# Patient Record
Sex: MALE | Race: BLACK OR AFRICAN AMERICAN | Employment: OTHER | URBAN - METROPOLITAN AREA
[De-identification: names, ages, dates, MRNs, and addresses within clinical notes are randomized per-mention and may not be internally consistent; named-entity substitution may affect disease eponyms.]

---

## 2017-01-09 ENCOUNTER — HOME CARE VISIT (OUTPATIENT)
Dept: HOME HEALTH SERVICES | Facility: HOME HEALTH | Age: 62
End: 2017-01-09

## 2017-01-11 ENCOUNTER — HOME HEALTH ADMISSION (OUTPATIENT)
Dept: HOME HEALTH SERVICES | Facility: HOME HEALTH | Age: 62
End: 2017-01-11
Payer: COMMERCIAL

## 2017-01-17 ENCOUNTER — HOME CARE VISIT (OUTPATIENT)
Dept: HOME HEALTH SERVICES | Facility: HOME HEALTH | Age: 62
End: 2017-01-17
Payer: COMMERCIAL

## 2017-01-18 ENCOUNTER — HOME CARE VISIT (OUTPATIENT)
Dept: SCHEDULING | Facility: HOME HEALTH | Age: 62
End: 2017-01-18
Payer: COMMERCIAL

## 2017-01-18 PROCEDURE — 400013 HH SOC

## 2017-01-18 PROCEDURE — G0299 HHS/HOSPICE OF RN EA 15 MIN: HCPCS

## 2017-01-19 ENCOUNTER — HOME CARE VISIT (OUTPATIENT)
Dept: HOME HEALTH SERVICES | Facility: HOME HEALTH | Age: 62
End: 2017-01-19
Payer: COMMERCIAL

## 2017-01-21 ENCOUNTER — HOME CARE VISIT (OUTPATIENT)
Dept: SCHEDULING | Facility: HOME HEALTH | Age: 62
End: 2017-01-21
Payer: COMMERCIAL

## 2017-01-21 VITALS
OXYGEN SATURATION: 98 % | WEIGHT: 210 LBS | TEMPERATURE: 98 F | DIASTOLIC BLOOD PRESSURE: 76 MMHG | BODY MASS INDEX: 29.4 KG/M2 | SYSTOLIC BLOOD PRESSURE: 138 MMHG | RESPIRATION RATE: 20 BRPM | HEART RATE: 68 BPM | HEIGHT: 71 IN

## 2017-01-21 VITALS — SYSTOLIC BLOOD PRESSURE: 128 MMHG | TEMPERATURE: 97 F | HEART RATE: 80 BPM | DIASTOLIC BLOOD PRESSURE: 76 MMHG

## 2017-01-21 PROCEDURE — G0299 HHS/HOSPICE OF RN EA 15 MIN: HCPCS

## 2017-01-24 ENCOUNTER — HOME CARE VISIT (OUTPATIENT)
Dept: SCHEDULING | Facility: HOME HEALTH | Age: 62
End: 2017-01-24
Payer: COMMERCIAL

## 2017-01-24 VITALS
HEART RATE: 68 BPM | TEMPERATURE: 98 F | RESPIRATION RATE: 20 BRPM | OXYGEN SATURATION: 97 % | SYSTOLIC BLOOD PRESSURE: 142 MMHG | DIASTOLIC BLOOD PRESSURE: 80 MMHG

## 2017-01-24 PROCEDURE — G0299 HHS/HOSPICE OF RN EA 15 MIN: HCPCS

## 2017-01-27 ENCOUNTER — HOME CARE VISIT (OUTPATIENT)
Dept: SCHEDULING | Facility: HOME HEALTH | Age: 62
End: 2017-01-27
Payer: COMMERCIAL

## 2017-01-27 VITALS
SYSTOLIC BLOOD PRESSURE: 128 MMHG | HEART RATE: 68 BPM | RESPIRATION RATE: 20 BRPM | OXYGEN SATURATION: 97 % | TEMPERATURE: 98 F | DIASTOLIC BLOOD PRESSURE: 88 MMHG

## 2017-01-27 PROCEDURE — G0299 HHS/HOSPICE OF RN EA 15 MIN: HCPCS

## 2017-01-31 ENCOUNTER — HOME CARE VISIT (OUTPATIENT)
Dept: SCHEDULING | Facility: HOME HEALTH | Age: 62
End: 2017-01-31
Payer: COMMERCIAL

## 2017-01-31 VITALS
RESPIRATION RATE: 20 BRPM | HEART RATE: 68 BPM | OXYGEN SATURATION: 98 % | DIASTOLIC BLOOD PRESSURE: 76 MMHG | TEMPERATURE: 98 F | SYSTOLIC BLOOD PRESSURE: 136 MMHG

## 2017-01-31 PROCEDURE — G0299 HHS/HOSPICE OF RN EA 15 MIN: HCPCS

## 2017-02-03 ENCOUNTER — HOME CARE VISIT (OUTPATIENT)
Dept: SCHEDULING | Facility: HOME HEALTH | Age: 62
End: 2017-02-03
Payer: COMMERCIAL

## 2017-02-03 PROCEDURE — G0299 HHS/HOSPICE OF RN EA 15 MIN: HCPCS

## 2017-02-05 ENCOUNTER — HOME CARE VISIT (OUTPATIENT)
Dept: HOME HEALTH SERVICES | Facility: HOME HEALTH | Age: 62
End: 2017-02-05
Payer: COMMERCIAL

## 2017-02-05 VITALS
RESPIRATION RATE: 20 BRPM | DIASTOLIC BLOOD PRESSURE: 80 MMHG | OXYGEN SATURATION: 98 % | SYSTOLIC BLOOD PRESSURE: 128 MMHG | HEART RATE: 68 BPM | TEMPERATURE: 97 F

## 2017-02-06 ENCOUNTER — HOME CARE VISIT (OUTPATIENT)
Dept: SCHEDULING | Facility: HOME HEALTH | Age: 62
End: 2017-02-06
Payer: COMMERCIAL

## 2017-02-06 PROCEDURE — G0299 HHS/HOSPICE OF RN EA 15 MIN: HCPCS

## 2017-02-08 ENCOUNTER — HOME CARE VISIT (OUTPATIENT)
Dept: SCHEDULING | Facility: HOME HEALTH | Age: 62
End: 2017-02-08
Payer: COMMERCIAL

## 2017-02-08 PROCEDURE — G0151 HHCP-SERV OF PT,EA 15 MIN: HCPCS

## 2017-02-09 ENCOUNTER — HOME CARE VISIT (OUTPATIENT)
Dept: HOME HEALTH SERVICES | Facility: HOME HEALTH | Age: 62
End: 2017-02-09
Payer: COMMERCIAL

## 2017-02-09 ENCOUNTER — HOME CARE VISIT (OUTPATIENT)
Dept: SCHEDULING | Facility: HOME HEALTH | Age: 62
End: 2017-02-09
Payer: COMMERCIAL

## 2017-02-09 VITALS — SYSTOLIC BLOOD PRESSURE: 115 MMHG | OXYGEN SATURATION: 96 % | HEART RATE: 102 BPM | DIASTOLIC BLOOD PRESSURE: 60 MMHG

## 2017-02-09 VITALS
HEART RATE: 91 BPM | SYSTOLIC BLOOD PRESSURE: 150 MMHG | OXYGEN SATURATION: 98 % | DIASTOLIC BLOOD PRESSURE: 90 MMHG | TEMPERATURE: 98.8 F

## 2017-02-09 PROCEDURE — G0151 HHCP-SERV OF PT,EA 15 MIN: HCPCS

## 2017-02-09 PROCEDURE — G0299 HHS/HOSPICE OF RN EA 15 MIN: HCPCS

## 2017-02-10 ENCOUNTER — HOME CARE VISIT (OUTPATIENT)
Dept: SCHEDULING | Facility: HOME HEALTH | Age: 62
End: 2017-02-10
Payer: COMMERCIAL

## 2017-02-10 VITALS — HEART RATE: 80 BPM | OXYGEN SATURATION: 94 % | SYSTOLIC BLOOD PRESSURE: 118 MMHG | DIASTOLIC BLOOD PRESSURE: 80 MMHG

## 2017-02-10 PROCEDURE — G0157 HHC PT ASSISTANT EA 15: HCPCS

## 2017-02-12 VITALS
RESPIRATION RATE: 16 BRPM | OXYGEN SATURATION: 99 % | HEART RATE: 98 BPM | SYSTOLIC BLOOD PRESSURE: 140 MMHG | TEMPERATURE: 96.5 F | DIASTOLIC BLOOD PRESSURE: 82 MMHG

## 2017-02-13 ENCOUNTER — HOME CARE VISIT (OUTPATIENT)
Dept: SCHEDULING | Facility: HOME HEALTH | Age: 62
End: 2017-02-13
Payer: COMMERCIAL

## 2017-02-13 VITALS — OXYGEN SATURATION: 95 % | SYSTOLIC BLOOD PRESSURE: 150 MMHG | DIASTOLIC BLOOD PRESSURE: 100 MMHG | HEART RATE: 102 BPM

## 2017-02-13 PROCEDURE — G0157 HHC PT ASSISTANT EA 15: HCPCS

## 2017-02-14 VITALS
TEMPERATURE: 98.1 F | DIASTOLIC BLOOD PRESSURE: 70 MMHG | OXYGEN SATURATION: 97 % | RESPIRATION RATE: 16 BRPM | SYSTOLIC BLOOD PRESSURE: 144 MMHG | HEART RATE: 92 BPM

## 2017-02-15 ENCOUNTER — HOME CARE VISIT (OUTPATIENT)
Dept: HOME HEALTH SERVICES | Facility: HOME HEALTH | Age: 62
End: 2017-02-15
Payer: COMMERCIAL

## 2017-02-15 ENCOUNTER — HOME CARE VISIT (OUTPATIENT)
Dept: SCHEDULING | Facility: HOME HEALTH | Age: 62
End: 2017-02-15
Payer: COMMERCIAL

## 2017-02-16 ENCOUNTER — HOME CARE VISIT (OUTPATIENT)
Dept: SCHEDULING | Facility: HOME HEALTH | Age: 62
End: 2017-02-16
Payer: COMMERCIAL

## 2017-02-16 ENCOUNTER — HOME CARE VISIT (OUTPATIENT)
Dept: HOME HEALTH SERVICES | Facility: HOME HEALTH | Age: 62
End: 2017-02-16
Payer: COMMERCIAL

## 2017-02-16 VITALS
RESPIRATION RATE: 16 BRPM | OXYGEN SATURATION: 98 % | SYSTOLIC BLOOD PRESSURE: 130 MMHG | HEART RATE: 83 BPM | DIASTOLIC BLOOD PRESSURE: 78 MMHG

## 2017-02-16 VITALS
HEART RATE: 86 BPM | SYSTOLIC BLOOD PRESSURE: 130 MMHG | OXYGEN SATURATION: 95 % | DIASTOLIC BLOOD PRESSURE: 80 MMHG | TEMPERATURE: 98.4 F

## 2017-02-16 PROCEDURE — G0157 HHC PT ASSISTANT EA 15: HCPCS

## 2017-02-16 PROCEDURE — G0299 HHS/HOSPICE OF RN EA 15 MIN: HCPCS

## 2017-02-17 ENCOUNTER — HOME CARE VISIT (OUTPATIENT)
Dept: SCHEDULING | Facility: HOME HEALTH | Age: 62
End: 2017-02-17
Payer: COMMERCIAL

## 2017-02-17 VITALS — OXYGEN SATURATION: 100 % | SYSTOLIC BLOOD PRESSURE: 120 MMHG | DIASTOLIC BLOOD PRESSURE: 70 MMHG | HEART RATE: 98 BPM

## 2017-02-17 PROCEDURE — G0157 HHC PT ASSISTANT EA 15: HCPCS

## 2017-02-20 ENCOUNTER — HOME CARE VISIT (OUTPATIENT)
Dept: SCHEDULING | Facility: HOME HEALTH | Age: 62
End: 2017-02-20
Payer: COMMERCIAL

## 2017-02-20 VITALS
HEART RATE: 89 BPM | SYSTOLIC BLOOD PRESSURE: 116 MMHG | TEMPERATURE: 98.6 F | OXYGEN SATURATION: 97 % | DIASTOLIC BLOOD PRESSURE: 70 MMHG

## 2017-02-20 PROCEDURE — G0157 HHC PT ASSISTANT EA 15: HCPCS

## 2017-02-20 PROCEDURE — G0299 HHS/HOSPICE OF RN EA 15 MIN: HCPCS

## 2017-02-21 ENCOUNTER — HOME CARE VISIT (OUTPATIENT)
Dept: HOME HEALTH SERVICES | Facility: HOME HEALTH | Age: 62
End: 2017-02-21
Payer: COMMERCIAL

## 2017-02-22 ENCOUNTER — HOME CARE VISIT (OUTPATIENT)
Dept: SCHEDULING | Facility: HOME HEALTH | Age: 62
End: 2017-02-22
Payer: COMMERCIAL

## 2017-02-22 VITALS
SYSTOLIC BLOOD PRESSURE: 148 MMHG | OXYGEN SATURATION: 97 % | HEART RATE: 74 BPM | DIASTOLIC BLOOD PRESSURE: 92 MMHG | RESPIRATION RATE: 20 BRPM | TEMPERATURE: 98 F

## 2017-02-22 VITALS — SYSTOLIC BLOOD PRESSURE: 120 MMHG | OXYGEN SATURATION: 98 % | DIASTOLIC BLOOD PRESSURE: 70 MMHG | HEART RATE: 97 BPM

## 2017-02-22 PROCEDURE — G0157 HHC PT ASSISTANT EA 15: HCPCS

## 2017-02-23 ENCOUNTER — HOME CARE VISIT (OUTPATIENT)
Dept: SCHEDULING | Facility: HOME HEALTH | Age: 62
End: 2017-02-23
Payer: COMMERCIAL

## 2017-02-23 PROCEDURE — G0299 HHS/HOSPICE OF RN EA 15 MIN: HCPCS

## 2017-02-24 ENCOUNTER — HOME CARE VISIT (OUTPATIENT)
Dept: SCHEDULING | Facility: HOME HEALTH | Age: 62
End: 2017-02-24
Payer: COMMERCIAL

## 2017-02-24 VITALS — OXYGEN SATURATION: 97 % | SYSTOLIC BLOOD PRESSURE: 124 MMHG | HEART RATE: 82 BPM | DIASTOLIC BLOOD PRESSURE: 82 MMHG

## 2017-02-24 PROCEDURE — G0157 HHC PT ASSISTANT EA 15: HCPCS

## 2017-02-26 VITALS
DIASTOLIC BLOOD PRESSURE: 88 MMHG | OXYGEN SATURATION: 98 % | SYSTOLIC BLOOD PRESSURE: 140 MMHG | HEART RATE: 103 BPM | RESPIRATION RATE: 16 BRPM | TEMPERATURE: 97 F

## 2017-02-27 ENCOUNTER — HOME CARE VISIT (OUTPATIENT)
Dept: SCHEDULING | Facility: HOME HEALTH | Age: 62
End: 2017-02-27
Payer: COMMERCIAL

## 2017-02-27 VITALS
DIASTOLIC BLOOD PRESSURE: 80 MMHG | OXYGEN SATURATION: 96 % | SYSTOLIC BLOOD PRESSURE: 132 MMHG | TEMPERATURE: 97 F | HEART RATE: 74 BPM | RESPIRATION RATE: 20 BRPM

## 2017-02-27 PROCEDURE — G0299 HHS/HOSPICE OF RN EA 15 MIN: HCPCS

## 2017-02-28 ENCOUNTER — HOME CARE VISIT (OUTPATIENT)
Dept: HOME HEALTH SERVICES | Facility: HOME HEALTH | Age: 62
End: 2017-02-28
Payer: COMMERCIAL

## 2017-02-28 VITALS
HEART RATE: 75 BPM | TEMPERATURE: 97.2 F | OXYGEN SATURATION: 95 % | SYSTOLIC BLOOD PRESSURE: 138 MMHG | DIASTOLIC BLOOD PRESSURE: 90 MMHG

## 2017-02-28 PROCEDURE — G0151 HHCP-SERV OF PT,EA 15 MIN: HCPCS

## 2017-03-02 ENCOUNTER — HOME CARE VISIT (OUTPATIENT)
Dept: SCHEDULING | Facility: HOME HEALTH | Age: 62
End: 2017-03-02
Payer: COMMERCIAL

## 2017-03-02 PROCEDURE — G0299 HHS/HOSPICE OF RN EA 15 MIN: HCPCS

## 2017-03-05 VITALS
SYSTOLIC BLOOD PRESSURE: 160 MMHG | TEMPERATURE: 96.8 F | DIASTOLIC BLOOD PRESSURE: 100 MMHG | HEART RATE: 85 BPM | OXYGEN SATURATION: 98 % | RESPIRATION RATE: 16 BRPM

## 2017-03-07 ENCOUNTER — HOME CARE VISIT (OUTPATIENT)
Dept: SCHEDULING | Facility: HOME HEALTH | Age: 62
End: 2017-03-07
Payer: COMMERCIAL

## 2017-03-07 PROCEDURE — G0299 HHS/HOSPICE OF RN EA 15 MIN: HCPCS

## 2017-03-09 ENCOUNTER — HOME CARE VISIT (OUTPATIENT)
Dept: SCHEDULING | Facility: HOME HEALTH | Age: 62
End: 2017-03-09
Payer: COMMERCIAL

## 2017-03-09 VITALS
DIASTOLIC BLOOD PRESSURE: 82 MMHG | HEART RATE: 89 BPM | OXYGEN SATURATION: 97 % | RESPIRATION RATE: 16 BRPM | TEMPERATURE: 98.2 F | SYSTOLIC BLOOD PRESSURE: 130 MMHG

## 2017-03-09 VITALS
SYSTOLIC BLOOD PRESSURE: 138 MMHG | RESPIRATION RATE: 20 BRPM | TEMPERATURE: 97.6 F | HEART RATE: 76 BPM | DIASTOLIC BLOOD PRESSURE: 76 MMHG | OXYGEN SATURATION: 97 %

## 2017-03-09 PROCEDURE — G0299 HHS/HOSPICE OF RN EA 15 MIN: HCPCS

## 2017-03-14 ENCOUNTER — HOME CARE VISIT (OUTPATIENT)
Dept: SCHEDULING | Facility: HOME HEALTH | Age: 62
End: 2017-03-14
Payer: COMMERCIAL

## 2017-03-14 PROCEDURE — G0299 HHS/HOSPICE OF RN EA 15 MIN: HCPCS

## 2017-03-15 VITALS
TEMPERATURE: 97 F | RESPIRATION RATE: 18 BRPM | OXYGEN SATURATION: 98 % | DIASTOLIC BLOOD PRESSURE: 78 MMHG | SYSTOLIC BLOOD PRESSURE: 138 MMHG | HEART RATE: 78 BPM

## 2017-03-16 ENCOUNTER — HOME CARE VISIT (OUTPATIENT)
Dept: HOME HEALTH SERVICES | Facility: HOME HEALTH | Age: 62
End: 2017-03-16
Payer: COMMERCIAL

## 2017-03-17 ENCOUNTER — HOSPITAL ENCOUNTER (EMERGENCY)
Age: 62
Discharge: HOME | End: 2017-03-17
Attending: EMERGENCY MEDICINE
Payer: COMMERCIAL

## 2017-03-17 ENCOUNTER — APPOINTMENT (OUTPATIENT)
Dept: GENERAL RADIOLOGY | Age: 62
End: 2017-03-17
Attending: EMERGENCY MEDICINE
Payer: COMMERCIAL

## 2017-03-17 ENCOUNTER — HOME CARE VISIT (OUTPATIENT)
Dept: HOME HEALTH SERVICES | Facility: HOME HEALTH | Age: 62
End: 2017-03-17
Payer: COMMERCIAL

## 2017-03-17 VITALS
TEMPERATURE: 98.8 F | HEART RATE: 99 BPM | BODY MASS INDEX: 26.78 KG/M2 | DIASTOLIC BLOOD PRESSURE: 93 MMHG | RESPIRATION RATE: 13 BRPM | WEIGHT: 192 LBS | OXYGEN SATURATION: 100 % | SYSTOLIC BLOOD PRESSURE: 153 MMHG

## 2017-03-17 DIAGNOSIS — R06.6 HICCUPS: Primary | ICD-10-CM

## 2017-03-17 LAB
ALBUMIN SERPL BCP-MCNC: 3.6 G/DL (ref 3.4–5)
ALBUMIN/GLOB SERPL: 0.7 {RATIO} (ref 0.8–1.7)
ALP SERPL-CCNC: 91 U/L (ref 45–117)
ALT SERPL-CCNC: 16 U/L (ref 16–61)
AMPHET UR QL SCN: NEGATIVE
ANION GAP BLD CALC-SCNC: 4 MMOL/L (ref 3–18)
AST SERPL W P-5'-P-CCNC: 10 U/L (ref 15–37)
BARBITURATES UR QL SCN: NEGATIVE
BASOPHILS # BLD AUTO: 0 K/UL (ref 0–0.1)
BASOPHILS # BLD: 0 % (ref 0–2)
BENZODIAZ UR QL: NEGATIVE
BILIRUB SERPL-MCNC: 0.4 MG/DL (ref 0.2–1)
BUN SERPL-MCNC: 11 MG/DL (ref 7–18)
BUN/CREAT SERPL: 11 (ref 12–20)
CALCIUM SERPL-MCNC: 9 MG/DL (ref 8.5–10.1)
CANNABINOIDS UR QL SCN: NEGATIVE
CHLORIDE SERPL-SCNC: 101 MMOL/L (ref 100–108)
CO2 SERPL-SCNC: 33 MMOL/L (ref 21–32)
COCAINE UR QL SCN: NEGATIVE
CREAT SERPL-MCNC: 0.97 MG/DL (ref 0.6–1.3)
DIFFERENTIAL METHOD BLD: NORMAL
EOSINOPHIL # BLD: 0.1 K/UL (ref 0–0.4)
EOSINOPHIL NFR BLD: 2 % (ref 0–5)
ERYTHROCYTE [DISTWIDTH] IN BLOOD BY AUTOMATED COUNT: 13.8 % (ref 11.6–14.5)
ETHANOL SERPL-MCNC: <3 MG/DL (ref 0–3)
GLOBULIN SER CALC-MCNC: 5 G/DL (ref 2–4)
GLUCOSE SERPL-MCNC: 94 MG/DL (ref 74–99)
HCT VFR BLD AUTO: 38.8 % (ref 36–48)
HDSCOM,HDSCOM: NORMAL
HGB BLD-MCNC: 13 G/DL (ref 13–16)
LYMPHOCYTES # BLD AUTO: 31 % (ref 21–52)
LYMPHOCYTES # BLD: 2.1 K/UL (ref 0.9–3.6)
MCH RBC QN AUTO: 27.1 PG (ref 24–34)
MCHC RBC AUTO-ENTMCNC: 33.5 G/DL (ref 31–37)
MCV RBC AUTO: 80.8 FL (ref 74–97)
METHADONE UR QL: NEGATIVE
MONOCYTES # BLD: 0.5 K/UL (ref 0.05–1.2)
MONOCYTES NFR BLD AUTO: 7 % (ref 3–10)
NEUTS SEG # BLD: 4.2 K/UL (ref 1.8–8)
NEUTS SEG NFR BLD AUTO: 60 % (ref 40–73)
OPIATES UR QL: NEGATIVE
PCP UR QL: NEGATIVE
PLATELET # BLD AUTO: 247 K/UL (ref 135–420)
PMV BLD AUTO: 10.3 FL (ref 9.2–11.8)
POTASSIUM SERPL-SCNC: 3.9 MMOL/L (ref 3.5–5.5)
PROT SERPL-MCNC: 8.6 G/DL (ref 6.4–8.2)
RBC # BLD AUTO: 4.8 M/UL (ref 4.7–5.5)
SODIUM SERPL-SCNC: 138 MMOL/L (ref 136–145)
WBC # BLD AUTO: 6.9 K/UL (ref 4.6–13.2)

## 2017-03-17 PROCEDURE — 71010 XR CHEST PORT: CPT

## 2017-03-17 PROCEDURE — 74011250637 HC RX REV CODE- 250/637: Performed by: EMERGENCY MEDICINE

## 2017-03-17 PROCEDURE — 99285 EMERGENCY DEPT VISIT HI MDM: CPT

## 2017-03-17 PROCEDURE — 85025 COMPLETE CBC W/AUTO DIFF WBC: CPT | Performed by: EMERGENCY MEDICINE

## 2017-03-17 PROCEDURE — 80307 DRUG TEST PRSMV CHEM ANLYZR: CPT | Performed by: EMERGENCY MEDICINE

## 2017-03-17 PROCEDURE — 80053 COMPREHEN METABOLIC PANEL: CPT | Performed by: EMERGENCY MEDICINE

## 2017-03-17 RX ORDER — CHLORPROMAZINE HYDROCHLORIDE 50 MG/1
50 TABLET, FILM COATED ORAL
Qty: 9 TAB | Refills: 0 | Status: ON HOLD | OUTPATIENT
Start: 2017-03-17 | End: 2017-07-13 | Stop reason: SDUPTHER

## 2017-03-17 RX ORDER — CHLORPROMAZINE HYDROCHLORIDE 50 MG/1
50 TABLET, FILM COATED ORAL ONCE
Status: COMPLETED | OUTPATIENT
Start: 2017-03-17 | End: 2017-03-17

## 2017-03-17 RX ADMIN — CHLORPROMAZINE HYDROCHLORIDE 50 MG: 50 TABLET, SUGAR COATED ORAL at 11:01

## 2017-03-17 NOTE — DISCHARGE INSTRUCTIONS
Hiccups: Care Instructions  Your Care Instructions    Hiccups occur when a spasm contracts the diaphragm, a large sheet of muscle that separates the chest cavity from the abdominal cavity. The spasm causes an intake of breath that is suddenly stopped by the closure of the vocal cords (glottis). This closure causes the \"hiccup\" sound. A very full stomach can cause hiccups that go away on their own. A full stomach can be caused by things like eating too much food too quickly or swallowing too much air. Most hiccups go away on their own within a few minutes to a few hours and do not require any treatment. Hiccups that last longer than 48 hours are called persistent hiccups. Hiccups that last longer than a month are called intractable hiccups. Both persistent and intractable hiccups may be a sign of a more serious health problem. Follow-up care is a key part of your treatment and safety. Be sure to make and go to all appointments, and call your doctor if you are having problems. It's also a good idea to know your test results and keep a list of the medicines you take. How can you care for yourself at home? · Try these safe and easy home remedies if your hiccups are making you uncomfortable. ¨ Eat a teaspoon of sugar or honey. ¨ Hold your breath and count slowly to 10. ¨ Quickly drink a glass of cold water. · If your doctor prescribed medicine, take it as directed. Call your doctor if you think you are having a problem with your medicine. To help prevent hiccups  · Take steps to avoid swallowing air:  ¨ Eat slowly. Avoid gulping food or beverages. ¨ Chew your food thoroughly before you swallow. ¨ Avoid drinking through a straw. ¨ Avoid chewing gum or eating hard candy. ¨ Do not smoke or use other tobacco products. ¨ If you wear dentures, check with a dentist to make sure they fit properly. · Do not eat large meals. · Do not drink alcohol.   · Avoid sudden changes in stomach temperature, such as drinking a hot beverage and then a cold beverage. · Avoid emotional stress or excitement. When should you call for help? Call your doctor now or seek immediate medical care if:  · You have trouble swallowing and are unable to swallow food or fluids. · You have hiccups for more than 2 days. · You have new symptoms, such as belly pain, constipation, diarrhea, heartburn, or vomiting. Watch closely for changes in your health, and be sure to contact your doctor if:  · You have trouble swallowing but are able to swallow food and fluids. · Hiccups occur often and get in the way of your activities. · You think medicine may be causing your symptoms. · You do not get better as expected. Where can you learn more? Go to http://lainey-millie.info/. Enter D892 in the search box to learn more about \"Hiccups: Care Instructions. \"  Current as of: May 27, 2016  Content Version: 11.1  © 3312-7590 [employer]. Care instructions adapted under license by [employer] (which disclaims liability or warranty for this information). If you have questions about a medical condition or this instruction, always ask your healthcare professional. [address] any warranty or liability for your use of this information.

## 2017-03-17 NOTE — ED NOTES
PT cleaned of bowel movement and linens changed, offered PT food and beverage which he denied, offered food and beverage and PT accepted, positioned in bed to eat, safety intact, will continue to monitor

## 2017-03-17 NOTE — ED NOTES
PT was discharged per MD orders. PT had friend at bedside. PT and friend were given discharge information and both verbalized understanding. PT was escorted from ED via wheelchair.

## 2017-03-17 NOTE — ED PROVIDER NOTES
HPI Comments: 9:52 AM Jaden Jones is a 58 y.o. male with a history of HTN, chronic hiccups who presents to the ED c/o hiccups onset ~3 days ago. Pt was at the doctor yesterday, was not experiencing them, got home started back up. Pt is on medication for hiccups, did not take today. Pt states they \"just cut my air off\". Denies any shortness of breath, chest pain, fevers or any other complaints. PCP: Shanelle Phillips MD      The history is provided by the patient. Past Medical History:   Diagnosis Date    Hiccups     Hypertension     Hyponatremia     Lower extremity edema     Psychogenic polydipsia     Spinal stenosis        Past Surgical History:   Procedure Laterality Date    HX HEENT      pt reports past hx of surgery on ears unsure of what type         Family History:   Problem Relation Age of Onset    Hypertension Other        Social History     Social History    Marital status:      Spouse name: N/A    Number of children: N/A    Years of education: N/A     Occupational History    Not on file. Social History Main Topics    Smoking status: Former Smoker     Packs/day: 0.50    Smokeless tobacco: Not on file    Alcohol use No    Drug use: No    Sexual activity: Not Currently     Other Topics Concern    Not on file     Social History Narrative         ALLERGIES: Review of patient's allergies indicates no known allergies. Review of Systems   HENT: Negative for congestion. Eyes: Negative for visual disturbance. Respiratory: Negative for shortness of breath. Cardiovascular: Negative for chest pain. Gastrointestinal: Negative for abdominal pain and nausea. Genitourinary: Negative for dysuria. Musculoskeletal: Negative. Neurological: Negative for light-headedness and headaches. All other systems reviewed and are negative.       Vitals:    03/17/17 0920 03/17/17 0945   BP: (!) 137/93 (!) 162/93   Pulse: 85 84   Resp: 18 17   Temp: 98.8 °F (37.1 °C) SpO2: 97% 100%   Weight: 87.1 kg (192 lb)             Physical Exam   Constitutional: He is oriented to person, place, and time. No distress. HENT:   Head: Atraumatic. Eyes: Conjunctivae are normal.   Neck: Normal range of motion. Neck supple. Cardiovascular: Normal rate, regular rhythm and normal heart sounds. Pulmonary/Chest: Effort normal and breath sounds normal. No respiratory distress. He exhibits no tenderness. Abdominal: Soft. Bowel sounds are normal. He exhibits no distension. There is no tenderness. There is no rebound and no guarding. Musculoskeletal: Normal range of motion. He exhibits edema ( BLE). He exhibits no tenderness. Neurological: He is alert and oriented to person, place, and time. He has normal strength. No cranial nerve deficit or sensory deficit. Skin: Skin is warm and dry. Psychiatric: He has a normal mood and affect. Nursing note and vitals reviewed. MDM  Number of Diagnoses or Management Options  Elevated blood pressure:   Hiccups:   Diagnosis management comments: Stephanie Guerrero is a 58 y.o. male presenting with complaints of acute exacerbation of chronic hiccups. Does not have any currently. Is out of his thorazine. Triage note states pt endorsed sob. On my exam pt denies any sob states his hiccups \"take his breath\". Pt resting comfortably on my exam. BLE edema with bandages in place, followed as outpt. Asymptomatic hypertension. Labs unremarkable, no further work up indicated at this time. Will give prescription for thorazine and I have instructed pt to follow up with pcp as scheduled next week. Return precautions discussed. Patient stated verbal understanding and agrees with course and plan.        ED Course       Procedures      Vitals:  Patient Vitals for the past 12 hrs:   Temp Pulse Resp BP SpO2   03/17/17 0945 - 84 17 (!) 162/93 100 %   03/17/17 0920 98.8 °F (37.1 °C) 85 18 (!) 137/93 97 %         X-Ray, CT or other radiology findings or impressions:  XR CHEST PORT   Final Result            Disposition:  Diagnosis:   1. Hiccups    2. Elevated blood pressure        Disposition: Discharged home in stable condition      Scribe Attestation:     Bettie Venegas, scribing for and in the presence of Dilan Bhandari MD March 17, 2017 at 10:43 AM   Signed by: eParl Gatica March 17, 2017, 10:43 AM      Physician Attestation:   I personally performed the services described in this documentation, reviewed and edited the documentation which was dictated to the scribe in my presence, and it accurately records my words and actions.  Dilan Bhandari MD March 17, 2017

## 2017-03-17 NOTE — BSMART NOTE
Comprehensive Assessment Form Part 1      Section l - Integrated Summary    Summary:  58year old male who presented to the ER for treatment for chronic hiccups. Upon discharge from the ER made comment he was going to \" blow his brains out. \" when he goes home. Interviewed in room 17 @ the request of Dr. Carla Campo. Patient lying quietly on ER bed. Calm presentation. No hiccupping. Patient questioned on comment he made about blowing his brains out. Stated he is tired of having hiccups and wants them to stop. Admits making comment out of frustration. \" Is there an operation I can have to stop the hiccups? \" Patient is prescribed Thorazine for hiccups. Patient best friend at the bedside. Stated he is normally at home but was at work when his latest hiccup episode happened. Friend stated \" I'm not going to let anything happen to him. \" No guns in the home per friend. Patient with friends support now denied being suicidal. Contracted for safety to go home. Gave a smile and a wink at the end of our interview. Section ll - Disposition      The Medical Doctor to Psychiatrist conference was not completed. The Medical Doctor is in agreement with Psychiatrist disposition because of (reason) patient denied being suicidal and verbally contacted for safety to be discharged home. .  The plan is discussed with Dr. Carla Campo plan to discharge from the ER. Referred to outpatient follow up with PCP and ask if any other treatment for hiccups.     Cory Flores RN

## 2017-03-17 NOTE — ED NOTES
PT resting in bed, warm blanket provided, no other needs voiced, safety intact, will continue to monitor

## 2017-03-17 NOTE — ED NOTES
PROGRESS NOTE:    At the time of initial discharge patient expressed desire to die because of his frustration with his hiccups. I was called into the room and patient with poor insight into this and continues to request a surgery to cure his hiccups. I have discussed with pt that there is no surgery for this and he is on thorazine which I am refilling. Pt with no hiccups during stay in ED. Crisis consulted, appreciate their assistance. [place] has evaluated pt and spoken with him and friend at bedside. Pt able to contract for safety, denies any actual desire to die but states he was just frustrated. Pt and friend feel comfortable with outpt follow up and Return precautions discussed. Patient stated verbal understanding and agrees with course and plan.        Leisa Rothman MD

## 2017-03-17 NOTE — ED NOTES
PT verbally upset about discharge, states \"I am going to blow my brains out when I get home because Im sick of this\", informed Dr. Nishi Way of PT threat to self harm, Dr. Nishi Way at bedside, discharge held at this time, safety intact, will continue to monitor

## 2017-03-17 NOTE — ED NOTES
PT reports shortness of breath started last night, hiccups lasing \"10 years\", RR 18 even unlabored,  Ax4, bandages to bilateral lower legs with drainage noted, +1 pitting edema to top of feet, edema noted to bilateral lower legs, bilateral lower feet cold to touch +1 pulse

## 2017-03-17 NOTE — ED NOTES
Phone provided to PT to call son for ride home after discharge, safety intact, will continue to monitor

## 2017-03-17 NOTE — ED NOTES
PT's son at bedside, updated son on plan of care and PT condition, verbalized understanding and left ER

## 2017-03-18 ENCOUNTER — HOME CARE VISIT (OUTPATIENT)
Dept: SCHEDULING | Facility: HOME HEALTH | Age: 62
End: 2017-03-18
Payer: COMMERCIAL

## 2017-03-18 VITALS
TEMPERATURE: 98 F | RESPIRATION RATE: 16 BRPM | SYSTOLIC BLOOD PRESSURE: 122 MMHG | HEART RATE: 80 BPM | DIASTOLIC BLOOD PRESSURE: 78 MMHG | OXYGEN SATURATION: 98 %

## 2017-03-18 PROCEDURE — G0299 HHS/HOSPICE OF RN EA 15 MIN: HCPCS

## 2017-03-21 ENCOUNTER — HOME CARE VISIT (OUTPATIENT)
Dept: SCHEDULING | Facility: HOME HEALTH | Age: 62
End: 2017-03-21
Payer: COMMERCIAL

## 2017-03-21 PROCEDURE — 400014 HH F/U

## 2017-03-21 PROCEDURE — G0299 HHS/HOSPICE OF RN EA 15 MIN: HCPCS

## 2017-03-22 VITALS
HEART RATE: 84 BPM | SYSTOLIC BLOOD PRESSURE: 138 MMHG | TEMPERATURE: 96.7 F | OXYGEN SATURATION: 98 % | RESPIRATION RATE: 20 BRPM | DIASTOLIC BLOOD PRESSURE: 86 MMHG

## 2017-03-24 ENCOUNTER — HOME CARE VISIT (OUTPATIENT)
Dept: SCHEDULING | Facility: HOME HEALTH | Age: 62
End: 2017-03-24
Payer: COMMERCIAL

## 2017-03-24 PROCEDURE — G0299 HHS/HOSPICE OF RN EA 15 MIN: HCPCS

## 2017-03-25 VITALS
SYSTOLIC BLOOD PRESSURE: 150 MMHG | TEMPERATURE: 97.6 F | OXYGEN SATURATION: 96 % | RESPIRATION RATE: 20 BRPM | DIASTOLIC BLOOD PRESSURE: 98 MMHG | HEART RATE: 86 BPM

## 2017-03-28 ENCOUNTER — HOME CARE VISIT (OUTPATIENT)
Dept: HOME HEALTH SERVICES | Facility: HOME HEALTH | Age: 62
End: 2017-03-28
Payer: COMMERCIAL

## 2017-03-28 ENCOUNTER — HOME CARE VISIT (OUTPATIENT)
Dept: SCHEDULING | Facility: HOME HEALTH | Age: 62
End: 2017-03-28
Payer: COMMERCIAL

## 2017-03-28 PROCEDURE — G0299 HHS/HOSPICE OF RN EA 15 MIN: HCPCS

## 2017-03-31 ENCOUNTER — HOME CARE VISIT (OUTPATIENT)
Dept: SCHEDULING | Facility: HOME HEALTH | Age: 62
End: 2017-03-31
Payer: COMMERCIAL

## 2017-03-31 PROCEDURE — G0299 HHS/HOSPICE OF RN EA 15 MIN: HCPCS

## 2017-04-02 VITALS
HEART RATE: 68 BPM | DIASTOLIC BLOOD PRESSURE: 84 MMHG | DIASTOLIC BLOOD PRESSURE: 78 MMHG | TEMPERATURE: 97 F | SYSTOLIC BLOOD PRESSURE: 146 MMHG | RESPIRATION RATE: 20 BRPM | SYSTOLIC BLOOD PRESSURE: 138 MMHG | OXYGEN SATURATION: 98 % | HEART RATE: 80 BPM | OXYGEN SATURATION: 98 % | RESPIRATION RATE: 20 BRPM | TEMPERATURE: 97 F

## 2017-04-04 ENCOUNTER — HOME CARE VISIT (OUTPATIENT)
Dept: SCHEDULING | Facility: HOME HEALTH | Age: 62
End: 2017-04-04
Payer: COMMERCIAL

## 2017-04-04 PROCEDURE — G0299 HHS/HOSPICE OF RN EA 15 MIN: HCPCS

## 2017-04-05 VITALS
OXYGEN SATURATION: 98 % | RESPIRATION RATE: 20 BRPM | TEMPERATURE: 98 F | SYSTOLIC BLOOD PRESSURE: 136 MMHG | HEART RATE: 68 BPM | DIASTOLIC BLOOD PRESSURE: 86 MMHG

## 2017-04-07 ENCOUNTER — HOME CARE VISIT (OUTPATIENT)
Dept: SCHEDULING | Facility: HOME HEALTH | Age: 62
End: 2017-04-07
Payer: COMMERCIAL

## 2017-04-07 PROCEDURE — G0299 HHS/HOSPICE OF RN EA 15 MIN: HCPCS

## 2017-04-08 VITALS
SYSTOLIC BLOOD PRESSURE: 138 MMHG | RESPIRATION RATE: 20 BRPM | TEMPERATURE: 97.6 F | OXYGEN SATURATION: 98 % | HEART RATE: 68 BPM | DIASTOLIC BLOOD PRESSURE: 78 MMHG

## 2017-04-10 ENCOUNTER — HOME CARE VISIT (OUTPATIENT)
Dept: SCHEDULING | Facility: HOME HEALTH | Age: 62
End: 2017-04-10
Payer: COMMERCIAL

## 2017-04-10 PROCEDURE — G0299 HHS/HOSPICE OF RN EA 15 MIN: HCPCS

## 2017-04-11 VITALS
DIASTOLIC BLOOD PRESSURE: 76 MMHG | SYSTOLIC BLOOD PRESSURE: 136 MMHG | TEMPERATURE: 96.7 F | OXYGEN SATURATION: 96 % | HEART RATE: 86 BPM | RESPIRATION RATE: 20 BRPM

## 2017-04-13 ENCOUNTER — APPOINTMENT (OUTPATIENT)
Dept: GENERAL RADIOLOGY | Age: 62
End: 2017-04-13
Attending: EMERGENCY MEDICINE
Payer: COMMERCIAL

## 2017-04-13 ENCOUNTER — HOSPITAL ENCOUNTER (EMERGENCY)
Age: 62
Discharge: HOME | End: 2017-04-13
Attending: EMERGENCY MEDICINE
Payer: COMMERCIAL

## 2017-04-13 VITALS
HEIGHT: 71 IN | WEIGHT: 167 LBS | OXYGEN SATURATION: 100 % | DIASTOLIC BLOOD PRESSURE: 87 MMHG | RESPIRATION RATE: 23 BRPM | HEART RATE: 88 BPM | TEMPERATURE: 98.1 F | SYSTOLIC BLOOD PRESSURE: 128 MMHG | BODY MASS INDEX: 23.38 KG/M2

## 2017-04-13 DIAGNOSIS — R60.0 BILATERAL EDEMA OF LOWER EXTREMITY: ICD-10-CM

## 2017-04-13 DIAGNOSIS — R03.0 ELEVATED BLOOD PRESSURE READING: ICD-10-CM

## 2017-04-13 DIAGNOSIS — R06.6 HICCUPS: Primary | ICD-10-CM

## 2017-04-13 LAB
ANION GAP BLD CALC-SCNC: 4 MMOL/L (ref 3–18)
BASOPHILS # BLD AUTO: 0 K/UL (ref 0–0.06)
BASOPHILS # BLD: 0 % (ref 0–2)
BNP SERPL-MCNC: 35 PG/ML (ref 0–900)
BUN SERPL-MCNC: 10 MG/DL (ref 7–18)
BUN/CREAT SERPL: 11 (ref 12–20)
CALCIUM SERPL-MCNC: 9.2 MG/DL (ref 8.5–10.1)
CHLORIDE SERPL-SCNC: 108 MMOL/L (ref 100–108)
CK MB CFR SERPL CALC: NORMAL % (ref 0–4)
CK MB SERPL-MCNC: <1 NG/ML (ref 5–25)
CK SERPL-CCNC: 63 U/L (ref 39–308)
CO2 SERPL-SCNC: 31 MMOL/L (ref 21–32)
CREAT SERPL-MCNC: 0.95 MG/DL (ref 0.6–1.3)
DIFFERENTIAL METHOD BLD: ABNORMAL
EOSINOPHIL # BLD: 0.2 K/UL (ref 0–0.4)
EOSINOPHIL NFR BLD: 3 % (ref 0–5)
ERYTHROCYTE [DISTWIDTH] IN BLOOD BY AUTOMATED COUNT: 13.6 % (ref 11.6–14.5)
GLUCOSE SERPL-MCNC: 94 MG/DL (ref 74–99)
HCT VFR BLD AUTO: 38.2 % (ref 36–48)
HGB BLD-MCNC: 12.5 G/DL (ref 13–16)
LACTATE BLD-SCNC: 1 MMOL/L (ref 0.4–2)
LYMPHOCYTES # BLD AUTO: 29 % (ref 21–52)
LYMPHOCYTES # BLD: 1.7 K/UL (ref 0.9–3.6)
MAGNESIUM SERPL-MCNC: 2.4 MG/DL (ref 1.6–2.6)
MCH RBC QN AUTO: 26.8 PG (ref 24–34)
MCHC RBC AUTO-ENTMCNC: 32.7 G/DL (ref 31–37)
MCV RBC AUTO: 82 FL (ref 74–97)
MONOCYTES # BLD: 0.5 K/UL (ref 0.05–1.2)
MONOCYTES NFR BLD AUTO: 8 % (ref 3–10)
NEUTS SEG # BLD: 3.6 K/UL (ref 1.8–8)
NEUTS SEG NFR BLD AUTO: 60 % (ref 40–73)
PLATELET # BLD AUTO: 213 K/UL (ref 135–420)
PMV BLD AUTO: 10.3 FL (ref 9.2–11.8)
POTASSIUM SERPL-SCNC: 4.5 MMOL/L (ref 3.5–5.5)
RBC # BLD AUTO: 4.66 M/UL (ref 4.7–5.5)
SODIUM SERPL-SCNC: 143 MMOL/L (ref 136–145)
TROPONIN I SERPL-MCNC: <0.02 NG/ML (ref 0–0.04)
WBC # BLD AUTO: 5.9 K/UL (ref 4.6–13.2)

## 2017-04-13 PROCEDURE — 73590 X-RAY EXAM OF LOWER LEG: CPT

## 2017-04-13 PROCEDURE — 71010 XR CHEST PORT: CPT

## 2017-04-13 PROCEDURE — 83605 ASSAY OF LACTIC ACID: CPT

## 2017-04-13 PROCEDURE — 83735 ASSAY OF MAGNESIUM: CPT | Performed by: EMERGENCY MEDICINE

## 2017-04-13 PROCEDURE — 74011250637 HC RX REV CODE- 250/637: Performed by: EMERGENCY MEDICINE

## 2017-04-13 PROCEDURE — 80048 BASIC METABOLIC PNL TOTAL CA: CPT | Performed by: EMERGENCY MEDICINE

## 2017-04-13 PROCEDURE — 99285 EMERGENCY DEPT VISIT HI MDM: CPT

## 2017-04-13 PROCEDURE — 93005 ELECTROCARDIOGRAM TRACING: CPT

## 2017-04-13 PROCEDURE — 85025 COMPLETE CBC W/AUTO DIFF WBC: CPT | Performed by: EMERGENCY MEDICINE

## 2017-04-13 PROCEDURE — 82550 ASSAY OF CK (CPK): CPT | Performed by: EMERGENCY MEDICINE

## 2017-04-13 PROCEDURE — 83880 ASSAY OF NATRIURETIC PEPTIDE: CPT | Performed by: EMERGENCY MEDICINE

## 2017-04-13 RX ORDER — CHLORPROMAZINE HYDROCHLORIDE 50 MG/1
50 TABLET, FILM COATED ORAL ONCE
Status: COMPLETED | OUTPATIENT
Start: 2017-04-13 | End: 2017-04-13

## 2017-04-13 RX ADMIN — CHLORPROMAZINE HYDROCHLORIDE 50 MG: 50 TABLET, SUGAR COATED ORAL at 15:16

## 2017-04-13 NOTE — DISCHARGE INSTRUCTIONS
Hiccups: Care Instructions  Your Care Instructions    Hiccups occur when a spasm contracts the diaphragm, a large sheet of muscle that separates the chest cavity from the abdominal cavity. The spasm causes an intake of breath that is suddenly stopped by the closure of the vocal cords (glottis). This closure causes the \"hiccup\" sound. A very full stomach can cause hiccups that go away on their own. A full stomach can be caused by things like eating too much food too quickly or swallowing too much air. Most hiccups go away on their own within a few minutes to a few hours and do not require any treatment. Hiccups that last longer than 48 hours are called persistent hiccups. Hiccups that last longer than a month are called intractable hiccups. Both persistent and intractable hiccups may be a sign of a more serious health problem. Follow-up care is a key part of your treatment and safety. Be sure to make and go to all appointments, and call your doctor if you are having problems. It's also a good idea to know your test results and keep a list of the medicines you take. How can you care for yourself at home? · Try these safe and easy home remedies if your hiccups are making you uncomfortable. ¨ Eat a teaspoon of sugar or honey. ¨ Hold your breath and count slowly to 10. ¨ Quickly drink a glass of cold water. · If your doctor prescribed medicine, take it as directed. Call your doctor if you think you are having a problem with your medicine. To help prevent hiccups  · Take steps to avoid swallowing air:  ¨ Eat slowly. Avoid gulping food or beverages. ¨ Chew your food thoroughly before you swallow. ¨ Avoid drinking through a straw. ¨ Avoid chewing gum or eating hard candy. ¨ Do not smoke or use other tobacco products. ¨ If you wear dentures, check with a dentist to make sure they fit properly. · Do not eat large meals. · Do not drink alcohol.   · Avoid sudden changes in stomach temperature, such as drinking a hot beverage and then a cold beverage. · Avoid emotional stress or excitement. When should you call for help? Call your doctor now or seek immediate medical care if:  · You have trouble swallowing and are unable to swallow food or fluids. · You have hiccups for more than 2 days. · You have new symptoms, such as belly pain, constipation, diarrhea, heartburn, or vomiting. Watch closely for changes in your health, and be sure to contact your doctor if:  · You have trouble swallowing but are able to swallow food and fluids. · Hiccups occur often and get in the way of your activities. · You think medicine may be causing your symptoms. · You do not get better as expected. Where can you learn more? Go to http://lainey-millie.info/. Enter S477 in the search box to learn more about \"Hiccups: Care Instructions. \"  Current as of: May 27, 2016  Content Version: 11.2  © 3870-9088 [employer]. Care instructions adapted under license by [employer] (which disclaims liability or warranty for this information). If you have questions about a medical condition or this instruction, always ask your healthcare professional. [address] any warranty or liability for your use of this information.

## 2017-04-13 NOTE — ED TRIAGE NOTES
Pt arrived via EMS alert & oriented times 4. Pt c/o hiccups since 10 years & he c/o feeling uncomfortable.

## 2017-04-13 NOTE — ED PROVIDER NOTES
HPI Comments: 2:44 PM Gerardo Carr is a 58 y.o. male with noted PMHx who presents to the ED c/o hiccups. The patient explains he has not taken his medicine but unsure why. Pt adds the hiccups have been going on all night and this morning. Pt also c/o leg swelling, slight SOB secondary to hiccups. Pt denies fever and is not complaining of any other symptoms currently. Pt states he sleeps in a hospital bed. Endorses some drainage to his wound on his left leg. Unsure if there has been any change in the skin. States it is managed by home health. States edema does improve with elevation. Denies any orthopnea, h/o VTE, any palpitations, sob currently, chest pain or cough. Patient is a 58 y.o. male presenting with hiccups. The history is provided by the patient. Hiccups   Associated symptoms include shortness of breath. Pertinent negatives include no chest pain, no abdominal pain and no headaches. Past Medical History:   Diagnosis Date    Hiccups     Hypertension     Hyponatremia     Lower extremity edema     Psychogenic polydipsia     Spinal stenosis        Past Surgical History:   Procedure Laterality Date    HX HEENT      pt reports past hx of surgery on ears unsure of what type         Family History:   Problem Relation Age of Onset    Hypertension Other        Social History     Social History    Marital status:      Spouse name: N/A    Number of children: N/A    Years of education: N/A     Occupational History    Not on file. Social History Main Topics    Smoking status: Former Smoker     Packs/day: 0.50    Smokeless tobacco: Not on file    Alcohol use No    Drug use: No    Sexual activity: Not Currently     Other Topics Concern    Not on file     Social History Narrative         ALLERGIES: Review of patient's allergies indicates no known allergies. Review of Systems   Constitutional: Negative for fever. HENT: Negative for congestion.     Respiratory: Positive for shortness of breath. Negative for cough. Cardiovascular: Positive for leg swelling. Negative for chest pain. Gastrointestinal: Negative for abdominal pain, nausea and vomiting. Hiccups     Genitourinary: Negative for dysuria. Musculoskeletal: Negative. Skin: Positive for wound (L lower leg). Neurological: Negative for light-headedness and headaches. All other systems reviewed and are negative. Vitals:    04/13/17 1615 04/13/17 1630 04/13/17 1700 04/13/17 1758   BP: 162/86 169/87 166/76 128/87   Pulse: 89 85 74 88   Resp:  18 18 18   Temp:       SpO2: 97% 97% 100%    Weight:       Height:                Physical Exam   Constitutional: He is oriented to person, place, and time. hiccuping    HENT:   Head: Atraumatic. Eyes: Conjunctivae are normal.   Neck: Normal range of motion. Neck supple. No JVD present. Cardiovascular: Normal rate, regular rhythm and normal heart sounds. Pulmonary/Chest: Effort normal and breath sounds normal. No respiratory distress. He exhibits no tenderness. Abdominal: Soft. Bowel sounds are normal. He exhibits no distension. There is no tenderness. There is no rebound and no guarding. Musculoskeletal: He exhibits edema ( BLE). Neurological: He is alert and oriented to person, place, and time. He has normal strength. No sensory deficit. Skin: Skin is warm. 1cm ulceration to left lower leg. Scant serosangiunous drainage, no surrounding erythema or fluctuance. Nonttp. Psychiatric: He has a normal mood and affect. Nursing note and vitals reviewed. MDM  Number of Diagnoses or Management Options  Bilateral edema of lower extremity:   Elevated blood pressure reading:   Hiccups:   Diagnosis management comments: Adele Stark is a 58 y.o. male presenting with complaints of hiccups, noncompliant with his thorazine. Also has nonhealing ulceration to left lower leg.  Scant drainage but otherwise per chart review appears to be decreasing in size since previous admission. Pt given home thorazine and hiccups resolved. Labs and xray without finding c/w systemic infection or osteo. I have discussed results of work up with patient. Prior to discharge pt resting comfortably, no distress, stable for outpt follow up. Wound cx pending and I have instructed pt to continue wound care at home and follow up with pcp in the next week for recheck. Return precautions discussed. Patient stated verbal understanding and agrees with course and plan.   1 BP reading, pt asymptomatic. Instructed pt discuss this with pcp as well. ED Course       Procedures    Vitals:  Patient Vitals for the past 12 hrs:   Temp Pulse Resp BP SpO2   04/13/17 1758 - 88 18 128/87 -   04/13/17 1700 - 74 18 166/76 100 %   04/13/17 1630 - 85 18 169/87 97 %   04/13/17 1615 - 89 - 162/86 97 %   04/13/17 1515 - 77 23 168/89 97 %   04/13/17 1500 - 78 16 (!) 158/93 97 %   04/13/17 1445 - 80 14 163/88 97 %   04/13/17 1430 - 80 11 148/89 -   04/13/17 1422 98.1 °F (36.7 °C) 80 12 166/89 96 %   96% on RA, indicating adequate oxygenation. EKG interpretation by ED Physician:  9816: NSR, Rate 78, No STEMI      X-Ray, CT or other radiology findings or impressions:  XR CHEST PORT   Final Result      XR TIB/FIB LT    (Results Pending)         Disposition:  Diagnosis:   1. Hiccups    2. Bilateral edema of lower extremity        Disposition: Discharged home in stable condition      SCRIBE ATTESTATION STATEMENT  Documented by: Jung byrneing for, and in the presence of, Angelina Connell MD 2:49 PM     Signed by: Pearl Butler. 04/13/17, 2:49 PM.    PROVIDER ATTESTATION STATEMENT  I personally performed the services described in the documentation, reviewed the documentation, as recorded by the scribe in my presence, and it accurately and completely records my words and actions.   Angelina Connell MD

## 2017-04-13 NOTE — ED NOTES
I have reviewed discharge instructions with the patient. The patient verbalized understanding. Patient armband removed and given to patient to take home. Patient was informed of the privacy risks if armband lost or stolen. Pt son called per pt request for transport home.

## 2017-04-14 ENCOUNTER — HOME CARE VISIT (OUTPATIENT)
Dept: SCHEDULING | Facility: HOME HEALTH | Age: 62
End: 2017-04-14
Payer: COMMERCIAL

## 2017-04-14 LAB
ATRIAL RATE: 78 BPM
CALCULATED P AXIS, ECG09: 77 DEGREES
CALCULATED R AXIS, ECG10: 58 DEGREES
CALCULATED T AXIS, ECG11: 47 DEGREES
DIAGNOSIS, 93000: NORMAL
P-R INTERVAL, ECG05: 178 MS
Q-T INTERVAL, ECG07: 396 MS
QRS DURATION, ECG06: 118 MS
QTC CALCULATION (BEZET), ECG08: 451 MS
VENTRICULAR RATE, ECG03: 78 BPM

## 2017-04-14 PROCEDURE — G0299 HHS/HOSPICE OF RN EA 15 MIN: HCPCS

## 2017-04-15 VITALS
DIASTOLIC BLOOD PRESSURE: 88 MMHG | TEMPERATURE: 97.6 F | OXYGEN SATURATION: 97 % | SYSTOLIC BLOOD PRESSURE: 132 MMHG | RESPIRATION RATE: 20 BRPM | HEART RATE: 68 BPM

## 2017-04-17 ENCOUNTER — HOME CARE VISIT (OUTPATIENT)
Dept: SCHEDULING | Facility: HOME HEALTH | Age: 62
End: 2017-04-17
Payer: COMMERCIAL

## 2017-04-17 PROCEDURE — G0299 HHS/HOSPICE OF RN EA 15 MIN: HCPCS

## 2017-04-18 VITALS
DIASTOLIC BLOOD PRESSURE: 78 MMHG | OXYGEN SATURATION: 98 % | RESPIRATION RATE: 20 BRPM | HEART RATE: 86 BPM | SYSTOLIC BLOOD PRESSURE: 140 MMHG | TEMPERATURE: 97.8 F

## 2017-04-20 ENCOUNTER — HOME CARE VISIT (OUTPATIENT)
Dept: SCHEDULING | Facility: HOME HEALTH | Age: 62
End: 2017-04-20
Payer: COMMERCIAL

## 2017-04-20 PROCEDURE — G0299 HHS/HOSPICE OF RN EA 15 MIN: HCPCS

## 2017-04-21 VITALS
RESPIRATION RATE: 20 BRPM | DIASTOLIC BLOOD PRESSURE: 78 MMHG | OXYGEN SATURATION: 97 % | HEART RATE: 68 BPM | SYSTOLIC BLOOD PRESSURE: 128 MMHG | TEMPERATURE: 98 F

## 2017-04-24 ENCOUNTER — HOME CARE VISIT (OUTPATIENT)
Dept: SCHEDULING | Facility: HOME HEALTH | Age: 62
End: 2017-04-24
Payer: COMMERCIAL

## 2017-04-24 VITALS
SYSTOLIC BLOOD PRESSURE: 128 MMHG | TEMPERATURE: 97 F | RESPIRATION RATE: 20 BRPM | DIASTOLIC BLOOD PRESSURE: 80 MMHG | OXYGEN SATURATION: 98 % | HEART RATE: 74 BPM

## 2017-04-24 PROCEDURE — G0299 HHS/HOSPICE OF RN EA 15 MIN: HCPCS

## 2017-04-25 ENCOUNTER — HOME CARE VISIT (OUTPATIENT)
Dept: HOME HEALTH SERVICES | Facility: HOME HEALTH | Age: 62
End: 2017-04-25
Payer: COMMERCIAL

## 2017-04-27 ENCOUNTER — HOME CARE VISIT (OUTPATIENT)
Dept: SCHEDULING | Facility: HOME HEALTH | Age: 62
End: 2017-04-27
Payer: COMMERCIAL

## 2017-04-27 VITALS
HEART RATE: 84 BPM | DIASTOLIC BLOOD PRESSURE: 80 MMHG | OXYGEN SATURATION: 97 % | SYSTOLIC BLOOD PRESSURE: 120 MMHG | RESPIRATION RATE: 20 BRPM | TEMPERATURE: 98.2 F

## 2017-04-27 PROCEDURE — G0299 HHS/HOSPICE OF RN EA 15 MIN: HCPCS

## 2017-05-01 ENCOUNTER — HOME CARE VISIT (OUTPATIENT)
Dept: SCHEDULING | Facility: HOME HEALTH | Age: 62
End: 2017-05-01
Payer: COMMERCIAL

## 2017-05-01 PROCEDURE — G0299 HHS/HOSPICE OF RN EA 15 MIN: HCPCS

## 2017-05-02 VITALS
OXYGEN SATURATION: 96 % | SYSTOLIC BLOOD PRESSURE: 138 MMHG | TEMPERATURE: 97 F | DIASTOLIC BLOOD PRESSURE: 78 MMHG | RESPIRATION RATE: 20 BRPM | HEART RATE: 86 BPM

## 2017-05-04 ENCOUNTER — HOME CARE VISIT (OUTPATIENT)
Dept: SCHEDULING | Facility: HOME HEALTH | Age: 62
End: 2017-05-04
Payer: COMMERCIAL

## 2017-05-04 PROCEDURE — G0299 HHS/HOSPICE OF RN EA 15 MIN: HCPCS

## 2017-05-07 VITALS
HEART RATE: 88 BPM | RESPIRATION RATE: 16 BRPM | SYSTOLIC BLOOD PRESSURE: 130 MMHG | OXYGEN SATURATION: 98 % | DIASTOLIC BLOOD PRESSURE: 78 MMHG

## 2017-05-08 ENCOUNTER — HOME CARE VISIT (OUTPATIENT)
Dept: SCHEDULING | Facility: HOME HEALTH | Age: 62
End: 2017-05-08
Payer: COMMERCIAL

## 2017-05-08 VITALS
OXYGEN SATURATION: 96 % | DIASTOLIC BLOOD PRESSURE: 80 MMHG | SYSTOLIC BLOOD PRESSURE: 135 MMHG | HEART RATE: 89 BPM | TEMPERATURE: 98.8 F | RESPIRATION RATE: 20 BRPM

## 2017-05-08 PROCEDURE — G0299 HHS/HOSPICE OF RN EA 15 MIN: HCPCS

## 2017-05-11 ENCOUNTER — APPOINTMENT (OUTPATIENT)
Dept: GENERAL RADIOLOGY | Age: 62
End: 2017-05-11
Attending: EMERGENCY MEDICINE
Payer: COMMERCIAL

## 2017-05-11 ENCOUNTER — APPOINTMENT (OUTPATIENT)
Dept: CT IMAGING | Age: 62
End: 2017-05-11
Attending: EMERGENCY MEDICINE
Payer: COMMERCIAL

## 2017-05-11 ENCOUNTER — HOME CARE VISIT (OUTPATIENT)
Dept: HOME HEALTH SERVICES | Facility: HOME HEALTH | Age: 62
End: 2017-05-11
Payer: COMMERCIAL

## 2017-05-11 ENCOUNTER — HOSPITAL ENCOUNTER (EMERGENCY)
Age: 62
Discharge: HOME | End: 2017-05-11
Attending: EMERGENCY MEDICINE
Payer: COMMERCIAL

## 2017-05-11 ENCOUNTER — HOME CARE VISIT (OUTPATIENT)
Dept: SCHEDULING | Facility: HOME HEALTH | Age: 62
End: 2017-05-11
Payer: COMMERCIAL

## 2017-05-11 VITALS
HEART RATE: 81 BPM | TEMPERATURE: 98.2 F | DIASTOLIC BLOOD PRESSURE: 87 MMHG | SYSTOLIC BLOOD PRESSURE: 133 MMHG | OXYGEN SATURATION: 98 % | RESPIRATION RATE: 17 BRPM

## 2017-05-11 VITALS
TEMPERATURE: 97.5 F | RESPIRATION RATE: 18 BRPM | SYSTOLIC BLOOD PRESSURE: 145 MMHG | OXYGEN SATURATION: 96 % | DIASTOLIC BLOOD PRESSURE: 92 MMHG | HEART RATE: 80 BPM

## 2017-05-11 DIAGNOSIS — R53.1 LEFT-SIDED WEAKNESS: Primary | ICD-10-CM

## 2017-05-11 LAB
ALBUMIN SERPL BCP-MCNC: 2.9 G/DL (ref 3.4–5)
ALBUMIN/GLOB SERPL: 0.6 {RATIO} (ref 0.8–1.7)
ALP SERPL-CCNC: 78 U/L (ref 45–117)
ALT SERPL-CCNC: 15 U/L (ref 16–61)
AMPHET UR QL SCN: NEGATIVE
ANION GAP BLD CALC-SCNC: 4 MMOL/L (ref 3–18)
APPEARANCE UR: CLEAR
APTT PPP: 25.5 SEC (ref 23–36.4)
AST SERPL W P-5'-P-CCNC: 12 U/L (ref 15–37)
BACTERIA URNS QL MICRO: ABNORMAL /HPF
BARBITURATES UR QL SCN: NEGATIVE
BASOPHILS # BLD AUTO: 0 K/UL (ref 0–0.06)
BASOPHILS # BLD: 0 % (ref 0–2)
BENZODIAZ UR QL: NEGATIVE
BILIRUB SERPL-MCNC: 0.2 MG/DL (ref 0.2–1)
BILIRUB UR QL: NEGATIVE
BUN SERPL-MCNC: 10 MG/DL (ref 7–18)
BUN/CREAT SERPL: 12 (ref 12–20)
CALCIUM SERPL-MCNC: 8.9 MG/DL (ref 8.5–10.1)
CANNABINOIDS UR QL SCN: NEGATIVE
CHLORIDE SERPL-SCNC: 104 MMOL/L (ref 100–108)
CK MB CFR SERPL CALC: 0.8 % (ref 0–4)
CK MB SERPL-MCNC: 1 NG/ML (ref 5–25)
CK SERPL-CCNC: 127 U/L (ref 39–308)
CO2 SERPL-SCNC: 32 MMOL/L (ref 21–32)
COCAINE UR QL SCN: NEGATIVE
COLOR UR: YELLOW
CREAT SERPL-MCNC: 0.82 MG/DL (ref 0.6–1.3)
DIFFERENTIAL METHOD BLD: ABNORMAL
EOSINOPHIL # BLD: 0.2 K/UL (ref 0–0.4)
EOSINOPHIL NFR BLD: 2 % (ref 0–5)
EPITH CASTS URNS QL MICRO: ABNORMAL /LPF (ref 0–5)
ERYTHROCYTE [DISTWIDTH] IN BLOOD BY AUTOMATED COUNT: 13.1 % (ref 11.6–14.5)
GLOBULIN SER CALC-MCNC: 5 G/DL (ref 2–4)
GLUCOSE SERPL-MCNC: 85 MG/DL (ref 74–99)
GLUCOSE UR STRIP.AUTO-MCNC: NEGATIVE MG/DL
HCT VFR BLD AUTO: 38 % (ref 36–48)
HDSCOM,HDSCOM: NORMAL
HGB BLD-MCNC: 12.8 G/DL (ref 13–16)
HGB UR QL STRIP: NEGATIVE
INR PPP: 1 (ref 0.8–1.2)
KETONES UR QL STRIP.AUTO: NEGATIVE MG/DL
LEUKOCYTE ESTERASE UR QL STRIP.AUTO: ABNORMAL
LYMPHOCYTES # BLD AUTO: 27 % (ref 21–52)
LYMPHOCYTES # BLD: 2 K/UL (ref 0.9–3.6)
MAGNESIUM SERPL-MCNC: 2.1 MG/DL (ref 1.6–2.6)
MCH RBC QN AUTO: 26.9 PG (ref 24–34)
MCHC RBC AUTO-ENTMCNC: 33.7 G/DL (ref 31–37)
MCV RBC AUTO: 79.8 FL (ref 74–97)
METHADONE UR QL: NEGATIVE
MONOCYTES # BLD: 0.6 K/UL (ref 0.05–1.2)
MONOCYTES NFR BLD AUTO: 8 % (ref 3–10)
NEUTS SEG # BLD: 4.7 K/UL (ref 1.8–8)
NEUTS SEG NFR BLD AUTO: 63 % (ref 40–73)
NITRITE UR QL STRIP.AUTO: POSITIVE
OPIATES UR QL: NEGATIVE
PCP UR QL: NEGATIVE
PH UR STRIP: 8 [PH] (ref 5–8)
PLATELET # BLD AUTO: 211 K/UL (ref 135–420)
PMV BLD AUTO: 10.8 FL (ref 9.2–11.8)
POTASSIUM SERPL-SCNC: 4 MMOL/L (ref 3.5–5.5)
PROT SERPL-MCNC: 7.9 G/DL (ref 6.4–8.2)
PROT UR STRIP-MCNC: NEGATIVE MG/DL
PROTHROMBIN TIME: 13.2 SEC (ref 11.5–15.2)
RBC # BLD AUTO: 4.76 M/UL (ref 4.7–5.5)
RBC #/AREA URNS HPF: NEGATIVE /HPF (ref 0–5)
SODIUM SERPL-SCNC: 140 MMOL/L (ref 136–145)
SP GR UR REFRACTOMETRY: 1.01 (ref 1–1.03)
TROPONIN I SERPL-MCNC: <0.02 NG/ML (ref 0–0.04)
UROBILINOGEN UR QL STRIP.AUTO: 0.2 EU/DL (ref 0.2–1)
WBC # BLD AUTO: 7.5 K/UL (ref 4.6–13.2)
WBC URNS QL MICRO: ABNORMAL /HPF (ref 0–4)

## 2017-05-11 PROCEDURE — 93005 ELECTROCARDIOGRAM TRACING: CPT

## 2017-05-11 PROCEDURE — 51702 INSERT TEMP BLADDER CATH: CPT

## 2017-05-11 PROCEDURE — 87086 URINE CULTURE/COLONY COUNT: CPT | Performed by: EMERGENCY MEDICINE

## 2017-05-11 PROCEDURE — 96361 HYDRATE IV INFUSION ADD-ON: CPT

## 2017-05-11 PROCEDURE — 80053 COMPREHEN METABOLIC PANEL: CPT | Performed by: EMERGENCY MEDICINE

## 2017-05-11 PROCEDURE — 77030005514 HC CATH URETH FOL14 BARD -A

## 2017-05-11 PROCEDURE — 85610 PROTHROMBIN TIME: CPT | Performed by: EMERGENCY MEDICINE

## 2017-05-11 PROCEDURE — 85730 THROMBOPLASTIN TIME PARTIAL: CPT | Performed by: EMERGENCY MEDICINE

## 2017-05-11 PROCEDURE — 81001 URINALYSIS AUTO W/SCOPE: CPT | Performed by: EMERGENCY MEDICINE

## 2017-05-11 PROCEDURE — 74011250636 HC RX REV CODE- 250/636: Performed by: EMERGENCY MEDICINE

## 2017-05-11 PROCEDURE — 87077 CULTURE AEROBIC IDENTIFY: CPT | Performed by: EMERGENCY MEDICINE

## 2017-05-11 PROCEDURE — 96360 HYDRATION IV INFUSION INIT: CPT

## 2017-05-11 PROCEDURE — 71010 XR CHEST PORT: CPT

## 2017-05-11 PROCEDURE — 70450 CT HEAD/BRAIN W/O DYE: CPT

## 2017-05-11 PROCEDURE — 80307 DRUG TEST PRSMV CHEM ANLYZR: CPT | Performed by: EMERGENCY MEDICINE

## 2017-05-11 PROCEDURE — 83735 ASSAY OF MAGNESIUM: CPT | Performed by: EMERGENCY MEDICINE

## 2017-05-11 PROCEDURE — 85025 COMPLETE CBC W/AUTO DIFF WBC: CPT | Performed by: EMERGENCY MEDICINE

## 2017-05-11 PROCEDURE — 87186 SC STD MICRODIL/AGAR DIL: CPT | Performed by: EMERGENCY MEDICINE

## 2017-05-11 PROCEDURE — G0299 HHS/HOSPICE OF RN EA 15 MIN: HCPCS

## 2017-05-11 PROCEDURE — 82550 ASSAY OF CK (CPK): CPT | Performed by: EMERGENCY MEDICINE

## 2017-05-11 PROCEDURE — 99284 EMERGENCY DEPT VISIT MOD MDM: CPT

## 2017-05-11 RX ADMIN — SODIUM CHLORIDE 500 ML: 900 INJECTION, SOLUTION INTRAVENOUS at 10:40

## 2017-05-11 NOTE — DISCHARGE INSTRUCTIONS
Weakness: Care Instructions  Your Care Instructions  Weakness is a lack of physical or muscle strength. You may feel that you need to make extra effort to move your arms, legs, or other muscles. Generalized weakness means that you feel weak in most areas of your body. Another type of weakness may affect just one muscle or group of muscles. You may feel weak and tired after you have done too much activity, such as taking an extra-long hike. This is not a serious problem. It often goes away on its own. Feeling weak can also be caused by medical conditions like thyroid problems, depression, or a virus. Sometimes the cause can be serious. Your doctor may want to do more tests to try to find the cause of the weakness. The doctor has checked you carefully, but problems can develop later. If you notice any problems or new symptoms, get medical treatment right away. Follow-up care is a key part of your treatment and safety. Be sure to make and go to all appointments, and call your doctor if you are having problems. It's also a good idea to know your test results and keep a list of the medicines you take. How can you care for yourself at home? · Rest when you feel tired. · Be safe with medicines. If your doctor prescribed medicine, take it exactly as prescribed. Call your doctor if you think you are having a problem with your medicine. You will get more details on the specific medicines your doctor prescribes. · Do not skip meals. Eating a balanced diet may increase your energy level. · Get some physical activity every day, but do not get too tired. When should you call for help? Call your doctor now or seek immediate medical care if:  · You have new or worse weakness. · You are dizzy or lightheaded, or you feel like you may faint. Watch closely for changes in your health, and be sure to contact your doctor if:  · You do not get better as expected. Where can you learn more?   Go to http://noel.info/. Enter [id number] in the search box to learn more about \"Weakness: Care Instructions. \"  Current as of: May 27, 2016  Content Version: 11.2  © 1680-0261 [employer]. Care instructions adapted under license by [employer] (which disclaims liability or warranty for this information). If you have questions about a medical condition or this instruction, always ask your healthcare professional. [address] any warranty or liability for your use of this information. Fatigue: Care Instructions  Your Care Instructions  Fatigue is a feeling of tiredness, exhaustion, or lack of energy. You may feel fatigue because of too much or not enough activity. It can also come from stress, lack of sleep, boredom, and poor diet. Many medical problems, such as viral infections, can cause fatigue. Emotional problems, especially depression, are often the cause of fatigue. Fatigue is most often a symptom of another problem. Treatment for fatigue depends on the cause. For example, if you have fatigue because you have a certain health problem, treating this problem also treats your fatigue. If depression or anxiety is the cause, treatment may help. Follow-up care is a key part of your treatment and safety. Be sure to make and go to all appointments, and call your doctor if you are having problems. It's also a good idea to know your test results and keep a list of the medicines you take. How can you care for yourself at home? · Get regular exercise. But don't overdo it. Go back and forth between rest and exercise. · Get plenty of rest.  · Eat a healthy diet. Do not skip meals, especially breakfast.  · Reduce your use of caffeine, tobacco, and alcohol. Caffeine is most often found in coffee, tea, cola drinks, and chocolate. · Limit medicines that can cause fatigue. This includes tranquilizers and cold and allergy medicines.   When should you call for help? Watch closely for changes in your health, and be sure to contact your doctor if:  · You have new symptoms such as fever or a rash. · Your fatigue gets worse. · You have been feeling down, depressed, or hopeless. Or you may have lost interest in things that you usually enjoy. · You are not getting better as expected. Where can you learn more? Go to http://lainey-millie.info/. Enter M372 in the search box to learn more about \"Fatigue: Care Instructions. \"  Current as of: May 27, 2016  Content Version: 11.2  © 5000-2138 [employer]. Care instructions adapted under license by [employer] (which disclaims liability or warranty for this information). If you have questions about a medical condition or this instruction, always ask your healthcare professional. [address] any warranty or liability for your use of this information.

## 2017-05-11 NOTE — ED PROVIDER NOTES
HPI Comments: 10:37 AM Devin Webb is a 58 y.o. male w/ hx of HTN, and stroke who presents to the ED, via EMS, c/o L sided weakness. Pt states that the sx started last night. Pt notes that it is difficult to move his L arm and L leg. Pt states that he fell around 6 PM last night and was on the ground for 3 hours before being found by a friend. Pt lives at home with his son who takes care of him. Pt denies syncope, LOC, CP, SOB, and fever, Pt has no other sx or complaints. Pt is an extremely poor historian and changed the timeframe of onset of symptoms multiple times. Past Medical History:   Diagnosis Date    Hiccups     Hypertension     Hyponatremia     Lower extremity edema     Psychogenic polydipsia     Spinal stenosis        Past Surgical History:   Procedure Laterality Date    HX HEENT      pt reports past hx of surgery on ears unsure of what type         Family History:   Problem Relation Age of Onset    Hypertension Other        Social History     Social History    Marital status:      Spouse name: N/A    Number of children: N/A    Years of education: N/A     Occupational History    Not on file. Social History Main Topics    Smoking status: Former Smoker     Packs/day: 0.50    Smokeless tobacco: Not on file    Alcohol use No    Drug use: No    Sexual activity: Not Currently     Other Topics Concern    Not on file     Social History Narrative         ALLERGIES: Review of patient's allergies indicates no known allergies. Review of Systems   Unable to perform ROS: Other       Vitals:    05/11/17 1015 05/11/17 1335   BP: 151/75 138/86   Pulse: 91 71   Resp: 16 14   Temp: 98.2 °F (36.8 °C)    SpO2: 98% 100%            Physical Exam   Constitutional: He appears well-developed and well-nourished. No distress. Smells of urine    HENT:   Head: Normocephalic and atraumatic. Eyes: Conjunctivae and EOM are normal. Pupils are equal, round, and reactive to light.  No scleral icterus. Neck: Normal range of motion. Neck supple. No JVD present. No thyromegaly present. Cardiovascular: Normal rate, regular rhythm, S1 normal and S2 normal.  Exam reveals no gallop and no friction rub. No murmur heard. Pulmonary/Chest: Effort normal and breath sounds normal. No accessory muscle usage. No respiratory distress. Abdominal: Soft. Normal appearance. He exhibits no distension. There is no tenderness. There is no rigidity, no rebound and no guarding. Musculoskeletal: Normal range of motion. He exhibits edema (chronic; bilateral lower extremities ). He exhibits no tenderness. Neurological: He is alert. L sided facial droop  3/5 strength L upper extremity  5/5 strength 5 upper extremity  4/5 strength in bilateral lower extremities  Dysarthria    Skin: Skin is warm and intact. No rash noted. Bilateral lower leg ulcers that appear clean and w/o foul smell or necrosis    Psychiatric: He has a normal mood and affect. His speech is normal and behavior is normal.   Vitals reviewed. MDM  Number of Diagnoses or Management Options  Left-sided weakness:   Diagnosis management comments: Devin Webb is a 58 y.o. Male coming in with c/o fall and left sided weakness. Patient has hx of CVA with chronic left sided deficits. Patient's w/u has been unremarkable and patient's son has come up to the ER and states that patient is completely at his baseline mental status and that he was with him all day yesterday and he did not have any fall or worsening weakness. Patient's son states that the patient is very manipulative and that he has been doing very well recently.     ED Course       Procedures        Vitals:  Patient Vitals for the past 12 hrs:   Temp Pulse Resp BP SpO2   05/11/17 1335 - 71 14 138/86 100 %   05/11/17 1015 98.2 °F (36.8 °C) 91 16 151/75 98 %       Medications ordered:   Medications   sodium chloride 0.9 % bolus infusion 500 mL (0 mL IntraVENous IV Completed 5/11/17 1425) Lab findings:  Recent Results (from the past 12 hour(s))   EKG, 12 LEAD, INITIAL    Collection Time: 05/11/17 12:02 PM   Result Value Ref Range    Ventricular Rate 84 BPM    Atrial Rate 84 BPM    P-R Interval 202 ms    QRS Duration 86 ms    Q-T Interval 358 ms    QTC Calculation (Bezet) 423 ms    Calculated P Axis -2 degrees    Calculated R Axis 4 degrees    Calculated T Axis 18 degrees    Diagnosis       Normal sinus rhythm  Normal ECG  When compared with ECG of 13-APR-2017 15:43,  No significant change was found     CBC WITH AUTOMATED DIFF    Collection Time: 05/11/17 12:13 PM   Result Value Ref Range    WBC 7.5 4.6 - 13.2 K/uL    RBC 4.76 4.70 - 5.50 M/uL    HGB 12.8 (L) 13.0 - 16.0 g/dL    HCT 38.0 36.0 - 48.0 %    MCV 79.8 74.0 - 97.0 FL    MCH 26.9 24.0 - 34.0 PG    MCHC 33.7 31.0 - 37.0 g/dL    RDW 13.1 11.6 - 14.5 %    PLATELET 132 828 - 473 K/uL    MPV 10.8 9.2 - 11.8 FL    NEUTROPHILS 63 40 - 73 %    LYMPHOCYTES 27 21 - 52 %    MONOCYTES 8 3 - 10 %    EOSINOPHILS 2 0 - 5 %    BASOPHILS 0 0 - 2 %    ABS. NEUTROPHILS 4.7 1.8 - 8.0 K/UL    ABS. LYMPHOCYTES 2.0 0.9 - 3.6 K/UL    ABS. MONOCYTES 0.6 0.05 - 1.2 K/UL    ABS. EOSINOPHILS 0.2 0.0 - 0.4 K/UL    ABS. BASOPHILS 0.0 0.0 - 0.06 K/UL    DF AUTOMATED     METABOLIC PANEL, COMPREHENSIVE    Collection Time: 05/11/17 12:45 PM   Result Value Ref Range    Sodium 140 136 - 145 mmol/L    Potassium 4.0 3.5 - 5.5 mmol/L    Chloride 104 100 - 108 mmol/L    CO2 32 21 - 32 mmol/L    Anion gap 4 3.0 - 18 mmol/L    Glucose 85 74 - 99 mg/dL    BUN 10 7.0 - 18 MG/DL    Creatinine 0.82 0.6 - 1.3 MG/DL    BUN/Creatinine ratio 12 12 - 20      GFR est AA >60 >60 ml/min/1.73m2    GFR est non-AA >60 >60 ml/min/1.73m2    Calcium 8.9 8.5 - 10.1 MG/DL    Bilirubin, total 0.2 0.2 - 1.0 MG/DL    ALT (SGPT) 15 (L) 16 - 61 U/L    AST (SGOT) 12 (L) 15 - 37 U/L    Alk.  phosphatase 78 45 - 117 U/L    Protein, total 7.9 6.4 - 8.2 g/dL    Albumin 2.9 (L) 3.4 - 5.0 g/dL    Globulin 5.0 (H) 2.0 - 4.0 g/dL    A-G Ratio 0.6 (L) 0.8 - 1.7     MAGNESIUM    Collection Time: 05/11/17 12:45 PM   Result Value Ref Range    Magnesium 2.1 1.6 - 2.6 mg/dL   PROTHROMBIN TIME + INR    Collection Time: 05/11/17 12:45 PM   Result Value Ref Range    Prothrombin time 13.2 11.5 - 15.2 sec    INR 1.0 0.8 - 1.2     PTT    Collection Time: 05/11/17 12:45 PM   Result Value Ref Range    aPTT 25.5 23.0 - 36.4 SEC   CARDIAC PANEL,(CK, CKMB & TROPONIN)    Collection Time: 05/11/17 12:45 PM   Result Value Ref Range     39 - 308 U/L    CK - MB 1.0 <3.6 ng/ml    CK-MB Index 0.8 0.0 - 4.0 %    Troponin-I, Qt. <0.02 0.0 - 0.045 NG/ML   URINALYSIS W/ RFLX MICROSCOPIC    Collection Time: 05/11/17  1:50 PM   Result Value Ref Range    Color YELLOW      Appearance CLEAR      Specific gravity 1.010 1.005 - 1.030      pH (UA) 8.0 5.0 - 8.0      Protein NEGATIVE  NEG mg/dL    Glucose NEGATIVE  NEG mg/dL    Ketone NEGATIVE  NEG mg/dL    Bilirubin NEGATIVE  NEG      Blood NEGATIVE  NEG      Urobilinogen 0.2 0.2 - 1.0 EU/dL    Nitrites POSITIVE (A) NEG      Leukocyte Esterase MODERATE (A) NEG     DRUG SCREEN, URINE    Collection Time: 05/11/17  1:50 PM   Result Value Ref Range    BENZODIAZEPINE NEGATIVE  NEG      BARBITURATES NEGATIVE  NEG      THC (TH-CANNABINOL) NEGATIVE  NEG      OPIATES NEGATIVE  NEG      PCP(PHENCYCLIDINE) NEGATIVE  NEG      COCAINE NEGATIVE  NEG      AMPHETAMINE NEGATIVE  NEG      METHADONE NEGATIVE  NEG      HDSCOM (NOTE)        EKG interpretation by ED Physician:  Sinus rhythm at rate of 94; no acute ischemic changes     X-Ray, CT or other radiology findings or impressions:  CT HEAD WO CONT   IMPRESSION:     1. No acute intracranial pathology. 2. MRI is a more sensitive modality in the first 24-48 hours of acute infarct. 3. Extensive pre-existing lacunar infarcts, chronic small vessel ischemic  disease, and right occipital encephalomalacia without definite interval change.    As interpreted by RAD   XR CHEST PORT   IMPRESSION: No acute findings. As interpreted by RAD       Progress notes, Consult notes or additional Procedure notes:   3:01 PM spoke w/ pt's son who states that pt's L sided weakness and slurred speech is his baseline; pt is manipulative and frequently calls 911 to take him to the ED to try and punish children if they are unable to get to his house quick enough when he calls; pt did not fall yesterday and was taken to all of his doctors appointment, per pt's son; pt has remained at baseline mental status; discussed resluts w/ pt and pt's son as well with discharge home; pt and son agree with this plan. Reevaluation of patient:   3:04 PM I have reassessed the patient and discussed their results and diagnosis. Pt will be discharged in stable condition. Patient is to return to emergency department if any new or worsening condition. Patient understands and verbalizes agreement with plan. Disposition:  Diagnosis:   1. Left-sided weakness      Disposition: Discharge     Follow-up Information     Follow up With Details Comments Contact Info    Manny Santo MD Schedule an appointment as soon as possible for a visit in 3 days for re-evaluation and further treatment [address] [facility] EM[facility]ENCY DEPT  As needed, If symptoms worsen [address] [place] [id number]  [phone]           Patient's Medications   Start Taking    No medications on file   Continue Taking    AMLODIPINE (NORVASC) 10 MG TABLET    Take 1 Tab by mouth daily. ASPIRIN 81 MG CHEWABLE TABLET    Take 1 Tab by mouth daily. ATORVASTATIN (LIPITOR) 20 MG TABLET    1 Tab by Per G Tube route nightly. ATORVASTATIN (LIPITOR) 20 MG TABLET    Take 20 mg by mouth daily. BACLOFEN (LIORESAL) 10 MG TABLET    Take 1 Tab by mouth every twelve (12) hours as needed. CHLORPROMAZINE (THORAZINE) 50 MG TABLET    Take 1 Tab by mouth three (3) times daily as needed.  Indications: INTRACTABLE HICCUPS    CYANOCOBALAMIN 1,000 MCG TABLET    Take 1 Tab by mouth daily. CYANOCOBALAMIN 1,000 MCG TABLET    Take 1,000 mcg by mouth daily. FOLIC ACID (FOLVITE) 1 MG TABLET    Take 1 Tab by mouth daily. FUROSEMIDE (LASIX) 40 MG TABLET    Take 40 mg by mouth daily. LORAZEPAM (ATIVAN) 0.5 MG TABLET    Take 1-2 Tabs by mouth every eight (8) hours as needed for Anxiety (or panic attacks). Max Daily Amount: 3 mg. METOPROLOL (LOPRESSOR) 25 MG TABLET    Take 1 Tab by mouth two (2) times a day. MULTIVITAMIN,THERAPEUTIC (THERA MULTI-VITAMIN PO)    Take 500 mg by mouth daily. OXYCODONE-ACETAMINOPHEN (PERCOCET) 5-325 MG PER TABLET    Take 1 Tab by mouth every four (4) hours as needed. Max Daily Amount: 6 Tabs. These Medications have changed    No medications on file   Stop Taking    No medications on file       Scribe Attestation:   I, Joyce Flowers, am scribing for and in the presence of Brendan Harris MD on this day 05/11/17 at 10:37 AM   bertin Quiroga    Provider Attestation:  I personally performed the services described in the documentation, reviewed the documentation, as recorded by the scribe in my presence, and it accurately and completely records my words and actions.   Brendan Harris MD. 10:37 AM      Signed by: Bertin Quiroga, 10:37 AM

## 2017-05-11 NOTE — ED TRIAGE NOTES
Pt arrived via EMS alert & oriented times 4. Pt covered in urine. Pt is from home. Son takes care of pt. Pt c/o unable to lift left arm.

## 2017-05-12 LAB
ATRIAL RATE: 84 BPM
CALCULATED P AXIS, ECG09: -2 DEGREES
CALCULATED R AXIS, ECG10: 4 DEGREES
CALCULATED T AXIS, ECG11: 18 DEGREES
DIAGNOSIS, 93000: NORMAL
P-R INTERVAL, ECG05: 202 MS
Q-T INTERVAL, ECG07: 358 MS
QRS DURATION, ECG06: 86 MS
QTC CALCULATION (BEZET), ECG08: 423 MS
VENTRICULAR RATE, ECG03: 84 BPM

## 2017-05-13 LAB
BACTERIA SPEC CULT: ABNORMAL
SERVICE CMNT-IMP: ABNORMAL

## 2017-05-16 ENCOUNTER — HOME CARE VISIT (OUTPATIENT)
Dept: SCHEDULING | Facility: HOME HEALTH | Age: 62
End: 2017-05-16
Payer: COMMERCIAL

## 2017-05-16 PROCEDURE — G0299 HHS/HOSPICE OF RN EA 15 MIN: HCPCS

## 2017-05-18 NOTE — PROGRESS NOTES
CALLED PATIENT BACK AND SPOKE WITH HIM. INFORMED OF UTI AND NEED FOR TREATMENT. ADVISED HE NEEDED AN ANTIBIOTIC. STATED HE USED [facility]. GAVE ME HIS SON'S NUMBER, [phone], Jensen Mujica. CALLED BUT NO ANSWER AND MAILBOX WAS FULL AND UNABLE TO LEAVE A MESSAGE. RX FOR KEFLEX CALLED INTO [facility] ON [address].

## 2017-05-18 NOTE — CALL BACK NOTE
CALLED PATIENT BACK AND SPOKE WITH HIM. INFORMED OF UTI AND NEED FOR TREATMENT. ADVISED HE NEEDED AN ANTIBIOTIC. STATED HE USED [facility]. GAVE ME HIS SON'S NUMBER, [phone], Felisha Melissa. CALLED BUT NO ANSWER AND MAILBOX WAS FULL AND UNABLE TO LEAVE A MESSAGE. RX FOR KEFLEX CALLED INTO [facility] ON [address].

## 2017-05-21 VITALS
SYSTOLIC BLOOD PRESSURE: 112 MMHG | OXYGEN SATURATION: 97 % | RESPIRATION RATE: 18 BRPM | TEMPERATURE: 98.1 F | DIASTOLIC BLOOD PRESSURE: 78 MMHG | HEART RATE: 82 BPM

## 2017-05-26 ENCOUNTER — HOSPITAL ENCOUNTER (EMERGENCY)
Age: 62
Discharge: HOME | End: 2017-05-27
Attending: EMERGENCY MEDICINE
Payer: COMMERCIAL

## 2017-05-26 VITALS
WEIGHT: 167 LBS | TEMPERATURE: 98.3 F | RESPIRATION RATE: 25 BRPM | DIASTOLIC BLOOD PRESSURE: 88 MMHG | HEIGHT: 71 IN | HEART RATE: 96 BPM | BODY MASS INDEX: 23.38 KG/M2 | SYSTOLIC BLOOD PRESSURE: 142 MMHG | OXYGEN SATURATION: 100 %

## 2017-05-26 DIAGNOSIS — L97.921 CHRONIC ULCER OF LEG, LEFT, LIMITED TO BREAKDOWN OF SKIN (HCC): Primary | ICD-10-CM

## 2017-05-26 LAB
ALBUMIN SERPL BCP-MCNC: 3.3 G/DL (ref 3.4–5)
ALBUMIN/GLOB SERPL: 0.7 {RATIO} (ref 0.8–1.7)
ALP SERPL-CCNC: 81 U/L (ref 45–117)
ALT SERPL-CCNC: 20 U/L (ref 16–61)
ANION GAP BLD CALC-SCNC: 8 MMOL/L (ref 3–18)
AST SERPL W P-5'-P-CCNC: 18 U/L (ref 15–37)
BASOPHILS # BLD AUTO: 0 K/UL (ref 0–0.1)
BASOPHILS # BLD: 0 % (ref 0–2)
BILIRUB SERPL-MCNC: 0.4 MG/DL (ref 0.2–1)
BUN SERPL-MCNC: 13 MG/DL (ref 7–18)
BUN/CREAT SERPL: 16 (ref 12–20)
CALCIUM SERPL-MCNC: 9.4 MG/DL (ref 8.5–10.1)
CHLORIDE SERPL-SCNC: 104 MMOL/L (ref 100–108)
CO2 SERPL-SCNC: 30 MMOL/L (ref 21–32)
CREAT SERPL-MCNC: 0.83 MG/DL (ref 0.6–1.3)
DIFFERENTIAL METHOD BLD: ABNORMAL
EOSINOPHIL # BLD: 0.1 K/UL (ref 0–0.4)
EOSINOPHIL NFR BLD: 2 % (ref 0–5)
ERYTHROCYTE [DISTWIDTH] IN BLOOD BY AUTOMATED COUNT: 13.1 % (ref 11.6–14.5)
GLOBULIN SER CALC-MCNC: 4.7 G/DL (ref 2–4)
GLUCOSE SERPL-MCNC: 86 MG/DL (ref 74–99)
HCT VFR BLD AUTO: 37 % (ref 36–48)
HGB BLD-MCNC: 12.7 G/DL (ref 13–16)
LYMPHOCYTES # BLD AUTO: 24 % (ref 21–52)
LYMPHOCYTES # BLD: 1.5 K/UL (ref 0.9–3.6)
MCH RBC QN AUTO: 27.4 PG (ref 24–34)
MCHC RBC AUTO-ENTMCNC: 34.3 G/DL (ref 31–37)
MCV RBC AUTO: 79.7 FL (ref 74–97)
MONOCYTES # BLD: 0.7 K/UL (ref 0.05–1.2)
MONOCYTES NFR BLD AUTO: 11 % (ref 3–10)
NEUTS SEG # BLD: 3.9 K/UL (ref 1.8–8)
NEUTS SEG NFR BLD AUTO: 63 % (ref 40–73)
PLATELET # BLD AUTO: 253 K/UL (ref 135–420)
PMV BLD AUTO: 10.9 FL (ref 9.2–11.8)
POTASSIUM SERPL-SCNC: 4.4 MMOL/L (ref 3.5–5.5)
PROT SERPL-MCNC: 8 G/DL (ref 6.4–8.2)
RBC # BLD AUTO: 4.64 M/UL (ref 4.7–5.5)
SODIUM SERPL-SCNC: 142 MMOL/L (ref 136–145)
WBC # BLD AUTO: 6.2 K/UL (ref 4.6–13.2)

## 2017-05-26 PROCEDURE — 85025 COMPLETE CBC W/AUTO DIFF WBC: CPT | Performed by: EMERGENCY MEDICINE

## 2017-05-26 PROCEDURE — 80053 COMPREHEN METABOLIC PANEL: CPT | Performed by: EMERGENCY MEDICINE

## 2017-05-26 PROCEDURE — 99284 EMERGENCY DEPT VISIT MOD MDM: CPT

## 2017-05-26 NOTE — ED TRIAGE NOTES
BIBA from home with complaint that he is not being taken care of at home. Medics report that there was urine soaked into the carpet and food left out on plates around the house. Pt denies any specific complaints other than not receiving care at home.

## 2017-05-27 NOTE — ED NOTES
Bedside shift change report given to Adolfo Queen (oncoming nurse) by Kendrick Hickman RN (offgoing nurse). Report included the following information SBAR, ED Summary, Intake/Output, MAR and Recent Results.

## 2017-05-27 NOTE — ED NOTES
I have reviewed discharge instructions with the patient. The patient verbalized understanding.  Pt left ED in c/o [facility]

## 2017-06-22 ENCOUNTER — HOSPITAL ENCOUNTER (EMERGENCY)
Age: 62
Discharge: HOME | End: 2017-06-22
Attending: EMERGENCY MEDICINE | Admitting: EMERGENCY MEDICINE
Payer: COMMERCIAL

## 2017-06-22 VITALS
HEART RATE: 100 BPM | TEMPERATURE: 98.8 F | RESPIRATION RATE: 19 BRPM | OXYGEN SATURATION: 100 % | HEIGHT: 66 IN | SYSTOLIC BLOOD PRESSURE: 148 MMHG | WEIGHT: 182 LBS | DIASTOLIC BLOOD PRESSURE: 107 MMHG | BODY MASS INDEX: 29.25 KG/M2

## 2017-06-22 DIAGNOSIS — I83.009 CHRONIC CUTANEOUS VENOUS STASIS ULCER (HCC): Primary | ICD-10-CM

## 2017-06-22 DIAGNOSIS — R06.6 HICCUPS: ICD-10-CM

## 2017-06-22 DIAGNOSIS — L97.909 CHRONIC CUTANEOUS VENOUS STASIS ULCER (HCC): Primary | ICD-10-CM

## 2017-06-22 LAB
ALBUMIN SERPL BCP-MCNC: 3 G/DL (ref 3.4–5)
ALBUMIN/GLOB SERPL: 0.6 {RATIO} (ref 0.8–1.7)
ALP SERPL-CCNC: 73 U/L (ref 45–117)
ALT SERPL-CCNC: 14 U/L (ref 16–61)
ANION GAP BLD CALC-SCNC: 7 MMOL/L (ref 3–18)
AST SERPL W P-5'-P-CCNC: 15 U/L (ref 15–37)
BASOPHILS # BLD AUTO: 0 K/UL (ref 0–0.1)
BASOPHILS # BLD: 0 % (ref 0–2)
BILIRUB DIRECT SERPL-MCNC: <0.1 MG/DL (ref 0–0.2)
BILIRUB SERPL-MCNC: 0.2 MG/DL (ref 0.2–1)
BNP SERPL-MCNC: 167 PG/ML (ref 0–900)
BUN SERPL-MCNC: 11 MG/DL (ref 7–18)
BUN/CREAT SERPL: 14 (ref 12–20)
CALCIUM SERPL-MCNC: 9.4 MG/DL (ref 8.5–10.1)
CHLORIDE SERPL-SCNC: 104 MMOL/L (ref 100–108)
CO2 SERPL-SCNC: 28 MMOL/L (ref 21–32)
CREAT SERPL-MCNC: 0.81 MG/DL (ref 0.6–1.3)
DIFFERENTIAL METHOD BLD: ABNORMAL
EOSINOPHIL # BLD: 0.2 K/UL (ref 0–0.4)
EOSINOPHIL NFR BLD: 3 % (ref 0–5)
ERYTHROCYTE [DISTWIDTH] IN BLOOD BY AUTOMATED COUNT: 13.7 % (ref 11.6–14.5)
GLOBULIN SER CALC-MCNC: 5.3 G/DL (ref 2–4)
GLUCOSE SERPL-MCNC: 87 MG/DL (ref 74–99)
HCT VFR BLD AUTO: 37.2 % (ref 36–48)
HGB BLD-MCNC: 12.5 G/DL (ref 13–16)
LYMPHOCYTES # BLD AUTO: 31 % (ref 21–52)
LYMPHOCYTES # BLD: 1.9 K/UL (ref 0.9–3.6)
MCH RBC QN AUTO: 27.1 PG (ref 24–34)
MCHC RBC AUTO-ENTMCNC: 33.6 G/DL (ref 31–37)
MCV RBC AUTO: 80.5 FL (ref 74–97)
MONOCYTES # BLD: 0.5 K/UL (ref 0.05–1.2)
MONOCYTES NFR BLD AUTO: 9 % (ref 3–10)
NEUTS SEG # BLD: 3.5 K/UL (ref 1.8–8)
NEUTS SEG NFR BLD AUTO: 57 % (ref 40–73)
PLATELET # BLD AUTO: 247 K/UL (ref 135–420)
PMV BLD AUTO: 9.7 FL (ref 9.2–11.8)
POTASSIUM SERPL-SCNC: 4.2 MMOL/L (ref 3.5–5.5)
PROT SERPL-MCNC: 8.3 G/DL (ref 6.4–8.2)
RBC # BLD AUTO: 4.62 M/UL (ref 4.7–5.5)
SODIUM SERPL-SCNC: 139 MMOL/L (ref 136–145)
WBC # BLD AUTO: 6.2 K/UL (ref 4.6–13.2)

## 2017-06-22 PROCEDURE — 80076 HEPATIC FUNCTION PANEL: CPT | Performed by: EMERGENCY MEDICINE

## 2017-06-22 PROCEDURE — 99284 EMERGENCY DEPT VISIT MOD MDM: CPT

## 2017-06-22 PROCEDURE — 93970 EXTREMITY STUDY: CPT

## 2017-06-22 PROCEDURE — 83880 ASSAY OF NATRIURETIC PEPTIDE: CPT | Performed by: EMERGENCY MEDICINE

## 2017-06-22 PROCEDURE — 80048 BASIC METABOLIC PNL TOTAL CA: CPT | Performed by: EMERGENCY MEDICINE

## 2017-06-22 PROCEDURE — 85025 COMPLETE CBC W/AUTO DIFF WBC: CPT | Performed by: EMERGENCY MEDICINE

## 2017-06-22 NOTE — DISCHARGE INSTRUCTIONS
Venous Skin Ulcer: Care Instructions  Your Care Instructions  A venous skin ulcer is a shallow wound that develops when the leg veins do not move blood back to the heart normally. Your veins have one-way valves that keep blood flowing toward the heart. When the valves are damaged, the blood can back up and pool in the vein. The blood may leak out of the vein into tissue around the vein. The tissue can break down and form an ulcer. The first sign of a venous skin ulcer is skin that turns dark red or purple over the area where the blood is leaking out of the vein. The skin also may become thick, dry, and itchy. Without treatment, an ulcer may form. The ulcer may be painful. Your leg also may swell and ache. If the ulcer becomes infected, the infection may cause an odor, and pus may drain from the ulcer. The area around the ulcer also may be more tender and red. Follow-up care is a key part of your treatment and safety. Be sure to make and go to all appointments, and call your doctor if you are having problems. It's also a good idea to know your test results and keep a list of the medicines you take. How can you care for yourself at home? · Follow your doctor's instructions on how to clean the ulcer and change the bandage. · If your doctor prescribed antibiotics, take them as directed. Do not stop taking them just because you feel better. You need to take the full course of antibiotics. · Lift your legs above the level of your heart as often as possible. For example, lie down and then prop up your legs with pillows. · Wear compression stockings or bandages. They help the blood circulate in your legs. And they help prevent blood from pooling in your legs. But there are different types of stockings, and they need to fit right. So your doctor will recommend what you need. · After your ulcer has healed, continue to wear compression stockings. Take them off only when you bathe and sleep.  Compression helps your blood circulate and helps prevent other ulcers from forming. · Walk daily. Walking helps your blood circulation. When should you call for help? Call your doctor now or seek immediate medical care if:  · You have symptoms of infection, such as:  ¨ Increased pain, swelling, warmth, or redness. ¨ Red streaks leading from the ulcer. ¨ Pus draining from the ulcer. ¨ A fever. Watch closely for changes in your health, and be sure to contact your doctor if:  · Your ulcer is not healing. · You have new ulcers. · The ulcer starts to bleed, and blood soaks through the bandage. Oozing small amounts of a mix of blood and fluid is normal.  · You have new bleeding. · You do not get better as expected. Where can you learn more? Go to http://lainey-millie.info/. Enter P485 in the search box to learn more about \"Venous Skin Ulcer: Care Instructions. \"  Current as of: March 20, 2017  Content Version: 11.3  © 4764-5213 [employer]. Care instructions adapted under license by [employer] (which disclaims liability or warranty for this information). If you have questions about a medical condition or this instruction, always ask your healthcare professional. [place] [address] any warranty or liability for your use of this information.

## 2017-06-22 NOTE — PROGRESS NOTES
Care Management Interventions  PCP Verified by CM: Yes (Dr Lizzie Persaud)  Mode of Transport at Discharge: S  Transition of Care Consult (CM Consult): [address]: Yes  Current Support Network: Family Lives [place], Lives Alone, Relative's Home (Pt lives with his brother, however he is not a caregiver. Patient's son is involved per APS. Patient's daughter lives locally but does not assist in care.  )  Confirm Follow Up Transport: Family  Plan discussed with Pt/Family/Caregiver: Yes  Freedom of Choice Offered: Yes  Discharge Location  Discharge Placement: Home with home health    Patient is a 57 yo male who presented to the ED c/o chronic hiccups. It was found that the patient also has wounds on his legs and is unable to ambulate at this time. NATALY discussed with [address] [place] [address]. Per [address] she reached out to the patient's daughter, Mady Gottron, and was awaiting call back. NATALY spoke with Mady Gottron, patient's daughter, who stated that she has been able to check on her father recently due to personal issues. She stated that she was unaware of the patient's current condition and would try to check on him more going forward. She was in agreement with [facility] and felt he would be doing better at home with additional resources, specially a caregiver. Patient is currently under age for \"Elderly Adult\" Medicaid and, supposedly, over income for disability designation via Social Security. NATALY spoke with [facility]PS , Luisito Odom [phone] , who stated she has been trying to assist with patient getting disability but has also been told his is over income. NATALY reached out to [facility] who will review his case. Luisito Odom stated she has been trying to assist the patient's son in getting more services within the home. In the past they have provided temporary aide services, but the patient does not want to pay fr the services once APS can no longer pay.   NATALY discussed this with the patient and encouraged him to consider this going forward. Informed Ms. Tony Zepeda of plan who is in agreement. Will follow up with specifics tomorrow. NATALY obtained FOC from the patient. He selected 1) [facility], 2) [facility]. [address] [place] [address] liaison informed of referral via . NATALY will follow up in the morning.

## 2017-06-22 NOTE — ROUTINE PROCESS
IV removed, VS obtained. Patient received and reviewed discharge instructions and follow up plan. Nad at discharge by EMS stretcher with tech at side. Patient ID band not removed prior to discharge.

## 2017-06-22 NOTE — PROGRESS NOTES
This writer met with this pt who was in room ED 17 alone, pt reports that he is having a hard time walking at home. Pt reports that he does not live alone, this writer will make efforts to contact this pt's daughter and son listed on this pt's emergency chart, to evaluate their thoughts around this pt's safety when he is at home/apartment. Pt is agreeable with placement if possible and accepted, this writer reviewed this pt's insurance who may not be agreeable to providing authorization for such an admissions, pt's legs were noticeably blistery and pt present as being unkept. CM will continue to follow and assist as needed.

## 2017-06-22 NOTE — PROCEDURES
[facility]  *** FINAL REPORT ***    Name: Azeem Longo  MRN: [id number]  : 08 Mar 1955  HIS Order #: [id number]  [id number] [facility] Visit #: [id number]  Date: 2017    TYPE OF TEST: Peripheral Venous Testing    REASON FOR TEST  Pain in limb, Limb swelling    Right Leg:-  Deep venous thrombosis:           No  Superficial venous thrombosis:    No  Deep venous insufficiency:        Not examined  Superficial venous insufficiency: Not examined    Left Leg:-  Deep venous thrombosis:           No  Superficial venous thrombosis:    No  Deep venous insufficiency:        Not examined  Superficial venous insufficiency: Not examined      INTERPRETATION/FINDINGS  Duplex images were obtained using 2-D gray scale, color flow, and  spectral Doppler analysis. Right le. No evidence of deep venous thrombosis detected in the veins  visualized. 2. Deep veins visualized include the common femoral, femoral,  popliteal, posterior tibial and peroneal veins. 3. No evidence of superficial thrombosis detected. 4. Superficial veins visualized include the proximal great saphenous  vein. Left le. No evidence of deep venous thrombosis detected in the veins  visualized. 2. Deep veins visualized include the common femoral, femoral,  popliteal, posterior tibial and peroneal veins. 3. No evidence of superficial thrombosis detected. 4. Superficial veins visualized include the proximal great saphenous  vein. ADDITIONAL COMMENTS  Limitations: Edema  Unable to perform adequate compression analysis of the left peroneal  veins. Patency of these vessels is demonstrated by color flow and  Doppler signal. Cannot rule out non-occlusive thrombus in this  segments. No change when compared to previous exam on 16. I have personally reviewed the data relevant to the interpretation of  this  study. TECHNOLOGIST: Halle Tracey RVT  Signed: 2017 03:38 PM    PHYSICIAN: Isaías Paul MD  Signed: 2017 12:29 PM

## 2017-06-22 NOTE — ED TRIAGE NOTES
Patient called 911 to transport to hospital for chronic hiccups, how ever, patient has bilateral circumvential  open lesions to lower extremities at ankles.

## 2017-06-22 NOTE — ED PROVIDER NOTES
HPI Comments: Patient with history of hypertension hiccups lower extremity edema, not ambulatory for the last 3 years, normally uses a wheelchair to get around presents via EMS. He called for hiccups but EMS was concerned with his living conditions, he was found alone in his house, reports that his brother normally cares for him. He is unable to use the restroom or feed himself or care for himself on his brother is not around, he does not have any help at home. He has had persistent hiccups which are chronic. Patient is a 58 y.o. male presenting with skin infection. Skin Infection   Pertinent negatives include no chest pain, no abdominal pain and no shortness of breath. Past Medical History:   Diagnosis Date    Hiccups     Hypertension     Hyponatremia     Lower extremity edema     Psychogenic polydipsia     Spinal stenosis        Past Surgical History:   Procedure Laterality Date    HX HEENT      pt reports past hx of surgery on ears unsure of what type         Family History:   Problem Relation Age of Onset    Hypertension Other        Social History     Social History    Marital status:      Spouse name: N/A    Number of children: N/A    Years of education: N/A     Occupational History    Not on file. Social History Main Topics    Smoking status: Former Smoker     Packs/day: 0.50     Quit date: 6/22/2008    Smokeless tobacco: Not on file    Alcohol use No    Drug use: No    Sexual activity: Not Currently     Other Topics Concern    Not on file     Social History Narrative         ALLERGIES: Review of patient's allergies indicates no known allergies. Review of Systems   Constitutional: Negative for fever. HENT: Negative for nosebleeds. Eyes: Negative for visual disturbance. Respiratory: Negative for shortness of breath. Cardiovascular: Negative for chest pain. Gastrointestinal: Negative for abdominal pain. Genitourinary: Negative for dysuria. Musculoskeletal: Negative for myalgias. Skin: Positive for wound. Negative for rash. Neurological: Negative for weakness. All other systems reviewed and are negative. Vitals:    06/22/17 1336 06/22/17 1413 06/22/17 1428 06/22/17 1440   BP:       Pulse: 83 98 100    Resp: 14 19 19    Temp:       SpO2: 100% 100% 100% 100%   Weight:       Height:                Physical Exam   Constitutional:   General:  Well-developed, well-nourished, poor hygiene, smells of stale urine. Head:  Normocephalic atraumatic. Eyes:  Pupils midrange extraocular movements intact. No pallor or conjunctival injection. Nose:  No rhinorrhea, inspection grossly normal.    Ears:  Grossly normal to inspection, no discharge. Mouth:  Mucous membranes moist, no appreciable intraoral lesion. Neck/Back:  Trachea midline, no asymmetry. Chest:  Grossly normal inspection, symmetric chest rise. Pulmonary:  Clear to auscultation bilaterally no wheezes rhonchi or rales. Cardiovascular:  S1-S2 no murmurs rubs or gallops. Abdomen: Soft, nontender, nondistended no guarding rebound or peritoneal signs. Extremities:  Grossly normal to inspection, peripheral pulses intact  LEFT lower extremity: 5 cm x 4 cm area of ulceration with granulation tissue, no surrounding erythema. RIGHT foot, 3 cm by 2 cm ulceration on the dorsum overlying the 3rd and 4th MTP without erythema or discharge. RIGHT shin: Lateral aspects 4 x 2 cm vertical ulceration without purulence or discharge, no surrounding erythema. Bilateral lower extremities with 1+ pitting edema symmetric bilaterally, 2+ dorsalis pedis pulses. Neurologic:  Alert and oriented no appreciable focal neurologic deficit. Skin:  Warm and dry  Psychiatric:  Grossly normal mood and affect. Nursing note reviewed, vital signs reviewed.           MDM  Number of Diagnoses or Management Options  Chronic cutaneous venous stasis ulcer ([address]):   Hiccups:   Diagnosis management comments: ED course:  Patient presents with hiccups, chronic, has prior workups that are unremarkable. Do not feel that this requires an ED workup at this time, he has bilateral lower extremity swelling we'll evaluate for DVT, kidney disease and low albumin concern that this is chronic and largely care for he has poor hygiene, [facility] Services called by EMS. The patient is wheelchair bound with inability to care for himself at home, will consult case management for either home health or placement     Labs unremarkable      Consult:  Discussed care with Case management. Standard discussion; including history of patient's chief complaint, available diagnostic results, and treatment course. Home health has been set up for the patient, patient is stable for discharge home to follow with wound care     [facility] is aware of the plan for discharge home and will follow as an outpatient     Patient's presentation, history, physical exam and laboratory evaluations were reviewed. At this time patient was felt to be stable for outpatient management and follow with primary care/specialist.  Patient was instructed to return to the emergency department with any concerns. Disposition:     Discharged home        Portions of this chart were created with Dragon medical speech to text program.   Unrecognized errors may be present.          ED Course       Procedures

## 2017-06-23 ENCOUNTER — HOME HEALTH ADMISSION (OUTPATIENT)
Dept: HOME HEALTH SERVICES | Facility: HOME HEALTH | Age: 62
End: 2017-06-23
Payer: COMMERCIAL

## 2017-06-23 NOTE — HOME CARE
[facility] liaisons received referral this morning left on voicemail by , Dc Alarcon. Noted the patient had been discharged to home without any identifiable caregiver although the ED Physician's notes state APS is aware of the plan. This nurse first contacted the patient this morning to verify demographic and DME information. When this nurse asked patient how he was doing this morning, his response was: \" I'm not doing good. I'm sick ma'am.\" This nurse did not note any distress in patient's voice and the patient continued to answer the questions asked. Per the patient, his brother Swetha Coleman lives there with him but is not there half the time. He also told this nurse that he can't walk and uses a bucket to urinate. This nurse explained to the patient what the home care nurses, therapists, aides and social workers' roles will be once he is admitted to home care and patient states, \"I need for you to come today. .. Are you coming today? This nurse asked patient if he was able to assist the nurse with the dressing changes on his legs and he says, \"No. I can't do anything. .. I'm sick ma'am\". This nurse asked the patient about his children and if he thought they would assist - he did tell this nurse that his son helps him out. This nurse called and spoke with Rayne Llanos, patient's son. Per Dariela Ferguson, he is willing and able to assist the patient with the wound care. Evelio Barney also stated that the patient has had numerous amounts of caregivers but \"My father runs them off\". Son describes the patient as stubborn, ornery and even defiant at times with care. This nurse has notified son that [facility] is not able to see patient today for wound care due to availability and referral was received this morning. This nurse then asked the son to be present tomorrow while SN visit was made and son replied, \"Tomorrow is not a good day for me. I'm going out of town this weekend. It would have to be Monday. \" This nurse then called and left  for daughter, Cathleen Hamman to return call. Daughter did return call this afternoon and when asked, she declined to be a caregiver for the patient. This nurse then asked supervisor and clinical director if patient should have a visit tomorrow so the SN can evaluate the patient's BLE's and contact PCP with recommendations (if PCP is available) or wait until Monday when caregiver is available. Referral sent to [facility] central intake for processing and visit scheduling. Son, Sally Pena, would like to be notified if patient is seen tomorrow - information relayed on the referral as well as the follow up appointment scheduled by the son for 7/5/17 at 2:30pm - Dr. Livia Hamilton will be out of the office per the son so another practitioner will see the patient - STELLA Grimaldo LPN     Addendum: wound care orders written on paper script which was faxed to [facility] central intake and stapled to the paper referral - STELLA Perdue LPN

## 2017-06-23 NOTE — PROGRESS NOTES
PCP appointment scheduled for 6/27/2017 at 10:30am.  Mr. Angie Worrell agreeable however stated his son, Nav Merino, \"takes care of all that\". SW attempted to call the patient's son but there was no anwwer. Voicemail was full. Appt date and time communicated to [facility]. 1200: SW received a call from the patient's son who stated he cannot make this appointment. NATALY called Dr. Esmer Collado office and there were several options to try to accommodate the patient since the PCP will be going out of town. SW unable to make that decision for the family, therefore SW called the patient's son back and requested he schedule the appointment. NATALY communicated the importance of the appt and stated that Dr. Esmer Collado office will be calling this SW to notify of the scheduled date and time. He understood. [facility] updated. Will provide new date and time to [facility] once received.

## 2017-06-24 ENCOUNTER — HOME CARE VISIT (OUTPATIENT)
Dept: SCHEDULING | Facility: HOME HEALTH | Age: 62
End: 2017-06-24
Payer: COMMERCIAL

## 2017-06-24 PROCEDURE — G0299 HHS/HOSPICE OF RN EA 15 MIN: HCPCS

## 2017-06-24 PROCEDURE — 400013 HH SOC

## 2017-06-25 ENCOUNTER — HOME CARE VISIT (OUTPATIENT)
Dept: HOME HEALTH SERVICES | Facility: HOME HEALTH | Age: 62
End: 2017-06-25

## 2017-06-25 VITALS
BODY MASS INDEX: 28 KG/M2 | HEART RATE: 80 BPM | WEIGHT: 200 LBS | SYSTOLIC BLOOD PRESSURE: 122 MMHG | DIASTOLIC BLOOD PRESSURE: 76 MMHG | TEMPERATURE: 97 F | RESPIRATION RATE: 16 BRPM | OXYGEN SATURATION: 97 % | HEIGHT: 71 IN

## 2017-06-26 ENCOUNTER — HOME CARE VISIT (OUTPATIENT)
Dept: SCHEDULING | Facility: HOME HEALTH | Age: 62
End: 2017-06-26
Payer: COMMERCIAL

## 2017-06-26 PROCEDURE — G0299 HHS/HOSPICE OF RN EA 15 MIN: HCPCS

## 2017-06-26 PROCEDURE — A6446 CONFORM BAND S W>=3" <5"/YD: HCPCS

## 2017-06-26 PROCEDURE — A6402 STERILE GAUZE <= 16 SQ IN: HCPCS

## 2017-06-26 PROCEDURE — A6197 ALGINATE DRSG >16 <=48 SQ IN: HCPCS

## 2017-06-26 PROCEDURE — A4927 NON-STERILE GLOVES: HCPCS

## 2017-06-26 PROCEDURE — A6216 NON-STERILE GAUZE<=16 SQ IN: HCPCS

## 2017-06-26 PROCEDURE — A6260 WOUND CLEANSER ANY TYPE/SIZE: HCPCS

## 2017-06-26 PROCEDURE — A4450 NON-WATERPROOF TAPE: HCPCS

## 2017-06-27 ENCOUNTER — HOME CARE VISIT (OUTPATIENT)
Dept: HOME HEALTH SERVICES | Facility: HOME HEALTH | Age: 62
End: 2017-06-27
Payer: COMMERCIAL

## 2017-06-29 ENCOUNTER — HOME CARE VISIT (OUTPATIENT)
Dept: SCHEDULING | Facility: HOME HEALTH | Age: 62
End: 2017-06-29
Payer: COMMERCIAL

## 2017-06-29 PROCEDURE — G0299 HHS/HOSPICE OF RN EA 15 MIN: HCPCS

## 2017-06-30 ENCOUNTER — HOME CARE VISIT (OUTPATIENT)
Dept: SCHEDULING | Facility: HOME HEALTH | Age: 62
End: 2017-06-30
Payer: COMMERCIAL

## 2017-06-30 PROCEDURE — G0299 HHS/HOSPICE OF RN EA 15 MIN: HCPCS

## 2017-07-02 VITALS
SYSTOLIC BLOOD PRESSURE: 140 MMHG | SYSTOLIC BLOOD PRESSURE: 140 MMHG | DIASTOLIC BLOOD PRESSURE: 78 MMHG | HEART RATE: 82 BPM | OXYGEN SATURATION: 97 % | OXYGEN SATURATION: 98 % | DIASTOLIC BLOOD PRESSURE: 78 MMHG | RESPIRATION RATE: 16 BRPM | RESPIRATION RATE: 16 BRPM | HEART RATE: 88 BPM

## 2017-07-03 ENCOUNTER — HOME CARE VISIT (OUTPATIENT)
Dept: SCHEDULING | Facility: HOME HEALTH | Age: 62
End: 2017-07-03
Payer: COMMERCIAL

## 2017-07-03 ENCOUNTER — HOME CARE VISIT (OUTPATIENT)
Dept: HOME HEALTH SERVICES | Facility: HOME HEALTH | Age: 62
End: 2017-07-03
Payer: COMMERCIAL

## 2017-07-03 PROCEDURE — G0299 HHS/HOSPICE OF RN EA 15 MIN: HCPCS

## 2017-07-05 ENCOUNTER — HOME CARE VISIT (OUTPATIENT)
Dept: HOME HEALTH SERVICES | Facility: HOME HEALTH | Age: 62
End: 2017-07-05
Payer: COMMERCIAL

## 2017-07-06 ENCOUNTER — APPOINTMENT (OUTPATIENT)
Dept: GENERAL RADIOLOGY | Age: 62
DRG: 982 | End: 2017-07-06
Attending: EMERGENCY MEDICINE
Payer: COMMERCIAL

## 2017-07-06 ENCOUNTER — HOSPITAL ENCOUNTER (INPATIENT)
Age: 62
LOS: 13 days | Discharge: NURSING, REHAB OR LONG-TERM CARE | DRG: 982 | End: 2017-07-19
Attending: EMERGENCY MEDICINE | Admitting: INTERNAL MEDICINE
Payer: COMMERCIAL

## 2017-07-06 ENCOUNTER — HOME CARE VISIT (OUTPATIENT)
Dept: HOME HEALTH SERVICES | Facility: HOME HEALTH | Age: 62
End: 2017-07-06
Payer: COMMERCIAL

## 2017-07-06 ENCOUNTER — OFFICE VISIT (OUTPATIENT)
Dept: VASCULAR SURGERY | Age: 62
End: 2017-07-06

## 2017-07-06 ENCOUNTER — HOME CARE VISIT (OUTPATIENT)
Dept: SCHEDULING | Facility: HOME HEALTH | Age: 62
End: 2017-07-06
Payer: COMMERCIAL

## 2017-07-06 VITALS
RESPIRATION RATE: 22 BRPM | HEIGHT: 71 IN | WEIGHT: 200 LBS | BODY MASS INDEX: 28 KG/M2 | HEART RATE: 86 BPM | SYSTOLIC BLOOD PRESSURE: 144 MMHG | DIASTOLIC BLOOD PRESSURE: 82 MMHG

## 2017-07-06 DIAGNOSIS — L97.512 FOOT ULCER WITH FAT LAYER EXPOSED, RIGHT (HCC): ICD-10-CM

## 2017-07-06 DIAGNOSIS — M79.672 BILATERAL LEG AND FOOT PAIN: ICD-10-CM

## 2017-07-06 DIAGNOSIS — B87.9: ICD-10-CM

## 2017-07-06 DIAGNOSIS — M79.604 BILATERAL LEG AND FOOT PAIN: ICD-10-CM

## 2017-07-06 DIAGNOSIS — L97.919 VENOUS STASIS ULCERS OF BOTH LOWER EXTREMITIES (HCC): Primary | ICD-10-CM

## 2017-07-06 DIAGNOSIS — I83.019 VENOUS STASIS ULCERS OF BOTH LOWER EXTREMITIES (HCC): Primary | ICD-10-CM

## 2017-07-06 DIAGNOSIS — I83.029 VENOUS STASIS ULCERS OF BOTH LOWER EXTREMITIES (HCC): Primary | ICD-10-CM

## 2017-07-06 DIAGNOSIS — M79.671 BILATERAL LEG AND FOOT PAIN: ICD-10-CM

## 2017-07-06 DIAGNOSIS — L08.9 INFECTED ULCER OF SKIN, WITH UNSPECIFIED SEVERITY (HCC): ICD-10-CM

## 2017-07-06 DIAGNOSIS — L08.9 RIGHT FOOT INFECTION: Primary | ICD-10-CM

## 2017-07-06 DIAGNOSIS — L97.929 VENOUS STASIS ULCERS OF BOTH LOWER EXTREMITIES (HCC): Primary | ICD-10-CM

## 2017-07-06 DIAGNOSIS — M79.605 BILATERAL LEG AND FOOT PAIN: ICD-10-CM

## 2017-07-06 DIAGNOSIS — L98.499 INFECTED ULCER OF SKIN, WITH UNSPECIFIED SEVERITY (HCC): ICD-10-CM

## 2017-07-06 LAB
ALBUMIN SERPL BCP-MCNC: 3.1 G/DL (ref 3.4–5)
ALBUMIN/GLOB SERPL: 0.6 {RATIO} (ref 0.8–1.7)
ALP SERPL-CCNC: 71 U/L (ref 45–117)
ALT SERPL-CCNC: 14 U/L (ref 16–61)
ANION GAP BLD CALC-SCNC: 3 MMOL/L (ref 3–18)
AST SERPL W P-5'-P-CCNC: 7 U/L (ref 15–37)
BASOPHILS # BLD AUTO: 0 K/UL (ref 0–0.1)
BASOPHILS # BLD: 0 % (ref 0–2)
BILIRUB DIRECT SERPL-MCNC: 0.1 MG/DL (ref 0–0.2)
BILIRUB SERPL-MCNC: 0.2 MG/DL (ref 0.2–1)
BUN SERPL-MCNC: 8 MG/DL (ref 7–18)
BUN/CREAT SERPL: 10 (ref 12–20)
CALCIUM SERPL-MCNC: 9.2 MG/DL (ref 8.5–10.1)
CHLORIDE SERPL-SCNC: 106 MMOL/L (ref 100–108)
CO2 SERPL-SCNC: 31 MMOL/L (ref 21–32)
CREAT SERPL-MCNC: 0.84 MG/DL (ref 0.6–1.3)
DIFFERENTIAL METHOD BLD: ABNORMAL
EOSINOPHIL # BLD: 0.2 K/UL (ref 0–0.4)
EOSINOPHIL NFR BLD: 3 % (ref 0–5)
ERYTHROCYTE [DISTWIDTH] IN BLOOD BY AUTOMATED COUNT: 13.6 % (ref 11.6–14.5)
GLOBULIN SER CALC-MCNC: 5 G/DL (ref 2–4)
GLUCOSE SERPL-MCNC: 86 MG/DL (ref 74–99)
HCT VFR BLD AUTO: 35.9 % (ref 36–48)
HGB BLD-MCNC: 12 G/DL (ref 13–16)
LACTATE BLD-SCNC: 1.1 MMOL/L (ref 0.4–2)
LYMPHOCYTES # BLD AUTO: 25 % (ref 21–52)
LYMPHOCYTES # BLD: 1.5 K/UL (ref 0.9–3.6)
MCH RBC QN AUTO: 26.7 PG (ref 24–34)
MCHC RBC AUTO-ENTMCNC: 33.4 G/DL (ref 31–37)
MCV RBC AUTO: 79.8 FL (ref 74–97)
MONOCYTES # BLD: 0.5 K/UL (ref 0.05–1.2)
MONOCYTES NFR BLD AUTO: 8 % (ref 3–10)
NEUTS SEG # BLD: 3.8 K/UL (ref 1.8–8)
NEUTS SEG NFR BLD AUTO: 64 % (ref 40–73)
PLATELET # BLD AUTO: 296 K/UL (ref 135–420)
PMV BLD AUTO: 10.1 FL (ref 9.2–11.8)
POTASSIUM SERPL-SCNC: 3.7 MMOL/L (ref 3.5–5.5)
PROT SERPL-MCNC: 8.1 G/DL (ref 6.4–8.2)
RBC # BLD AUTO: 4.5 M/UL (ref 4.7–5.5)
SODIUM SERPL-SCNC: 140 MMOL/L (ref 136–145)
WBC # BLD AUTO: 6 K/UL (ref 4.6–13.2)

## 2017-07-06 PROCEDURE — 87184 SC STD DISK METHOD PER PLATE: CPT | Performed by: EMERGENCY MEDICINE

## 2017-07-06 PROCEDURE — 87070 CULTURE OTHR SPECIMN AEROBIC: CPT | Performed by: EMERGENCY MEDICINE

## 2017-07-06 PROCEDURE — 73620 X-RAY EXAM OF FOOT: CPT

## 2017-07-06 PROCEDURE — 83605 ASSAY OF LACTIC ACID: CPT

## 2017-07-06 PROCEDURE — 65270000029 HC RM PRIVATE

## 2017-07-06 PROCEDURE — 99284 EMERGENCY DEPT VISIT MOD MDM: CPT

## 2017-07-06 PROCEDURE — 93970 EXTREMITY STUDY: CPT

## 2017-07-06 PROCEDURE — 74011250636 HC RX REV CODE- 250/636: Performed by: EMERGENCY MEDICINE

## 2017-07-06 PROCEDURE — 80048 BASIC METABOLIC PNL TOTAL CA: CPT

## 2017-07-06 PROCEDURE — 80076 HEPATIC FUNCTION PANEL: CPT | Performed by: EMERGENCY MEDICINE

## 2017-07-06 PROCEDURE — 96366 THER/PROPH/DIAG IV INF ADDON: CPT

## 2017-07-06 PROCEDURE — 85025 COMPLETE CBC W/AUTO DIFF WBC: CPT

## 2017-07-06 PROCEDURE — 77030011256 HC DRSG MEPILEX <16IN NO BORD MOLN -A

## 2017-07-06 PROCEDURE — 87077 CULTURE AEROBIC IDENTIFY: CPT | Performed by: EMERGENCY MEDICINE

## 2017-07-06 PROCEDURE — 96365 THER/PROPH/DIAG IV INF INIT: CPT

## 2017-07-06 PROCEDURE — 87186 SC STD MICRODIL/AGAR DIL: CPT | Performed by: EMERGENCY MEDICINE

## 2017-07-06 PROCEDURE — 87040 BLOOD CULTURE FOR BACTERIA: CPT | Performed by: EMERGENCY MEDICINE

## 2017-07-06 RX ORDER — CHLORPROMAZINE HYDROCHLORIDE 25 MG/1
TABLET, FILM COATED ORAL
Status: ON HOLD | COMMUNITY
Start: 2017-07-05 | End: 2017-07-13

## 2017-07-06 RX ORDER — BACLOFEN 20 MG/1
TABLET ORAL
Status: ON HOLD | COMMUNITY
Start: 2017-07-05 | End: 2017-07-13

## 2017-07-06 RX ADMIN — SODIUM CHLORIDE 1000 MG: 900 INJECTION, SOLUTION INTRAVENOUS at 16:17

## 2017-07-06 NOTE — IP AVS SNAPSHOT
[address] Patient: Frankie Mae MRN: [id number] Current Discharge Medication List  
  
START taking these medications Dose & Instructions Dispensing Information Comments Morning Noon Evening Bedtime  
 amoxicillin-clavulanate 875-125 mg per tablet Commonly known as:  AUGMENTIN Your last dose was: Your next dose is:    
   
   
 Dose:  1 Tab Take 1 Tab by mouth two (2) times a day for 8 days. Quantity:  16 Tab Refills:  0  
     
   
   
   
  
 ascorbic acid (vitamin C) 250 mg tablet Commonly known as:  VITAMIN C Your last dose was: Your next dose is:    
   
   
 Dose:  250 mg Take 1 Tab by mouth daily. Quantity:  30 Tab Refills:  2  
     
   
   
   
  
 ciprofloxacin HCl 500 mg tablet Commonly known as:  CIPRO Your last dose was: Your next dose is:    
   
   
 Dose:  500 mg Take 1 Tab by mouth two (2) times a day for 8 days. Quantity:  16 Tab Refills:  0  
     
   
   
   
  
 ferrous sulfate 325 mg (65 mg iron) tablet Your last dose was: Your next dose is:    
   
   
 Dose:  325 mg Take 1 Tab by mouth daily (with breakfast). Quantity:  30 Tab Refills:  2  
     
   
   
   
  
 polyethylene glycol 17 gram packet Commonly known as:  Tye Seals Your last dose was: Your next dose is:    
   
   
 Dose:  17 g Take 1 Packet by mouth daily. Quantity:  30 Packet Refills:  2 Saccharomyces boulardii 250 mg capsule Commonly known as:  Blair Carter Your last dose was: Your next dose is:    
   
   
 Dose:  250 mg Take 1 Cap by mouth two (2) times a day for 14 days. Quantity:  28 Cap Refills:  0  
     
   
   
   
  
 senna-docusate 8.6-50 mg per tablet Commonly known as:  Sanjay Teague Your last dose was: Your next dose is: Dose:  1 Tab Take 1 Tab by mouth nightly. HOLD for loose stool. Quantity:  30 Tab Refills:  2 CONTINUE these medications which have CHANGED Dose & Instructions Dispensing Information Comments Morning Noon Evening Bedtime  
 baclofen 10 mg tablet Commonly known as:  LIORESAL What changed:   
- reasons to take this 
- additional instructions Your last dose was: Your next dose is:    
   
   
 Dose:  10 mg Take 1 Tab by mouth every twelve (12) hours as needed. Quantity:  15 Tab Refills:  0  
     
   
   
   
  
 chlorproMAZINE 25 mg tablet Commonly known as:  THORAZINE What changed:   
- how much to take 
- how to take this - when to take this - Another medication with the same name was removed. Continue taking this medication, and follow the directions you see here. Your last dose was: Your next dose is:    
   
   
 Dose:  25 mg Take 1 Tab by mouth four (4) times daily. Quantity:  120 Tab Refills:  3  
     
   
   
   
  
 metoprolol tartrate 25 mg tablet Commonly known as:  LOPRESSOR What changed:  additional instructions Your last dose was: Your next dose is:    
   
   
 Dose:  25 mg Take 1 Tab by mouth two (2) times a day. Quantity:  60 Tab Refills:  2 CONTINUE these medications which have NOT CHANGED Dose & Instructions Dispensing Information Comments Morning Noon Evening Bedtime  
 amLODIPine 10 mg tablet Commonly known as:  Joaquín Kamara Your last dose was: Your next dose is:    
   
   
 Dose:  10 mg Take 1 Tab by mouth daily. Quantity:  30 Tab Refills:  2  
     
   
   
   
  
 aspirin 81 mg chewable tablet Your last dose was: Your next dose is:    
   
   
 Dose:  81 mg Take 1 Tab by mouth daily. Quantity:  30 Tab Refills:  0  
     
   
   
   
  
 cyanocobalamin 1,000 mcg tablet Your last dose was: Your next dose is:    
   
   
 Dose:  1000 mcg Take 1 Tab by mouth daily. Quantity:  30 Tab Refills:  2 FLOMAX 0.4 mg capsule Generic drug:  tamsulosin Your last dose was: Your next dose is:    
   
   
 Dose:  0.4 mg Take 0.4 mg by mouth daily. 1 tab daily Refills:  0  
     
   
   
   
  
 folic acid 1 mg tablet Commonly known as:  Google Your last dose was: Your next dose is:    
   
   
 Dose:  1 mg Take 1 Tab by mouth daily. Quantity:  30 Tab Refills:  1 LASIX 40 mg tablet Generic drug:  furosemide Your last dose was: Your next dose is:    
   
   
 Dose:  40 mg Take 40 mg by mouth daily. Refills:  0  
     
   
   
   
  
 oxyCODONE-acetaminophen 5-325 mg per tablet Commonly known as:  PERCOCET Your last dose was: Your next dose is:    
   
   
 Dose:  1 Tab Take 1 Tab by mouth every four (4) hours as needed. Max Daily Amount: 6 Tabs. Quantity:  20 Tab Refills:  0  
     
   
   
   
  
 THERA MULTI-VITAMIN PO Your last dose was: Your next dose is:    
   
   
 Dose:  500 mg Take 500 mg by mouth daily. Refills:  0 STOP taking these medications   
 atorvastatin 20 mg tablet Commonly known as:  LIPITOR LORazepam 0.5 mg tablet Commonly known as:  ATIVAN Where to Get Your Medications Information on where to get these meds will be given to you by the nurse or doctor. ! Ask your nurse or doctor about these medications  
  amoxicillin-clavulanate 875-125 mg per tablet  
 ascorbic acid (vitamin C) 250 mg tablet  
 chlorproMAZINE 25 mg tablet  
 ciprofloxacin HCl 500 mg tablet  
 ferrous sulfate 325 mg (65 mg iron) tablet  
 oxyCODONE-acetaminophen 5-325 mg per tablet  
 polyethylene glycol 17 gram packet Saccharomyces boulardii 250 mg capsule senna-docusate 8.6-50 mg per tablet

## 2017-07-06 NOTE — IP AVS SNAPSHOT
303 [facility] 
 
 
 [address] Patient: Frankie Mae MRN: [id number] CVM:5/3/0808 You are allergic to the following No active allergies Recent Documentation Height Weight BMI Smoking Status 1.803 m 88.5 kg 27.2 kg/m2 Former Smoker Unresulted Labs Order Current Status HEPATITIS PANEL, ACUTE In process Emergency Contacts Name Discharge Info Relation Home Work Mobile [facility] DISCHARGE CAREGIVER [3] Son [22]   [phone] Mary Preciado  Daughter [21] [phone] Mary Preciado  Child [2] [phone] Hermelindo Goel  Child [2] [phone] About your hospitalization You were admitted on:  July 6, 2017 You last received care in the:  [facility] [address] You were discharged on:  July 19, 2017 Unit phone number:  [phone] Why you were hospitalized Your primary diagnosis was:  Not on File Your diagnoses also included:  Right Foot Infection, Bilateral Leg And Foot Pain, Infected Ulcer Of Skin (Hcc), Cellulitis, Intractable Hiccups, Foot Ulcer (Hcc) Providers Seen During Your Hospitalizations Provider Role Specialty Primary office phone Fernando Schmidt DO Attending Provider Emergency Medicine [phone] Yessenia Little MD Attending Provider Internal Medicine [phone] Your Primary Care Physician (PCP) Primary Care Physician Office Phone Office Fax Kannan Salguero [phone] Follow-up Information Follow up With Details Comments Contact Info Robert Morelos DPM On 7/31/2017 @10:00AM [address] [address] 
[phone] [facility] CARE OF Singh MARTINEZ [facility])   [address] [facility] [address] [phone] Eugenia Luong MD   [address] [facility] [id number] [phone] Sotero Crespo MD Schedule an appointment as soon as possible for a visit in 7 weeks  [address] [facility] [address] [facility] [address] 
[phone] Javan Bergeron DPM Schedule an appointment as soon as possible for a visit in 1 week  [address] [address] 
[phone] Your Appointments Thursday August 17, 2017  9:30 AM EDT Office Visit with [facility] ([address] [place] [address]) [address] Shelagh Severe 399 200 [facility]  
[phone] Current Discharge Medication List  
  
START taking these medications Dose & Instructions Dispensing Information Comments Morning Noon Evening Bedtime  
 amoxicillin-clavulanate 875-125 mg per tablet Commonly known as:  AUGMENTIN Your last dose was: Your next dose is:    
   
   
 Dose:  1 Tab Take 1 Tab by mouth two (2) times a day for 8 days. Quantity:  16 Tab Refills:  0  
     
   
   
   
  
 ascorbic acid (vitamin C) 250 mg tablet Commonly known as:  VITAMIN C Your last dose was: Your next dose is:    
   
   
 Dose:  250 mg Take 1 Tab by mouth daily. Quantity:  30 Tab Refills:  2  
     
   
   
   
  
 ciprofloxacin HCl 500 mg tablet Commonly known as:  CIPRO Your last dose was: Your next dose is:    
   
   
 Dose:  500 mg Take 1 Tab by mouth two (2) times a day for 8 days. Quantity:  16 Tab Refills:  0  
     
   
   
   
  
 ferrous sulfate 325 mg (65 mg iron) tablet Your last dose was: Your next dose is:    
   
   
 Dose:  325 mg Take 1 Tab by mouth daily (with breakfast). Quantity:  30 Tab Refills:  2  
     
   
   
   
  
 polyethylene glycol 17 gram packet Commonly known as:  Kelly Gram Your last dose was: Your next dose is:    
   
   
 Dose:  17 g Take 1 Packet by mouth daily. Quantity:  30 Packet Refills:  2 Saccharomyces boulardii 250 mg capsule Commonly known as:  Blair Carter Your last dose was: Your next dose is:    
   
   
 Dose:  250 mg Take 1 Cap by mouth two (2) times a day for 14 days. Quantity:  28 Cap Refills:  0  
     
   
   
   
  
 senna-docusate 8.6-50 mg per tablet Commonly known as:  Jose Eord [place] Your last dose was: Your next dose is:    
   
   
 Dose:  1 Tab Take 1 Tab by mouth nightly. HOLD for loose stool. Quantity:  30 Tab Refills:  2 CONTINUE these medications which have CHANGED Dose & Instructions Dispensing Information Comments Morning Noon Evening Bedtime  
 baclofen 10 mg tablet Commonly known as:  LIORESAL What changed:   
- reasons to take this 
- additional instructions Your last dose was: Your next dose is:    
   
   
 Dose:  10 mg Take 1 Tab by mouth every twelve (12) hours as needed. Quantity:  15 Tab Refills:  0  
     
   
   
   
  
 chlorproMAZINE 25 mg tablet Commonly known as:  THORAZINE What changed:   
- how much to take 
- how to take this - when to take this - Another medication with the same name was removed. Continue taking this medication, and follow the directions you see here. Your last dose was: Your next dose is:    
   
   
 Dose:  25 mg Take 1 Tab by mouth four (4) times daily. Quantity:  120 Tab Refills:  3  
     
   
   
   
  
 metoprolol tartrate 25 mg tablet Commonly known as:  LOPRESSOR What changed:  additional instructions Your last dose was: Your next dose is:    
   
   
 Dose:  25 mg Take 1 Tab by mouth two (2) times a day. Quantity:  60 Tab Refills:  2 CONTINUE these medications which have NOT CHANGED Dose & Instructions Dispensing Information Comments Morning Noon Evening Bedtime  
 amLODIPine 10 mg tablet Commonly known as:  Julia Melendez Your last dose was: Your next dose is:    
   
   
 Dose:  10 mg Take 1 Tab by mouth daily. Quantity:  30 Tab Refills:  2  
     
   
   
   
  
 aspirin 81 mg chewable tablet Your last dose was: Your next dose is:    
   
   
 Dose:  81 mg Take 1 Tab by mouth daily. Quantity:  30 Tab Refills:  0  
     
   
   
   
  
 cyanocobalamin 1,000 mcg tablet Your last dose was: Your next dose is:    
   
   
 Dose:  1000 mcg Take 1 Tab by mouth daily. Quantity:  30 Tab Refills:  2 FLOMAX 0.4 mg capsule Generic drug:  tamsulosin Your last dose was: Your next dose is:    
   
   
 Dose:  0.4 mg Take 0.4 mg by mouth daily. 1 tab daily Refills:  0  
     
   
   
   
  
 folic acid 1 mg tablet Commonly known as:  Randy Your last dose was: Your next dose is:    
   
   
 Dose:  1 mg Take 1 Tab by mouth daily. Quantity:  30 Tab Refills:  1 LASIX 40 mg tablet Generic drug:  furosemide Your last dose was: Your next dose is:    
   
   
 Dose:  40 mg Take 40 mg by mouth daily. Refills:  0  
     
   
   
   
  
 oxyCODONE-acetaminophen 5-325 mg per tablet Commonly known as:  PERCOCET Your last dose was: Your next dose is:    
   
   
 Dose:  1 Tab Take 1 Tab by mouth every four (4) hours as needed. Max Daily Amount: 6 Tabs. Quantity:  20 Tab Refills:  0  
     
   
   
   
  
 THERA MULTI-VITAMIN PO Your last dose was: Your next dose is:    
   
   
 Dose:  500 mg Take 500 mg by mouth daily. Refills:  0 STOP taking these medications   
 atorvastatin 20 mg tablet Commonly known as:  LIPITOR LORazepam 0.5 mg tablet Commonly known as:  ATIVAN Where to Get Your Medications Information on where to get these meds will be given to you by the nurse or doctor. ! Ask your nurse or doctor about these medications  
  amoxicillin-clavulanate 875-125 mg per tablet  
 ascorbic acid (vitamin C) 250 mg tablet  
 chlorproMAZINE 25 mg tablet  
 ciprofloxacin HCl 500 mg tablet  
 ferrous sulfate 325 mg (65 mg iron) tablet  
 oxyCODONE-acetaminophen 5-325 mg per tablet  
 polyethylene glycol 17 gram packet Saccharomyces boulardii 250 mg capsule  
 senna-docusate 8.6-50 mg per tablet Discharge Instructions Vacuum-Assisted Closure for Wound Healing: Care Instructions Your Care Instructions When you have a wound that is hard to close your doctor may treat it with vacuum-assisted closure (VAC). VAC uses negative pressure (suction) to help bring the edges of your wound together. It also removes fluid and dead tissue from the wound area. In VAC: 
· A special piece of foam or cotton gauze fits over your wound. This covers and protects the wound. A clear bandage (film dressing) goes several inches beyond the foam or gauze dressing to create a seal for the vacuum. · A tube connects the foam to a small machine called the therapy unit. The therapy unit creates the suction. · The [facility] system may be carried around (portable) or may stay in one place (stationary). VAC does not hurt. You may feel a mild pulling on the wound when treatment first starts. How long you need VAC depends on the size and type of wound you have and how well the [facility] works. You will be limited in what you can do while the wound heals. You will use VAC 24 hours a day. Follow-up care is a key part of your treatment and safety. Be sure to make and go to all appointments, and call your doctor if you are having problems. It's also a good idea to know your test results and keep a list of the medicines you take. How can you care for yourself at home? · A home health care worker will come to your home a few times a week to change the bandage and check the machine. You may need it changed more often if there is a lot of drainage. · Your doctor will give you information on what you can and can't do. This depends on where your wound is located. Your activities may be limited during the time you're using VAC. · You will be able to take sponge baths. Don't shower or take baths unless your doctor says it is okay. · Take pain medicines exactly as directed. ¨ If the doctor gave you a prescription medicine for pain, take it as prescribed. ¨ If you are not taking a prescription pain medicine, ask your doctor if you can take an over-the-counter medicine. · If your doctor prescribed antibiotics, take them as directed. Do not stop taking them just because you feel better. You need to take the full course of antibiotics. When should you call for help? Call 911 anytime you think you may need emergency care. For example, call if: 
· You have a lot of bleeding or see a sudden change in the color or texture of the drainage. · The wound splits open and organs under the skin can be seen (evisceration). Call your doctor now or seek immediate medical care if: · The wound starts bleeding. · The bandage comes off. Cover the area with a sterile bandage until you can see your doctor or your home health care worker comes by. · You have signs of infection, such as: 
¨ Increased pain, swelling, warmth, or redness around the wound. ¨ Red streaks leading from the wound. ¨ Pus draining from the wound. ¨ A fever. Watch closely for changes in your health, and be sure to contact your doctor if: · The noise the machine makes changes or gets very loud. This may mean the seal is broken or the machine is not producing enough suction. Where can you learn more? Go to http://lainey-millie.info/. Enter H571 in the search box to learn more about \"Vacuum-Assisted Closure for Wound Healing: Care Instructions. \" Current as of: March 20, 2017 Content Version: 11.3 © 3464-2128 Env[employer]ion. Care instructions adapted under license by [employer] (which disclaims liability or warranty for this information). If you have questions about a medical condition or this instruction, always ask your healthcare professional. [place] [address] any warranty or liability for your use of this information. Cellulitis: Care Instructions Your Care Instructions Cellulitis is a skin infection. It often occurs after a break in the skin from a scrape, cut, bite, or puncture, or after a rash. The doctor has checked you carefully, but problems can develop later. If you notice any problems or new symptoms, get medical treatment right away. Follow-up care is a key part of your treatment and safety. Be sure to make and go to all appointments, and call your doctor if you are having problems. It's also a good idea to know your test results and keep a list of the medicines you take. How can you care for yourself at home? · Take your antibiotics as directed. Do not stop taking them just because you feel better. You need to take the full course of antibiotics. · Prop up the infected area on pillows to reduce pain and swelling. Try to keep the area above the level of your heart as often as you can. · If your doctor told you how to care for your wound, follow your doctor's instructions. If you did not get instructions, follow this general advice: ¨ Wash the wound with clean water 2 times a day. Don't use hydrogen peroxide or alcohol, which can slow healing. ¨ You may cover the wound with a thin layer of petroleum jelly, such as Vaseline, and a nonstick bandage. ¨ Apply more petroleum jelly and replace the bandage as needed. · Be safe with medicines. Take pain medicines exactly as directed. ¨ If the doctor gave you a prescription medicine for pain, take it as prescribed. ¨ If you are not taking a prescription pain medicine, ask your doctor if you can take an over-the-counter medicine. To prevent cellulitis in the future · Try to prevent cuts, scrapes, or other injuries to your skin. Cellulitis most often occurs where there is a break in the skin. · If you get a scrape, cut, mild burn, or bite, wash the wound with clean water as soon as you can to help avoid infection. Don't use hydrogen peroxide or alcohol, which can slow healing. · If you have swelling in your legs (edema), support stockings and good skin care may help prevent leg sores and cellulitis. · Take care of your feet, especially if you have diabetes or other conditions that increase the risk of infection. Wear shoes and socks. Do not go barefoot. If you have athlete's foot or other skin problems on your feet, talk to your doctor about how to treat them. When should you call for help? Call your doctor now or seek immediate medical care if: 
· You have signs that your infection is getting worse, such as: 
¨ Increased pain, swelling, warmth, or redness. ¨ Red streaks leading from the area. ¨ Pus draining from the area. ¨ A fever. · You get a rash. Watch closely for changes in your health, and be sure to contact your doctor if: 
· You are not getting better after 1 day (24 hours). · You do not get better as expected. Where can you learn more? Go to http://lainey-millie.info/. Lucas Connell in the search box to learn more about \"Cellulitis: Care Instructions. \" Current as of: October 13, 2016 Content Version: 11.3 © 7274-7422 Moviles.[employer]. Care instructions adapted under license by [employer] (which disclaims liability or warranty for this information).  If you have questions about a medical condition or this instruction, always ask your healthcare professional. [employer] disclaims any warranty or liability for your use of this information. Venous Skin Ulcer: Care Instructions Your Care Instructions A venous skin ulcer is a shallow wound that develops when the leg veins do not move blood back to the heart normally. Your veins have one-way valves that keep blood flowing toward the heart. When the valves are damaged, the blood can back up and pool in the vein. The blood may leak out of the vein into tissue around the vein. The tissue can break down and form an ulcer. The first sign of a venous skin ulcer is skin that turns dark red or purple over the area where the blood is leaking out of the vein. The skin also may become thick, dry, and itchy. Without treatment, an ulcer may form. The ulcer may be painful. Your leg also may swell and ache. If the ulcer becomes infected, the infection may cause an odor, and pus may drain from the ulcer. The area around the ulcer also may be more tender and red. Follow-up care is a key part of your treatment and safety. Be sure to make and go to all appointments, and call your doctor if you are having problems. It's also a good idea to know your test results and keep a list of the medicines you take. How can you care for yourself at home? · Follow your doctor's instructions on how to clean the ulcer and change the bandage. · If your doctor prescribed antibiotics, take them as directed. Do not stop taking them just because you feel better. You need to take the full course of antibiotics. · Lift your legs above the level of your heart as often as possible. For example, lie down and then prop up your legs with pillows. · Wear compression stockings or bandages. They help the blood circulate in your legs. And they help prevent blood from pooling in your legs. But there are different types of stockings, and they need to fit right. So your doctor will recommend what you need. · After your ulcer has healed, continue to wear compression stockings. Take them off only when you bathe and sleep. Compression helps your blood circulate and helps prevent other ulcers from forming. · Walk daily. Walking helps your blood circulation. When should you call for help? Call your doctor now or seek immediate medical care if: 
· You have symptoms of infection, such as: 
¨ Increased pain, swelling, warmth, or redness. ¨ Red streaks leading from the ulcer. ¨ Pus draining from the ulcer. ¨ A fever. Watch closely for changes in your health, and be sure to contact your doctor if: 
· Your ulcer is not healing. · You have new ulcers. · The ulcer starts to bleed, and blood soaks through the bandage. Oozing small amounts of a mix of blood and fluid is normal. 
· You have new bleeding. · You do not get better as expected. Where can you learn more? Go to http://lainey-millie.info/. Enter G718 in the search box to learn more about \"Venous Skin Ulcer: Care Instructions. \" Current as of: March 20, 2017 Content Version: 11.3 © 8975-2578 Gennius. Care instructions adapted under license by [employer] (which disclaims liability or warranty for this information). If you have questions about a medical condition or this instruction, always ask your healthcare professional. [place] [address] any warranty or liability for your use of this information. Hiccups: Care Instructions Your Care Instructions Hiccups occur when a spasm contracts the diaphragm, a large sheet of muscle that separates the chest cavity from the abdominal cavity. The spasm causes an intake of breath that is suddenly stopped by the closure of the vocal cords (glottis). This closure causes the \"hiccup\" sound. A very full stomach can cause hiccups that go away on their own.  A full stomach can be caused by things like eating too much food too quickly or swallowing too much air. Most hiccups go away on their own within a few minutes to a few hours and do not require any treatment. Hiccups that last longer than 48 hours are called persistent hiccups. Hiccups that last longer than a month are called intractable hiccups. Both persistent and intractable hiccups may be a sign of a more serious health problem. Follow-up care is a key part of your treatment and safety. Be sure to make and go to all appointments, and call your doctor if you are having problems. It's also a good idea to know your test results and keep a list of the medicines you take. How can you care for yourself at home? · Try these safe and easy home remedies if your hiccups are making you uncomfortable. ¨ Eat a teaspoon of sugar or honey. ¨ Hold your breath and count slowly to 10. ¨ Quickly drink a glass of cold water. · If your doctor prescribed medicine, take it as directed. Call your doctor if you think you are having a problem with your medicine. To help prevent hiccups · Take steps to avoid swallowing air: 
¨ Eat slowly. Avoid gulping food or beverages. ¨ Chew your food thoroughly before you swallow. ¨ Avoid drinking through a straw. ¨ Avoid chewing gum or eating hard candy. ¨ Do not smoke or use other tobacco products. ¨ If you wear dentures, check with a dentist to make sure they fit properly. · Do not eat large meals. · Do not drink alcohol. · Avoid sudden changes in stomach temperature, such as drinking a hot beverage and then a cold beverage. · Avoid emotional stress or excitement. When should you call for help? Call your doctor now or seek immediate medical care if: 
· You have trouble swallowing and are unable to swallow food or fluids. · You have hiccups for more than 2 days. · You have new symptoms, such as belly pain, constipation, diarrhea, heartburn, or vomiting. Watch closely for changes in your health, and be sure to contact your doctor if: · You have trouble swallowing but are able to swallow food and fluids. · Hiccups occur often and get in the way of your activities. · You think medicine may be causing your symptoms. · You do not get better as expected. Where can you learn more? Go to http://lainey-millie.info/. Enter K273 in the search box to learn more about \"Hiccups: Care Instructions. \" Current as of: 2017 Content Version: 11.3 © 7804-4387 deviantART. Care instructions adapted under license by [employer] (which disclaims liability or warranty for this information). If you have questions about a medical condition or this instruction, always ask your healthcare professional. [place] [address] any warranty or liability for your use of this information. Patient armband removed and shredded DISCHARGE SUMMARY from Nurse The following personal items are in your possession at time of discharge: 
 
Dental Appliances: None Visual Aid: None Home Medications: None Jewelry: None Clothing: None MyChart Activation Thank you for requesting access to [employer]. Please follow the instructions below to securely access and download your online medical record. [employer] allows you to send messages to your doctor, view your test results, renew your prescriptions, schedule appointments, and more. How Do I Sign Up? 1. In your internet browser, go to [email or web address] 
2. Click on the First Time User? Click Here link in the Sign In box. You will be redirect to the New Member Sign Up page. 3. Enter your [employer] Access Code exactly as it appears below. You will not need to use this code after youve completed the sign-up process. If you do not sign up before the expiration date, you must request a new code. [employer] Access Code: [email or web address] Expires: 2017  3:45 PM (This is the date your [employer] access code will ) 4. Enter the last four digits of your Social Security Number (xxxx) and Date of Birth (mm/dd/yyyy) as indicated and click Submit. You will be taken to the next sign-up page. 5. Create a [facility] ID. This will be your [facility] login ID and cannot be changed, so think of one that is secure and easy to remember. 6. Create a [facility] password. You can change your password at any time. 7. Enter your Password Reset Question and Answer. This can be used at a later time if you forget your password. 8. Enter your e-mail address. You will receive e-mail notification when new information is available in [address] 9. Click Sign Up. You can now view and download portions of your medical record. 10. Click the Download Summary menu link to download a portable copy of your medical information. Additional Information If you have questions, please visit the Frequently Asked Questions section of the [employer] website at [email or web address] [email or web address] Remember, [facility] is NOT to be used for urgent needs. For medical emergencies, dial 911. PATIENT INSTRUCTIONS: 
 
 
F-face looks uneven A-arms unable to move or move unevenly S-speech slurred or non-existent T-time-call 911 as soon as signs and symptoms begin-DO NOT go Back to bed or wait to see if you get better-TIME IS BRAIN. Warning Signs of HEART ATTACK Call 911 if you have these symptoms: 
? Chest discomfort. Most heart attacks involve discomfort in the center of the chest that lasts more than a few minutes, or that goes away and comes back. It can feel like uncomfortable pressure, squeezing, fullness, or pain. ? Discomfort in other areas of the upper body.  Symptoms can include pain or discomfort in one or both arms, the back, neck, jaw, or stomach. ? Shortness of breath with or without chest discomfort. ? Other signs may include breaking out in a cold sweat, nausea, or lightheadedness. Don't wait more than five minutes to call [address]! Fast action can save your life. Calling 911 is almost always the fastest way to get lifesaving treatment. Emergency Medical Services staff can begin treatment when they arrive  up to an hour sooner than if someone gets to the hospital by car. The discharge information has been reviewed with the caregiver. The caregiver verbalized understanding. Discharge medications reviewed with the caregiver and appropriate educational materials and side effects teaching were provided. Discharge Instructions Attachments/References WOUND: VAC (VACUUM-ASSISTED CLOSURE) (ENGLISH) ULTRASOUND: ABDOMEN (ENGLISH) Discharge Orders None [employer] Announcement We are excited to announce that we are making your provider's discharge notes available to you in [employer]. You will see these notes when they are completed and signed by the physician that discharged you from your recent hospital stay. If you have any questions or concerns about any information you see in [employer], please call the Health Information Department where you were seen or reach out to your Primary Care Provider for more information about your plan of care. Introducing [facility]! Ivan Samuels introduces [employer] patient portal. Now you can access parts of your medical record, email your doctor's office, and request medication refills online. 1. In your internet browser, go to [email or web address] 2. Click on the First Time User? Click Here link in the Sign In box. You will see the New Member Sign Up page. 3. Enter your [employer] Access Code exactly as it appears below.  You will not need to use this code after youve completed the sign-up process. If you do not sign up before the expiration date, you must request a new code. · [employer] Access Code: [email or web address] Expires: 9/16/2017  3:45 PM 
 
4. Enter the last four digits of your Social Security Number (xxxx) and Date of Birth (mm/dd/yyyy) as indicated and click Submit. You will be taken to the next sign-up page. 5. Create a IKOTECH ID. This will be your [employer] login ID and cannot be changed, so think of one that is secure and easy to remember. 6. Create a [employer]CH password. You can change your password at any time. 7. Enter your Password Reset Question and Answer. This can be used at a later time if you forget your password. 8. Enter your e-mail address. You will receive e-mail notification when new information is available in [address] 9. Click Sign Up. You can now view and download portions of your medical record. 10. Click the Download Summary menu link to download a portable copy of your medical information. If you have questions, please visit the Frequently Asked Questions section of the [employer] website. Remember, [employer] is NOT to be used for urgent needs. For medical emergencies, dial 911. Now available from your iPhone and Android! General Information Please provide this summary of care documentation to your next provider. Patient Signature:  ____________________________________________________________ Date:  ____________________________________________________________  
  
Eulalia Morelos Provider Signature:  ____________________________________________________________ Date:  ____________________________________________________________ More Information Vacuum-Assisted Closure for Wound Healing: Care Instructions Your Care Instructions When you have a wound that is hard to close your doctor may treat it with vacuum-assisted closure (VAC). VAC uses negative pressure (suction) to help bring the edges of your wound together. It also removes fluid and dead tissue from the wound area. In VAC: 
· A special piece of foam or cotton gauze fits over your wound. This covers and protects the wound. A clear bandage (film dressing) goes several inches beyond the foam or gauze dressing to create a seal for the vacuum. · A tube connects the foam to a small machine called the therapy unit. The therapy unit creates the suction. · The [facility] system may be carried around (portable) or may stay in one place (stationary). VAC does not hurt. You may feel a mild pulling on the wound when treatment first starts. How long you need VAC depends on the size and type of wound you have and how well the [facility] works. You will be limited in what you can do while the wound heals. You will use VAC 24 hours a day. Follow-up care is a key part of your treatment and safety. Be sure to make and go to all appointments, and call your doctor if you are having problems. It's also a good idea to know your test results and keep a list of the medicines you take. How can you care for yourself at home? · A home health care worker will come to your home a few times a week to change the bandage and check the machine. You may need it changed more often if there is a lot of drainage. · Your doctor will give you information on what you can and can't do. This depends on where your wound is located. Your activities may be limited during the time you're using VAC. · You will be able to take sponge baths. Don't shower or take baths unless your doctor says it is okay. · Take pain medicines exactly as directed. ¨ If the doctor gave you a prescription medicine for pain, take it as prescribed. ¨ If you are not taking a prescription pain medicine, ask your doctor if you can take an over-the-counter medicine. · If your doctor prescribed antibiotics, take them as directed.  Do not stop taking them just because you feel better. You need to take the full course of antibiotics. When should you call for help? Call 911 anytime you think you may need emergency care. For example, call if: 
· You have a lot of bleeding or see a sudden change in the color or texture of the drainage. · The wound splits open and organs under the skin can be seen (evisceration). Call your doctor now or seek immediate medical care if: · The wound starts bleeding. · The bandage comes off. Cover the area with a sterile bandage until you can see your doctor or your home health care worker comes by. · You have signs of infection, such as: 
¨ Increased pain, swelling, warmth, or redness around the wound. ¨ Red streaks leading from the wound. ¨ Pus draining from the wound. ¨ A fever. Watch closely for changes in your health, and be sure to contact your doctor if: · The noise the machine makes changes or gets very loud. This may mean the seal is broken or the machine is not producing enough suction. Where can you learn more? Go to http://lainey-millie.info/. Enter T874 in the search box to learn more about \"Vacuum-Assisted Closure for Wound Healing: Care Instructions. \" Current as of: March 20, 2017 Content Version: 11.3 © 8155-2145 [employer]. Care instructions adapted under license by [employer] (which disclaims liability or warranty for this information). If you have questions about a medical condition or this instruction, always ask your healthcare professional. [place] [address] any warranty or liability for your use of this information. Ultrasound of the Abdomen: About This Test 
What is it? An abdominal ultrasound uses reflected sound waves to produce a picture of the organs and blood vessels in the upper belly.  These include your liver, gallbladder, spleen, pancreas, kidneys, and major blood vessels like the aorta. The sound waves create a picture on a video monitor. Why is this test done? An ultrasound can be done to: · Find out what's causing belly paina gallstone, for example. · Look at a lump or mass that was found in a prior exam and, in some cases, tell what it is. · Check for problems in the liver and kidneys. · Look for changes in an aortic aneurysm, a bulging section in the wall of the large artery that carries blood out of the heart. · Find fluid in the belly. · Guide needles or other medical instruments in treatment. How can you prepare for the test? 
· Depending on the part of your belly your doctor will be looking at, you may need to eat a fat-free meal the night before your test. Or you may need to avoid eating for 4 to 12 hours before the test. Your doctor will tell you what to do. What happens before the test? 
· You will need to take off any jewelry that might interfere with the ultrasound test. 
· You will need to take off your clothes around the area to be scanned. You will get a cloth or paper covering to use during the test. 
What happens during the test? 
· You lie down on your back or your side on an exam table. · The doctor or technologist spreads some warm gel on your belly to improve the transmission of the sound waves. A small handheld unit called a transducer is pressed against your belly and is moved back and forth. A picture of the organs and blood vessels can be seen on a video monitor. · You need to lie still. You may be asked to take a breath and hold it for several seconds during the scanning. What else should you know about the test? 
· The ultrasound is a very safe procedure. · There is no discomfort from the test itself. If your belly hurts already, you will feel some pain from the probe being pressed down on it. · You will not hear or feel the sound waves. How long does the test take? · The test will take about 30 minutes. · You may be asked to wait until the radiologist has reviewed the scan. He or she may want to do more ultrasound views of some areas of your belly. What happens after the test? 
· You will probably be able to go home right away. · You can go back to your usual activities right away. Follow-up care is a key part of your treatment and safety. Be sure to make and go to all appointments, and call your doctor if you are having problems. It's also a good idea to keep a list of the medicines you take. Ask your doctor when you can expect to have your test results. Where can you learn more? Go to http://lainey-millie.info/. Enter T376 in the search box to learn more about \"Ultrasound of the Abdomen: About This Test.\" Current as of: October 14, 2016 Content Version: 11.3 © 7499-9223 [employer]. Care instructions adapted under license by [employer] (which disclaims liability or warranty for this information). If you have questions about a medical condition or this instruction, always ask your healthcare professional. [address] any warranty or liability for your use of this information.

## 2017-07-06 NOTE — ED PROVIDER NOTES
HPI Comments: 2:31 PM Emerald Young is a 58 y.o. Male with a hx of HTN,  who presents to the ED for evaluation of an ulcer to the right dorsal foot that was noted by his son several days ago. His son states that he noted maggots in the ulcer initially, so he brought the pt to Dr. Austin Srinivasan office. Feliciano Cloud referred them to the [address] [place] [address] and Vascular. They were seen at [facility] where they cleaned and dressed the wound of the right foot as well as wounds to the bilateral legs, and referred the patient to Dr. Gore Red podiatry office. They called the podiatry office and they were instructed to come to the ED for treatment of a possible foot infection before he could be seen by them for wound care. The patient denies foot pain, fever, cough, SOB, and any further complaints. The history is provided by the patient and a relative (Son). Past Medical History:   Diagnosis Date    Hiccups     Hypertension     Hyponatremia     Lower extremity edema     Psychogenic polydipsia     Spinal stenosis        Past Surgical History:   Procedure Laterality Date    HX HEENT      pt reports past hx of surgery on ears unsure of what type         Family History:   Problem Relation Age of Onset    Hypertension Other        Social History     Social History    Marital status:      Spouse name: N/A    Number of children: N/A    Years of education: N/A     Occupational History    Not on file. Social History Main Topics    Smoking status: Former Smoker     Packs/day: 0.50     Quit date: 6/22/2008    Smokeless tobacco: Never Used    Alcohol use No    Drug use: No    Sexual activity: Not Currently     Other Topics Concern    Not on file     Social History Narrative         ALLERGIES: Review of patient's allergies indicates no known allergies. Review of Systems   Constitutional: Negative for fever. HENT: Negative for sore throat. Eyes: Negative for redness and visual disturbance. Respiratory: Negative for cough, shortness of breath and wheezing. Cardiovascular: Negative for chest pain. Gastrointestinal: Negative for abdominal pain and nausea. Endocrine: Negative for polyuria. Genitourinary: Negative for dysuria. Musculoskeletal: Negative for arthralgias and neck stiffness. Skin: Positive for wound. Negative for rash. Neurological: Negative for headaches. All other systems reviewed and are negative. Vitals:    07/06/17 1630 07/06/17 1700 07/06/17 1930 07/06/17 1945   BP: (!) 163/92 163/89 (!) 160/91 (!) 162/95   Pulse: 77 77 75 79   Resp: 19 15 15 13   Temp:       SpO2: 100% 100% 100% 100%   Weight:       Height:                Physical Exam   Constitutional: He is oriented to person, place, and time. He appears well-developed and well-nourished. No distress. HENT:   Head: Normocephalic and atraumatic. Mouth/Throat: Oropharynx is clear and moist.   Eyes: Conjunctivae are normal. Pupils are equal, round, and reactive to light. No scleral icterus. Neck: Normal range of motion. Neck supple. Cardiovascular: Normal rate and intact distal pulses. Capillary refill < 3 seconds   Pulmonary/Chest: Effort normal and breath sounds normal. No respiratory distress. He has no wheezes. Abdominal: Soft. Bowel sounds are normal. He exhibits no distension. There is no tenderness. Musculoskeletal: Normal range of motion. He exhibits edema (pitting edema of the bilateral lower extremities.) and tenderness (bilateral calf tenderness, right greater than left). Lymphadenopathy:     He has no cervical adenopathy. Neurological: He is alert and oriented to person, place, and time. No cranial nerve deficit. Skin: Skin is warm and dry. He is not diaphoretic. Purulent drainage from the dorsal ulcer of the left foot, foul smelling     Nursing note and vitals reviewed.        MDM  Number of Diagnoses or Management Options  Bilateral leg and foot pain:   Infected ulcer of skin, with unspecified severity [facility]):   Right foot infection:   Diagnosis management comments: ddx foot and leg ulcer infections, osteomyelitis, DVT, metabolic    Relative states recently they found magets in the foot wound.  Pt was being treated for his wound but worsened and was sent to ED    Foul smelling drainage of foot ulcer    Labs, cultures, Vancomycin    Xray foot    Admitted    I discussed results and plan, Pt and family agree with plan       Amount and/or Complexity of Data Reviewed  Clinical lab tests: ordered and reviewed  Tests in the radiology section of CPT®: ordered and reviewed  Tests in the medicine section of CPT®: ordered and reviewed  Discussion of test results with the performing providers: yes  Obtain history from someone other than the patient: (family)  Review and summarize past medical records: yes  Discuss the patient with other providers: yes  Independent visualization of images, tracings, or specimens: yes    Risk of Complications, Morbidity, and/or Mortality  Presenting problems: high  Diagnostic procedures: high  Management options: high    Patient Progress  Patient progress: stable    ED Course       Procedures    Vitals:  Patient Vitals for the past 12 hrs:   Temp Pulse Resp BP SpO2   07/06/17 1945 - 79 13 (!) 162/95 100 %   07/06/17 1930 - 75 15 (!) 160/91 100 %   07/06/17 1700 - 77 15 163/89 100 %   07/06/17 1630 - 77 19 (!) 163/92 100 %   07/06/17 1600 - - - (!) 149/107 100 %   07/06/17 1530 - 93 - (!) 173/146 100 %   07/06/17 1500 - 78 14 152/85 100 %   07/06/17 1430 - 81 16 137/77 99 %   07/06/17 1354 98.3 °F (36.8 °C) 88 19 134/83 99 %     Pulse ox reviewed and WNL    Medications ordered:   Medications   vancomycin (VANCOCIN) 1,000 mg in 0.9% sodium chloride (MBP/ADV) 250 mL adv (0 mg IntraVENous IV Completed 7/6/17 1817)         Lab findings:  Recent Results (from the past 12 hour(s))   CBC WITH AUTOMATED DIFF    Collection Time: 07/06/17  2:45 PM   Result Value Ref Range    WBC 6.0 4.6 - 13.2 K/uL    RBC 4.50 (L) 4.70 - 5.50 M/uL    HGB 12.0 (L) 13.0 - 16.0 g/dL    HCT 35.9 (L) 36.0 - 48.0 %    MCV 79.8 74.0 - 97.0 FL    MCH 26.7 24.0 - 34.0 PG    MCHC 33.4 31.0 - 37.0 g/dL    RDW 13.6 11.6 - 14.5 %    PLATELET 901 251 - 338 K/uL    MPV 10.1 9.2 - 11.8 FL    NEUTROPHILS 64 40 - 73 %    LYMPHOCYTES 25 21 - 52 %    MONOCYTES 8 3 - 10 %    EOSINOPHILS 3 0 - 5 %    BASOPHILS 0 0 - 2 %    ABS. NEUTROPHILS 3.8 1.8 - 8.0 K/UL    ABS. LYMPHOCYTES 1.5 0.9 - 3.6 K/UL    ABS. MONOCYTES 0.5 0.05 - 1.2 K/UL    ABS. EOSINOPHILS 0.2 0.0 - 0.4 K/UL    ABS. BASOPHILS 0.0 0.0 - 0.1 K/UL    DF AUTOMATED     METABOLIC PANEL, BASIC    Collection Time: 07/06/17  2:45 PM   Result Value Ref Range    Sodium 140 136 - 145 mmol/L    Potassium 3.7 3.5 - 5.5 mmol/L    Chloride 106 100 - 108 mmol/L    CO2 31 21 - 32 mmol/L    Anion gap 3 3.0 - 18 mmol/L    Glucose 86 74 - 99 mg/dL    BUN 8 7.0 - 18 MG/DL    Creatinine 0.84 0.6 - 1.3 MG/DL    BUN/Creatinine ratio 10 (L) 12 - 20      GFR est AA >60 >60 ml/min/1.73m2    GFR est non-AA >60 >60 ml/min/1.73m2    Calcium 9.2 8.5 - 10.1 MG/DL   HEPATIC FUNCTION PANEL    Collection Time: 07/06/17  2:45 PM   Result Value Ref Range    Protein, total 8.1 6.4 - 8.2 g/dL    Albumin 3.1 (L) 3.4 - 5.0 g/dL    Globulin 5.0 (H) 2.0 - 4.0 g/dL    A-G Ratio 0.6 (L) 0.8 - 1.7      Bilirubin, total 0.2 0.2 - 1.0 MG/DL    Bilirubin, direct 0.1 0.0 - 0.2 MG/DL    Alk. phosphatase 71 45 - 117 U/L    AST (SGOT) 7 (L) 15 - 37 U/L    ALT (SGPT) 14 (L) 16 - 61 U/L   POC LACTIC ACID    Collection Time: 07/06/17  3:12 PM   Result Value Ref Range    Lactic Acid (POC) 1.1 0.4 - 2.0 mmol/L       X-Ray, CT or other radiology findings or impressions:  DUPLEX LOWER EXT VENOUS BILAT      INTERPRETATION/FINDINGS  Duplex images were obtained using 2-D gray scale, color flow, and  spectral Doppler analysis. Right leg :  1.  Deep veins visualized include the common femoral, femoral,  popliteal, posterior tibial and peroneal veins. Visualization of the  peroneal veins in the calf is limited due to body habitus. 2. No evidence of deep venous thrombosis detected in the veins  visualized. Patency of the peroneal veins is demonstrated by color  flow and spectral Doppler. An isolated acute, non-occlusive thrombus  cannot be totally ruled out in this area. 3. Superficial veins visualized include the great saphenous vein. 4. No evidence of superficial thrombosis detected. 5. Normal multiphasic flow seen in the posterior tibial artery. Left leg :  1. Deep veins visualized include the common femoral, femoral,  popliteal, posterior tibial and peroneal veins. Visualization of the  peroneal veins in the calf is limited due to body habitus. 2. No evidence of deep venous thrombosis detected in the veins  visualized. Patency of the peroneal veins is demonstrated by color  flow and spectral Doppler. An isolated acute, non-occlusive thrombus  cannot be totally ruled out in this area. 3. Superficial veins visualized include the great saphenous vein. 4. No evidence of superficial thrombosis detected. 5. Normal multiphasic flow seen in the posterior tibial artery. XR FOOT RT AP/LAT   Final Result   IMPRESSION:     No acute bony injuries. Per Dr. Sonali Woodard, Radiology       Progress notes, Consult notes or additional Procedure notes:   Consult:  Discussed care with Dr. Reena Husain, Specialty: Hospitalist  Standard discussion; including history of patients chief complaint, available diagnostic results, and treatment course. She agrees on admission. 07/06/17         Disposition:  Diagnosis:   1. Right foot infection    2. Infected ulcer of skin, with unspecified severity ([address]    3.  Bilateral leg and foot pain        Disposition: Admit        SCRIBE ATTESTATION STATEMENT  Documented by: Diandra byrneing for, and in the presence of, Jaki Motley DO 3:48 PM    Signed by: Pearl Dao, 07/06/17 3:48 PM    PROVIDER ATTESTATION STATEMENT  I personally performed the services described in the documentation, reviewed the documentation, as recorded by the scribe in my presence, and it accurately and completely records my words and actions.   Germaine Elaine, DO

## 2017-07-06 NOTE — PROGRESS NOTES
Dr. Leana Singer office called our office back after patient had left and stated that they are unable and not equipped to care for patient in their office and so were cancelling the appointment for this afternoon. Contacted patient's son and explained this and advised patient's son to take patient to the ED because the foot needed to be addressed. Son stated understood.

## 2017-07-06 NOTE — PROGRESS NOTES
Marguerite     Chief Complaint   Patient presents with   Carolyn Wilson New Patient    Swelling    Wound Check       History and Physical    Mr Vanessa Boykin is referred by his PCP for leg wounds  He is nonambulatory for the past 3 years and spends many days with his legs dependent sitting in his wheelchair  He was acutally in the ED a couple of weeks ago and is now set back up with home health  It sounds like these wounds on the legs have been present about one year. He was hospitalized in December and it appears plastic surgery was consulted and did some debridement of wounds. He was in a SNF for a short period as well and had some continued wound care. There is a bit of a gap in time of what has been continued since he has been home. Apparently the recent ED visit was prompted by EMS having to come out and sent him in due to concern of poor living arrangement and even APS was involved. He is here with his son today who has taken on the role of helping out, but he also works full time. There is apparently a brother who lives with the patient. But he now also has a relatively new wound of the right foot. He saw podiatry about 2 months ago but it was not present at that time  This wound was in fact observed to have maggots in it.  He is also experiencing pain in this wound/foot     Past Medical History:   Diagnosis Date    Hiccups     Hypertension     Hyponatremia     Lower extremity edema     Psychogenic polydipsia     Spinal stenosis      Patient Active Problem List   Diagnosis Code    Intractable hiccups R06.6    Essential hypertension, benign I10    First degree AV block I44.0    Former heavy tobacco smoker Z87.891    Psychogenic polydipsia R63.1, F54    Hyponatremia E87.1    Cellulitis L03.90     Past Surgical History:   Procedure Laterality Date    HX HEENT      pt reports past hx of surgery on ears unsure of what type     Current Outpatient Prescriptions   Medication Sig Dispense Refill    baclofen (LIORESAL) 20 mg tablet       chlorproMAZINE (THORAZINE) 25 mg tablet       tamsulosin (FLOMAX) 0.4 mg capsule Take 0.4 mg by mouth daily. 1 tab daily      furosemide (LASIX) 40 mg tablet Take 40 mg by mouth daily.  atorvastatin (LIPITOR) 20 mg tablet Take 20 mg by mouth daily.  chlorproMAZINE (THORAZINE) 50 mg tablet Take 1 Tab by mouth three (3) times daily as needed. Indications: INTRACTABLE HICCUPS 9 Tab 0    MULTIVITAMIN,THERAPEUTIC (THERA MULTI-VITAMIN PO) Take 500 mg by mouth daily.  LORazepam (ATIVAN) 0.5 mg tablet Take 1-2 Tabs by mouth every eight (8) hours as needed for Anxiety (or panic attacks). Max Daily Amount: 3 mg. 40 Tab 0    oxyCODONE-acetaminophen (PERCOCET) 5-325 mg per tablet Take 1 Tab by mouth every four (4) hours as needed. Max Daily Amount: 6 Tabs. 40 Tab 0    baclofen (LIORESAL) 10 mg tablet Take 1 Tab by mouth every twelve (12) hours as needed. (Patient taking differently: Take 1 Tab by mouth every twelve (12) hours as needed (for back pain, muscle ralaxant). as needed for pain,) 15 Tab 0    amLODIPine (NORVASC) 10 mg tablet Take 1 Tab by mouth daily. 30 Tab 2    metoprolol (LOPRESSOR) 25 mg tablet Take 1 Tab by mouth two (2) times a day. (Patient taking differently: Take 1 Tab by mouth two (2) times a day. 1/2 tablet bid) 60 Tab 2    cyanocobalamin 1,000 mcg tablet Take 1 Tab by mouth daily. 30 Tab 2    folic acid (FOLVITE) 1 mg tablet Take 1 Tab by mouth daily. 30 Tab 1    atorvastatin (LIPITOR) 20 mg tablet 1 Tab by Per G Tube route nightly. (Patient taking differently: Take 1 Tab by mouth nightly.) 30 Tab 0    aspirin 81 mg chewable tablet Take 1 Tab by mouth daily. 30 Tab 0     No Known Allergies  Social History     Social History    Marital status:      Spouse name: N/A    Number of children: N/A    Years of education: N/A     Occupational History    Not on file.      Social History Main Topics    Smoking status: Former Smoker     Packs/day: 0.50     Quit date: 2008    Smokeless tobacco: Not on file    Alcohol use No    Drug use: No    Sexual activity: Not Currently     Other Topics Concern    Not on file     Social History Narrative      Family History   Problem Relation Age of Onset    Hypertension Other        Review of Systems    Patient is a poor historian, unable to obtain full ROS. Pertinent factors in HPI as provided by chart review and discussion with son        Physical Exam:    Visit Vitals    /82 (BP 1 Location: Left arm, BP Patient Position: Sitting)    Pulse 86    Resp 22    Ht 5' 11\" (1.803 m)    Wt 200 lb (90.7 kg)    BMI 27.89 kg/m2      General:  Alert, cooperative, no distress. Wheelchair bound   Head:  Normocephalic, without obvious abnormality, atraumatic. Eyes:    Conjunctivae/corneas clear. Pupils equal, round, reactive to light. Extraocular movements intact. Extremities: About 2+ edema bilaterally   Pulses: Triphasic signals distally with handheld doppler, which is consistent with flow studies done 2016   Skin: Bilateral anterolateral calf wounds which are superficial and viable. Can tell by periwounds that these were once larger, as can see scarring/evidence of wound contraction. Has new right foot ulcer which is infested with maggots. dakins applied to attempt removal of maggots. There appears to be some lateral tunneling of wound which maggots are moving towards and difficult to completely remove. Soaked dakins gauze applied until able to be seen by podiatry. Other wounds dressed and unna boot applied on left     Vascular studies:  17 PVL  Right le. No evidence of deep venous thrombosis detected in the veins  visualized. 2. Deep veins visualized include the common femoral, femoral,  popliteal, posterior tibial and peroneal veins. 3. No evidence of superficial thrombosis detected. 4. Superficial veins visualized include the proximal great saphenous  vein. Left le.  No evidence of deep venous thrombosis detected in the veins  visualized. 2. Deep veins visualized include the common femoral, femoral,  popliteal, posterior tibial and peroneal veins. 3. No evidence of superficial thrombosis detected. 4. Superficial veins visualized include the proximal great saphenous  vein. Arterial duplex 12/2016  Right leg :  1. Mild <50% stenosis of the common femoral, profunda femoris,  superficial femoral, popliteal, anterior tibial and posterior tibial  arteries. 2. Unable to visualize the peroneal artery due to body habitus. 3. Triphasic doppler signals noted throughout the lower extremity  arteries assessed. Left leg :  1. Mild <50% stenosis of the common femoral, profunda femoris,  superficial femoral, popliteal, anterior tibial and posterior tibial  arteries. 2. Unable to visualize the peroneal artery due to body habitus. 3. Triphasic doppler signals noted throughout the lower extremity  arteries assessed. Impression and Plan:  1. Venous stasis ulcers of both lower extremities ([address]    2. Foot ulcer with fat layer exposed, right    3. Infestation, maggots      Orders Placed This Encounter    baclofen (LIORESAL) 20 mg tablet    chlorproMAZINE (THORAZINE) 25 mg tablet     Leg wounds appear viable. So simply needs to continue with wound care efforts and would incorporate compression with that. I think unna boots would be idea, but local wound care and light compression with tubi  sleeves applied today until right foot can be evaluated by podiatry as likely needs debridement. He is established with podiatry so we are trying to facilitate urgent visit. Will coordinate continued efforts with home health wound care team. Defer that foot wound to podiatry and we can evaluate leg wounds in about 6 weeks    Follow-up Disposition:  Return in about 6 weeks (around 8/17/2017). ARSEN Norwood    Portions of this note have been created using voice recognition software.

## 2017-07-06 NOTE — PROCEDURES
[facility]  *** [facility] ***    Name: Hayden Conley  MRN: [id number]  : 08 Mar 1955  HIS Order #: [id number]  [address] [facility] Visit #: [id number]  Date: 2017    TYPE OF TEST: Peripheral Venous Testing    REASON FOR TEST  Limb swelling, Ulcer of skin of lower ext    Right Leg:-  Deep venous thrombosis:           No  Superficial venous thrombosis:    No  Deep venous insufficiency:        Not examined  Superficial venous insufficiency: Not examined    Left Leg:-  Deep venous thrombosis:           No  Superficial venous thrombosis:    No  Deep venous insufficiency:        Not examined  Superficial venous insufficiency: Not examined      INTERPRETATION/FINDINGS  Duplex images were obtained using 2-D gray scale, color flow, and  spectral Doppler analysis. Right leg :  1. Deep veins visualized include the common femoral, femoral,  popliteal, posterior tibial and peroneal veins. Visualization of the  peroneal veins in the calf is limited due to body habitus. 2. No evidence of deep venous thrombosis detected in the veins  visualized. Patency of the peroneal veins is demonstrated by color  flow and spectral Doppler. An isolated acute, non-occlusive thrombus  cannot be totally ruled out in this area. 3. Superficial veins visualized include the great saphenous vein. 4. No evidence of superficial thrombosis detected. 5. Normal multiphasic flow seen in the posterior tibial artery. Left leg :  1. Deep veins visualized include the common femoral, femoral,  popliteal, posterior tibial and peroneal veins. Visualization of the  peroneal veins in the calf is limited due to body habitus. 2. No evidence of deep venous thrombosis detected in the veins  visualized. Patency of the peroneal veins is demonstrated by color  flow and spectral Doppler. An isolated acute, non-occlusive thrombus  cannot be totally ruled out in this area. 3. Superficial veins visualized include the great saphenous vein.   4. No evidence of superficial thrombosis detected. 5. Normal multiphasic flow seen in the posterior tibial artery. ADDITIONAL COMMENTS    I have personally reviewed the data relevant to the interpretation of  this  study. TECHNOLOGIST: IDALMIS Delcid, RVS  Signed: 07/06/2017 06:47 PM    PHYSICIAN: Michael Caldera D.O.   Signed: 07/07/2017 09:14 AM

## 2017-07-06 NOTE — MR AVS SNAPSHOT
Visit Information Date & Time Provider Department Dept. Phone Encounter #  
 7/6/2017  9:15 AM Meredith Jefferson, [facility] [id number] Follow-up Instructions Return in about 6 weeks (around 8/17/2017). Your Appointments 8/17/2017  9:30 AM  
Office Visit with ARSEN Benz [facility] ([facility]) Appt Note: 6 WEEK FOLLOW UP PER NATALI TO CHECK WOUND  
 [address], [place] [address] [address] [facility]  
[phone] [address] [facility] [address] [facility] Upcoming Health Maintenance Date Due Hepatitis C Screening 1955 DTaP/Tdap/Td series (1 - Tdap) 3/8/1976 FOBT Q 1 YEAR AGE 50-75 3/8/2005 ZOSTER VACCINE AGE 60> 3/8/2015 INFLUENZA AGE 9 TO ADULT 8/1/2017 Allergies as of 7/6/2017  Review Complete On: 7/6/2017 By: ARSEN Benz No Known Allergies Current Immunizations  Reviewed on 8/16/2014 Name Date Pneumococcal Polysaccharide (PPSV-23) 5/23/2014 12:05 PM  
  
 Not reviewed this visit You Were Diagnosed With   
  
 Codes Comments Venous stasis ulcers of both lower extremities (HCC)    -  Primary ICD-10-CM: I83.019, I83.029 
ICD-9-CM: 459.81, 707.10 Foot ulcer with fat layer exposed, right     ICD-10-CM: W14.681 ICD-9-CM: 707.15 Infestation, maggots     ICD-10-CM: B87.9 ICD-9-CM: 134.0 Vitals BP Pulse Resp Height(growth percentile) Weight(growth percentile) BMI  
 144/82 (BP 1 Location: Left arm, BP Patient Position: Sitting) 86 22 5' 11\" (1.803 m) 200 lb (90.7 kg) 27.89 kg/m2 Smoking Status Former Smoker Vitals History BMI and BSA Data Body Mass Index Body Surface Area  
 27.89 kg/m 2 2.13 m 2 Preferred Pharmacy Pharmacy Name Phone [facility] [address] [place] [address] [phone] Your Updated Medication List  
  
   
This list is accurate as of: 7/6/17  9:57 AM.  Always use your most recent med list. amLODIPine 10 mg tablet Commonly known as:  Joaquín Asad Take 1 Tab by mouth daily. aspirin 81 mg chewable tablet Take 1 Tab by mouth daily. * atorvastatin 20 mg tablet Commonly known as:  LIPITOR Take 20 mg by mouth daily. * atorvastatin 20 mg tablet Commonly known as:  LIPITOR  
1 Tab by Per G Tube route nightly. * baclofen 10 mg tablet Commonly known as:  LIORESAL Take 1 Tab by mouth every twelve (12) hours as needed. * baclofen 20 mg tablet Commonly known as:  LIORESAL * chlorproMAZINE 50 mg tablet Commonly known as:  THORAZINE Take 1 Tab by mouth three (3) times daily as needed. Indications: INTRACTABLE HICCUPS * chlorproMAZINE 25 mg tablet Commonly known as:  THORAZINE  
  
 cyanocobalamin 1,000 mcg tablet Take 1 Tab by mouth daily. FLOMAX 0.4 mg capsule Generic drug:  tamsulosin Take 0.4 mg by mouth daily. 1 tab daily  
  
 folic acid 1 mg tablet Commonly known as:  [employer] Take 1 Tab by mouth daily. LASIX 40 mg tablet Generic drug:  furosemide Take 40 mg by mouth daily. LORazepam 0.5 mg tablet Commonly known as:  ATIVAN Take 1-2 Tabs by mouth every eight (8) hours as needed for Anxiety (or panic attacks). Max Daily Amount: 3 mg.  
  
 metoprolol tartrate 25 mg tablet Commonly known as:  LOPRESSOR Take 1 Tab by mouth two (2) times a day. oxyCODONE-acetaminophen 5-325 mg per tablet Commonly known as:  PERCOCET Take 1 Tab by mouth every four (4) hours as needed. Max Daily Amount: 6 Tabs. THERA MULTI-VITAMIN PO Take 500 mg by mouth daily. * Notice: This list has 6 medication(s) that are the same as other medications prescribed for you. Read the directions carefully, and ask your doctor or other care provider to review them with you. We Performed the Following APPLY OF PASTE BOOT [id number] CPT(R)] Follow-up Instructions Return in about 6 weeks (around 8/17/2017). To-Do List   
 07/08/2017 To Be Determined Appointment with Rui Lopez RN at [address] Introducing [address]! [employer] introduces "[employer]" patient portal. Now you can access parts of your medical record, email your doctor's office, and request medication refills online. 1. In your internet browser, go to [email or web address] 2. Click on the First Time User? Click Here link in the Sign In box. You will see the New Member Sign Up page. 3. Enter your "[employer]" Access Code exactly as it appears below. You will not need to use this code after youve completed the sign-up process. If you do not sign up before the expiration date, you must request a new code. · "[employer]" Access Code: [email or web address] Expires: 9/16/2017  3:45 PM 
 
4. Enter the last four digits of your Social Security Number (xxxx) and Date of Birth (mm/dd/yyyy) as indicated and click Submit. You will be taken to the next sign-up page. 5. Create a "[employer]" ID. This will be your "[employer]" login ID and cannot be changed, so think of one that is secure and easy to remember. 6. Create a "[employer]" password. You can change your password at any time. 7. Enter your Password Reset Question and Answer. This can be used at a later time if you forget your password. 8. Enter your e-mail address. You will receive e-mail notification when new information is available in [address] 9. Click Sign Up. You can now view and download portions of your medical record. 10. Click the Download Summary menu link to download a portable copy of your medical information. If you have questions, please visit the Frequently Asked Questions section of the "[employer]" website.  Remember, "[employer]" is NOT to be used for urgent needs. For medical emergencies, dial 911. Now available from your iPhone and Android! Please provide this summary of care documentation to your next provider. Your primary care clinician is listed as Kasey Bruno. If you have any questions after today's visit, please call [phone].

## 2017-07-06 NOTE — ED NOTES
Received report from Satnley Bledsoe RN pt alert and oriented at this time, pt has wounds on both calves as well as right foot, bilateral pitting edema with blisters and weeping

## 2017-07-07 ENCOUNTER — APPOINTMENT (OUTPATIENT)
Dept: MRI IMAGING | Age: 62
DRG: 982 | End: 2017-07-07
Attending: PODIATRIST
Payer: COMMERCIAL

## 2017-07-07 PROBLEM — L97.509 FOOT ULCER (HCC): Status: ACTIVE | Noted: 2017-07-07

## 2017-07-07 LAB — LACTATE SERPL-SCNC: 0.7 MMOL/L (ref 0.4–2)

## 2017-07-07 PROCEDURE — 74011250636 HC RX REV CODE- 250/636: Performed by: INTERNAL MEDICINE

## 2017-07-07 PROCEDURE — 77030011256 HC DRSG MEPILEX <16IN NO BORD MOLN -A

## 2017-07-07 PROCEDURE — 77030011255 HC DSG AQUACEL AG BMS -A

## 2017-07-07 PROCEDURE — 83605 ASSAY OF LACTIC ACID: CPT | Performed by: INTERNAL MEDICINE

## 2017-07-07 PROCEDURE — 65270000029 HC RM PRIVATE

## 2017-07-07 PROCEDURE — 77030029211 HC GEL MEDIH TU INLC -B

## 2017-07-07 PROCEDURE — 87040 BLOOD CULTURE FOR BACTERIA: CPT | Performed by: INTERNAL MEDICINE

## 2017-07-07 PROCEDURE — 74011000258 HC RX REV CODE- 258: Performed by: INTERNAL MEDICINE

## 2017-07-07 PROCEDURE — 74011250637 HC RX REV CODE- 250/637: Performed by: HOSPITALIST

## 2017-07-07 PROCEDURE — 74011250637 HC RX REV CODE- 250/637: Performed by: INTERNAL MEDICINE

## 2017-07-07 PROCEDURE — 36415 COLL VENOUS BLD VENIPUNCTURE: CPT | Performed by: INTERNAL MEDICINE

## 2017-07-07 RX ORDER — THERA TABS 400 MCG
1 TAB ORAL DAILY
Status: DISCONTINUED | OUTPATIENT
Start: 2017-07-07 | End: 2017-07-19 | Stop reason: HOSPADM

## 2017-07-07 RX ORDER — ATORVASTATIN CALCIUM 20 MG/1
20 TABLET, FILM COATED ORAL
Status: DISCONTINUED | OUTPATIENT
Start: 2017-07-07 | End: 2017-07-18

## 2017-07-07 RX ORDER — ATORVASTATIN CALCIUM 20 MG/1
20 TABLET, FILM COATED ORAL DAILY
Status: DISCONTINUED | OUTPATIENT
Start: 2017-07-07 | End: 2017-07-07

## 2017-07-07 RX ORDER — OXYCODONE AND ACETAMINOPHEN 5; 325 MG/1; MG/1
1 TABLET ORAL
Status: DISCONTINUED | OUTPATIENT
Start: 2017-07-07 | End: 2017-07-07

## 2017-07-07 RX ORDER — ACETAMINOPHEN 500 MG
500 TABLET ORAL
Status: DISCONTINUED | OUTPATIENT
Start: 2017-07-07 | End: 2017-07-18

## 2017-07-07 RX ORDER — LORAZEPAM 0.5 MG/1
.5-1 TABLET ORAL
Status: DISCONTINUED | OUTPATIENT
Start: 2017-07-07 | End: 2017-07-19 | Stop reason: HOSPADM

## 2017-07-07 RX ORDER — OXYCODONE AND ACETAMINOPHEN 5; 325 MG/1; MG/1
1 TABLET ORAL
Status: DISCONTINUED | OUTPATIENT
Start: 2017-07-07 | End: 2017-07-19 | Stop reason: HOSPADM

## 2017-07-07 RX ORDER — CHLORPROMAZINE HYDROCHLORIDE 25 MG/1
25 TABLET, FILM COATED ORAL 4 TIMES DAILY
Status: DISCONTINUED | OUTPATIENT
Start: 2017-07-07 | End: 2017-07-19 | Stop reason: HOSPADM

## 2017-07-07 RX ORDER — ACETAMINOPHEN 325 MG/1
650 TABLET ORAL
Status: DISCONTINUED | OUTPATIENT
Start: 2017-07-07 | End: 2017-07-07

## 2017-07-07 RX ORDER — TAMSULOSIN HYDROCHLORIDE 0.4 MG/1
0.4 CAPSULE ORAL DAILY
Status: DISCONTINUED | OUTPATIENT
Start: 2017-07-07 | End: 2017-07-19 | Stop reason: HOSPADM

## 2017-07-07 RX ORDER — FUROSEMIDE 40 MG/1
40 TABLET ORAL DAILY
Status: DISCONTINUED | OUTPATIENT
Start: 2017-07-07 | End: 2017-07-19 | Stop reason: HOSPADM

## 2017-07-07 RX ORDER — HEPARIN SODIUM 5000 [USP'U]/ML
5000 INJECTION, SOLUTION INTRAVENOUS; SUBCUTANEOUS EVERY 8 HOURS
Status: DISCONTINUED | OUTPATIENT
Start: 2017-07-07 | End: 2017-07-19 | Stop reason: HOSPADM

## 2017-07-07 RX ORDER — AMLODIPINE BESYLATE 10 MG/1
10 TABLET ORAL DAILY
Status: DISCONTINUED | OUTPATIENT
Start: 2017-07-07 | End: 2017-07-19 | Stop reason: HOSPADM

## 2017-07-07 RX ORDER — ONDANSETRON 2 MG/ML
4 INJECTION INTRAMUSCULAR; INTRAVENOUS
Status: DISCONTINUED | OUTPATIENT
Start: 2017-07-07 | End: 2017-07-19 | Stop reason: HOSPADM

## 2017-07-07 RX ORDER — GUAIFENESIN 100 MG/5ML
81 LIQUID (ML) ORAL DAILY
Status: DISCONTINUED | OUTPATIENT
Start: 2017-07-07 | End: 2017-07-19 | Stop reason: HOSPADM

## 2017-07-07 RX ORDER — LANOLIN ALCOHOL/MO/W.PET/CERES
1000 CREAM (GRAM) TOPICAL DAILY
Status: DISCONTINUED | OUTPATIENT
Start: 2017-07-07 | End: 2017-07-19 | Stop reason: HOSPADM

## 2017-07-07 RX ADMIN — CHLORPROMAZINE HYDROCHLORIDE 25 MG: 25 TABLET, SUGAR COATED ORAL at 23:11

## 2017-07-07 RX ADMIN — AMLODIPINE BESYLATE 10 MG: 10 TABLET ORAL at 12:01

## 2017-07-07 RX ADMIN — THERA TABS 1 TABLET: TAB at 12:28

## 2017-07-07 RX ADMIN — PIPERACILLIN SODIUM,TAZOBACTAM SODIUM 3.38 G: 3; .375 INJECTION, POWDER, FOR SOLUTION INTRAVENOUS at 02:49

## 2017-07-07 RX ADMIN — TAMSULOSIN HYDROCHLORIDE 0.4 MG: 0.4 CAPSULE ORAL at 12:01

## 2017-07-07 RX ADMIN — ATORVASTATIN CALCIUM 20 MG: 20 TABLET, FILM COATED ORAL at 23:11

## 2017-07-07 RX ADMIN — PIPERACILLIN SODIUM,TAZOBACTAM SODIUM 3.38 G: 3; .375 INJECTION, POWDER, FOR SOLUTION INTRAVENOUS at 20:17

## 2017-07-07 RX ADMIN — CHLORPROMAZINE HYDROCHLORIDE 25 MG: 25 TABLET, SUGAR COATED ORAL at 12:04

## 2017-07-07 RX ADMIN — FUROSEMIDE 40 MG: 40 TABLET ORAL at 12:01

## 2017-07-07 RX ADMIN — PIPERACILLIN SODIUM,TAZOBACTAM SODIUM 3.38 G: 3; .375 INJECTION, POWDER, FOR SOLUTION INTRAVENOUS at 15:10

## 2017-07-07 RX ADMIN — HEPARIN SODIUM 5000 UNITS: 5000 INJECTION, SOLUTION INTRAVENOUS; SUBCUTANEOUS at 17:21

## 2017-07-07 RX ADMIN — PIPERACILLIN SODIUM,TAZOBACTAM SODIUM 3.38 G: 3; .375 INJECTION, POWDER, FOR SOLUTION INTRAVENOUS at 08:59

## 2017-07-07 RX ADMIN — OXYCODONE AND ACETAMINOPHEN 1 TABLET: 5; 325 TABLET ORAL at 20:18

## 2017-07-07 RX ADMIN — CYANOCOBALAMIN TAB 1000 MCG 1000 MCG: 1000 TAB at 12:01

## 2017-07-07 RX ADMIN — HEPARIN SODIUM 5000 UNITS: 5000 INJECTION, SOLUTION INTRAVENOUS; SUBCUTANEOUS at 02:49

## 2017-07-07 RX ADMIN — HEPARIN SODIUM 5000 UNITS: 5000 INJECTION, SOLUTION INTRAVENOUS; SUBCUTANEOUS at 09:02

## 2017-07-07 RX ADMIN — ASPIRIN 81 MG 81 MG: 81 TABLET ORAL at 12:01

## 2017-07-07 RX ADMIN — CHLORPROMAZINE HYDROCHLORIDE 25 MG: 25 TABLET, SUGAR COATED ORAL at 17:21

## 2017-07-07 NOTE — ROUTINE PROCESS
Bedside and Verbal shift change report given to Lidia Arteaga (oncoming nurse) by Levy Keenan (offgoing nurse). Report included the following information SBAR, Kardex, MAR and Recent Results.     SITUATION:    Code Status: DNR   Reason for Admission: Right foot infection   Infected ulcer of skin ([facility]   Bilateral leg and foot pain   Foot ulcer ([facility]   Cellulitis   Intractable hiccups    [facility] day: 1   Problem List:       Hospital Problems  Date Reviewed: 7/6/2017          Codes Class Noted POA    Foot ulcer ([facility] ICD-10-CM: L97.509  ICD-9-CM: 707.15  7/7/2017 Unknown        Right foot infection ICD-10-CM: L08.9  ICD-9-CM: 686.9  7/6/2017 Unknown        Bilateral leg and foot pain ICD-10-CM: M79.604, M79.605, M79.671, M79.672  ICD-9-CM: 729.5  7/6/2017 Unknown        Infected ulcer of skin ([facility] ICD-10-CM: L98.499, L08.9  ICD-9-CM: 707.9, 686.9  7/6/2017 Unknown        Cellulitis ICD-10-CM: L03.90  ICD-9-CM: 682.9  12/20/2016 Unknown        Intractable hiccups (Chronic) ICD-10-CM: R06.6  ICD-9-CM: 786.8  9/2/2012 Unknown              BACKGROUND:    Past Medical History:   Past Medical History:   Diagnosis Date    Hiccups     Hypertension     Hyponatremia     Lacunar infarction ([facility] 2010    Lower extremity edema     Psychogenic polydipsia     Spinal stenosis          Patient taking anticoagulants yes     ASSESSMENT:    Changes in Assessment Throughout Shift: no     Patient has Central Line: no Reasons if yes:      Patient has Reid Cath: no Reasons if yes:       Last Vitals:     Vitals:    07/06/17 2140 07/07/17 0051 07/07/17 0500 07/07/17 0857   BP: (!) 165/99 (!) 167/94 (!) 161/96 (!) 165/91   Pulse: 77 85 81 77   Resp: 14 15 16 19   Temp: 98.1 °F (36.7 °C) 99 °F (37.2 °C) 98.4 °F (36.9 °C) 97.4 °F (36.3 °C)   SpO2: 100% 100% 99% 100%   Weight:       Height:            IV and DRAINS (will only show if present)   Peripheral IV 07/06/17 Right Wrist-Site Assessment: Clean, dry, & intact     WOUND (if present)   Wound Type: infected foot ulcer   Dressing present     Wound Concerns/Notes:  Yes, sepsis     PAIN    Pain Assessment    Pain Intensity 1: 0 (07/07/17 0853)              Patient Stated Pain Goal: 0  o Interventions for Pain:  none  o Intervention effective: no  o Time of last intervention:    o Reassessment Completed: yes      Last 3 Weights:  Last 3 Recorded Weights in this Encounter    07/06/17 1354   Weight: 72.6 kg (160 lb)     Weight change:      INTAKE/OUPUT    Current Shift:      Last three shifts: 07/05 1901 - 07/07 0700  In: -   Out: 600 [Urine:600]     LAB RESULTS     Recent Labs      07/06/17   1445   WBC  6.0   HGB  12.0*   HCT  35.9*   PLT  296        Recent Labs      07/06/17   1445   NA  140   K  3.7   GLU  86   BUN  8   CREA  0.84   CA  9.2       RECOMMENDATIONS AND DISCHARGE PLANNING     1. Pending tests/procedures/ Plan of Care or Other Needs: Wound consult     2. Discharge plan for patient and Needs/Barriers: TBD    3. Estimated Discharge Date: 8/7 Posted on Whiteboard in Patients Room: no      4. The patient's care plan was reviewed with the oncoming nurse. \"HEALS\" SAFETY CHECK      Fall Risk    Total Score: 4    Safety Measures: Safety Measures: Bed/Chair alarm on, Bed/Chair-Wheels locked, Bed in low position, Call light within reach, Fall prevention (comment)    A safety check occurred in the patient's room between off going nurse and oncoming nurse listed above.     The safety check included the below items  Area Items   H  High Alert Medications - Verify all high alert medication drips (heparin, PCA, etc.)   E  Equipment - Suction is set up for ALL patients (with yanker)  - Red plugs utilized for all equipment (IV pumps, etc.)  - WOWs wiped down at end of shift.  - Room stocked with oxygen, suction, and other unit-specific supplies   A  Alarms - Bed alarm is set for fall risk patients  - Ensure chair alarm is in place and activated if patient is up in a chair   L  Lines - Check IV for any infiltration  - Reid bag is empty if patient has a Reid   - Tubing and IV bags are labeled   S  Safety   - Room is clean, patient is clean, and equipment is clean. - Hallways are clear from equipment besides carts. - Fall bracelet on for fall risk patients  - Ensure room is clear and free of clutter  - Suction is set up for ALL patients (with yanker)  - Hallways are clear from equipment besides carts.    - Isolation precautions followed, supplies available outside room, sign posted     Elham Cunningham

## 2017-07-07 NOTE — H&P
History & Physical    Patient: Uday Arce MRN: [id number]  CSN: [id number]    YOB: 1955  Age: 58 y.o. Sex: male      DOA: 7/6/2017    Chief Complaint:   Chief Complaint   Patient presents with    Foot Pain          HPI:     Uday Arce is a 58 y.o.  male who went to wound clinic for worsening foot ulcer sent to [facility] for further eval and abx treatment   Ulcer chronic but non healing worse on right leg / associated with pain   No f/c/n/s   Known CVA with left sided weaness  Son is JESSICA Peguero    Past Medical History:   Diagnosis Date    Hiccups     Hypertension     Hyponatremia     Lacunar infarction ([address] 2010    Lower extremity edema     Psychogenic polydipsia     Spinal stenosis        Past Surgical History:   Procedure Laterality Date    HX HEENT      pt reports past hx of surgery on ears unsure of what type       Family History   Problem Relation Age of Onset    Hypertension Other        Social History     Social History    Marital status:      Spouse name: N/A    Number of children: N/A    Years of education: N/A     Social History Main Topics    Smoking status: Former Smoker     Packs/day: 0.50     Quit date: 6/22/2008    Smokeless tobacco: Never Used    Alcohol use No    Drug use: No    Sexual activity: Not Currently     Other Topics Concern    None     Social History Narrative       Prior to Admission medications    Medication Sig Start Date End Date Taking? Authorizing Provider   baclofen (LIORESAL) 20 mg tablet  7/5/17   Historical Provider   chlorproMAZINE (THORAZINE) 25 mg tablet  7/5/17   Historical Provider   tamsulosin (FLOMAX) 0.4 mg capsule Take 0.4 mg by mouth daily. 1 tab daily    Historical Provider   furosemide (LASIX) 40 mg tablet Take 40 mg by mouth daily. Lisa Dubois MD   atorvastatin (LIPITOR) 20 mg tablet Take 20 mg by mouth daily.     Historical Provider   chlorproMAZINE (THORAZINE) 50 mg tablet Take 1 Tab by mouth three (3) times daily as needed. Indications: INTRACTABLE HICCUPS 3/17/17   Tarik French MD   MULTIVITAMIN,THERAPEUTIC (THERA MULTI-VITAMIN PO) Take 500 mg by mouth daily. Historical Provider   LORazepam (ATIVAN) 0.5 mg tablet Take 1-2 Tabs by mouth every eight (8) hours as needed for Anxiety (or panic attacks). Max Daily Amount: 3 mg. 12/30/16   Tai Cash MD   oxyCODONE-acetaminophen (PERCOCET) 5-325 mg per tablet Take 1 Tab by mouth every four (4) hours as needed. Max Daily Amount: 6 Tabs. 12/30/16   Tai Cash MD   baclofen (LIORESAL) 10 mg tablet Take 1 Tab by mouth every twelve (12) hours as needed. Patient taking differently: Take 1 Tab by mouth every twelve (12) hours as needed (for back pain, muscle ralaxant). as needed for pain, 1/25/16   ARSEN Medina   amLODIPine (NORVASC) 10 mg tablet Take 1 Tab by mouth daily. 8/18/14   Gutierrez Chauhan MD   metoprolol (LOPRESSOR) 25 mg tablet Take 1 Tab by mouth two (2) times a day. Patient taking differently: Take 1 Tab by mouth two (2) times a day. 1/2 tablet bid 8/18/14   Gutierrez Chauhan MD   cyanocobalamin 1,000 mcg tablet Take 1 Tab by mouth daily. 8/18/14   Gutierrez Chauhan MD   folic acid (FOLVITE) 1 mg tablet Take 1 Tab by mouth daily. 8/18/14   Gutierrez Chauhan MD   atorvastatin (LIPITOR) 20 mg tablet 1 Tab by Per G Tube route nightly. Patient taking differently: Take 1 Tab by mouth nightly. 9/17/13   Vick Romero MD   aspirin 81 mg chewable tablet Take 1 Tab by mouth daily. 9/18/12   Gutierrez Chauhan MD       No Known Allergies      Review of Systems  GENERAL: Patient alert, awake and oriented times 3, able to communicate full sentences and not in distress. HEENT: No change in vision, no earache, tinnitus, sore throat or sinus congestion. Dysarthria noted hiccups with speeh   NECK: No pain or stiffness. PULMONARY: No shortness of breath, cough or wheeze.    Cardiovascular: no pnd or orthopnea, no CP  GASTROINTESTINAL: No abdominal pain, nausea, vomiting or diarrhea, melena or bright red blood per rectum. GENITOURINARY: No urinary frequency, urgency, hesitancy or dysuria. MUSCULOSKELETAL: foot pain present bilateral  joint or muscle pain, no back pain, no recent trauma. DERMATOLOGIC: No rash, no itching, no lesions. ENDOCRINE: No polyuria, polydipsia, no heat or cold intolerance. No recent change in weight. HEMATOLOGICAL: No anemia or easy bruising or bleeding. NEUROLOGIC: No headache, seizures, numbness, tingling chronic left sidedweaknes non ambulatory  weakness. Physical Exam:     Physical Exam:  Visit Vitals    BP (!) 167/94 (BP 1 Location: Right arm, BP Patient Position: At rest)    Pulse 85    Temp 99 °F (37.2 °C)    Resp 15    Ht 5' 11\" (1.803 m)    Wt 72.6 kg (160 lb)    SpO2 100%    BMI 22.32 kg/m2      O2 Device: Room air    Temp (24hrs), Av.5 °F (36.9 °C), Min:98.1 °F (36.7 °C), Max:99 °F (37.2 °C)             General:  Alert, cooperative, no distress, appears stated age. Head: Normocephalic, without obvious abnormality, atraumatic. Eyes:  Conjunctivae/corneas clear. PERRL, EOMs intact. Nose: Nares normal. No drainage or sinus tenderness. Neck: Supple, symmetrical, trachea midline, no adenopathy, thyroid: no enlargement, no carotid bruit and no JVD. Lungs:   Clear to auscultation bilaterally. Heart:  Regular rate and rhythm, S1, S2 normal.     Abdomen: Soft, non-tender. Bowel sounds normal.    Extremities: Extremities normal, atraumatic, no cyanosis or edema. Pulses: 1+ pulseand symmetric all extremities. Skin:  right leg ulcer superficial 3by 2 cm   Left foot ulcer deep with serous anguinas drainge foul spelling    Neurologic: AAOx3, No focal motor or sensory deficit. Labs Reviewed:    Lab results reviewed. For significant abnormal values and values requiring intervention, see assessment and plan.   Labs reviewed    Procedures/imaging: see electronic medical records for all procedures/Xrays and details which were not copied into this note but were reviewed prior to creation of Plan      Assessment/Plan     Active Problems:    Intractable hiccups (9/2/2012)  Increase thorazine     Cellulitis (12/20/2016)  Start zosyn     Right foot infection (7/6/2017)  Wound care therapy       Bilateral leg and foot pain (7/6/2017)      Infected ulcer of skin ([address] (7/6/2017)      Foot ulcer ([address] (7/7/2017)         DVT/GI Prophylaxis: Hep SQ    Discussed with patient and  at bedside about hospital admission and my plan care, both understood and agree with my plan care.     Oren Jacobson MD  7/7/2017 12:59 AM

## 2017-07-07 NOTE — PROGRESS NOTES
NUTRITION    Nursing Referral: Pressure Ulcer      RECOMMENDATIONS / PLAN:     - Add supplement: Ensure Enlive once daily; Simon BID  - Continue RD inpatient monitoring and evaluation. NUTRITION INTERVENTIONS & DIAGNOSIS:     [x] Meals/Snacks: regular diet  [x] Medical food supplementation: add    Nutrition Diagnosis:  Unintentional weight loss related to inadequate energy intake as evidenced by weight loss of 15 lb PTA  Increased protein needs related to promotion of wound healing as evidenced by pressure ulcer wounds    ASSESSMENT:     Pt reported good appetite and meal intake PTA and since admission. Reported consuming 100% of meals. Pt reported experiencing unplanned weight loss PTA. Noted skin breakdown. Discussed adding nutrition supplement; pt agreed with plan    Average po intake adequate to meet patients estimated nutritional needs:   [x] Yes     [] No   [] Unable to determine at this time    Diet: DIET REGULAR      Food Allergies:  None known   Current Appetite:   [x] Good     [] Fair     [] Poor     [] Other:  Appetite/meal intake prior to admission:   [x] Good     [] Fair     [] Poor     [] Other:  Feeding Limitations:  [] Swallowing difficulty    [] Chewing difficulty    [] Other:  Current Meal Intake: No data found.       BM:  7/5 - pt denied feeling constipated  Skin Integrity:  Vascular left leg wound; vascular right foot wound; vascular right leg wound; stage II pressure ulcer on buttocks; pressure ulcer on right foot  Edema:  +3 LEs  Pertinent Medications: Reviewed    Recent Labs      07/06/17   1445   NA  140   K  3.7   CL  106   CO2  31   GLU  86   BUN  8   CREA  0.84   CA  9.2   ALB  3.1*   SGOT  7*   ALT  14*       Intake/Output Summary (Last 24 hours) at 07/07/17 1737  Last data filed at 07/07/17 0249   Gross per 24 hour   Intake                0 ml   Output              600 ml   Net             -600 ml       Anthropometrics:  Ht Readings from Last 1 Encounters:   07/06/17 5' 11\" (1.803 m) Last 3 Recorded Weights in this Encounter    07/06/17 1354   Weight: 72.6 kg (160 lb)     Body mass index is 22.32 kg/(m^2). Weight History:  Pt reported experiencing unplanned weight loss of 15 lb (8.6%) PTA; pt unsure of time frame of weight trends    Weight Metrics 7/6/2017 7/6/2017 6/24/2017 6/22/2017 5/26/2017 4/13/2017 3/17/2017   Weight 160 lb 200 lb 200 lb 182 lb 167 lb 167 lb 192 lb   BMI 22.32 kg/m2 27.89 kg/m2 27.89 kg/m2 29.38 kg/m2 23.29 kg/m2 23.29 kg/m2 26.78 kg/m2        Admitting Diagnosis: Right foot infection  Infected ulcer of skin (HCC)  Bilateral leg and foot pain  Foot ulcer (HCC)  Cellulitis  Intractable hiccups  Pertinent PMHx:  hypertension    Education Needs:        [x] None identified  [] Identified - Not appropriate at this time  []  Identified and addressed - refer to education log  Learning Limitations:   [x] None identified  [] Identified    Cultural, Restorationism & ethnic food preferences:  [x] None identified    [] Identified and addressed     ESTIMATED NUTRITION NEEDS:     Calories: 0593-4826 kcal (30-33 kcal/kg) based on  [x] Actual BW: 73 kg      [] IBW   Protein:  gm (1.2-1.4 gm/kg) based on  [x] Actual BW      [] IBW   Fluid: 1 mL/kcal     MONITORING & EVALUATION:     Nutrition Goal(s):   1. Po intake of meals will meet >75% of patient estimated nutritional needs within the next 7 days.   Outcome:  [] Met/Ongoing    []  Not Met    [x] New/Initial Goal     Monitoring:   [x] Diet tolerance   [x] Meal intake   [x] Supplement intake   [] GI symptoms/ability to tolerate po diet   [] Respiratory status   [] Plan of care      Previous Recommendations (for follow-up assessments only):     []   Implemented       []   Not Implemented (RD to address)     [] No Recommendation Made     Discharge Planning:   Regular diet  [x] Participated in care planning, discharge planning, & interdisciplinary rounds as appropriate      Philippe Joyner, [address] [place] [address]   Pager: [phone]
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
## 2017-07-07 NOTE — ED NOTES
TRANSFER - OUT REPORT:    Verbal report given to JOSEFA Lizarraga(name) on Jose David Azar  being transferred to N(unit) for routine progression of care       Report consisted of patients Situation, Background, Assessment and   Recommendations(SBAR). Information from the following report(s) SBAR and ED Summary was reviewed with the receiving nurse. Lines:   Peripheral IV 07/06/17 Right Wrist (Active)   Site Assessment Clean, dry, & intact 7/6/2017  2:45 PM   Phlebitis Assessment 0 7/6/2017  2:45 PM   Infiltration Assessment 0 7/6/2017  2:45 PM   Dressing Status Clean, dry, & intact 7/6/2017  2:45 PM   Dressing Type Tape;Transparent 7/6/2017  2:45 PM   Hub Color/Line Status Pink;Flushed;Patent 7/6/2017  2:45 PM        Opportunity for questions and clarification was provided.       Patient transported with:   [place]

## 2017-07-07 NOTE — WOUND CARE
Physical Exam   Wound Leg Left; Anterior (Active)   DRESSING STATUS Removed 7/7/2017  1:14 PM   DRESSING TYPE Silicone 4/3/4954  0:34 PM   Non-Pressure Injury Partial thickness (epider/derm) 7/7/2017  1:14 PM   Wound Length (cm) 1.7 cm 7/7/2017  1:14 PM   Wound Width (cm) 3 cm 7/7/2017  1:14 PM   Wound Depth (cm) 0.3 7/7/2017  1:14 PM   Wound Surface area (cm^3) 1.53 cm^2 7/7/2017  1:14 PM   Condition of Base Other (comment);Granulation 7/7/2017  1:14 PM   Condition of Edges Closed 7/7/2017  1:14 PM   Tissue Type Percent Red 75 7/7/2017  1:14 PM   Tissue Type Percent Yellow 25 7/7/2017  1:14 PM   Drainage Amount  Scant 7/7/2017  1:14 PM   Drainage Color Serous 7/7/2017  1:14 PM   Wound Odor None 7/7/2017  1:14 PM   Periwound Skin Condition Other (comment) 7/7/2017  1:14 PM   Cleansing and Cleansing Agents  Normal saline 7/7/2017  1:14 PM   Dressing Type Applied Honey;Silver products;Silicone 5/0/3204  2:21 PM   Procedure Tolerated Well 7/7/2017  1:14 PM   Number of days:1       Wound Foot Dorsal;Right (Active)   DRESSING STATUS Removed 7/7/2017  1:14 PM   DRESSING TYPE Silicone 6/9/2528  0:99 PM   Non-Pressure Injury Full thickness (subcut/muscle) 7/7/2017  1:14 PM   Wound Length (cm) 5.5 cm 7/7/2017  1:14 PM   Wound Width (cm) 4 cm 7/7/2017  1:14 PM   Wound Depth (cm) 0.3 7/7/2017  1:14 PM   Wound Surface area (cm^3) 6.6 cm^2 7/7/2017  1:14 PM   Condition of Base Slough 7/7/2017  1:14 PM   Condition of Edges Closed 7/7/2017  1:14 PM   Tissue Type Yellow 7/7/2017  1:14 PM   Tissue Type Percent Yellow 100 7/7/2017  1:14 PM   Drainage Amount  Scant 7/7/2017  1:14 PM   Drainage Color Serous 7/7/2017  1:14 PM   Wound Odor None 7/7/2017  1:14 PM   Periwound Skin Condition Intact 7/7/2017  1:14 PM   Cleansing and Cleansing Agents  Normal saline 7/7/2017  1:14 PM   Dressing Type Applied Honey;Silver products;Silicone 9/4/6313  7:25 PM   Procedure Tolerated Well 7/7/2017  1:14 PM   Number of days:1       Wound Leg Right;Lateral (Active)   DRESSING STATUS Removed 7/7/2017  1:14 PM   DRESSING TYPE Silicone 8/6/9811  5:79 PM   Non-Pressure Injury Partial thickness (epider/derm) 7/7/2017  1:14 PM   Wound Length (cm) 5 cm 7/7/2017  1:14 PM   Wound Width (cm) 2 cm 7/7/2017  1:14 PM   Wound Depth (cm) 0.1 7/7/2017  1:14 PM   Wound Surface area (cm^3) 1 cm^2 7/7/2017  1:14 PM   Condition of Base [place] 7/7/2017  1:14 PM   Condition of Edges Calloused;Closed 7/7/2017  1:14 PM   Tissue Type Pink 7/7/2017  1:14 PM   Tissue Type Percent Pink 100 7/7/2017  1:14 PM   Drainage Amount  Scant 7/7/2017  1:14 PM   Drainage Color Serous 7/7/2017  1:14 PM   Wound Odor None 7/7/2017  1:14 PM   Periwound Skin Condition Calloused 7/7/2017  1:14 PM   Cleansing and Cleansing Agents  Normal saline 7/7/2017  1:14 PM   Dressing Type Applied Honey;Silver products;Silicone 8/5/1147  2:84 PM   Procedure Tolerated Well 7/7/2017  1:14 PM   Number of days:1       Wound Buttocks Right (Active), no open wounds healed pink skin areas noted   DRESSING TYPE Open to air 7/7/2017  1:14 PM   Number of days:1          453: Seen by wound care [facility] dressing applications and wounds assessment performed at this time. Healed skin to buttocks  noted during skin assessment. Treatment plan explained  and Pt agreeable to continue current treatment. Wound care education provided to pt at this time. Plan of care reviewed with nursing. Will turn over care to nursing staff at this time  Jocelynn Lake, Wound Care Department

## 2017-07-07 NOTE — PROGRESS NOTES
conducted an initial consultation and Spiritual Assessment for Brandon Tapia, who is a 58 y. o.,male. Patients Primary Language is: Trenda Faroese. According to the patients EMR [facility] Affiliation is: [facility].     The reason the Patient came to the hospital is:   Patient Active Problem List    Diagnosis Date Noted    Foot ulcer ([facility] 07/07/2017    Right foot infection 07/06/2017    Bilateral leg and foot pain 07/06/2017    Infected ulcer of skin ([facility] 07/06/2017    Cellulitis 12/20/2016    Hyponatremia 01/01/2015    Psychogenic polydipsia 05/22/2014    First degree AV block 09/01/2013    Former heavy tobacco smoker 09/01/2013    Intractable hiccups 09/02/2012    Essential hypertension, benign 09/02/2012        The  provided the following Interventions:  Initiated a relationship of care and support with patient. Explored issues of kitty, belief, spirituality and Taoist/ritual needs while hospitalized. Listened empathically to patient. Provided chaplaincy education. Provided information about Spiritual Care Services. Offered prayer and assurance of continued prayers on patient's behalf. Chart reviewed. The following outcomes where achieved:  Patient shared limited information about both their medical narrative and spiritual journey/beliefs. Patient processed feeling about current hospitalization. Patient expressed gratitude for 's visit. Assessment:  Patient does not have any Taoist/cultural needs that will affect patients preferences in health care. There are no spiritual or Taoist issues which require intervention at this time. Plan:  Chaplains will continue to follow and will provide pastoral care on an as needed/requested basis.  recommends bedside caregivers page  on duty if patient shows signs of acute spiritual or emotional distress.     [address] [place] [address]   [phone]

## 2017-07-07 NOTE — PROGRESS NOTES
Received in bed, eyes closed appears to be sleeping, easily aroused. A/OX2 name, place. Disoriented to time, situation. Speech unclear. Left sided weakness. Fall precautions. Side rails X3. Call bell phone within reach. Appears to be in no resp. distress. Mepilex dressing to right foot and left lower leg intact,.

## 2017-07-07 NOTE — CONSULTS
Consult    Patient: Eric Perez MRN: [id number]  SSN: [id number]    YOB: 1955  Age: 58 y.o. Sex: male      Subjective: Eric Perez is a 58 y.o. male who is being seen for asked to evaluate and treat infection right foot. .    Past Medical History:   Diagnosis Date    Hiccups     Hypertension     Hyponatremia     Lacunar infarction ([address] 2010    Lower extremity edema     Psychogenic polydipsia     Spinal stenosis      Past Surgical History:   Procedure Laterality Date    HX HEENT      pt reports past hx of surgery on ears unsure of what type      Family History   Problem Relation Age of Onset    Hypertension Other      Social History   Substance Use Topics    Smoking status: Former Smoker     Packs/day: 0.50     Quit date: 6/22/2008    Smokeless tobacco: Never Used    Alcohol use No      Current Facility-Administered Medications   Medication Dose Route Frequency Provider Last Rate Last Dose    ondansetron (ZOFRAN) injection 4 mg  4 mg IntraVENous Q4H PRN Margarita Ortiz MD        heparin (porcine) injection 5,000 Units  5,000 Units SubCUTAneous Q8H Margarita Ortiz MD   5,000 Units at 07/07/17 0902    amLODIPine (NORVASC) tablet 10 mg  10 mg Oral DAILY Margarita Ortiz MD        aspirin chewable tablet 81 mg  81 mg Oral DAILY Margarita Ortiz MD        atorvastatin (LIPITOR) tablet 20 mg  20 mg Oral QHS Margarita Ortiz MD        chlorproMAZINE (THORAZINE) tablet 25 mg  25 mg Oral QID Margarita Ortiz MD        cyanocobalamin tablet 1,000 mcg  1,000 mcg Oral DAILY Margarita Ortiz MD        furosemide (LASIX) tablet 40 mg  40 mg Oral DAILY Margarita Ortiz MD        LORazepam (ATIVAN) tablet 0.5-1 mg  0.5-1 mg Oral Q8H PRN Margarita Ortiz MD        therapeutic multivitamin [facility]) tablet 1 Tab  1 Tab Oral DAILY Margarita Ortiz MD        tamsulosin (FLOMAX) capsule 0.4 mg  0.4 mg Oral DAILY Norma Hinds MD [place]        piperacillin-tazobactam (ZOSYN) 3.375 g in 0.9% sodium chloride (MBP/ADV) 100 mL MBP  3.375 g IntraVENous Q6H Romell Heimlich,  mL/hr at 07/07/17 0859 3.375 g at 07/07/17 0859    oxyCODONE-acetaminophen (PERCOCET) 5-325 mg per tablet 1 Tab  1 Tab Oral Q6H PRN Roz Garcia MD        acetaminophen (TYLENOL) tablet 500 mg  500 mg Oral Q6H PRN Roz Garcia MD            No Known Allergies    Review of Systems:  A comprehensive review of systems was negative except for that written in the History of Present Illness. Objective:     Vitals:    07/06/17 2140 07/07/17 0051 07/07/17 0500 07/07/17 0857   BP: (!) 165/99 (!) 167/94 (!) 161/96 (!) 165/91   Pulse: 77 85 81 77   Resp: 14 15 16 19   Temp: 98.1 °F (36.7 °C) 99 °F (37.2 °C) 98.4 °F (36.9 °C) 97.4 °F (36.3 °C)   SpO2: 100% 100% 99% 100%   Weight:       Height:            Physical Exam:  Seen at bedside awake and alert. Dose not ambulate, lower leg weakness? Etiology? Feet warm, palpable dorsalis pedis pulses. Abrasions on lower legs. No cellulitis. Deep ulcer dorsal right forefoot, pus in base. Painful. Assessment:     Hospital Problems  Date Reviewed: 7/7/2017          Codes Class Noted POA    Foot ulcer ([facility] ICD-10-CM: L97.509  ICD-9-CM: 707.15  7/7/2017 Unknown        Right foot infection ICD-10-CM: L08.9  ICD-9-CM: 686.9  7/6/2017 Unknown        Bilateral leg and foot pain ICD-10-CM: M79.604, M79.605, M79.671, M79.672  ICD-9-CM: 729.5  7/6/2017 Unknown        Infected ulcer of skin ([facility] ICD-10-CM: L98.499, L08.9  ICD-9-CM: 707.9, 686.9  7/6/2017 Unknown        Cellulitis ICD-10-CM: L03.90  ICD-9-CM: 682.9  12/20/2016 Unknown        Intractable hiccups (Chronic) ICD-10-CM: R06.6  ICD-9-CM: 786.8  9/2/2012 Unknown              Plan:     Deep infected ulcer, start wound care, MRI to evaluate for bone involvement.     Signed By: Anuel Gonzales DPM     July 7, 2017

## 2017-07-08 ENCOUNTER — APPOINTMENT (OUTPATIENT)
Dept: GENERAL RADIOLOGY | Age: 62
DRG: 982 | End: 2017-07-08
Attending: HOSPITALIST
Payer: COMMERCIAL

## 2017-07-08 ENCOUNTER — APPOINTMENT (OUTPATIENT)
Dept: MRI IMAGING | Age: 62
DRG: 982 | End: 2017-07-08
Attending: PODIATRIST
Payer: COMMERCIAL

## 2017-07-08 ENCOUNTER — HOME CARE VISIT (OUTPATIENT)
Dept: HOME HEALTH SERVICES | Facility: HOME HEALTH | Age: 62
End: 2017-07-08
Payer: COMMERCIAL

## 2017-07-08 PROCEDURE — 74011000258 HC RX REV CODE- 258: Performed by: INTERNAL MEDICINE

## 2017-07-08 PROCEDURE — 74011250637 HC RX REV CODE- 250/637: Performed by: INTERNAL MEDICINE

## 2017-07-08 PROCEDURE — 71020 XR CHEST PA LAT: CPT

## 2017-07-08 PROCEDURE — 74000 XR ABD (KUB): CPT

## 2017-07-08 PROCEDURE — 74011250636 HC RX REV CODE- 250/636: Performed by: INTERNAL MEDICINE

## 2017-07-08 PROCEDURE — 70030 X-RAY EYE FOR FOREIGN BODY: CPT

## 2017-07-08 PROCEDURE — 65270000029 HC RM PRIVATE

## 2017-07-08 PROCEDURE — 70250 X-RAY EXAM OF SKULL: CPT

## 2017-07-08 RX ADMIN — CHLORPROMAZINE HYDROCHLORIDE 25 MG: 25 TABLET, SUGAR COATED ORAL at 18:21

## 2017-07-08 RX ADMIN — CHLORPROMAZINE HYDROCHLORIDE 25 MG: 25 TABLET, SUGAR COATED ORAL at 09:43

## 2017-07-08 RX ADMIN — TAMSULOSIN HYDROCHLORIDE 0.4 MG: 0.4 CAPSULE ORAL at 09:43

## 2017-07-08 RX ADMIN — HEPARIN SODIUM 5000 UNITS: 5000 INJECTION, SOLUTION INTRAVENOUS; SUBCUTANEOUS at 18:20

## 2017-07-08 RX ADMIN — AMLODIPINE BESYLATE 10 MG: 10 TABLET ORAL at 09:40

## 2017-07-08 RX ADMIN — CHLORPROMAZINE HYDROCHLORIDE 25 MG: 25 TABLET, SUGAR COATED ORAL at 21:20

## 2017-07-08 RX ADMIN — HEPARIN SODIUM 5000 UNITS: 5000 INJECTION, SOLUTION INTRAVENOUS; SUBCUTANEOUS at 09:44

## 2017-07-08 RX ADMIN — PIPERACILLIN SODIUM,TAZOBACTAM SODIUM 3.38 G: 3; .375 INJECTION, POWDER, FOR SOLUTION INTRAVENOUS at 01:45

## 2017-07-08 RX ADMIN — THERA TABS 1 TABLET: TAB at 09:42

## 2017-07-08 RX ADMIN — PIPERACILLIN SODIUM,TAZOBACTAM SODIUM 3.38 G: 3; .375 INJECTION, POWDER, FOR SOLUTION INTRAVENOUS at 20:53

## 2017-07-08 RX ADMIN — CYANOCOBALAMIN TAB 1000 MCG 1000 MCG: 1000 TAB at 09:41

## 2017-07-08 RX ADMIN — PIPERACILLIN SODIUM,TAZOBACTAM SODIUM 3.38 G: 3; .375 INJECTION, POWDER, FOR SOLUTION INTRAVENOUS at 13:32

## 2017-07-08 RX ADMIN — ATORVASTATIN CALCIUM 20 MG: 20 TABLET, FILM COATED ORAL at 21:20

## 2017-07-08 RX ADMIN — PIPERACILLIN SODIUM,TAZOBACTAM SODIUM 3.38 G: 3; .375 INJECTION, POWDER, FOR SOLUTION INTRAVENOUS at 08:59

## 2017-07-08 RX ADMIN — ASPIRIN 81 MG 81 MG: 81 TABLET ORAL at 09:41

## 2017-07-08 RX ADMIN — HEPARIN SODIUM 5000 UNITS: 5000 INJECTION, SOLUTION INTRAVENOUS; SUBCUTANEOUS at 01:52

## 2017-07-08 RX ADMIN — FUROSEMIDE 40 MG: 40 TABLET ORAL at 09:42

## 2017-07-08 RX ADMIN — CHLORPROMAZINE HYDROCHLORIDE 25 MG: 25 TABLET, SUGAR COATED ORAL at 12:44

## 2017-07-08 RX ADMIN — LORAZEPAM 1 MG: 0.5 TABLET ORAL at 16:22

## 2017-07-08 NOTE — PROGRESS NOTES
7/8/17 17:56 Patient is a poor historian and will need clearing x rays prior to his MRI test. I notified his nurse that we will need orbits, skull, chest and KUB x rays in order to clear him. Patient is currently vomitting and will be sent back to his room.      Thank you  Mame MARINELLI RT (R) MR

## 2017-07-09 ENCOUNTER — APPOINTMENT (OUTPATIENT)
Dept: MRI IMAGING | Age: 62
DRG: 982 | End: 2017-07-09
Attending: PODIATRIST
Payer: COMMERCIAL

## 2017-07-09 LAB
ANION GAP BLD CALC-SCNC: 7 MMOL/L (ref 3–18)
BASOPHILS # BLD AUTO: 0 K/UL (ref 0–0.1)
BASOPHILS # BLD: 0 % (ref 0–2)
BUN SERPL-MCNC: 10 MG/DL (ref 7–18)
BUN/CREAT SERPL: 10 (ref 12–20)
CALCIUM SERPL-MCNC: 8.7 MG/DL (ref 8.5–10.1)
CHLORIDE SERPL-SCNC: 108 MMOL/L (ref 100–108)
CO2 SERPL-SCNC: 26 MMOL/L (ref 21–32)
CREAT SERPL-MCNC: 0.99 MG/DL (ref 0.6–1.3)
DIFFERENTIAL METHOD BLD: ABNORMAL
EOSINOPHIL # BLD: 0.3 K/UL (ref 0–0.4)
EOSINOPHIL NFR BLD: 6 % (ref 0–5)
ERYTHROCYTE [DISTWIDTH] IN BLOOD BY AUTOMATED COUNT: 14 % (ref 11.6–14.5)
FERRITIN SERPL-MCNC: 39 NG/ML (ref 8–388)
FOLATE SERPL-MCNC: >20 NG/ML (ref 3.1–17.5)
GLUCOSE SERPL-MCNC: 84 MG/DL (ref 74–99)
HCT VFR BLD AUTO: 34.4 % (ref 36–48)
HGB BLD-MCNC: 11.5 G/DL (ref 13–16)
IRON SATN MFR SERPL: 19 %
IRON SERPL-MCNC: 45 UG/DL (ref 50–175)
LYMPHOCYTES # BLD AUTO: 20 % (ref 21–52)
LYMPHOCYTES # BLD: 1 K/UL (ref 0.9–3.6)
MAGNESIUM SERPL-MCNC: 2.2 MG/DL (ref 1.6–2.6)
MCH RBC QN AUTO: 26.9 PG (ref 24–34)
MCHC RBC AUTO-ENTMCNC: 33.4 G/DL (ref 31–37)
MCV RBC AUTO: 80.4 FL (ref 74–97)
MONOCYTES # BLD: 0.5 K/UL (ref 0.05–1.2)
MONOCYTES NFR BLD AUTO: 10 % (ref 3–10)
NEUTS SEG # BLD: 3.3 K/UL (ref 1.8–8)
NEUTS SEG NFR BLD AUTO: 64 % (ref 40–73)
PLATELET # BLD AUTO: 240 K/UL (ref 135–420)
PMV BLD AUTO: 10.1 FL (ref 9.2–11.8)
POTASSIUM SERPL-SCNC: 4 MMOL/L (ref 3.5–5.5)
RBC # BLD AUTO: 4.28 M/UL (ref 4.7–5.5)
SODIUM SERPL-SCNC: 141 MMOL/L (ref 136–145)
TIBC SERPL-MCNC: 233 UG/DL (ref 250–450)
VIT B12 SERPL-MCNC: 541 PG/ML (ref 211–911)
WBC # BLD AUTO: 5.1 K/UL (ref 4.6–13.2)

## 2017-07-09 PROCEDURE — 36415 COLL VENOUS BLD VENIPUNCTURE: CPT | Performed by: HOSPITALIST

## 2017-07-09 PROCEDURE — 74011250636 HC RX REV CODE- 250/636: Performed by: INTERNAL MEDICINE

## 2017-07-09 PROCEDURE — 74011250637 HC RX REV CODE- 250/637: Performed by: INTERNAL MEDICINE

## 2017-07-09 PROCEDURE — 85025 COMPLETE CBC W/AUTO DIFF WBC: CPT | Performed by: HOSPITALIST

## 2017-07-09 PROCEDURE — 74011000258 HC RX REV CODE- 258: Performed by: INTERNAL MEDICINE

## 2017-07-09 PROCEDURE — 80048 BASIC METABOLIC PNL TOTAL CA: CPT | Performed by: HOSPITALIST

## 2017-07-09 PROCEDURE — 83540 ASSAY OF IRON: CPT | Performed by: HOSPITALIST

## 2017-07-09 PROCEDURE — 82728 ASSAY OF FERRITIN: CPT | Performed by: HOSPITALIST

## 2017-07-09 PROCEDURE — 83735 ASSAY OF MAGNESIUM: CPT | Performed by: HOSPITALIST

## 2017-07-09 PROCEDURE — 74011250636 HC RX REV CODE- 250/636: Performed by: HOSPITALIST

## 2017-07-09 PROCEDURE — 73718 MRI LOWER EXTREMITY W/O DYE: CPT

## 2017-07-09 PROCEDURE — 65270000029 HC RM PRIVATE

## 2017-07-09 PROCEDURE — 82607 VITAMIN B-12: CPT | Performed by: HOSPITALIST

## 2017-07-09 RX ORDER — HYDROMORPHONE HYDROCHLORIDE 1 MG/ML
1 INJECTION, SOLUTION INTRAMUSCULAR; INTRAVENOUS; SUBCUTANEOUS
Status: COMPLETED | OUTPATIENT
Start: 2017-07-09 | End: 2017-07-09

## 2017-07-09 RX ORDER — HYDROMORPHONE HYDROCHLORIDE 1 MG/ML
2 INJECTION, SOLUTION INTRAMUSCULAR; INTRAVENOUS; SUBCUTANEOUS
Status: ACTIVE | OUTPATIENT
Start: 2017-07-09 | End: 2017-07-10

## 2017-07-09 RX ADMIN — CYANOCOBALAMIN TAB 1000 MCG 1000 MCG: 1000 TAB at 08:22

## 2017-07-09 RX ADMIN — HEPARIN SODIUM 5000 UNITS: 5000 INJECTION, SOLUTION INTRAVENOUS; SUBCUTANEOUS at 02:06

## 2017-07-09 RX ADMIN — ONDANSETRON 4 MG: 2 INJECTION INTRAMUSCULAR; INTRAVENOUS at 18:08

## 2017-07-09 RX ADMIN — CHLORPROMAZINE HYDROCHLORIDE 25 MG: 25 TABLET, SUGAR COATED ORAL at 13:00

## 2017-07-09 RX ADMIN — CHLORPROMAZINE HYDROCHLORIDE 25 MG: 25 TABLET, SUGAR COATED ORAL at 18:03

## 2017-07-09 RX ADMIN — ATORVASTATIN CALCIUM 20 MG: 20 TABLET, FILM COATED ORAL at 21:58

## 2017-07-09 RX ADMIN — THERA TABS 1 TABLET: TAB at 08:22

## 2017-07-09 RX ADMIN — TAMSULOSIN HYDROCHLORIDE 0.4 MG: 0.4 CAPSULE ORAL at 08:22

## 2017-07-09 RX ADMIN — AMLODIPINE BESYLATE 10 MG: 10 TABLET ORAL at 08:23

## 2017-07-09 RX ADMIN — PIPERACILLIN SODIUM,TAZOBACTAM SODIUM 3.38 G: 3; .375 INJECTION, POWDER, FOR SOLUTION INTRAVENOUS at 21:04

## 2017-07-09 RX ADMIN — PIPERACILLIN SODIUM,TAZOBACTAM SODIUM 3.38 G: 3; .375 INJECTION, POWDER, FOR SOLUTION INTRAVENOUS at 02:05

## 2017-07-09 RX ADMIN — PIPERACILLIN SODIUM,TAZOBACTAM SODIUM 3.38 G: 3; .375 INJECTION, POWDER, FOR SOLUTION INTRAVENOUS at 08:00

## 2017-07-09 RX ADMIN — FUROSEMIDE 40 MG: 40 TABLET ORAL at 08:22

## 2017-07-09 RX ADMIN — CHLORPROMAZINE HYDROCHLORIDE 25 MG: 25 TABLET, SUGAR COATED ORAL at 21:58

## 2017-07-09 RX ADMIN — HYDROMORPHONE HYDROCHLORIDE 1 MG: 1 INJECTION, SOLUTION INTRAMUSCULAR; INTRAVENOUS; SUBCUTANEOUS at 15:40

## 2017-07-09 RX ADMIN — HEPARIN SODIUM 5000 UNITS: 5000 INJECTION, SOLUTION INTRAVENOUS; SUBCUTANEOUS at 18:03

## 2017-07-09 RX ADMIN — ASPIRIN 81 MG 81 MG: 81 TABLET ORAL at 08:22

## 2017-07-09 RX ADMIN — PIPERACILLIN SODIUM,TAZOBACTAM SODIUM 3.38 G: 3; .375 INJECTION, POWDER, FOR SOLUTION INTRAVENOUS at 14:00

## 2017-07-09 RX ADMIN — CHLORPROMAZINE HYDROCHLORIDE 25 MG: 25 TABLET, SUGAR COATED ORAL at 08:22

## 2017-07-09 RX ADMIN — HEPARIN SODIUM 5000 UNITS: 5000 INJECTION, SOLUTION INTRAVENOUS; SUBCUTANEOUS at 12:52

## 2017-07-09 NOTE — PROGRESS NOTES
Hospitalist Progress Note    Patient: Lindsey Flowers MRN: [id number]  CSN: [id number]    YOB: 1955  Age: 58 y.o. Sex: male    DOA: 7/6/2017 LOS:  LOS: 3 days          vomited when MRI attempted yesterday so not done. He agrees to try again with dilaudid. Assessment/Plan     1. Infected wound of the right foot which is new, as well as wounds to the bilateral legs, venous stasis ulcers. Podiatry following, MRI recommended. 2. Iron deficiency anemia  3. nonambulatory for the past 3 years and spends many days with his legs dependent sitting in his wheelchair  4. concern of poor living arrangement and APS has been involved. Per chart and patient, he lives with his brother. His son works, but stop in to help. 5. Arterial duplex 12/2016  Right leg : Mild <50% stenosis of the common femoral, profunda femoris, superficial femoral, popliteal, anterior tibial and posterior tibial arteries. Mild <50% stenosis of the common femoral, profunda femoris, superficial femoral, popliteal, anterior tibial and posterior tibial  Arteries. 6. Infestation, maggots at presentation. 7. Hypertension - home meds resumed  8. Wheelchair bound at baseline. 9. DNR. No durable DNR on file. brother who lives with the patient. Son also assists w/ care but works full time. Additional Notes:      Case discussed with:  [x]Patient  []Family  []Nursing  []Case Management  DVT Prophylaxis:  []Lovenox  [x]Hep SQ  []SCDs  []Coumadin   []On Heparin gtt    Vital signs/Intake and Output:  Visit Vitals    /89 (BP 1 Location: Right arm, BP Patient Position: At rest)    Pulse (!) 102    Temp 97 °F (36.1 °C)    Resp 18    Ht 5' 11\" (1.803 m)    Wt 72.6 kg (160 lb)    SpO2 96%    BMI 22.32 kg/m2     Current Shift:  07/09 0701 - 07/09 1900  In: 240 [P.O.:240]  Out: -   Last three shifts:  07/07 1901 - 07/09 0700  In: -   Out: 150 [Urine:150]    Drowsy, arousable, oriented x2  Ncat.  Poor dentition  RRR  cta b.l  Soft nt nd nabs  Trace edema b.l. Deep ulcer dorsal right forefoot, pus in base. Medications Reviewed      Labs: Results:       Chemistry Recent Labs      07/09/17 0224 07/06/17   1445   GLU  84  86   NA  141  140   K  4.0  3.7   CL  108  106   CO2  26  31   BUN  10  8   CREA  0.99  0.84   CA  8.7  9.2   AGAP  7  3   BUCR  10*  10*   AP   --   71   TP   --   8.1   ALB   --   3.1*   GLOB   --   5.0*   AGRAT   --   0.6*      CBC w/Diff Recent Labs      07/09/17 0224 07/06/17   1445   WBC  5.1  6.0   RBC  4.28*  4.50*   HGB  11.5*  12.0*   HCT  34.4*  35.9*   PLT  240  296   GRANS  64  64   LYMPH  20*  25   EOS  6*  3      Cardiac Enzymes No results for input(s): CPK, CKND1, BARRINGTON in the last 72 hours. No lab exists for component: CKRMB, TROIP   Coagulation No results for input(s): PTP, INR, APTT in the last 72 hours. No lab exists for component: INREXT, INREXT    Lipid Panel Lab Results   Component Value Date/Time    Cholesterol, total 107 12/20/2011 04:00 AM    HDL Cholesterol 39 12/20/2011 04:00 AM    LDL, calculated 54.4 12/20/2011 04:00 AM    VLDL, calculated 13.6 12/20/2011 04:00 AM    Triglyceride 68 12/20/2011 04:00 AM    CHOL/HDL Ratio 2.7 12/20/2011 04:00 AM      BNP No results for input(s): BNPP in the last 72 hours.    Liver Enzymes Recent Labs      07/06/17   1445   TP  8.1   ALB  3.1*   AP  71   SGOT  7*      Thyroid Studies Lab Results   Component Value Date/Time    TSH 0.80 07/02/2016 10:56 AM        Procedures/imaging: see electronic medical records for all procedures/Xrays and details which were not copied into this note but were reviewed prior to creation of Plan

## 2017-07-09 NOTE — PROGRESS NOTES
Hospitalist Progress Note    Patient: Eric Perez MRN: [id number]  CSN: [id number]    YOB: 1955  Age: 58 y.o. Sex: male    DOA: 7/6/2017 LOS:  LOS: 2 days          MRI not done as patient declined to lie flat. At my interview, he agrees to MRI if given ativan. Assessment/Plan     1. Infected wound of the right foot which is new, as well as wounds to the bilateral legs, venous stasis ulcers. Podiatry following, MRI recommended. 2. Anemia  hc - check studies. 3. nonambulatory for the past 3 years and spends many days with his legs dependent sitting in his wheelchair  4. concern of poor living arrangement and APS has been involved. Per chart and patient, he lives with his brother. His son works, but stop in to help. 5. Arterial duplex 12/2016  Right leg : Mild <50% stenosis of the common femoral, profunda femoris, superficial femoral, popliteal, anterior tibial and posterior tibial arteries. Mild <50% stenosis of the common femoral, profunda femoris, superficial femoral, popliteal, anterior tibial and posterior tibial  Arteries. 6. Infestation, maggots at presentation. 7. Hypertension - home meds resumed  8. Wheelchair bound at baseline. 9. DNR. No durable DNR on file. I discussed the case with Dr. Augie Scheuermann. brother who lives with the patient. Son also assists w/ care but works full time. Additional Notes:      Case discussed with:  [x]Patient  []Family  []Nursing  []Case Management  DVT Prophylaxis:  []Lovenox  [x]Hep SQ  []SCDs  []Coumadin   []On Heparin gtt    Vital signs/Intake and Output:  Visit Vitals    /83 (BP 1 Location: Left arm, BP Patient Position: At rest)    Pulse 89    Temp 97.3 °F (36.3 °C)    Resp 18    Ht 5' 11\" (1.803 m)    Wt 72.6 kg (160 lb)    SpO2 96%    BMI 22.32 kg/m2     Current Shift:     Last three shifts:       Drowsy, arousable, oriented x2  Ncat. Poor dentition  RRR  cta b.l  Soft nt nd nabs  Trace edema b.l.  Deep ulcer dorsal right forefoot, pus in base. Medications Reviewed      Labs: Results:       Chemistry Recent Labs      07/06/17   1445   GLU  86   NA  140   K  3.7   CL  106   CO2  31   BUN  8   CREA  0.84   CA  9.2   AGAP  3   BUCR  10*   AP  71   TP  8.1   ALB  3.1*   GLOB  5.0*   AGRAT  0.6*      CBC w/Diff Recent Labs      07/06/17   1445   WBC  6.0   RBC  4.50*   HGB  12.0*   HCT  35.9*   PLT  296   GRANS  64   LYMPH  25   EOS  3      Cardiac Enzymes No results for input(s): CPK, CKND1, BARRINGTON in the last 72 hours. No lab exists for component: CKRMB, TROIP   Coagulation No results for input(s): PTP, INR, APTT in the last 72 hours. No lab exists for component: INREXT, INREXT    Lipid Panel Lab Results   Component Value Date/Time    Cholesterol, total 107 12/20/2011 04:00 AM    HDL Cholesterol 39 12/20/2011 04:00 AM    LDL, calculated 54.4 12/20/2011 04:00 AM    VLDL, calculated 13.6 12/20/2011 04:00 AM    Triglyceride 68 12/20/2011 04:00 AM    CHOL/HDL Ratio 2.7 12/20/2011 04:00 AM      BNP No results for input(s): BNPP in the last 72 hours.    Liver Enzymes Recent Labs      07/06/17   1445   TP  8.1   ALB  3.1*   AP  71   SGOT  7*      Thyroid Studies Lab Results   Component Value Date/Time    TSH 0.80 07/02/2016 10:56 AM        Procedures/imaging: see electronic medical records for all procedures/Xrays and details which were not copied into this note but were reviewed prior to creation of Plan

## 2017-07-09 NOTE — ROUTINE PROCESS
Bedside and Verbal shift change report given to Chris Contreras RN (oncoming nurse) by Raj Galarza RN (offgoing nurse). Report included the following information SBAR, Kardex, MAR and Recent Results.     SITUATION:    Code Status: DNR  Reason for Admission: Right foot infection  Infected ulcer of skin ([facility]  Bilateral leg and foot pain  Foot ulcer ([facility]  Cellulitis   Intractable hiccups    [facility] day: 3   Problem List:       Hospital Problems  Date Reviewed: 7/7/2017          Codes Class Noted POA    Foot ulcer ([facility] ICD-10-CM: L97.509  ICD-9-CM: 707.15  7/7/2017 Unknown        Right foot infection ICD-10-CM: L08.9  ICD-9-CM: 686.9  7/6/2017 Unknown        Bilateral leg and foot pain ICD-10-CM: M79.604, M79.605, M79.671, M79.672  ICD-9-CM: 729.5  7/6/2017 Unknown        Infected ulcer of skin ([facility] ICD-10-CM: L98.499, L08.9  ICD-9-CM: 707.9, 686.9  7/6/2017 Unknown        Cellulitis ICD-10-CM: L03.90  ICD-9-CM: 682.9  12/20/2016 Unknown        Intractable hiccups (Chronic) ICD-10-CM: R06.6  ICD-9-CM: 786.8  9/2/2012 Unknown              BACKGROUND:    Past Medical History:   Past Medical History:   Diagnosis Date    Hiccups     Hypertension     Hyponatremia     Lacunar infarction ([facility] 2010    Lower extremity edema     Psychogenic polydipsia     Spinal stenosis          Patient taking anticoagulants yes     ASSESSMENT:    Changes in Assessment Throughout Shift: no     Patient has Central Line: no Reasons if yes:      Patient has Reid Cath: no Reasons if yes:       Last Vitals:     Vitals:    07/08/17 1147 07/08/17 1554 07/08/17 2042 07/08/17 2346   BP: 158/84 126/86 133/83 147/90   Pulse: 78 94 89 89   Resp: 16 16 18 19   Temp: 97.5 °F (36.4 °C) 96.8 °F (36 °C) 97.3 °F (36.3 °C) 97 °F (36.1 °C)   TempSrc:       SpO2: 96% 98% 96% 100%   Weight:       Height:            IV and DRAINS (will only show if present)   Peripheral IV 07/06/17 Right Wrist-Site Assessment: Clean, dry, & intact     WOUND (if present)   Wound Type: infected foot ulcer   Dressing present Dressing Present : Yes   Wound Concerns/Notes:  Yes, sepsis     PAIN    Pain Assessment    Pain Intensity 1: 0 (07/09/17 0000)    Pain Location 1: Leg    Pain Intervention(s) 1: Medication (see MAR)    Patient Stated Pain Goal: 0  o Interventions for Pain:  None. No C/O pain  o Intervention effective: no  o Time of last intervention:  N/A  o Reassessment Completed: yes      Last 3 Weights:  Last 3 Recorded Weights in this Encounter    07/06/17 1354   Weight: 72.6 kg (160 lb)     Weight change:      INTAKE/OUPUT    Current Shift:      Last three shifts:       LAB RESULTS     Recent Labs      07/06/17   1445   WBC  6.0   HGB  12.0*   HCT  35.9*   PLT  296        Recent Labs      07/06/17   1445   NA  140   K  3.7   GLU  86   BUN  8   CREA  0.84   CA  9.2       RECOMMENDATIONS AND DISCHARGE PLANNING     1. Pending tests/procedures/ Plan of Care or Other Needs: Wound consult     2. Discharge plan for patient and Needs/Barriers: TBD    3. Estimated Discharge Date: 8/7 Posted on Whiteboard in Patients Room: YES      4. The patient's care plan was reviewed with the oncoming nurse. \"HEALS\" SAFETY CHECK      Fall Risk    Total Score: 4    Safety Measures: Safety Measures: Bed/Chair-Wheels locked, Bed in low position, Call light within reach, Fall prevention (comment)    A safety check occurred in the patient's room between off going nurse and oncoming nurse listed above.     The safety check included the below items  Area Items   H  High Alert Medications - Verify all high alert medication drips (heparin, PCA, etc.)   E  Equipment - Suction is set up for ALL patients (with yanker)  - Red plugs utilized for all equipment (IV pumps, etc.)  - WOWs wiped down at end of shift.  - Room stocked with oxygen, suction, and other unit-specific supplies   A  Alarms - Bed alarm is set for fall risk patients  - Ensure chair alarm is in place and activated if patient is up in a chair   L  Lines - Check IV for any infiltration  - Reid bag is empty if patient has a Reid   - Tubing and IV bags are labeled   S  Safety   - Room is clean, patient is clean, and equipment is clean. - Hallways are clear from equipment besides carts. - Fall bracelet on for fall risk patients  - Ensure room is clear and free of clutter  - Suction is set up for ALL patients (with yanker)  - Hallways are clear from equipment besides carts.    - Isolation precautions followed, supplies available outside room, sign posted     Terrence Woods RN

## 2017-07-10 ENCOUNTER — HOME CARE VISIT (OUTPATIENT)
Dept: HOME HEALTH SERVICES | Facility: HOME HEALTH | Age: 62
End: 2017-07-10
Payer: COMMERCIAL

## 2017-07-10 ENCOUNTER — ANESTHESIA EVENT (OUTPATIENT)
Dept: SURGERY | Age: 62
DRG: 982 | End: 2017-07-10
Payer: COMMERCIAL

## 2017-07-10 LAB
ANION GAP BLD CALC-SCNC: 6 MMOL/L (ref 3–18)
BACTERIA SPEC CULT: ABNORMAL
BASOPHILS # BLD AUTO: 0 K/UL (ref 0–0.1)
BASOPHILS # BLD: 0 % (ref 0–2)
BUN SERPL-MCNC: 10 MG/DL (ref 7–18)
BUN/CREAT SERPL: 11 (ref 12–20)
CALCIUM SERPL-MCNC: 8.8 MG/DL (ref 8.5–10.1)
CHLORIDE SERPL-SCNC: 107 MMOL/L (ref 100–108)
CO2 SERPL-SCNC: 26 MMOL/L (ref 21–32)
CREAT SERPL-MCNC: 0.91 MG/DL (ref 0.6–1.3)
DIFFERENTIAL METHOD BLD: ABNORMAL
EOSINOPHIL # BLD: 0.3 K/UL (ref 0–0.4)
EOSINOPHIL NFR BLD: 4 % (ref 0–5)
ERYTHROCYTE [DISTWIDTH] IN BLOOD BY AUTOMATED COUNT: 14.1 % (ref 11.6–14.5)
GLUCOSE SERPL-MCNC: 86 MG/DL (ref 74–99)
GRAM STN SPEC: ABNORMAL
HCT VFR BLD AUTO: 32.7 % (ref 36–48)
HGB BLD-MCNC: 10.8 G/DL (ref 13–16)
INR PPP: 1.2 (ref 0.8–1.2)
LYMPHOCYTES # BLD AUTO: 16 % (ref 21–52)
LYMPHOCYTES # BLD: 1.2 K/UL (ref 0.9–3.6)
MCH RBC QN AUTO: 26.5 PG (ref 24–34)
MCHC RBC AUTO-ENTMCNC: 33 G/DL (ref 31–37)
MCV RBC AUTO: 80.1 FL (ref 74–97)
MONOCYTES # BLD: 0.5 K/UL (ref 0.05–1.2)
MONOCYTES NFR BLD AUTO: 7 % (ref 3–10)
NEUTS SEG # BLD: 5.5 K/UL (ref 1.8–8)
NEUTS SEG NFR BLD AUTO: 73 % (ref 40–73)
PLATELET # BLD AUTO: 253 K/UL (ref 135–420)
PMV BLD AUTO: 10.7 FL (ref 9.2–11.8)
POTASSIUM SERPL-SCNC: 4 MMOL/L (ref 3.5–5.5)
PROTHROMBIN TIME: 14.3 SEC (ref 11.5–15.2)
RBC # BLD AUTO: 4.08 M/UL (ref 4.7–5.5)
SERVICE CMNT-IMP: ABNORMAL
SODIUM SERPL-SCNC: 139 MMOL/L (ref 136–145)
WBC # BLD AUTO: 7.4 K/UL (ref 4.6–13.2)

## 2017-07-10 PROCEDURE — 74011250637 HC RX REV CODE- 250/637: Performed by: INTERNAL MEDICINE

## 2017-07-10 PROCEDURE — 85025 COMPLETE CBC W/AUTO DIFF WBC: CPT | Performed by: HOSPITALIST

## 2017-07-10 PROCEDURE — 74011000258 HC RX REV CODE- 258: Performed by: INTERNAL MEDICINE

## 2017-07-10 PROCEDURE — 36415 COLL VENOUS BLD VENIPUNCTURE: CPT | Performed by: HOSPITALIST

## 2017-07-10 PROCEDURE — 85610 PROTHROMBIN TIME: CPT | Performed by: HOSPITALIST

## 2017-07-10 PROCEDURE — 74011250636 HC RX REV CODE- 250/636: Performed by: INTERNAL MEDICINE

## 2017-07-10 PROCEDURE — 80048 BASIC METABOLIC PNL TOTAL CA: CPT | Performed by: HOSPITALIST

## 2017-07-10 PROCEDURE — 65270000029 HC RM PRIVATE

## 2017-07-10 RX ORDER — LANOLIN ALCOHOL/MO/W.PET/CERES
1 CREAM (GRAM) TOPICAL
Status: DISCONTINUED | OUTPATIENT
Start: 2017-07-11 | End: 2017-07-19 | Stop reason: HOSPADM

## 2017-07-10 RX ORDER — ASCORBIC ACID 250 MG
250 TABLET ORAL DAILY
Status: DISCONTINUED | OUTPATIENT
Start: 2017-07-11 | End: 2017-07-19 | Stop reason: HOSPADM

## 2017-07-10 RX ADMIN — AMLODIPINE BESYLATE 10 MG: 10 TABLET ORAL at 08:49

## 2017-07-10 RX ADMIN — CHLORPROMAZINE HYDROCHLORIDE 25 MG: 25 TABLET, SUGAR COATED ORAL at 22:51

## 2017-07-10 RX ADMIN — PIPERACILLIN SODIUM,TAZOBACTAM SODIUM 3.38 G: 3; .375 INJECTION, POWDER, FOR SOLUTION INTRAVENOUS at 01:29

## 2017-07-10 RX ADMIN — TAMSULOSIN HYDROCHLORIDE 0.4 MG: 0.4 CAPSULE ORAL at 08:48

## 2017-07-10 RX ADMIN — CHLORPROMAZINE HYDROCHLORIDE 25 MG: 25 TABLET, SUGAR COATED ORAL at 08:48

## 2017-07-10 RX ADMIN — PIPERACILLIN SODIUM,TAZOBACTAM SODIUM 3.38 G: 3; .375 INJECTION, POWDER, FOR SOLUTION INTRAVENOUS at 14:43

## 2017-07-10 RX ADMIN — PIPERACILLIN SODIUM,TAZOBACTAM SODIUM 3.38 G: 3; .375 INJECTION, POWDER, FOR SOLUTION INTRAVENOUS at 08:53

## 2017-07-10 RX ADMIN — ASPIRIN 81 MG 81 MG: 81 TABLET ORAL at 08:49

## 2017-07-10 RX ADMIN — PIPERACILLIN SODIUM,TAZOBACTAM SODIUM 3.38 G: 3; .375 INJECTION, POWDER, FOR SOLUTION INTRAVENOUS at 22:51

## 2017-07-10 RX ADMIN — HEPARIN SODIUM 5000 UNITS: 5000 INJECTION, SOLUTION INTRAVENOUS; SUBCUTANEOUS at 10:44

## 2017-07-10 RX ADMIN — CHLORPROMAZINE HYDROCHLORIDE 25 MG: 25 TABLET, SUGAR COATED ORAL at 17:45

## 2017-07-10 RX ADMIN — THERA TABS 1 TABLET: TAB at 08:49

## 2017-07-10 RX ADMIN — HEPARIN SODIUM 5000 UNITS: 5000 INJECTION, SOLUTION INTRAVENOUS; SUBCUTANEOUS at 01:33

## 2017-07-10 RX ADMIN — CYANOCOBALAMIN TAB 1000 MCG 1000 MCG: 1000 TAB at 08:49

## 2017-07-10 RX ADMIN — ATORVASTATIN CALCIUM 20 MG: 20 TABLET, FILM COATED ORAL at 22:51

## 2017-07-10 RX ADMIN — FUROSEMIDE 40 MG: 40 TABLET ORAL at 08:48

## 2017-07-10 RX ADMIN — CHLORPROMAZINE HYDROCHLORIDE 25 MG: 25 TABLET, SUGAR COATED ORAL at 12:05

## 2017-07-10 RX ADMIN — HEPARIN SODIUM 5000 UNITS: 5000 INJECTION, SOLUTION INTRAVENOUS; SUBCUTANEOUS at 17:45

## 2017-07-10 NOTE — ROUTINE PROCESS
Bedside and Verbal shift change report given to Alma Oliver (oncoming nurse) by Cee aLy RN (offgoing nurse). Report included the following information SBAR, Kardex, MAR and Recent Results.     SITUATION:    Code Status: DNR  Reason for Admission: Right foot infection  Infected ulcer of skin ([facility]  Bilateral leg and foot pain  Foot ulcer ([facility] [address]  Cellulitis  Intractable hiccups   DX    [facility] day: 4   Problem List:       Hospital Problems  Date Reviewed: 7/7/2017          Codes Class Noted POA    Foot ulcer ([facility] [facility] ICD-10-CM: L97.509  ICD-9-CM: 707.15  7/7/2017 Unknown        Right foot infection ICD-10-CM: L08.9  ICD-9-CM: 686.9  7/6/2017 Unknown        Bilateral leg and foot pain ICD-10-CM: M79.604, M79.605, M79.671, M79.672  ICD-9-CM: 729.5  7/6/2017 Unknown        Infected ulcer of skin ([facility] ICD-10-CM: L98.499, L08.9  ICD-9-CM: 707.9, 686.9  7/6/2017 Unknown        Cellulitis ICD-10-CM: L03.90  ICD-9-CM: 682.9  12/20/2016 Unknown        Intractable hiccups (Chronic) ICD-10-CM: R06.6  ICD-9-CM: 786.8  9/2/2012 Unknown              BACKGROUND:    Past Medical History:   Past Medical History:   Diagnosis Date    Hiccups     Hypertension     Hyponatremia     Lacunar infarction ([facility] [address] [facility] 2010    Lower extremity edema     Psychogenic polydipsia     Spinal stenosis          Patient taking anticoagulants yes     ASSESSMENT:    Changes in Assessment Throughout Shift: no     Patient has Central Line: no Reasons if yes:    Patient has Reid Cath: no Reasons if yes:       Last Vitals:     Vitals:    07/10/17 0436 07/10/17 0848 07/10/17 1126 07/10/17 1709   BP: 128/80 126/81 108/66 111/71   Pulse: 76 88 92 80   Resp: 18 18 18 20   Temp: 96.6 °F (35.9 °C) 97.3 °F (36.3 °C) 98 °F (36.7 °C) 100 °F (37.8 °C)   TempSrc:       SpO2: 99% 98% 98% 98%   Weight:       Height:            IV and DRAINS (will only show if present)   Peripheral IV 07/06/17 Right Wrist-Site Assessment: Clean, dry, & intact     WOUND (if present)   Wound Type:  peripheral   Dressing present Dressing Present : Yes   Wound Concerns/Notes:    PAIN    Pain Assessment    Pain Intensity 1: 0 (07/10/17 1816)    Pain Location 1: Abdomen (nausea)    Pain Intervention(s) 1: Medication (see MAR)    Patient Stated Pain Goal: 0  o Interventions for Pain:  NO  o Intervention effective: pt. c/o no pain  o Time of last intervention:  n/a  o Reassessment Completed: yes      Last 3 Weights:  Last 3 Recorded Weights in this Encounter    07/06/17 1354   Weight: 72.6 kg (160 lb)     Weight change:      INTAKE/OUPUT    Current Shift: 07/10 0701 - 07/10 1900  In: 1180 [P.O.:1080; I.V.:100]  Out: 700 [Urine:700]    Last three shifts: 07/08 1901 - 07/10 0700  In: 240 [P.O.:240]  Out: 600 [Urine:600]     LAB RESULTS     Recent Labs      07/10/17   0248  07/09/17   0224   WBC  7.4  5.1   HGB  10.8*  11.5*   HCT  32.7*  34.4*   PLT  253  240        Recent Labs      07/10/17   0248  07/09/17   0224   NA  139  141   K  4.0  4.0   GLU  86  84   BUN  10  10   CREA  0.91  0.99   CA  8.8  8.7   MG   --   2.2   INR  1.2   --        RECOMMENDATIONS AND DISCHARGE PLANNING     1. Pending tests/procedures/ Plan of Care or Other Needs: mri results     2. Discharge plan for patient and Needs/Barriers: pending mri results    3. Estimated Discharge Date: 7/12/17 Posted on Whiteboard in [facility]: yes      4. The patient's care plan was reviewed with the oncoming nurse. \"HEALS\" SAFETY CHECK      Fall Risk    Total Score: 4    Safety Measures: Safety Measures: Bed/Chair-Wheels locked, Bed in low position, Call light within reach    A safety check occurred in the patient's room between off going nurse and oncoming nurse listed above.     The safety check included the below items  Area Items   H  High Alert Medications - Verify all high alert medication drips (heparin, PCA, etc.)   E  Equipment - Suction is set up for ALL patients (with erika)  - Red plugs utilized for all equipment (IV pumps, etc.)  - WOWs wiped down at end of shift.  - Room stocked with oxygen, suction, and other unit-specific supplies   A  Alarms - Bed alarm is set for fall risk patients  - Ensure chair alarm is in place and activated if patient is up in a chair   L  Lines - Check IV for any infiltration  - Reid bag is empty if patient has a Reid   - Tubing and IV bags are labeled   S  Safety   - Room is clean, patient is clean, and equipment is clean. - Hallways are clear from equipment besides carts. - Fall bracelet on for fall risk patients  - Ensure room is clear and free of clutter  - Suction is set up for ALL patients (with yanker)  - Hallways are clear from equipment besides carts.    - Isolation precautions followed, supplies available outside room, sign posted     Monalisa Dakin, RN

## 2017-07-10 NOTE — PROGRESS NOTES
Hospitalist Progress Note    Patient: Eric Perez MRN: [id number]  CSN: [id number]    YOB: 1955  Age: 58 y.o. Sex: male    DOA: 7/6/2017 LOS:  LOS: 4 days          MRI done, results pending. He ate 100% of breakfast tray. Hiccoughs are overall better controlled than in the past.     Assessment/Plan     1. Infected wound of the right foot which is new, as well as wounds to the bilateral legs, venous stasis ulcers. Follow MRI results and Podiatry recs. 2. Iron deficiency anemia - supplement. 3. nonambulatory for the past 3 years and spends many days with his legs dependent sitting in his wheelchair  4. concern of poor living arrangement and APS has been involved. Per chart and patient, he lives with his brother. His son works, but stops in to help. 5. Arterial duplex 12/2016  Right leg : Mild <50% stenosis of the common femoral, profunda femoris, superficial femoral, popliteal, anterior tibial and posterior tibial arteries. Mild <50% stenosis of the common femoral, profunda femoris, superficial femoral, popliteal, anterior tibial and posterior tibial  Arteries. 6. Infestation, maggots at presentation. 7. Hypertension controlled on home meds   8. Wheelchair bound at baseline. 9. DNR. No durable DNR on file. brother who lives with the patient. Son also assists w/ care but works full time. Additional Notes:      Case discussed with:  [x]Patient  []Family  [x]Nursing  [x]Case Management  DVT Prophylaxis:  []Lovenox  [x]Hep SQ  []SCDs  []Coumadin   []On Heparin gtt    Vital signs/Intake and Output:  Visit Vitals    /81    Pulse 88    Temp 97.3 °F (36.3 °C)    Resp 18    Ht 5' 11\" (1.803 m)    Wt 72.6 kg (160 lb)    SpO2 98%    BMI 22.32 kg/m2     Current Shift:     Last three shifts:  07/08 1901 - 07/10 0700  In: 240 [P.O.:240]  Out: 600 [Urine:600]    Drowsy, arousable, oriented x2. Empty breakfast tray at bedside. Ncat.  Poor dentition  RRR  cta b.l  Soft nt nd nabs  Trace edema b.l. Deep ulcer dorsal right forefoot, pus in base. Medications Reviewed      Labs: Results:       Chemistry Recent Labs      07/10/17   0248  07/09/17   0224   GLU  86  84   NA  139  141   K  4.0  4.0   CL  107  108   CO2  26  26   BUN  10  10   CREA  0.91  0.99   CA  8.8  8.7   AGAP  6  7   BUCR  11*  10*      CBC w/Diff Recent Labs      07/10/17   0248  07/09/17   0224   WBC  7.4  5.1   RBC  4.08*  4.28*   HGB  10.8*  11.5*   HCT  32.7*  34.4*   PLT  253  240   GRANS  73  64   LYMPH  16*  20*   EOS  4  6*      Cardiac Enzymes No results for input(s): CPK, CKND1, BARRINGTON in the last 72 hours. No lab exists for component: CKRMB, TROIP   Coagulation Recent Labs      07/10/17   0248   PTP  14.3   INR  1.2       Lipid Panel Lab Results   Component Value Date/Time    Cholesterol, total 107 12/20/2011 04:00 AM    HDL Cholesterol 39 12/20/2011 04:00 AM    LDL, calculated 54.4 12/20/2011 04:00 AM    VLDL, calculated 13.6 12/20/2011 04:00 AM    Triglyceride 68 12/20/2011 04:00 AM    CHOL/HDL Ratio 2.7 12/20/2011 04:00 AM      BNP No results for input(s): BNPP in the last 72 hours. Liver Enzymes No results for input(s): TP, ALB, TBIL, AP, SGOT, GPT in the last 72 hours.     No lab exists for component: DBIL   Thyroid Studies Lab Results   Component Value Date/Time    TSH 0.80 07/02/2016 10:56 AM        Procedures/imaging: see electronic medical records for all procedures/Xrays and details which were not copied into this note but were reviewed prior to creation of Plan

## 2017-07-10 NOTE — ROUTINE PROCESS
Bedside and Verbal shift change report given to amalia rn (oncoming nurse) by Reanna Price RN (offgoing nurse). Report included the following information SBAR, Kardex, MAR and Recent Results.     SITUATION:    Code Status: DNR   Reason for Admission: Right foot infection   Infected ulcer of skin ([address]   Bilateral leg and foot pain   Foot ulcer ([address]   Cellulitis   Intractable hiccups    [facility] day: 3   Problem List:       Hospital Problems  Date Reviewed: 7/7/2017          Codes Class Noted POA    Foot ulcer ([facility] [address] ICD-10-CM: L97.509  ICD-9-CM: 707.15  7/7/2017 Unknown        Right foot infection ICD-10-CM: L08.9  ICD-9-CM: 686.9  7/6/2017 Unknown        Bilateral leg and foot pain ICD-10-CM: M79.604, M79.605, M79.671, M79.672  ICD-9-CM: 729.5  7/6/2017 Unknown        Infected ulcer of skin ([facility] [address] ICD-10-CM: L98.499, L08.9  ICD-9-CM: 707.9, 686.9  7/6/2017 Unknown        Cellulitis ICD-10-CM: L03.90  ICD-9-CM: 682.9  12/20/2016 Unknown        Intractable hiccups (Chronic) ICD-10-CM: R06.6  ICD-9-CM: 786.8  9/2/2012 Unknown              BACKGROUND:    Past Medical History:   Past Medical History:   Diagnosis Date    Hiccups     Hypertension     Hyponatremia     Lacunar infarction ([facility] [address] 2010    Lower extremity edema     Psychogenic polydipsia     Spinal stenosis          Patient taking anticoagulants yes     ASSESSMENT:    Changes in Assessment Throughout Shift: no     Patient has Central Line: no Reasons if yes:    Patient has Reid Cath: no Reasons if yes:       Last Vitals:     Vitals:    07/09/17 0500 07/09/17 0821 07/09/17 1123 07/09/17 1533   BP: 122/77 (!) 151/93 158/89 147/85   Pulse: 80 83 (!) 102 97   Resp: 18 18 18 18   Temp: 97.6 °F (36.4 °C) 96.9 °F (36.1 °C) 97 °F (36.1 °C) 97 °F (36.1 °C)   TempSrc:       SpO2: 99% 98% 96% 93%   Weight:       Height:            IV and DRAINS (will only show if present)   Peripheral IV 07/06/17 Right Wrist-Site Assessment: Clean, dry, & intact     WOUND (if present)   Wound Type:  peripheral   Dressing present Dressing Present : Yes   Wound Concerns/Notes: pending mri   PAIN    Pain Assessment    Pain Intensity 1: 8 (07/09/17 1811)    Pain Location 1: Abdomen (nausea)    Pain Intervention(s) 1: Medication (see MAR)    Patient Stated Pain Goal: 0  o Interventions for Pain:  yes  o Intervention effective: yes  o Time of last intervention:    o Reassessment Completed: yes      Last 3 Weights:  Last 3 Recorded Weights in this Encounter    07/06/17 1354   Weight: 72.6 kg (160 lb)     Weight change:      INTAKE/OUPUT    Current Shift:      Last three shifts: 07/08 0701 - 07/09 1900  In: 240 [P.O.:240]  Out: 150 [Urine:150]     LAB RESULTS     Recent Labs      07/09/17 0224   WBC  5.1   HGB  11.5*   HCT  34.4*   PLT  240        Recent Labs      07/09/17 0224   NA  141   K  4.0   GLU  84   BUN  10   CREA  0.99   CA  8.7   MG  2.2       RECOMMENDATIONS AND DISCHARGE PLANNING     1. Pending tests/procedures/ Plan of Care or Other Needs: mri results     2. Discharge plan for patient and Needs/Barriers: pending mri results    3. Estimated Discharge Date: 7/12/17 Posted on Whiteboard in \[facility]\"": yes      4. The patient's care plan was reviewed with the oncoming nurse. \"HEALS\" SAFETY CHECK      Fall Risk    Total Score: 4    Safety Measures: Safety Measures: Bed/Chair-Wheels locked, Bed in low position, Call light within reach, Fall prevention (comment)    A safety check occurred in the patient's room between off going nurse and oncoming nurse listed above.     The safety check included the below items  Area Items   H  High Alert Medications - Verify all high alert medication drips (heparin, PCA, etc.)   E  Equipment - Suction is set up for ALL patients (with yanker)  - Red plugs utilized for all equipment (IV pumps, etc.)  - WOWs wiped down at end of shift.  - Room stocked with oxygen, suction, and other unit-specific supplies   A  Alarms - Bed alarm is set for fall risk patients  - Ensure chair alarm is in place and activated if patient is up in a chair   L  Lines - Check IV for any infiltration  - Reid bag is empty if patient has a Reid   - Tubing and IV bags are labeled   S  Safety   - Room is clean, patient is clean, and equipment is clean. - Hallways are clear from equipment besides carts. - Fall bracelet on for fall risk patients  - Ensure room is clear and free of clutter  - Suction is set up for ALL patients (with yanker)  - Hallways are clear from equipment besides carts.    - Isolation precautions followed, supplies available outside room, sign posted     Daryn Lawson RN

## 2017-07-10 NOTE — PROGRESS NOTES
Care Management Interventions  PCP Verified by CM: Yes (DR. Ismael Amaro)  Palliative Care Consult (Criteria: CHF and RRAT>21): No  Reason for No Palliative Care Consult: Other (see comment) (NO ORDER)  Mode of Transport at Discharge:  Other (see comment) (FAMILY WILL TRANSPORT)  Transition of Care Consult (CM Consult): Discharge Planning  Current Support Network: Own Home, Lives Alone, Family Lives Nearby  Confirm Follow Up Transport: Self  Plan discussed with Pt/Family/Caregiver: Yes (PT LIVES AT HOME ALONE, [facility])  Discharge Location  Discharge Placement: Home with home health

## 2017-07-10 NOTE — ROUTINE PROCESS
Bedside and Verbal shift change report given to JOSEFA Hughes (oncoming nurse) by Nazia Wilde RN (offgoing nurse). Report included the following information SBAR, Kardex, MAR and Recent Results.     SITUATION:    Code Status: DNR  Reason for Admission: Right foot infection  Infected ulcer of skin ([facility] [address]  Bilateral leg and foot pain  Foot ulcer ([facility] [address]  Cellulitis   Intractable hiccups    [facility] day: 4   Problem List:       Hospital Problems  Date Reviewed: 7/7/2017          Codes Class Noted POA    Foot ulcer ([facility] [address] ICD-10-CM: L97.509  ICD-9-CM: 707.15  7/7/2017 Unknown        Right foot infection ICD-10-CM: L08.9  ICD-9-CM: 686.9  7/6/2017 Unknown        Bilateral leg and foot pain ICD-10-CM: M79.604, M79.605, M79.671, M79.672  ICD-9-CM: 729.5  7/6/2017 Unknown        Infected ulcer of skin ([facility] [address] ICD-10-CM: L98.499, L08.9  ICD-9-CM: 707.9, 686.9  7/6/2017 Unknown        Cellulitis ICD-10-CM: L03.90  ICD-9-CM: 682.9  12/20/2016 Unknown        Intractable hiccups (Chronic) ICD-10-CM: R06.6  ICD-9-CM: 786.8  9/2/2012 Unknown              BACKGROUND:    Past Medical History:   Past Medical History:   Diagnosis Date    Hiccups     Hypertension     Hyponatremia     Lacunar infarction ([facility] [address] 2010    Lower extremity edema     Psychogenic polydipsia     Spinal stenosis          Patient taking anticoagulants yes     ASSESSMENT:    Changes in Assessment Throughout Shift: no     Patient has Central Line: no Reasons if yes:    Patient has Reid Cath: no Reasons if yes:       Last Vitals:     Vitals:    07/09/17 1533 07/09/17 2049 07/10/17 0020 07/10/17 0436   BP: 147/85 112/65 115/62 128/80   Pulse: 97 98 90 76   Resp: 18 18 18 18   Temp: 97 °F (36.1 °C) 98.7 °F (37.1 °C) 98 °F (36.7 °C) 96.6 °F (35.9 °C)   TempSrc:       SpO2: 93% 98% 98% 99%   Weight:       Height:            IV and DRAINS (will only show if present)   Peripheral IV 07/06/17 Right Wrist-Site Assessment: Clean, dry, & intact     WOUND (if present)   Wound Type:  peripheral   Dressing present Dressing Present : Yes   Wound Concerns/Notes:    PAIN    Pain Assessment    Pain Intensity 1: 0 (07/10/17 0400)    Pain Location 1: Abdomen (nausea)    Pain Intervention(s) 1: Medication (see MAR)    Patient Stated Pain Goal: 0  o Interventions for Pain:  NO  o Intervention effective: pt. c/o no pain  o Time of last intervention:  n/a  o Reassessment Completed: yes      Last 3 Weights:  Last 3 Recorded Weights in this Encounter    07/06/17 1354   Weight: 72.6 kg (160 lb)     Weight change:      INTAKE/OUPUT    Current Shift: 07/09 1901 - 07/10 0700  In: -   Out: 450 [Urine:450]    Last three shifts: 07/08 0701 - 07/09 1900  In: 240 [P.O.:240]  Out: 150 [Urine:150]     LAB RESULTS     Recent Labs      07/10/17   0248  07/09/17   0224   WBC  7.4  5.1   HGB  10.8*  11.5*   HCT  32.7*  34.4*   PLT  253  240        Recent Labs      07/10/17   0248  07/09/17   0224   NA  139  141   K  4.0  4.0   GLU  86  84   BUN  10  10   CREA  0.91  0.99   CA  8.8  8.7   MG   --   2.2   INR  1.2   --        RECOMMENDATIONS AND DISCHARGE PLANNING     1. Pending tests/procedures/ Plan of Care or Other Needs: mri results     2. Discharge plan for patient and Needs/Barriers: pending mri results    3. Estimated Discharge Date: 7/12/17 Posted on Whiteboard in [facility]: yes      4. The patient's care plan was reviewed with the oncoming nurse. \"HEALS\" SAFETY CHECK      Fall Risk    Total Score: 4    Safety Measures: Safety Measures: Bed/Chair-Wheels locked, Bed in low position, Call light within reach, Fall prevention (comment)    A safety check occurred in the patient's room between off going nurse and oncoming nurse listed above.     The safety check included the below items  Area Items   H  High Alert Medications - Verify all high alert medication drips (heparin, PCA, etc.)   E  Equipment - Suction is set up for ALL patients (with yanker)  - Red plugs utilized for all equipment (IV pumps, etc.)  - WOWs wiped down at end of shift.  - Room stocked with oxygen, suction, and other unit-specific supplies   A  Alarms - Bed alarm is set for fall risk patients  - Ensure chair alarm is in place and activated if patient is up in a chair   L  Lines - Check IV for any infiltration  - Reid bag is empty if patient has a Reid   - Tubing and IV bags are labeled   S  Safety   - Room is clean, patient is clean, and equipment is clean. - Hallways are clear from equipment besides carts. - Fall bracelet on for fall risk patients  - Ensure room is clear and free of clutter  - Suction is set up for ALL patients (with yanker)  - Hallways are clear from equipment besides carts.    - Isolation precautions followed, supplies available outside room, sign posted     Andrew Mendoza RN

## 2017-07-10 NOTE — ROUTINE PROCESS
Received patient lying in bed awake and orient x 3. Patient denies pain at this time. Respiration even and unlabored. mepilex to right leg and left foot intact with small amount of drainage. No odor. Informed patient nothing to eat or drink after midnight fr surgery tomorrow. Voice understanding. No apparent distress noted.

## 2017-07-11 ENCOUNTER — ANESTHESIA (OUTPATIENT)
Dept: SURGERY | Age: 62
DRG: 982 | End: 2017-07-11
Payer: COMMERCIAL

## 2017-07-11 VITALS
HEART RATE: 89 BPM | SYSTOLIC BLOOD PRESSURE: 130 MMHG | OXYGEN SATURATION: 98 % | RESPIRATION RATE: 16 BRPM | DIASTOLIC BLOOD PRESSURE: 88 MMHG

## 2017-07-11 LAB
ANION GAP BLD CALC-SCNC: 6 MMOL/L (ref 3–18)
BASOPHILS # BLD AUTO: 0 K/UL (ref 0–0.1)
BASOPHILS # BLD: 0 % (ref 0–2)
BUN SERPL-MCNC: 20 MG/DL (ref 7–18)
BUN/CREAT SERPL: 19 (ref 12–20)
CALCIUM SERPL-MCNC: 8.9 MG/DL (ref 8.5–10.1)
CHLORIDE SERPL-SCNC: 105 MMOL/L (ref 100–108)
CO2 SERPL-SCNC: 27 MMOL/L (ref 21–32)
CREAT SERPL-MCNC: 1.06 MG/DL (ref 0.6–1.3)
DIFFERENTIAL METHOD BLD: ABNORMAL
EOSINOPHIL # BLD: 0.4 K/UL (ref 0–0.4)
EOSINOPHIL NFR BLD: 8 % (ref 0–5)
ERYTHROCYTE [DISTWIDTH] IN BLOOD BY AUTOMATED COUNT: 14.3 % (ref 11.6–14.5)
GLUCOSE SERPL-MCNC: 109 MG/DL (ref 74–99)
HCT VFR BLD AUTO: 32.5 % (ref 36–48)
HGB BLD-MCNC: 10.8 G/DL (ref 13–16)
LYMPHOCYTES # BLD AUTO: 14 % (ref 21–52)
LYMPHOCYTES # BLD: 0.7 K/UL (ref 0.9–3.6)
MCH RBC QN AUTO: 26.7 PG (ref 24–34)
MCHC RBC AUTO-ENTMCNC: 33.2 G/DL (ref 31–37)
MCV RBC AUTO: 80.2 FL (ref 74–97)
MONOCYTES # BLD: 0.6 K/UL (ref 0.05–1.2)
MONOCYTES NFR BLD AUTO: 12 % (ref 3–10)
NEUTS SEG # BLD: 3.2 K/UL (ref 1.8–8)
NEUTS SEG NFR BLD AUTO: 66 % (ref 40–73)
PLATELET # BLD AUTO: 255 K/UL (ref 135–420)
PMV BLD AUTO: 10.8 FL (ref 9.2–11.8)
POTASSIUM SERPL-SCNC: 4.5 MMOL/L (ref 3.5–5.5)
RBC # BLD AUTO: 4.05 M/UL (ref 4.7–5.5)
SODIUM SERPL-SCNC: 138 MMOL/L (ref 136–145)
WBC # BLD AUTO: 4.9 K/UL (ref 4.6–13.2)

## 2017-07-11 PROCEDURE — 74011250637 HC RX REV CODE- 250/637: Performed by: INTERNAL MEDICINE

## 2017-07-11 PROCEDURE — 74011250636 HC RX REV CODE- 250/636: Performed by: NURSE ANESTHETIST, CERTIFIED REGISTERED

## 2017-07-11 PROCEDURE — 77030032490 HC SLV COMPR SCD KNE COVD -B: Performed by: PODIATRIST

## 2017-07-11 PROCEDURE — 77030011640 HC PAD GRND REM COVD -A: Performed by: PODIATRIST

## 2017-07-11 PROCEDURE — 74011250637 HC RX REV CODE- 250/637: Performed by: NURSE ANESTHETIST, CERTIFIED REGISTERED

## 2017-07-11 PROCEDURE — 76060000031 HC ANESTHESIA FIRST 0.5 HR: Performed by: PODIATRIST

## 2017-07-11 PROCEDURE — 36415 COLL VENOUS BLD VENIPUNCTURE: CPT | Performed by: HOSPITALIST

## 2017-07-11 PROCEDURE — 74011250636 HC RX REV CODE- 250/636

## 2017-07-11 PROCEDURE — 87075 CULTR BACTERIA EXCEPT BLOOD: CPT | Performed by: PODIATRIST

## 2017-07-11 PROCEDURE — 74011250636 HC RX REV CODE- 250/636: Performed by: INTERNAL MEDICINE

## 2017-07-11 PROCEDURE — 74011000250 HC RX REV CODE- 250

## 2017-07-11 PROCEDURE — 76010000154 HC OR TIME FIRST 0.5 HR: Performed by: PODIATRIST

## 2017-07-11 PROCEDURE — 74011000258 HC RX REV CODE- 258: Performed by: INTERNAL MEDICINE

## 2017-07-11 PROCEDURE — 76210000006 HC OR PH I REC 0.5 TO 1 HR: Performed by: PODIATRIST

## 2017-07-11 PROCEDURE — 77030018836 HC SOL IRR NACL ICUM -A: Performed by: PODIATRIST

## 2017-07-11 PROCEDURE — 77030020782 HC GWN BAIR PAWS FLX 3M -B: Performed by: PODIATRIST

## 2017-07-11 PROCEDURE — 74011250637 HC RX REV CODE- 250/637: Performed by: HOSPITALIST

## 2017-07-11 PROCEDURE — 85025 COMPLETE CBC W/AUTO DIFF WBC: CPT | Performed by: HOSPITALIST

## 2017-07-11 PROCEDURE — 0LBV0ZZ EXCISION OF RIGHT FOOT TENDON, OPEN APPROACH: ICD-10-PCS | Performed by: PODIATRIST

## 2017-07-11 PROCEDURE — 65270000029 HC RM PRIVATE

## 2017-07-11 PROCEDURE — 74011000250 HC RX REV CODE- 250: Performed by: PODIATRIST

## 2017-07-11 PROCEDURE — 87077 CULTURE AEROBIC IDENTIFY: CPT | Performed by: PODIATRIST

## 2017-07-11 PROCEDURE — 87186 SC STD MICRODIL/AGAR DIL: CPT | Performed by: PODIATRIST

## 2017-07-11 PROCEDURE — 80048 BASIC METABOLIC PNL TOTAL CA: CPT | Performed by: HOSPITALIST

## 2017-07-11 PROCEDURE — 87070 CULTURE OTHR SPECIMN AEROBIC: CPT | Performed by: PODIATRIST

## 2017-07-11 RX ORDER — AMOXICILLIN 250 MG
2 CAPSULE ORAL
Status: DISCONTINUED | OUTPATIENT
Start: 2017-07-11 | End: 2017-07-19 | Stop reason: HOSPADM

## 2017-07-11 RX ORDER — PROPOFOL 10 MG/ML
INJECTION, EMULSION INTRAVENOUS AS NEEDED
Status: DISCONTINUED | OUTPATIENT
Start: 2017-07-11 | End: 2017-07-11 | Stop reason: HOSPADM

## 2017-07-11 RX ORDER — SODIUM CHLORIDE, SODIUM LACTATE, POTASSIUM CHLORIDE, CALCIUM CHLORIDE 600; 310; 30; 20 MG/100ML; MG/100ML; MG/100ML; MG/100ML
75 INJECTION, SOLUTION INTRAVENOUS CONTINUOUS
Status: DISCONTINUED | OUTPATIENT
Start: 2017-07-11 | End: 2017-07-11 | Stop reason: HOSPADM

## 2017-07-11 RX ORDER — FENTANYL CITRATE 50 UG/ML
50 INJECTION, SOLUTION INTRAMUSCULAR; INTRAVENOUS AS NEEDED
Status: DISCONTINUED | OUTPATIENT
Start: 2017-07-11 | End: 2017-07-11 | Stop reason: HOSPADM

## 2017-07-11 RX ORDER — MIDAZOLAM HYDROCHLORIDE 1 MG/ML
INJECTION, SOLUTION INTRAMUSCULAR; INTRAVENOUS AS NEEDED
Status: DISCONTINUED | OUTPATIENT
Start: 2017-07-11 | End: 2017-07-11 | Stop reason: HOSPADM

## 2017-07-11 RX ORDER — SODIUM CHLORIDE 0.9 % (FLUSH) 0.9 %
5-10 SYRINGE (ML) INJECTION AS NEEDED
Status: DISCONTINUED | OUTPATIENT
Start: 2017-07-11 | End: 2017-07-11 | Stop reason: HOSPADM

## 2017-07-11 RX ORDER — POLYETHYLENE GLYCOL 3350 17 G/17G
17 POWDER, FOR SOLUTION ORAL DAILY
Status: DISCONTINUED | OUTPATIENT
Start: 2017-07-12 | End: 2017-07-19 | Stop reason: HOSPADM

## 2017-07-11 RX ORDER — HYDROCODONE BITARTRATE AND ACETAMINOPHEN 5; 325 MG/1; MG/1
1 TABLET ORAL ONCE
Status: DISCONTINUED | OUTPATIENT
Start: 2017-07-11 | End: 2017-07-11 | Stop reason: HOSPADM

## 2017-07-11 RX ORDER — DIPHENHYDRAMINE HYDROCHLORIDE 50 MG/ML
25 INJECTION, SOLUTION INTRAMUSCULAR; INTRAVENOUS
Status: DISCONTINUED | OUTPATIENT
Start: 2017-07-11 | End: 2017-07-11 | Stop reason: HOSPADM

## 2017-07-11 RX ORDER — FAMOTIDINE 20 MG/1
20 TABLET, FILM COATED ORAL ONCE
Status: COMPLETED | OUTPATIENT
Start: 2017-07-11 | End: 2017-07-11

## 2017-07-11 RX ORDER — SODIUM CHLORIDE 0.9 % (FLUSH) 0.9 %
5-10 SYRINGE (ML) INJECTION EVERY 8 HOURS
Status: DISCONTINUED | OUTPATIENT
Start: 2017-07-11 | End: 2017-07-11 | Stop reason: HOSPADM

## 2017-07-11 RX ORDER — LIDOCAINE HYDROCHLORIDE 20 MG/ML
INJECTION, SOLUTION EPIDURAL; INFILTRATION; INTRACAUDAL; PERINEURAL AS NEEDED
Status: DISCONTINUED | OUTPATIENT
Start: 2017-07-11 | End: 2017-07-11 | Stop reason: HOSPADM

## 2017-07-11 RX ADMIN — CHLORPROMAZINE HYDROCHLORIDE 25 MG: 25 TABLET, SUGAR COATED ORAL at 09:25

## 2017-07-11 RX ADMIN — PROPOFOL 30 MG: 10 INJECTION, EMULSION INTRAVENOUS at 17:30

## 2017-07-11 RX ADMIN — PIPERACILLIN SODIUM,TAZOBACTAM SODIUM 3.38 G: 3; .375 INJECTION, POWDER, FOR SOLUTION INTRAVENOUS at 02:38

## 2017-07-11 RX ADMIN — PIPERACILLIN SODIUM,TAZOBACTAM SODIUM 3.38 G: 3; .375 INJECTION, POWDER, FOR SOLUTION INTRAVENOUS at 09:25

## 2017-07-11 RX ADMIN — FENTANYL CITRATE 50 MCG: 50 INJECTION INTRAMUSCULAR; INTRAVENOUS at 18:08

## 2017-07-11 RX ADMIN — CYANOCOBALAMIN TAB 1000 MCG 1000 MCG: 1000 TAB at 09:24

## 2017-07-11 RX ADMIN — OXYCODONE AND ACETAMINOPHEN 1 TABLET: 5; 325 TABLET ORAL at 02:13

## 2017-07-11 RX ADMIN — CHLORPROMAZINE HYDROCHLORIDE 25 MG: 25 TABLET, SUGAR COATED ORAL at 21:55

## 2017-07-11 RX ADMIN — HEPARIN SODIUM 5000 UNITS: 5000 INJECTION, SOLUTION INTRAVENOUS; SUBCUTANEOUS at 02:38

## 2017-07-11 RX ADMIN — FAMOTIDINE 20 MG: 20 TABLET ORAL at 12:34

## 2017-07-11 RX ADMIN — TAMSULOSIN HYDROCHLORIDE 0.4 MG: 0.4 CAPSULE ORAL at 09:24

## 2017-07-11 RX ADMIN — Medication 325 MG: at 09:24

## 2017-07-11 RX ADMIN — CHLORPROMAZINE HYDROCHLORIDE 25 MG: 25 TABLET, SUGAR COATED ORAL at 12:34

## 2017-07-11 RX ADMIN — LIDOCAINE HYDROCHLORIDE 40 MG: 20 INJECTION, SOLUTION EPIDURAL; INFILTRATION; INTRACAUDAL; PERINEURAL at 17:30

## 2017-07-11 RX ADMIN — Medication 250 MG: at 09:24

## 2017-07-11 RX ADMIN — AMLODIPINE BESYLATE 10 MG: 10 TABLET ORAL at 09:24

## 2017-07-11 RX ADMIN — ONDANSETRON 4 MG: 2 INJECTION INTRAMUSCULAR; INTRAVENOUS at 02:13

## 2017-07-11 RX ADMIN — PIPERACILLIN SODIUM,TAZOBACTAM SODIUM 3.38 G: 3; .375 INJECTION, POWDER, FOR SOLUTION INTRAVENOUS at 20:08

## 2017-07-11 RX ADMIN — SODIUM CHLORIDE, SODIUM LACTATE, POTASSIUM CHLORIDE, AND CALCIUM CHLORIDE 75 ML/HR: 600; 310; 30; 20 INJECTION, SOLUTION INTRAVENOUS at 12:41

## 2017-07-11 RX ADMIN — ONDANSETRON 4 MG: 2 INJECTION INTRAMUSCULAR; INTRAVENOUS at 09:32

## 2017-07-11 RX ADMIN — ATORVASTATIN CALCIUM 20 MG: 20 TABLET, FILM COATED ORAL at 21:50

## 2017-07-11 RX ADMIN — OXYCODONE AND ACETAMINOPHEN 1 TABLET: 5; 325 TABLET ORAL at 21:49

## 2017-07-11 RX ADMIN — Medication 2 TABLET: at 21:49

## 2017-07-11 RX ADMIN — OXYCODONE AND ACETAMINOPHEN 1 TABLET: 5; 325 TABLET ORAL at 09:24

## 2017-07-11 RX ADMIN — Medication 10 ML: at 14:36

## 2017-07-11 RX ADMIN — THERA TABS 1 TABLET: TAB at 09:25

## 2017-07-11 RX ADMIN — PIPERACILLIN SODIUM,TAZOBACTAM SODIUM 3.38 G: 3; .375 INJECTION, POWDER, FOR SOLUTION INTRAVENOUS at 14:41

## 2017-07-11 RX ADMIN — FENTANYL CITRATE 50 MCG: 50 INJECTION INTRAMUSCULAR; INTRAVENOUS at 18:17

## 2017-07-11 RX ADMIN — MIDAZOLAM HYDROCHLORIDE 2 MG: 1 INJECTION, SOLUTION INTRAMUSCULAR; INTRAVENOUS at 17:28

## 2017-07-11 RX ADMIN — FUROSEMIDE 40 MG: 40 TABLET ORAL at 09:25

## 2017-07-11 NOTE — PERIOP NOTES
TRANSFER - OUT REPORT:    Verbal report given to Mert on Mahi Ramos  being transferred to  for routine post - op       Report consisted of patients Situation, Background, Assessment and   Recommendations(SBAR). Information from the following report(s) SBAR and MAR was reviewed with the receiving nurse. Lines:   Peripheral IV 07/06/17 Right Wrist (Active)   Site Assessment Clean, dry, & intact 7/11/2017  5:50 PM   Phlebitis Assessment 0 7/11/2017  5:50 PM   Infiltration Assessment 0 7/11/2017  5:50 PM   Dressing Status Clean, dry, & intact 7/11/2017  5:50 PM   Dressing Type Tape;Transparent 7/11/2017  5:50 PM   Hub Color/Line Status Pink; Infusing 7/11/2017  5:50 PM   Alcohol Cap Used No 7/9/2017  6:11 PM        Opportunity for questions and clarification was provided.       Patient transported with:   Registered Nurse

## 2017-07-11 NOTE — ROUTINE PROCESS
Bedside and Verbal shift change report given to 132Larisa Betty Tierney (oncoming nurse) by Madelyn Batista RN (offgoing nurse). Report included the following information SBAR, Kardex, MAR and Recent Results.     SITUATION:    Code Status: DNR   Reason for Admission: Right foot infection   Infected [facility] of [facility] ([facility]   Bilateral leg and foot pain   Foot ulcer ([facility] [facility]   Cellulitis   Intractable hiccups   DX    [facility] day: 5   Problem List:       Hospital Problems  Date Reviewed: 7/11/2017          Codes Class Noted POA    Foot ulcer ([facility] [address] ICD-10-CM: L97.509  ICD-9-CM: 707.15  7/7/2017 Unknown        Right foot infection ICD-10-CM: L08.9  ICD-9-CM: 686.9  7/6/2017 Unknown        Bilateral leg and foot pain ICD-10-CM: M79.604, M79.605, M79.671, M79.672  ICD-9-CM: 729.5  7/6/2017 Unknown        Infected ulcer of skin ([facility] [address] [facility] ICD-10-CM: L98.499, L08.9  ICD-9-CM: 707.9, 686.9  7/6/2017 Unknown        Cellulitis ICD-10-CM: L03.90  ICD-9-CM: 682.9  12/20/2016 Unknown        Intractable hiccups (Chronic) ICD-10-CM: R06.6  ICD-9-CM: 786.8  9/2/2012 Unknown              BACKGROUND:    Past Medical History:   Past Medical History:   Diagnosis Date    Hiccups     Hypertension     Hyponatremia     Lacunar infarction ([facility] [address] [facility] 2010    Lower extremity edema     Psychogenic polydipsia     Spinal stenosis          Patient taking anticoagulants yes     ASSESSMENT:    Changes in Assessment Throughout Shift: S/P DEBRIDMENT     Patient has Central Line: no Reasons if yes:    Patient has Reid Cath: no Reasons if yes:       Last Vitals:     Vitals:    07/11/17 1810 07/11/17 1817 07/11/17 1820 07/11/17 1823   BP: 127/70  119/66    Pulse:  94 93 89   Resp:  18 14 12   Temp:       TempSrc:       SpO2: 99% 99% 97% 97%   Weight:       Height:            IV and DRAINS (will only show if present)   Peripheral IV 07/06/17 Right Wrist-Site Assessment: Clean, dry, & intact     WOUND (if present)   Wound Type:  YES   Dressing present Dressing Present : Yes   Wound Concerns/Notes:       PAIN    Pain Assessment    Pain Intensity 1: 0 (07/11/17 1934)    Pain Location 1: Foot    Pain Intervention(s) 1: Medication (see MAR)    Patient Stated Pain Goal: 0  o Interventions for Pain:  YES  o Intervention effective: yes  o Time of last intervention:    o Reassessment Completed: yes      Last 3 Weights:  Last 3 Recorded Weights in this Encounter    07/06/17 1354   Weight: 72.6 kg (160 lb)     Weight change:      INTAKE/OUPUT    Current Shift:      Last three shifts: 07/10 0701 - 07/11 1900  In: 1580 [P.O.:1080; I.V.:500]  Out: 1850 [Urine:1850]     LAB RESULTS     Recent Labs      07/11/17   0326  07/10/17   0248  07/09/17   0224   WBC  4.9  7.4  5.1   HGB  10.8*  10.8*  11.5*   HCT  32.5*  32.7*  34.4*   PLT  255  253  240        Recent Labs      07/11/17   0326  07/10/17   0248  07/09/17   0224   NA  138  139  141   K  4.5  4.0  4.0   GLU  109*  86  84   BUN  20*  10  10   CREA  1.06  0.91  0.99   CA  8.9  8.8  8.7   MG   --    --   2.2   INR   --   1.2   --        RECOMMENDATIONS AND DISCHARGE PLANNING     1. Pending tests/procedures/ Plan of Care or Other Needs: WOUND CARE     2. Discharge plan for patient and Needs/Barriers: [address] [place] [address]    3. Estimated Discharge Date: 7/14/17 Posted on Whiteboard in \[facility]\"": yes      4. The patient's care plan was reviewed with the oncoming nurse. \"HEALS\" SAFETY CHECK      Fall Risk    Total Score: 3    Safety Measures: Safety Measures: Bed/Chair-Wheels locked    A safety check occurred in the patient's room between off going nurse and oncoming nurse listed above.     The safety check included the below items  Area Items   H  High Alert Medications - Verify all high alert medication drips (heparin, PCA, etc.)   E  Equipment - Suction is set up for ALL patients (with yanker)  - Red plugs utilized for all equipment (IV pumps, etc.)  - WOWs wiped down at end of shift.  - Room stocked with oxygen, suction, and other unit-specific supplies   A  Alarms - Bed alarm is set for fall risk patients  - Ensure chair alarm is in place and activated if patient is up in a chair   L  Lines - Check IV for any infiltration  - Reid bag is empty if patient has a Reid   - Tubing and IV bags are labeled   S  Safety   - Room is clean, patient is clean, and equipment is clean. - Hallways are clear from equipment besides carts. - Fall bracelet on for fall risk patients  - Ensure room is clear and free of clutter  - Suction is set up for ALL patients (with ellaker)  - Hallways are clear from equipment besides carts.    - Isolation precautions followed, supplies available outside room, sign posted     Caleb Alamo RN

## 2017-07-11 NOTE — ROUTINE PROCESS
Received  Patient from pacu sleeping awakens to voice, alert oriented x3, no c/o pain bulkey dressing intact to right foot intact no bleeding.

## 2017-07-11 NOTE — CONSULTS
Infectious Disease Consultation Note    Requested by: dr. Jhon quiroz    Reason: infected right foot ulcer    Current abx Prior abx   Pip/tazo since 7/6 Vancomycin x1 on 7/6     Lines:       Assessment :    58 y.o. Male with a hx of HTN,  who presented to the ED on 7/6/17 for evaluation of an ulcer to the right dorsal foot that was noted by his son several days ago. Clinical picture consistent with right foot cellulitis, infected right foot ulcer secondary to Proteus, citrobacter, e.coli, mssa, e.fecalis, e.avium, strep viridans. No evidence of osteomyelitis clinically or per MRI. Plans for I&D noted    Recommendations:    1. Continue pip/tazo  2. I&D of the ulcer per podiatry  3. F/u intra op cultures, clinically      Thank you for consultation request. Above plan was discussed in details with patient, RN. Please call me if any further questions or concerns. Will continue to participate in the care of this patient. HPI:    58 y.o. Male with a hx of HTN,  who presented to the ED on 7/6/17 for evaluation of an ulcer to the right dorsal foot that was noted by his son several days ago. Patient states that he has had an ulcer on his right foot since about 2 years. Recently his son states that he noted maggots in the ulcer initially, so he brought the pt to Dr. Lyubov Akbar office. Jessica Cloud referred them to the [address] [place] [address] and Vascular. [facility] were seen [facility] where they cleaned and dressed the wound of the right foot as well as wounds to the bilateral legs, and referred the patient to Dr. Roel Giron podiatry office. They called the podiatry office and they were instructed to come to the ED for treatment of a possible foot infection before he could be seen by them for wound care. X ray in ed didn't reveal evidence of osteomyelitis. Dr. Renea Chapman was consulted. Mri right foot was ordered.  Mri 7/9/17 reported as Notable ulceration in the dorsum of the forefoot with adjacent collection of  fluid, possibly abscess. No compelling evidence for osteomyelitis. Wound cultures reveal mssa, citrobacter, proteus. Patient is planned to have I&d of the ulcer today. I have been consulted for further recommendations. Patient continues to have pain in his right foot. He denies fever, headaches, visual disturbances, sore throat, runny nose, earaches, cp, sob, chills, cough, abdominal pain, diarrhea, burning micturition, or weakness in extremities. He denies back pain/flank pain. He denies recent sick contacts. No h/o recent travel. No known h/o MRSA colonization or infection in the past.    He had arterial doppler bilateral LE in 12/2016 which didn't reveal significant peripheral arterial insufficiency. Past Medical History:   Diagnosis Date    Hiccups     Hypertension     Hyponatremia     Lacunar infarction ([facility] [address] 2010    Lower extremity edema     Psychogenic polydipsia     Spinal stenosis        Past Surgical History:   Procedure Laterality Date    HX HEENT      pt reports past hx of surgery on ears unsure of what type       Home Medication List    Details   !! baclofen (LIORESAL) 20 mg tablet       !! chlorproMAZINE (THORAZINE) 25 mg tablet       tamsulosin (FLOMAX) 0.4 mg capsule Take 0.4 mg by mouth daily. 1 tab daily      furosemide (LASIX) 40 mg tablet Take 40 mg by mouth daily. !! atorvastatin (LIPITOR) 20 mg tablet Take 20 mg by mouth daily. !! chlorproMAZINE (THORAZINE) 50 mg tablet Take 1 Tab by mouth three (3) times daily as needed. Indications: INTRACTABLE HICCUPS  Qty: 9 Tab, Refills: 0      MULTIVITAMIN,THERAPEUTIC (THERA MULTI-VITAMIN PO) Take 500 mg by mouth daily. LORazepam (ATIVAN) 0.5 mg tablet Take 1-2 Tabs by mouth every eight (8) hours as needed for Anxiety (or panic attacks). Max Daily Amount: 3 mg. Qty: 40 Tab, Refills: 0      oxyCODONE-acetaminophen (PERCOCET) 5-325 mg per tablet Take 1 Tab by mouth every four (4) hours as needed.  Max Daily Amount: 6 Tabs.  Qty: 40 Tab, Refills: 0      !! baclofen (LIORESAL) 10 mg tablet Take 1 Tab by mouth every twelve (12) hours as needed. Qty: 15 Tab, Refills: 0      amLODIPine (NORVASC) 10 mg tablet Take 1 Tab by mouth daily. Qty: 30 Tab, Refills: 2      metoprolol (LOPRESSOR) 25 mg tablet Take 1 Tab by mouth two (2) times a day. Qty: 60 Tab, Refills: 2      cyanocobalamin 1,000 mcg tablet Take 1 Tab by mouth daily. Qty: 30 Tab, Refills: 2      folic acid (FOLVITE) 1 mg tablet Take 1 Tab by mouth daily. Qty: 30 Tab, Refills: 1      !! atorvastatin (LIPITOR) 20 mg tablet 1 Tab by Per G Tube route nightly. Qty: 30 Tab, Refills: 0      aspirin 81 mg chewable tablet Take 1 Tab by mouth daily. Qty: 30 Tab, Refills: 0       !! - Potential duplicate medications found. Please discuss with provider.           Current Facility-Administered Medications   Medication Dose Route Frequency    ferrous sulfate tablet 325 mg  1 Tab Oral DAILY WITH BREAKFAST    ascorbic acid (vitamin C) (VITAMIN C) tablet 250 mg  250 mg Oral DAILY    ondansetron (ZOFRAN) injection 4 mg  4 mg IntraVENous Q4H PRN    heparin (porcine) injection 5,000 Units  5,000 Units SubCUTAneous Q8H    amLODIPine (NORVASC) tablet 10 mg  10 mg Oral DAILY    aspirin chewable tablet 81 mg  81 mg Oral DAILY    atorvastatin (LIPITOR) tablet 20 mg  20 mg Oral QHS    chlorproMAZINE (THORAZINE) tablet 25 mg  25 mg Oral QID    cyanocobalamin tablet 1,000 mcg  1,000 mcg Oral DAILY    furosemide (LASIX) tablet 40 mg  40 mg Oral DAILY    LORazepam (ATIVAN) tablet 0.5-1 mg  0.5-1 mg Oral Q8H PRN    therapeutic multivitamin (THERAGRAN) tablet 1 Tab  1 Tab Oral DAILY    tamsulosin (FLOMAX) capsule 0.4 mg  0.4 mg Oral DAILY    piperacillin-tazobactam (ZOSYN) 3.375 g in 0.9% sodium chloride (MBP/ADV) 100 mL MBP  3.375 g IntraVENous Q6H    oxyCODONE-acetaminophen (PERCOCET) 5-325 mg per tablet 1 Tab  1 Tab Oral Q6H PRN    acetaminophen (TYLENOL) tablet 500 mg  500 mg Oral Q6H PRN       Allergies: Review of patient's allergies indicates no known allergies. Family History   Problem Relation Age of Onset    Hypertension Other      Social History     Social History    Marital status:      Spouse name: N/A    Number of children: N/A    Years of education: N/A     Occupational History    Not on file. Social History Main Topics    Smoking status: Former Smoker     Packs/day: 0.50     Quit date: 2008    Smokeless tobacco: Never Used    Alcohol use No    Drug use: No    Sexual activity: Not Currently     Other Topics Concern    Not on file     Social History Narrative     History   Smoking Status    Former Smoker    Packs/day: 0.50    Quit date: 2008   Smokeless Tobacco    Never Used        Temp (24hrs), Av.4 °F (36.9 °C), Min:97.3 °F (36.3 °C), Max:100 °F (37.8 °C)    Visit Vitals    /82 (BP 1 Location: Left arm, BP Patient Position: At rest)    Pulse 97    Temp 98.9 °F (37.2 °C)    Resp 18    Ht 5' 11\" (1.803 m)    Wt 72.6 kg (160 lb)    SpO2 98%    BMI 22.32 kg/m2       ROS: 12 point ROS obtained in details. Pertinent positives as mentioned in HPI,   otherwise negative    Physical Exam:    Constitutional: He is oriented to person, place, and time. He appears well-developed and well-nourished. No distress. HENT:   Head: Normocephalic and atraumatic. Mouth/Throat: Oropharynx is clear and moist.   Eyes: Conjunctivae are normal. Pupils are equal, round, and reactive to light. No scleral icterus. Neck: Normal range of motion. Neck supple. Cardiovascular: Normal rate and intact distal pulses. Pulmonary/Chest: Effort normal and breath sounds normal. No respiratory distress. He has no wheezes. Abdominal: Soft. Bowel sounds are normal. He exhibits no distension. There is no tenderness. Musculoskeletal: Normal range of motion.  He exhibits edema (pitting edema of the bilateral lower extremities.) and tenderness (bilateral calf tenderness, right greater than left). Lymphadenopathy:     He has no cervical adenopathy. Neurological: He is alert and oriented to person, place, and time. No cranial nerve deficit. Skin: Skin is warm and dry. He is not diaphoretic. Purulent drainage from the dorsal ulcer of the left foot, foul smelling    Labs: Results:   Chemistry Recent Labs      07/11/17   0326  07/10/17   0248  07/09/17   0224   GLU  109*  86  84   NA  138  139  141   K  4.5  4.0  4.0   CL  105  107  108   CO2  27  26  26   BUN  20*  10  10   CREA  1.06  0.91  0.99   CA  8.9  8.8  8.7   AGAP  6  6  7   BUCR  19  11*  10*      CBC w/Diff Recent Labs      07/11/17   0326  07/10/17   0248  07/09/17   0224   WBC  4.9  7.4  5.1   RBC  4.05*  4.08*  4.28*   HGB  10.8*  10.8*  11.5*   HCT  32.5*  32.7*  34.4*   PLT  255  253  240   GRANS  66  73  64   LYMPH  14*  16*  20*   EOS  8*  4  6*      Microbiology No results for input(s): CULT in the last 72 hours.        RADIOLOGY:    All available imaging studies/reports in [facility] for this admission were reviewed    Dr. Annalisa Eduardo, Infectious Disease Specialist  [phone]  July 11, 2017  9:13 AM

## 2017-07-11 NOTE — BRIEF OP NOTE
BRIEF OPERATIVE NOTE    Date of Procedure: 7/11/2017   Preoperative Diagnosis: DX  Postoperative Diagnosis: * No post-op diagnosis entered *    Procedure(s):  DEBRIDEMENT AND DRAINAGE RIGHT FOOT  Surgeon(s) and Role:     * Mihai Portillo DPM - Primary         Assistant Staff:       Surgical Staff:  Circ-1: Sonya Lopez  Scrub Tech-1: Iraida Nguyen  Surg Asst-1: Brandt Sandoval  Event Time In   Incision Start 1740   Incision Close      Anesthesia: MAC   Estimated Blood Loss: 0  Specimens: * No specimens in log *   Findings: abscess, necrosis right foot.     Complications: none   Implants: * No implants in log *

## 2017-07-11 NOTE — H&P
H&P Update:  Dennis Rice was seen and examined. History and physical has been reviewed. The patient has been examined.  There have been no significant clinical changes since the completion of the originally dated History and Physical.    Signed By: Campbell Phillip DPM     July 11, 2017 4:11 PM

## 2017-07-11 NOTE — ANESTHESIA PREPROCEDURE EVALUATION
Anesthetic History   No history of anesthetic complications            Review of Systems / Medical History  Patient summary reviewed and pertinent labs reviewed    Pulmonary  Within defined limits                 Neuro/Psych              Cardiovascular    Hypertension        Dysrhythmias            GI/Hepatic/Renal         Renal disease       Endo/Other             Other Findings   Comments:   Risk Factors for Postoperative nausea/vomiting:       History of postoperative nausea/vomiting? NO       Female? NO       Motion sickness? NO       Intended opioid administration for postoperative analgesia? YES      Smoking Abstinence  Current Smoker? NO  Elective Surgery? YES  Seen preoperatively by anesthesiologist or proxy prior to day of surgery? YES  Pt abstained from smoking 24 hours prior to anesthesia?  N/A           Physical Exam    Airway  Mallampati: III  TM Distance: 4 - 6 cm  Neck ROM: decreased range of motion   Mouth opening: Diminished (comment)     Cardiovascular    Rhythm: irregular  Rate: normal         Dental    Dentition: Poor dentition  Comments: Multiple missing teeth   Pulmonary      Decreased breath sounds: bilateral           Abdominal  GI exam deferred       Other Findings            Anesthetic Plan    ASA: 3  Anesthesia type: MAC            Anesthetic plan and risks discussed with: Son / Daughter

## 2017-07-11 NOTE — ROUTINE PROCESS
Patients son called at phone number provided, left phone number for son to call, phone full left sms notification sent.

## 2017-07-12 LAB
ANION GAP BLD CALC-SCNC: 7 MMOL/L (ref 3–18)
BACTERIA SPEC CULT: NORMAL
BACTERIA SPEC CULT: NORMAL
BASOPHILS # BLD AUTO: 0 K/UL (ref 0–0.1)
BASOPHILS # BLD: 1 % (ref 0–2)
BUN SERPL-MCNC: 11 MG/DL (ref 7–18)
BUN/CREAT SERPL: 12 (ref 12–20)
CALCIUM SERPL-MCNC: 8.6 MG/DL (ref 8.5–10.1)
CHLORIDE SERPL-SCNC: 104 MMOL/L (ref 100–108)
CO2 SERPL-SCNC: 28 MMOL/L (ref 21–32)
CREAT SERPL-MCNC: 0.9 MG/DL (ref 0.6–1.3)
DIFFERENTIAL METHOD BLD: ABNORMAL
EOSINOPHIL # BLD: 0.4 K/UL (ref 0–0.4)
EOSINOPHIL NFR BLD: 9 % (ref 0–5)
ERYTHROCYTE [DISTWIDTH] IN BLOOD BY AUTOMATED COUNT: 14.3 % (ref 11.6–14.5)
GLUCOSE SERPL-MCNC: 83 MG/DL (ref 74–99)
HCT VFR BLD AUTO: 33.2 % (ref 36–48)
HGB BLD-MCNC: 10.8 G/DL (ref 13–16)
LYMPHOCYTES # BLD AUTO: 22 % (ref 21–52)
LYMPHOCYTES # BLD: 1 K/UL (ref 0.9–3.6)
MCH RBC QN AUTO: 26.4 PG (ref 24–34)
MCHC RBC AUTO-ENTMCNC: 32.5 G/DL (ref 31–37)
MCV RBC AUTO: 81.2 FL (ref 74–97)
MONOCYTES # BLD: 0.3 K/UL (ref 0.05–1.2)
MONOCYTES NFR BLD AUTO: 7 % (ref 3–10)
NEUTS SEG # BLD: 2.9 K/UL (ref 1.8–8)
NEUTS SEG NFR BLD AUTO: 61 % (ref 40–73)
PLATELET # BLD AUTO: 199 K/UL (ref 135–420)
PMV BLD AUTO: 10 FL (ref 9.2–11.8)
POTASSIUM SERPL-SCNC: 3.7 MMOL/L (ref 3.5–5.5)
RBC # BLD AUTO: 4.09 M/UL (ref 4.7–5.5)
SERVICE CMNT-IMP: NORMAL
SERVICE CMNT-IMP: NORMAL
SODIUM SERPL-SCNC: 139 MMOL/L (ref 136–145)
WBC # BLD AUTO: 4.7 K/UL (ref 4.6–13.2)

## 2017-07-12 PROCEDURE — 36415 COLL VENOUS BLD VENIPUNCTURE: CPT | Performed by: HOSPITALIST

## 2017-07-12 PROCEDURE — 80048 BASIC METABOLIC PNL TOTAL CA: CPT | Performed by: HOSPITALIST

## 2017-07-12 PROCEDURE — 85025 COMPLETE CBC W/AUTO DIFF WBC: CPT | Performed by: HOSPITALIST

## 2017-07-12 PROCEDURE — 74011250637 HC RX REV CODE- 250/637: Performed by: HOSPITALIST

## 2017-07-12 PROCEDURE — 74011250636 HC RX REV CODE- 250/636: Performed by: INTERNAL MEDICINE

## 2017-07-12 PROCEDURE — 65270000029 HC RM PRIVATE

## 2017-07-12 PROCEDURE — 74011000258 HC RX REV CODE- 258: Performed by: INTERNAL MEDICINE

## 2017-07-12 PROCEDURE — 74011250637 HC RX REV CODE- 250/637: Performed by: INTERNAL MEDICINE

## 2017-07-12 RX ADMIN — PIPERACILLIN SODIUM,TAZOBACTAM SODIUM 3.38 G: 3; .375 INJECTION, POWDER, FOR SOLUTION INTRAVENOUS at 20:54

## 2017-07-12 RX ADMIN — AMLODIPINE BESYLATE 10 MG: 10 TABLET ORAL at 10:20

## 2017-07-12 RX ADMIN — CHLORPROMAZINE HYDROCHLORIDE 25 MG: 25 TABLET, SUGAR COATED ORAL at 12:28

## 2017-07-12 RX ADMIN — CYANOCOBALAMIN TAB 1000 MCG 1000 MCG: 1000 TAB at 10:20

## 2017-07-12 RX ADMIN — TAMSULOSIN HYDROCHLORIDE 0.4 MG: 0.4 CAPSULE ORAL at 11:50

## 2017-07-12 RX ADMIN — HEPARIN SODIUM 5000 UNITS: 5000 INJECTION, SOLUTION INTRAVENOUS; SUBCUTANEOUS at 01:07

## 2017-07-12 RX ADMIN — Medication 250 MG: at 10:20

## 2017-07-12 RX ADMIN — POLYETHYLENE GLYCOL 3350 17 G: 17 POWDER, FOR SOLUTION ORAL at 10:20

## 2017-07-12 RX ADMIN — THERA TABS 1 TABLET: TAB at 10:20

## 2017-07-12 RX ADMIN — PIPERACILLIN SODIUM,TAZOBACTAM SODIUM 3.38 G: 3; .375 INJECTION, POWDER, FOR SOLUTION INTRAVENOUS at 10:19

## 2017-07-12 RX ADMIN — ONDANSETRON 4 MG: 2 INJECTION INTRAMUSCULAR; INTRAVENOUS at 14:37

## 2017-07-12 RX ADMIN — FUROSEMIDE 40 MG: 40 TABLET ORAL at 10:20

## 2017-07-12 RX ADMIN — CHLORPROMAZINE HYDROCHLORIDE 25 MG: 25 TABLET, SUGAR COATED ORAL at 10:20

## 2017-07-12 RX ADMIN — ASPIRIN 81 MG 81 MG: 81 TABLET ORAL at 10:20

## 2017-07-12 RX ADMIN — OXYCODONE AND ACETAMINOPHEN 1 TABLET: 5; 325 TABLET ORAL at 16:34

## 2017-07-12 RX ADMIN — ATORVASTATIN CALCIUM 20 MG: 20 TABLET, FILM COATED ORAL at 21:00

## 2017-07-12 RX ADMIN — Medication 325 MG: at 10:20

## 2017-07-12 RX ADMIN — HEPARIN SODIUM 5000 UNITS: 5000 INJECTION, SOLUTION INTRAVENOUS; SUBCUTANEOUS at 10:20

## 2017-07-12 RX ADMIN — PIPERACILLIN SODIUM,TAZOBACTAM SODIUM 3.38 G: 3; .375 INJECTION, POWDER, FOR SOLUTION INTRAVENOUS at 13:19

## 2017-07-12 RX ADMIN — HEPARIN SODIUM 5000 UNITS: 5000 INJECTION, SOLUTION INTRAVENOUS; SUBCUTANEOUS at 17:29

## 2017-07-12 RX ADMIN — CHLORPROMAZINE HYDROCHLORIDE 25 MG: 25 TABLET, SUGAR COATED ORAL at 17:28

## 2017-07-12 RX ADMIN — PIPERACILLIN SODIUM,TAZOBACTAM SODIUM 3.38 G: 3; .375 INJECTION, POWDER, FOR SOLUTION INTRAVENOUS at 01:05

## 2017-07-12 RX ADMIN — Medication 2 TABLET: at 21:00

## 2017-07-12 RX ADMIN — CHLORPROMAZINE HYDROCHLORIDE 25 MG: 25 TABLET, SUGAR COATED ORAL at 21:00
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
## 2017-07-12 NOTE — OP NOTES
[address]    Name:  Arlen Ramachandran  MR#:  [id number]  :  1955  Account #:  [de-identified]  Date of Adm:  2017  Date of Surgery:  2017      PREOPERATIVE DIAGNOSIS:  Necrotic wound with abscess, right  dorsal forefoot. POSTOPERATIVE DIAGNOSIS:  Necrotic wound with abscess, right  dorsal forefoot. PROCEDURES PERFORMED:  Excisional drainage of infection  involving skin, subcutaneous tissue, muscle and tendon, drainage of  infection with wound packing, right foot. SURGEON: Tylor Holm DPM    ESTIMATED BLOOD LOSS: -    SPECIMENS REMOVED: -    ANESTHESIA:  MAC. PROCEDURE: On 2017, the patient was placed on the  operating table in the supine position. After adequate induction of MAC  anesthesia, right lower extremity was prepped and draped in the usual  sterile fashion. Attention was directed to the dorsum of the right foot. There was a necrotic wound approximately 2 x 2 cm with deep  drainage, about 2 cm penetration to the deep fascia over the bone. I  took deep cultures. I then debrided the wound edges sharply back to  healthy tissue and excised necrotic tissue from the base of the wound,  expressed localized pus. Post-debridement was 3 cm x 3 cm and 3 cm  depth. Thoroughly irrigated with antibiotic solution. Small bleeders were  bovied as necessary. There was adequate perfusion. Packed with  gauze packing and a soft dressing. Patient tolerated the procedure and  anesthesia well. Vital signs stable throughout. Patient was transported  to the recovery room in stable condition.         MICHELLE Pan / [place]  D:  2017   17:52  T:  2017   09:24  Job #:  [id number]

## 2017-07-12 NOTE — PROGRESS NOTES
Infectious Disease progress Note    Requested by: dr. Sherry quiroz    Reason: infected right foot ulcer    Current abx Prior abx   Pip/tazo since 7/6 Vancomycin x1 on 7/6     Lines:       Assessment :    58 y.o. Male with a hx of HTN,  who presented to the ED on 7/6/17 for evaluation of an ulcer to the right dorsal foot that was noted by his son several days ago. Clinical picture consistent with right foot cellulitis, infected right foot ulcer secondary to Proteus, citrobacter, e.coli, mssa, e.fecalis, e.avium, strep viridans. No evidence of osteomyelitis clinically or per MRI. S/p I&D of right foot ulcer on 7/11- intra op findings noted - no evidence of bone infection. Recommendations:    1. Continue pip/tazo  2. F/u intra op cultures  3.will switch to po antibiotics when discharge planned       Above plan was discussed in details with patient, RN. Please call me if any further questions or concerns. Will continue to participate in the care of this patient. subjective:    Patient denies headaches, visual disturbances, sore throat, runny nose, earaches, cp, sob, chills, cough, abdominal pain, diarrhea, burning micturition,  or weakness in extremities. He denies back pain/flank pain. Denies increased right foot pain. Home Medication List    Details   !! baclofen (LIORESAL) 20 mg tablet       !! chlorproMAZINE (THORAZINE) 25 mg tablet       tamsulosin (FLOMAX) 0.4 mg capsule Take 0.4 mg by mouth daily. 1 tab daily      furosemide (LASIX) 40 mg tablet Take 40 mg by mouth daily. !! atorvastatin (LIPITOR) 20 mg tablet Take 20 mg by mouth daily. !! chlorproMAZINE (THORAZINE) 50 mg tablet Take 1 Tab by mouth three (3) times daily as needed. Indications: INTRACTABLE HICCUPS  Qty: 9 Tab, Refills: 0      MULTIVITAMIN,THERAPEUTIC (THERA MULTI-VITAMIN PO) Take 500 mg by mouth daily.       LORazepam (ATIVAN) 0.5 mg tablet Take 1-2 Tabs by mouth every eight (8) hours as needed for Anxiety (or panic attacks). Max Daily Amount: 3 mg. Qty: 40 Tab, Refills: 0      oxyCODONE-acetaminophen (PERCOCET) 5-325 mg per tablet Take 1 Tab by mouth every four (4) hours as needed. Max Daily Amount: 6 Tabs. Qty: 40 Tab, Refills: 0      !! baclofen (LIORESAL) 10 mg tablet Take 1 Tab by mouth every twelve (12) hours as needed. Qty: 15 Tab, Refills: 0      amLODIPine (NORVASC) 10 mg tablet Take 1 Tab by mouth daily. Qty: 30 Tab, Refills: 2      metoprolol (LOPRESSOR) 25 mg tablet Take 1 Tab by mouth two (2) times a day. Qty: 60 Tab, Refills: 2      cyanocobalamin 1,000 mcg tablet Take 1 Tab by mouth daily. Qty: 30 Tab, Refills: 2      folic acid (FOLVITE) 1 mg tablet Take 1 Tab by mouth daily. Qty: 30 Tab, Refills: 1      !! atorvastatin (LIPITOR) 20 mg tablet 1 Tab by Per G Tube route nightly. Qty: 30 Tab, Refills: 0      aspirin 81 mg chewable tablet Take 1 Tab by mouth daily. Qty: 30 Tab, Refills: 0       !! - Potential duplicate medications found. Please discuss with provider.           Current Facility-Administered Medications   Medication Dose Route Frequency    senna-docusate (PERICOLACE) 8.6-50 mg per tablet 2 Tab  2 Tab Oral QHS    polyethylene glycol (MIRALAX) packet 17 g  17 g Oral DAILY    ferrous sulfate tablet 325 mg  1 Tab Oral DAILY WITH BREAKFAST    ascorbic acid (vitamin C) (VITAMIN C) tablet 250 mg  250 mg Oral DAILY    ondansetron (ZOFRAN) injection 4 mg  4 mg IntraVENous Q4H PRN    heparin (porcine) injection 5,000 Units  5,000 Units SubCUTAneous Q8H    amLODIPine (NORVASC) tablet 10 mg  10 mg Oral DAILY    aspirin chewable tablet 81 mg  81 mg Oral DAILY    atorvastatin (LIPITOR) tablet 20 mg  20 mg Oral QHS    chlorproMAZINE (THORAZINE) tablet 25 mg  25 mg Oral QID    cyanocobalamin tablet 1,000 mcg  1,000 mcg Oral DAILY    furosemide (LASIX) tablet 40 mg  40 mg Oral DAILY    LORazepam (ATIVAN) tablet 0.5-1 mg  0.5-1 mg Oral Q8H PRN    therapeutic multivitamin [facility]) tablet 1 Tab  1 Tab Oral DAILY    tamsulosin (FLOMAX) capsule 0.4 mg  0.4 mg Oral DAILY    piperacillin-tazobactam (ZOSYN) 3.375 g in 0.9% sodium chloride (MBP/ADV) 100 mL MBP  3.375 g IntraVENous Q6H    oxyCODONE-acetaminophen (PERCOCET) 5-325 mg per tablet 1 Tab  1 Tab Oral Q6H PRN    acetaminophen (TYLENOL) tablet 500 mg  500 mg Oral Q6H PRN       Allergies: Review of patient's allergies indicates no known allergies. Temp (24hrs), Av.1 °F (36.7 °C), Min:97.4 °F (36.3 °C), Max:98.8 °F (37.1 °C)    Visit Vitals    /72 (BP 1 Location: Right arm, BP Patient Position: At rest)    Pulse 79    Temp 97.6 °F (36.4 °C)    Resp 20    Ht 5' 11\" (1.803 m)    Wt 72.6 kg (160 lb)    SpO2 97%    BMI 22.32 kg/m2       ROS: 12 point ROS obtained in details. Pertinent positives as mentioned in HPI,   otherwise negative    Physical Exam:    Constitutional: He is oriented to person, place, and time. He appears well-developed and well-nourished. No distress. HENT:   Head: Normocephalic and atraumatic. Mouth/Throat: Oropharynx is clear and moist.   Eyes: Conjunctivae are normal. Pupils are equal, round, and reactive to light. No scleral icterus. Neck: Normal range of motion. Neck supple. Cardiovascular: Normal rate and intact distal pulses. Pulmonary/Chest: Effort normal and breath sounds normal. No respiratory distress. He has no wheezes. Abdominal: Soft. Bowel sounds are normal. He exhibits no distension. There is no tenderness. Musculoskeletal: Normal range of motion. He exhibits edema (pitting edema of the bilateral lower extremities.) and tenderness (bilateral calf tenderness, right greater than left). Lymphadenopathy:     He has no cervical adenopathy. Neurological: He is alert and oriented to person, place, and time. No cranial nerve deficit. Skin: Skin is warm and dry. He is not diaphoretic.    Purulent drainage from the dorsal ulcer of the left foot, foul smelling    Labs: Results:   Chemistry Recent Labs      07/12/17   0236  07/11/17   0326  07/10/17   0248   GLU  83  109*  86   NA  139  138  139   K  3.7  4.5  4.0   CL  104  105  107   CO2  28  27  26   BUN  11  20*  10   CREA  0.90  1.06  0.91   CA  8.6  8.9  8.8   AGAP  7  6  6   BUCR  12  19  11*      CBC w/Diff Recent Labs      07/12/17 0236  07/11/17   0326  07/10/17   0248   WBC  4.7  4.9  7.4   RBC  4.09*  4.05*  4.08*   HGB  10.8*  10.8*  10.8*   HCT  33.2*  32.5*  32.7*   PLT  199  255  253   GRANS  61  66  73   LYMPH  22  14*  16*   EOS  9*  8*  4      Microbiology Recent Labs      07/11/17   1755   CULT  PENDING          RADIOLOGY:    All available imaging studies/reports in [facility] for this admission were reviewed    Dr. Sabrina Oliva, Infectious Disease Specialist  [phone]  July 12, 2017  9:13 AM

## 2017-07-12 NOTE — ANESTHESIA POSTPROCEDURE EVALUATION
Post-Anesthesia Evaluation and Assessment    Patient: Nafisa Arteaga MRN: [id number]  SSN: [id number]    YOB: 1955  Age: 58 y.o. Sex: male     VS from flow sheet    Cardiovascular Function/Vital Signs  Visit Vitals    /76 (BP 1 Location: Left arm, BP Patient Position: At rest)    Pulse 86    Temp 36.3 °C (97.4 °F)    Resp 20    Ht 5' 11\" (1.803 m)    Wt 72.6 kg (160 lb)    SpO2 96%    BMI 22.32 kg/m2       Patient is status post MAC anesthesia for Procedure(s):  DEBRIDEMENT AND DRAINAGE RIGHT FOOT. Nausea/Vomiting: None    Postoperative hydration reviewed and adequate. Pain:  Pain Scale 1: Numeric (0 - 10) (07/11/17 1934)  Pain Intensity 1: 0 (07/11/17 1934)   Managed    Neurological Status:   Neuro (WDL): Within Defined Limits (07/11/17 1750)  Neuro  Neurologic State: Appropriate for age (07/11/17 1429)  Orientation Level: Appropriate for age (07/11/17 1429)  Cognition: Follows commands (07/11/17 1115)  Speech: Clear (07/11/17 1115)  LUE Motor Response: Weak (07/11/17 1115)  LLE Motor Response: Weak (07/11/17 1115)  RUE Motor Response: Weak (07/11/17 1115)  RLE Motor Response: Weak (07/11/17 1115)   At baseline    Mental Status and Level of Consciousness: Arousable    Pulmonary Status:   O2 Device: Room air (07/11/17 1753)   Adequate oxygenation and airway patent    Complications related to anesthesia: None    Post-anesthesia assessment completed.  No concerns    Signed By: Hermilo Biswas MD     July 11, 2017

## 2017-07-12 NOTE — ROUTINE PROCESS
Bedside and Verbal shift change report given to Job Hussein (oncoming nurse) by Kishore Carmona (offgoing nurse). Report included the following information SBAR, Kardex, MAR and Recent Results.     SITUATION:    Code Status: DNR  Reason for Admission: Right foot infection  Infected ulcer of skin ([facility]  Bilateral leg and foot pain  Foot ulcer ([facility] [facility]  Cellulitis  Intractable hiccups   DX    [facility] day: 6   Problem List:       Hospital Problems  Date Reviewed: 7/11/2017          Codes Class Noted POA    Foot ulcer ([facility] ICD-10-CM: L97.509  ICD-9-CM: 707.15  7/7/2017 Unknown        Right foot infection ICD-10-CM: L08.9  ICD-9-CM: 686.9  7/6/2017 Unknown        Bilateral leg and foot pain ICD-10-CM: M79.604, M79.605, M79.671, M79.672  ICD-9-CM: 729.5  7/6/2017 Unknown        Infected ulcer of skin ([facility] ICD-10-CM: L98.499, L08.9  ICD-9-CM: 707.9, 686.9  7/6/2017 Unknown        Cellulitis ICD-10-CM: L03.90  ICD-9-CM: 682.9  12/20/2016 Unknown        Intractable hiccups (Chronic) ICD-10-CM: R06.6  ICD-9-CM: 786.8  9/2/2012 Unknown              BACKGROUND:    Past Medical History:   Past Medical History:   Diagnosis Date    Hiccups     Hypertension     Hyponatremia     Lacunar infarction ([facility] [facility] 2010    Lower extremity edema     Psychogenic polydipsia     Spinal stenosis          Patient taking anticoagulants yes     ASSESSMENT:    Changes in Assessment Throughout Shift: S/P DEBRIDMENT     Patient has Central Line: no Reasons if yes:    Patient has Reid Cath: no Reasons if yes:       Last Vitals:     Vitals:    07/11/17 1820 07/11/17 1823 07/11/17 2102 07/12/17 0408   BP: 119/66  119/76 117/77   Pulse: 93 89 86 80   Resp: 14 12 20 20   Temp:   97.4 °F (36.3 °C) 98.2 °F (36.8 °C)   TempSrc:       SpO2: 97% 97% 96% 100%   Weight:       Height:            IV and DRAINS (will only show if present)   Peripheral IV 07/06/17 Right Wrist-Site Assessment: Clean, dry, & intact     WOUND (if present)   Wound Type: YES   Dressing present Dressing Present : Yes   Wound Concerns/Notes:       PAIN    Pain Assessment    Pain Intensity 1: 0 (07/12/17 0408)    Pain Location 1: Foot    Pain Intervention(s) 1: Medication (see MAR)    Patient Stated Pain Goal: 0  o Interventions for Pain:  YES  o Intervention effective: yes  o Time of last intervention:    o Reassessment Completed: yes      Last 3 Weights:  Last 3 Recorded Weights in this Encounter    07/06/17 1354   Weight: 72.6 kg (160 lb)     Weight change:      INTAKE/OUPUT    Current Shift:      Last three shifts: 07/10 0701 - 07/11 1900  In: 1580 [P.O.:1080; I.V.:500]  Out: 1850 [Urine:1850]     LAB RESULTS     Recent Labs      07/12/17   0236  07/11/17   0326  07/10/17   0248   WBC  4.7  4.9  7.4   HGB  10.8*  10.8*  10.8*   HCT  33.2*  32.5*  32.7*   PLT  199  255  253        Recent Labs      07/12/17   0236  07/11/17   0326  07/10/17   0248   NA  139  138  139   K  3.7  4.5  4.0   GLU  83  109*  86   BUN  11  20*  10   CREA  0.90  1.06  0.91   CA  8.6  8.9  8.8   INR   --    --   1.2       RECOMMENDATIONS AND DISCHARGE PLANNING     1. Pending tests/procedures/ Plan of Care or Other Needs: WOUND CARE     2. Discharge plan for patient and Needs/Barriers: [address] [place] [address]    3. Estimated Discharge Date: 7/14/17 Posted on Whiteboard in [facility]: yes      4. The patient's care plan was reviewed with the oncoming nurse. \"HEALS\" SAFETY CHECK      Fall Risk    Total Score: 3    Safety Measures: Safety Measures: Bed/Chair alarm on, Bed/Chair-Wheels locked, Bed in low position, Call light within reach, Fall prevention (comment)    A safety check occurred in the patient's room between off going nurse and oncoming nurse listed above.     The safety check included the below items  Area Items   H  High Alert Medications - Verify all high alert medication drips (heparin, PCA, etc.)   E  Equipment - Suction is set up for ALL patients (with ellaker)  - Red plugs utilized for all equipment (IV pumps, etc.)  - WOWs wiped down at end of shift.  - Room stocked with oxygen, suction, and other unit-specific supplies   A  Alarms - Bed alarm is set for fall risk patients  - Ensure chair alarm is in place and activated if patient is up in a chair   L  Lines - Check IV for any infiltration  - Reid bag is empty if patient has a Reid   - Tubing and IV bags are labeled   S  Safety   - Room is clean, patient is clean, and equipment is clean. - Hallways are clear from equipment besides carts. - Fall bracelet on for fall risk patients  - Ensure room is clear and free of clutter  - Suction is set up for ALL patients (with yanker)  - Hallways are clear from equipment besides carts.    - Isolation precautions followed, supplies available outside room, sign posted     Elham Michelle

## 2017-07-13 VITALS
HEART RATE: 82 BPM | TEMPERATURE: 98 F | SYSTOLIC BLOOD PRESSURE: 130 MMHG | RESPIRATION RATE: 16 BRPM | DIASTOLIC BLOOD PRESSURE: 80 MMHG | OXYGEN SATURATION: 98 %

## 2017-07-13 PROBLEM — L08.9 INFECTED ULCER OF SKIN (HCC): Status: RESOLVED | Noted: 2017-07-06 | Resolved: 2017-07-13

## 2017-07-13 PROBLEM — L97.509 FOOT ULCER (HCC): Status: RESOLVED | Noted: 2017-07-07 | Resolved: 2017-07-13

## 2017-07-13 PROBLEM — L98.499 INFECTED ULCER OF SKIN (HCC): Status: RESOLVED | Noted: 2017-07-06 | Resolved: 2017-07-13

## 2017-07-13 LAB
BACTERIA SPEC CULT: NORMAL
BACTERIA SPEC CULT: NORMAL
GLUCOSE BLD STRIP.AUTO-MCNC: 84 MG/DL (ref 70–110)
SERVICE CMNT-IMP: NORMAL
SERVICE CMNT-IMP: NORMAL

## 2017-07-13 PROCEDURE — 82962 GLUCOSE BLOOD TEST: CPT

## 2017-07-13 PROCEDURE — 97535 SELF CARE MNGMENT TRAINING: CPT

## 2017-07-13 PROCEDURE — 97161 PT EVAL LOW COMPLEX 20 MIN: CPT

## 2017-07-13 PROCEDURE — 77030019934 HC DRSG VAC ASST KCON -B

## 2017-07-13 PROCEDURE — 77030011251 HC DRSG HYDRCOIL S&N -A

## 2017-07-13 PROCEDURE — 74011250637 HC RX REV CODE- 250/637: Performed by: INTERNAL MEDICINE

## 2017-07-13 PROCEDURE — 97165 OT EVAL LOW COMPLEX 30 MIN: CPT

## 2017-07-13 PROCEDURE — 77030011255 HC DSG AQUACEL AG BMS -A

## 2017-07-13 PROCEDURE — 65270000029 HC RM PRIVATE

## 2017-07-13 PROCEDURE — 74011250637 HC RX REV CODE- 250/637: Performed by: HOSPITALIST

## 2017-07-13 PROCEDURE — 77030019952 HC CANSTR VAC ASST KCON -B

## 2017-07-13 PROCEDURE — 74011250636 HC RX REV CODE- 250/636: Performed by: INTERNAL MEDICINE

## 2017-07-13 PROCEDURE — 77030011256 HC DRSG MEPILEX <16IN NO BORD MOLN -A

## 2017-07-13 PROCEDURE — 74011000258 HC RX REV CODE- 258: Performed by: INTERNAL MEDICINE

## 2017-07-13 RX ORDER — CIPROFLOXACIN 500 MG/1
500 TABLET ORAL 2 TIMES DAILY
Qty: 20 TAB | Refills: 0 | Status: SHIPPED | OUTPATIENT
Start: 2017-07-13 | End: 2017-07-19

## 2017-07-13 RX ORDER — POLYETHYLENE GLYCOL 3350 17 G/17G
17 POWDER, FOR SOLUTION ORAL ONCE
Status: COMPLETED | OUTPATIENT
Start: 2017-07-13 | End: 2017-07-13

## 2017-07-13 RX ORDER — POLYETHYLENE GLYCOL 3350 17 G/17G
17 POWDER, FOR SOLUTION ORAL DAILY
Qty: 30 PACKET | Refills: 2 | Status: SHIPPED | OUTPATIENT
Start: 2017-07-13

## 2017-07-13 RX ORDER — ASCORBIC ACID 250 MG
250 TABLET ORAL DAILY
Qty: 30 TAB | Refills: 2 | Status: SHIPPED | OUTPATIENT
Start: 2017-07-13

## 2017-07-13 RX ORDER — LANOLIN ALCOHOL/MO/W.PET/CERES
325 CREAM (GRAM) TOPICAL
Qty: 30 TAB | Refills: 2 | Status: SHIPPED | OUTPATIENT
Start: 2017-07-13

## 2017-07-13 RX ORDER — AMOXICILLIN 250 MG
1 CAPSULE ORAL
Qty: 30 TAB | Refills: 2 | Status: SHIPPED | OUTPATIENT
Start: 2017-07-13 | End: 2022-10-22

## 2017-07-13 RX ORDER — CHLORPROMAZINE HYDROCHLORIDE 25 MG/1
25 TABLET, FILM COATED ORAL 4 TIMES DAILY
Qty: 120 TAB | Refills: 3 | Status: SHIPPED | OUTPATIENT
Start: 2017-07-13 | End: 2017-08-22

## 2017-07-13 RX ORDER — SAME BUTANEDISULFONATE/BETAINE 400-600 MG
250 POWDER IN PACKET (EA) ORAL 2 TIMES DAILY
Qty: 28 CAP | Refills: 0 | Status: SHIPPED | OUTPATIENT
Start: 2017-07-13 | End: 2017-07-27

## 2017-07-13 RX ORDER — AMOXICILLIN AND CLAVULANATE POTASSIUM 500; 125 MG/1; MG/1
1 TABLET, FILM COATED ORAL EVERY 12 HOURS
Qty: 20 TAB | Refills: 0 | Status: SHIPPED | OUTPATIENT
Start: 2017-07-13 | End: 2017-07-14

## 2017-07-13 RX ADMIN — HEPARIN SODIUM 5000 UNITS: 5000 INJECTION, SOLUTION INTRAVENOUS; SUBCUTANEOUS at 09:01

## 2017-07-13 RX ADMIN — ONDANSETRON 4 MG: 2 INJECTION INTRAMUSCULAR; INTRAVENOUS at 06:57

## 2017-07-13 RX ADMIN — FUROSEMIDE 40 MG: 40 TABLET ORAL at 08:58

## 2017-07-13 RX ADMIN — CYANOCOBALAMIN TAB 1000 MCG 1000 MCG: 1000 TAB at 08:58

## 2017-07-13 RX ADMIN — CHLORPROMAZINE HYDROCHLORIDE 25 MG: 25 TABLET, SUGAR COATED ORAL at 18:19

## 2017-07-13 RX ADMIN — PIPERACILLIN SODIUM,TAZOBACTAM SODIUM 3.38 G: 3; .375 INJECTION, POWDER, FOR SOLUTION INTRAVENOUS at 09:02

## 2017-07-13 RX ADMIN — POLYETHYLENE GLYCOL 3350 17 G: 17 POWDER, FOR SOLUTION ORAL at 01:32

## 2017-07-13 RX ADMIN — AMLODIPINE BESYLATE 10 MG: 10 TABLET ORAL at 08:58

## 2017-07-13 RX ADMIN — HEPARIN SODIUM 5000 UNITS: 5000 INJECTION, SOLUTION INTRAVENOUS; SUBCUTANEOUS at 18:19

## 2017-07-13 RX ADMIN — PIPERACILLIN SODIUM,TAZOBACTAM SODIUM 3.38 G: 3; .375 INJECTION, POWDER, FOR SOLUTION INTRAVENOUS at 01:32

## 2017-07-13 RX ADMIN — ATORVASTATIN CALCIUM 20 MG: 20 TABLET, FILM COATED ORAL at 21:04

## 2017-07-13 RX ADMIN — CHLORPROMAZINE HYDROCHLORIDE 25 MG: 25 TABLET, SUGAR COATED ORAL at 08:58

## 2017-07-13 RX ADMIN — OXYCODONE AND ACETAMINOPHEN 1 TABLET: 5; 325 TABLET ORAL at 21:04

## 2017-07-13 RX ADMIN — THERA TABS 1 TABLET: TAB at 08:58

## 2017-07-13 RX ADMIN — OXYCODONE AND ACETAMINOPHEN 1 TABLET: 5; 325 TABLET ORAL at 08:56

## 2017-07-13 RX ADMIN — ASPIRIN 81 MG 81 MG: 81 TABLET ORAL at 08:58

## 2017-07-13 RX ADMIN — PIPERACILLIN SODIUM,TAZOBACTAM SODIUM 3.38 G: 3; .375 INJECTION, POWDER, FOR SOLUTION INTRAVENOUS at 21:03

## 2017-07-13 RX ADMIN — POLYETHYLENE GLYCOL 3350 17 G: 17 POWDER, FOR SOLUTION ORAL at 08:56

## 2017-07-13 RX ADMIN — Medication 325 MG: at 08:58

## 2017-07-13 RX ADMIN — CHLORPROMAZINE HYDROCHLORIDE 25 MG: 25 TABLET, SUGAR COATED ORAL at 12:59

## 2017-07-13 RX ADMIN — Medication 2 TABLET: at 21:04

## 2017-07-13 RX ADMIN — CHLORPROMAZINE HYDROCHLORIDE 25 MG: 25 TABLET, SUGAR COATED ORAL at 21:04

## 2017-07-13 RX ADMIN — HEPARIN SODIUM 5000 UNITS: 5000 INJECTION, SOLUTION INTRAVENOUS; SUBCUTANEOUS at 01:32

## 2017-07-13 RX ADMIN — PIPERACILLIN SODIUM,TAZOBACTAM SODIUM 3.38 G: 3; .375 INJECTION, POWDER, FOR SOLUTION INTRAVENOUS at 16:18

## 2017-07-13 RX ADMIN — TAMSULOSIN HYDROCHLORIDE 0.4 MG: 0.4 CAPSULE ORAL at 08:58

## 2017-07-13 RX ADMIN — Medication 250 MG: at 08:58

## 2017-07-13 NOTE — PROGRESS NOTES
Problem: Self Care Deficits Care Plan (Adult)  Goal: *Acute Goals and Plan of Care (Insert Text)  Occupational Therapy Goals  Initiated 7/13/2017 within 7 day(s). 1. Patient will perform self-feeding with supervision/set-up   2. Patient will perform grooming with supervision/set-up sitting on EOB. 3. Patient will perform upper body dressing with minimal assistance/contact guard assist.  4. Patient will participate in BUE exercises to increase strength in preparation for ADLs      **Ambulatory goals will be addressed as patient progresses/clarification on WB status/reliable PLOF  Outcome: Progressing Towards Goal  OCCUPATIONAL THERAPY EVALUATION     Patient: Pauline Laws (41 y.o. male)  Date: 7/13/2017  Primary Diagnosis: Right foot infection  Infected ulcer of skin (HCC)  Bilateral leg and foot pain  Foot ulcer (HCC)  Cellulitis  Intractable hiccups  DX  Procedure(s) (LRB):  DEBRIDEMENT AND DRAINAGE RIGHT FOOT (Right) 2 Days Post-Op   Precautions:  Skin, Fall, (PWB/WBAT per MD order, will need clarification on WB status of RLE)      ASSESSMENT :  Based on the objective data described below, the patient was supine in bed upon arrival. Patient was A&O x 3. Patient sat EOB with Mod A for bed mobility. Patient's LUE AROM and strength was significantly decreased, with functional RUE AROM and strength. Patient simulated LE dressing with Max A sitting on EOB(unable to assess standing at this time, secondary to need for clarification on WB status of RLE). Patient returned to supine with Mod A. Patient rolled with supervision during tiffanie care. Patient performed UE dressing with Max A. Decreases in BUE AROM and strength currently limit participation in self care activities. Would benefit from strength training and training in adaptive strategies for ADLs. Patient was left comfortable in bed. Patient will benefit from skilled intervention to address the above impairments.   Patients rehabilitation potential is considered to be Good  Factors which may influence rehabilitation potential include:   [ ]             None noted  [ ]             Mental ability/status  [X]             Medical condition  [ ]             Home/family situation and support systems  [ ]             Safety awareness  [ ]             Pain tolerance/management  [ ]             Other:        PLAN :  Recommendations and Planned Interventions:  [X]               Self Care Training                  [X]        Therapeutic Activities  [X]               Functional Mobility Training    [X]        Cognitive Retraining  [ ]               Therapeutic Exercises           [X]        Endurance Activities  [ ]               Balance Training                   [ ]        Neuromuscular Re-Education  [ ]               Visual/Perceptual Training     [X]   Home Safety Training  [X]               Patient Education                 [ ]        Family Training/Education  [ ]               Other (comment):     Frequency/Duration: Patient will be followed by occupational therapy 1-2 times per day/4-7 days per week to address goals. Discharge Recommendations: K[facility] Sheppard   Further Equipment Recommendations for Discharge: N/A       PATIENT COMPLEXITY      Eval Complexity: History: LOW Complexity : Brief history review ; Examination: LOW Complexity : 1-3 performance deficits relating to physical, cognitive , or psychosocial skils that result in activity limitations and / or participation restrictions ; Decision Making:LOW Complexity : No comorbidities that affect functional and no verbal or physical assistance needed to complete eval tasks  Assessment: Low Complexity       G-CODES:      Self Care  Current  CL= 60-79%   Goal  CJ= 20-39%. The severity rating is based on the Level of Assistance required for Functional Mobility and ADLs. SUBJECTIVE:   Patient stated My brother helps me with anything I want help with.       OBJECTIVE DATA SUMMARY:       Past Medical History:   Diagnosis Date    Hiccups      Hypertension      Hyponatremia      Lacunar infarction ([facility] 2010    Lower extremity edema      Psychogenic polydipsia      Spinal stenosis       Past Surgical History:   Procedure Laterality Date    HX HEENT         pt reports past hx of surgery on ears unsure of what type     Prior Level of Function/Home Situation: Patient is a poor historian. Patient reported that he needed help for all ADLs and IADLs PTA. Pt. Reported that he used a w/c and walker PTA and was able to perform transfers with mod I. Home Situation  Home Environment: Private residence  # Steps to Enter: 3  One/Two Story Residence: One story  Living Alone: No  Support Systems: Family member(s)  Patient Expects to be Discharged to[de-identified] Apartment  Current DME Used/Available at Home: Valeria Vaughn, Wheelchair  [X]  Right hand dominant          [ ]  Left hand dominant  Cognitive/Behavioral Status:  Neurologic State: Alert  Orientation Level: Disoriented to time;Oriented to person;Oriented to place;Oriented to situation  Cognition: Follows commands     Skin: No skin changes noted     Edema: No edema noted     Vision/Perceptual:    Acuity: Within Defined Limits (not formally assessed)       Coordination:  Coordination: Grossly decreased, non-functional (LUE), WFL (RUE)     Balance:  Sitting: Impaired  Sitting - Static: Good (unsupported)  Sitting - Dynamic: Fair (occasional)     Strength:  Strength: Grossly decreased L  and L shoulder              LUE:  strength 2/5              RUE:  strength 5/5     Tone & Sensation:  Tone: Normal (BUEs)  Sensation: Intact (BUEs)     Range of Motion:  AROM: Grossly decreased, functional (BUEs R>L, except non-functional limited AROM L shoulder)  PROM: Generally decreased, functional  (LUE)              LUE: Shoulder flexion 0-90 degrees.  Elbow flexion WFL     Functional Mobility and Transfers for ADLs:  Bed Mobility:  Rolling: Supervision;Stand-by asssistance  Supine to Sit: Moderate assistance  Sit to Supine: Moderate assistance  Scooting: Maximum assistance;Assist x2  Transfers:  Did not asses at this time      ADL Assessment:  Feeding: Minimum assistance     Oral Facial Hygiene/Grooming: Moderate assistance     Bathing: Maximum assistance     Upper Body Dressing: Maximum assistance     Lower Body Dressing: Maximum assistance     Toileting: Maximum assistance     Pain:  Pt reports 0/10 pain or discomfort prior to treatment. Pt reports 0/10 pain or discomfort post treatment. Activity Tolerance:   Good     Please refer to the flowsheet for vital signs taken during this treatment. After treatment:   [ ] Patient left in no apparent distress sitting up in chair  [X] Patient left in no apparent distress in bed  [ ] Call bell left within reach  [X] Nursing notified  [ ] Caregiver present  [ ] Bed alarm activated      COMMUNICATION/EDUCATION:   [ ] Home safety education was provided and the patient/caregiver indicated understanding. [ ] Patient/family have participated as able in goal setting and plan of care. [X] Patient/family agree to work toward stated goals and plan of care. [ ] Patient understands intent and goals of therapy, but is neutral about his/her participation. [ ] Patient is unable to participate in goal setting and plan of care.      Thank you for this referral.  Nancylee Hodgkin, OTS  Time Calculation: 27 mins

## 2017-07-13 NOTE — ROUTINE PROCESS
Bedside and Verbal shift change report given to Ben Polo (oncoming nurse) by Lisa Shelby (offgoing nurse). Report included the following information SBAR, Kardex, MAR and Recent Results.     SITUATION:    Code Status: DNR  Reason for Admission: Right foot infection  Infected ulcer of skin ([facility]  Bilateral leg and foot pain  Foot ulcer ([facility] [facility]  Cellulitis  Intractable hiccups   DX    [facility] day: 7   Problem List:       Hospital Problems  Date Reviewed: 7/11/2017          Codes Class Noted POA    Foot ulcer ([facility] ICD-10-CM: L97.509  ICD-9-CM: 707.15  7/7/2017 Unknown        Right foot infection ICD-10-CM: L08.9  ICD-9-CM: 686.9  7/6/2017 Unknown        Bilateral leg and foot pain ICD-10-CM: M79.604, M79.605, M79.671, M79.672  ICD-9-CM: 729.5  7/6/2017 Unknown        Infected ulcer of skin ([facility] ICD-10-CM: L98.499, L08.9  ICD-9-CM: 707.9, 686.9  7/6/2017 Unknown        Cellulitis ICD-10-CM: L03.90  ICD-9-CM: 682.9  12/20/2016 Unknown        Intractable hiccups (Chronic) ICD-10-CM: R06.6  ICD-9-CM: 786.8  9/2/2012 Unknown              BACKGROUND:    Past Medical History:   Past Medical History:   Diagnosis Date    Hiccups     Hypertension     Hyponatremia     Lacunar infarction ([facility] [address] 2010    Lower extremity edema     Psychogenic polydipsia     Spinal stenosis          Patient taking anticoagulants yes     ASSESSMENT:    Changes in Assessment Throughout Shift: S/P DEBRIDMENT     Patient has Central Line: no Reasons if yes:    Patient has Reid Cath: no Reasons if yes:       Last Vitals:     Vitals:    07/12/17 1954 07/12/17 2052 07/12/17 2340 07/13/17 0511   BP: 128/78  138/80 (!) 171/95   Pulse: 98  100 (!) 107   Resp: 20  19 20   Temp: 98.3 °F (36.8 °C)  98.8 °F (37.1 °C) 98.8 °F (37.1 °C)   TempSrc:       SpO2: 95%  100% 97%   Weight:  89.7 kg (197 lb 11.2 oz)     Height:            IV and DRAINS (will only show if present)   Peripheral IV 07/06/17 Right Wrist-Site Assessment: Clean, dry, & intact     WOUND (if present)   Wound Type:  YES   Dressing present Dressing Present : Yes   Wound Concerns/Notes:       PAIN    Pain Assessment    Pain Intensity 1: 0 (07/13/17 0418)    Pain Location 1: Abdomen    Pain Intervention(s) 1: Medication (see MAR)    Patient Stated Pain Goal: 0  o Interventions for Pain:  YES  o Intervention effective: yes  o Time of last intervention:    o Reassessment Completed: yes      Last 3 Weights:  Last 3 Recorded Weights in this Encounter    07/06/17 1354 07/12/17 2052   Weight: 72.6 kg (160 lb) 89.7 kg (197 lb 11.2 oz)     Weight change:      INTAKE/OUPUT    Current Shift:      Last three shifts: 07/11 1901 - 07/13 0700  In: -   Out: 1200 [Urine:1200]     LAB RESULTS     Recent Labs      07/12/17   0236  07/11/17   0326   WBC  4.7  4.9   HGB  10.8*  10.8*   HCT  33.2*  32.5*   PLT  199  255        Recent Labs      07/12/17   0236  07/11/17   0326   NA  139  138   K  3.7  4.5   GLU  83  109*   BUN  11  20*   CREA  0.90  1.06   CA  8.6  8.9       RECOMMENDATIONS AND DISCHARGE PLANNING     1. Pending tests/procedures/ Plan of Care or Other Needs: WOUND CARE     2. Discharge plan for patient and Needs/Barriers: [address] [place] [address]    3. Estimated Discharge Date: 7/14/17 Posted on Whiteboard in \[facility]\"": yes      4. The patient's care plan was reviewed with the oncoming nurse. \"HEALS\" SAFETY CHECK      Fall Risk    Total Score: 2    Safety Measures: Safety Measures: Bed/Chair alarm on, Bed/Chair-Wheels locked, Bed in low position    A safety check occurred in the patient's room between off going nurse and oncoming nurse listed above.     The safety check included the below items  Area Items   H  High Alert Medications - Verify all high alert medication drips (heparin, PCA, etc.)   E  Equipment - Suction is set up for ALL patients (with yanker)  - Red plugs utilized for all equipment (IV pumps, etc.)  - WOWs wiped down at end of shift.  - Room stocked with oxygen, suction, and other unit-specific supplies   A  Alarms - Bed alarm is set for fall risk patients  - Ensure chair alarm is in place and activated if patient is up in a chair   L  Lines - Check IV for any infiltration  - Reid bag is empty if patient has a Reid   - Tubing and IV bags are labeled   S  Safety   - Room is clean, patient is clean, and equipment is clean. - Hallways are clear from equipment besides carts. - Fall bracelet on for fall risk patients  - Ensure room is clear and free of clutter  - Suction is set up for ALL patients (with erika)  - Hallways are clear from equipment besides carts.    - Isolation precautions followed, supplies available outside room, sign posted     Elham Terrazas

## 2017-07-13 NOTE — WOUND CARE
Physical Exam   Musculoskeletal:        Feet:      Seen by wound care appllied negative pressure vac therapy performed at this time. Beefy red skin  noted during skin assessment. Treatment plan explained  and Pt agreeable to continue current treatment. Wound care education provided to pt at this time. Plan of care reviewed with nursing. Will turn over care to nursing staff at this time  Aneesh Clark, Wound Care Department

## 2017-07-13 NOTE — PROGRESS NOTES
Problem: Mobility Impaired (Adult and Pediatric)  Goal: *Acute Goals and Plan of Care (Insert Text)  STG for 7.13.17 to be completed by 7.20.17 (7 days). 1. Pt will complete supine to/from sit (I) in order to complete EOB activity. 2. Pt will complete functional transfer to/from w/c vs chair with mod A in prep for OOB activity. 3. Pt will perform HEP for improved BLE strength (I). Outcome: Progressing Towards Goal  PHYSICAL THERAPY EVALUATION     Patient: Nafisa Arteaga (58 y.o. male)  Date: 7/13/2017  Primary Diagnosis: Right foot infection  Infected ulcer of skin (HCC)  Bilateral leg and foot pain  Foot ulcer (HCC)  Cellulitis  Intractable hiccups  DX  Procedure(s) (LRB):  DEBRIDEMENT AND DRAINAGE RIGHT FOOT (Right) 2 Days Post-Op   Precautions: PWB (BLE)      ASSESSMENT : 57 yo male presents with impaired functional mobility, decreased activity tolerance, and generalized weakness s/p debridement and drainage of (R) foot. Pt educated on role of therapy during acute stay, verbalized understanding. Pt reports he is limited to wheelchair mobility only, usually requires 1-2 people to transfer him to chair. Pt is PWB BLE, please clarify what percentage of WB so that we can address transfers appropriately via stand-pivot vs sliding board. Pt completed bed mobility with min/mod A, required mod VCs to sit up. Pt sat EOB x 7 minutes prior to returning supine with HOB elevated to eat breakfast. Pt reporting he had to urinate but catheter was not in, notified Thad Schulz and CNA. Gave pt a urinal and educated on use. Recommend continued therapy during acute stay.      Eval Complexity: History: MEDIUM  Complexity : 1-2 comorbidities / personal factors will impact the outcome/ POC Exam:LOW Complexity : 1-2 Standardized tests and measures addressing body structure, function, activity limitation and / or participation in recreation  Presentation: LOW Complexity : Stable, uncomplicated  Clinical Decision Making:Low Complexity wheelchair bound, PWB BLE, minimal support at home Overall Complexity:LOW      Patient will benefit from skilled intervention to address the above impairments. Patients rehabilitation potential is considered to be Guarded  Factors which may influence rehabilitation potential include:   [ ]         None noted  [ ]         Mental ability/status  [X]         Medical condition  [X]         Home/family situation and support systems  [ ]         Safety awareness  [X]         Pain tolerance/management  [ ]         Other:        PLAN :Recommend continued therapy during acute stay. Recommendations and Planned Interventions:  [X]           Bed Mobility Training             [X]    Neuromuscular Re-Education  [X]           Transfer Training                   [ ]    Orthotic/Prosthetic Training  [X]           Gait Training                          [ ]    Modalities  [X]           Therapeutic Exercises          [ ]    Edema Management/Control  [X]           Therapeutic Activities            [X]    Patient and Family Training/Education  [ ]           Other (comment):     Frequency/Duration: Patient will be followed by physical therapy 3-5 times a week to address goals. Discharge Recommendations: Kj Sheppard  Further Equipment Recommendations for Discharge: bedside commode, to be determined at next level of care prior to home. SUBJECTIVE:   Patient stated It depends who is at home.       OBJECTIVE DATA SUMMARY:       Past Medical History:   Diagnosis Date    Hiccups      Hypertension      Hyponatremia      Lacunar infarction ([facility] 2010    Lower extremity edema      Psychogenic polydipsia      Spinal stenosis       Past Surgical History:   Procedure Laterality Date    HX HEENT         pt reports past hx of surgery on ears unsure of what type     Barriers to Learning/Limitations: None  Compensate with: visual, verbal, tactile, kinesthetic cues/model  GCODES(GP):Mobility  Current  CJ= 20-39%   Goal  CI= 1-19%. The severity rating is based on the [facility] Sitting Balance Scale  0: Pt performs 25% or less of sitting activity (Max assist) CN, 100% impaired. 1: Pt supports self with upper extremities but requires therapist assistance. Pt performs 25-50% of effort (Mod assist) CM, 80% to <100% impaired. 1+: Pt supports self with upper extremities but requires therapist assistance. Pt performs >50% effort. (Min assist). CL, 60% to <80% impaired. 2: Pt supports self independently with both upper extremities. CL, 60% to <80% impaired. 2+: Pt support self independently with 1 upper extremity. CK, 40% to <60% impaired. 3: Pt sits without upper extremity support for up to 30 seconds. CK, 40% to <60% impaired. 3+: Pt sits without upper extremity support for 30 seconds or greater. CJ, 20% to <40% impaired. 4: Pt moves and returns trunkal midpoint 1-2 inches in one plane. CJ, 20% to <40% impaired. 4+: Pt moves and returns trunkal midpoint 1-2 inches in multiple planes. CI, 1% to <20% impaired. 5: Pt moves and returns trunkal midpoint in all planes greater than 2 inches. CH, 0% impaired. Prior Level of Function/Home Situation: Pt reports he uses power w/c all of the time. Reports he requires assist from 1-2 people to get into chair. Home Situation  Home Environment: Private residence  # Steps to Enter: 1  One/Two Story Residence: One story  Living Alone: No  Support Systems: Family member(s), Friends \ neighbors  Patient Expects to be Discharged to[de-identified] Apartment  Current DME Used/Available at Home: Wheelchair, power  Critical Behavior:  Neurologic State: Alert  Orientation Level: Appropriate for age  Cognition: Follows commands  Psychosocial  Patient Behaviors: Calm; Cooperative  Purposeful Interaction: Yes  Strength:    Strength: Generally decreased, functional  Range Of Motion:  AROM: Generally decreased, functional  PROM: Generally decreased, functional  Functional Mobility:  Bed Mobility:  Supine to Sit: Minimum assistance; Moderate assistance;Assist x1  Sit to Supine: Minimum assistance; Moderate assistance;Assist x1;Additional time  Transfers:  Sit to Stand:  (did not assess)  Balance:   Sitting: Impaired  Sitting - Static: Good (unsupported)  Sitting - Dynamic: Good (unsupported)  Standing:  (did not assess)  Ambulation/Gait Training:  Gait Description (WDL):  (did not assess)  Pain:  Pre-treatment level: 10  Location: BLE  Post-treatment level: 10  Activity Tolerance:   Pt reported no symptomatic complaints throughout session. Please refer to the flowsheet for vital signs taken during this treatment. After treatment:   [ ]         Patient left in no apparent distress sitting up in chair  [X]         Patient left in no apparent distress in bed  [X]         Call bell left within reach  [X]         Nursing notified  [ ]         Caregiver present  [ ]         Bed alarm activated      COMMUNICATION/EDUCATION:   [X]         Fall prevention education was provided and the patient/caregiver indicated understanding. [X]         Patient/family have participated as able in goal setting and plan of care. [X]         Patient/family agree to work toward stated goals and plan of care. [ ]         Patient understands intent and goals of therapy, but is neutral about his/her participation. [ ]         Patient is unable to participate in goal setting and plan of care.      Thank you for this referral.  Naty Cifuentes PT, DPT   Time Calculation: 12 mins
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
## 2017-07-13 NOTE — ROUTINE PROCESS
Bedside and Verbal shift change report given to Serina Galeas (oncoming nurse) by Steven Roberts (offgoing nurse). Report included the following information SBAR, Kardex, MAR and Recent Results.     SITUATION:    Code Status: DNR  Reason for Admission: Right foot infection  Infected ulcer of skin ([address]  Bilateral leg and foot pain  Foot ulcer ([address]  Cellulitis  Intractable hiccups   DX    [facility] day: 7   Problem List:       Hospital Problems  Date Reviewed: 7/11/2017          Codes Class Noted POA    Foot ulcer ([facility] ICD-10-CM: L97.509  ICD-9-CM: 707.15  7/7/2017 Unknown        Right foot infection ICD-10-CM: L08.9  ICD-9-CM: 686.9  7/6/2017 Unknown        Bilateral leg and foot pain ICD-10-CM: M79.604, M79.605, M79.671, M79.672  ICD-9-CM: 729.5  7/6/2017 Unknown        Infected ulcer of skin ([facility] [address] ICD-10-CM: L98.499, L08.9  ICD-9-CM: 707.9, 686.9  7/6/2017 Unknown        Cellulitis ICD-10-CM: L03.90  ICD-9-CM: 682.9  12/20/2016 Unknown        Intractable hiccups (Chronic) ICD-10-CM: R06.6  ICD-9-CM: 786.8  9/2/2012 Unknown              BACKGROUND:    Past Medical History:   Past Medical History:   Diagnosis Date    Hiccups     Hypertension     Hyponatremia     Lacunar infarction ([facility] 2010    Lower extremity edema     Psychogenic polydipsia     Spinal stenosis          Patient taking anticoagulants yes     ASSESSMENT:    Changes in Assessment Throughout Shift: S/P DEBRIDMENT     Patient has Central Line: no Reasons if yes:    Patient has Reid Cath: no Reasons if yes:       Last Vitals:     Vitals:    07/12/17 1954 07/12/17 2052 07/12/17 2340 07/13/17 0511   BP: 128/78  138/80 (!) 171/95   Pulse: 98  100 (!) 107   Resp: 20  19 20   Temp: 98.3 °F (36.8 °C)  98.8 °F (37.1 °C) 98.8 °F (37.1 °C)   TempSrc:       SpO2: 95%  100% 97%   Weight:  89.7 kg (197 lb 11.2 oz)     Height:            IV and DRAINS (will only show if present)   Peripheral IV 07/06/17 Right Wrist-Site Assessment: Clean, dry, & intact     WOUND (if present)   Wound Type:  YES   Dressing present Dressing Present : Yes   Wound Concerns/Notes:       PAIN    Pain Assessment    Pain Intensity 1: 0 (07/13/17 0418)    Pain Location 1: Abdomen    Pain Intervention(s) 1: Medication (see MAR)    Patient Stated Pain Goal: 0  o Interventions for Pain:  YES  o Intervention effective: yes  o Time of last intervention:    o Reassessment Completed: yes      Last 3 Weights:  Last 3 Recorded Weights in this Encounter    07/06/17 1354 07/12/17 2052   Weight: 72.6 kg (160 lb) 89.7 kg (197 lb 11.2 oz)     Weight change:      INTAKE/OUPUT    Current Shift:      Last three shifts: 07/11 1901 - 07/13 0700  In: -   Out: 1200 [Urine:1200]     LAB RESULTS     Recent Labs      07/12/17   0236  07/11/17   0326   WBC  4.7  4.9   HGB  10.8*  10.8*   HCT  33.2*  32.5*   PLT  199  255        Recent Labs      07/12/17   0236  07/11/17   0326   NA  139  138   K  3.7  4.5   GLU  83  109*   BUN  11  20*   CREA  0.90  1.06   CA  8.6  8.9       RECOMMENDATIONS AND DISCHARGE PLANNING     1. Pending tests/procedures/ Plan of Care or Other Needs: WOUND CARE     2. Discharge plan for patient and Needs/Barriers: [address] [place] [address]    3. Estimated Discharge Date: 7/14/17 Posted on Whiteboard in \[facility] [place] [facility]\"": yes      4. The patient's care plan was reviewed with the oncoming nurse. \"HEALS\" SAFETY CHECK      Fall Risk    Total Score: 2    Safety Measures: Safety Measures: Bed/Chair alarm on, Bed/Chair-Wheels locked, Bed in low position    A safety check occurred in the patient's room between off going nurse and oncoming nurse listed above.     The safety check included the below items  Area Items   H  High Alert Medications - Verify all high alert medication drips (heparin, PCA, etc.)   E  Equipment - Suction is set up for ALL patients (with yanker)  - Red plugs utilized for all equipment (IV pumps, etc.)  - WOWs wiped down at end of shift.  - Room stocked with oxygen, suction, and other unit-specific supplies   A  Alarms - Bed alarm is set for fall risk patients  - Ensure chair alarm is in place and activated if patient is up in a chair   L  Lines - Check IV for any infiltration  - Reid bag is empty if patient has a Reid   - Tubing and IV bags are labeled   S  Safety   - Room is clean, patient is clean, and equipment is clean. - Hallways are clear from equipment besides carts. - Fall bracelet on for fall risk patients  - Ensure room is clear and free of clutter  - Suction is set up for ALL patients (with ellaker)  - Hallways are clear from equipment besides carts.    - Isolation precautions followed, supplies available outside room, sign posted     Elham Hodges

## 2017-07-13 NOTE — PROGRESS NOTES
Infectious Disease progress Note    Requested by: dr. Wes quiroz    Reason: infected right foot ulcer    Current abx Prior abx   Pip/tazo since 7/6 Vancomycin x1 on 7/6     Lines:       Assessment :    58 y.o. Male with a hx of HTN,  who presented to the ED on 7/6/17 for evaluation of an ulcer to the right dorsal foot that was noted by his son several days ago. Clinical picture consistent with right foot cellulitis, infected right foot ulcer secondary to Proteus, citrobacter, e.coli, mssa, e.fecalis, e.avium, strep viridans. Surgical wound cultures 7/11 : proteus, second gram negative    No evidence of osteomyelitis clinically or per MRI. S/p I&D of right foot ulcer on 7/11- intra op findings noted - no evidence of bone infection. Recommendations:    1. discontinue pip/tazo. Start po ciprofloxacin, amoxicillin/clavulanate for 10 days  2. Wound care per dr. Nathaly Wayne to d/c from id standpoint     Above plan was discussed in details with patient, RN. Please call me if any further questions or concerns. Will continue to participate in the care of this patient. subjective:    Patient denies headaches, visual disturbances, sore throat, runny nose, earaches, cp, sob, chills, cough, abdominal pain, diarrhea, burning micturition,  or weakness in extremities. He denies back pain/flank pain. Denies increased right foot pain. Home Medication List    Details   !! baclofen (LIORESAL) 20 mg tablet       !! chlorproMAZINE (THORAZINE) 25 mg tablet       tamsulosin (FLOMAX) 0.4 mg capsule Take 0.4 mg by mouth daily. 1 tab daily      furosemide (LASIX) 40 mg tablet Take 40 mg by mouth daily. !! atorvastatin (LIPITOR) 20 mg tablet Take 20 mg by mouth daily. !! chlorproMAZINE (THORAZINE) 50 mg tablet Take 1 Tab by mouth three (3) times daily as needed.  Indications: INTRACTABLE HICCUPS  Qty: 9 Tab, Refills: 0      MULTIVITAMIN,THERAPEUTIC (THERA MULTI-VITAMIN PO) Take 500 mg by mouth daily.      LORazepam (ATIVAN) 0.5 mg tablet Take 1-2 Tabs by mouth every eight (8) hours as needed for Anxiety (or panic attacks). Max Daily Amount: 3 mg. Qty: 40 Tab, Refills: 0      oxyCODONE-acetaminophen (PERCOCET) 5-325 mg per tablet Take 1 Tab by mouth every four (4) hours as needed. Max Daily Amount: 6 Tabs. Qty: 40 Tab, Refills: 0      !! baclofen (LIORESAL) 10 mg tablet Take 1 Tab by mouth every twelve (12) hours as needed. Qty: 15 Tab, Refills: 0      amLODIPine (NORVASC) 10 mg tablet Take 1 Tab by mouth daily. Qty: 30 Tab, Refills: 2      metoprolol (LOPRESSOR) 25 mg tablet Take 1 Tab by mouth two (2) times a day. Qty: 60 Tab, Refills: 2      cyanocobalamin 1,000 mcg tablet Take 1 Tab by mouth daily. Qty: 30 Tab, Refills: 2      folic acid (FOLVITE) 1 mg tablet Take 1 Tab by mouth daily. Qty: 30 Tab, Refills: 1      !! atorvastatin (LIPITOR) 20 mg tablet 1 Tab by Per G Tube route nightly. Qty: 30 Tab, Refills: 0      aspirin 81 mg chewable tablet Take 1 Tab by mouth daily. Qty: 30 Tab, Refills: 0       !! - Potential duplicate medications found. Please discuss with provider.           Current Facility-Administered Medications   Medication Dose Route Frequency    senna-docusate (PERICOLACE) 8.6-50 mg per tablet 2 Tab  2 Tab Oral QHS    polyethylene glycol (MIRALAX) packet 17 g  17 g Oral DAILY    ferrous sulfate tablet 325 mg  1 Tab Oral DAILY WITH BREAKFAST    ascorbic acid (vitamin C) (VITAMIN C) tablet 250 mg  250 mg Oral DAILY    ondansetron (ZOFRAN) injection 4 mg  4 mg IntraVENous Q4H PRN    heparin (porcine) injection 5,000 Units  5,000 Units SubCUTAneous Q8H    amLODIPine (NORVASC) tablet 10 mg  10 mg Oral DAILY    aspirin chewable tablet 81 mg  81 mg Oral DAILY    atorvastatin (LIPITOR) tablet 20 mg  20 mg Oral QHS    chlorproMAZINE (THORAZINE) tablet 25 mg  25 mg Oral QID    cyanocobalamin tablet 1,000 mcg  1,000 mcg Oral DAILY    furosemide (LASIX) tablet 40 mg  40 mg Oral DAILY    LORazepam (ATIVAN) tablet 0.5-1 mg  0.5-1 mg Oral Q8H PRN    therapeutic multivitamin (THERAGRAN) tablet 1 Tab  1 Tab Oral DAILY    tamsulosin (FLOMAX) capsule 0.4 mg  0.4 mg Oral DAILY    piperacillin-tazobactam (ZOSYN) 3.375 g in 0.9% sodium chloride (MBP/ADV) 100 mL MBP  3.375 g IntraVENous Q6H    oxyCODONE-acetaminophen (PERCOCET) 5-325 mg per tablet 1 Tab  1 Tab Oral Q6H PRN    acetaminophen (TYLENOL) tablet 500 mg  500 mg Oral Q6H PRN       Allergies: Review of patient's allergies indicates no known allergies. Temp (24hrs), Av.3 °F (36.8 °C), Min:97.5 °F (36.4 °C), Max:98.8 °F (37.1 °C)    Visit Vitals    /88 (BP 1 Location: Right arm, BP Patient Position: At rest)    Pulse 90    Temp 98.3 °F (36.8 °C)    Resp 20    Ht 5' 11\" (1.803 m)    Wt 89.7 kg (197 lb 11.2 oz)    SpO2 99%    BMI 27.57 kg/m2       ROS: 12 point ROS obtained in details. Pertinent positives as mentioned in HPI,   otherwise negative    Physical Exam:    Constitutional: He is oriented to person, place, and time. He appears well-developed and well-nourished. No distress. HENT:   Head: Normocephalic and atraumatic. Mouth/Throat: Oropharynx is clear and moist.   Eyes: Conjunctivae are normal. Pupils are equal, round, and reactive to light. No scleral icterus. Neck: Normal range of motion. Neck supple. Cardiovascular: Normal rate and intact distal pulses. Pulmonary/Chest: Effort normal and breath sounds normal. No respiratory distress. He has no wheezes. Abdominal: Soft. Bowel sounds are normal. He exhibits no distension. There is no tenderness. Musculoskeletal: Normal range of motion. Tenderness left foot. Decreased bilateral LE swelling. Wound vac recent surgical site left foot  Lymphadenopathy:     He has no cervical adenopathy. Neurological: He is alert and oriented to person, place, and time. No cranial nerve deficit. Skin: Skin is warm and dry. He is not diaphoretic.        Labs: Results:   Chemistry Recent Labs      07/12/17   0236  07/11/17   0326   GLU  83  109*   NA  139  138   K  3.7  4.5   CL  104  105   CO2  28  27   BUN  11  20*   CREA  0.90  1.06   CA  8.6  8.9   AGAP  7  6   BUCR  12  19      CBC w/Diff Recent Labs      07/12/17   0236  07/11/17   0326   WBC  4.7  4.9   RBC  4.09*  4.05*   HGB  10.8*  10.8*   HCT  33.2*  32.5*   PLT  199  255   GRANS  61  66   LYMPH  22  14*   EOS  9*  8*      Microbiology Recent Labs      07/11/17   1755   CULT  NO ANAEROBES ISOLATED 2 DAYS  FEW PROTEUS MIRABILIS*          RADIOLOGY:    All available imaging studies/reports in [facility] for this admission were reviewed    Dr. Clara Portillo, Infectious Disease Specialist  [phone]  July 13, 2017  9:13 AM

## 2017-07-14 LAB
BACTERIA SPEC CULT: ABNORMAL
BACTERIA SPEC CULT: ABNORMAL
GRAM STN SPEC: ABNORMAL
GRAM STN SPEC: ABNORMAL
SERVICE CMNT-IMP: ABNORMAL

## 2017-07-14 PROCEDURE — 74011000258 HC RX REV CODE- 258: Performed by: INTERNAL MEDICINE

## 2017-07-14 PROCEDURE — 97110 THERAPEUTIC EXERCISES: CPT

## 2017-07-14 PROCEDURE — 74011250637 HC RX REV CODE- 250/637: Performed by: HOSPITALIST

## 2017-07-14 PROCEDURE — 65270000029 HC RM PRIVATE

## 2017-07-14 PROCEDURE — 74011250636 HC RX REV CODE- 250/636: Performed by: INTERNAL MEDICINE

## 2017-07-14 PROCEDURE — 74011250637 HC RX REV CODE- 250/637: Performed by: INTERNAL MEDICINE

## 2017-07-14 RX ORDER — AMOXICILLIN AND CLAVULANATE POTASSIUM 875; 125 MG/1; MG/1
1 TABLET, FILM COATED ORAL 2 TIMES DAILY
Qty: 20 TAB | Refills: 0 | Status: SHIPPED
Start: 2017-07-14 | End: 2017-07-19

## 2017-07-14 RX ORDER — OXYCODONE AND ACETAMINOPHEN 5; 325 MG/1; MG/1
1 TABLET ORAL
Qty: 20 TAB | Refills: 0 | Status: ON HOLD | OUTPATIENT
Start: 2017-07-14 | End: 2017-08-30

## 2017-07-14 RX ORDER — OXYCODONE AND ACETAMINOPHEN 5; 325 MG/1; MG/1
1 TABLET ORAL
Qty: 20 TAB | Refills: 0 | Status: SHIPPED | OUTPATIENT
Start: 2017-07-14 | End: 2017-07-14

## 2017-07-14 RX ADMIN — CHLORPROMAZINE HYDROCHLORIDE 25 MG: 25 TABLET, SUGAR COATED ORAL at 22:00

## 2017-07-14 RX ADMIN — PIPERACILLIN SODIUM,TAZOBACTAM SODIUM 3.38 G: 3; .375 INJECTION, POWDER, FOR SOLUTION INTRAVENOUS at 21:01

## 2017-07-14 RX ADMIN — AMLODIPINE BESYLATE 10 MG: 10 TABLET ORAL at 08:42

## 2017-07-14 RX ADMIN — FUROSEMIDE 40 MG: 40 TABLET ORAL at 08:42

## 2017-07-14 RX ADMIN — ASPIRIN 81 MG 81 MG: 81 TABLET ORAL at 08:42

## 2017-07-14 RX ADMIN — CHLORPROMAZINE HYDROCHLORIDE 25 MG: 25 TABLET, SUGAR COATED ORAL at 15:16

## 2017-07-14 RX ADMIN — Medication 325 MG: at 08:42

## 2017-07-14 RX ADMIN — PIPERACILLIN SODIUM,TAZOBACTAM SODIUM 3.38 G: 3; .375 INJECTION, POWDER, FOR SOLUTION INTRAVENOUS at 08:42

## 2017-07-14 RX ADMIN — Medication 2 TABLET: at 22:00

## 2017-07-14 RX ADMIN — CYANOCOBALAMIN TAB 1000 MCG 1000 MCG: 1000 TAB at 08:42

## 2017-07-14 RX ADMIN — CHLORPROMAZINE HYDROCHLORIDE 25 MG: 25 TABLET, SUGAR COATED ORAL at 18:47

## 2017-07-14 RX ADMIN — HEPARIN SODIUM 5000 UNITS: 5000 INJECTION, SOLUTION INTRAVENOUS; SUBCUTANEOUS at 02:10

## 2017-07-14 RX ADMIN — HEPARIN SODIUM 5000 UNITS: 5000 INJECTION, SOLUTION INTRAVENOUS; SUBCUTANEOUS at 18:47

## 2017-07-14 RX ADMIN — THERA TABS 1 TABLET: TAB at 08:42

## 2017-07-14 RX ADMIN — POLYETHYLENE GLYCOL 3350 17 G: 17 POWDER, FOR SOLUTION ORAL at 08:45

## 2017-07-14 RX ADMIN — OXYCODONE AND ACETAMINOPHEN 1 TABLET: 5; 325 TABLET ORAL at 18:46

## 2017-07-14 RX ADMIN — HEPARIN SODIUM 5000 UNITS: 5000 INJECTION, SOLUTION INTRAVENOUS; SUBCUTANEOUS at 11:10

## 2017-07-14 RX ADMIN — TAMSULOSIN HYDROCHLORIDE 0.4 MG: 0.4 CAPSULE ORAL at 08:42

## 2017-07-14 RX ADMIN — PIPERACILLIN SODIUM,TAZOBACTAM SODIUM 3.38 G: 3; .375 INJECTION, POWDER, FOR SOLUTION INTRAVENOUS at 02:11

## 2017-07-14 RX ADMIN — ATORVASTATIN CALCIUM 20 MG: 20 TABLET, FILM COATED ORAL at 22:00

## 2017-07-14 RX ADMIN — OXYCODONE AND ACETAMINOPHEN 1 TABLET: 5; 325 TABLET ORAL at 08:42

## 2017-07-14 RX ADMIN — PIPERACILLIN SODIUM,TAZOBACTAM SODIUM 3.38 G: 3; .375 INJECTION, POWDER, FOR SOLUTION INTRAVENOUS at 15:16

## 2017-07-14 RX ADMIN — Medication 250 MG: at 08:42

## 2017-07-14 RX ADMIN — CHLORPROMAZINE HYDROCHLORIDE 25 MG: 25 TABLET, SUGAR COATED ORAL at 08:42

## 2017-07-14 NOTE — DISCHARGE SUMMARY
[id number]  TRANSFER SUMMARY    Name:  Malcom Jacobs  MR#:  [id number]  :  1955  Account #:  [de-identified]  Date of Adm:  2017  Date of Transfer:      DIAGNOSES:  1. Infected wound on right foot status post debridement by Podiatry  and Wound VAC. 2.  Iron deficiency anemia. 3.  Debilitated and bed and wheelchair bound for the last 3 years. 4.  Infestation of wound and maggots on initial presentation. 5.  Hypertension. 6.  Right foot cellulitis. DISCHARGE MEDICATIONS:  1. Augmentin 875/125 mg one tablet p.o. twice daily for 10 more days. 2.  Vitamin C 250 mg p.o. daily. 3.  Cipro 500 mg p.o. twice daily for 10 days. 4.  Ferrous sulfate 325 mg p.o. daily. 5.  MiraLax 17 g p.o. daily. 6.  Florastor 250 mg p.o. twice daily for 14 days. 7.  PeriColace 1 tablet p.o. daily at night, hold for loose stools. 8.  Lipitor 20 mg p.o. daily. 9.  Baclofen 10 mg p.o. every 12 hours. 10.  Thorazine 25 mg p.o. 4 times daily. 11.  Lopressor 25 mg p.o. 2 times daily. 12.  Norvasc 10 mg p.o. daily. 13.  Aspirin 81 mg p.o. daily. 14.  Vitamin B12 1000 mcg p.o. daily. 15.  Flomax 0.4 mg p.o. daily. 16.  Folic acid 1 mg p.o. daily. 17.  Lasix 40 mg p.o. daily. 18.  Percocet 5/325 mg 1 tablet p.o. every 4 hours p.r.n. pain. 19.  Multivitamin 1 tablet p.o. daily. INSTRUCTIONS:  1. Diet:  Cardiac regular. Ensure Enlive once daily, Simon drink mix  twice daily. 2.  Activity as tolerated. 3.  All precautions, aspirations precautions, decubitus precautions. 4.  PT/OT evaluate and treat. 5.  Wound care and Wound VAC as directed by Podiatry. 6.  Check a CBC, CMP, magnesium in 3 days. Results to supervising  physician at the facility immediately. 7.  Follow up with PCP in 1 week. 8.  Follow up with Podiatry in 1 week. 9.  Incentive spirometry use as directed. 10.  Graded compression stockings on the left leg. Use as directed.     HOSPITAL COURSE:  This is a 59-year-old male who presented to the  ER with pain in his right foot. The patient was noted to have an  infected right foot ulcer. The patient was admitted and was started on  antibiotics. As per the chart, the patient had known history of CVA with  some residual weakness on the left side. Antibiotics were continued,  cultures were followed. Infectious Disease was consulted, Podiatry was  consulted. The patient subsequently underwent a debridement of the  wound. Wound care was done, wound VAC was placed. Infectious  Disease made recommendations for discharge antibiotics. PT/OT  were consulted and they have recommended skilled nursing facility. I  have discussed with the care manager and she advises me that the  patient will be transferred once a bed is available. I have discussed  discharge plans with the patient and he verbalized understanding and  agreed. Total time of the discharge more than 35 minutes.         Marianela French MD    VT / PAG  D:  07/14/2017   13:28  T:  07/14/2017   14:21  Job #:  [id number]

## 2017-07-14 NOTE — DISCHARGE SUMMARY
[id number]. D/w Patient regarding DNR and DDNR. Patient wishes to be full code and rescinds DNR. Patient is awake and alert.

## 2017-07-14 NOTE — PROGRESS NOTES
Bedside and Verbal shift change report given to Dionisio Hunt (oncoming nurse) by Marianela Carrillo LPN (offgoing nurse). Report included the following information SBAR, Kardex, MAR and Recent Results. SITUATION:    Code Status: Full Code   Reason for Admission: Right foot infection   In[facility]   Bilateral leg and foot pain   Foot ulcer ([facility]   Cellulitis   Intractable hiccups   DX    [facility] day: 8   Problem List:       Hospital Problems  Date Reviewed: 7/11/2017          Codes Class Noted POA    Right foot infection ICD-10-CM: L08.9  ICD-9-CM: 686.9  7/6/2017 Unknown        Bilateral leg and foot pain ICD-10-CM: M79.604, M79.605, M79.671, M79.672  ICD-9-CM: 729.5  7/6/2017 Unknown        Cellulitis ICD-10-CM: L03.90  ICD-9-CM: 682.9  12/20/2016 Unknown        Intractable hiccups (Chronic) ICD-10-CM: R06.6  ICD-9-CM: 786.8  9/2/2012 Unknown              BACKGROUND:    Past Medical History:   Past Medical History:   Diagnosis Date    Hiccups     Hypertension     Hyponatremia     Lacunar infarction ([facility] 2010    Lower extremity edema     Psychogenic polydipsia     Spinal stenosis          Patient taking anticoagulants yes Heparin    ASSESSMENT:    Changes in Assessment Throughout Shift: No     Patient has Central Line:No    Patient has Reid Cath: No     Last Vitals:     Vitals:    07/14/17 0016 07/14/17 0501 07/14/17 0735 07/14/17 1620   BP: 118/73 128/77 159/81 153/76   Pulse: (!) 56 83 83 81   Resp: 19 20 19 20   Temp: 97.1 °F (36.2 °C) 97 °F (36.1 °C) 98.6 °F (37 °C) 97.5 °F (36.4 °C)   TempSrc:       SpO2: 99% 99% 97% 98%   Weight:       Height:            IV and DRAINS (will only show if present)   Peripheral IV 07/14/17 Right Other(comment)-Site Assessment: Clean, dry, & intact  [REMOVED] Peripheral IV 07/06/17 Right Wrist-Site Assessment: Clean, dry, & intact     WOUND (if present)   Wound Type:  Foot/lower extrem.    Dressing present yes wound vac Wound Concerns/Notes: none     PAIN    Pain Assessment    Pain Intensity 1: 4 (07/14/17 1846)    Pain Location 1: Abdomen    Pain Intervention(s) 1: Medication (see MAR)    Patient Stated Pain Goal: 0  o Interventions for Pain:  Percocet   o Intervention effective: yes  o Time of last intervention: 1846  o    o Reassessment Completed: yes      Last 3 Weights:  Last 3 Recorded Weights in this Encounter    07/06/17 1354 07/12/17 2052 07/13/17 2103   Weight: 72.6 kg (160 lb) 89.7 kg (197 lb 11.2 oz) 90.8 kg (200 lb 1.6 oz)     Weight change: 1.089 kg (2 lb 6.4 oz)     INTAKE/OUPUT    Current Shift:      Last three shifts: 07/13 0701 - 07/14 1900  In: 565 [P.O.:565]  Out: -      LAB RESULTS     Recent Labs      07/12/17   0236   WBC  4.7   HGB  10.8*   HCT  33.2*   PLT  199        Recent Labs      07/12/17   0236   NA  139   K  3.7   GLU  83   BUN  11   CREA  0.90   CA  8.6       RECOMMENDATIONS AND DISCHARGE PLANNING     1. Pending tests/procedures/ Plan of Care or Other NeedsPT/OT    2. Discharge plan for patient and Needs/Barriers: SNF    3. Estimated Discharge Date: 7/17/2017 Posted on Whiteboard in [facility]: yes      4. The patient's care plan was reviewed with the oncoming nurse. \"HEALS\" SAFETY CHECK      Fall Risk    Total Score: 2    Safety Measures: Safety Measures: Bed/Chair-Wheels locked, Bed in low position, Call light within reach, Fall prevention (comment), Side rails X 3    A safety check occurred in the patient's room between off going nurse and oncoming nurse listed above.     The safety check included the below items  Area Items   H  High Alert Medications - Verify all high alert medication drips (heparin, PCA, etc.)   E  Equipment - Suction is set up for ALL patients (with yanker)  - Red plugs utilized for all equipment (IV pumps, etc.)  - WOWs wiped down at end of shift.  - Room stocked with oxygen, suction, and other unit-specific supplies   A  Alarms - Bed alarm is set for fall risk patients  - Ensure chair alarm is in place and activated if patient is up in a chair   L  Lines - Check IV for any infiltration  - Reid bag is empty if patient has a Reid   - Tubing and IV bags are labeled   S  Safety   - Room is clean, patient is clean, and equipment is clean. - Hallways are clear from equipment besides carts. - Fall bracelet on for fall risk patients  - Ensure room is clear and free of clutter  - Suction is set up for ALL patients (with yanker)  - Hallways are clear from equipment besides carts.    - Isolation precautions followed, supplies available outside room, sign posted     Ac Olea LPN

## 2017-07-14 NOTE — PROGRESS NOTES
conducted a Follow up consultation and Spiritual Assessment for Mahi Ramos, who is a 58 y. o.,male. The  provided the following Interventions:  Continued the relationship of care and support. Listened empathically. Offered prayer and assurance of continued prayer on patients behalf. Chart reviewed. The following outcomes were achieved:  Patient expressed gratitude for 's visit. Assessment:  There are no further spiritual or Congregation issues which require Spiritual Care Services interventions at this time. Plan:  Chaplains will continue to follow and will provide pastoral care on an as needed/requested basis.  recommends bedside caregivers page  on duty if patient shows signs of acute spiritual or emotional distress.        [address] [place] [address]   [phone]

## 2017-07-14 NOTE — PROGRESS NOTES
Problem: Mobility Impaired (Adult and Pediatric)  Goal: *Acute Goals and Plan of Care (Insert Text)  STG for 7.13.17 to be completed by 7.20.17 (7 days). 1. Pt will complete supine to/from sit (I) in order to complete EOB activity. 2. Pt will complete functional transfer to/from w/c vs chair with mod A in prep for OOB activity. 3. Pt will perform HEP for improved BLE strength (I). PHYSICAL THERAPY TREATMENT     Patient: Paul Ivory (51 y.o. male)  Date: 7/14/2017  Diagnosis: Right foot infection  Infected ulcer of skin (HCC)  Bilateral leg and foot pain  Foot ulcer ([address]  Cellulitis  Intractable hiccups  DX <principal problem not specified>  Procedure(s) (LRB):  DEBRIDEMENT AND DRAINAGE RIGHT FOOT (Right) 3 Days Post-Op  Precautions: Skin, Fall, PWB, WBAT  Chart, physical therapy assessment, plan of care and goals were reviewed. ASSESSMENT:  Pt seen today for follow-up physical therapy treatment. Still awaiting weight bearing clarification. Pt agreed to bed level therex. Wound vac in place dorsal aspect of R foot. Pt presents with significant LE weakness and ROM deficits today, requiring AAROM for Hip ABD, Knee Ext, Ankle pumps therex. Bilateral hamstrings hypertonic, hip tightness present restricting hip abduction, and little ankle DF/PF PROM or AROM. These impairments indicate pt likely moves very little. Pt would benefit from SNF for rehab to improve strength and ROM to increase independence with functional mobility and transfers. Progression toward goals:  [ ]      Improving appropriately and progressing toward goals  [X]      Improving slowly and progressing toward goals  [ ]      Not making progress toward goals and plan of care will be adjusted       PLAN:  Patient continues to benefit from skilled intervention to address the above impairments. Continue treatment per established plan of care.   Discharge Recommendations:  Kj Sheppard  Further Equipment Recommendations for Discharge:  TBD by next level of care          SUBJECTIVE:   Patient stated I'm leaving today.       OBJECTIVE DATA SUMMARY:   Critical Behavior:  Neurologic State: Alert, Eyes open spontaneously  Orientation Level: Oriented to person, Oriented to place, Oriented to time  Cognition: Follows commands     Therapeutic Exercises:   AAROM: Hip Abd 10x ea, Knee Ext 10x ea  PROM: Ankle PF/DF  AROM: Ankle Pumps 30x, Knee Flex 10x ea  Pain:  Pain prior to treatment: 0/10  Pain after treatment: 0/10     Activity Tolerance:   Good  Please refer to the flowsheet for vital signs taken during this treatment.   After treatment:   [ ] Patient left in no apparent distress sitting up in chair  [X] Patient left in no apparent distress in bed  [X] Call bell left within reach  [ ] Nursing notified  [ ] Caregiver present  [ ] Bed alarm activated      Bolivar Morrow PT, DPT   Time Calculation: 12 mins

## 2017-07-14 NOTE — PROGRESS NOTES
NUTRITION    Nursing Referral: Pressure Ulcer      RECOMMENDATIONS / PLAN:     - Continue with current nutrition interventions  - Continue RD inpatient monitoring and evaluation. NUTRITION INTERVENTIONS & DIAGNOSIS:     [x] Meals/Snacks: modified diet  [x] Medical food supplementation: Ensure Enlive once daily; Simon BID    Nutrition Diagnosis:  Unintentional weight loss related to inadequate energy intake as evidenced by weight loss of 15 lb PTA  Increased protein needs related to promotion of wound healing as evidenced by pressure ulcer wounds    ASSESSMENT:     7/14: Pt reported good appetite. Meal intake is variable sometimes, but usually good, >75%, per pt. Consuming supplements. Pt denied having any concerns at time of visit    7/7: Pt reported good appetite and meal intake PTA and since admission. Reported consuming 100% of meals. Pt reported experiencing unplanned weight loss PTA. Noted skin breakdown.  Discussed adding nutrition supplement; pt agreed with plan    Average po intake adequate to meet patients estimated nutritional needs:   [x] Yes     [] No   [] Unable to determine at this time    Diet: DIET NUTRITIONAL SUPPLEMENTS Breakfast; ENSURE ENLIVE  DIET NUTRITIONAL SUPPLEMENTS Lunch, Dinner; [address] MIX  DIET CARDIAC Regular      Food Allergies:  None known   Current Appetite:   [x] Good     [] Fair     [] Poor     [] Other:  Appetite/meal intake prior to admission:   [x] Good     [] Fair     [] Poor     [] Other:  Feeding Limitations:  [] Swallowing difficulty    [] Chewing difficulty    [] Other:  Current Meal Intake:   Patient Vitals for the past 100 hrs:   % Diet Eaten   07/13/17 0905 30 %       BM:  7/14  Skin Integrity:  Vascular left leg wound; vascular right foot wound; vascular right leg wound; stage II pressure ulcer on buttocks; pressure ulcer on right foot  Edema:  +1 LEs  Pertinent Medications: Reviewed    Recent Labs      07/12/17   0236   NA  139   K  3.7   CL  104   CO2  28 GLU  83   BUN  11   CREA  0.90   CA  8.6     No intake or output data in the 24 hours ending 07/14/17 1515    Anthropometrics:  Ht Readings from Last 1 Encounters:   07/06/17 5' 11\" (1.803 m)     Last 3 Recorded Weights in this Encounter    07/06/17 1354 07/12/17 2052 07/13/17 2103   Weight: 72.6 kg (160 lb) 89.7 kg (197 lb 11.2 oz) 90.8 kg (200 lb 1.6 oz)     Body mass index is 27.91 kg/(m^2). Weight History:  Pt reported experiencing unplanned weight loss of 15 lb (8.6%) PTA; pt unsure of time frame of weight trends    Weight Metrics 7/13/2017 7/6/2017 7/6/2017 7/6/2017 6/24/2017 6/22/2017 5/26/2017   Weight 200 lb 1.6 oz - 200 lb - 200 lb 182 lb 167 lb   BMI - 27.91 kg/m2 - 27.89 kg/m2 27.89 kg/m2 29.38 kg/m2 23.29 kg/m2        Admitting Diagnosis: Right foot infection  Infected ulcer of skin (HCC)  Bilateral leg and foot pain  Foot ulcer (HCC)  Cellulitis  Intractable hiccups  DX  Pertinent PMHx:  hypertension    Education Needs:        [x] None identified  [] Identified - Not appropriate at this time  []  Identified and addressed - refer to education log  Learning Limitations:   [x] None identified  [] Identified    Cultural, Sikhism & ethnic food preferences:  [x] None identified    [] Identified and addressed     ESTIMATED NUTRITION NEEDS:     Calories: 3985-7455 kcal (30-33 kcal/kg) based on  [x] Actual BW: 73 kg      [] IBW   Protein:  gm (1.2-1.4 gm/kg) based on  [x] Actual BW      [] IBW   Fluid: 1 mL/kcal     MONITORING & EVALUATION:     Nutrition Goal(s):   1. Po intake of meals will meet >75% of patient estimated nutritional needs within the next 7 days.   Outcome:  [x] Met/Ongoing    []  Not Met    [] New/Initial Goal     Monitoring:   [x] Diet tolerance   [x] Meal intake   [x] Supplement intake   [] GI symptoms/ability to tolerate po diet   [] Respiratory status   [] Plan of care      Previous Recommendations (for follow-up assessments only):     [x]   Implemented       []   Not Implemented (RD to address)     [] No Recommendation Made     Discharge Planning:   Regular diet  [x] Participated in care planning, discharge planning, & interdisciplinary rounds as appropriate      Albertina Buitrago, [address] [place] [address]   Pager: [phone]

## 2017-07-14 NOTE — PROGRESS NOTES
6732 Received in bed A/OX3. Aware of name, place situation. Side rails X3. Call bell phone within reach. Appears to be in no resp. distress. C/o pain to abd. area, aching 8/10. Medicated with Percocet 1 po.

## 2017-07-14 NOTE — PROGRESS NOTES
Meeting with this pt and his son and reported that PT and OT are recommending SNF placement prior to this pt returning home. Pt and son are in favor of this plan for rehab prior to returning home. Spoke with CM from [facility] who is requesting to speak with pt's son, Daryl Mcleod [phone] ext. [phone].

## 2017-07-14 NOTE — ROUTINE PROCESS
Bedside and Verbal shift change report given to Lidia Fallon (oncoming nurse) by Kishore Carmona (offgoing nurse). Report included the following information SBAR, Kardex, MAR and Recent Results.     SITUATION:    Code Status: DNR  Reason for Admission: Right foot infection  Infected ulcer of skin ([address]  Bilateral leg and foot pain  Foot ulcer ([address]  Cellulitis  Intractable hiccups   DX    [facility] day: 8   Problem List:       Hospital Problems  Date Reviewed: 7/11/2017          Codes Class Noted POA    Right foot infection ICD-10-CM: L08.9  ICD-9-CM: 686.9  7/6/2017 Unknown        Bilateral leg and foot pain ICD-10-CM: M79.604, M79.605, M79.671, M79.672  ICD-9-CM: 729.5  7/6/2017 Unknown        Cellulitis ICD-10-CM: L03.90  ICD-9-CM: 682.9  12/20/2016 Unknown        Intractable hiccups (Chronic) ICD-10-CM: R06.6  ICD-9-CM: 786.8  9/2/2012 Unknown              BACKGROUND:    Past Medical History:   Past Medical History:   Diagnosis Date    Hiccups     Hypertension     Hyponatremia     [facility]ar infarction [facility] [address] 2010    Lower extremity edema     Psychogenic polydipsia     Spinal stenosis          Patient taking anticoagulants yes     ASSESSMENT:    Changes in Assessment Throughout Shift: S/P DEBRIDMENT     Patient has Central Line: no Reasons if yes:    Patient has Reid Cath: no Reasons if yes:       Last Vitals:     Vitals:    07/13/17 2103 07/14/17 0016 07/14/17 0501 07/14/17 0735   BP:  118/73 128/77 159/81   Pulse:  (!) 56 83 83   Resp:  19 20 19   Temp:  97.1 °F (36.2 °C) 97 °F (36.1 °C) 98.6 °F (37 °C)   TempSrc:       SpO2:  99% 99% 97%   Weight: 90.8 kg (200 lb 1.6 oz)      Height:            IV and DRAINS (will only show if present)   Peripheral IV 07/14/17 Right Other(comment)-Site Assessment: Clean, dry, & intact  [REMOVED] Peripheral IV 07/06/17 Right Wrist-Site Assessment: Clean, dry, & intact     WOUND (if present)   Wound Type:  YES   Dressing present Dressing Present : Yes   Wound Concerns/Notes:       PAIN    Pain Assessment    Pain Intensity 1: 10 (07/13/17 2109)    Pain Location 1: Foot    Pain Intervention(s) 1: Medication (see MAR)    Patient Stated Pain Goal: 0  o Interventions for Pain:  YES  o Intervention effective: yes  o Time of last intervention:    o Reassessment Completed: yes      Last 3 Weights:  Last 3 Recorded Weights in this Encounter    07/06/17 1354 07/12/17 2052 07/13/17 2103   Weight: 72.6 kg (160 lb) 89.7 kg (197 lb 11.2 oz) 90.8 kg (200 lb 1.6 oz)     Weight change: 1.089 kg (2 lb 6.4 oz)     INTAKE/OUPUT    Current Shift:      Last three shifts: 07/12 1901 - 07/14 0700  In: 345 [P.O.:345]  Out: 600 [Urine:600]     LAB RESULTS     Recent Labs      07/12/17   0236   WBC  4.7   HGB  10.8*   HCT  33.2*   PLT  199        Recent Labs      07/12/17   0236   NA  139   K  3.7   GLU  83   BUN  11   CREA  0.90   CA  8.6       RECOMMENDATIONS AND DISCHARGE PLANNING     1. Pending tests/procedures/ Plan of Care or Other Needs: WOUND CARE     2. Discharge plan for patient and Needs/Barriers: [facility]    3. Estimated Discharge Date: 7/14/17 Posted on Whiteboard in [facility]: yes      4. The patient's care plan was reviewed with the oncoming nurse. \"HEALS\" SAFETY CHECK      Fall Risk    Total Score: 2    Safety Measures: Safety Measures: Bed/Chair alarm on, Bed/Chair-Wheels locked, Bed in low position, Call light within reach, Fall prevention (comment), Side rails X2    A safety check occurred in the patient's room between off going nurse and oncoming nurse listed above.     The safety check included the below items  Area Items   H  High Alert Medications - Verify all high alert medication drips (heparin, PCA, etc.)   E  Equipment - Suction is set up for ALL patients (with yanker)  - Red plugs utilized for all equipment (IV pumps, etc.)  - WOWs wiped down at end of shift.  - Room stocked with oxygen, suction, and other unit-specific supplies   A  Alarms - Bed alarm is set for fall risk patients  - Ensure chair alarm is in place and activated if patient is up in a chair   L  Lines - Check IV for any infiltration  - Reid bag is empty if patient has a Reid   - Tubing and IV bags are labeled   S  Safety   - Room is clean, patient is clean, and equipment is clean. - Hallways are clear from equipment besides carts. - Fall bracelet on for fall risk patients  - Ensure room is clear and free of clutter  - Suction is set up for ALL patients (with yanker)  - Hallways are clear from equipment besides carts.    - Isolation precautions followed, supplies available outside room, sign posted     Elham Evans

## 2017-07-15 PROCEDURE — 74011250636 HC RX REV CODE- 250/636: Performed by: INTERNAL MEDICINE

## 2017-07-15 PROCEDURE — 74011000258 HC RX REV CODE- 258: Performed by: INTERNAL MEDICINE

## 2017-07-15 PROCEDURE — 74011250637 HC RX REV CODE- 250/637: Performed by: HOSPITALIST

## 2017-07-15 PROCEDURE — 97110 THERAPEUTIC EXERCISES: CPT

## 2017-07-15 PROCEDURE — 97530 THERAPEUTIC ACTIVITIES: CPT

## 2017-07-15 PROCEDURE — 74011250637 HC RX REV CODE- 250/637: Performed by: INTERNAL MEDICINE

## 2017-07-15 PROCEDURE — 65270000029 HC RM PRIVATE

## 2017-07-15 RX ADMIN — TAMSULOSIN HYDROCHLORIDE 0.4 MG: 0.4 CAPSULE ORAL at 08:25

## 2017-07-15 RX ADMIN — FUROSEMIDE 40 MG: 40 TABLET ORAL at 08:25

## 2017-07-15 RX ADMIN — POLYETHYLENE GLYCOL 3350 17 G: 17 POWDER, FOR SOLUTION ORAL at 08:25

## 2017-07-15 RX ADMIN — OXYCODONE AND ACETAMINOPHEN 1 TABLET: 5; 325 TABLET ORAL at 00:18

## 2017-07-15 RX ADMIN — LORAZEPAM 0.5 MG: 0.5 TABLET ORAL at 00:21

## 2017-07-15 RX ADMIN — CHLORPROMAZINE HYDROCHLORIDE 25 MG: 25 TABLET, SUGAR COATED ORAL at 17:05

## 2017-07-15 RX ADMIN — HEPARIN SODIUM 5000 UNITS: 5000 INJECTION, SOLUTION INTRAVENOUS; SUBCUTANEOUS at 09:33

## 2017-07-15 RX ADMIN — PIPERACILLIN SODIUM,TAZOBACTAM SODIUM 3.38 G: 3; .375 INJECTION, POWDER, FOR SOLUTION INTRAVENOUS at 01:55

## 2017-07-15 RX ADMIN — OXYCODONE AND ACETAMINOPHEN 1 TABLET: 5; 325 TABLET ORAL at 22:36

## 2017-07-15 RX ADMIN — CHLORPROMAZINE HYDROCHLORIDE 25 MG: 25 TABLET, SUGAR COATED ORAL at 12:12

## 2017-07-15 RX ADMIN — CHLORPROMAZINE HYDROCHLORIDE 25 MG: 25 TABLET, SUGAR COATED ORAL at 22:35

## 2017-07-15 RX ADMIN — ASPIRIN 81 MG 81 MG: 81 TABLET ORAL at 08:25

## 2017-07-15 RX ADMIN — Medication 325 MG: at 08:24

## 2017-07-15 RX ADMIN — PIPERACILLIN SODIUM,TAZOBACTAM SODIUM 3.38 G: 3; .375 INJECTION, POWDER, FOR SOLUTION INTRAVENOUS at 20:23

## 2017-07-15 RX ADMIN — AMLODIPINE BESYLATE 10 MG: 10 TABLET ORAL at 08:25

## 2017-07-15 RX ADMIN — THERA TABS 1 TABLET: TAB at 08:24

## 2017-07-15 RX ADMIN — Medication 250 MG: at 08:25

## 2017-07-15 RX ADMIN — CHLORPROMAZINE HYDROCHLORIDE 25 MG: 25 TABLET, SUGAR COATED ORAL at 08:24

## 2017-07-15 RX ADMIN — PIPERACILLIN SODIUM,TAZOBACTAM SODIUM 3.38 G: 3; .375 INJECTION, POWDER, FOR SOLUTION INTRAVENOUS at 08:25

## 2017-07-15 RX ADMIN — HEPARIN SODIUM 5000 UNITS: 5000 INJECTION, SOLUTION INTRAVENOUS; SUBCUTANEOUS at 17:05

## 2017-07-15 RX ADMIN — HEPARIN SODIUM 5000 UNITS: 5000 INJECTION, SOLUTION INTRAVENOUS; SUBCUTANEOUS at 01:56

## 2017-07-15 RX ADMIN — ATORVASTATIN CALCIUM 20 MG: 20 TABLET, FILM COATED ORAL at 22:35

## 2017-07-15 RX ADMIN — PIPERACILLIN SODIUM,TAZOBACTAM SODIUM 3.38 G: 3; .375 INJECTION, POWDER, FOR SOLUTION INTRAVENOUS at 13:43

## 2017-07-15 RX ADMIN — Medication 2 TABLET: at 22:35

## 2017-07-15 RX ADMIN — CYANOCOBALAMIN TAB 1000 MCG 1000 MCG: 1000 TAB at 08:24

## 2017-07-15 NOTE — PROGRESS NOTES
Bedside and Verbal shift change report given to Mary RIVERO (oncoming nurse) by Tonia Chiang (offgoing nurse). Report included the following information SBAR, Kardex, MAR and Recent Results.     SITUATION:  Code Status: Full Code  Reason for Admission: Right foot infection  Infected ulcer of skin ([facility] [address]  Bilateral leg and foot pain  Foot ulcer ([facility] [address]  Cellulitis  Intractable hiccups  DX  Hospital day: 9  Problem List:       Hospital Problems  Date Reviewed: 7/11/2017          Codes Class Noted POA    Right foot infection ICD-10-CM: L08.9  ICD-9-CM: 686.9  7/6/2017 Unknown        Bilateral leg and foot pain ICD-10-CM: M79.604, M79.605, M79.671, M79.672  ICD-9-CM: 729.5  7/6/2017 Unknown        Cellulitis ICD-10-CM: L03.90  ICD-9-CM: 682.9  12/20/2016 Unknown        Intractable hiccups (Chronic) ICD-10-CM: R06.6  ICD-9-CM: 786.8  9/2/2012 Unknown              BACKGROUND:   Past Medical History:   Past Medical History:   Diagnosis Date    Hiccups     Hypertension     Hyponatremia     Lacunar infarction ([facility] [address] 2010    Lower extremity edema     Psychogenic polydipsia     Spinal stenosis       Patient taking anticoagulants yes    Patient has a defibrillator: no    History of shots NO    Isolation History NO    ASSESSMENT:  Changes in Assessment Throughout Shift: NA  Significant Changes in 24 hours (for example, RR/code, fall)  Patient has Central Line: no   Patient has Reid Cath: no - incontinent   Mobility Issues  PT  IV Patency  OR Checklist  Pending Tests    Last Vitals:  Vitals w/ MEWS Score (last day)     Date/Time MEWS Score Pulse Resp Temp BP Level of Consciousness SpO2    07/15/17 1616 2 (!)  104 18 98.3 °F (36.8 °C) 110/71 Alert 98 %    07/15/17 1205 1 91 18 98.6 °F (37 °C) 125/77 Alert 97 %    07/15/17 0855 1 69 18 98 °F (36.7 °C) 137/81 Alert 94 %    07/15/17 0410 1 82 18 98.7 °F (37.1 °C) 136/72 Alert 98 %    07/15/17 0125 -- 81 18 97.8 °F (36.6 °C) 139/83 -- 98 %    07/14/17 2010 -- 89 20 97.3 °F (36.3 °C) 123/75 -- 96 %    07/14/17 1620 1 81 20 97.5 °F (36.4 °C) 153/76 Alert 98 %    07/14/17 0842 -- -- -- -- -- Alert --    07/14/17 0735 1 83 19 98.6 °F (37 °C) 159/81 Alert 97 %    07/14/17 0501 1 83 20 97 °F (36.1 °C) 128/77 Alert 99 %    07/14/17 0016 1 (!)  56 19 97.1 °F (36.2 °C) 118/73 Alert 99 %            PAIN    Pain Assessment    Pain Intensity 1: 0 (07/15/17 0125)    Pain Location 1: Abdomen    Pain Intervention(s) 1: Medication (see MAR)    Patient Stated Pain Goal: 0  Intervention effective: yes - denies pain       Last 3 Weights:  Last 3 Recorded Weights in this Encounter    07/12/17 2052 07/13/17 2103 07/15/17 0017   Weight: 89.7 kg (197 lb 11.2 oz) 90.8 kg (200 lb 1.6 oz) 90.7 kg (200 lb)   Weight change: -0.045 kg (-1.6 oz)    INTAKE/OUPUT    Current Shift: 07/15 0701 - 07/15 1900  In: 733 [P.O.:870; I.V.:100]  Out: -     Last three shifts: 07/13 1901 - 07/15 0700  In: 220 [P.O.:220]  Out: -     RECOMMENDATIONS AND DISCHARGE PLANNING  Patient needs and requests: NA    Pending tests/procedures: NA     Discharge plan for patient: to be determined     Discharge planning Needs or Barriers: NA    Estimated Discharge Date: to be determined Posted on Whiteboard in Patients Room: no       \"HEALS\" SAFETY CHECK  A safety check occurred in the patient's room between off going nurse and oncoming nurse listed above. The safety check included the below items:    H  High Alert Medications Verify all high alert medication drips (heparin, PCA, etc.)  E  Equipment Suction is set up for ALL patients (with yanker)  Red plugs utilized for all equipment (IV pumps, etc.)  WOWs wiped down at end of shift.   Room stocked with oxygen, suction, and other unit-specific supplies  A  Alarms Bed alarm is set for fall risk patients  Ensure chair alarm is in place and activated if patient is up in a chair  L  Lines Check IV for any infiltration  Reid bag is empty if patient has a Reid   Tubing and IV bags are labeled  S  Safety  Room is clean, patient is clean, and equipment is clean. Hallways are clear from equipment besides carts. Fall bracelet on for fall risk patients  Ensure room is clear and free of clutter  Suction is set up for ALL patients (with erika)  Hallways are clear from equipment besides carts.    Isolation precautions followed, supplies available outside room, sign posted    Elsie Adams

## 2017-07-15 NOTE — PROGRESS NOTES
Hospitalist Progress Note    Patient: Kain Sanders MRN: [id number]  CSN: [id number]    YOB: 1955  Age: 58 y.o. Sex: male    DOA: 7/6/2017 LOS:  LOS: 9 days          Patient sitting in bed in NAD    Assessment/Plan     1. Infected wound of the right foot which is new, as well as wounds to the bilateral legs, venous stasis ulcers. No OM on MRI. S/p procecure per podiatry 7/11/17. abx per ID. Wound vac per podiatry. 2. Iron deficiency anemia - supplement. 3. nonambulatory for the past 3 years and spends many days with his legs dependent sitting in his wheelchair  4. Counseled compliance  6. Infestation, maggots at presentation. 7. Hypertension controlled- monitor  8. Wheelchair bound at baseline. 9. Patient rescinded DNR status,   Dispo- await SNF, Discharge summary dictated on 7/14/17    Additional Notes:      Case discussed with:  [x]Patient  []Family  [x]Nursing  [x]Case Management  DVT Prophylaxis:  []Lovenox  [x]Hep SQ  []SCDs  []Coumadin   []On Heparin gtt    Vital signs/Intake and Output:  Visit Vitals    /71 (BP 1 Location: Left arm, BP Patient Position: At rest)    Pulse (!) 104    Temp 98.3 °F (36.8 °C)    Resp 18    Ht 5' 11\" (1.803 m)    Wt 90.7 kg (200 lb)    SpO2 98%    BMI 27.89 kg/m2     Current Shift:  07/15 0701 - 07/15 1900  In: 932 [P.O.:870; I.V.:100]  Out: -   Last three shifts:  07/13 1901 - 07/15 0700  In: 220 [P.O.:220]  Out: -     General:  Awake, alert  Cardiovascular:  S1S2+, RRR  Pulmonary:  CTA b/l  GI:  Soft, BS+, NT, ND  Extremities:  trace edema, right foot with dressing      Medications Reviewed      Labs: Results:       Chemistry No results for input(s): GLU, NA, K, CL, CO2, BUN, CREA, CA, AGAP, BUCR, TBIL, GPT, AP, TP, ALB, GLOB, AGRAT in the last 72 hours. CBC w/Diff No results for input(s): WBC, RBC, HGB, HCT, PLT, GRANS, LYMPH, EOS, HGBEXT, HCTEXT, PLTEXT, HGBEXT, HCTEXT, PLTEXT in the last 72 hours.    Cardiac Enzymes No results for input(s): CPK, CKND1, B[place] in the last 72 hours. No lab exists for component: CKRMB, TROIP   Coagulation No results for input(s): PTP, INR, APTT in the last 72 hours. No lab exists for component: INREXT, INREXT    Lipid Panel Lab Results   Component Value Date/Time    Cholesterol, total 107 12/20/2011 04:00 AM    HDL Cholesterol 39 12/20/2011 04:00 AM    LDL, calculated 54.4 12/20/2011 04:00 AM    VLDL, calculated 13.6 12/20/2011 04:00 AM    Triglyceride 68 12/20/2011 04:00 AM    CHOL/HDL Ratio 2.7 12/20/2011 04:00 AM      BNP No results for input(s): BNPP in the last 72 hours. Liver Enzymes No results for input(s): TP, ALB, TBIL, AP, SGOT, GPT in the last 72 hours.     No lab exists for component: DBIL   Thyroid Studies Lab Results   Component Value Date/Time    TSH 0.80 07/02/2016 10:56 AM        Procedures/imaging: see electronic medical records for all procedures/Xrays and details which were not copied into this note but were reviewed prior to creation of Eli Yepez MD

## 2017-07-15 NOTE — PROGRESS NOTES
Problem: Mobility Impaired (Adult and Pediatric)  Goal: *Acute Goals and Plan of Care (Insert Text)  STG for 7.13.17 to be completed by 7.20.17 (7 days). 1. Pt will complete supine to/from sit (I) in order to complete EOB activity. 2. Pt will complete functional transfer to/from w/c vs chair with mod A in prep for OOB activity. 3. Pt will perform HEP for improved BLE strength (I). PHYSICAL THERAPY TREATMENT     Patient: Kirti Reed (77 y.o. male)  Date: 7/15/2017  Diagnosis: Right foot infection  Infected ulcer of skin (HCC)  Bilateral leg and foot pain  Foot ulcer ([address]  Cellulitis  Intractable hiccups  DX <principal problem not specified>  Procedure(s) (LRB):  DEBRIDEMENT AND DRAINAGE RIGHT FOOT (Right) 4 Days Post-Op  Precautions: Skin, Fall, PWB, WBAT  Chart, physical therapy assessment, plan of care and goals were reviewed. ASSESSMENT:  Pt  Performed supine  And  Seated  There ex  Fairly  Well. Required  Min (A) (B) LE  With  Supine  To sit. Pt  Performed  Sit  To stand  2x   Min/mod  (A). Attempt  Ambulation but  Pt  Was  unable  Due  To pain. Progression toward goals:  [X]      Improving appropriately and progressing toward goals  [ ]      Improving slowly and progressing toward goals  [ ]      Not making progress toward goals and plan of care will be adjusted       PLAN:  Patient continues to benefit from skilled intervention to address the above impairments. Continue treatment per established plan of care. Discharge Recommendations:  Rehab and Home Health  Further Equipment Recommendations for Discharge:  bedside commode, rolling walker and N/A       G-CODES:             SUBJECTIVE:   Patient stated Im  ok.       OBJECTIVE DATA SUMMARY:   Critical Behavior:  Neurologic State: Alert, Eyes open spontaneously  Orientation Level: Oriented X4  Cognition: Appropriate decision making, Appropriate for age attention/concentration, Appropriate safety awareness, Follows commands     Functional Mobility Training:  Bed Mobility:  Rolling: Minimum assistance  Supine to Sit: Minimum assistance ((B)  LE)  Sit to Supine: Minimum assistance ((B) LE)                       Transfers:  Sit to Stand: Minimum assistance; Moderate assistance  Stand to Sit: Minimum assistance            Balance:  Sitting: Intact; With support  Sitting - Static: Poor (constant support)  Sitting - Dynamic: Poor (constant support)  Standing: Impaired;Pull to stand  Standing - Static: Constant support  Standing - Dynamic : Poor  Ambulation/Gait Training:  Attempt  But  Pt  Experienced  Pain. Therapeutic Exercises:   Supine:  SLR/AP  Quad  Sets  X  10  Each  Seated :  Marches/LAQ  X  10 each  Pain:  Pain Scale 1: Visual  Pain Intensity 1: 0  Pain Location 1: Abdomen  Pain Orientation 1: Anterior  Pain Description 1: Aching  Pain Intervention(s) 1: Medication (see MAR)  Activity Tolerance:   fair  Please refer to the flowsheet for vital signs taken during this treatment.   After treatment:   [ ] Patient left in no apparent distress sitting up in chair  [X] Patient left in no apparent distress in bed  [X] Call bell left within reach  [ ] Nursing notified  [ ] Caregiver present  [X] Bed alarm activated      Tomeka Lamar PTA   Time Calculation: 25 mins

## 2017-07-16 LAB
BACTERIA SPEC CULT: NORMAL
ERYTHROCYTE [DISTWIDTH] IN BLOOD BY AUTOMATED COUNT: 14.2 % (ref 11.6–14.5)
HCT VFR BLD AUTO: 33.8 % (ref 36–48)
HGB BLD-MCNC: 11 G/DL (ref 13–16)
MCH RBC QN AUTO: 26.1 PG (ref 24–34)
MCHC RBC AUTO-ENTMCNC: 32.5 G/DL (ref 31–37)
MCV RBC AUTO: 80.3 FL (ref 74–97)
PLATELET # BLD AUTO: 236 K/UL (ref 135–420)
PMV BLD AUTO: 10.7 FL (ref 9.2–11.8)
RBC # BLD AUTO: 4.21 M/UL (ref 4.7–5.5)
SERVICE CMNT-IMP: NORMAL
WBC # BLD AUTO: 5.7 K/UL (ref 4.6–13.2)

## 2017-07-16 PROCEDURE — 77030010545

## 2017-07-16 PROCEDURE — 85027 COMPLETE CBC AUTOMATED: CPT | Performed by: INTERNAL MEDICINE

## 2017-07-16 PROCEDURE — 36415 COLL VENOUS BLD VENIPUNCTURE: CPT | Performed by: INTERNAL MEDICINE

## 2017-07-16 PROCEDURE — 97530 THERAPEUTIC ACTIVITIES: CPT

## 2017-07-16 PROCEDURE — 65270000029 HC RM PRIVATE

## 2017-07-16 PROCEDURE — 74011250637 HC RX REV CODE- 250/637: Performed by: HOSPITALIST

## 2017-07-16 PROCEDURE — 74011000258 HC RX REV CODE- 258: Performed by: INTERNAL MEDICINE

## 2017-07-16 PROCEDURE — 74011250637 HC RX REV CODE- 250/637: Performed by: INTERNAL MEDICINE

## 2017-07-16 PROCEDURE — 74011250636 HC RX REV CODE- 250/636: Performed by: INTERNAL MEDICINE

## 2017-07-16 RX ADMIN — THERA TABS 1 TABLET: TAB at 08:30

## 2017-07-16 RX ADMIN — CYANOCOBALAMIN TAB 1000 MCG 1000 MCG: 1000 TAB at 08:30

## 2017-07-16 RX ADMIN — Medication 250 MG: at 08:30

## 2017-07-16 RX ADMIN — CHLORPROMAZINE HYDROCHLORIDE 25 MG: 25 TABLET, SUGAR COATED ORAL at 17:46

## 2017-07-16 RX ADMIN — PIPERACILLIN SODIUM,TAZOBACTAM SODIUM 3.38 G: 3; .375 INJECTION, POWDER, FOR SOLUTION INTRAVENOUS at 01:44

## 2017-07-16 RX ADMIN — ATORVASTATIN CALCIUM 20 MG: 20 TABLET, FILM COATED ORAL at 22:04

## 2017-07-16 RX ADMIN — POLYETHYLENE GLYCOL 3350 17 G: 17 POWDER, FOR SOLUTION ORAL at 08:30

## 2017-07-16 RX ADMIN — PIPERACILLIN SODIUM,TAZOBACTAM SODIUM 3.38 G: 3; .375 INJECTION, POWDER, FOR SOLUTION INTRAVENOUS at 08:28

## 2017-07-16 RX ADMIN — Medication 325 MG: at 08:30

## 2017-07-16 RX ADMIN — CHLORPROMAZINE HYDROCHLORIDE 25 MG: 25 TABLET, SUGAR COATED ORAL at 08:30

## 2017-07-16 RX ADMIN — HEPARIN SODIUM 5000 UNITS: 5000 INJECTION, SOLUTION INTRAVENOUS; SUBCUTANEOUS at 17:46

## 2017-07-16 RX ADMIN — Medication 2 TABLET: at 22:05

## 2017-07-16 RX ADMIN — PIPERACILLIN SODIUM,TAZOBACTAM SODIUM 3.38 G: 3; .375 INJECTION, POWDER, FOR SOLUTION INTRAVENOUS at 20:00

## 2017-07-16 RX ADMIN — AMLODIPINE BESYLATE 10 MG: 10 TABLET ORAL at 08:30

## 2017-07-16 RX ADMIN — HEPARIN SODIUM 5000 UNITS: 5000 INJECTION, SOLUTION INTRAVENOUS; SUBCUTANEOUS at 09:33

## 2017-07-16 RX ADMIN — CHLORPROMAZINE HYDROCHLORIDE 25 MG: 25 TABLET, SUGAR COATED ORAL at 22:04

## 2017-07-16 RX ADMIN — ASPIRIN 81 MG 81 MG: 81 TABLET ORAL at 08:30

## 2017-07-16 RX ADMIN — FUROSEMIDE 40 MG: 40 TABLET ORAL at 08:30

## 2017-07-16 RX ADMIN — LORAZEPAM 1 MG: 0.5 TABLET ORAL at 22:15

## 2017-07-16 RX ADMIN — PIPERACILLIN SODIUM,TAZOBACTAM SODIUM 3.38 G: 3; .375 INJECTION, POWDER, FOR SOLUTION INTRAVENOUS at 14:26

## 2017-07-16 RX ADMIN — CHLORPROMAZINE HYDROCHLORIDE 25 MG: 25 TABLET, SUGAR COATED ORAL at 14:26

## 2017-07-16 RX ADMIN — TAMSULOSIN HYDROCHLORIDE 0.4 MG: 0.4 CAPSULE ORAL at 08:30

## 2017-07-16 RX ADMIN — HEPARIN SODIUM 5000 UNITS: 5000 INJECTION, SOLUTION INTRAVENOUS; SUBCUTANEOUS at 01:44

## 2017-07-16 RX ADMIN — ONDANSETRON 4 MG: 2 INJECTION INTRAMUSCULAR; INTRAVENOUS at 22:15

## 2017-07-16 NOTE — PROGRESS NOTES
Bedside and Verbal shift change report given to Mojgan Belcher RN (oncoming nurse) by Leanne Sanford RN (offgoing nurse). Report included the following information SBAR, Kardex, MAR and Recent Results.     SITUATION:  Code Status: Full Code  Reason for Admission: Right foot infection  Infected ulcer of skin ([facility] [address]  Bilateral leg and foot pain  Foot ulcer ([facility] [address]  Cellulitis  Intractable hiccups  DX  Hospital day: 10  Problem List:       Hospital Problems  Date Reviewed: 7/11/2017          Codes Class Noted POA    Right foot infection ICD-10-CM: L08.9  ICD-9-CM: 686.9  7/6/2017 Unknown        Bilateral leg and foot pain ICD-10-CM: M79.604, M79.605, M79.671, M79.672  ICD-9-CM: 729.5  7/6/2017 Unknown        Cellulitis ICD-10-CM: L03.90  ICD-9-CM: 682.9  12/20/2016 Unknown        Intractable hiccups (Chronic) ICD-10-CM: R06.6  ICD-9-CM: 786.8  9/2/2012 Unknown              BACKGROUND:   Past Medical History:   Past Medical History:   Diagnosis Date    Hiccups     Hypertension     Hyponatremia     Lacunar infarction ([facility] [address] 2010    Lower extremity edema     Psychogenic polydipsia     Spinal stenosis       Patient taking anticoagulants yes    Patient has a defibrillator: no    History of shots NO    Isolation History NO    ASSESSMENT:  Changes in Assessment Throughout Shift: NA  Significant Changes in 24 hours (for example, RR/code, fall)  Patient has Central Line: no   Patient has Reid Cath: no - incontinent   Mobility Issues  PT  IV Patency  OR Checklist  Pending Tests    Last Vitals:  Vitals w/ MEWS Score (last day)     Date/Time MEWS Score Pulse Resp Temp BP Level of Consciousness SpO2    07/16/17 0553 1 74 19 97.3 °F (36.3 °C) 148/82 Alert 99 %    07/16/17 0013 1 87 20 97.6 °F (36.4 °C) 132/88 Alert 94 %    07/15/17 2236 -- -- -- -- -- Alert --    07/15/17 2126 1 87 19 97.8 °F (36.6 °C) 137/83 Alert 97 %    07/15/17 1616 2 (!)  104 18 98.3 °F (36.8 °C) 110/71 Alert 98 %    07/15/17 1205 1 91 18 98.6 °F (37 °C) 125/77 Alert 97 %    07/15/17 0855 1 69 18 98 °F (36.7 °C) 137/81 Alert 94 %    07/15/17 0410 1 82 18 98.7 °F (37.1 °C) 136/72 Alert 98 %    07/15/17 0125 -- 81 18 97.8 °F (36.6 °C) 139/83 -- 98 %            PAIN    Pain Assessment    Pain Intensity 1: 0 (07/16/17 0400)    Pain Location 1: Leg    Pain Intervention(s) 1: Medication (see MAR)    Patient Stated Pain Goal: 0  Intervention effective: yes       Last 3 Weights:  Last 3 Recorded Weights in this Encounter    07/13/17 2103 07/15/17 0017 07/16/17 0143   Weight: 90.8 kg (200 lb 1.6 oz) 90.7 kg (200 lb) 92.9 kg (204 lb 12.8 oz)   Weight change: 2.177 kg (4 lb 12.8 oz)    INTAKE/OUPUT    Current Shift:      Last three shifts: 07/14 1901 - 07/16 0700  In: 970 [P.O.:870; I.V.:100]  Out: -     RECOMMENDATIONS AND DISCHARGE PLANNING  Patient needs and requests: NA    Pending tests/procedures: NA     Discharge plan for patient: to be determined     Discharge planning Needs or Barriers: NA    Estimated Discharge Date: to be determined Posted on Whiteboard in Patients Room: no       \"HEALS\" SAFETY CHECK  A safety check occurred in the patient's room between off going nurse and oncoming nurse listed above. The safety check included the below items:    H  High Alert Medications Verify all high alert medication drips (heparin, PCA, etc.)  E  Equipment Suction is set up for ALL patients (with yanker)  Red plugs utilized for all equipment (IV pumps, etc.)  WOWs wiped down at end of shift. Room stocked with oxygen, suction, and other unit-specific supplies  A  Alarms Bed alarm is set for fall risk patients  Ensure chair alarm is in place and activated if patient is up in a chair  L  Lines Check IV for any infiltration  Reid bag is empty if patient has a Reid   Tubing and IV bags are labeled  S  Safety  Room is clean, patient is clean, and equipment is clean. Hallways are clear from equipment besides carts.    Fall bracelet on for fall risk patients  Ensure room is clear and free of clutter  Suction is set up for ALL patients (with erika)  Hallways are clear from equipment besides carts.    Isolation precautions followed, supplies available outside room, sign posted    Beryle Ishikawa, RN

## 2017-07-16 NOTE — PROGRESS NOTES
Medical Progress Note      NAME: Marguerite Booker   :  1955  MRM:  [id number]    Date/Time: 2017  12:52 PM         Subjective: This 62M is s/p debridement of his dorsal RT foot with VAC placement by Pod/Segundo on 17. Kenrick historian. RN in room. Awaiting placement. Also has other foot/ankle/leg wounds. I am covering for Jason/Segundo. Past Medical History reviewed and unchanged from Admission History and Physical    Review of Systems   Denies FCNVD/cp/sob. Objective:     Vitals:      Last 24hrs VS reviewed since prior progress note. Most recent are:    Visit Vitals    /74 (BP 1 Location: Left arm, BP Patient Position: At rest)    Pulse 86    Temp 97.9 °F (36.6 °C)    Resp 19    Ht 5' 11\" (1.803 m)    Wt 92.9 kg (204 lb 12.8 oz)    SpO2 99%    BMI 28.56 kg/m2     SpO2 Readings from Last 6 Encounters:   17 99%   17 98%   17 98%   17 97%   17 98%   17 97%          Intake/Output Summary (Last 24 hours) at 17 1252  Last data filed at 17 1126   Gross per 24 hour   Intake              590 ml   Output              450 ml   Net              140 ml          Exam:      Physical Exam     CFT immediate RL. Skin is warm RL. VAC intact and functioning RT foot.      Lab Data Reviewed: (see below)    Medications:  Current Facility-Administered Medications   Medication Dose Route Frequency    senna-docusate (PERICOLACE) 8.6-50 mg per tablet 2 Tab  2 Tab Oral QHS    polyethylene glycol (MIRALAX) packet 17 g  17 g Oral DAILY    ferrous sulfate tablet 325 mg  1 Tab Oral DAILY WITH BREAKFAST    ascorbic acid (vitamin C) (VITAMIN C) tablet 250 mg  250 mg Oral DAILY    ondansetron (ZOFRAN) injection 4 mg  4 mg IntraVENous Q4H PRN    heparin (porcine) injection 5,000 Units  5,000 Units SubCUTAneous Q8H    amLODIPine (NORVASC) tablet 10 mg  10 mg Oral DAILY    aspirin chewable tablet 81 mg  81 mg Oral DAILY    atorvastatin (LIPITOR) tablet 20 mg  20 mg Oral QHS    chlorproMAZINE (THORAZINE) tablet 25 mg  25 mg Oral QID    cyanocobalamin tablet 1,000 mcg  1,000 mcg Oral DAILY    furosemide (LASIX) tablet 40 mg  40 mg Oral DAILY    LORazepam (ATIVAN) tablet 0.5-1 mg  0.5-1 mg Oral Q8H PRN    therapeutic multivitamin (THERAGRAN) tablet 1 Tab  1 Tab Oral DAILY    tamsulosin (FLOMAX) capsule 0.4 mg  0.4 mg Oral DAILY    piperacillin-tazobactam (ZOSYN) 3.375 g in 0.9% sodium chloride (MBP/ADV) 100 mL MBP  3.375 g IntraVENous Q6H    oxyCODONE-acetaminophen (PERCOCET) 5-325 mg per tablet 1 Tab  1 Tab Oral Q6H PRN    acetaminophen (TYLENOL) tablet 500 mg  500 mg Oral Q6H PRN       ______________________________________________________________________      Lab Review:     Recent Labs      07/16/17   0244   WBC  5.7   HGB  11.0*   HCT  33.8*   PLT  236     Reviewed relevant EMR/notes/labs/studies/reports. Assessment:     Cellulitis of RT foot  Ulcers of foot/ankle RL       Plan:     1. Conintue VAC as outpatient. 2. Abx as per cxs.   3. Follow-up with Pod/Segundo one week after d/c.    ___________________________________________________    Attending Physician: Roly Toussaint DPM

## 2017-07-16 NOTE — PROGRESS NOTES
Attempted to see pt. Pt reporting he is \"sick\" when nursing arrived. Deferred to nursing, told them we would try again at a later time today as schedule allows.

## 2017-07-16 NOTE — PROGRESS NOTES
Hospitalist Progress Note    Patient: Verona Akins MRN: [id number]  CSN: [id number]    YOB: 1955  Age: 58 y.o. Sex: male    DOA: 7/6/2017 LOS:  LOS: 10 days          Patient in NAD, no new complaints    Assessment/Plan     1. Infected wound of the right foot which is new, as well as wounds to the bilateral legs, venous stasis ulcers. No OM on MRI. S/p procecure per podiatry 7/11/17. , Abx, wound care and vac  2. Iron deficiency anemia - supplement. 3. nonambulatory for the past 3 years and spends many days with his legs dependent sitting in his wheelchair  4. Counseled compliance  6. Infestation, maggots at presentation. 7. Hypertension controlled- monitor  8. Wheelchair bound at baseline. 9. Patient rescinded DNR status, Full code  Dispo- Await SNF, Discharge summary dictated on 7/14/17    Additional Notes:      Case discussed with:  [x]Patient  []Family  [x]Nursing  [x]Case Management  DVT Prophylaxis:  []Lovenox  [x]Hep SQ  []SCDs  []Coumadin   []On Heparin gtt    Vital signs/Intake and Output:  Visit Vitals    /79 (BP 1 Location: Left arm, BP Patient Position: At rest)    Pulse (!) 102    Temp 98.9 °F (37.2 °C)    Resp 19    Ht 5' 11\" (1.803 m)    Wt 92.9 kg (204 lb 12.8 oz)    SpO2 97%    BMI 28.56 kg/m2     Current Shift:  07/16 0701 - 07/16 1900  In: 200 [P.O.:100; I.V.:100]  Out: 450 [Urine:450]  Last three shifts:  07/14 1901 - 07/16 0700  In: 1 [P.O.:870; I.V.:100]  Out: -     General:  Awake, alert  Cardiovascular:  S1S2+, RRR  Pulmonary:  CTA b/l  GI:  Soft, BS+, NT, ND  Extremities:  trace edema, right foot with dressing, wound vac      Medications Reviewed      Labs: Results:       Chemistry No results for input(s): GLU, NA, K, CL, CO2, BUN, CREA, CA, AGAP, BUCR, TBIL, GPT, AP, TP, ALB, GLOB, AGRAT in the last 72 hours.    CBC w/Diff Recent Labs      07/16/17   0244   WBC  5.7   RBC  4.21*   HGB  11.0*   HCT  33.8*   PLT  236      Cardiac Enzymes No results for input(s): CPK, CKND1, BARRINGTON in the last 72 hours. No lab exists for component: CKRMB, TROIP   Coagulation No results for input(s): PTP, INR, APTT in the last 72 hours. No lab exists for component: INREXT, INREXT    Lipid Panel Lab Results   Component Value Date/Time    Cholesterol, total 107 12/20/2011 04:00 AM    HDL Cholesterol 39 12/20/2011 04:00 AM    LDL, calculated 54.4 12/20/2011 04:00 AM    VLDL, calculated 13.6 12/20/2011 04:00 AM    Triglyceride 68 12/20/2011 04:00 AM    CHOL/HDL Ratio 2.7 12/20/2011 04:00 AM      BNP No results for input(s): BNPP in the last 72 hours. Liver Enzymes No results for input(s): TP, ALB, TBIL, AP, SGOT, GPT in the last 72 hours.     No lab exists for component: DBIL   Thyroid Studies Lab Results   Component Value Date/Time    TSH 0.80 07/02/2016 10:56 AM        Procedures/imaging: see electronic medical records for all procedures/Xrays and details which were not copied into this note but were reviewed prior to creation of Gala Chow MD

## 2017-07-16 NOTE — PROGRESS NOTES
Bedside and Verbal shift change report given to Mary RIVERO (oncoming nurse) by Odilon Barfield (offgoing nurse). Report included the following information SBAR, Kardex, MAR and Recent Results.     SITUATION:  Code Status: Full Code  Reason for Admission: Right foot infection  Infected ulcer of skin ([facility] [address]  Bilateral leg and foot pain  Foot ulcer ([facility] [address]  Cellulitis  Intractable hiccups  DX  Hospital day: 9  Problem List:   [facility] Problems  Date Reviewed: 7/11/2017             Codes Class Noted POA     Right foot infection ICD-10-CM: L08.9  ICD-9-CM: 356. 9   7/6/2017 Unknown           Bilateral leg and foot pain ICD-10-CM: M79.604, M79.605, M79.671, M79.672  ICD-9-CM: 100. 5   7/6/2017 Unknown           Cellulitis ICD-10-CM: L03.90  ICD-9-CM: 322. 9   12/20/2016 Unknown           Intractable hiccups (Chronic) ICD-10-CM: R06.6  ICD-9-CM: 786.8   9/2/2012 Unknown                  BACKGROUND:                        Past Medical History:        Past Medical History:   Diagnosis Date    Hiccups      Hypertension      Hyponatremia      Lacunar infarction ([facility] [address] 2010    Lower extremity edema      Psychogenic polydipsia      Spinal stenosis                              Patient taking anticoagulants yes                         Patient has a defibrillator: no                         History of shots NO                         Isolation History NO     ASSESSMENT:  Changes in Assessment Throughout Shift: More nauseated today.   Significant Changes in 24 hours (for example, RR/code, fall)  Patient has Central Line: no   Patient has Reid Cath: no - incontinent   Mobility Issues  PT  IV Patency  OR Checklist  Pending Tests     Last Vitals:  Vitals w/ MEWS Score (last day)     Date/Time MEWS Score Pulse Resp Temp BP Level of Consciousness SpO2     07/15/17 1616 2 (!)  104 18 98.3 °F (36.8 °C) 110/71 Alert 98 %     07/15/17 1205 1 91 18 98.6 °F (37 °C) 125/77 Alert 97 %     07/15/17 0855 1 69 18 98 °F (36.7 °C) 137/81 Alert 94 %     07/15/17 0410 1 82 18 98.7 °F (37.1 °C) 136/72 Alert 98 %     07/15/17 0125 -- 81 18 97.8 °F (36.6 °C) 139/83 -- 98 %     07/14/17 2010 -- 89 20 97.3 °F (36.3 °C) 123/75 -- 96 %     07/14/17 1620 1 81 20 97.5 °F (36.4 °C) 153/76 Alert 98 %     07/14/17 0842 -- -- -- -- -- Alert --     07/14/17 0735 1 83 19 98.6 °F (37 °C) 159/81 Alert 97 %     07/14/17 0501 1 83 20 97 °F (36.1 °C) 128/77 Alert 99 %     07/14/17 0016 1 (!)  56 19 97.1 °F (36.2 °C) 118/73 Alert 99 %               PAIN                                              Pain Assessment                                              Pain Intensity 1: 0 (07/15/17 0125)                                              Pain Location 1: Abdomen                                              Pain Intervention(s) 1: Medication (see MAR)                                              Patient Stated Pain Goal: 0  Intervention effective: yes - denies pain         Last 3 Weights:        Last 3 Recorded Weights in this Encounter     07/12/17 2052 07/13/17 2103 07/15/17 0017   Weight: 89.7 kg (197 lb 11.2 oz) 90.8 kg (200 lb 1.6 oz) 90.7 kg (200 lb)   Weight change: -0.045 kg (-1.6 oz)     INTAKE/OUPUT                                              Current Shift: 07/15 0701 - 07/15 1900  In: 1 [P.O.:870;  I.V.:100]  Out: -                                               Last three shifts: 07/13 1901 - 07/15 0700  In: 26 [P.O.:220]  Out: -      RECOMMENDATIONS AND DISCHARGE PLANNING  Patient needs and requests: NA     Pending tests/procedures: NA      Discharge plan for patient: to be determined      Discharge planning Needs or Barriers: NA     Estimated Discharge Date: to be determined Posted on Whiteboard in Patients Room: no        \"HEALS\" SAFETY CHECK  A safety check occurred in the patient's room between off going nurse and oncoming nurse listed above.     The safety check included the below items:     H  High Alert Medications                      Verify all high alert medication drips (heparin, PCA, etc.)  E  Equipment                 Suction is set up for ALL patients (with erika)  Red plugs utilized for all equipment (IV pumps, etc.)  WOWs wiped down at end of shift. Room stocked with oxygen, suction, and other unit-specific supplies  A  Alarms           Bed alarm is set for fall risk patients  Ensure chair alarm is in place and activated if patient is up in a chair  L  Lines              Check IV for any infiltration  Reid bag is empty if patient has a Reid   Tubing and IV bags are labeled  S  Safety  Room is clean, patient is clean, and equipment is clean. Hallways are clear from equipment besides carts. Fall bracelet on for fall risk patients  Ensure room is clear and free of clutter  Suction is set up for ALL patients (with erika)  Hallways are clear from equipment besides carts.    Isolation precautions followed, supplies available outside room, sign posted     Metta Bamberger

## 2017-07-16 NOTE — PROGRESS NOTES
Problem: Mobility Impaired (Adult and Pediatric)  Goal: *Acute Goals and Plan of Care (Insert Text)  STG for 7.13.17 to be completed by 7.20.17 (7 days). 1. Pt will complete supine to/from sit (I) in order to complete EOB activity. 2. Pt will complete functional transfer to/from w/c vs chair with mod A in prep for OOB activity. 3. Pt will perform HEP for improved BLE strength (I). PHYSICAL THERAPY TREATMENT     Patient: Kain Sanders (74 y.o. male)  Date: 7/16/2017  Diagnosis: Right foot infection  Infected ulcer of skin (HCC)  Bilateral leg and foot pain  Foot ulcer ([address]  Cellulitis  Intractable hiccups  DX <principal problem not specified>  Procedure(s) (LRB):  DEBRIDEMENT AND DRAINAGE RIGHT FOOT (Right) 5 Days Post-Op  Precautions: Skin, Fall, PWB, WBAT  Chart, physical therapy assessment, plan of care and goals were reviewed. ASSESSMENT:  Pt reporting still feeling sick, however is eager to participate in PT. Pt is slightly lethargic, difficulty with speech/slurring words. Encouraged to sit EOB, min(A) required for LE mobility during bed mobility. Applied gait belt. Min/Mod(A) for sit<>stand transfer - maintained standing position ~2' x4.  Pt WBAT on (R) LE, appears to tolerate ~50% Weight on (R) w/o reports of increases in pain. Good improvement in weight bearing tolerance, however unable to ambulate at this time. Pt returned to bed w/ mod/max(A) to position @ HOB. Pt left w/ HOB elevated, food tray, call bell within reach and nursing notified. Progression toward goals:  [X]      Improving appropriately and progressing toward goals  [ ]      Improving slowly and progressing toward goals  [ ]      Not making progress toward goals and plan of care will be adjusted       PLAN:  Patient continues to benefit from skilled intervention to address the above impairments. Continue treatment per established plan of care.   Discharge Recommendations:  [address] Recommendations for Discharge:  rolling walker and N/A       SUBJECTIVE:   Patient stated You help me and I'll help you.   Mobility [id number] Current  CJ= 20-39%   Goal  CI= 1-19%. The severity rating is based on the Other Level of assistance required for functional mobility and ADLs. OBJECTIVE DATA SUMMARY:   Critical Behavior:  Neurologic State: Drowsy  Orientation Level: Oriented X4  Cognition: Follows commands     Functional Mobility Training:  Bed Mobility:  Rolling: Minimum assistance; Moderate assistance  Supine to Sit: Minimum assistance  Sit to Supine: Minimum assistance; Moderate assistance  Scooting: Maximum assistance                    Transfers:  Sit to Stand: Minimum assistance; Moderate assistance  Stand to Sit: Minimum assistance        Balance:  Sitting: Intact; With support  Sitting - Static: Fair (occasional)  Sitting - Dynamic: Fair (occasional)  Standing: Impaired; With support  Standing - Static: Poor  Standing - Dynamic : Poor     Pain:  Pt pain was reported as  1-2 pre-treatment. Pt pain was reported as 1-2 post-treatment. Activity Tolerance:   Good  Please refer to the flowsheet for vital signs taken during this treatment.   After treatment:   [ ] Patient left in no apparent distress sitting up in chair  [X] Patient left in no apparent distress in bed  [X] Call bell left within reach  [X] Nursing notified  [ ] Caregiver present  [ ] Bed alarm activated      Juan Oneal PTA   Time Calculation: 34 mins

## 2017-07-17 ENCOUNTER — APPOINTMENT (OUTPATIENT)
Dept: CT IMAGING | Age: 62
DRG: 982 | End: 2017-07-17
Attending: EMERGENCY MEDICINE
Payer: COMMERCIAL

## 2017-07-17 LAB
ALBUMIN SERPL BCP-MCNC: 2.9 G/DL (ref 3.4–5)
ALBUMIN/GLOB SERPL: 0.6 {RATIO} (ref 0.8–1.7)
ALP SERPL-CCNC: 88 U/L (ref 45–117)
ALT SERPL-CCNC: 67 U/L (ref 16–61)
ANION GAP BLD CALC-SCNC: 7 MMOL/L (ref 3–18)
AST SERPL W P-5'-P-CCNC: 108 U/L (ref 15–37)
BILIRUB SERPL-MCNC: 0.4 MG/DL (ref 0.2–1)
BUN SERPL-MCNC: 15 MG/DL (ref 7–18)
BUN/CREAT SERPL: 15 (ref 12–20)
CALCIUM SERPL-MCNC: 9.2 MG/DL (ref 8.5–10.1)
CHLORIDE SERPL-SCNC: 105 MMOL/L (ref 100–108)
CO2 SERPL-SCNC: 29 MMOL/L (ref 21–32)
CREAT SERPL-MCNC: 1.03 MG/DL (ref 0.6–1.3)
GLOBULIN SER CALC-MCNC: 5.2 G/DL (ref 2–4)
GLUCOSE SERPL-MCNC: 112 MG/DL (ref 74–99)
LACTATE SERPL-SCNC: 1.5 MMOL/L (ref 0.4–2)
LIPASE SERPL-CCNC: 172 U/L (ref 73–393)
POTASSIUM SERPL-SCNC: 3.8 MMOL/L (ref 3.5–5.5)
PROT SERPL-MCNC: 8.1 G/DL (ref 6.4–8.2)
SODIUM SERPL-SCNC: 141 MMOL/L (ref 136–145)

## 2017-07-17 PROCEDURE — 74176 CT ABD & PELVIS W/O CONTRAST: CPT

## 2017-07-17 PROCEDURE — 77030012935 HC DRSG AQUACEL BMS -B

## 2017-07-17 PROCEDURE — 83690 ASSAY OF LIPASE: CPT | Performed by: EMERGENCY MEDICINE

## 2017-07-17 PROCEDURE — 74011250636 HC RX REV CODE- 250/636: Performed by: INTERNAL MEDICINE

## 2017-07-17 PROCEDURE — 83605 ASSAY OF LACTIC ACID: CPT | Performed by: EMERGENCY MEDICINE

## 2017-07-17 PROCEDURE — 77030011256 HC DRSG MEPILEX <16IN NO BORD MOLN -A

## 2017-07-17 PROCEDURE — 77030019934 HC DRSG VAC ASST KCON -B

## 2017-07-17 PROCEDURE — 74011000258 HC RX REV CODE- 258: Performed by: INTERNAL MEDICINE

## 2017-07-17 PROCEDURE — 74011250637 HC RX REV CODE- 250/637: Performed by: INTERNAL MEDICINE

## 2017-07-17 PROCEDURE — 74011250637 HC RX REV CODE- 250/637: Performed by: HOSPITALIST

## 2017-07-17 PROCEDURE — 74011250636 HC RX REV CODE- 250/636: Performed by: EMERGENCY MEDICINE

## 2017-07-17 PROCEDURE — 65270000029 HC RM PRIVATE

## 2017-07-17 PROCEDURE — 36415 COLL VENOUS BLD VENIPUNCTURE: CPT | Performed by: EMERGENCY MEDICINE

## 2017-07-17 PROCEDURE — 80053 COMPREHEN METABOLIC PANEL: CPT | Performed by: EMERGENCY MEDICINE

## 2017-07-17 RX ORDER — SODIUM CHLORIDE 9 MG/ML
75 INJECTION, SOLUTION INTRAVENOUS CONTINUOUS
Status: DISCONTINUED | OUTPATIENT
Start: 2017-07-17 | End: 2017-07-19

## 2017-07-17 RX ORDER — HYDRALAZINE HYDROCHLORIDE 20 MG/ML
10 INJECTION INTRAMUSCULAR; INTRAVENOUS
Status: DISCONTINUED | OUTPATIENT
Start: 2017-07-17 | End: 2017-07-19 | Stop reason: HOSPADM

## 2017-07-17 RX ORDER — FACIAL-BODY WIPES
10 EACH TOPICAL DAILY PRN
Status: DISCONTINUED | OUTPATIENT
Start: 2017-07-17 | End: 2017-07-19 | Stop reason: HOSPADM

## 2017-07-17 RX ADMIN — PIPERACILLIN SODIUM,TAZOBACTAM SODIUM 3.38 G: 3; .375 INJECTION, POWDER, FOR SOLUTION INTRAVENOUS at 08:36

## 2017-07-17 RX ADMIN — FUROSEMIDE 40 MG: 40 TABLET ORAL at 08:41

## 2017-07-17 RX ADMIN — Medication 250 MG: at 08:35

## 2017-07-17 RX ADMIN — ATORVASTATIN CALCIUM 20 MG: 20 TABLET, FILM COATED ORAL at 22:48

## 2017-07-17 RX ADMIN — CYANOCOBALAMIN TAB 1000 MCG 1000 MCG: 1000 TAB at 08:35

## 2017-07-17 RX ADMIN — POLYETHYLENE GLYCOL 3350 17 G: 17 POWDER, FOR SOLUTION ORAL at 08:40

## 2017-07-17 RX ADMIN — THERA TABS 1 TABLET: TAB at 08:35

## 2017-07-17 RX ADMIN — ONDANSETRON 4 MG: 2 INJECTION INTRAMUSCULAR; INTRAVENOUS at 12:16

## 2017-07-17 RX ADMIN — OXYCODONE AND ACETAMINOPHEN 1 TABLET: 5; 325 TABLET ORAL at 05:42

## 2017-07-17 RX ADMIN — HEPARIN SODIUM 5000 UNITS: 5000 INJECTION, SOLUTION INTRAVENOUS; SUBCUTANEOUS at 01:54

## 2017-07-17 RX ADMIN — CHLORPROMAZINE HYDROCHLORIDE 25 MG: 25 TABLET, SUGAR COATED ORAL at 22:48

## 2017-07-17 RX ADMIN — ACETAMINOPHEN 500 MG: 500 TABLET ORAL at 08:39

## 2017-07-17 RX ADMIN — PIPERACILLIN SODIUM,TAZOBACTAM SODIUM 3.38 G: 3; .375 INJECTION, POWDER, FOR SOLUTION INTRAVENOUS at 21:00

## 2017-07-17 RX ADMIN — SODIUM CHLORIDE 75 ML/HR: 900 INJECTION, SOLUTION INTRAVENOUS at 17:05

## 2017-07-17 RX ADMIN — TAMSULOSIN HYDROCHLORIDE 0.4 MG: 0.4 CAPSULE ORAL at 08:35

## 2017-07-17 RX ADMIN — Medication 325 MG: at 08:39

## 2017-07-17 RX ADMIN — PIPERACILLIN SODIUM,TAZOBACTAM SODIUM 3.38 G: 3; .375 INJECTION, POWDER, FOR SOLUTION INTRAVENOUS at 16:09

## 2017-07-17 RX ADMIN — PIPERACILLIN SODIUM,TAZOBACTAM SODIUM 3.38 G: 3; .375 INJECTION, POWDER, FOR SOLUTION INTRAVENOUS at 01:51

## 2017-07-17 RX ADMIN — CHLORPROMAZINE HYDROCHLORIDE 25 MG: 25 TABLET, SUGAR COATED ORAL at 18:00

## 2017-07-17 RX ADMIN — OXYCODONE AND ACETAMINOPHEN 1 TABLET: 5; 325 TABLET ORAL at 18:57

## 2017-07-17 RX ADMIN — HEPARIN SODIUM 5000 UNITS: 5000 INJECTION, SOLUTION INTRAVENOUS; SUBCUTANEOUS at 18:57

## 2017-07-17 RX ADMIN — AMLODIPINE BESYLATE 10 MG: 10 TABLET ORAL at 08:41

## 2017-07-17 RX ADMIN — CHLORPROMAZINE HYDROCHLORIDE 25 MG: 25 TABLET, SUGAR COATED ORAL at 12:11

## 2017-07-17 RX ADMIN — ASPIRIN 81 MG 81 MG: 81 TABLET ORAL at 08:35

## 2017-07-17 RX ADMIN — Medication 2 TABLET: at 22:49

## 2017-07-17 RX ADMIN — HEPARIN SODIUM 5000 UNITS: 5000 INJECTION, SOLUTION INTRAVENOUS; SUBCUTANEOUS at 10:36

## 2017-07-17 RX ADMIN — CHLORPROMAZINE HYDROCHLORIDE 25 MG: 25 TABLET, SUGAR COATED ORAL at 08:35

## 2017-07-17 NOTE — PROGRESS NOTES
Bedside and Verbal shift change report given to Sabrina Chase LPN (oncoming nurse) by Sean Dunbar RN (offgoing nurse). Report included the following information SBAR, Kardex, MAR and Recent Results.     SITUATION:  Code Status: Full Code  Reason for Admission: Right foot infection  Infected ulcer of skin ([facility] [address]  Bilateral leg and foot pain  Foot ulcer ([facility] [address]  Cellulitis  Intractable hiccups  DX  Hospital day: 11  Problem List:       Hospital Problems  Date Reviewed: 7/11/2017          Codes Class Noted POA    Right foot infection ICD-10-CM: L08.9  ICD-9-CM: 686.9  7/6/2017 Unknown        Bilateral leg and foot pain ICD-10-CM: M79.604, M79.605, M79.671, M79.672  ICD-9-CM: 729.5  7/6/2017 Unknown        Cellulitis ICD-10-CM: L03.90  ICD-9-CM: 682.9  12/20/2016 Unknown        Intractable hiccups (Chronic) ICD-10-CM: R06.6  ICD-9-CM: 786.8  9/2/2012 Unknown              BACKGROUND:   Past Medical History:   Past Medical History:   Diagnosis Date    Hiccups     Hypertension     Hyponatremia     Lacunar infarction ([facility] [address] 2010    Lower extremity edema     Psychogenic polydipsia     Spinal stenosis       Patient taking anticoagulants yes    Patient has a defibrillator: no    History of shots NO    Isolation History NO    ASSESSMENT:  Changes in Assessment Throughout Shift: NA  Significant Changes in 24 hours (for example, RR/code, fall)  Patient has Central Line: no   Patient has Reid Cath: no - incontinent   Mobility Issues  PT  IV Patency  OR Checklist  Pending Tests    Last Vitals:  Vitals w/ MEWS Score (last day)     Date/Time MEWS Score Pulse Resp Temp BP Level of Consciousness SpO2    07/17/17 0542 -- -- -- -- -- Alert --    07/17/17 0415 1 84 18 97.8 °F (36.6 °C) 135/87 Alert 97 %    07/17/17 0000 1 96 18 98.2 °F (36.8 °C) (!)  161/97 Alert 97 %    07/16/17 2000 1 95 18 98.5 °F (36.9 °C) 146/90 Alert 97 %    07/16/17 1602 1 95 20 98.6 °F (37 °C) 123/75 Alert 96 %    07/16/17 1327 2 (!)  102 19 98.9 °F (37.2 °C) 119/79 Alert 97 %    07/16/17 0820 1 86 19 97.9 °F (36.6 °C) 112/74 Alert 99 %    07/16/17 0553 1 74 19 97.3 °F (36.3 °C) 148/82 Alert 99 %    07/16/17 0013 1 87 20 97.6 °F (36.4 °C) 132/88 Alert 94 %            PAIN    Pain Assessment    Pain Intensity 1: 5 (07/17/17 0618)    Pain Location 1: Leg    Pain Intervention(s) 1: Medication (see MAR)    Patient Stated Pain Goal: 0  Intervention effective: yes       Last 3 Weights:  Last 3 Recorded Weights in this Encounter    07/15/17 0017 07/16/17 0143 07/17/17 0212   Weight: 90.7 kg (200 lb) 92.9 kg (204 lb 12.8 oz) 89.9 kg (198 lb 1.6 oz)   Weight change: -3.039 kg (-6 lb 11.2 oz)    INTAKE/OUPUT    Current Shift:      Last three shifts: 07/15 1901 - 07/17 0700  In: 300 [P.O.:100; I.V.:200]  Out: 450 [Urine:450]    RECOMMENDATIONS AND DISCHARGE PLANNING  Patient needs and requests: NA    Pending tests/procedures: NA     Discharge plan for patient: to be determined     Discharge planning Needs or Barriers: NA    Estimated Discharge Date: to be determined Posted on Whiteboard in Patients Room: no       \"HEALS\" SAFETY CHECK  A safety check occurred in the patient's room between off going nurse and oncoming nurse listed above. The safety check included the below items:    H  High Alert Medications Verify all high alert medication drips (heparin, PCA, etc.)  E  Equipment Suction is set up for ALL patients (with yanker)  Red plugs utilized for all equipment (IV pumps, etc.)  WOWs wiped down at end of shift. Room stocked with oxygen, suction, and other unit-specific supplies  A  Alarms Bed alarm is set for fall risk patients  Ensure chair alarm is in place and activated if patient is up in a chair  L  Lines Check IV for any infiltration  Reid bag is empty if patient has a Reid   Tubing and IV bags are labeled  S  Safety  Room is clean, patient is clean, and equipment is clean. Hallways are clear from equipment besides carts.    Fall bracelet on for fall risk patients  Ensure room is clear and free of clutter  Suction is set up for ALL patients (with erika)  Hallways are clear from equipment besides carts.    Isolation precautions followed, supplies available outside room, sign posted    Luz Marina Ortiz RN

## 2017-07-17 NOTE — PROGRESS NOTES
Infectious Disease progress Note    Requested by: dr. Larissa Linton. gabriella    Reason: infected right foot ulcer    Current abx Prior abx   Pip/tazo since 7/6 Vancomycin x1 on 7/6     Lines:       Assessment :    58 y.o. Male with a hx of HTN,  who presented to the ED on 7/6/17 for evaluation of an ulcer to the right dorsal foot that was noted by his son several days ago. Clinical picture consistent with right foot cellulitis, infected right foot ulcer secondary to Proteus, citrobacter, e.coli, mssa, e.fecalis, e.avium, strep viridans. Surgical wound cultures 7/11 : proteus, second gram negative    No evidence of osteomyelitis clinically or per MRI. S/p I&D of right foot ulcer on 7/11- intra op findings noted - no evidence of bone infection. Recommendations:    1. discontinue pip/tazo. Start po ciprofloxacin, amoxicillin/clavulanate for 8  days  2. Wound care per dr. Edmundo Resendiz to d/c from id standpoint     Above plan was discussed in details with patient, RN. Please call me if any further questions or concerns. Will continue to participate in the care of this patient. subjective:    Patient denies headaches, visual disturbances, sore throat, runny nose, earaches, cp, sob, chills, cough, abdominal pain, diarrhea, burning micturition,  or weakness in extremities. He denies back pain/flank pain. Denies increased right foot pain. Home Medication List    Details   !! baclofen (LIORESAL) 20 mg tablet       !! chlorproMAZINE (THORAZINE) 25 mg tablet       tamsulosin (FLOMAX) 0.4 mg capsule Take 0.4 mg by mouth daily. 1 tab daily      furosemide (LASIX) 40 mg tablet Take 40 mg by mouth daily. !! atorvastatin (LIPITOR) 20 mg tablet Take 20 mg by mouth daily. !! chlorproMAZINE (THORAZINE) 50 mg tablet Take 1 Tab by mouth three (3) times daily as needed.  Indications: INTRACTABLE HICCUPS  Qty: 9 Tab, Refills: 0      MULTIVITAMIN,THERAPEUTIC (THERA MULTI-VITAMIN PO) Take 500 mg by mouth daily.      LORazepam (ATIVAN) 0.5 mg tablet Take 1-2 Tabs by mouth every eight (8) hours as needed for Anxiety (or panic attacks). Max Daily Amount: 3 mg. Qty: 40 Tab, Refills: 0      oxyCODONE-acetaminophen (PERCOCET) 5-325 mg per tablet Take 1 Tab by mouth every four (4) hours as needed. Max Daily Amount: 6 Tabs. Qty: 40 Tab, Refills: 0      !! baclofen (LIORESAL) 10 mg tablet Take 1 Tab by mouth every twelve (12) hours as needed. Qty: 15 Tab, Refills: 0      amLODIPine (NORVASC) 10 mg tablet Take 1 Tab by mouth daily. Qty: 30 Tab, Refills: 2      metoprolol (LOPRESSOR) 25 mg tablet Take 1 Tab by mouth two (2) times a day. Qty: 60 Tab, Refills: 2      cyanocobalamin 1,000 mcg tablet Take 1 Tab by mouth daily. Qty: 30 Tab, Refills: 2      folic acid (FOLVITE) 1 mg tablet Take 1 Tab by mouth daily. Qty: 30 Tab, Refills: 1      !! atorvastatin (LIPITOR) 20 mg tablet 1 Tab by Per G Tube route nightly. Qty: 30 Tab, Refills: 0      aspirin 81 mg chewable tablet Take 1 Tab by mouth daily. Qty: 30 Tab, Refills: 0       !! - Potential duplicate medications found. Please discuss with provider.           Current Facility-Administered Medications   Medication Dose Route Frequency    senna-docusate (PERICOLACE) 8.6-50 mg per tablet 2 Tab  2 Tab Oral QHS    polyethylene glycol (MIRALAX) packet 17 g  17 g Oral DAILY    ferrous sulfate tablet 325 mg  1 Tab Oral DAILY WITH BREAKFAST    ascorbic acid (vitamin C) (VITAMIN C) tablet 250 mg  250 mg Oral DAILY    ondansetron (ZOFRAN) injection 4 mg  4 mg IntraVENous Q4H PRN    heparin (porcine) injection 5,000 Units  5,000 Units SubCUTAneous Q8H    amLODIPine (NORVASC) tablet 10 mg  10 mg Oral DAILY    aspirin chewable tablet 81 mg  81 mg Oral DAILY    atorvastatin (LIPITOR) tablet 20 mg  20 mg Oral QHS    chlorproMAZINE (THORAZINE) tablet 25 mg  25 mg Oral QID    cyanocobalamin tablet 1,000 mcg  1,000 mcg Oral DAILY    furosemide (LASIX) tablet 40 mg  40 mg Oral DAILY    LORazepam (ATIVAN) tablet 0.5-1 mg  0.5-1 mg Oral Q8H PRN    therapeutic multivitamin (THERAGRAN) tablet 1 Tab  1 Tab Oral DAILY    tamsulosin (FLOMAX) capsule 0.4 mg  0.4 mg Oral DAILY    piperacillin-tazobactam (ZOSYN) 3.375 g in 0.9% sodium chloride (MBP/ADV) 100 mL MBP  3.375 g IntraVENous Q6H    oxyCODONE-acetaminophen (PERCOCET) 5-325 mg per tablet 1 Tab  1 Tab Oral Q6H PRN    acetaminophen (TYLENOL) tablet 500 mg  500 mg Oral Q6H PRN       Allergies: Review of patient's allergies indicates no known allergies. Temp (24hrs), Av.3 °F (36.8 °C), Min:97.5 °F (36.4 °C), Max:98.9 °F (37.2 °C)    Visit Vitals    /84 (BP 1 Location: Left arm, BP Patient Position: At rest)    Pulse 88    Temp 97.5 °F (36.4 °C)    Resp 18    Ht 5' 11\" (1.803 m)    Wt 89.9 kg (198 lb 1.6 oz)    SpO2 97%    BMI 27.63 kg/m2       ROS: 12 point ROS obtained in details. Pertinent positives as mentioned in HPI,   otherwise negative    Physical Exam:    Constitutional: He is oriented to person, place, and time. He appears well-developed and well-nourished. No distress. HENT:   Head: Normocephalic and atraumatic. Mouth/Throat: Oropharynx is clear and moist.   Eyes: Conjunctivae are normal. Pupils are equal, round, and reactive to light. No scleral icterus. Neck: Normal range of motion. Neck supple. Cardiovascular: Normal rate and intact distal pulses. Pulmonary/Chest: Effort normal and breath sounds normal. No respiratory distress. He has no wheezes. Abdominal: Soft. Bowel sounds are normal. He exhibits no distension. There is no tenderness. Musculoskeletal: Normal range of motion. Tenderness left foot. Decreased bilateral LE swelling. Wound vac recent surgical site left foot  Lymphadenopathy:     He has no cervical adenopathy. Neurological: He is alert and oriented to person, place, and time. No cranial nerve deficit. Skin: Skin is warm and dry. He is not diaphoretic.        Labs: Results: Chemistry No results for input(s): GLU, NA, K, CL, CO2, BUN, CREA, CA, AGAP, BUCR, TBIL, GPT, AP, TP, ALB, GLOB, AGRAT in the last 72 hours. CBC w/Diff Recent Labs      07/16/17   0244   WBC  5.7   RBC  4.21*   HGB  11.0*   HCT  33.8*   PLT  236      Microbiology No results for input(s): CULT in the last 72 hours.        RADIOLOGY:    All available imaging studies/reports in [facility] for this admission were reviewed    Dr. Teresa Parry, Infectious Disease Specialist  [phone]  July 17, 2017  9:13 AM

## 2017-07-17 NOTE — PROGRESS NOTES
Hospitalist Progress Note    Patient: Verona Akins MRN: [id number]  CSN: [id number]    YOB: 1955  Age: 58 y.o. Sex: male    DOA: 7/6/2017 LOS:  LOS: 11 days          Patient in NAD, awake, follows commands, c/o some Left sided abdo pain and had 1 episode of vomiting. RN in room    Assessment/Plan     1. Infected wound of the right foot which is new, as well as wounds to the bilateral legs, venous stasis ulcers. No OM on MRI. S/p procecure per podiatry 7/11/17. ,wound v\care and Vac, on abx  2. Iron deficiency anemia - supplement. 3. nonambulatory for the past 3 years and spends many days with his legs dependent sitting in his wheelchair  4. Counseled compliance  6. Infestation, maggots at presentation. 7. Hypertension controlled- monitor  8. Wheelchair bound at baseline. 9. Patient rescinded DNR status, Full code  10 CT abdo done- discussed CT findings with GI Dr Mihai Rodas and he recommends Bowel purging. Given that there is no recurrent vomiting he does not recommend NG tube. Normal lactate is reassuring  Dispo- Await SNF, Discharge summary dictated on 7/14/17  D/w patient, d/w RN, d/w Care manager dante    Additional Notes:      Case discussed with:  [x]Patient  []Family  [x]Nursing  [x]Case Management  DVT Prophylaxis:  []Lovenox  [x]Hep SQ  []SCDs  []Coumadin   []On Heparin gtt    Vital signs/Intake and Output:  Visit Vitals    BP (!) 148/91 (BP 1 Location: Right arm, BP Patient Position: At rest)    Pulse 96    Temp 97.3 °F (36.3 °C)    Resp 18    Ht 5' 11\" (1.803 m)    Wt 89.9 kg (198 lb 1.6 oz)    SpO2 98%    BMI 27.63 kg/m2     Current Shift:  07/17 0701 - 07/17 1900  In: 425 [P.O.:425]  Out: 1300 [Urine:1300]  Last three shifts:  07/15 1901 - 07/17 0700  In: 300 [P.O.:100;  I.V.:200]  Out: 450 [Urine:450]    General:  Awake, alert  Cardiovascular:  S1S2+, RRR  Pulmonary:  CTA b/l  GI:  Soft, BS+, mild distension, mild tenderness to palpation in Left mid abdomen  Extremities:  trace edema, right foot with dressing, wound vac      Medications Reviewed      Labs: Results:       Chemistry No results for input(s): GLU, NA, K, CL, CO2, BUN, CREA, CA, AGAP, BUCR, TBIL, GPT, AP, TP, ALB, GLOB, AGRAT in the last 72 hours. CBC w/Diff Recent Labs      07/16/17   0244   WBC  5.7   RBC  4.21*   HGB  11.0*   HCT  33.8*   PLT  236      Cardiac Enzymes No results for input(s): CPK, CKND1, BARRINGTON in the last 72 hours. No lab exists for component: CKRMB, TROIP   Coagulation No results for input(s): PTP, INR, APTT in the last 72 hours. No lab exists for component: INREXT, INREXT    Lipid Panel Lab Results   Component Value Date/Time    Cholesterol, total 107 12/20/2011 04:00 AM    HDL Cholesterol 39 12/20/2011 04:00 AM    LDL, calculated 54.4 12/20/2011 04:00 AM    VLDL, calculated 13.6 12/20/2011 04:00 AM    Triglyceride 68 12/20/2011 04:00 AM    CHOL/HDL Ratio 2.7 12/20/2011 04:00 AM      BNP No results for input(s): BNPP in the last 72 hours. Liver Enzymes No results for input(s): TP, ALB, TBIL, AP, SGOT, GPT in the last 72 hours.     No lab exists for component: DBIL   Thyroid Studies Lab Results   Component Value Date/Time    TSH 0.80 07/02/2016 10:56 AM        Procedures/imaging: see electronic medical records for all procedures/Xrays and details which were not copied into this note but were reviewed prior to creation of Kiera Lopez MD

## 2017-07-17 NOTE — PROGRESS NOTES
Bedside and Verbal shift change report given to Elham RIVERO (oncoming nurse) by Kirsten Duenas LPN (offgoing nurse). Report included the following information SBAR, Kardex, MAR and Recent Results.     SITUATION:    Code Status: Full Code   Reason for Admission: Right foot infection   [facility]fected ulcer of [facility] [address]   Bilateral leg and foot pain   Foot ulcer ([facility] [address]   Cellulitis   Intractable hiccups   DX    [facility] day: 11   Problem List:       Hospital Problems  Date Reviewed: 7/11/2017          Codes Class Noted POA    Right foot infection ICD-10-CM: L08.9  ICD-9-CM: 686.9  7/6/2017 Unknown        Bilateral leg and foot pain ICD-10-CM: M79.604, M79.605, M79.671, M79.672  ICD-9-CM: 729.5  7/6/2017 Unknown        Cellulitis ICD-10-CM: L03.90  ICD-9-CM: 682.9  12/20/2016 Unknown        Intractable hiccups (Chronic) ICD-10-CM: R06.6  ICD-9-CM: 786.8  9/2/2012 Unknown              BACKGROUND:    Past Medical History:   Past Medical History:   Diagnosis Date    Hiccups     Hypertension     Hyponatremia     Lacunar infarction ([facility] [address] 2010    Lower extremity edema     Psychogenic polydipsia     Spinal stenosis          Patient taking anticoagulants yes     ASSESSMENT:    Changes in Assessment Throughout Shift: No     Patient has Central Line: yes    Patient has Reid Cath: no      Last Vitals:     Vitals:    07/17/17 0415 07/17/17 0842 07/17/17 1201 07/17/17 1556   BP: 135/87 126/84 (!) 148/91 (!) 165/92   Pulse: 84 88 96 93   Resp: 18 18 18 18   Temp: 97.8 °F (36.6 °C) 97.5 °F (36.4 °C) 97.3 °F (36.3 °C) 98.4 °F (36.9 °C)   TempSrc:       SpO2: 97% 97% 98% 99%   Weight:       Height:            IV and DRAINS (will only show if present)   Peripheral IV 07/14/17 Right Other(comment)-Site Assessment: Clean, dry, & intact  [REMOVED] Peripheral IV 07/06/17 Right Wrist-Site Assessment: Clean, dry, & intact     WOUND (if present)   Wound Type:  none   Dressing present Dressing Present : Yes   Wound Concerns/Notes:  none     PAIN    Pain Assessment    Pain Intensity 1: 7 (07/17/17 1857)    Pain Location 1: Abdomen    Pain Intervention(s) 1: Medication (see MAR)    Patient Stated Pain Goal: 0  o Interventions for Pain:  Percocet   o Intervention effective: yes  o Time of last intervention: 1857   o Reassessment Completed: yes      Last 3 Weights:  Last 3 Recorded Weights in this Encounter    07/15/17 0017 07/16/17 0143 07/17/17 0212   Weight: 90.7 kg (200 lb) 92.9 kg (204 lb 12.8 oz) 89.9 kg (198 lb 1.6 oz)     Weight change: -3.039 kg (-6 lb 11.2 oz)     INTAKE/OUPUT    Current Shift:      Last three shifts: 07/16 0701 - 07/17 1900  In: 725 [P.O.:525; I.V.:200]  Out: 1750 [Urine:1750]     LAB RESULTS     Recent Labs      07/16/17   0244   WBC  5.7   HGB  11.0*   HCT  33.8*   PLT  236        Recent Labs      07/17/17   1403   NA  141   K  3.8   GLU  112*   BUN  15   CREA  1.03   CA  9.2       RECOMMENDATIONS AND DISCHARGE PLANNING     1. Pending tests/procedures/ Plan of Care or Other Needs: Suppository     2. Discharge plan for patient and Needs/Barriers:SNF    3. Estimated Discharge Date: 7/17/2017 Posted on Whiteboard in [facility]: yes      4. The patient's care plan was reviewed with the oncoming nurse. \"HEALS\" SAFETY CHECK      Fall Risk    Total Score: 3    Safety Measures: Safety Measures: Bed/Chair-Wheels locked, Bed in low position, Call light within reach, Fall prevention (comment), Side rails X 3    A safety check occurred in the patient's room between off going nurse and oncoming nurse listed above.     The safety check included the below items  Area Items   H  High Alert Medications - Verify all high alert medication drips (heparin, PCA, etc.)   E  Equipment - Suction is set up for ALL patients (with yanker)  - Red plugs utilized for all equipment (IV pumps, etc.)  - WOWs wiped down at end of shift.  - Room stocked with oxygen, suction, and other unit-specific supplies   A  Alarms - Bed alarm is set for fall risk patients  - Ensure chair alarm is in place and activated if patient is up in a chair   L  Lines - Check IV for any infiltration  - Reid bag is empty if patient has a Reid   - Tubing and IV bags are labeled   S  Safety   - Room is clean, patient is clean, and equipment is clean. - Hallways are clear from equipment besides carts. - Fall bracelet on for fall risk patients  - Ensure room is clear and free of clutter  - Suction is set up for ALL patients (with yanker)  - Hallways are clear from equipment besides carts.    - Isolation precautions followed, supplies available outside room, sign posted     Ashley Pearson LPN

## 2017-07-18 ENCOUNTER — APPOINTMENT (OUTPATIENT)
Dept: ULTRASOUND IMAGING | Age: 62
DRG: 982 | End: 2017-07-18
Attending: HOSPITALIST
Payer: COMMERCIAL

## 2017-07-18 LAB
ALBUMIN SERPL BCP-MCNC: 2.6 G/DL (ref 3.4–5)
ALBUMIN/GLOB SERPL: 0.5 {RATIO} (ref 0.8–1.7)
ALP SERPL-CCNC: 125 U/L (ref 45–117)
ALT SERPL-CCNC: 268 U/L (ref 16–61)
ANION GAP BLD CALC-SCNC: 6 MMOL/L (ref 3–18)
AST SERPL W P-5'-P-CCNC: 289 U/L (ref 15–37)
BASOPHILS # BLD AUTO: 0 K/UL (ref 0–0.1)
BASOPHILS # BLD: 0 % (ref 0–2)
BILIRUB SERPL-MCNC: 1.3 MG/DL (ref 0.2–1)
BUN SERPL-MCNC: 12 MG/DL (ref 7–18)
BUN/CREAT SERPL: 12 (ref 12–20)
CALCIUM SERPL-MCNC: 8.8 MG/DL (ref 8.5–10.1)
CHLORIDE SERPL-SCNC: 109 MMOL/L (ref 100–108)
CO2 SERPL-SCNC: 27 MMOL/L (ref 21–32)
CREAT SERPL-MCNC: 1 MG/DL (ref 0.6–1.3)
DIFFERENTIAL METHOD BLD: ABNORMAL
EOSINOPHIL # BLD: 0.2 K/UL (ref 0–0.4)
EOSINOPHIL NFR BLD: 4 % (ref 0–5)
ERYTHROCYTE [DISTWIDTH] IN BLOOD BY AUTOMATED COUNT: 14.3 % (ref 11.6–14.5)
GLOBULIN SER CALC-MCNC: 4.8 G/DL (ref 2–4)
GLUCOSE SERPL-MCNC: 89 MG/DL (ref 74–99)
HCT VFR BLD AUTO: 33.8 % (ref 36–48)
HGB BLD-MCNC: 11.1 G/DL (ref 13–16)
INR PPP: 1.2 (ref 0.8–1.2)
LYMPHOCYTES # BLD AUTO: 16 % (ref 21–52)
LYMPHOCYTES # BLD: 0.9 K/UL (ref 0.9–3.6)
MAGNESIUM SERPL-MCNC: 2.3 MG/DL (ref 1.6–2.6)
MCH RBC QN AUTO: 26.4 PG (ref 24–34)
MCHC RBC AUTO-ENTMCNC: 32.8 G/DL (ref 31–37)
MCV RBC AUTO: 80.3 FL (ref 74–97)
MONOCYTES # BLD: 0.6 K/UL (ref 0.05–1.2)
MONOCYTES NFR BLD AUTO: 12 % (ref 3–10)
NEUTS SEG # BLD: 3.5 K/UL (ref 1.8–8)
NEUTS SEG NFR BLD AUTO: 68 % (ref 40–73)
PLATELET # BLD AUTO: 206 K/UL (ref 135–420)
PMV BLD AUTO: 9.9 FL (ref 9.2–11.8)
POTASSIUM SERPL-SCNC: 4 MMOL/L (ref 3.5–5.5)
PROT SERPL-MCNC: 7.4 G/DL (ref 6.4–8.2)
PROTHROMBIN TIME: 14.8 SEC (ref 11.5–15.2)
RBC # BLD AUTO: 4.21 M/UL (ref 4.7–5.5)
SODIUM SERPL-SCNC: 142 MMOL/L (ref 136–145)
WBC # BLD AUTO: 5.2 K/UL (ref 4.6–13.2)

## 2017-07-18 PROCEDURE — 83735 ASSAY OF MAGNESIUM: CPT | Performed by: EMERGENCY MEDICINE

## 2017-07-18 PROCEDURE — 85610 PROTHROMBIN TIME: CPT | Performed by: EMERGENCY MEDICINE

## 2017-07-18 PROCEDURE — 80053 COMPREHEN METABOLIC PANEL: CPT | Performed by: EMERGENCY MEDICINE

## 2017-07-18 PROCEDURE — 74011000258 HC RX REV CODE- 258: Performed by: INTERNAL MEDICINE

## 2017-07-18 PROCEDURE — 74011250637 HC RX REV CODE- 250/637: Performed by: INTERNAL MEDICINE

## 2017-07-18 PROCEDURE — 85025 COMPLETE CBC W/AUTO DIFF WBC: CPT | Performed by: EMERGENCY MEDICINE

## 2017-07-18 PROCEDURE — 97530 THERAPEUTIC ACTIVITIES: CPT

## 2017-07-18 PROCEDURE — 36415 COLL VENOUS BLD VENIPUNCTURE: CPT | Performed by: EMERGENCY MEDICINE

## 2017-07-18 PROCEDURE — 74011250637 HC RX REV CODE- 250/637: Performed by: HOSPITALIST

## 2017-07-18 PROCEDURE — 65270000029 HC RM PRIVATE

## 2017-07-18 PROCEDURE — 74011250636 HC RX REV CODE- 250/636: Performed by: INTERNAL MEDICINE

## 2017-07-18 PROCEDURE — 97535 SELF CARE MNGMENT TRAINING: CPT

## 2017-07-18 PROCEDURE — 74011250636 HC RX REV CODE- 250/636: Performed by: EMERGENCY MEDICINE

## 2017-07-18 PROCEDURE — 76705 ECHO EXAM OF ABDOMEN: CPT

## 2017-07-18 RX ORDER — CIPROFLOXACIN 500 MG/1
500 TABLET ORAL EVERY 12 HOURS
Status: DISCONTINUED | OUTPATIENT
Start: 2017-07-18 | End: 2017-07-19 | Stop reason: HOSPADM

## 2017-07-18 RX ORDER — AMOXICILLIN AND CLAVULANATE POTASSIUM 875; 125 MG/1; MG/1
1 TABLET, FILM COATED ORAL EVERY 12 HOURS
Status: DISCONTINUED | OUTPATIENT
Start: 2017-07-18 | End: 2017-07-19 | Stop reason: HOSPADM

## 2017-07-18 RX ADMIN — Medication 325 MG: at 09:15

## 2017-07-18 RX ADMIN — CHLORPROMAZINE HYDROCHLORIDE 25 MG: 25 TABLET, SUGAR COATED ORAL at 21:01

## 2017-07-18 RX ADMIN — SODIUM CHLORIDE 75 ML/HR: 900 INJECTION, SOLUTION INTRAVENOUS at 07:26

## 2017-07-18 RX ADMIN — LORAZEPAM 1 MG: 0.5 TABLET ORAL at 03:06

## 2017-07-18 RX ADMIN — FUROSEMIDE 40 MG: 40 TABLET ORAL at 09:16

## 2017-07-18 RX ADMIN — Medication 2 TABLET: at 21:01

## 2017-07-18 RX ADMIN — PIPERACILLIN SODIUM,TAZOBACTAM SODIUM 3.38 G: 3; .375 INJECTION, POWDER, FOR SOLUTION INTRAVENOUS at 09:15

## 2017-07-18 RX ADMIN — HEPARIN SODIUM 5000 UNITS: 5000 INJECTION, SOLUTION INTRAVENOUS; SUBCUTANEOUS at 02:23

## 2017-07-18 RX ADMIN — ASPIRIN 81 MG 81 MG: 81 TABLET ORAL at 09:15

## 2017-07-18 RX ADMIN — ONDANSETRON 4 MG: 2 INJECTION INTRAMUSCULAR; INTRAVENOUS at 14:17

## 2017-07-18 RX ADMIN — CHLORPROMAZINE HYDROCHLORIDE 25 MG: 25 TABLET, SUGAR COATED ORAL at 17:46

## 2017-07-18 RX ADMIN — AMLODIPINE BESYLATE 10 MG: 10 TABLET ORAL at 09:17

## 2017-07-18 RX ADMIN — CHLORPROMAZINE HYDROCHLORIDE 25 MG: 25 TABLET, SUGAR COATED ORAL at 09:16

## 2017-07-18 RX ADMIN — CYANOCOBALAMIN TAB 1000 MCG 1000 MCG: 1000 TAB at 09:16

## 2017-07-18 RX ADMIN — POLYETHYLENE GLYCOL 3350 17 G: 17 POWDER, FOR SOLUTION ORAL at 09:16

## 2017-07-18 RX ADMIN — HEPARIN SODIUM 5000 UNITS: 5000 INJECTION, SOLUTION INTRAVENOUS; SUBCUTANEOUS at 09:16

## 2017-07-18 RX ADMIN — TAMSULOSIN HYDROCHLORIDE 0.4 MG: 0.4 CAPSULE ORAL at 09:15

## 2017-07-18 RX ADMIN — CIPROFLOXACIN HYDROCHLORIDE 500 MG: 500 TABLET, FILM COATED ORAL at 20:54

## 2017-07-18 RX ADMIN — Medication 250 MG: at 09:17

## 2017-07-18 RX ADMIN — HEPARIN SODIUM 5000 UNITS: 5000 INJECTION, SOLUTION INTRAVENOUS; SUBCUTANEOUS at 17:46

## 2017-07-18 RX ADMIN — THERA TABS 1 TABLET: TAB at 09:15

## 2017-07-18 RX ADMIN — AMOXICILLIN AND CLAVULANATE POTASSIUM 1 TABLET: 875; 125 TABLET, FILM COATED ORAL at 20:53

## 2017-07-18 RX ADMIN — PIPERACILLIN SODIUM,TAZOBACTAM SODIUM 3.38 G: 3; .375 INJECTION, POWDER, FOR SOLUTION INTRAVENOUS at 02:23

## 2017-07-18 NOTE — DISCHARGE INSTRUCTIONS
Vacuum-Assisted Closure for Wound Healing: Care Instructions  Your Care Instructions  When you have a wound that is hard to close your doctor may treat it with vacuum-assisted closure (VAC). VAC uses negative pressure (suction) to help bring the edges of your wound together. It also removes fluid and dead tissue from the wound area. In VAC:  · A special piece of foam or cotton gauze fits over your wound. This covers and protects the wound. A clear bandage (film dressing) goes several inches beyond the foam or gauze dressing to create a seal for the vacuum. · A tube connects the foam to a small machine called the therapy unit. The therapy unit creates the suction. · The [facility] system may be carried around (portable) or may stay in one place (stationary). VAC does not hurt. You may feel a mild pulling on the wound when treatment first starts. How long you need VAC depends on the size and type of wound you have and how well the [facility] works. You will be limited in what you can do while the wound heals. You will use VAC 24 hours a day. Follow-up care is a key part of your treatment and safety. Be sure to make and go to all appointments, and call your doctor if you are having problems. It's also a good idea to know your test results and keep a list of the medicines you take. How can you care for yourself at home? · A home health care worker will come to your home a few times a week to change the bandage and check the machine. You may need it changed more often if there is a lot of drainage. · Your doctor will give you information on what you can and can't do. This depends on where your wound is located. Your activities may be limited during the time you're using VAC. · You will be able to take sponge baths. Don't shower or take baths unless your doctor says it is okay. · Take pain medicines exactly as directed. ¨ If the doctor gave you a prescription medicine for pain, take it as prescribed.   ¨ If you are not taking a prescription pain medicine, ask your doctor if you can take an over-the-counter medicine. · If your doctor prescribed antibiotics, take them as directed. Do not stop taking them just because you feel better. You need to take the full course of antibiotics. When should you call for help? Call 911 anytime you think you may need emergency care. For example, call if:  · You have a lot of bleeding or see a sudden change in the color or texture of the drainage. · The wound splits open and organs under the skin can be seen (evisceration). Call your doctor now or seek immediate medical care if:  · The wound starts bleeding. · The bandage comes off. Cover the area with a sterile bandage until you can see your doctor or your home health care worker comes by. · You have signs of infection, such as:  ¨ Increased pain, swelling, warmth, or redness around the wound. ¨ Red streaks leading from the wound. ¨ Pus draining from the wound. ¨ A fever. Watch closely for changes in your health, and be sure to contact your doctor if:  · The noise the machine makes changes or gets very loud. This may mean the seal is broken or the machine is not producing enough suction. Where can you learn more? Go to http://lainey-millie.info/. Enter I816 in the search box to learn more about \"Vacuum-Assisted Closure for Wound Healing: Care Instructions. \"  Current as of: March 20, 2017  Content Version: 11.3  © 1926-8484 [employer]. Care instructions adapted under license by [employer] (which disclaims liability or warranty for this information). If you have questions about a medical condition or this instruction, always ask your healthcare professional. [place] [address] any warranty or liability for your use of this information. Cellulitis: Care Instructions  Your Care Instructions    Cellulitis is a skin infection.  It often occurs after a break in the skin from a scrape, cut, bite, or puncture, or after a rash. The doctor has checked you carefully, but problems can develop later. If you notice any problems or new symptoms, get medical treatment right away. Follow-up care is a key part of your treatment and safety. Be sure to make and go to all appointments, and call your doctor if you are having problems. It's also a good idea to know your test results and keep a list of the medicines you take. How can you care for yourself at home? · Take your antibiotics as directed. Do not stop taking them just because you feel better. You need to take the full course of antibiotics. · Prop up the infected area on pillows to reduce pain and swelling. Try to keep the area above the level of your heart as often as you can. · If your doctor told you how to care for your wound, follow your doctor's instructions. If you did not get instructions, follow this general advice:  ¨ Wash the wound with clean water 2 times a day. Don't use hydrogen peroxide or alcohol, which can slow healing. ¨ You may cover the wound with a thin layer of petroleum jelly, such as Vaseline, and a nonstick bandage. ¨ Apply more petroleum jelly and replace the bandage as needed. · Be safe with medicines. Take pain medicines exactly as directed. ¨ If the doctor gave you a prescription medicine for pain, take it as prescribed. ¨ If you are not taking a prescription pain medicine, ask your doctor if you can take an over-the-counter medicine. To prevent cellulitis in the future  · Try to prevent cuts, scrapes, or other injuries to your skin. Cellulitis most often occurs where there is a break in the skin. · If you get a scrape, cut, mild burn, or bite, wash the wound with clean water as soon as you can to help avoid infection. Don't use hydrogen peroxide or alcohol, which can slow healing.   · If you have swelling in your legs (edema), support stockings and good skin care may help prevent leg sores and cellulitis. · Take care of your feet, especially if you have diabetes or other conditions that increase the risk of infection. Wear shoes and socks. Do not go barefoot. If you have athlete's foot or other skin problems on your feet, talk to your doctor about how to treat them. When should you call for help? Call your doctor now or seek immediate medical care if:  · You have signs that your infection is getting worse, such as:  ¨ Increased pain, swelling, warmth, or redness. ¨ Red streaks leading from the area. ¨ Pus draining from the area. ¨ A fever. · You get a rash. Watch closely for changes in your health, and be sure to contact your doctor if:  · You are not getting better after 1 day (24 hours). · You do not get better as expected. Where can you learn more? Go to http://lainey-millie.info/. Galdino Skelton in the search box to learn more about \"Cellulitis: Care Instructions. \"  Current as of: October 13, 2016  Content Version: 11.3  © 9452-2808 [employer]. Care instructions adapted under license by [employer] (which disclaims liability or warranty for this information). If you have questions about a medical condition or this instruction, always ask your healthcare professional. [address] any warranty or liability for your use of this information. Venous Skin Ulcer: Care Instructions  Your Care Instructions  A venous skin ulcer is a shallow wound that develops when the leg veins do not move blood back to the heart normally. Your veins have one-way valves that keep blood flowing toward the heart. When the valves are damaged, the blood can back up and pool in the vein. The blood may leak out of the vein into tissue around the vein. The tissue can break down and form an ulcer. The first sign of a venous skin ulcer is skin that turns dark red or purple over the area where the blood is leaking out of the vein.  The skin also may become thick, dry, and itchy. Without treatment, an ulcer may form. The ulcer may be painful. Your leg also may swell and ache. If the ulcer becomes infected, the infection may cause an odor, and pus may drain from the ulcer. The area around the ulcer also may be more tender and red. Follow-up care is a key part of your treatment and safety. Be sure to make and go to all appointments, and call your doctor if you are having problems. It's also a good idea to know your test results and keep a list of the medicines you take. How can you care for yourself at home? · Follow your doctor's instructions on how to clean the ulcer and change the bandage. · If your doctor prescribed antibiotics, take them as directed. Do not stop taking them just because you feel better. You need to take the full course of antibiotics. · Lift your legs above the level of your heart as often as possible. For example, lie down and then prop up your legs with pillows. · Wear compression stockings or bandages. They help the blood circulate in your legs. And they help prevent blood from pooling in your legs. But there are different types of stockings, and they need to fit right. So your doctor will recommend what you need. · After your ulcer has healed, continue to wear compression stockings. Take them off only when you bathe and sleep. Compression helps your blood circulate and helps prevent other ulcers from forming. · Walk daily. Walking helps your blood circulation. When should you call for help? Call your doctor now or seek immediate medical care if:  · You have symptoms of infection, such as:  ¨ Increased pain, swelling, warmth, or redness. ¨ Red streaks leading from the ulcer. ¨ Pus draining from the ulcer. ¨ A fever. Watch closely for changes in your health, and be sure to contact your doctor if:  · Your ulcer is not healing. · You have new ulcers. · The ulcer starts to bleed, and blood soaks through the bandage.  Oozing small amounts of a mix of blood and fluid is normal.  · You have new bleeding. · You do not get better as expected. Where can you learn more? Go to http://lainey-millie.info/. Enter W010 in the search box to learn more about \"Venous Skin Ulcer: Care Instructions. \"  Current as of: March 20, 2017  Content Version: 11.3  © 3166-8468 [employer]. Care instructions adapted under license by [employer] (which disclaims liability or warranty for this information). If you have questions about a medical condition or this instruction, always ask your healthcare professional. [place] [address] any warranty or liability for your use of this information. Hiccups: Care Instructions  Your Care Instructions    Hiccups occur when a spasm contracts the diaphragm, a large sheet of muscle that separates the chest cavity from the abdominal cavity. The spasm causes an intake of breath that is suddenly stopped by the closure of the vocal cords (glottis). This closure causes the \"hiccup\" sound. A very full stomach can cause hiccups that go away on their own. A full stomach can be caused by things like eating too much food too quickly or swallowing too much air. Most hiccups go away on their own within a few minutes to a few hours and do not require any treatment. Hiccups that last longer than 48 hours are called persistent hiccups. Hiccups that last longer than a month are called intractable hiccups. Both persistent and intractable hiccups may be a sign of a more serious health problem. Follow-up care is a key part of your treatment and safety. Be sure to make and go to all appointments, and call your doctor if you are having problems. It's also a good idea to know your test results and keep a list of the medicines you take. How can you care for yourself at home? · Try these safe and easy home remedies if your hiccups are making you uncomfortable.   ¨ Eat a teaspoon of sugar or honey. ¨ Hold your breath and count slowly to 10. ¨ Quickly drink a glass of cold water. · If your doctor prescribed medicine, take it as directed. Call your doctor if you think you are having a problem with your medicine. To help prevent hiccups  · Take steps to avoid swallowing air:  ¨ Eat slowly. Avoid gulping food or beverages. ¨ Chew your food thoroughly before you swallow. ¨ Avoid drinking through a straw. ¨ Avoid chewing gum or eating hard candy. ¨ Do not smoke or use other tobacco products. ¨ If you wear dentures, check with a dentist to make sure they fit properly. · Do not eat large meals. · Do not drink alcohol. · Avoid sudden changes in stomach temperature, such as drinking a hot beverage and then a cold beverage. · Avoid emotional stress or excitement. When should you call for help? Call your doctor now or seek immediate medical care if:  · You have trouble swallowing and are unable to swallow food or fluids. · You have hiccups for more than 2 days. · You have new symptoms, such as belly pain, constipation, diarrhea, heartburn, or vomiting. Watch closely for changes in your health, and be sure to contact your doctor if:  · You have trouble swallowing but are able to swallow food and fluids. · Hiccups occur often and get in the way of your activities. · You think medicine may be causing your symptoms. · You do not get better as expected. Where can you learn more? Go to http://lainey-millie.info/. Enter D698 in the search box to learn more about \"Hiccups: Care Instructions. \"  Current as of: March 20, 2017  Content Version: 11.3  © 3693-3945 [employer]. Care instructions adapted under license by [employer] (which disclaims liability or warranty for this information).  If you have questions about a medical condition or this instruction, always ask your healthcare professional. Mari Mckinley disclaims any warranty or liability for your use of this information. Patient armband removed and shredded      DISCHARGE SUMMARY from Nurse    The following personal items are in your possession at time of discharge:    Dental Appliances: None  Visual Aid: None     Home Medications: None  Jewelry: None  Clothing: None   MyChart Activation    Thank you for requesting access to [email or web address]. Please follow the instructions below to securely access and download your online medical record. [email or web address] allows you to send messages to your doctor, view your test results, renew your prescriptions, schedule appointments, and more. How Do I Sign Up? 1. In your internet browser, go to [email or web address]  2. Click on the First Time User? Click Here link in the Sign In box. You will be redirect to the New Member Sign Up page. 3. Enter your [email or web address] Access Code exactly as it appears below. You will not need to use this code after youve completed the sign-up process. If you do not sign up before the expiration date, you must request a new code. [email or web address] Access Code: [email or web address]  Expires: 2017  3:45 PM (This is the date your [email or web address] access code will )    4. Enter the last four digits of your Social Security Number (xxxx) and Date of Birth (mm/dd/yyyy) as indicated and click Submit. You will be taken to the next sign-up page. 5. Create a [email or web address] ID. This will be your [email or web address] login ID and cannot be changed, so think of one that is secure and easy to remember. 6. Create a [email or web address] password. You can change your password at any time. 7. Enter your Password Reset Question and Answer. This can be used at a later time if you forget your password. 8. Enter your e-mail address. You will receive e-mail notification when new information is available in [address] 9. Click Sign Up. You can now view and download portions of your medical record.   10. Click the Download Summary menu link to download a portable copy of your medical information. Additional Information    If you have questions, please visit the Frequently Asked Questions section of the [employer] website at [email or web address] Remember, [employer] is NOT to be used for urgent needs. For medical emergencies, dial 911. PATIENT INSTRUCTIONS:    After general anesthesia or intravenous sedation, for 24 hours or while taking prescription Narcotics:  · Limit your activities  · Do not drive and operate hazardous machinery  · Do not make important personal or business decisions  · Do  not drink alcoholic beverages  · If you have not urinated within 8 hours after discharge, please contact your surgeon on call. Report the following to your surgeon:  · Excessive pain, swelling, redness or odor of or around the surgical area  · Temperature over 100.5  · Nausea and vomiting lasting longer than 4 hours or if unable to take medications  · Any signs of decreased circulation or nerve impairment to extremity: change in color, persistent  numbness, tingling, coldness or increase pain  · Any questions        What to do at Home:  Recommended activity: Bedrest, Wound Vac to RT Dorsal Foot. If you experience any of the following symptoms Pain, Fever not controlled, please follow up with doctor at facility. *  Please give a list of your current medications to your Primary Care Provider. *  Please update this list whenever your medications are discontinued, doses are      changed, or new medications (including over-the-counter products) are added. *  Please carry medication information at all times in case of emergency situations. These are general instructions for a healthy lifestyle:    No smoking/ No tobacco products/ Avoid exposure to second hand smoke    Surgeon General's Warning:  Quitting smoking now greatly reduces serious risk to your health.     Obesity, smoking, and sedentary lifestyle greatly increases your risk for illness    A healthy diet, regular physical exercise & weight monitoring are important for maintaining a healthy lifestyle    You may be retaining fluid if you have a history of heart failure or if you experience any of the following symptoms:  Weight gain of 3 pounds or more overnight or 5 pounds in a week, increased swelling in our hands or feet or shortness of breath while lying flat in bed. Please call your doctor as soon as you notice any of these symptoms; do not wait until your next office visit. Recognize signs and symptoms of STROKE:    F-face looks uneven    A-arms unable to move or move unevenly    S-speech slurred or non-existent    T-time-call 911 as soon as signs and symptoms begin-DO NOT go       Back to bed or wait to see if you get better-TIME IS BRAIN. Warning Signs of HEART ATTACK     Call 911 if you have these symptoms:   Chest discomfort. Most heart attacks involve discomfort in the center of the chest that lasts more than a few minutes, or that goes away and comes back. It can feel like uncomfortable pressure, squeezing, fullness, or pain.  Discomfort in other areas of the upper body. Symptoms can include pain or discomfort in one or both arms, the back, neck, jaw, or stomach.  Shortness of breath with or without chest discomfort.  Other signs may include breaking out in a cold sweat, nausea, or lightheadedness. Don't wait more than five minutes to call 911 - MINUTES MATTER! Fast action can save your life. Calling 911 is almost always the fastest way to get lifesaving treatment. Emergency Medical Services staff can begin treatment when they arrive -- up to an hour sooner than if someone gets to the hospital by car. The discharge information has been reviewed with the caregiver. The caregiver verbalized understanding. Discharge medications reviewed with the caregiver and appropriate educational materials and side effects teaching were provided.

## 2017-07-18 NOTE — PROGRESS NOTES
[facility] notified of this acceptance. Would like to be called regarding a time.

## 2017-07-18 NOTE — PROGRESS NOTES
Problem: Self Care Deficits Care Plan (Adult)  Goal: *Acute Goals and Plan of Care (Insert Text)  Occupational Therapy Goals  Initiated 7/13/2017 within 7 day(s). 1. Patient will perform self-feeding with supervision/set-up   2. Patient will perform grooming with supervision/set-up. 3. Patient will perform upper body dressing with minimal assistance/contact guard assist.  4. Patient will participate in UE exercises to increase strength to 3+/5 for ADLs    **Ambulatory goals will be addressed as patient progresses   Outcome: Progressing Towards Goal  OCCUPATIONAL THERAPY TREATMENT     Patient: Barry Sarmiento (68 y.o. male)  Date: 7/18/2017  Diagnosis: Right foot infection  Infected ulcer of skin (HCC)  Bilateral leg and foot pain  Foot ulcer ([address]  Cellulitis  Intractable hiccups  DX <principal problem not specified>  Procedure(s) (LRB):  DEBRIDEMENT AND DRAINAGE RIGHT FOOT (Right) 7 Days Post-Op  Precautions: Skin, Fall, PWB, WBAT  Chart, occupational therapy assessment, plan of care, and goals were reviewed. ASSESSMENT:  Pt is asleep upon entry. Appears drowsy, opens eyes to voice, c/o feeling nauseous. Pt is requesting to perform grooming task. Pt required Supervised A to perform simple grooming task at bed level, c/o feeling tired upon completion. Pt performed simulated self-feeding task (pt is NPO) w/min VCs for sequencing. Pt was educate don BUE TherEx to perform to improve UB strength and endurance in preparation for ADLs. Pt verbalized understanding, however reports he is too tired and doesn't feel good enough to perform. Pt's nurse notified of pt's nausea and addressing.    Progression toward goals:  [ ]          Improving appropriately and progressing toward goals  [X]          Improving slowly and progressing toward goals  [ ]          Not making progress toward goals and plan of care will be adjusted       PLAN:  Patient continues to benefit from skilled intervention to address the above impairments. Continue treatment per established plan of care. Discharge Recommendations:  jK Sheppard  Further Equipment Recommendations for Discharge:  N/A      G-CODES:      Self Care  Current  CM= 80-99%. The severity rating is based on the Level of Assistance required for Functional Mobility and ADLs. SUBJECTIVE:   Patient stated I just don't feel good today.       OBJECTIVE DATA SUMMARY:   Cognitive/Behavioral Status:  Neurologic State: Alert  Orientation Level: Oriented X4  Cognition: Follows commands     ADL Intervention:   Feeding  Drink to Mouth: Supervision/set-up (simulated task, pt is NPO)     Grooming  Grooming Assistance: Supervision/set up (at bed level)  Washing Face: Supervision/set-up  Washing Hands: Supervision/set-up  Therapeutic Exercises:   Pt is educated on BUE TherEx to perform to improve UE strength and endurance for ADLs carryover. Pain:  Pt doesn't rate pain or discomfort prior to treatment. Pt doesn't rate pain or discomfort post treatment. Activity Tolerance:    Fair-     Please refer to the flowsheet for vital signs taken during this treatment.   After treatment:   [ ]  Patient left in no apparent distress sitting up in chair  [X]  Patient left in no apparent distress in bed  [X]  Call bell left within reach  [X]  Nursing notified  [ ]  Caregiver present  [X]  Bed alarm activated  JILL Saravia  Time Calculation: 9 mins

## 2017-07-18 NOTE — ROUTINE PROCESS
Bedside and Verbal shift change report given to JOSEFA Lizarraga (oncoming nurse) by Silver Bae RN (offgoing nurse). Report included the following information SBAR, Kardex, MAR and Recent Results.     SITUATION:    Code Status: Full Code   Reason for Admission: Right foot infection   Infected [address] of [address] ([facility]   Bilateral leg and foot pain   Foot ulcer ([facility] [address]   Cellulitis   Intractable hiccups   DX    [facility] day: 12   Problem List:       Hospital Problems  Date Reviewed: 7/11/2017          Codes Class Noted POA    Right foot infection ICD-10-CM: L08.9  ICD-9-CM: 686.9  7/6/2017 Unknown        Bilateral leg and foot pain ICD-10-CM: M79.604, M79.605, M79.671, M79.672  ICD-9-CM: 729.5  7/6/2017 Unknown        Cellulitis ICD-10-CM: L03.90  ICD-9-CM: 682.9  12/20/2016 Unknown        Intractable hiccups (Chronic) ICD-10-CM: R06.6  ICD-9-CM: 786.8  9/2/2012 Unknown              BACKGROUND:    Past Medical History:   Past Medical History:   Diagnosis Date    Hiccups     Hypertension     Hyponatremia     Lacunar infarction ([facility] [address] 2010    Lower extremity edema     Psychogenic polydipsia     Spinal stenosis          Patient taking anticoagulants yes     ASSESSMENT:    Changes in Assessment Throughout Shift: no     Patient has Central Line: no Reasons if yes: n/a   Patient has Reid Cath: no Reasons if yes: n/a      Last Vitals:     Vitals:    07/18/17 0400 07/18/17 0911 07/18/17 1153 07/18/17 1717   BP: 126/75 135/88 133/82 (!) 141/92   Pulse: 98 85 81 90   Resp: 18 20 18 18   Temp: 99 °F (37.2 °C) 98.7 °F (37.1 °C) 98.1 °F (36.7 °C) 97.4 °F (36.3 °C)   TempSrc:       SpO2: 98% 98%  96%   Weight:   86.1 kg (189 lb 12.8 oz)    Height:            IV and DRAINS (will only show if present)   Peripheral IV 07/14/17 Right Wrist-Site Assessment: Clean, dry, & intact  [REMOVED] Peripheral IV 07/06/17 Right Wrist-Site Assessment: Clean, dry, & intact     WOUND (if present)   Wound Type:  none   Dressing present Dressing Present : Yes   Wound Concerns/Notes:  none     PAIN    Pain Assessment    Pain Intensity 1: 0 (07/18/17 1746)    Pain Location 1: Foot    Pain Intervention(s) 1: Medication (see MAR)    Patient Stated Pain Goal: 0  o Interventions for Pain:  none  o Intervention effective: n/a  o Time of last intervention: none  o Reassessment Completed: n/a     Last 3 Weights:  Last 3 Recorded Weights in this Encounter    07/17/17 0212 07/18/17 0222 07/18/17 1153   Weight: 89.9 kg (198 lb 1.6 oz) 89.9 kg (198 lb 1.6 oz) 86.1 kg (189 lb 12.8 oz)     Weight change: 0 kg (0 lb)     INTAKE/OUPUT    Current Shift:      Last three shifts: 07/17 0701 - 07/18 1900  In: 1025 [P.O.:1025]  Out: 1300 [Urine:1300]     LAB RESULTS     Recent Labs      07/18/17   0257  07/16/17   0244   WBC  5.2  5.7   HGB  11.1*  11.0*   HCT  33.8*  33.8*   PLT  206  236        Recent Labs      07/18/17   0257  07/17/17   1403   NA  142  141   K  4.0  3.8   GLU  89  112*   BUN  12  15   CREA  1.00  1.03   CA  8.8  9.2   MG  2.3   --    INR  1.2   --        RECOMMENDATIONS AND DISCHARGE PLANNING     1. Pending tests/procedures/ Plan of Care or Other Needs: GI Consult     2. Discharge plan for patient and Needs/Barriers: SNF    3. Estimated Discharge Date: TBD Posted on Whiteboard in Patients Room:       4. The patient's care plan was reviewed with the oncoming nurse. \"HEALS\" SAFETY CHECK      Fall Risk    Total Score: 3    Safety Measures: Safety Measures: Bed/Chair-Wheels locked, Bed in low position, Call light within reach    A safety check occurred in the patient's room between off going nurse and oncoming nurse listed above.     The safety check included the below items  Area Items   H  High Alert Medications - Verify all high alert medication drips (heparin, PCA, etc.)   E  Equipment - Suction is set up for ALL patients (with yanker)  - Red plugs utilized for all equipment (IV pumps, etc.)  - WOWs wiped down at end of shift.  - Room stocked with oxygen, suction, and other unit-specific supplies   A  Alarms - Bed alarm is set for fall risk patients  - Ensure chair alarm is in place and activated if patient is up in a chair   L  Lines - Check IV for any infiltration  - Reid bag is empty if patient has a Reid   - Tubing and IV bags are labeled   S  Safety   - Room is clean, patient is clean, and equipment is clean. - Hallways are clear from equipment besides carts. - Fall bracelet on for fall risk patients  - Ensure room is clear and free of clutter  - Suction is set up for ALL patients (with yanker)  - Hallways are clear from equipment besides carts.    - Isolation precautions followed, supplies available outside room, sign posted     Anna James RN

## 2017-07-18 NOTE — ROUTINE PROCESS
Bedside and Verbal shift change report given to Sara Negron RN (oncoming nurse) by Felecia Chen (offgoing nurse). Report included the following information SBAR, Kardex, MAR and Recent Results.     SITUATION:    Code Status: Full Code  Reason for Admission: Right foot infection  Infected ulcer of skin ([facility] [address]  Bilateral leg and foot pain  Foot ulcer ([facility] [address]  Cellulitis  Intractable hiccups   DX    [facility] day: 12   Problem List:       Hospital Problems  Date Reviewed: 7/11/2017          Codes Class Noted POA    Right foot infection ICD-10-CM: L08.9  ICD-9-CM: 686.9  7/6/2017 Unknown        Bilateral leg and foot pain ICD-10-CM: M79.604, M79.605, M79.671, M79.672  ICD-9-CM: 729.5  7/6/2017 Unknown        Cellulitis ICD-10-CM: L03.90  ICD-9-CM: 682.9  12/20/2016 Unknown        Intractable hiccups (Chronic) ICD-10-CM: R06.6  ICD-9-CM: 786.8  9/2/2012 Unknown              BACKGROUND:    Past Medical History:   Past Medical History:   Diagnosis Date    Hiccups     Hypertension     Hyponatremia     Lacunar infarction ([facility] [address] 2010    Lower extremity edema     Psychogenic polydipsia     Spinal stenosis          Patient taking anticoagulants yes     ASSESSMENT:    Changes in Assessment Throughout Shift: S/P DEBRIDMENT     Patient has Central Line: no Reasons if yes:    Patient has Reid Cath: no Reasons if yes:       Last Vitals:     Vitals:    07/17/17 1937 07/18/17 0000 07/18/17 0222 07/18/17 0400   BP: 126/74 128/79  126/75   Pulse: 99 97  98   Resp: 18 18 18   Temp: 99.7 °F (37.6 °C) 99.2 °F (37.3 °C)  99 °F (37.2 °C)   TempSrc:       SpO2: 96% 98%  98%   Weight:   89.9 kg (198 lb 1.6 oz)    Height:            IV and DRAINS (will only show if present)   Peripheral IV 07/14/17 Right Other(comment)-Site Assessment: Clean, dry, & intact  [REMOVED] Peripheral IV 07/06/17 Right Wrist-Site Assessment: Clean, dry, & intact     WOUND (if present)   Wound Type:  YES   Dressing present Dressing Present : Yes   Wound Concerns/Notes:       PAIN    Pain Assessment    Pain Intensity 1: 0 (07/18/17 0400)    Pain Location 1: Abdomen    Pain Intervention(s) 1: Medication (see MAR)    Patient Stated Pain Goal: 0  o Interventions for Pain:  YES  o Intervention effective: yes  o Time of last intervention:    o Reassessment Completed: yes      Last 3 Weights:  Last 3 Recorded Weights in this Encounter    07/16/17 0143 07/17/17 0212 07/18/17 0222   Weight: 92.9 kg (204 lb 12.8 oz) 89.9 kg (198 lb 1.6 oz) 89.9 kg (198 lb 1.6 oz)     Weight change: 0 kg (0 lb)     INTAKE/OUPUT    Current Shift:      Last three shifts: 07/16 1901 - 07/18 0700  In: 425 [P.O.:425]  Out: 1300 [Urine:1300]     LAB RESULTS     Recent Labs      07/18/17   0257  07/16/17   0244   WBC  5.2  5.7   HGB  11.1*  11.0*   HCT  33.8*  33.8*   PLT  206  236        Recent Labs      07/18/17   0257  07/17/17   1403   NA  142  141   K  4.0  3.8   GLU  89  112*   BUN  12  15   CREA  1.00  1.03   CA  8.8  9.2   MG  2.3   --    INR  1.2   --        RECOMMENDATIONS AND DISCHARGE PLANNING     1. Pending tests/procedures/ Plan of Care or Other Needs: WOUND CARE     2. Discharge plan for patient and Needs/Barriers: [facility]  3. Estimated Discharge Date: 7/14/17 Posted on Whiteboard in [facility]: yes      4. The patient's care plan was reviewed with the oncoming nurse. \"HEALS\" SAFETY CHECK      Fall Risk    Total Score: 3    Safety Measures: Safety Measures: Bed/Chair alarm on, Bed/Chair-Wheels locked, Bed in low position, Call light within reach, Fall prevention (comment)    A safety check occurred in the patient's room between off going nurse and oncoming nurse listed above.     The safety check included the below items  Area Items   H  High Alert Medications - Verify all high alert medication drips (heparin, PCA, etc.)   E  Equipment - Suction is set up for ALL patients (with yanker)  - Red plugs utilized for all equipment (IV pumps, etc.)  - WOWs wiped down at end of shift.  - Room stocked with oxygen, suction, and other unit-specific supplies   A  Alarms - Bed alarm is set for fall risk patients  - Ensure chair alarm is in place and activated if patient is up in a chair   L  Lines - Check IV for any infiltration  - Reid bag is empty if patient has a Reid   - Tubing and IV bags are labeled   S  Safety   - Room is clean, patient is clean, and equipment is clean. - Hallways are clear from equipment besides carts. - Fall bracelet on for fall risk patients  - Ensure room is clear and free of clutter  - Suction is set up for ALL patients (with yanker)  - Hallways are clear from equipment besides carts.    - Isolation precautions followed, supplies available outside room, sign posted     Elham Munoz      \[id number]\

## 2017-07-18 NOTE — PROGRESS NOTES
Infectious Disease progress Note    Requested by: dr. Joellen Munson. gabriella    Reason: infected right foot ulcer    Current abx Prior abx   Pip/tazo since 7/6 Vancomycin x1 on 7/6     Lines:       Assessment :    58 y.o. Male with a hx of HTN,  who presented to the ED on 7/6/17 for evaluation of an ulcer to the right dorsal foot that was noted by his son several days ago. Clinical picture consistent with right foot cellulitis, infected right foot ulcer secondary to Proteus, citrobacter, e.coli, mssa, e.fecalis, e.avium, strep viridans. Surgical wound cultures 7/11 : proteus, second gram negative    No evidence of osteomyelitis clinically or per MRI. S/p I&D of right foot ulcer on 7/11- intra op findings noted - no evidence of bone infection. Recommendations:    1. discontinue pip/tazo. Start po ciprofloxacin, amoxicillin/clavulanate till 7/25/17  2. Wound care per dr. Lissa Ayon to d/c from id standpoint     Above plan was discussed in details with patient, RN. Please call me if any further questions or concerns. Will continue to participate in the care of this patient. subjective:    Patient denies headaches, visual disturbances, sore throat, runny nose, earaches, cp, sob, chills, cough, abdominal pain, diarrhea, burning micturition,  or weakness in extremities. He denies back pain/flank pain. Denies increased right foot pain. Home Medication List    Details   !! baclofen (LIORESAL) 20 mg tablet       !! chlorproMAZINE (THORAZINE) 25 mg tablet       tamsulosin (FLOMAX) 0.4 mg capsule Take 0.4 mg by mouth daily. 1 tab daily      furosemide (LASIX) 40 mg tablet Take 40 mg by mouth daily. !! atorvastatin (LIPITOR) 20 mg tablet Take 20 mg by mouth daily. !! chlorproMAZINE (THORAZINE) 50 mg tablet Take 1 Tab by mouth three (3) times daily as needed.  Indications: INTRACTABLE HICCUPS  Qty: 9 Tab, Refills: 0      MULTIVITAMIN,THERAPEUTIC (THERA MULTI-VITAMIN PO) Take 500 mg by mouth daily.      LORazepam (ATIVAN) 0.5 mg tablet Take 1-2 Tabs by mouth every eight (8) hours as needed for Anxiety (or panic attacks). Max Daily Amount: 3 mg. Qty: 40 Tab, Refills: 0      oxyCODONE-acetaminophen (PERCOCET) 5-325 mg per tablet Take 1 Tab by mouth every four (4) hours as needed. Max Daily Amount: 6 Tabs. Qty: 40 Tab, Refills: 0      !! baclofen (LIORESAL) 10 mg tablet Take 1 Tab by mouth every twelve (12) hours as needed. Qty: 15 Tab, Refills: 0      amLODIPine (NORVASC) 10 mg tablet Take 1 Tab by mouth daily. Qty: 30 Tab, Refills: 2      metoprolol (LOPRESSOR) 25 mg tablet Take 1 Tab by mouth two (2) times a day. Qty: 60 Tab, Refills: 2      cyanocobalamin 1,000 mcg tablet Take 1 Tab by mouth daily. Qty: 30 Tab, Refills: 2      folic acid (FOLVITE) 1 mg tablet Take 1 Tab by mouth daily. Qty: 30 Tab, Refills: 1      !! atorvastatin (LIPITOR) 20 mg tablet 1 Tab by Per G Tube route nightly. Qty: 30 Tab, Refills: 0      aspirin 81 mg chewable tablet Take 1 Tab by mouth daily. Qty: 30 Tab, Refills: 0       !! - Potential duplicate medications found. Please discuss with provider.           Current Facility-Administered Medications   Medication Dose Route Frequency    bisacodyl (DULCOLAX) suppository 10 mg  10 mg Rectal DAILY PRN    0.9% sodium chloride infusion  75 mL/hr IntraVENous CONTINUOUS    hydrALAZINE (APRESOLINE) 20 mg/mL injection 10 mg  10 mg IntraVENous Q6H PRN    senna-docusate (PERICOLACE) 8.6-50 mg per tablet 2 Tab  2 Tab Oral QHS    polyethylene glycol (MIRALAX) packet 17 g  17 g Oral DAILY    ferrous sulfate tablet 325 mg  1 Tab Oral DAILY WITH BREAKFAST    ascorbic acid (vitamin C) (VITAMIN C) tablet 250 mg  250 mg Oral DAILY    ondansetron (ZOFRAN) injection 4 mg  4 mg IntraVENous Q4H PRN    heparin (porcine) injection 5,000 Units  5,000 Units SubCUTAneous Q8H    amLODIPine (NORVASC) tablet 10 mg  10 mg Oral DAILY    aspirin chewable tablet 81 mg  81 mg Oral DAILY    chlorproMAZINE (THORAZINE) tablet 25 mg  25 mg Oral QID    cyanocobalamin tablet 1,000 mcg  1,000 mcg Oral DAILY    furosemide (LASIX) tablet 40 mg  40 mg Oral DAILY    LORazepam (ATIVAN) tablet 0.5-1 mg  0.5-1 mg Oral Q8H PRN    therapeutic multivitamin (THERAGRAN) tablet 1 Tab  1 Tab Oral DAILY    tamsulosin (FLOMAX) capsule 0.4 mg  0.4 mg Oral DAILY    piperacillin-tazobactam (ZOSYN) 3.375 g in 0.9% sodium chloride (MBP/ADV) 100 mL MBP  3.375 g IntraVENous Q6H    oxyCODONE-acetaminophen (PERCOCET) 5-325 mg per tablet 1 Tab  1 Tab Oral Q6H PRN       Allergies: Review of patient's allergies indicates no known allergies. Temp (24hrs), Av.7 °F (37.1 °C), Min:97.3 °F (36.3 °C), Max:99.7 °F (37.6 °C)    Visit Vitals    /88 (BP 1 Location: Left arm, BP Patient Position: At rest)    Pulse 85    Temp 98.7 °F (37.1 °C)    Resp 20    Ht 5' 11\" (1.803 m)    Wt 89.9 kg (198 lb 1.6 oz)    SpO2 98%    BMI 27.63 kg/m2       ROS: 12 point ROS obtained in details. Pertinent positives as mentioned in HPI,   otherwise negative    Physical Exam:    Constitutional: He is oriented to person, place, and time. He appears well-developed and well-nourished. No distress. HENT:   Head: Normocephalic and atraumatic. Mouth/Throat: Oropharynx is clear and moist.   Eyes: Conjunctivae are normal. Pupils are equal, round, and reactive to light. No scleral icterus. Neck: Normal range of motion. Neck supple. Cardiovascular: Normal rate and intact distal pulses. Pulmonary/Chest: Effort normal and breath sounds normal. No respiratory distress. He has no wheezes. Abdominal: Soft. Bowel sounds are normal. He exhibits no distension. There is no tenderness. Musculoskeletal: Normal range of motion. Tenderness left foot. Decreased bilateral LE swelling. Wound vac recent surgical site left foot  Lymphadenopathy:     He has no cervical adenopathy. Neurological: He is alert and oriented to person, place, and time. No cranial nerve deficit. Skin: Skin is warm and dry. He is not diaphoretic. Labs: Results:   Chemistry Recent Labs      07/18/17   0257  07/17/17   1403   GLU  89  112*   NA  142  141   K  4.0  3.8   CL  109*  105   CO2  27  29   BUN  12  15   CREA  1.00  1.03   CA  8.8  9.2   AGAP  6  7   BUCR  12  15   AP  125*  88   TP  7.4  8.1   ALB  2.6*  2.9*   GLOB  4.8*  5.2*   AGRAT  0.5*  0.6*      CBC w/Diff Recent Labs      07/18/17   0257  07/16/17   0244   WBC  5.2  5.7   RBC  4.21*  4.21*   HGB  11.1*  11.0*   HCT  33.8*  33.8*   PLT  206  236   GRANS  68   --    LYMPH  16*   --    EOS  4   --       Microbiology No results for input(s): CULT in the last 72 hours.        RADIOLOGY:    All available imaging studies/reports in [facility] for this admission were reviewed    Dr. Rosanne Aguilar, Infectious Disease Specialist  [phone]  July 18, 2017  9:13 AM

## 2017-07-18 NOTE — PROGRESS NOTES
Problem: Mobility Impaired (Adult and Pediatric)  Goal: *Acute Goals and Plan of Care (Insert Text)  STG for 7.13.17 to be completed by 7.20.17 (7 days). 1. Pt will complete supine to/from sit (I) in order to complete EOB activity. 2. Pt will complete functional transfer to/from w/c vs chair with mod A in prep for OOB activity. 3. Pt will perform HEP for improved BLE strength (I). Outcome: Progressing Towards Goal  PHYSICAL THERAPY TREATMENT     Patient: Michelle Corral (1 y.o. male)  Date: 7/18/2017  Diagnosis: Right foot infection  Infected ulcer of skin (HCC)  Bilateral leg and foot pain  Foot ulcer ([address]  Cellulitis  Intractable hiccups  DX <principal problem not specified>  Procedure(s) (LRB):  DEBRIDEMENT AND DRAINAGE RIGHT FOOT (Right) 7 Days Post-Op  Precautions: Skin, Fall, PWB, WBAT   Chart, physical therapy assessment, plan of care and goals were reviewed. ASSESSMENT:  Patient continues to progress with functional mobility. Wound vac in place to R foot. WB status on R LE continues to require further clarification. Patient requiring less assistance with bed mobility, transitioned into standing initially min A with RW, however with 2 more completed trials with standing, assistance level decreased to CGA with RW, with patient maintaining PWB on R LE. Patient required increased assist with transitioning back to supine from sitting position, mod A for management of B LE onto bed and tubing from the wound vac. Will continue to progress patient as tolerated. Progression toward goals:  [ ]      Improving appropriately and progressing toward goals  [X]      Improving slowly and progressing toward goals  [ ]      Not making progress toward goals and plan of care will be adjusted       PLAN:  Patient continues to benefit from skilled intervention to address the above impairments. Continue treatment per established plan of care.   Discharge Recommendations:  [facility] Equipment Recommendations for Discharge: To be determined       SUBJECTIVE:   Patient stated I think I am going to a nursing home to go to rehab so I can get stronger.       OBJECTIVE DATA SUMMARY:   Critical Behavior:  Neurologic State: Alert  Orientation Level: Oriented to person, Oriented to place, Oriented to situation  Cognition: Follows commands, Appropriate decision making, Appropriate for age attention/concentration, Appropriate safety awareness  Safety/Judgement: Fall prevention, Awareness of environment  Functional Mobility Training:  Bed Mobility:  Rolling: Minimum assistance; Additional time  Supine to Sit: Minimum assistance; Additional time  Sit to Supine: Moderate assistance; Additional time (assist for management of B LE onto bed)  Scooting: Minimum assistance;Contact guard assistance; Additional time              Interventions: Verbal cues  Transfers:  Sit to Stand: Minimum assistance;Contact guard assistance; Additional time (with RW)  Stand to Sit: Contact guard assistance; Additional time (with RW, vc's for hand placement)  Balance:  Sitting: Impaired; With support  Sitting - Static: Fair (occasional)  Sitting - Dynamic: Fair (occasional)  Standing: Impaired;Pull to stand; With support (with RW)  Standing - Static: Fair;Constant support (with RW)  Standing - Dynamic :  (N/A)  Pain:  Pain Scale 1: Numeric (0 - 10)  Pain Intensity 1: 0  Pain Location 1: Foot  Pain Orientation 1: Right  Activity Tolerance:   Good  Please refer to the flowsheet for vital signs taken during this treatment.   After treatment:   [ ] Patient left in no apparent distress sitting up in chair  [X] Patient left in no apparent distress in bed  [X] Call bell left within reach  [X] Nursing notified  [ ] Caregiver present  [ ] Bed alarm activated      Sade Saleh, PT   Time Calculation: 34 mins

## 2017-07-18 NOTE — PROGRESS NOTES
SW received a telephone call from Ana Maria Richmond with [facility]. Ms. Preet Ross is seeking an update on the patient's disability status as it is necessary for patient's Medicaid approval for LTC. VML with [employer].

## 2017-07-18 NOTE — PROGRESS NOTES
Hospitalist Progress Note    Patient: Aquilino Hale MRN: [id number]  CSN: [id number]    YOB: 1955  Age: 58 y.o. Sex: male    DOA: 7/6/2017 LOS:  LOS: 12 days          Patient feels better, min nausea but no vomiting. Had BM. Assessment/Plan     1. Infected wound of the right foot which is new, as well as wounds to the bilateral legs, venous stasis ulcers. No OM on MRI. S/p procecure per podiatry 7/11/17. Cont wound care and Vac, on abx  2. Constipation: s/p laxative     3. Abnormal LFT's with chololithiasis: will d/c statin and monitor LFT's. get RUQ sono and hep panel. GI mago, d/w Dr. Sofy Truong   4. Infestation, maggots at presentation. 5. Hypertension controlled- monitor  7. Wheelchair bound at baseline. 8. Iron deficiency anemia - supplement. Dispo- Await SNF, Discharge summary dictated on 7/14/17  D/w patient, d/w RN   Full code    Case discussed with:  [x]Patient  []Family  [x]Nursing  [x]Case Management  DVT Prophylaxis:  []Lovenox  [x]Hep SQ  []SCDs  []Coumadin   []On Heparin gtt    Vital signs/Intake and Output:  Visit Vitals    /82 (BP 1 Location: Right arm, BP Patient Position: At rest)    Pulse 81    Temp 98.1 °F (36.7 °C)    Resp 18    Ht 5' 11\" (1.803 m)    Wt 86.1 kg (189 lb 12.8 oz)    SpO2 98%    BMI 26.47 kg/m2     Current Shift:     Last three shifts:  07/16 1901 - 07/18 0700  In: 425 [P.O.:425]  Out: 1300 [Urine:1300]    General:  Awake, alert  Cardiovascular:  S1S2+, RRR  Pulmonary:  CTA b/l  GI:  Soft, BS +, non distension, no tenderness to palpation.   Extremities:  no edema, right foot with dressing, wound vac       Medications Reviewed      Labs: Results:       Chemistry Recent Labs      07/18/17   0257  07/17/17   1403   GLU  89  112*   NA  142  141   K  4.0  3.8   CL  109*  105   CO2  27  29   BUN  12  15   CREA  1.00  1.03   CA  8.8  9.2   AGAP  6  7   BUCR  12  15   AP  125*  88   TP  7.4  8.1   ALB  2.6*  2.9*   GLOB  4.8*  5.2*   AGRAT  0.5* 0.6*      CBC w/Diff Recent Labs      07/18/17 0257 07/16/17   0244   WBC  5.2  5.7   RBC  4.21*  4.21*   HGB  11.1*  11.0*   HCT  33.8*  33.8*   PLT  206  236   GRANS  68   --    LYMPH  16*   --    EOS  4   --       Cardiac Enzymes No results for input(s): CPK, CKND1, BARRINGTON in the last 72 hours. No lab exists for component: CKRMB, TROIP   Coagulation Recent Labs      07/18/17 0257   PTP  14.8   INR  1.2       Lipid Panel Lab Results   Component Value Date/Time    Cholesterol, total 107 12/20/2011 04:00 AM    HDL Cholesterol 39 12/20/2011 04:00 AM    LDL, calculated 54.4 12/20/2011 04:00 AM    VLDL, calculated 13.6 12/20/2011 04:00 AM    Triglyceride 68 12/20/2011 04:00 AM    CHOL/HDL Ratio 2.7 12/20/2011 04:00 AM      BNP No results for input(s): BNPP in the last 72 hours.    Liver Enzymes Recent Labs      07/18/17 0257   TP  7.4   ALB  2.6*   AP  125*   SGOT  289*      Thyroid Studies Lab Results   Component Value Date/Time    TSH 0.80 07/02/2016 10:56 AM        Procedures/imaging: see electronic medical records for all procedures/Xrays and details which were not copied into this note but were reviewed prior to creation of Maximilian Min MD

## 2017-07-19 VITALS
TEMPERATURE: 96.6 F | SYSTOLIC BLOOD PRESSURE: 117 MMHG | WEIGHT: 195 LBS | RESPIRATION RATE: 18 BRPM | OXYGEN SATURATION: 100 % | HEIGHT: 71 IN | BODY MASS INDEX: 27.3 KG/M2 | DIASTOLIC BLOOD PRESSURE: 77 MMHG | HEART RATE: 84 BPM

## 2017-07-19 LAB
ALBUMIN SERPL BCP-MCNC: 2.6 G/DL (ref 3.4–5)
ALBUMIN/GLOB SERPL: 0.5 {RATIO} (ref 0.8–1.7)
ALP SERPL-CCNC: 103 U/L (ref 45–117)
ALT SERPL-CCNC: 169 U/L (ref 16–61)
ANION GAP BLD CALC-SCNC: 6 MMOL/L (ref 3–18)
AST SERPL W P-5'-P-CCNC: 77 U/L (ref 15–37)
BASOPHILS # BLD AUTO: 0 K/UL (ref 0–0.1)
BASOPHILS # BLD: 0 % (ref 0–2)
BILIRUB SERPL-MCNC: 0.4 MG/DL (ref 0.2–1)
BUN SERPL-MCNC: 9 MG/DL (ref 7–18)
BUN/CREAT SERPL: 13 (ref 12–20)
CALCIUM SERPL-MCNC: 8.9 MG/DL (ref 8.5–10.1)
CHLORIDE SERPL-SCNC: 109 MMOL/L (ref 100–108)
CO2 SERPL-SCNC: 25 MMOL/L (ref 21–32)
CREAT SERPL-MCNC: 0.67 MG/DL (ref 0.6–1.3)
DIFFERENTIAL METHOD BLD: ABNORMAL
EOSINOPHIL # BLD: 0.4 K/UL (ref 0–0.4)
EOSINOPHIL NFR BLD: 8 % (ref 0–5)
ERYTHROCYTE [DISTWIDTH] IN BLOOD BY AUTOMATED COUNT: 14.4 % (ref 11.6–14.5)
GLOBULIN SER CALC-MCNC: 4.9 G/DL (ref 2–4)
GLUCOSE SERPL-MCNC: 74 MG/DL (ref 74–99)
HAV IGM SERPL QL IA: NEGATIVE
HBV CORE IGM SER QL: NEGATIVE
HBV SURFACE AG SER QL: <0.1 INDEX
HBV SURFACE AG SER QL: NEGATIVE
HCT VFR BLD AUTO: 32.8 % (ref 36–48)
HCV AB SER IA-ACNC: 0.11 INDEX
HCV AB SERPL QL IA: NEGATIVE
HCV COMMENT,HCGAC: NORMAL
HGB BLD-MCNC: 10.8 G/DL (ref 13–16)
LYMPHOCYTES # BLD AUTO: 24 % (ref 21–52)
LYMPHOCYTES # BLD: 1.2 K/UL (ref 0.9–3.6)
MCH RBC QN AUTO: 26.6 PG (ref 24–34)
MCHC RBC AUTO-ENTMCNC: 32.9 G/DL (ref 31–37)
MCV RBC AUTO: 80.8 FL (ref 74–97)
MONOCYTES # BLD: 0.5 K/UL (ref 0.05–1.2)
MONOCYTES NFR BLD AUTO: 9 % (ref 3–10)
NEUTS SEG # BLD: 3.1 K/UL (ref 1.8–8)
NEUTS SEG NFR BLD AUTO: 59 % (ref 40–73)
PLATELET # BLD AUTO: 194 K/UL (ref 135–420)
PMV BLD AUTO: 10.3 FL (ref 9.2–11.8)
POTASSIUM SERPL-SCNC: 3.8 MMOL/L (ref 3.5–5.5)
PROT SERPL-MCNC: 7.5 G/DL (ref 6.4–8.2)
RBC # BLD AUTO: 4.06 M/UL (ref 4.7–5.5)
SODIUM SERPL-SCNC: 140 MMOL/L (ref 136–145)
SP1: NORMAL
SP2: NORMAL
SP3: NORMAL
WBC # BLD AUTO: 5.2 K/UL (ref 4.6–13.2)

## 2017-07-19 PROCEDURE — 74011250636 HC RX REV CODE- 250/636: Performed by: INTERNAL MEDICINE

## 2017-07-19 PROCEDURE — 36415 COLL VENOUS BLD VENIPUNCTURE: CPT | Performed by: HOSPITALIST

## 2017-07-19 PROCEDURE — 77030018836 HC SOL IRR NACL ICUM -A

## 2017-07-19 PROCEDURE — 74011250637 HC RX REV CODE- 250/637: Performed by: INTERNAL MEDICINE

## 2017-07-19 PROCEDURE — 80074 ACUTE HEPATITIS PANEL: CPT | Performed by: HOSPITALIST

## 2017-07-19 PROCEDURE — 74011250637 HC RX REV CODE- 250/637: Performed by: HOSPITALIST

## 2017-07-19 PROCEDURE — 80053 COMPREHEN METABOLIC PANEL: CPT | Performed by: HOSPITALIST

## 2017-07-19 PROCEDURE — 77030011256 HC DRSG MEPILEX <16IN NO BORD MOLN -A

## 2017-07-19 PROCEDURE — 74011250636 HC RX REV CODE- 250/636: Performed by: EMERGENCY MEDICINE

## 2017-07-19 PROCEDURE — 85025 COMPLETE CBC W/AUTO DIFF WBC: CPT | Performed by: HOSPITALIST

## 2017-07-19 PROCEDURE — 77030033263 HC DRSG MEPILEX 16-48IN BORD MOLN -B

## 2017-07-19 RX ORDER — AMOXICILLIN AND CLAVULANATE POTASSIUM 875; 125 MG/1; MG/1
1 TABLET, FILM COATED ORAL 2 TIMES DAILY
Qty: 16 TAB | Refills: 0 | Status: SHIPPED
Start: 2017-07-19 | End: 2017-07-27

## 2017-07-19 RX ORDER — CIPROFLOXACIN 500 MG/1
500 TABLET ORAL 2 TIMES DAILY
Qty: 16 TAB | Refills: 0 | Status: SHIPPED
Start: 2017-07-19 | End: 2017-07-27

## 2017-07-19 RX ADMIN — POLYETHYLENE GLYCOL 3350 17 G: 17 POWDER, FOR SOLUTION ORAL at 09:52

## 2017-07-19 RX ADMIN — CHLORPROMAZINE HYDROCHLORIDE 25 MG: 25 TABLET, SUGAR COATED ORAL at 13:42

## 2017-07-19 RX ADMIN — TAMSULOSIN HYDROCHLORIDE 0.4 MG: 0.4 CAPSULE ORAL at 09:53

## 2017-07-19 RX ADMIN — HEPARIN SODIUM 5000 UNITS: 5000 INJECTION, SOLUTION INTRAVENOUS; SUBCUTANEOUS at 09:53

## 2017-07-19 RX ADMIN — CHLORPROMAZINE HYDROCHLORIDE 25 MG: 25 TABLET, SUGAR COATED ORAL at 09:53

## 2017-07-19 RX ADMIN — HEPARIN SODIUM 5000 UNITS: 5000 INJECTION, SOLUTION INTRAVENOUS; SUBCUTANEOUS at 02:00

## 2017-07-19 RX ADMIN — Medication 250 MG: at 09:52

## 2017-07-19 RX ADMIN — CYANOCOBALAMIN TAB 1000 MCG 1000 MCG: 1000 TAB at 09:53

## 2017-07-19 RX ADMIN — AMOXICILLIN AND CLAVULANATE POTASSIUM 1 TABLET: 875; 125 TABLET, FILM COATED ORAL at 09:53

## 2017-07-19 RX ADMIN — OXYCODONE AND ACETAMINOPHEN 1 TABLET: 5; 325 TABLET ORAL at 14:45

## 2017-07-19 RX ADMIN — AMLODIPINE BESYLATE 10 MG: 10 TABLET ORAL at 09:53

## 2017-07-19 RX ADMIN — ASPIRIN 81 MG 81 MG: 81 TABLET ORAL at 09:53

## 2017-07-19 RX ADMIN — THERA TABS 1 TABLET: TAB at 09:52

## 2017-07-19 RX ADMIN — Medication 325 MG: at 09:53

## 2017-07-19 RX ADMIN — FUROSEMIDE 40 MG: 40 TABLET ORAL at 11:26

## 2017-07-19 RX ADMIN — CIPROFLOXACIN HYDROCHLORIDE 500 MG: 500 TABLET, FILM COATED ORAL at 09:53

## 2017-07-19 RX ADMIN — SODIUM CHLORIDE 75 ML/HR: 900 INJECTION, SOLUTION INTRAVENOUS at 05:27

## 2017-07-19 NOTE — DISCHARGE SUMMARY
Transfer Summary    Patient: Emerald Young MRN: [id number]  CSN: [id number]    YOB: 1955  Age: 58 y.o. Sex: male    DOA: 7/6/2017 LOS:  LOS: 13 days   Discharge Date:      Admission Diagnoses: Right foot infection  Infected ulcer of skin ([address]  Bilateral leg and foot pain  Foot ulcer ([address]  Cellulitis  Intractable hiccups  DX    Discharge Diagnoses:  Please see Dictation. Discharge Condition: Stable    PHYSICAL EXAM  Visit Vitals    /75 (BP 1 Location: Left arm, BP Patient Position: Head of bed elevated (Comment degrees))    Pulse 96    Temp 97.5 °F (36.4 °C)    Resp 19    Ht 5' 11\" (1.803 m)    Wt 86.1 kg (189 lb 12.8 oz)    SpO2 99%    BMI 26.47 kg/m2       General:  Awake, alert  Cardiovascular:  S1S2+, RRR  Pulmonary:  CTA b/l  GI:  Soft, BS +, non distension, no tenderness to palpation. Extremities:  no edema, right foot with dressing, wound vac +  Neuro: AAOx3                                Hospital Course: Please see dictation by Dr. Bharati Stanton.   Pt was planned transfer to SNF but it was cancelled after pt developed abd pain. CT abd done showed constipation but no obstruction. Pt had BM after laxatives and enema. It was noted that his LFT went up, so RUQ sono was done and hep panel was sent. RUQ shows fatty liver and GI eval recommended no intervention but hold statin. GI cleared for transfer and follow up out pt. Hep panel is pending. Pt feels better, tolerating PO well. Will transfer to SNF today. D/w son Agus Brown, he agree with transfer      Discharge Medications:     Current Discharge Medication List      START taking these medications    Details   amoxicillin-clavulanate (AUGMENTIN) 875-125 mg per tablet Take 1 Tab by mouth two (2) times a day for 8 days. Qty: 16 Tab, Refills: 0      ciprofloxacin HCl (CIPRO) 500 mg tablet Take 1 Tab by mouth two (2) times a day for 8 days.   Qty: 16 Tab, Refills: 0      ascorbic acid, vitamin C, (VITAMIN C) 250 mg tablet Take 1 Tab by mouth daily. Qty: 30 Tab, Refills: 2      ferrous sulfate 325 mg (65 mg iron) tablet Take 1 Tab by mouth daily (with breakfast). Qty: 30 Tab, Refills: 2      polyethylene glycol (MIRALAX) 17 gram packet Take 1 Packet by mouth daily. Qty: 30 Packet, Refills: 2      senna-docusate (PERICOLACE) 8.6-50 mg per tablet Take 1 Tab by mouth nightly. HOLD for loose stool. Qty: 30 Tab, Refills: 2      Saccharomyces boulardii (FLORASTOR) 250 mg capsule Take 1 Cap by mouth two (2) times a day for 14 days. Qty: 28 Cap, Refills: 0         CONTINUE these medications which have CHANGED    Details   oxyCODONE-acetaminophen (PERCOCET) 5-325 mg per tablet Take 1 Tab by mouth every four (4) hours as needed. Max Daily Amount: 6 Tabs. Qty: 20 Tab, Refills: 0      chlorproMAZINE (THORAZINE) 25 mg tablet Take 1 Tab by mouth four (4) times daily. Qty: 120 Tab, Refills: 3         CONTINUE these medications which have NOT CHANGED    Details   tamsulosin (FLOMAX) 0.4 mg capsule Take 0.4 mg by mouth daily. 1 tab daily      furosemide (LASIX) 40 mg tablet Take 40 mg by mouth daily. MULTIVITAMIN,THERAPEUTIC (THERA MULTI-VITAMIN PO) Take 500 mg by mouth daily. baclofen (LIORESAL) 10 mg tablet Take 1 Tab by mouth every twelve (12) hours as needed. Qty: 15 Tab, Refills: 0      amLODIPine (NORVASC) 10 mg tablet Take 1 Tab by mouth daily. Qty: 30 Tab, Refills: 2      metoprolol (LOPRESSOR) 25 mg tablet Take 1 Tab by mouth two (2) times a day. Qty: 60 Tab, Refills: 2      cyanocobalamin 1,000 mcg tablet Take 1 Tab by mouth daily. Qty: 30 Tab, Refills: 2      folic acid (FOLVITE) 1 mg tablet Take 1 Tab by mouth daily. Qty: 30 Tab, Refills: 1      aspirin 81 mg chewable tablet Take 1 Tab by mouth daily.   Qty: 30 Tab, Refills: 0         STOP taking these medications       atorvastatin (LIPITOR) 20 mg tablet Comments:   Reason for Stopping:         LORazepam (ATIVAN) 0.5 mg tablet Comments:   Reason for Stopping: atorvastatin (LIPITOR) 20 mg tablet Comments:   Reason for Stopping:             DIET:  Cardiac Diet    ACTIVITY: Activity as tolerated  Patient needs to be on Fall, aspiration, decubitus precaution. ·    PT/OT consult  ·             Speech consult  ·  Wound care and vac care  ·             DVT prophylaxis     ADDITIONAL INFORMATION: If you experience any of the following symptoms but not limited to Fever, chills, nausea, vomiting, diarrhea, change in mentation, falling, bleeding, shortness of breath, chest pain, please call your primary care physician or return to the emergency room if you cannot get hold of your doctor:     FOLLOW UP CARE:  Follow-up with 1. Physician at SNF in 1-2 days with Cbc with diff, bmp. LFT in 2 weeks                             2. Dr. Whyte Record in 6 week                           [address] in 1 week.                                 Pt's PCP: Hans Yuen MD.    Minutes spent on discharge: >40 minutes spent coordinating this discharge (review instructions/follow-up, prescriptions, preparing report for sign off)    Gunjan Castillo MD  7/19/2017 1:41 PM

## 2017-07-19 NOTE — PROGRESS NOTES
Infectious Disease progress Note    Requested by: dr. Nelai quiroz    Reason: infected right foot ulcer    Current abx Prior abx   Ciprofloxacin, augmentin since 7/18 Vancomycin x1 on 7/6  Pip/tazo  7/6-7/18     Lines:       Assessment :    58 y.o. Male with a hx of HTN,  who presented to the ED on 7/6/17 for evaluation of an ulcer to the right dorsal foot that was noted by his son several days ago. Clinical picture consistent with right foot cellulitis, infected right foot ulcer secondary to Proteus, citrobacter, e.coli, mssa, e.fecalis, e.avium, strep viridans. Surgical wound cultures 7/11 : proteus, second gram negative    No evidence of osteomyelitis clinically or per MRI. S/p I&D of right foot ulcer on 7/11- intra op findings noted - no evidence of bone infection. Recommendations:    1. cont po ciprofloxacin, amoxicillin/clavulanate tentatively till 7/25/17  2. Wound care per dr. Gus Amaya was discussed in details with patient, RN. Please call me if any further questions or concerns. Will continue to participate in the care of this patient. subjective:    Patient denies headaches, visual disturbances, sore throat, runny nose, earaches, cp, sob, chills, cough, abdominal pain, diarrhea, burning micturition,  or weakness in extremities. He denies back pain/flank pain. Denies increased right foot pain. Home Medication List    Details   !! baclofen (LIORESAL) 20 mg tablet       !! chlorproMAZINE (THORAZINE) 25 mg tablet       tamsulosin (FLOMAX) 0.4 mg capsule Take 0.4 mg by mouth daily. 1 tab daily      furosemide (LASIX) 40 mg tablet Take 40 mg by mouth daily. !! atorvastatin (LIPITOR) 20 mg tablet Take 20 mg by mouth daily. !! chlorproMAZINE (THORAZINE) 50 mg tablet Take 1 Tab by mouth three (3) times daily as needed. Indications: INTRACTABLE HICCUPS  Qty: 9 Tab, Refills: 0      MULTIVITAMIN,THERAPEUTIC (THERA MULTI-VITAMIN PO) Take 500 mg by mouth daily. LORazepam (ATIVAN) 0.5 mg tablet Take 1-2 Tabs by mouth every eight (8) hours as needed for Anxiety (or panic attacks). Max Daily Amount: 3 mg. Qty: 40 Tab, Refills: 0      oxyCODONE-acetaminophen (PERCOCET) 5-325 mg per tablet Take 1 Tab by mouth every four (4) hours as needed. Max Daily Amount: 6 Tabs. Qty: 40 Tab, Refills: 0      !! baclofen (LIORESAL) 10 mg tablet Take 1 Tab by mouth every twelve (12) hours as needed. Qty: 15 Tab, Refills: 0      amLODIPine (NORVASC) 10 mg tablet Take 1 Tab by mouth daily. Qty: 30 Tab, Refills: 2      metoprolol (LOPRESSOR) 25 mg tablet Take 1 Tab by mouth two (2) times a day. Qty: 60 Tab, Refills: 2      cyanocobalamin 1,000 mcg tablet Take 1 Tab by mouth daily. Qty: 30 Tab, Refills: 2      folic acid (FOLVITE) 1 mg tablet Take 1 Tab by mouth daily. Qty: 30 Tab, Refills: 1      !! atorvastatin (LIPITOR) 20 mg tablet 1 Tab by Per G Tube route nightly. Qty: 30 Tab, Refills: 0      aspirin 81 mg chewable tablet Take 1 Tab by mouth daily. Qty: 30 Tab, Refills: 0       !! - Potential duplicate medications found. Please discuss with provider.           Current Facility-Administered Medications   Medication Dose Route Frequency    ciprofloxacin HCl (CIPRO) tablet 500 mg  500 mg Oral Q12H    amoxicillin-clavulanate (AUGMENTIN) 875-125 mg per tablet 1 Tab  1 Tab Oral Q12H    bisacodyl (DULCOLAX) suppository 10 mg  10 mg Rectal DAILY PRN    0.9% sodium chloride infusion  75 mL/hr IntraVENous CONTINUOUS    hydrALAZINE (APRESOLINE) 20 mg/mL injection 10 mg  10 mg IntraVENous Q6H PRN    senna-docusate (PERICOLACE) 8.6-50 mg per tablet 2 Tab  2 Tab Oral QHS    polyethylene glycol (MIRALAX) packet 17 g  17 g Oral DAILY    ferrous sulfate tablet 325 mg  1 Tab Oral DAILY WITH BREAKFAST    ascorbic acid (vitamin C) (VITAMIN C) tablet 250 mg  250 mg Oral DAILY    ondansetron (ZOFRAN) injection 4 mg  4 mg IntraVENous Q4H PRN    heparin (porcine) injection 5,000 Units  5,000 Units SubCUTAneous Q8H    amLODIPine (NORVASC) tablet 10 mg  10 mg Oral DAILY    aspirin chewable tablet 81 mg  81 mg Oral DAILY    chlorproMAZINE (THORAZINE) tablet 25 mg  25 mg Oral QID    cyanocobalamin tablet 1,000 mcg  1,000 mcg Oral DAILY    furosemide (LASIX) tablet 40 mg  40 mg Oral DAILY    LORazepam (ATIVAN) tablet 0.5-1 mg  0.5-1 mg Oral Q8H PRN    therapeutic multivitamin (THERAGRAN) tablet 1 Tab  1 Tab Oral DAILY    tamsulosin (FLOMAX) capsule 0.4 mg  0.4 mg Oral DAILY    oxyCODONE-acetaminophen (PERCOCET) 5-325 mg per tablet 1 Tab  1 Tab Oral Q6H PRN       Allergies: Review of patient's allergies indicates no known allergies. Temp (24hrs), Av.8 °F (36.6 °C), Min:97 °F (36.1 °C), Max:98.4 °F (36.9 °C)    Visit Vitals    BP (!) 138/91 (BP 1 Location: Right arm, BP Patient Position: Head of bed elevated (Comment degrees))    Pulse 76    Temp 97 °F (36.1 °C)    Resp 18    Ht 5' 11\" (1.803 m)    Wt 86.1 kg (189 lb 12.8 oz)    SpO2 96%    BMI 26.47 kg/m2       ROS: 12 point ROS obtained in details. Pertinent positives as mentioned in HPI,   otherwise negative    Physical Exam:    Constitutional: He is oriented to person, place, and time. He appears well-developed and well-nourished. No distress. HENT:   Head: Normocephalic and atraumatic. Mouth/Throat: Oropharynx is clear and moist.   Eyes: Conjunctivae are normal. Pupils are equal, round, and reactive to light. No scleral icterus. Neck: Normal range of motion. Neck supple. Cardiovascular: Normal rate and intact distal pulses. Pulmonary/Chest: Effort normal and breath sounds normal. No respiratory distress. He has no wheezes. Abdominal: Soft. Bowel sounds are normal. He exhibits no distension. There is no tenderness. Musculoskeletal: Normal range of motion. Tenderness left foot. Decreased bilateral LE swelling. Wound vac recent surgical site left foot  Lymphadenopathy:     He has no cervical adenopathy.    Neurological: He is alert and oriented to person, place, and time. No cranial nerve deficit. Skin: Skin is warm and dry. He is not diaphoretic. Labs: Results:   Chemistry Recent Labs      07/19/17   0309 07/18/17   0257  07/17/17   1403   GLU  74  89  112*   NA  140  142  141   K  3.8  4.0  3.8   CL  109*  109*  105   CO2  25  27  29   BUN  9  12  15   CREA  0.67  1.00  1.03   CA  8.9  8.8  9.2   AGAP  6  6  7   BUCR  13  12  15   AP  103  125*  88   TP  7.5  7.4  8.1   ALB  2.6*  2.6*  2.9*   GLOB  4.9*  4.8*  5.2*   AGRAT  0.5*  0.5*  0.6*      CBC w/Diff Recent Labs      07/19/17 0309 07/18/17 0257   WBC  5.2  5.2   RBC  4.06*  4.21*   HGB  10.8*  11.1*   HCT  32.8*  33.8*   PLT  194  206   GRANS  59  68   LYMPH  24  16*   EOS  8*  4      Microbiology No results for input(s): CULT in the last 72 hours.        RADIOLOGY:    All available imaging studies/reports in [facility] for this admission were reviewed    Dr. Rachelle Romero, Infectious Disease Specialist  [phone]  July 19, 2017  9:13 AM

## 2017-07-19 NOTE — PROGRESS NOTES
Medical Progress Note      NAME: Duarte Salinas   :  1955  MRM:  [id number]    Date/Time: 2017  12:52 PM         Subjective: This 62M is s/p debridement of his dorsal RT foot with VAC placement by Jason/Segundo on 17. Poor historian. Should be placed today. Denies new issues. Past Medical History reviewed and unchanged from Admission History and Physical    Review of Systems   Denies FCNVD/cp/sob. Objective:     Vitals:      Last 24hrs VS reviewed since prior progress note. Most recent are:    Visit Vitals    /90 (BP 1 Location: Right arm, BP Patient Position: At rest)    Pulse 81    Temp 98.4 °F (36.9 °C)    Resp 18    Ht 5' 11\" (1.803 m)    Wt 86.1 kg (189 lb 12.8 oz)    SpO2 99%    BMI 26.47 kg/m2     SpO2 Readings from Last 6 Encounters:   17 99%   17 98%   17 98%   17 97%   17 98%   17 97%            Intake/Output Summary (Last 24 hours) at 17 0757  Last data filed at 17 1859   Gross per 24 hour   Intake           1372.5 ml   Output                0 ml   Net           1372.5 ml          Exam:      Physical Exam     CFT immediate RL. Skin is warm RL. VAC intact and functioning RT foot. Other dressings CDI.     Lab Data Reviewed: (see below)    Medications:  Current Facility-Administered Medications   Medication Dose Route Frequency    ciprofloxacin HCl (CIPRO) tablet 500 mg  500 mg Oral Q12H    amoxicillin-clavulanate (AUGMENTIN) 875-125 mg per tablet 1 Tab  1 Tab Oral Q12H    bisacodyl (DULCOLAX) suppository 10 mg  10 mg Rectal DAILY PRN    0.9% sodium chloride infusion  75 mL/hr IntraVENous CONTINUOUS    hydrALAZINE (APRESOLINE) 20 mg/mL injection 10 mg  10 mg IntraVENous Q6H PRN    senna-docusate (PERICOLACE) 8.6-50 mg per tablet 2 Tab  2 Tab Oral QHS    polyethylene glycol (MIRALAX) packet 17 g  17 g Oral DAILY    ferrous sulfate tablet 325 mg  1 Tab Oral DAILY WITH BREAKFAST    ascorbic acid (vitamin C) (VITAMIN C) tablet 250 mg  250 mg Oral DAILY    ondansetron (ZOFRAN) injection 4 mg  4 mg IntraVENous Q4H PRN    heparin (porcine) injection 5,000 Units  5,000 Units SubCUTAneous Q8H    amLODIPine (NORVASC) tablet 10 mg  10 mg Oral DAILY    aspirin chewable tablet 81 mg  81 mg Oral DAILY    chlorproMAZINE (THORAZINE) tablet 25 mg  25 mg Oral QID    cyanocobalamin tablet 1,000 mcg  1,000 mcg Oral DAILY    furosemide (LASIX) tablet 40 mg  40 mg Oral DAILY    LORazepam (ATIVAN) tablet 0.5-1 mg  0.5-1 mg Oral Q8H PRN    therapeutic multivitamin (THERAGRAN) tablet 1 Tab  1 Tab Oral DAILY    tamsulosin (FLOMAX) capsule 0.4 mg  0.4 mg Oral DAILY    oxyCODONE-acetaminophen (PERCOCET) 5-325 mg per tablet 1 Tab  1 Tab Oral Q6H PRN       ______________________________________________________________________      Lab Review:     Recent Labs      07/19/17   0309  07/18/17   0257   WBC  5.2  5.2   HGB  10.8*  11.1*   HCT  32.8*  33.8*   PLT  194  206     Reviewed relevant EMR/notes/labs/studies/reports. Assessment:     Cellulitis of RT foot  Ulcers of foot/ankle RL       Plan:     1. Conintue VAC as outpatient. 2. Abx as per cxs.   3. Follow-up with Pod/Segundo one week after d/c.    ___________________________________________________    Attending Physician: Cyndi Díaz DPM

## 2017-07-19 NOTE — ROUTINE PROCESS
Bedside and Verbal shift change report given to Singh Corral RN (oncoming nurse) by Cathy Arroyo (offgoing nurse). Report included the following information SBAR, Kardex, MAR and Recent Results.     SITUATION:    Code Status: Full Code  Reason for Admission: Right foot infection  Infected ulcer of skin ([facility]  Bilateral leg and foot pain  Foot ulcer ([facility]  Cellulitis  Intractable hiccups   DX    [facility] day: 13   Problem List:       Hospital Problems  Date Reviewed: 7/11/2017          Codes Class Noted POA    Right foot infection ICD-10-CM: L08.9  ICD-9-CM: 686.9  7/6/2017 Unknown        Bilateral leg and foot pain ICD-10-CM: M79.604, M79.605, M79.671, M79.672  ICD-9-CM: 729.5  7/6/2017 Unknown        Cellulitis ICD-10-CM: L03.90  ICD-9-CM: 682.9  12/20/2016 Unknown        Intractable hiccups (Chronic) ICD-10-CM: R06.6  ICD-9-CM: 786.8  9/2/2012 Unknown              BACKGROUND:    Past Medical History:   Past Medical History:   Diagnosis Date    Hiccups     Hypertension     Hyponatremia     Lacunar infarction ([facility] 2010    Lower extremity edema     Psychogenic polydipsia     Spinal stenosis          Patient taking anticoagulants yes     ASSESSMENT:    Changes in Assessment Throughout Shift: no     Patient has Central Line: no Reasons if yes: n/a   Patient has Reid Cath: no Reasons if yes: n/a      Last Vitals:     Vitals:    07/18/17 1153 07/18/17 1717 07/18/17 1930 07/19/17 0000   BP: 133/82 (!) 141/92 (!) 156/107 147/90   Pulse: 81 90 84 81   Resp: 18 18 18 18   Temp: 98.1 °F (36.7 °C) 97.4 °F (36.3 °C) 98.2 °F (36.8 °C) 98.4 °F (36.9 °C)   TempSrc:       SpO2:  96% 99% 99%   Weight: 86.1 kg (189 lb 12.8 oz)      Height:            IV and DRAINS (will only show if present)   Peripheral IV 07/14/17 Right Wrist-Site Assessment: Clean, dry, & intact  [REMOVED] Peripheral IV 07/06/17 Right Wrist-Site Assessment: Clean, dry, & intact     WOUND (if present)   Wound Type:  none   Dressing present Dressing Present : Yes   Wound Concerns/Notes:  none     PAIN    Pain Assessment    Pain Intensity 1: 0 (07/19/17 0727)    Pain Location 1: Foot    Pain Intervention(s) 1: Medication (see MAR)    Patient Stated Pain Goal: 0  o Interventions for Pain:  none  o Intervention effective: n/a  o Time of last intervention: none  o Reassessment Completed: n/a     Last 3 Weights:  Last 3 Recorded Weights in this Encounter    07/17/17 0212 07/18/17 0222 07/18/17 1153   Weight: 89.9 kg (198 lb 1.6 oz) 89.9 kg (198 lb 1.6 oz) 86.1 kg (189 lb 12.8 oz)     Weight change: -3.765 kg (-8 lb 4.8 oz)     INTAKE/OUPUT    Current Shift:      Last three shifts: 07/17 1901 - 07/19 0700  In: 1372.5 [P.O.:600; I.V.:772.5]  Out: -      LAB RESULTS     Recent Labs      07/19/17   0309 07/18/17 0257   WBC  5.2  5.2   HGB  10.8*  11.1*   HCT  32.8*  33.8*   PLT  194  206        Recent Labs      07/19/17   0309 07/18/17   0257  07/17/17   1403   NA  140  142  141   K  3.8  4.0  3.8   GLU  74  89  112*   BUN  9  12  15   CREA  0.67  1.00  1.03   CA  8.9  8.8  9.2   MG   --   2.3   --    INR   --   1.2   --        RECOMMENDATIONS AND DISCHARGE PLANNING     1. Pending tests/procedures/ Plan of Care or Other Needs: none    2. Discharge plan for patient and Needs/Barriers: SNF    3. Estimated Discharge Date: 7/19 Posted on Whiteboard in [address] [place] [address] Room:       4. The patient's care plan was reviewed with the oncoming nurse. \"HEALS\" SAFETY CHECK      Fall Risk    Total Score: 2    Safety Measures: Safety Measures: Bed/Chair alarm on, Bed/Chair-Wheels locked, Call light within reach, Fall prevention (comment)    A safety check occurred in the patient's room between off going nurse and oncoming nurse listed above.     The safety check included the below items  Area Items   H  High Alert Medications - Verify all high alert medication drips (heparin, PCA, etc.)   E  Equipment - Suction is set up for ALL patients (with erika)  - Red plugs utilized for all equipment (IV pumps, etc.)  - WOWs wiped down at end of shift.  - Room stocked with oxygen, suction, and other unit-specific supplies   A  Alarms - Bed alarm is set for fall risk patients  - Ensure chair alarm is in place and activated if patient is up in a chair   L  Lines - Check IV for any infiltration  - Reid bag is empty if patient has a Reid   - Tubing and IV bags are labeled   S  Safety   - Room is clean, patient is clean, and equipment is clean. - Hallways are clear from equipment besides carts. - Fall bracelet on for fall risk patients  - Ensure room is clear and free of clutter  - Suction is set up for ALL patients (with yanker)  - Hallways are clear from equipment besides carts.    - Isolation precautions followed, supplies available outside room, sign posted     Elham Woo

## 2017-07-19 NOTE — PROGRESS NOTES
Pt discharge to  [address] [place] [address]. Medical  Transport pick-up. Report called to  Colleen Thompson RN.

## 2017-07-19 NOTE — PROGRESS NOTES
PT attempted treatment this afternoon, pt refused stating that he was leaving today. Pt is awaiting discharge. Thank you.   Camilla Montgomery, PT, DPT

## 2017-07-19 NOTE — CONSULTS
[facility] 1947, [address] 32   [email or web address]      Impression:   1. Abn LFTs on statin w current infectious process R foot. Plan:     1. Numbers improving. OK to DC w OP f/u. Suspect due to fatty liver, statin, and current foot infection. Will see in f/u if LFTs don't return to baseline. F/U LFTs in 4-6 weeks w PCP. Will sign off, avail if needed. Chief Complaint:       HPI:  Uday Arce is a 58 y.o. male who is being seen on consult for abn LFTs. Has a current infected R foot which is likely contributing. On statin which has been held. Sono neg. Hep panel pending. PMH:   Past Medical History:   Diagnosis Date    Hiccups     Hypertension     Hyponatremia     Lacunar infarction ([facility] [address] 2010    Lower extremity edema     Psychogenic polydipsia     Spinal stenosis        PSH:   Past Surgical History:   Procedure Laterality Date    HX HEENT      pt reports past hx of surgery on ears unsure of what type       Social HX:   Social History     Social History    Marital status:      Spouse name: N/A    Number of children: N/A    Years of education: N/A     Occupational History    Not on file. Social History Main Topics    Smoking status: Former Smoker     Packs/day: 0.50     Quit date: 6/22/2008    Smokeless tobacco: Never Used    Alcohol use No    Drug use: No    Sexual activity: Not Currently     Other Topics Concern    Not on file     Social History Narrative       FHX:   Family History   Problem Relation Age of Onset    Hypertension Other        Allergy:   No Known Allergies    Home Medications:     Prescriptions Prior to Admission   Medication Sig    tamsulosin (FLOMAX) 0.4 mg capsule Take 0.4 mg by mouth daily. 1 tab daily    furosemide (LASIX) 40 mg tablet Take 40 mg by mouth daily.  MULTIVITAMIN,THERAPEUTIC (THERA MULTI-VITAMIN PO) Take 500 mg by mouth daily.     LORazepam (ATIVAN) 0.5 mg tablet Take 1-2 Tabs by mouth every eight (8) hours as needed for Anxiety (or panic attacks). Max Daily Amount: 3 mg.  baclofen (LIORESAL) 10 mg tablet Take 1 Tab by mouth every twelve (12) hours as needed. (Patient taking differently: Take 1 Tab by mouth every twelve (12) hours as needed (for back pain, muscle ralaxant). as needed for pain,)    amLODIPine (NORVASC) 10 mg tablet Take 1 Tab by mouth daily.  metoprolol (LOPRESSOR) 25 mg tablet Take 1 Tab by mouth two (2) times a day. (Patient taking differently: Take 1 Tab by mouth two (2) times a day. 1/2 tablet bid)    cyanocobalamin 1,000 mcg tablet Take 1 Tab by mouth daily.  folic acid (FOLVITE) 1 mg tablet Take 1 Tab by mouth daily.  atorvastatin (LIPITOR) 20 mg tablet 1 Tab by Per G Tube route nightly. (Patient taking differently: Take 1 Tab by mouth nightly.)    aspirin 81 mg chewable tablet Take 1 Tab by mouth daily. Review of Systems:     Constitutional: + fevers, + chills, weight loss, fatigue. Skin: No rashes, pruritis, jaundice, ulcerations, erythema. HENT: No headaches, nosebleeds, sinus pressure, rhinorrhea, sore throat. Eyes: No visual changes, blurred vision, eye pain, photophobia, jaundice. Cardiovascular: No chest pain, heart palpitations. Respiratory: No cough, SOB, wheezing, chest discomfort, orthopnea. Gastrointestinal: neg   Genitourinary: No dysuria, bleeding, discharge, pyuria. Musculoskeletal: + weakness, arthralgias, wasting. Endo: No sweats. Heme: No bruising, easy bleeding. Allergies: As noted. Neurological: Cranial nerves intact. Alert and oriented. Gait not assessed. Psychiatric:  No anxiety, depression, hallucinations.                  Visit Vitals    /75 (BP 1 Location: Left arm, BP Patient Position: Head of bed elevated (Comment degrees))    Pulse 96    Temp 97.5 °F (36.4 °C)    Resp 19    Ht 5' 11\" (1.803 m)    Wt 86.1 kg (189 lb 12.8 oz)    SpO2 99%    BMI 26.47 kg/m2       Physical Assessment:     constitutional: appearance: well developed, normal habitus, no deformities, in no acute distress. skin: inspection: no rashes, ulcers, icterus or other lesions; no clubbing or telangiectasias. palpation: no induration or subcutaneos nodules. eyes: inspection: normal conjunctivae and lids; no jaundice pupils: symmetrical, normoreactive to light, normal accommodation and size. ENMT: mouth: normal oral mucosa,lips and gums; good dentition. respiratory: effort: normal chest excursion; no intercostal retraction or accessory muscle use. cardiovascular: abdominal aorta: normal size and position; no bruits. palpation: PMI of normal size and position; normal rhythm; no thrill or murmurs. abdominal: abdomen: normal consistency; no tenderness or masses. hernias: no hernias appreciated. liver: normal size and consistency. spleen: not palpable. rectal: hemoccult/guaiac: not performed. musculoskeletal: digits and nails: not assessed. neurologic: cranial nerves: II-XII normal.   psychiatric: judgement/insight: within normal limits. memory: within normal limits for recent and remote events. mood and affect: no evidence of depression, anxiety or agitation. orientation: oriented to time, space and person. Basic Metabolic Profile   Recent Labs      07/19/17   0309  07/18/17   0257   NA  140  142   K  3.8  4.0   CL  109*  109*   CO2  25  27   BUN  9  12   GLU  74  89   CA  8.9  8.8   MG   --   2.3         CBC w/Diff    Recent Labs      07/19/17   0309   WBC  5.2   RBC  4.06*   HGB  10.8*   HCT  32.8*   MCV  80.8   MCH  26.6   MCHC  32.9   RDW  14.4   PLT  194    Recent Labs      07/19/17   0309   GRANS  59   LYMPH  24   EOS  8*        Hepatic Function   Recent Labs      07/19/17   0309   07/17/17   1403   ALB  2.6*   < >  2.9*   TP  7.5   < >  8.1   TBILI  0.4   < >  0.4   SGOT  77*   < >  108*   AP  103   < >  88   LPSE   --    --   172    < > = values in this interval not displayed.           Reuben Oviedo MD, M.D. [facility] [place] 1947, [address] [facility]  [email or web address]

## 2017-07-20 NOTE — PROGRESS NOTES
NATALY received a voicemail from [facility] regarding a call placed while the patient was admitted inquiring about disability status. NATALY spoke with Hector Harley regarding this patient today. NATALY explained situation that the patient is over income for disability but under age for LTC Medicaid. She stated she will have her supervisor review the patient to see if anything can be done.

## 2017-08-18 ENCOUNTER — OFFICE VISIT (OUTPATIENT)
Dept: VASCULAR SURGERY | Age: 62
End: 2017-08-18

## 2017-08-18 VITALS
RESPIRATION RATE: 20 BRPM | HEART RATE: 66 BPM | DIASTOLIC BLOOD PRESSURE: 72 MMHG | HEIGHT: 71 IN | BODY MASS INDEX: 27.3 KG/M2 | SYSTOLIC BLOOD PRESSURE: 140 MMHG | WEIGHT: 195 LBS

## 2017-08-18 DIAGNOSIS — L08.9 RIGHT FOOT INFECTION: Primary | ICD-10-CM

## 2017-08-18 DIAGNOSIS — R60.0 BILATERAL LEG EDEMA: ICD-10-CM

## 2017-08-18 NOTE — PROGRESS NOTES
Mr. martines is here today for 6 week follow-up wound check. When we first saw him, we actually had concerns about maggots in the right foot wound. We are trying to send him urgently to podiatry but they deferred him to the hospital.  So he was admitted and had debridement with podiatry. He then initially had a wound VAC skilled nursing facility for continued care. He comes from there today. The wound VAC has since been discontinued. That right foot wound is very viable and healthy filling in nicely, has good granulation tissue. There is also a healing left shin wound, which is likely stasis ulcer. He had vascular studies which have been negative for any DVT and he has good arterial flow. So there is not any necessary follow-up that will be needed from a vascular standpoint. Reinforcement with compression will continue to aid wound healing by keeping the swelling down. That can be achieved with tubi  which we applied today, or alternatively standard compression stockings or compression or Ace wrap.   If any issues or concerns we will certainly be happy to see him back

## 2017-08-18 NOTE — PROGRESS NOTES
Cleansed wound to top of right foot and left lower leg with wound cleanser and gauze, wounds are granulating with small amount ss drainage, surrounding tissue WNL. Right foot wound measures: 0.5x2.0x0.1 cm and left lower leg wound measures: 3.0x1.0x0.1cm. Applied silvasorb, mepilex and tubi  size E, patient tolerated well.

## 2017-08-22 ENCOUNTER — APPOINTMENT (OUTPATIENT)
Dept: CT IMAGING | Age: 62
DRG: 194 | End: 2017-08-22
Attending: PHYSICIAN ASSISTANT
Payer: COMMERCIAL

## 2017-08-22 ENCOUNTER — HOSPITAL ENCOUNTER (INPATIENT)
Age: 62
LOS: 10 days | Discharge: NURSING, REHAB OR LONG-TERM CARE | DRG: 194 | End: 2017-09-01
Attending: EMERGENCY MEDICINE | Admitting: INTERNAL MEDICINE
Payer: COMMERCIAL

## 2017-08-22 ENCOUNTER — APPOINTMENT (OUTPATIENT)
Dept: GENERAL RADIOLOGY | Age: 62
DRG: 194 | End: 2017-08-22
Attending: PHYSICIAN ASSISTANT
Payer: COMMERCIAL

## 2017-08-22 DIAGNOSIS — J18.9 PNEUMONIA OF RIGHT LOWER LOBE DUE TO INFECTIOUS ORGANISM: Primary | ICD-10-CM

## 2017-08-22 DIAGNOSIS — I10 ESSENTIAL HYPERTENSION: ICD-10-CM

## 2017-08-22 DIAGNOSIS — J90 PLEURAL EFFUSION, RIGHT: ICD-10-CM

## 2017-08-22 LAB
ALBUMIN SERPL-MCNC: 2.6 G/DL (ref 3.4–5)
ALBUMIN/GLOB SERPL: 0.4 {RATIO} (ref 0.8–1.7)
ALP SERPL-CCNC: 99 U/L (ref 45–117)
ALT SERPL-CCNC: 12 U/L (ref 16–61)
AMYLASE SERPL-CCNC: 52 U/L (ref 25–115)
ANION GAP SERPL CALC-SCNC: 8 MMOL/L (ref 3–18)
AST SERPL-CCNC: 11 U/L (ref 15–37)
ATRIAL RATE: 96 BPM
BASOPHILS # BLD: 0 K/UL (ref 0–0.1)
BASOPHILS NFR BLD: 0 % (ref 0–2)
BILIRUB SERPL-MCNC: 0.8 MG/DL (ref 0.2–1)
BUN SERPL-MCNC: 13 MG/DL (ref 7–18)
BUN/CREAT SERPL: 17 (ref 12–20)
CALCIUM SERPL-MCNC: 9 MG/DL (ref 8.5–10.1)
CALCULATED P AXIS, ECG09: 50 DEGREES
CALCULATED R AXIS, ECG10: 60 DEGREES
CALCULATED T AXIS, ECG11: -6 DEGREES
CHLORIDE SERPL-SCNC: 100 MMOL/L (ref 100–108)
CK MB CFR SERPL CALC: ABNORMAL % (ref 0–4)
CK MB SERPL-MCNC: <1 NG/ML (ref 5–25)
CK SERPL-CCNC: 27 U/L (ref 39–308)
CO2 SERPL-SCNC: 27 MMOL/L (ref 21–32)
CREAT SERPL-MCNC: 0.78 MG/DL (ref 0.6–1.3)
DIAGNOSIS, 93000: NORMAL
DIFFERENTIAL METHOD BLD: ABNORMAL
EOSINOPHIL # BLD: 0.1 K/UL (ref 0–0.4)
EOSINOPHIL NFR BLD: 1 % (ref 0–5)
ERYTHROCYTE [DISTWIDTH] IN BLOOD BY AUTOMATED COUNT: 14 % (ref 11.6–14.5)
GLOBULIN SER CALC-MCNC: 6.2 G/DL (ref 2–4)
GLUCOSE SERPL-MCNC: 101 MG/DL (ref 74–99)
HCT VFR BLD AUTO: 31.9 % (ref 36–48)
HGB BLD-MCNC: 11.1 G/DL (ref 13–16)
LACTATE BLD-SCNC: 1.4 MMOL/L (ref 0.4–2)
LIPASE SERPL-CCNC: 68 U/L (ref 73–393)
LYMPHOCYTES # BLD: 1.3 K/UL (ref 0.9–3.6)
LYMPHOCYTES NFR BLD: 12 % (ref 21–52)
MCH RBC QN AUTO: 27 PG (ref 24–34)
MCHC RBC AUTO-ENTMCNC: 34.8 G/DL (ref 31–37)
MCV RBC AUTO: 77.6 FL (ref 74–97)
MONOCYTES # BLD: 1.1 K/UL (ref 0.05–1.2)
MONOCYTES NFR BLD: 10 % (ref 3–10)
NEUTS SEG # BLD: 8.5 K/UL (ref 1.8–8)
NEUTS SEG NFR BLD: 77 % (ref 40–73)
P-R INTERVAL, ECG05: 180 MS
PLATELET # BLD AUTO: 263 K/UL (ref 135–420)
PMV BLD AUTO: 9.7 FL (ref 9.2–11.8)
POTASSIUM SERPL-SCNC: 3.8 MMOL/L (ref 3.5–5.5)
PROT SERPL-MCNC: 8.8 G/DL (ref 6.4–8.2)
Q-T INTERVAL, ECG07: 332 MS
QRS DURATION, ECG06: 92 MS
QTC CALCULATION (BEZET), ECG08: 419 MS
RBC # BLD AUTO: 4.11 M/UL (ref 4.7–5.5)
SODIUM SERPL-SCNC: 135 MMOL/L (ref 136–145)
TROPONIN I SERPL-MCNC: <0.02 NG/ML (ref 0–0.04)
VENTRICULAR RATE, ECG03: 96 BPM
WBC # BLD AUTO: 11 K/UL (ref 4.6–13.2)

## 2017-08-22 PROCEDURE — 96361 HYDRATE IV INFUSION ADD-ON: CPT

## 2017-08-22 PROCEDURE — 99285 EMERGENCY DEPT VISIT HI MDM: CPT

## 2017-08-22 PROCEDURE — 85025 COMPLETE CBC W/AUTO DIFF WBC: CPT | Performed by: EMERGENCY MEDICINE

## 2017-08-22 PROCEDURE — 80053 COMPREHEN METABOLIC PANEL: CPT | Performed by: EMERGENCY MEDICINE

## 2017-08-22 PROCEDURE — 74176 CT ABD & PELVIS W/O CONTRAST: CPT

## 2017-08-22 PROCEDURE — 74011250636 HC RX REV CODE- 250/636: Performed by: PHYSICIAN ASSISTANT

## 2017-08-22 PROCEDURE — 96374 THER/PROPH/DIAG INJ IV PUSH: CPT

## 2017-08-22 PROCEDURE — 74011000258 HC RX REV CODE- 258: Performed by: PHYSICIAN ASSISTANT

## 2017-08-22 PROCEDURE — 82550 ASSAY OF CK (CPK): CPT | Performed by: PHYSICIAN ASSISTANT

## 2017-08-22 PROCEDURE — 71010 XR CHEST PORT: CPT

## 2017-08-22 PROCEDURE — 83605 ASSAY OF LACTIC ACID: CPT

## 2017-08-22 PROCEDURE — 74011000258 HC RX REV CODE- 258: Performed by: FAMILY MEDICINE

## 2017-08-22 PROCEDURE — 74011250636 HC RX REV CODE- 250/636: Performed by: FAMILY MEDICINE

## 2017-08-22 PROCEDURE — 83690 ASSAY OF LIPASE: CPT | Performed by: EMERGENCY MEDICINE

## 2017-08-22 PROCEDURE — 65270000029 HC RM PRIVATE

## 2017-08-22 PROCEDURE — 93005 ELECTROCARDIOGRAM TRACING: CPT

## 2017-08-22 PROCEDURE — 87040 BLOOD CULTURE FOR BACTERIA: CPT

## 2017-08-22 PROCEDURE — 82150 ASSAY OF AMYLASE: CPT | Performed by: EMERGENCY MEDICINE

## 2017-08-22 PROCEDURE — 74011250637 HC RX REV CODE- 250/637: Performed by: FAMILY MEDICINE

## 2017-08-22 RX ORDER — FOLIC ACID 1 MG/1
1 TABLET ORAL DAILY
Status: DISCONTINUED | OUTPATIENT
Start: 2017-08-23 | End: 2017-09-01 | Stop reason: HOSPADM

## 2017-08-22 RX ORDER — ENOXAPARIN SODIUM 100 MG/ML
40 INJECTION SUBCUTANEOUS EVERY 24 HOURS
Status: DISCONTINUED | OUTPATIENT
Start: 2017-08-22 | End: 2017-09-01 | Stop reason: HOSPADM

## 2017-08-22 RX ORDER — ONDANSETRON 2 MG/ML
4 INJECTION INTRAMUSCULAR; INTRAVENOUS
Status: DISCONTINUED | OUTPATIENT
Start: 2017-08-22 | End: 2017-09-01 | Stop reason: HOSPADM

## 2017-08-22 RX ORDER — GUAIFENESIN 100 MG/5ML
81 LIQUID (ML) ORAL DAILY
Status: DISCONTINUED | OUTPATIENT
Start: 2017-08-23 | End: 2017-09-01 | Stop reason: HOSPADM

## 2017-08-22 RX ORDER — LANOLIN ALCOHOL/MO/W.PET/CERES
325 CREAM (GRAM) TOPICAL
Status: DISCONTINUED | OUTPATIENT
Start: 2017-08-23 | End: 2017-09-01 | Stop reason: HOSPADM

## 2017-08-22 RX ORDER — THERA TABS 400 MCG
1 TAB ORAL DAILY
Status: DISCONTINUED | OUTPATIENT
Start: 2017-08-22 | End: 2017-09-01 | Stop reason: HOSPADM

## 2017-08-22 RX ORDER — OXYCODONE AND ACETAMINOPHEN 5; 325 MG/1; MG/1
1 TABLET ORAL
Status: DISCONTINUED | OUTPATIENT
Start: 2017-08-22 | End: 2017-09-01 | Stop reason: HOSPADM

## 2017-08-22 RX ORDER — ASCORBIC ACID 250 MG
250 TABLET ORAL DAILY
Status: DISCONTINUED | OUTPATIENT
Start: 2017-08-23 | End: 2017-09-01 | Stop reason: HOSPADM

## 2017-08-22 RX ORDER — AMLODIPINE BESYLATE 10 MG/1
10 TABLET ORAL DAILY
Status: DISCONTINUED | OUTPATIENT
Start: 2017-08-23 | End: 2017-09-01 | Stop reason: HOSPADM

## 2017-08-22 RX ORDER — DIPHENHYDRAMINE HCL 25 MG
25 CAPSULE ORAL
Status: DISCONTINUED | OUTPATIENT
Start: 2017-08-22 | End: 2017-09-01 | Stop reason: HOSPADM

## 2017-08-22 RX ORDER — AMOXICILLIN 250 MG
1 CAPSULE ORAL
Status: DISCONTINUED | OUTPATIENT
Start: 2017-08-22 | End: 2017-09-01 | Stop reason: HOSPADM

## 2017-08-22 RX ORDER — METOCLOPRAMIDE HYDROCHLORIDE 5 MG/ML
5 INJECTION INTRAMUSCULAR; INTRAVENOUS
Status: COMPLETED | OUTPATIENT
Start: 2017-08-22 | End: 2017-08-22

## 2017-08-22 RX ORDER — FUROSEMIDE 40 MG/1
40 TABLET ORAL DAILY
Status: DISCONTINUED | OUTPATIENT
Start: 2017-08-23 | End: 2017-09-01 | Stop reason: HOSPADM

## 2017-08-22 RX ORDER — TAMSULOSIN HYDROCHLORIDE 0.4 MG/1
0.4 CAPSULE ORAL DAILY
Status: DISCONTINUED | OUTPATIENT
Start: 2017-08-23 | End: 2017-09-01 | Stop reason: HOSPADM

## 2017-08-22 RX ORDER — POLYETHYLENE GLYCOL 3350 17 G/17G
17 POWDER, FOR SOLUTION ORAL DAILY
Status: DISCONTINUED | OUTPATIENT
Start: 2017-08-23 | End: 2017-09-01 | Stop reason: HOSPADM

## 2017-08-22 RX ORDER — LANOLIN ALCOHOL/MO/W.PET/CERES
1000 CREAM (GRAM) TOPICAL DAILY
Status: DISCONTINUED | OUTPATIENT
Start: 2017-08-23 | End: 2017-09-01 | Stop reason: HOSPADM

## 2017-08-22 RX ORDER — BACLOFEN 10 MG/1
10 TABLET ORAL
Status: DISCONTINUED | OUTPATIENT
Start: 2017-08-22 | End: 2017-09-01 | Stop reason: HOSPADM

## 2017-08-22 RX ADMIN — THERA TABS 1 TABLET: TAB at 13:04

## 2017-08-22 RX ADMIN — CEFTRIAXONE 1 G: 1 INJECTION, POWDER, FOR SOLUTION INTRAMUSCULAR; INTRAVENOUS at 07:11

## 2017-08-22 RX ADMIN — STANDARDIZED SENNA CONCENTRATE AND DOCUSATE SODIUM 1 TABLET: 8.6; 5 TABLET, FILM COATED ORAL at 23:04

## 2017-08-22 RX ADMIN — METOCLOPRAMIDE 5 MG: 5 INJECTION, SOLUTION INTRAMUSCULAR; INTRAVENOUS at 04:26

## 2017-08-22 RX ADMIN — SODIUM CHLORIDE 500 ML: 900 INJECTION, SOLUTION INTRAVENOUS at 04:24

## 2017-08-22 RX ADMIN — SODIUM CHLORIDE 500 MG: 900 INJECTION, SOLUTION INTRAVENOUS at 10:35

## 2017-08-22 RX ADMIN — ENOXAPARIN SODIUM 40 MG: 40 INJECTION SUBCUTANEOUS at 13:04

## 2017-08-22 RX ADMIN — VANCOMYCIN HYDROCHLORIDE 1500 MG: 10 INJECTION, POWDER, LYOPHILIZED, FOR SOLUTION INTRAVENOUS at 23:14

## 2017-08-22 RX ADMIN — PIPERACILLIN SODIUM AND TAZOBACTAM SODIUM 4.5 G: 4; .5 INJECTION, POWDER, LYOPHILIZED, FOR SOLUTION INTRAVENOUS at 20:42

## 2017-08-22 NOTE — ED NOTES
Assumed care of pt from jess ackerman pt stable no s/s of cardiac or respiratory distress, pt resting easily arousable connected to cardiac monitor

## 2017-08-22 NOTE — IP AVS SNAPSHOT
[address] [address] [facility] Patient: Dionne Fonseca MRN: [id number] XTM:[birth year] You are allergic to the following No active allergies Recent Documentation Height Weight BMI Smoking Status 1.651 m 85.3 kg 31.28 kg/m2 Former Smoker Unresulted Labs Order Current Status AFB CULTURE + SMEAR W/RFLX PCR AND ID Preliminary result CULTURE, FUNGUS Preliminary result Emergency Contacts Name Discharge Info Relation Home Work Mobile [facility] DISCHARGE CAREGIVER [3] Son [22]   [phone] Mary Preciado  Daughter [21] [phone] Mary Preciado  Child [2] [phone] Hermelindo Goel  Child [2] [phone] About your hospitalization You were admitted on:  August 22, 2017 You last received care in the:  [facility] [address] You were discharged on:  September 1, 2017 Unit phone number:  [phone] Why you were hospitalized Your primary diagnosis was:  Not on File Your diagnoses also included:  Pneumonia Providers Seen During Your Hospitalizations Provider Role Specialty Primary office phone Johny Astudillo MD Attending Provider Emergency Medicine [phone] Heriberto Machuca MD Attending Provider Internal Medicine [phone] Roger Zarate MD Attending Provider [facility] [place] [phone] Your Primary Care Physician (PCP) Primary Care Physician Office Phone Office Fax Ivana Cea [phone] Follow-up Information Follow up With Details Comments Contact Info Cyrus Mast MD   [address] [facility] [address] [phone] Patient will likely be discharging to a [address] [facility]/ Noel John. Current Discharge Medication List  
  
START taking these medications Dose & Instructions Dispensing Information Comments Morning Noon Evening Bedtime  
 levoFLOXacin 750 mg tablet Commonly known as:  Lisa Sainz Your last dose was: Your next dose is:    
   
   
 Dose:  750 mg Take 1 Tab by mouth Daily (before breakfast). Quantity:  1 Tab Refills:  0 CONTINUE these medications which have CHANGED Dose & Instructions Dispensing Information Comments Morning Noon Evening Bedtime  
 baclofen 10 mg tablet Commonly known as:  LIORESAL What changed:   
- reasons to take this 
- additional instructions Your last dose was: Your next dose is:    
   
   
 Dose:  10 mg Take 1 Tab by mouth every twelve (12) hours as needed. Quantity:  15 Tab Refills:  0  
     
   
   
   
  
 metoprolol tartrate 25 mg tablet Commonly known as:  LOPRESSOR What changed:  additional instructions Your last dose was: Your next dose is:    
   
   
 Dose:  25 mg Take 1 Tab by mouth two (2) times a day. Quantity:  60 Tab Refills:  2 CONTINUE these medications which have NOT CHANGED Dose & Instructions Dispensing Information Comments Morning Noon Evening Bedtime  
 amLODIPine 10 mg tablet Commonly known as:  Kasey Jada Your last dose was: Your next dose is:    
   
   
 Dose:  10 mg Take 1 Tab by mouth daily. Quantity:  30 Tab Refills:  2  
     
   
   
   
  
 ascorbic acid (vitamin C) 250 mg tablet Commonly known as:  VITAMIN C Your last dose was: Your next dose is:    
   
   
 Dose:  250 mg Take 1 Tab by mouth daily. Quantity:  30 Tab Refills:  2  
     
   
   
   
  
 aspirin 81 mg chewable tablet Your last dose was: Your next dose is:    
   
   
 Dose:  81 mg Take 1 Tab by mouth daily. Quantity:  30 Tab Refills:  0  
     
   
   
   
  
 cyanocobalamin 1,000 mcg tablet Your last dose was: Your next dose is:    
   
   
 Dose:  1000 mcg Take 1 Tab by mouth daily. Quantity:  30 Tab Refills:  2  
     
   
   
   
  
 ferrous sulfate 325 mg (65 mg iron) tablet Your last dose was: Your next dose is:    
   
   
 Dose:  325 mg Take 1 Tab by mouth daily (with breakfast). Quantity:  30 Tab Refills:  2 FLOMAX 0.4 mg capsule Generic drug:  tamsulosin Your last dose was: Your next dose is:    
   
   
 Dose:  0.4 mg Take 0.4 mg by mouth daily. 1 tab daily Refills:  0  
     
   
   
   
  
 folic acid 1 mg tablet Commonly known as:  Google Your last dose was: Your next dose is:    
   
   
 Dose:  1 mg Take 1 Tab by mouth daily. Quantity:  30 Tab Refills:  1 LASIX 40 mg tablet Generic drug:  furosemide Your last dose was: Your next dose is:    
   
   
 Dose:  40 mg Take 40 mg by mouth daily. Refills:  0  
     
   
   
   
  
 oxyCODONE-acetaminophen 5-325 mg per tablet Commonly known as:  PERCOCET Your last dose was: Your next dose is:    
   
   
 Dose:  1 Tab Take 1 Tab by mouth every four (4) hours as needed. Max Daily Amount: 6 Tabs. Quantity:  20 Tab Refills:  0  
     
   
   
   
  
 polyethylene glycol 17 gram packet Commonly known as:  Levon Santillan Your last dose was: Your next dose is:    
   
   
 Dose:  17 g Take 1 Packet by mouth daily. Quantity:  30 Packet Refills:  2  
     
   
   
   
  
 senna-docusate 8.6-50 mg per tablet Commonly known as:  Fatoumata Madden Your last dose was: Your next dose is:    
   
   
 Dose:  1 Tab Take 1 Tab by mouth nightly. HOLD for loose stool. Quantity:  30 Tab Refills:  2 THERA MULTI-VITAMIN PO Your last dose was: Your next dose is:    
   
   
 Dose:  500 mg Take 500 mg by mouth daily. Refills:  0 STOP taking these medications   
 chlorproMAZINE 25 mg tablet Commonly known as:  THORAZINE Where to Get Your Medications Information on where to get these meds will be given to you by the nurse or doctor. ! Ask your nurse or doctor about these medications  
  levoFLOXacin 750 mg tablet  
 oxyCODONE-acetaminophen 5-325 mg per tablet Discharge Instructions Pneumonia: Care Instructions Your Care Instructions Pneumonia is an infection of the lungs. Most cases are caused by infections from bacteria or viruses. Pneumonia may be mild or very severe. If it is caused by bacteria, you will be treated with antibiotics. It may take a few weeks to a few months to recover fully from pneumonia, depending on how sick you were and whether your overall health is good. Follow-up care is a key part of your treatment and safety. Be sure to make and go to all appointments, and call your doctor if you are having problems. Its also a good idea to know your test results and keep a list of the medicines you take. How can you care for yourself at home? · Take your antibiotics exactly as directed. Do not stop taking the medicine just because you are feeling better. You need to take the full course of antibiotics. · Take your medicines exactly as prescribed. Call your doctor if you think you are having a problem with your medicine. · Get plenty of rest and sleep. You may feel weak and tired for a while, but your energy level will improve with time. · To prevent dehydration, drink plenty of fluids, enough so that your urine is light yellow or clear like water. Choose water and other caffeine-free clear liquids until you feel better. If you have kidney, heart, or liver disease and have to limit fluids, talk with your doctor before you increase the amount of fluids you drink.  
· Take care of your cough so you can rest. A cough that brings up mucus from your lungs is common with pneumonia. It is one way your body gets rid of the infection. But if coughing keeps you from resting or causes severe fatigue and chest-wall pain, talk to your doctor. He or she may suggest that you take a medicine to reduce the cough. · Use a vaporizer or humidifier to add moisture to your bedroom. Follow the directions for cleaning the machine. · Do not smoke or allow others to smoke around you. Smoke will make your cough last longer. If you need help quitting, talk to your doctor about stop-smoking programs and medicines. These can increase your chances of quitting for good. · Take an over-the-counter pain medicine, such as acetaminophen (Tylenol), ibuprofen (Advil, Motrin), or naproxen (Aleve). Read and follow all instructions on the label. · Do not take two or more pain medicines at the same time unless the doctor told you to. Many pain medicines have acetaminophen, which is Tylenol. Too much acetaminophen (Tylenol) can be harmful. · If you were given a spirometer to measure how well your lungs are working, use it as instructed. This can help your doctor tell how your recovery is going. · To prevent pneumonia in the future, talk to your doctor about getting a flu vaccine (once a year) and a pneumococcal vaccine (one time only for most people). When should you call for help? Call 911 anytime you think you may need emergency care. For example, call if: 
· You have severe trouble breathing. Call your doctor now or seek immediate medical care if: 
· You cough up dark brown or bloody mucus (sputum). · You have new or worse trouble breathing. · You are dizzy or lightheaded, or you feel like you may faint. Watch closely for changes in your health, and be sure to contact your doctor if: 
· You have a new or higher fever. · You are coughing more deeply or more often. · You are not getting better after 2 days (48 hours). · You do not get better as expected. Where can you learn more? Go to http://lainey-millie.info/. Enter [id number] in the search box to learn more about \"Pneumonia: Care Instructions. \" Current as of: 2017 Content Version: 11.3 © 0669-8464 [employer]. Care instructions adapted under license by [employer] (which disclaims liability or warranty for this information). If you have questions about a medical condition or this instruction, always ask your healthcare professional. [address] any warranty or liability for your use of this information. Patient armband removed and given to patient to take home. Patient was informed of the privacy risks if armband lost or stolen [email or web address] Activation Thank you for requesting access to Seculert. Please follow the instructions below to securely access and download your online medical record. Seculert allows you to send messages to your doctor, view your test results, renew your prescriptions, schedule appointments, and more. How Do I Sign Up? 1. In your internet browser, go to www.[employer] party supplies [employer] rentals 
2. Click on the First Time User? Click Here link in the Sign In box. You will be redirect to the New Member Sign Up page. 3. Enter your Seculert Access Code exactly as it appears below. You will not need to use this code after youve completed the sign-up process. If you do not sign up before the expiration date, you must request a new code. Seculert Access Code: [email or web address] Expires: 2017  3:45 PM (This is the date your Seculert access code will ) 4. Enter the last four digits of your Social Security Number (xxxx) and Date of Birth (mm/dd/yyyy) as indicated and click Submit. You will be taken to the next sign-up page. 5. Create a Seculert ID. This will be your Seculert login ID and cannot be changed, so think of one that is secure and easy to remember. 6. Create a WebPay password. You can change your password at any time. 7. Enter your Password Reset Question and Answer. This can be used at a later time if you forget your password. 8. Enter your e-mail address. You will receive e-mail notification when new information is available in [address] 9. Click Sign Up. You can now view and download portions of your medical record. 10. Click the Download Summary menu link to download a portable copy of your medical information. Additional Information If you have questions, please visit the Frequently Asked Questions section of the WebPay website at [email or web address] Remember, WebPay is NOT to be used for urgent needs. For medical emergencies, dial 911. DISCHARGE SUMMARY from Nurse The following personal items are in your possession at time of discharge: 
 
Dental Appliances: None Visual Aid: None Home Medications: None Jewelry: None Clothing: None Other Valuables: None PATIENT INSTRUCTIONS: 
 
 
F-face looks uneven A-arms unable to move or move unevenly S-speech slurred or non-existent T-time-call 911 as soon as signs and symptoms begin-DO NOT go Back to bed or wait to see if you get better-TIME IS BRAIN. Warning Signs of HEART ATTACK Call 911 if you have these symptoms: 
? Chest discomfort. Most heart attacks involve discomfort in the center of the chest that lasts more than a few minutes, or that goes away and comes back. It can feel like uncomfortable pressure, squeezing, fullness, or pain. ? Discomfort in other areas of the upper body. Symptoms can include pain or discomfort in one or both arms, the back, neck, jaw, or stomach. ? Shortness of breath with or without chest discomfort. ? Other signs may include breaking out in a cold sweat, nausea, or lightheadedness. Don't wait more than five minutes to call [address]! Fast action can save your life. Calling 911 is almost always the fastest way to get lifesaving treatment. Emergency Medical Services staff can begin treatment when they arrive  up to an hour sooner than if someone gets to the hospital by car. The discharge information has been reviewed with the patient and caregiver. The patient and caregiver verbalized understanding. Discharge medications reviewed with the patient and caregiver and appropriate educational materials and side effects teaching were provided. Discharge Orders None ClickFox Announcement We are excited to announce that we are making your provider's discharge notes available to you in ClickFox. You will see these notes when they are completed and signed by the physician that discharged you from your recent hospital stay. If you have any questions or concerns about any information you see in ClickFox, please call the Health Information Department where you were seen or reach out to your Primary Care Provider for more information about your plan of care. Introducing [facility] SERVICES! Kalpana Cancino introduces ClickFox patient portal. Now you can access parts of your medical record, email your doctor's office, and request medication refills online. 1. In your internet browser, go to [email or web address] 2. Click on the First Time User? Click Here link in the Sign In box. You will see the New Member Sign Up page. 3. Enter your ClickFox Access Code exactly as it appears below. You will not need to use this code after youve completed the sign-up process.  If you do not sign up before the expiration date, you must request a new code. · LogicLoop Access Code: [email or web address] Expires: 9/16/2017  3:45 PM 
 
4. Enter the last four digits of your Social Security Number (xxxx) and Date of Birth (mm/dd/yyyy) as indicated and click Submit. You will be taken to the next sign-up page. 5. Create a LogicLoop ID. This will be your LogicLoop login ID and cannot be changed, so think of one that is secure and easy to remember. 6. Create a LogicLoop password. You can change your password at any time. 7. Enter your Password Reset Question and Answer. This can be used at a later time if you forget your password. 8. Enter your e-mail address. You will receive e-mail notification when new information is available in [address] 9. Click Sign Up. You can now view and download portions of your medical record. 10. Click the Download Summary menu link to download a portable copy of your medical information. If you have questions, please visit the Frequently Asked Questions section of the LogicLoop website. Remember, LogicLoop is NOT to be used for urgent needs. For medical emergencies, dial 911. Now available from your iPhone and Android! General Information Please provide this summary of care documentation to your next provider. Patient Signature:  ____________________________________________________________ Date:  ____________________________________________________________  
  
Claudene Born Provider Signature:  ____________________________________________________________ Date:  ____________________________________________________________

## 2017-08-22 NOTE — IP AVS SNAPSHOT
[address] Patient: Ruben Shaw MRN: [id number] CVS:[id number] Current Discharge Medication List  
  
START taking these medications Dose & Instructions Dispensing Information Comments Morning Noon Evening Bedtime  
 levoFLOXacin 750 mg tablet Commonly known as:  Josseline Ground Your last dose was: Your next dose is:    
   
   
 Dose:  750 mg Take 1 Tab by mouth Daily (before breakfast). Quantity:  1 Tab Refills:  0 CONTINUE these medications which have CHANGED Dose & Instructions Dispensing Information Comments Morning Noon Evening Bedtime  
 baclofen 10 mg tablet Commonly known as:  LIORESAL What changed:   
- reasons to take this 
- additional instructions Your last dose was: Your next dose is:    
   
   
 Dose:  10 mg Take 1 Tab by mouth every twelve (12) hours as needed. Quantity:  15 Tab Refills:  0  
     
   
   
   
  
 metoprolol tartrate 25 mg tablet Commonly known as:  LOPRESSOR What changed:  additional instructions Your last dose was: Your next dose is:    
   
   
 Dose:  25 mg Take 1 Tab by mouth two (2) times a day. Quantity:  60 Tab Refills:  2 CONTINUE these medications which have NOT CHANGED Dose & Instructions Dispensing Information Comments Morning Noon Evening Bedtime  
 amLODIPine 10 mg tablet Commonly known as:  Alma Rosa Chimes Your last dose was: Your next dose is:    
   
   
 Dose:  10 mg Take 1 Tab by mouth daily. Quantity:  30 Tab Refills:  2  
     
   
   
   
  
 ascorbic acid (vitamin C) 250 mg tablet Commonly known as:  VITAMIN C Your last dose was: Your next dose is:    
   
   
 Dose:  250 mg Take 1 Tab by mouth daily. Quantity:  30 Tab Refills:  2 aspirin 81 mg chewable tablet Your last dose was: Your next dose is:    
   
   
 Dose:  81 mg Take 1 Tab by mouth daily. Quantity:  30 Tab Refills:  0  
     
   
   
   
  
 cyanocobalamin 1,000 mcg tablet Your last dose was: Your next dose is:    
   
   
 Dose:  1000 mcg Take 1 Tab by mouth daily. Quantity:  30 Tab Refills:  2  
     
   
   
   
  
 ferrous sulfate 325 mg (65 mg iron) tablet Your last dose was: Your next dose is:    
   
   
 Dose:  325 mg Take 1 Tab by mouth daily (with breakfast). Quantity:  30 Tab Refills:  2 FLOMAX 0.4 mg capsule Generic drug:  tamsulosin Your last dose was: Your next dose is:    
   
   
 Dose:  0.4 mg Take 0.4 mg by mouth daily. 1 tab daily Refills:  0  
     
   
   
   
  
 folic acid 1 mg tablet Commonly known as:  Google Your last dose was: Your next dose is:    
   
   
 Dose:  1 mg Take 1 Tab by mouth daily. Quantity:  30 Tab Refills:  1 LASIX 40 mg tablet Generic drug:  furosemide Your last dose was: Your next dose is:    
   
   
 Dose:  40 mg Take 40 mg by mouth daily. Refills:  0  
     
   
   
   
  
 oxyCODONE-acetaminophen 5-325 mg per tablet Commonly known as:  PERCOCET Your last dose was: Your next dose is:    
   
   
 Dose:  1 Tab Take 1 Tab by mouth every four (4) hours as needed. Max Daily Amount: 6 Tabs. Quantity:  20 Tab Refills:  0  
     
   
   
   
  
 polyethylene glycol 17 gram packet Commonly known as:  Byron Francis Your last dose was: Your next dose is:    
   
   
 Dose:  17 g Take 1 Packet by mouth daily. Quantity:  30 Packet Refills:  2  
     
   
   
   
  
 senna-docusate 8.6-50 mg per tablet Commonly known as:  Maurilio Khan Your last dose was: Your next dose is:    
   
   
 Dose:  1 Tab Take 1 Tab by mouth nightly. HOLD for loose stool. Quantity:  30 Tab Refills:  2 THERA MULTI-VITAMIN PO Your last dose was: Your next dose is:    
   
   
 Dose:  500 mg Take 500 mg by mouth daily. Refills:  0 STOP taking these medications   
 chlorproMAZINE 25 mg tablet Commonly known as:  THORAZINE Where to Get Your Medications Information on where to get these meds will be given to you by the nurse or doctor. ! Ask your nurse or doctor about these medications  
  levoFLOXacin 750 mg tablet  
 oxyCODONE-acetaminophen 5-325 mg per tablet

## 2017-08-22 NOTE — ED PROVIDER NOTES
HPI Comments: 65yo M with h/o HTN, chronic hiccups, CVA presents due to right sided chest and abdominal pain. Started this morning. Constant all day. Pain is in RUQ of abdomen into the right lower chest wall. He admits to nausea and vomiting today. He denies diarrhea and believes his last bowel movement was today. He denies dysuria and hematuria. His hiccups have been present for years. He denies cough and shortness of breath. Patient is a 58 y.o. male presenting with abdominal pain and chest pain. Abdominal Pain    Associated symptoms include nausea, vomiting and chest pain. Pertinent negatives include no fever, no diarrhea, no constipation and no dysuria. Chest Pain (Angina)    Associated symptoms include abdominal pain, nausea and vomiting. Pertinent negatives include no dizziness, no fever and no shortness of breath. Past Medical History:   Diagnosis Date    Hiccups     Hypertension     Hyponatremia     Lacunar infarction ([facility] [address] 2010    Lower extremity edema     Psychogenic polydipsia     Spinal stenosis        Past Surgical History:   Procedure Laterality Date    HX HEENT      pt reports past hx of surgery on ears unsure of what type         Family History:   Problem Relation Age of Onset    Hypertension Other        Social History     Social History    Marital status:      Spouse name: N/A    Number of children: N/A    Years of education: N/A     Occupational History    Not on file. Social History Main Topics    Smoking status: Former Smoker     Packs/day: 0.50     Quit date: 6/22/2008    Smokeless tobacco: Never Used    Alcohol use No    Drug use: No    Sexual activity: Not Currently     Other Topics Concern    Not on file     Social History Narrative         ALLERGIES: Review of patient's allergies indicates no known allergies. Review of Systems   Constitutional: Negative for fever. HENT: Negative for facial swelling.     Eyes: Negative for visual disturbance. Respiratory: Negative for shortness of breath. Cardiovascular: Positive for chest pain. Gastrointestinal: Positive for abdominal pain, nausea and vomiting. Negative for constipation and diarrhea. Genitourinary: Negative for dysuria. Musculoskeletal: Negative for neck pain. Skin: Negative for rash. Neurological: Negative for dizziness. Psychiatric/Behavioral: Negative for confusion. All other systems reviewed and are negative. Vitals:    08/22/17 0308   BP: 139/80   Pulse: 98   Resp: 25   Temp: 98.9 °F (37.2 °C)   SpO2: 96%   Weight: 99.3 kg (219 lb)            Physical Exam   Constitutional: He appears well-developed and well-nourished. No distress. HENT:   Head: Normocephalic and atraumatic. Eyes: Conjunctivae are normal.   Neck: Normal range of motion. Neck supple. Cardiovascular: Normal rate. Pulmonary/Chest: Effort normal and breath sounds normal. He exhibits no tenderness. Abdominal: Soft. There is tenderness in the right upper quadrant. Musculoskeletal: Normal range of motion. Neurological: He is alert. Skin: Skin is warm and dry. He is not diaphoretic. Psychiatric: He has a normal mood and affect. Nursing note and vitals reviewed. MDM  Number of Diagnoses or Management Options  Essential hypertension:   Pleural effusion, right:   Pneumonia of right lower lobe due to infectious organism [facility]):   Diagnosis management comments: RUQ pain , nausea and vomiting x 1 day. The patient presents with abdominal pain with a differential diagnosis of AAA, appendicitis, biliary colic, cholecystitis, diverticulitis, gastroenteritis, obstruction, pancreatitis, PUD and renal Colic    No significant abnormality on labs.      0600am: I received care from my colleague who was ending her shift. We discussed the HPI, exam, workup, and pending CT results of his abdomen.   I will follow up, reassess pt and dispo him.    0645am: rechecked patient, we discussed the CT with him and I discussed with my attending who saw him, he feels so sick, appears ill, and has pneumonia with pleural effusion, and comorbid conditions, admit,   0654am, consult with Dr. Rosaline Pollard, hospitalist physician, we discussed the case, general discussion, she accepts him for admission, I will order lactic acid and blood cultures and put him on zithromax and rocephin, stop his thorazine for now. His oxygen was around 95-97, oxygen for him was ordered to help him feel and breath a little bit better. He likes the oxygen. Amount and/or Complexity of Data Reviewed  Clinical lab tests: ordered and reviewed  Tests in the radiology section of CPT®: ordered and reviewed    Risk of Complications, Morbidity, and/or Mortality  Presenting problems: moderate  Diagnostic procedures: moderate  Management options: moderate    Patient Progress  Patient progress: stable    ED Course       Procedures           Labs Reviewed   CBC WITH AUTOMATED DIFF - Abnormal; Notable for the following:        Result Value    RBC 4.11 (*)     HGB 11.1 (*)     HCT 31.9 (*)     NEUTROPHILS 77 (*)     LYMPHOCYTES 12 (*)     ABS.  NEUTROPHILS 8.5 (*)     All other components within normal limits   METABOLIC PANEL, COMPREHENSIVE - Abnormal; Notable for the following:     Sodium 135 (*)     Glucose 101 (*)     ALT (SGPT) 12 (*)     AST (SGOT) 11 (*)     Protein, total 8.8 (*)     Albumin 2.6 (*)     Globulin 6.2 (*)     A-G Ratio 0.4 (*)     All other components within normal limits   LIPASE - Abnormal; Notable for the following:     Lipase 68 (*)     All other components within normal limits   CARDIAC PANEL,(CK, CKMB & TROPONIN) - Abnormal; Notable for the following:     CK 27 (*)     All other components within normal limits   CULTURE, BLOOD   CULTURE, BLOOD   AMYLASE   HEPATIC FUNCTION PANEL     Final result (Exam End: 8/22/2017  4:48 AM) Open        Study Result      CT of abdomen and pelvis with contrast      INDICATION: Right-sided chest and abdominal pain, feel nauseated     COMPARISON: None.     TECHNIQUE: 5 mm helical scan to the abdomen and pelvis is obtained  from the  diaphragm to the symphysis pubis after uneventful nonionic IV contrast  administration.     All CT scans at this facility performed using dose optimization techniques as  appreciated to a performed exam, to include automated exposure control,  adjustment of the mA and or KU according to patient size (including appropriate  matching for site specific examination), or use of iterative reconstruction  technique.     CONTRAST: 100 cc Isovue 370.     FINDINGS:      CT OF ABDOMEN:     Lung Bases: Patchy airspace opacities identified in right lower lobe with  associated mild and not loculated right pleural effusion.     Liver: Normal.     Gallbladder: Moderate amount layering hyperdensity identified in the gallbladder  lumen, likely numerous tiny calculi and hyperdense debris.      Biliary System: No ductal dilatation.     Spleen: Normal.     Pancreas: Normal.     Kidneys: Unremarkable.     Adrenal Glands: Normal.     Bowel: The stomach is poorly distended. Moderate hiatal hernia present. The  small and large bowel are nondilated.       Peritoneum/Retroperitoneum: No adenopathy. No free air or fluid.     Vasculature: Unremarkable for age.     CT PELVIS:      Bowel: The small and large bowel are nondilated. Extensive diverticula  throughout the colon     Bladder: Well distended with subtle asymmetric bladder wall thickening at  posterior inferior aspect. No discrete mass seen. .     Peritoneum/Retroperitoneum: No adenopathy.     Other: The prostate is not enlarged. No free fluid.     CT OSSEOUS STRUCTURES:        Unremarkable for age.     IMPRESSION  IMPRESSION:      1. Patchy airspace opacities in right lower lobe identified and suggesting  infectious airspace disease. Small loculated right pleural effusion.     2.  Cholelithiasis with numerous tiny calculi along with debris layering in the  dependent gallbladder lumen.     3. Large hiatal hernia.     4. Extensive colonic diverticulosis without diverticulitis. Normal appendix.     5. Mild and asymmetric bladder wall thickening at inferior and posterior aspect  as nonspecific finding. Recommend clinical correlation and bladder ultrasound if  indicated.     Thank you for this referral.              ICD-10-CM ICD-9-CM   1. Pneumonia of right lower lobe due to infectious organism ([facility] [address] J18.1 486   2. Pleural effusion, right J90 511.9   3. Essential hypertension I10 401.9   I personally saw and examined the patient. I have reviewed and agree with the MLP's findings, including all diagnostic interpretations, and plans as written. I was present during the key portions of separately billed procedures.     Gege Curry MD        Plan: Admit to the hospital.

## 2017-08-22 NOTE — ED TRIAGE NOTES
Pt arrived by EMS from [facility]  for c/o right sided chest/abdominal pain. Pt states that he has been feeling nauseated. Pt is AxOx3, NAD.

## 2017-08-22 NOTE — PROGRESS NOTES
Seen, chart reviewed. Patient referred for admission for treatment of suspected pneumonia. This patient has been hospitalized frequently here at [facility], most recently last month. Has received azithromycin ED, but needs coverage for healthcare-acquired organisms - will initiate. Full note to follow.

## 2017-08-22 NOTE — H&P
History & Physical    Patient: Barry Sarmiento MRN: [id number]  CSN: [id number]    YOB: 1955  Age: 58 y.o. Sex: male      DOA: 8/22/2017    Chief Complaint:   Chief Complaint   Patient presents with    Abdominal Pain     fever    Chest Pain          HPI:     Barry Sarmiento is a 58 y.o.  male with history of intractable hiccups, HTN and stroke who is well known to service. He presented to the ED this AM for evaluation of fever and chest/abdominal pain. Symptoms began late last night/early this morning and have been constant. Patient reports associated N/V as well. On arrival to the ED, CXR was concerning for pneumonia. He is being referred for hospital admission for further evaluation/treatment. Past Medical History:   Diagnosis Date    Hiccups     Hypertension     Hyponatremia     Lacunar infarction ([facility] [address] 2010    Lower extremity edema     Psychogenic polydipsia     Spinal stenosis        Past Surgical History:   Procedure Laterality Date    HX HEENT      pt reports past hx of surgery on ears unsure of what type       Family History   Problem Relation Age of Onset    Hypertension Other        Social History     Social History    Marital status:      Spouse name: N/A    Number of children: N/A    Years of education: N/A     Social History Main Topics    Smoking status: Former Smoker     Packs/day: 0.50     Quit date: 6/22/2008    Smokeless tobacco: Never Used    Alcohol use No    Drug use: No    Sexual activity: Not Currently     Other Topics Concern    None     Social History Narrative       Prior to Admission medications    Medication Sig Start Date End Date Taking? Authorizing Provider   oxyCODONE-acetaminophen (PERCOCET) 5-325 mg per tablet Take 1 Tab by mouth every four (4) hours as needed. Max Daily Amount: 6 Tabs. 7/14/17   Zuly Lester MD   ascorbic acid, vitamin C, (VITAMIN C) 250 mg tablet Take 1 Tab by mouth daily.  7/13/17   Sherwood Closs Milka Scherer MD   ferrous sulfate 325 mg (65 mg iron) tablet Take 1 Tab by mouth daily (with breakfast). 7/13/17   Dat Willett MD   polyethylene glycol (MIRALAX) 17 gram packet Take 1 Packet by mouth daily. 7/13/17   Dat Willett MD   senna-docusate (PERICOLACE) 8.6-50 mg per tablet Take 1 Tab by mouth nightly. HOLD for loose stool. 7/13/17   Dat Willett MD   tamsulosin (FLOMAX) 0.4 mg capsule Take 0.4 mg by mouth daily. 1 tab daily    Historical Provider   furosemide (LASIX) 40 mg tablet Take 40 mg by mouth daily. Jeffery Bolden MD   MULTIVITAMIN,THERAPEUTIC [facility] MULTI-VITAMIN PO) Take 500 mg by mouth daily. Historical Provider   baclofen (LIORESAL) 10 mg tablet Take 1 Tab by mouth every twelve (12) hours as needed. Patient taking differently: Take 1 Tab by mouth every twelve (12) hours as needed (for back pain, muscle ralaxant). as needed for pain, 1/25/16   ARSEN Medina   amLODIPine (NORVASC) 10 mg tablet Take 1 Tab by mouth daily. 8/18/14   Dat Willett MD   metoprolol (LOPRESSOR) 25 mg tablet Take 1 Tab by mouth two (2) times a day. Patient taking differently: Take 1 Tab by mouth two (2) times a day. 1/2 tablet bid 8/18/14   Dat Willett MD   cyanocobalamin 1,000 mcg tablet Take 1 Tab by mouth daily. 8/18/14   Dat Willett MD   folic acid (FOLVITE) 1 mg tablet Take 1 Tab by mouth daily. 8/18/14   Dat Willett MD   aspirin 81 mg chewable tablet Take 1 Tab by mouth daily. 9/18/12   Dat Willett MD       No Known Allergies      Review of Systems  GENERAL: Ill-appearing, but nontoxic. HEENT: No change in vision, no earache, tinnitus, sore throat or sinus congestion. NECK: No pain or stiffness. PULMONARY: No shortness of breath, cough or wheeze. Cardiovascular: + chest pain  GASTROINTESTINAL: + abdominal pain and nausea, vomiting. No diarrhea, melena or bright red blood per rectum. GENITOURINARY: No urinary frequency, urgency, hesitancy or dysuria. MUSCULOSKELETAL: No joint or muscle pain, no back pain, no recent trauma. DERMATOLOGIC: No rash, no itching, no lesions. ENDOCRINE: No polyuria, polydipsia, no heat or cold intolerance. No recent change in weight. HEMATOLOGICAL: No anemia or easy bruising or bleeding. NEUROLOGIC: No headache, seizures, numbness, tingling or new weakness. Physical Exam:     Physical Exam:  Visit Vitals    /71    Pulse 89    Temp 98.9 °F (37.2 °C)    Resp 18    Ht 5' 5\" (1.651 m)    Wt 65.8 kg (145 lb)    SpO2 100%    BMI 24.13 kg/m2      O2 Device: Room air    Temp (24hrs), Av.9 °F (37.2 °C), Min:98.9 °F (37.2 °C), Max:98.9 °F (37.2 °C)             General:  In NAD, nontoxic-appearing. Appears older than stated age. Head: NCAT. Eyes:  Conjunctivae/corneas clear, sclerae anicteric. Nose: Nares normal, no drainage. Throat: Lips and mucosa normal, OP clear. Neck: Supple, symmetrical, trachea midline. Back:   No bony/paraspinous TTp. Lungs: No wheezes, effort nonlabored. Chest wall:  No tenderness or deformity. Heart:  RRR. Abdomen: Soft, non-tender. Bowel sounds normal. No masses,  No organomegaly. Extremities: No edema. Pulses: Extremities warm, no ischemia. Skin:  No rashes or lesions   Neurologic: Awake and alert.         Labs Reviewed:  BMP:   Lab Results   Component Value Date/Time     (L) 2017 03:25 AM    K 3.8 2017 03:25 AM     2017 03:25 AM    CO2 27 2017 03:25 AM    AGAP 8 2017 03:25 AM     (H) 2017 03:25 AM    BUN 13 2017 03:25 AM    CREA 0.78 2017 03:25 AM    GFRAA >60 2017 03:25 AM    GFRNA >60 2017 03:25 AM          CBC WITH AUTOMATED DIFF    Collection Time: 17  3:25 AM   Result Value Ref Range    WBC 11.0 4.6 - 13.2 K/uL    RBC 4.11 (L) 4.70 - 5.50 M/uL    HGB 11.1 (L) 13.0 - 16.0 g/dL    HCT 31.9 (L) 36.0 - 48.0 %    MCV 77.6 74.0 - 97.0 FL    MCH 27.0 24.0 - 34.0 PG    MCHC 34.8 31.0 - 37.0 g/dL    RDW 14.0 11.6 - 14.5 %    PLATELET 315 622 - 598 K/uL    MPV 9.7 9.2 - 11.8 FL    NEUTROPHILS 77 (H) 40 - 73 %    LYMPHOCYTES 12 (L) 21 - 52 %    MONOCYTES 10 3 - 10 %    EOSINOPHILS 1 0 - 5 %    BASOPHILS 0 0 - 2 %    ABS. NEUTROPHILS 8.5 (H) 1.8 - 8.0 K/UL    ABS. LYMPHOCYTES 1.3 0.9 - 3.6 K/UL    ABS. MONOCYTES 1.1 0.05 - 1.2 K/UL    ABS. EOSINOPHILS 0.1 0.0 - 0.4 K/UL    ABS. BASOPHILS 0.0 0.0 - 0.1 K/UL    DF AUTOMATED           Assessment:   HCAP  HTN  Iron deficiency anemia  Recent R foot wound    Plan:  Admit to medical bed. Given azithromycin in ED. Needs to be treated for HCAP given multiple, recent hospitalizations. Have ordered appropriate antibiotic therapy - de-escalate as soon as able. Wound care eval.  Resume previous home meds as prescribed.   DVT/GI Prophylaxis: Stacey Simmons MD  8/22/2017 12:22 PM

## 2017-08-23 LAB
ALBUMIN SERPL-MCNC: 2.2 G/DL (ref 3.4–5)
ALBUMIN/GLOB SERPL: 0.4 {RATIO} (ref 0.8–1.7)
ALP SERPL-CCNC: 97 U/L (ref 45–117)
ALT SERPL-CCNC: 17 U/L (ref 16–61)
ANION GAP SERPL CALC-SCNC: 8 MMOL/L (ref 3–18)
AST SERPL-CCNC: 20 U/L (ref 15–37)
BACTERIA SPEC CULT: ABNORMAL
BACTERIA SPEC CULT: ABNORMAL
BASOPHILS # BLD: 0 K/UL (ref 0–0.06)
BASOPHILS NFR BLD: 0 % (ref 0–2)
BILIRUB SERPL-MCNC: 0.8 MG/DL (ref 0.2–1)
BUN SERPL-MCNC: 12 MG/DL (ref 7–18)
BUN/CREAT SERPL: 16 (ref 12–20)
CALCIUM SERPL-MCNC: 8.4 MG/DL (ref 8.5–10.1)
CHLORIDE SERPL-SCNC: 104 MMOL/L (ref 100–108)
CO2 SERPL-SCNC: 25 MMOL/L (ref 21–32)
CREAT SERPL-MCNC: 0.73 MG/DL (ref 0.6–1.3)
DIFFERENTIAL METHOD BLD: ABNORMAL
EOSINOPHIL # BLD: 0.4 K/UL (ref 0–0.4)
EOSINOPHIL NFR BLD: 4 % (ref 0–5)
ERYTHROCYTE [DISTWIDTH] IN BLOOD BY AUTOMATED COUNT: 14.2 % (ref 11.6–14.5)
GLOBULIN SER CALC-MCNC: 5.4 G/DL (ref 2–4)
GLUCOSE SERPL-MCNC: 114 MG/DL (ref 74–99)
HCT VFR BLD AUTO: 29.6 % (ref 36–48)
HGB BLD-MCNC: 9.8 G/DL (ref 13–16)
LYMPHOCYTES # BLD: 1 K/UL (ref 0.9–3.6)
LYMPHOCYTES NFR BLD: 12 % (ref 21–52)
MCH RBC QN AUTO: 26.3 PG (ref 24–34)
MCHC RBC AUTO-ENTMCNC: 33.1 G/DL (ref 31–37)
MCV RBC AUTO: 79.6 FL (ref 74–97)
MONOCYTES # BLD: 0.7 K/UL (ref 0.05–1.2)
MONOCYTES NFR BLD: 9 % (ref 3–10)
NEUTS SEG # BLD: 6.1 K/UL (ref 1.8–8)
NEUTS SEG NFR BLD: 75 % (ref 40–73)
PLATELET # BLD AUTO: 260 K/UL (ref 135–420)
PMV BLD AUTO: 10 FL (ref 9.2–11.8)
POTASSIUM SERPL-SCNC: 3.6 MMOL/L (ref 3.5–5.5)
PROT SERPL-MCNC: 7.6 G/DL (ref 6.4–8.2)
RBC # BLD AUTO: 3.72 M/UL (ref 4.7–5.5)
SERVICE CMNT-IMP: ABNORMAL
SODIUM SERPL-SCNC: 137 MMOL/L (ref 136–145)
WBC # BLD AUTO: 8.1 K/UL (ref 4.6–13.2)

## 2017-08-23 PROCEDURE — 74011250637 HC RX REV CODE- 250/637: Performed by: FAMILY MEDICINE

## 2017-08-23 PROCEDURE — 74011000258 HC RX REV CODE- 258: Performed by: FAMILY MEDICINE

## 2017-08-23 PROCEDURE — 80053 COMPREHEN METABOLIC PANEL: CPT | Performed by: FAMILY MEDICINE

## 2017-08-23 PROCEDURE — 77030011256 HC DRSG MEPILEX <16IN NO BORD MOLN -A

## 2017-08-23 PROCEDURE — 85025 COMPLETE CBC W/AUTO DIFF WBC: CPT | Performed by: FAMILY MEDICINE

## 2017-08-23 PROCEDURE — 74011250637 HC RX REV CODE- 250/637: Performed by: INTERNAL MEDICINE

## 2017-08-23 PROCEDURE — 77010033678 HC OXYGEN DAILY

## 2017-08-23 PROCEDURE — 74011250636 HC RX REV CODE- 250/636: Performed by: FAMILY MEDICINE

## 2017-08-23 PROCEDURE — 65270000029 HC RM PRIVATE

## 2017-08-23 PROCEDURE — 77030010545

## 2017-08-23 PROCEDURE — 36415 COLL VENOUS BLD VENIPUNCTURE: CPT | Performed by: FAMILY MEDICINE

## 2017-08-23 PROCEDURE — 87641 MR-STAPH DNA AMP PROBE: CPT | Performed by: FAMILY MEDICINE

## 2017-08-23 RX ORDER — ZOLPIDEM TARTRATE 5 MG/1
5 TABLET ORAL
Status: DISCONTINUED | OUTPATIENT
Start: 2017-08-23 | End: 2017-09-01 | Stop reason: HOSPADM

## 2017-08-23 RX ADMIN — ONDANSETRON 4 MG: 2 INJECTION INTRAMUSCULAR; INTRAVENOUS at 05:26

## 2017-08-23 RX ADMIN — ENOXAPARIN SODIUM 40 MG: 40 INJECTION SUBCUTANEOUS at 12:30

## 2017-08-23 RX ADMIN — VANCOMYCIN HYDROCHLORIDE 1500 MG: 10 INJECTION, POWDER, LYOPHILIZED, FOR SOLUTION INTRAVENOUS at 12:30

## 2017-08-23 RX ADMIN — FUROSEMIDE 40 MG: 40 TABLET ORAL at 09:43

## 2017-08-23 RX ADMIN — FOLIC ACID 1 MG: 1 TABLET ORAL at 09:43

## 2017-08-23 RX ADMIN — PIPERACILLIN SODIUM AND TAZOBACTAM SODIUM 4.5 G: 4; .5 INJECTION, POWDER, LYOPHILIZED, FOR SOLUTION INTRAVENOUS at 05:28

## 2017-08-23 RX ADMIN — POLYETHYLENE GLYCOL 3350 17 G: 17 POWDER, FOR SOLUTION ORAL at 09:42

## 2017-08-23 RX ADMIN — CYANOCOBALAMIN TAB 1000 MCG 1000 MCG: 1000 TAB at 09:42

## 2017-08-23 RX ADMIN — ASPIRIN 81 MG 81 MG: 81 TABLET ORAL at 09:42

## 2017-08-23 RX ADMIN — ONDANSETRON 4 MG: 2 INJECTION INTRAMUSCULAR; INTRAVENOUS at 20:10

## 2017-08-23 RX ADMIN — OXYCODONE HYDROCHLORIDE AND ACETAMINOPHEN 1 TABLET: 5; 325 TABLET ORAL at 05:28

## 2017-08-23 RX ADMIN — TAMSULOSIN HYDROCHLORIDE 0.4 MG: 0.4 CAPSULE ORAL at 09:42

## 2017-08-23 RX ADMIN — PIPERACILLIN SODIUM AND TAZOBACTAM SODIUM 4.5 G: 4; .5 INJECTION, POWDER, LYOPHILIZED, FOR SOLUTION INTRAVENOUS at 00:47

## 2017-08-23 RX ADMIN — STANDARDIZED SENNA CONCENTRATE AND DOCUSATE SODIUM 1 TABLET: 8.6; 5 TABLET, FILM COATED ORAL at 23:03

## 2017-08-23 RX ADMIN — PIPERACILLIN SODIUM AND TAZOBACTAM SODIUM 4.5 G: 4; .5 INJECTION, POWDER, LYOPHILIZED, FOR SOLUTION INTRAVENOUS at 17:38

## 2017-08-23 RX ADMIN — OXYCODONE HYDROCHLORIDE AND ACETAMINOPHEN 1 TABLET: 5; 325 TABLET ORAL at 20:14

## 2017-08-23 RX ADMIN — FERROUS SULFATE TAB 325 MG (65 MG ELEMENTAL FE) 325 MG: 325 (65 FE) TAB at 09:44

## 2017-08-23 RX ADMIN — SODIUM CHLORIDE 500 MG: 900 INJECTION, SOLUTION INTRAVENOUS at 11:00

## 2017-08-23 RX ADMIN — PIPERACILLIN SODIUM AND TAZOBACTAM SODIUM 4.5 G: 4; .5 INJECTION, POWDER, LYOPHILIZED, FOR SOLUTION INTRAVENOUS at 13:47

## 2017-08-23 RX ADMIN — Medication 250 MG: at 09:43

## 2017-08-23 RX ADMIN — ZOLPIDEM TARTRATE 5 MG: 5 TABLET ORAL at 23:03

## 2017-08-23 RX ADMIN — THERA TABS 1 TABLET: TAB at 09:45

## 2017-08-23 RX ADMIN — AMLODIPINE BESYLATE 10 MG: 10 TABLET ORAL at 09:42

## 2017-08-23 NOTE — PROGRESS NOTES
met with patient, completed the initial Spiritual Assessment of the patient, and offered Pastoral Care, see flow sheets for interventions. Pastoral support provided. Patient does not have any Mosque/cultural needs that will affect patients preferences in health care. Chart reviewed. Chaplains will continue to follow and will provide pastoral care on an as needed/as requested basis. Ray Rose MDiv.   Board Certified [employer] [phone]

## 2017-08-23 NOTE — PROGRESS NOTES
NUTRITION    Nursing Referral: [facility]  Nutrition Consult: General Nutrition Management & Supplements     RECOMMENDATIONS / PLAN:     - Start supplements: Ensure daily, Simon BID.   - Continue vitamin/mineral supplementation as medically appropriate  - Continue RD inpatient monitoring and evaluation. NUTRITION INTERVENTIONS & DIAGNOSIS:     [x] Meals/Snacks: modified diet  [x] Medical food supplementation: Ensure Daily, Simon BID. [x] Vitamin/mineral supplementation: MVI, folvite, Vit C, ferrous sulfate-continue as medically appropriate        Nutrition Diagnosis: Inadequate oral intake related to increased needs of wound healing as evidenced by PO intake not meeting estimated needs. ASSESSMENT:     Variations in height and weight noted. Pt denies recent weight loss and reports fair appetite.  Skin breakdown noted    Average po intake adequate to meet patients estimated nutritional needs:   [] Yes     [x] No   [] Unable to determine at this time    Diet: DIET CARDIAC Mechanical Soft; Consistent Carb 1800kcal      Food Allergies: none  Current Appetite:   [] Good     [x] Fair     [] Poor     [] Other:  Appetite/meal intake prior to admission:   [x] Good     [] Fair     [] Poor     [] Other:  Feeding Limitations: pt denies issues chewing or swallowing  [] Swallowing difficulty    [] Chewing difficulty    [] Other:  Current Meal Intake: Patient Vitals for the past 100 hrs:   % Diet Eaten   08/22/17 1828 80 %   08/22/17 1304 100 %       BM:  8/22  Skin Integrity: R foot wound, L pretibial wound  Edema: none noted  Pertinent Medications: Reviewed, Vitamin C, ferrous sulfate, folvite, MVI    Recent Labs      08/23/17   0104  08/22/17   0325   NA  137  135*   K  3.6  3.8   CL  104  100   CO2  25  27   GLU  114*  101*   BUN  12  13   CREA  0.73  0.78   CA  8.4*  9.0   ALB  2.2*  2.6*   SGOT  20  11*   ALT  17  12*       Intake/Output Summary (Last 24 hours) at 08/23/17 1450  Last data filed at 08/22/17 1828   Gross per 24 hour   Intake              800 ml   Output                0 ml   Net              800 ml       Anthropometrics: Per chart review pt was 5'11\". Pt appears taller than 5'5\". Ht Readings from Last 1 Encounters:   08/22/17 5' 5\" (1.651 m)     Last 3 Recorded Weights in this Encounter    08/22/17 0308 08/22/17 1153   Weight: 99.3 kg (219 lb) 65.8 kg (145 lb)     Body mass index is 24.13 kg/(m^2). BMI: 27.3 based on Height 71\", 195 lbs. Weight History: Pt reports  lbs. Change in weight documented noted-RD took bed weight of 195 lbs. Pt denies any recent weight loss. Weight Metrics 8/22/2017 8/18/2017 7/19/2017 7/6/2017 7/6/2017 7/6/2017 6/24/2017   Weight 145 lb 195 lb 195 lb - 200 lb - 200 lb   BMI 24.13 kg/m2 27.2 kg/m2 - 27.2 kg/m2 - 27.89 kg/m2 27.89 kg/m2        Admitting Diagnosis: Pneumonia  Pertinent PMHx: HTN, recent admission for foot ulcer and foot infection 7/7/17    Education Needs:        [x] None identified  [] Identified - Not appropriate at this time  []  Identified and addressed - refer to education log  Learning Limitations:   [x] None identified  [] Identified    Cultural, Bahai & ethnic food preferences:  [x] None identified    [] Identified and addressed     ESTIMATED NUTRITION NEEDS:     Calories: 1685-4333 kcal (MSJx1.2-1.3) based on  [x] Actual BW 88.6 kg     [] IBW   Protein:  gm (0.8-1.2 gm/kg) based on  [x] Actual BW      [] IBW   Fluid: 1 mL/kcal     MONITORING & EVALUATION:     Nutrition Goal(s):   1. Po intake of meals will meet >75% of patient estimated nutritional needs within the next 7 days.   Outcome:  [] Met/Ongoing    []  Not Met    [x] New/Initial Goal     Monitoring:   [x] Diet tolerance   [x] Meal intake   [x] Supplement intake   [] GI symptoms/ability to tolerate po diet   [] Respiratory status   [] Plan of care      Previous Recommendations (for follow-up assessments only):     []   Implemented       []   Not Implemented (RD to address)     [] No Recommendation Made     Discharge Planning:  Cardiac diet  [x] Participated in care planning, discharge planning, & interdisciplinary rounds as appropriate      Aleksandra Callahan, [address] [place] [address]   Pager: [phone]

## 2017-08-23 NOTE — INTERDISCIPLINARY ROUNDS
IDR/SLIDR Summary          Patient: Jose David Azar MRN: [id number]    Age: 58 y.o. YOB: 1955 Room/Bed: [facility]   Admit Diagnosis: Pneumonia  Principal Diagnosis: <principal problem not specified>   Goals: resolve   Readmission: NO  Quality Measure: PNA  VTE Prophylaxis: Chemical  Influenza Vaccine screening completed? YES  Pneumococcal Vaccine screening completed? YES  Mobility needs: Yes   Nutrition plan:Yes  Consults: P. T and O.T. Financial concerns:no  Escalated to CM? NO  RRAT Score: 7   Interventions:Testing due for pt today? NO  LOS: 1 days Expected length of stay ?  days  Discharge plan: home  PCP: Ernestine Louis MD  Transportation needs: No    Days before discharge:two or more days before discharge   Discharge disposition: SNF    Signed:     Dayo Giraldo RN  8/23/2017  4:06 AM

## 2017-08-23 NOTE — CDMP QUERY
Please clarify if this patient is being treated/managed for:    =>Wound Foot Right;Dorsal and Pretibial Left;Lateral  vascular wound POA with PVD (or without PVD)  =>Other Explanation of clinical findings  =>Unable to Determine (no explanation of clinical findings)    The medical record reflects the following:    Risk:  59 yo with hx CVA, HTN    Clinical Indicators:  --H&P:  \"Recent R foot wound\"  --8/23 wound RN \"Wound Foot Right;Dorsal and Pretibial Left;Lateral  vascular wound POA\"    Treatment: Wound RN consult, Silicone;Elastic bandage, Xeroform;Silicone;Elastic bandage    Please clarify and document your clinical opinion in the progress notes and discharge summary including the definitive and/or presumptive diagnosis, (suspected or probable), related to the above clinical findings. Please include clinical findings supporting your diagnosis. If you DECLINE this query or would like to communicate with [facility], please utilize the \"[employer] message box\" at the TOP of the Progress Note on the right.       Thank you,  Fritz Ordonez RN BSN CCDS [phone]

## 2017-08-23 NOTE — ROUTINE PROCESS
Bedside and Verbal shift change report given to Natasha Quispe RN (oncoming nurse) by Chantelle Alexander RN (offgoing nurse). Report included the following information SBAR, Kardex, MAR and Recent Results.     SITUATION:    Code Status: Full Code   Reason for Admission: Pneumonia    [facility] day: 1   Problem List:       Hospital Problems  Date Reviewed: 7/11/2017          Codes Class Noted POA    Pneumonia ICD-10-CM: J18.9  ICD-9-CM: 486  8/22/2017 Unknown              BACKGROUND:    Past Medical History:   Past Medical History:   Diagnosis Date    Hiccups     Hypertension     Hyponatremia     Lacunar infarction ([facility] [place] [address] 2010    Lower extremity edema     Psychogenic polydipsia     Spinal stenosis          Patient taking anticoagulants yes     ASSESSMENT:    Changes in Assessment Throughout Shift: none     Patient has Central Line: no Reasons if yes: n/a   Patient has Reid Cath: no Reasons if yes: n/a      Last Vitals:     Vitals:    08/22/17 1519 08/22/17 2031 08/23/17 0030 08/23/17 0443   BP: (!) 144/94 129/74  137/80   Pulse: 94 87 82 80   Resp: 20 18 18 18   Temp: 99.7 °F (37.6 °C) 99.3 °F (37.4 °C) 99.3 °F (37.4 °C) 98.4 °F (36.9 °C)   SpO2: 96% 100% 100% 100%   Weight:       Height:            IV and DRAINS (will only show if present)   Peripheral IV 08/22/17 Right Hand-Site Assessment: Clean, dry, & intact  Peripheral IV 08/22/17 Left Hand-Site Assessment: Clean, dry, & intact     WOUND (if present)   Wound Type:  Vascular wound   Dressing present Dressing Present : No   Wound Concerns/Notes:  Yes,  Wound  consult in place     PAIN    Pain Assessment    Pain Intensity 1: 6 (08/23/17 0528)    Pain Location 1: Abdomen    Pain Intervention(s) 1: Medication (see MAR)    Patient Stated Pain Goal: 0  o Interventions for Pain:  percocet  o Intervention effective: yes  o Time of last intervention: 0528   o Reassessment Completed: yes      Last 3 Weights:  Last 3 Recorded Weights in this Encounter 08/22/17 0308 08/22/17 1153   Weight: 99.3 kg (219 lb) 65.8 kg (145 lb)     Weight change: -33.566 kg (-74 lb)     INTAKE/OUPUT    Current Shift:      Last three shifts: 08/21 1901 - 08/23 0700  In: 6483 [P.O.:1040]  Out: -      LAB RESULTS     Recent Labs      08/23/17   0104  08/22/17   0325   WBC  8.1  11.0   HGB  9.8*  11.1*   HCT  29.6*  31.9*   PLT  260  263        Recent Labs      08/23/17   0104  08/22/17   0325   NA  137  135*   K  3.6  3.8   GLU  114*  101*   BUN  12  13   CREA  0.73  0.78   CA  8.4*  9.0       RECOMMENDATIONS AND DISCHARGE PLANNING     1. Pending tests/procedures/ Plan of Care or Other Needs: antibiotics    2. Discharge plan for patient and Needs/Barriers: home    3. Estimated Discharge Date: tbd Posted on Whiteboard in [facility]: yes      4. The patient's care plan was reviewed with the oncoming nurse. \"HEALS\" SAFETY CHECK      Fall Risk    Total Score: 4    Safety Measures: Safety Measures: Bed/Chair-Wheels locked, Bed in low position, Call light within reach    A safety check occurred in the patient's room between off going nurse and oncoming nurse listed above. The safety check included the below items  Area Items   H  High Alert Medications - Verify all high alert medication drips (heparin, PCA, etc.)   E  Equipment - Suction is set up for ALL patients (with yanker)  - Red plugs utilized for all equipment (IV pumps, etc.)  - WOWs wiped down at end of shift.  - Room stocked with oxygen, suction, and other unit-specific supplies   A  Alarms - Bed alarm is set for fall risk patients  - Ensure chair alarm is in place and activated if patient is up in a chair   L  Lines - Check IV for any infiltration  - Reid bag is empty if patient has a Reid   - Tubing and IV bags are labeled   S  Safety   - Room is clean, patient is clean, and equipment is clean. - Hallways are clear from equipment besides carts.    - Fall bracelet on for fall risk patients  - Ensure room is clear and free of clutter  - Suction is set up for ALL patients (with erika)  - Hallways are clear from equipment besides carts.    - Isolation precautions followed, supplies available outside room, sign posted     Ita Reyes RN

## 2017-08-23 NOTE — WOUND CARE
Physical Exam   Room 452: wound assessment  Wound Foot Right;Dorsal (Active) vascular wound POA   DRESSING STATUS Intact 8/23/2017  9:21 AM   DRESSING TYPE Silicone;Elastic bandage 8/23/2017  9:21 AM   Non-Pressure Injury Partial thickness (epider/derm) 8/23/2017  9:21 AM   Wound Length (cm) 1 cm 8/23/2017  9:21 AM   Wound Width (cm) 1.8 cm 8/23/2017  9:21 AM   Wound Depth (cm) 0.1 8/23/2017  9:21 AM   Wound Surface area (cm^3) 0.18 cm^2 8/23/2017  9:21 AM   Condition of Base Granulation;Epithelializing 8/23/2017  9:21 AM   Condition of Edges Closed 8/23/2017  9:21 AM   Epithelialization (%) 50 8/23/2017  9:21 AM   Tissue Type Red 8/23/2017  9:21 AM   Tissue Type Percent Red 100 8/23/2017  9:21 AM   Drainage Amount  Small  8/23/2017  9:21 AM   Drainage Color Serosanguinous 8/23/2017  9:21 AM   Wound Odor None 8/23/2017  9:21 AM   Periwound Skin Condition Intact;Edematous 8/23/2017  9:21 AM   Cleansing and Cleansing Agents  Normal saline 8/23/2017  9:21 AM   Dressing Type Applied Silicone;Elastic bandage 8/23/2017  9:21 AM   Procedure Tolerated Well 8/23/2017  9:21 AM   Number of days:1   Wound Pretibial Left;Lateral (Active) vascular wound POA   DRESSING STATUS Intact; Removed 8/23/2017  9:21 AM   DRESSING TYPE Xeroform;Silicone;Elastic bandage 8/23/2017  9:21 AM   Non-Pressure Injury Partial thickness (epider/derm) 8/23/2017  9:21 AM   Wound Length (cm) 2.5 cm 8/23/2017  9:21 AM   Wound Width (cm) 2 cm 8/23/2017  9:21 AM   Wound Depth (cm) 0.1 8/23/2017  9:21 AM   Wound Surface area (cm^3) 0.5 cm^2 8/23/2017  9:21 AM   Condition of Base Granulation;Epithelializing 8/23/2017  9:21 AM   Condition of Edges Closed 8/23/2017  9:21 AM   Epithelialization (%) 75 8/23/2017  9:21 AM   Tissue Type Red 8/23/2017  9:21 AM   Tissue Type Percent Red 100 8/23/2017  9:21 AM   Drainage Amount  Small  8/23/2017  9:21 AM   Drainage Color Serosanguinous 8/23/2017  9:21 AM   Wound Odor None 8/23/2017  9:21 AM   Periwound Skin Condition Intact;Edematous 8/23/2017  9:21 AM   Cleansing and Cleansing Agents  Normal saline 8/23/2017  9:21 AM   Dressing Type Applied Silicone;Elastic bandage 8/23/2017  9:21 AM   Procedure Tolerated Well 8/23/2017  9:21 AM   Number of days:1   Pt agreeable to continue current treatment. Wound care education provided to pt at this time. Will turn over care to nursing staff at this time.   Frank Allison BSN, RN, [facility], [address]

## 2017-08-23 NOTE — PROGRESS NOTES
[facility] Hospitalist Group  Progress Note    Patient: Brandon Tapia Age: 58 y.o. : 1955 MR#: [id number] SSN: [id number]  Date/Time: 2017 12:08 PM    Subjective/24-hour events:     Complains of stomach discomfort, but denies N/V. Able to tolerate some breakfast this AM without difficulty. Afebrile overnight. Assessment:   HCAP  HTN  Iron deficiency anemia  Nasal MRSA colonization  Recent R foot wound    Plan:  Antibiotic therapy as ordered for now, continue to follow cultures. De-escalate therapy as soon as able. Wound care per wound/ostomy team recs. PT/OT. Contact isolation initiated. Disposition TBD. Case discussed with:  [x]Patient  []Family  []Nursing  []Case Management  DVT Prophylaxis:  [x]Lovenox  []Hep SQ  []SCDs  []Coumadin   []On Heparin gtt    Objective:   VS:   Visit Vitals    /80    Pulse 80    Temp 98.4 °F (36.9 °C)    Resp 18    Ht 5' 5\" (1.651 m)    Wt 65.8 kg (145 lb)    SpO2 100%    BMI 24.13 kg/m2      Tmax/24hrs: Temp (24hrs), Av.3 °F (37.4 °C), Min:98.4 °F (36.9 °C), Max:99.7 °F (37.6 °C)    Intake/Output Summary (Last 24 hours) at 17 1208  Last data filed at 17 1828   Gross per 24 hour   Intake             1040 ml   Output                0 ml   Net             1040 ml       General:  In NAD. Nontoxic-appearing. Cardiovascular:  RRR. Pulmonary:  No wheezes, effort nonlabored. GI:  Abdomen soft, NTTP. Extremities:  No ischemia. Additional:  Awake and alert.     Labs:    Recent Results (from the past 24 hour(s))   METABOLIC PANEL, COMPREHENSIVE    Collection Time: 17  1:04 AM   Result Value Ref Range    Sodium 137 136 - 145 mmol/L    Potassium 3.6 3.5 - 5.5 mmol/L    Chloride 104 100 - 108 mmol/L    CO2 25 21 - 32 mmol/L    Anion gap 8 3.0 - 18 mmol/L    Glucose 114 (H) 74 - 99 mg/dL    BUN 12 7.0 - 18 MG/DL    Creatinine 0.73 0.6 - 1.3 MG/DL    BUN/Creatinine ratio 16 12 - 20      GFR est AA >60 >60 ml/min/1.73m2    GFR est non-AA >60 >60 ml/min/1.73m2    Calcium 8.4 (L) 8.5 - 10.1 MG/DL    Bilirubin, total 0.8 0.2 - 1.0 MG/DL    ALT (SGPT) 17 16 - 61 U/L    AST (SGOT) 20 15 - 37 U/L    Alk. phosphatase 97 45 - 117 U/L    Protein, total 7.6 6.4 - 8.2 g/dL    Albumin 2.2 (L) 3.4 - 5.0 g/dL    Globulin 5.4 (H) 2.0 - 4.0 g/dL    A-G Ratio 0.4 (L) 0.8 - 1.7     CBC WITH AUTOMATED DIFF    Collection Time: 08/23/17  1:04 AM   Result Value Ref Range    WBC 8.1 4.6 - 13.2 K/uL    RBC 3.72 (L) 4.70 - 5.50 M/uL    HGB 9.8 (L) 13.0 - 16.0 g/dL    HCT 29.6 (L) 36.0 - 48.0 %    MCV 79.6 74.0 - 97.0 FL    MCH 26.3 24.0 - 34.0 PG    MCHC 33.1 31.0 - 37.0 g/dL    RDW 14.2 11.6 - 14.5 %    PLATELET 314 599 - 485 K/uL    MPV 10.0 9.2 - 11.8 FL    NEUTROPHILS 75 (H) 40 - 73 %    LYMPHOCYTES 12 (L) 21 - 52 %    MONOCYTES 9 3 - 10 %    EOSINOPHILS 4 0 - 5 %    BASOPHILS 0 0 - 2 %    ABS. NEUTROPHILS 6.1 1.8 - 8.0 K/UL    ABS. LYMPHOCYTES 1.0 0.9 - 3.6 K/UL    ABS. MONOCYTES 0.7 0.05 - 1.2 K/UL    ABS. EOSINOPHILS 0.4 0.0 - 0.4 K/UL    ABS. BASOPHILS 0.0 0.0 - 0.06 K/UL    DF AUTOMATED     MRSA SCREEN - PCR (NASAL)    Collection Time: 08/23/17  5:00 AM   Result Value Ref Range    Special Requests: NO SPECIAL REQUESTS      Culture result: (A)       MRSA target DNA is detected (presumptive positive for MRSA colonization).     Culture result:        CALLED TO AND CORRECTLY REPEATED BY:  MUKESH RIVERO 4N 8/23/17 1026 TO LAE         Signed By: Moises Flood MD     August 23, 2017 12:08 PM

## 2017-08-24 LAB
ANION GAP SERPL CALC-SCNC: 5 MMOL/L (ref 3–18)
BUN SERPL-MCNC: 9 MG/DL (ref 7–18)
BUN/CREAT SERPL: 13 (ref 12–20)
CALCIUM SERPL-MCNC: 8.4 MG/DL (ref 8.5–10.1)
CHLORIDE SERPL-SCNC: 104 MMOL/L (ref 100–108)
CO2 SERPL-SCNC: 27 MMOL/L (ref 21–32)
CREAT SERPL-MCNC: 0.69 MG/DL (ref 0.6–1.3)
DATE LAST DOSE: NORMAL
ERYTHROCYTE [DISTWIDTH] IN BLOOD BY AUTOMATED COUNT: 14.3 % (ref 11.6–14.5)
GLUCOSE SERPL-MCNC: 114 MG/DL (ref 74–99)
HCT VFR BLD AUTO: 26.4 % (ref 36–48)
HGB BLD-MCNC: 8.8 G/DL (ref 13–16)
MCH RBC QN AUTO: 26.3 PG (ref 24–34)
MCHC RBC AUTO-ENTMCNC: 33.3 G/DL (ref 31–37)
MCV RBC AUTO: 79 FL (ref 74–97)
PLATELET # BLD AUTO: 296 K/UL (ref 135–420)
PMV BLD AUTO: 10 FL (ref 9.2–11.8)
POTASSIUM SERPL-SCNC: 3.7 MMOL/L (ref 3.5–5.5)
RBC # BLD AUTO: 3.34 M/UL (ref 4.7–5.5)
REPORTED DOSE,DOSE: NORMAL UNITS
REPORTED DOSE/TIME,TMG: 1200
SODIUM SERPL-SCNC: 136 MMOL/L (ref 136–145)
VANCOMYCIN TROUGH SERPL-MCNC: 18 UG/ML (ref 10–20)
WBC # BLD AUTO: 6.6 K/UL (ref 4.6–13.2)

## 2017-08-24 PROCEDURE — 94760 N-INVAS EAR/PLS OXIMETRY 1: CPT

## 2017-08-24 PROCEDURE — 74011250636 HC RX REV CODE- 250/636: Performed by: FAMILY MEDICINE

## 2017-08-24 PROCEDURE — 36415 COLL VENOUS BLD VENIPUNCTURE: CPT | Performed by: FAMILY MEDICINE

## 2017-08-24 PROCEDURE — 51798 US URINE CAPACITY MEASURE: CPT

## 2017-08-24 PROCEDURE — 65270000029 HC RM PRIVATE

## 2017-08-24 PROCEDURE — 74011250637 HC RX REV CODE- 250/637: Performed by: FAMILY MEDICINE

## 2017-08-24 PROCEDURE — 80202 ASSAY OF VANCOMYCIN: CPT | Performed by: FAMILY MEDICINE

## 2017-08-24 PROCEDURE — 74011000258 HC RX REV CODE- 258: Performed by: FAMILY MEDICINE

## 2017-08-24 PROCEDURE — 80048 BASIC METABOLIC PNL TOTAL CA: CPT | Performed by: FAMILY MEDICINE

## 2017-08-24 PROCEDURE — 85027 COMPLETE CBC AUTOMATED: CPT | Performed by: FAMILY MEDICINE

## 2017-08-24 PROCEDURE — 74011250637 HC RX REV CODE- 250/637: Performed by: INTERNAL MEDICINE

## 2017-08-24 RX ORDER — ACETAMINOPHEN 325 MG/1
650 TABLET ORAL
Status: DISCONTINUED | OUTPATIENT
Start: 2017-08-24 | End: 2017-09-01 | Stop reason: HOSPADM

## 2017-08-24 RX ADMIN — CYANOCOBALAMIN TAB 1000 MCG 1000 MCG: 1000 TAB at 08:29

## 2017-08-24 RX ADMIN — ENOXAPARIN SODIUM 40 MG: 40 INJECTION SUBCUTANEOUS at 12:27

## 2017-08-24 RX ADMIN — ACETAMINOPHEN 650 MG: 325 TABLET ORAL at 01:21

## 2017-08-24 RX ADMIN — OXYCODONE HYDROCHLORIDE AND ACETAMINOPHEN 1 TABLET: 5; 325 TABLET ORAL at 12:28

## 2017-08-24 RX ADMIN — PIPERACILLIN SODIUM AND TAZOBACTAM SODIUM 4.5 G: 4; .5 INJECTION, POWDER, LYOPHILIZED, FOR SOLUTION INTRAVENOUS at 06:34

## 2017-08-24 RX ADMIN — FUROSEMIDE 40 MG: 40 TABLET ORAL at 08:29

## 2017-08-24 RX ADMIN — PIPERACILLIN SODIUM AND TAZOBACTAM SODIUM 4.5 G: 4; .5 INJECTION, POWDER, LYOPHILIZED, FOR SOLUTION INTRAVENOUS at 12:27

## 2017-08-24 RX ADMIN — AMLODIPINE BESYLATE 10 MG: 10 TABLET ORAL at 08:29

## 2017-08-24 RX ADMIN — FERROUS SULFATE TAB 325 MG (65 MG ELEMENTAL FE) 325 MG: 325 (65 FE) TAB at 08:29

## 2017-08-24 RX ADMIN — PIPERACILLIN SODIUM AND TAZOBACTAM SODIUM 4.5 G: 4; .5 INJECTION, POWDER, LYOPHILIZED, FOR SOLUTION INTRAVENOUS at 23:47

## 2017-08-24 RX ADMIN — VANCOMYCIN HYDROCHLORIDE 1500 MG: 10 INJECTION, POWDER, LYOPHILIZED, FOR SOLUTION INTRAVENOUS at 13:25

## 2017-08-24 RX ADMIN — VANCOMYCIN HYDROCHLORIDE 1500 MG: 10 INJECTION, POWDER, LYOPHILIZED, FOR SOLUTION INTRAVENOUS at 01:22

## 2017-08-24 RX ADMIN — THERA TABS 1 TABLET: TAB at 08:29

## 2017-08-24 RX ADMIN — PIPERACILLIN SODIUM AND TAZOBACTAM SODIUM 4.5 G: 4; .5 INJECTION, POWDER, LYOPHILIZED, FOR SOLUTION INTRAVENOUS at 00:26

## 2017-08-24 RX ADMIN — TAMSULOSIN HYDROCHLORIDE 0.4 MG: 0.4 CAPSULE ORAL at 08:29

## 2017-08-24 RX ADMIN — PIPERACILLIN SODIUM AND TAZOBACTAM SODIUM 4.5 G: 4; .5 INJECTION, POWDER, LYOPHILIZED, FOR SOLUTION INTRAVENOUS at 17:15

## 2017-08-24 RX ADMIN — FOLIC ACID 1 MG: 1 TABLET ORAL at 08:29

## 2017-08-24 RX ADMIN — Medication 250 MG: at 08:29

## 2017-08-24 RX ADMIN — STANDARDIZED SENNA CONCENTRATE AND DOCUSATE SODIUM 1 TABLET: 8.6; 5 TABLET, FILM COATED ORAL at 21:56

## 2017-08-24 RX ADMIN — ASPIRIN 81 MG 81 MG: 81 TABLET ORAL at 08:29

## 2017-08-24 NOTE — PROGRESS NOTES
[facility]    Patient: Allison Leiva Age: 58 y.o. : 1955 MR#: [id number] SSN: [id number]  Date/Time: 2017 10:18 AM    Subjective/24-hour events:     No acute respiratory issues overnight. Remains afebrile. Assessment:   HCAP, RLL  HTN  Iron deficiency anemia  Nasal MRSA colonization  Recent R foot wound    Plan:  Vancomycin and Zosyn to continue. Consider de-escalating therapy in next day or so if clinical status remains stable. Monitor H/H - mild decline noted, but no evdience for acute blood loss. F/U CXR in AM.  Wound care as ordered. PT/OT evals. Disposition TBD. Case discussed with:  [x]Patient  []Family  []Nursing  []Case Management  DVT Prophylaxis:  [x]Lovenox  []Hep SQ  []SCDs  []Coumadin   []On Heparin gtt    Objective:   VS:   Visit Vitals    /78 (BP 1 Location: Right arm, BP Patient Position: At rest)    Pulse 80    Temp 98.6 °F (37 °C)    Resp 18    Ht 5' 5\" (1.651 m)    Wt 65.8 kg (145 lb)    SpO2 96%    BMI 24.13 kg/m2      Tmax/24hrs: Temp (24hrs), Av.7 °F (37.6 °C), Min:97.4 °F (36.3 °C), Max:101.1 °F (38.4 °C)      Intake/Output Summary (Last 24 hours) at 17 1017  Last data filed at 17 0908   Gross per 24 hour   Intake              440 ml   Output              200 ml   Net              240 ml       General:  In NAD. Nontoxic-appearing. Cardiovascular:  RRR. Pulmonary:  No wheezes, effort nonlabored. GI:  Abdomen soft, NTTP. Extremities:  No ischemia. Additional:  Awake and alert.     Labs:    Recent Results (from the past 24 hour(s))   CBC W/O DIFF    Collection Time: 17  3:14 AM   Result Value Ref Range    WBC 6.6 4.6 - 13.2 K/uL    RBC 3.34 (L) 4.70 - 5.50 M/uL    HGB 8.8 (L) 13.0 - 16.0 g/dL    HCT 26.4 (L) 36.0 - 48.0 %    MCV 79.0 74.0 - 97.0 FL    MCH 26.3 24.0 - 34.0 PG    MCHC 33.3 31.0 - 37.0 g/dL    RDW 14.3 11.6 - 14.5 %    PLATELET 112 410 - 248 K/uL    MPV 10.0 9.2 - 57.7 FL   METABOLIC PANEL, BASIC    Collection Time: 08/24/17  3:14 AM   Result Value Ref Range    Sodium 136 136 - 145 mmol/L    Potassium 3.7 3.5 - 5.5 mmol/L    Chloride 104 100 - 108 mmol/L    CO2 27 21 - 32 mmol/L    Anion gap 5 3.0 - 18 mmol/L    Glucose 114 (H) 74 - 99 mg/dL    BUN 9 7.0 - 18 MG/DL    Creatinine 0.69 0.6 - 1.3 MG/DL    BUN/Creatinine ratio 13 12 - 20      GFR est AA >60 >60 ml/min/1.73m2    GFR est non-AA >60 >60 ml/min/1.73m2    Calcium 8.4 (L) 8.5 - 10.1 MG/DL       Signed By: Humberto Rahman MD     August 24, 2017 10:17 AM

## 2017-08-24 NOTE — PROGRESS NOTES
Problem: Falls - Risk of  Goal: *Absence of Falls  Document Abena Fall Risk and appropriate interventions in the flowsheet.    Outcome: Progressing Towards Goal  Fall Risk Interventions:  Mobility Interventions: Bed/chair exit alarm, Assess mobility with egress test     Mentation Interventions: Bed/chair exit alarm, Evaluate medications/consider consulting pharmacy     Medication Interventions: Bed/chair exit alarm, Evaluate medications/consider consulting pharmacy     Elimination Interventions: Bed/chair exit alarm     History of Falls Interventions: Consult care management for discharge planning, Bed/chair exit alarm, Evaluate medications/consider consulting pharmacy

## 2017-08-24 NOTE — ROUTINE PROCESS
Bedside and Verbal shift change report given to [address] [place] [address] E (oncoming nurse) by Sigifredo Dominguez RN (offgoing nurse). Report included the following information SBAR, Kardex, MAR and Recent Results.     SITUATION:    Code Status: Full Code   Reason for Admission: Pneumonia    [facility] day: 2   Problem List:       Hospital Problems  Date Reviewed: 7/11/2017          Codes Class Noted POA    Pneumonia ICD-10-CM: J18.9  ICD-9-CM: 569  8/22/2017 Unknown              BACKGROUND:    Past Medical History:   Past Medical History:   Diagnosis Date    Hiccups     Hypertension     Hyponatremia     Lacunar infarction ([facility] [address] 2010    Lower extremity edema     Psychogenic polydipsia     Spinal stenosis          Patient taking anticoagulants yes     ASSESSMENT:    Changes in Assessment Throughout Shift: none     Patient has Central Line: no Reasons if yes: n/a   Patient has Reid Cath: no Reasons if yes: n/a      Last Vitals:     Vitals:    08/24/17 0503 08/24/17 0744 08/24/17 1119 08/24/17 1521   BP: 109/68 126/78 124/73 136/81   Pulse: 75 80 80 73   Resp: 18 18 20 20   Temp: 97.4 °F (36.3 °C) 98.6 °F (37 °C) 98.9 °F (37.2 °C) 97.8 °F (36.6 °C)   SpO2: 100% 96% 95% 97%   Weight:       Height:            IV and DRAINS (will only show if present)   Peripheral IV 08/22/17 Right Hand-Site Assessment: Clean, dry, & intact  Peripheral IV 08/22/17 Left Hand-Site Assessment: Clean, dry, & intact     WOUND (if present)   Wound Type:  Vascular wound   Dressing present Dressing Present : No   Wound Concerns/Notes:  Yes,  Wound  consult in place     PAIN    Pain Assessment    Pain Intensity 1: 0 (08/24/17 0500)    Pain Location 1: Abdomen    Pain Intervention(s) 1: Medication (see MAR)    Patient Stated Pain Goal: 0  o Interventions for Pain:  percocet  o Intervention effective: yes  o Time of last intervention: 2014   o Reassessment Completed: yes      Last 3 Weights:  Last 3 Recorded Weights in this Encounter    08/22/17 7029 08/22/17 1153   Weight: 99.3 kg (219 lb) 65.8 kg (145 lb)     Weight change:      INTAKE/OUPUT    Current Shift: 08/24 1901 - 08/25 0700  In: -   Out: 200 [Urine:200]    Last three shifts: 08/23 0701 - 08/24 1900  In: 9669 [P.O.:1640; I.V.:700]  Out: 1600 [Urine:1600]     LAB RESULTS     Recent Labs      08/24/17   0314  08/23/17   0104  08/22/17   0325   WBC  6.6  8.1  11.0   HGB  8.8*  9.8*  11.1*   HCT  26.4*  29.6*  31.9*   PLT  296  260  263        Recent Labs      08/24/17   0314  08/23/17   0104  08/22/17   0325   NA  136  137  135*   K  3.7  3.6  3.8   GLU  114*  114*  101*   BUN  9  12  13   CREA  0.69  0.73  0.78   CA  8.4*  8.4*  9.0       RECOMMENDATIONS AND DISCHARGE PLANNING     1. Pending tests/procedures/ Plan of Care or Other Needs: antibiotics    2. Discharge plan for patient and Needs/Barriers: home    3. Estimated Discharge Date: tbd Posted on Whiteboard in [facility]: yes      4. The patient's care plan was reviewed with the oncoming nurse. \"HEALS\" SAFETY CHECK      Fall Risk    Total Score: 5    Safety Measures: Safety Measures: Bed/Chair-Wheels locked, Bed in low position, Call light within reach    A safety check occurred in the patient's room between off going nurse and oncoming nurse listed above.     The safety check included the below items  Area Items   H  High Alert Medications - Verify all high alert medication drips (heparin, PCA, etc.)   E  Equipment - Suction is set up for ALL patients (with yanker)  - Red plugs utilized for all equipment (IV pumps, etc.)  - WOWs wiped down at end of shift.  - Room stocked with oxygen, suction, and other unit-specific supplies   A  Alarms - Bed alarm is set for fall risk patients  - Ensure chair alarm is in place and activated if patient is up in a chair   L  Lines - Check IV for any infiltration  - Reid bag is empty if patient has a Reid   - Tubing and IV bags are labeled   S  Safety   - Room is clean, patient is clean, and equipment is clean. - Hallways are clear from equipment besides carts. - Fall bracelet on for fall risk patients  - Ensure room is clear and free of clutter  - Suction is set up for ALL patients (with erika)  - Hallways are clear from equipment besides carts.    - Isolation precautions followed, supplies available outside room, sign posted     Monalisa Dakin, RN

## 2017-08-24 NOTE — PROGRESS NOTES
Problem: Falls - Risk of  Goal: *Absence of Falls  Document Abena Fall Risk and appropriate interventions in the flowsheet.    Outcome: Progressing Towards Goal  Fall Risk Interventions:        Mentation Interventions: Door open when patient unattended     Medication Interventions: Utilize gait belt for transfers/ambulation     Elimination Interventions: Call light in reach     History of Falls Interventions: Room close to nurse's station

## 2017-08-24 NOTE — ROUTINE PROCESS
Bedside and Verbal shift change report given to Helen Bell RN (oncoming nurse) by Hector Plunkett RN (offgoing nurse). Report included the following information SBAR, Kardex, MAR and Recent Results.     SITUATION:    Code Status: Full Code   Reason for Admission: Pneumonia    [facility] day: 2   Problem List:       Hospital Problems  Date Reviewed: 7/11/2017          Codes Class Noted POA    Pneumonia ICD-10-CM: J18.9  ICD-9-CM: 871  8/22/2017 Unknown              BACKGROUND:    Past Medical History:   Past Medical History:   Diagnosis Date    Hiccups     Hypertension     Hyponatremia     Lacunar infarction ([facility] [address] 2010    Lower extremity edema     Psychogenic polydipsia     Spinal stenosis          Patient taking anticoagulants yes     ASSESSMENT:    Changes in Assessment Throughout Shift: none     Patient has Central Line: no Reasons if yes: n/a   Patient has Reid Cath: no Reasons if yes: n/a      Last Vitals:     Vitals:    08/23/17 1959 08/23/17 2332 08/24/17 0503 08/24/17 0744   BP: 149/82 138/79 109/68 126/78   Pulse: 86 90 75 80   Resp: 19 18 18 18   Temp: 100.4 °F (38 °C) (!) 101.1 °F (38.4 °C) 97.4 °F (36.3 °C) 98.6 °F (37 °C)   SpO2: 95% 95% 100% 96%   Weight:       Height:            IV and DRAINS (will only show if present)   Peripheral IV 08/22/17 Right Hand-Site Assessment: Clean, dry, & intact  Peripheral IV 08/22/17 Left Hand-Site Assessment: Clean, dry, & intact     WOUND (if present)   Wound Type:  Vascular wound   Dressing present Dressing Present : No   Wound Concerns/Notes:  Yes,  Wound  consult in place     PAIN    Pain Assessment    Pain Intensity 1: 0 (08/24/17 0500)    Pain Location 1: Abdomen    Pain Intervention(s) 1: Medication (see MAR)    Patient Stated Pain Goal: 0  o Interventions for Pain:  percocet  o Intervention effective: yes  o Time of last intervention: 2014   o Reassessment Completed: yes      Last 3 Weights:  Last 3 Recorded Weights in this Encounter 08/22/17 0308 08/22/17 1153   Weight: 99.3 kg (219 lb) 65.8 kg (145 lb)     Weight change:      INTAKE/OUPUT    Current Shift: 08/24 0701 - 08/24 1900  In: 120 [P.O.:120]  Out: 200 [Urine:200]    Last three shifts:       LAB RESULTS     Recent Labs      08/24/17   0314  08/23/17   0104  08/22/17   0325   WBC  6.6  8.1  11.0   HGB  8.8*  9.8*  11.1*   HCT  26.4*  29.6*  31.9*   PLT  296  260  263        Recent Labs      08/24/17   0314  08/23/17   0104  08/22/17   0325   NA  136  137  135*   K  3.7  3.6  3.8   GLU  114*  114*  101*   BUN  9  12  13   CREA  0.69  0.73  0.78   CA  8.4*  8.4*  9.0       RECOMMENDATIONS AND DISCHARGE PLANNING     1. Pending tests/procedures/ Plan of Care or Other Needs: antibiotics    2. Discharge plan for patient and Needs/Barriers: home    3. Estimated Discharge Date: tbd Posted on Whiteboard in [facility]: yes      4. The patient's care plan was reviewed with the oncoming nurse. \"HEALS\" SAFETY CHECK      Fall Risk    Total Score: 5    Safety Measures: Safety Measures: Bed/Chair-Wheels locked, Bed in low position, Call light within reach    A safety check occurred in the patient's room between off going nurse and oncoming nurse listed above. The safety check included the below items  Area Items   H  High Alert Medications - Verify all high alert medication drips (heparin, PCA, etc.)   E  Equipment - Suction is set up for ALL patients (with yanker)  - Red plugs utilized for all equipment (IV pumps, etc.)  - WOWs wiped down at end of shift.  - Room stocked with oxygen, suction, and other unit-specific supplies   A  Alarms - Bed alarm is set for fall risk patients  - Ensure chair alarm is in place and activated if patient is up in a chair   L  Lines - Check IV for any infiltration  - Reid bag is empty if patient has a Reid   - Tubing and IV bags are labeled   S  Safety   - Room is clean, patient is clean, and equipment is clean.   - Hallways are clear from equipment besides carts. - Fall bracelet on for fall risk patients  - Ensure room is clear and free of clutter  - Suction is set up for ALL patients (with erika)  - Hallways are clear from equipment besides carts.    - Isolation precautions followed, supplies available outside room, sign posted     Higinio Weiss RN

## 2017-08-25 ENCOUNTER — APPOINTMENT (OUTPATIENT)
Dept: GENERAL RADIOLOGY | Age: 62
DRG: 194 | End: 2017-08-25
Attending: FAMILY MEDICINE
Payer: COMMERCIAL

## 2017-08-25 LAB
ANION GAP SERPL CALC-SCNC: 5 MMOL/L (ref 3–18)
BUN SERPL-MCNC: 10 MG/DL (ref 7–18)
BUN/CREAT SERPL: 14 (ref 12–20)
CALCIUM SERPL-MCNC: 8.5 MG/DL (ref 8.5–10.1)
CHLORIDE SERPL-SCNC: 104 MMOL/L (ref 100–108)
CO2 SERPL-SCNC: 30 MMOL/L (ref 21–32)
CREAT SERPL-MCNC: 0.71 MG/DL (ref 0.6–1.3)
GLUCOSE SERPL-MCNC: 128 MG/DL (ref 74–99)
HCT VFR BLD AUTO: 29.5 % (ref 36–48)
HGB BLD-MCNC: 9.8 G/DL (ref 13–16)
POTASSIUM SERPL-SCNC: 3.7 MMOL/L (ref 3.5–5.5)
SODIUM SERPL-SCNC: 139 MMOL/L (ref 136–145)

## 2017-08-25 PROCEDURE — 97161 PT EVAL LOW COMPLEX 20 MIN: CPT

## 2017-08-25 PROCEDURE — 65270000029 HC RM PRIVATE

## 2017-08-25 PROCEDURE — 71020 XR CHEST PA LAT: CPT

## 2017-08-25 PROCEDURE — 85018 HEMOGLOBIN: CPT | Performed by: FAMILY MEDICINE

## 2017-08-25 PROCEDURE — 80048 BASIC METABOLIC PNL TOTAL CA: CPT | Performed by: FAMILY MEDICINE

## 2017-08-25 PROCEDURE — 74011250636 HC RX REV CODE- 250/636: Performed by: FAMILY MEDICINE

## 2017-08-25 PROCEDURE — 74011000258 HC RX REV CODE- 258: Performed by: FAMILY MEDICINE

## 2017-08-25 PROCEDURE — 74011250637 HC RX REV CODE- 250/637: Performed by: FAMILY MEDICINE

## 2017-08-25 PROCEDURE — 77010033678 HC OXYGEN DAILY

## 2017-08-25 PROCEDURE — 36415 COLL VENOUS BLD VENIPUNCTURE: CPT | Performed by: FAMILY MEDICINE

## 2017-08-25 RX ADMIN — ASPIRIN 81 MG 81 MG: 81 TABLET ORAL at 09:22

## 2017-08-25 RX ADMIN — FOLIC ACID 1 MG: 1 TABLET ORAL at 09:22

## 2017-08-25 RX ADMIN — PIPERACILLIN SODIUM AND TAZOBACTAM SODIUM 4.5 G: 4; .5 INJECTION, POWDER, LYOPHILIZED, FOR SOLUTION INTRAVENOUS at 13:24

## 2017-08-25 RX ADMIN — CYANOCOBALAMIN TAB 1000 MCG 1000 MCG: 1000 TAB at 09:22

## 2017-08-25 RX ADMIN — OXYCODONE HYDROCHLORIDE AND ACETAMINOPHEN 1 TABLET: 5; 325 TABLET ORAL at 09:22

## 2017-08-25 RX ADMIN — VANCOMYCIN HYDROCHLORIDE 1500 MG: 10 INJECTION, POWDER, LYOPHILIZED, FOR SOLUTION INTRAVENOUS at 03:24

## 2017-08-25 RX ADMIN — PIPERACILLIN SODIUM AND TAZOBACTAM SODIUM 4.5 G: 4; .5 INJECTION, POWDER, LYOPHILIZED, FOR SOLUTION INTRAVENOUS at 18:00

## 2017-08-25 RX ADMIN — Medication 250 MG: at 09:22

## 2017-08-25 RX ADMIN — PIPERACILLIN SODIUM AND TAZOBACTAM SODIUM 4.5 G: 4; .5 INJECTION, POWDER, LYOPHILIZED, FOR SOLUTION INTRAVENOUS at 05:45

## 2017-08-25 RX ADMIN — ENOXAPARIN SODIUM 40 MG: 40 INJECTION SUBCUTANEOUS at 12:55

## 2017-08-25 RX ADMIN — TAMSULOSIN HYDROCHLORIDE 0.4 MG: 0.4 CAPSULE ORAL at 09:22

## 2017-08-25 RX ADMIN — THERA TABS 1 TABLET: TAB at 09:22

## 2017-08-25 RX ADMIN — FUROSEMIDE 40 MG: 40 TABLET ORAL at 09:22

## 2017-08-25 RX ADMIN — STANDARDIZED SENNA CONCENTRATE AND DOCUSATE SODIUM 1 TABLET: 8.6; 5 TABLET, FILM COATED ORAL at 21:28

## 2017-08-25 RX ADMIN — FERROUS SULFATE TAB 325 MG (65 MG ELEMENTAL FE) 325 MG: 325 (65 FE) TAB at 09:16

## 2017-08-25 RX ADMIN — POLYETHYLENE GLYCOL 3350 17 G: 17 POWDER, FOR SOLUTION ORAL at 09:22

## 2017-08-25 RX ADMIN — VANCOMYCIN HYDROCHLORIDE 1500 MG: 10 INJECTION, POWDER, LYOPHILIZED, FOR SOLUTION INTRAVENOUS at 15:00

## 2017-08-25 RX ADMIN — AMLODIPINE BESYLATE 10 MG: 10 TABLET ORAL at 09:22

## 2017-08-25 NOTE — PROGRESS NOTES
Karishma Cloud Hospitalist paged on call. Discussed bed rest orders vs, PT evaluation orders. MD states he will d/c orders.   Will complete evaluation once orders have been discharged for bedrest.

## 2017-08-25 NOTE — CDMP QUERY
Please clarify if this patient is being treated/managed for:    =>Mild Malnutrition  =>Other Explanation of clinical findings  =>Unable to Determine (no explanation of clinical findings)    The medical record reflects the following:    Risk:  57 yo with pneumonia and vascular wounds    Clinical Indicators:  --RD \"Pt denies any recent weight loss. Current Appetite:   Fair       Nutrition Diagnosis: Inadequate oral intake related to increased needs of wound healing as evidenced by PO intake not meeting estimated needs. \"  RD Plan:  Start supplements: Ensure daily, Simon BID. Continue vitamin/mineral supplementation as medically appropriate  - Continue RD inpatient monitoring and evaluation. \"    Please clarify and document your clinical opinion in the progress notes and discharge summary including the definitive and/or presumptive diagnosis, (suspected or probable), related to the above clinical findings. Please include clinical findings supporting your diagnosis. If you DECLINE this query or would like to communicate with [facility], please utilize the \"[facility] message box\" at the TOP of the Progress Note on the right.       Thank you,  Juve Nunes RN BSN CCDS [phone]

## 2017-08-25 NOTE — PROGRESS NOTES
Problem: Mobility Impaired (Adult and Pediatric)  Goal: *Acute Goals and Plan of Care (Insert Text)  Physical Therapy Goals  Initiated 8/25/2017 and to be accomplished within 5 day(s)  1. Patient will move from supine to sit and sit to supine , scoot up and down and roll side to side in bed with moderate assistance . 2. Patient will transfer from bed to chair and chair to bed with max A of 2 using the least restrictive device. 3. Patient will perform sit to stand with maximal assistance of 2.  4. Patient will improved sitting static and dynamic balance to fair+good. PHYSICAL THERAPY EVALUATION     Patient: Mahi Ramos (89 y.o. male)  Date: 8/25/2017  Primary Diagnosis: Pneumonia        Precautions:   Aspiration, Contact, Fall      ASSESSMENT :  Based on the objective data described below, the patient presents with generalized weakness and overall extensive assistance is required for bed mobility. Pt. Reports that he was in a SNF for rehab prior to this due to a stroke and was working on standing and exercises. Pt. Required extensive assistance to move to EOB due to his weakness and limited ROM LUE he is unable to assist.  He has limited hip and knee flexion as well as LE weakness which further complicate his condition. Pt is willing to work with PT and has goals of walking. He reports that his PLOF was indep and that he lived at home prior to the NH. Patient will benefit from skilled intervention to address the above impairments.   Patients rehabilitation potential is considered to be Good  Factors which may influence rehabilitation potential include:   [ ]         None noted  [ ]         Mental ability/status  [X]         Medical condition  [ ]         Home/family situation and support systems  [ ]         Safety awareness  [ ]         Pain tolerance/management  [ ]         Other:        PLAN :Recommendations and Planned Interventions:  [X]           Bed Mobility Training             [ ] Neuromuscular Re-Education  [X]           Transfer Training                   [ ]    Orthotic/Prosthetic Training  [X]           Gait Training                          [ ]    Modalities  [X]           Therapeutic Exercises          [ ]    Edema Management/Control  [X]           Therapeutic Activities            [X]    Patient and Family Training/Education  [ ]           Other (comment):     Frequency/Duration: Patient will be followed by physical therapy 1-2 times per day/4-7 days per week to address goals. Discharge Recommendations: Skilled Nursing Facility  Further Equipment Recommendations for Discharge: TBD at d/c facility/snf       SUBJECTIVE:   Patient stated Vanessa Rodriguezs would like to work with PT.      OBJECTIVE DATA SUMMARY:       Past Medical History:   Diagnosis Date    Hiccups      Hypertension      Hyponatremia      Lacunar infarction ([facility] [address] 2010    Lower extremity edema      Psychogenic polydipsia      Spinal stenosis       Past Surgical History:   Procedure Laterality Date    HX HEENT         pt reports past hx of surgery on ears unsure of what type     Barriers to Learning/Limitations: None  Compensate with: visual, verbal, tactile, kinesthetic cues/model     Eval Complexity: History: MEDIUM  Complexity : 1-2 comorbidities / personal factors will impact the outcome/ POC Exam:LOW Complexity : 1-2 Standardized tests and measures addressing body structure, function, activity limitation and / or participation in recreation  Presentation: LOW Complexity : Stable, uncomplicated  Clinical Decision Making:Low Complexity based on functional testing and ADL's Overall Complexity:LOW      GCODES(GP):  Mobility  Current  CM= 80-99%   Goal  CL= 60-79%.   The severity rating is based on the Other severity rated on functional mobility and ADL score     Prior Level of Function/Home Situation:   Home Situation  Home Environment: [address] [place] [address] Name: per pt.[facility]  # Steps to Enter: 0  One/Two Story Residence: One story  Living Alone: No  Support Systems: /, Child(flako)  Patient Expects to be Discharged to[de-identified] Skilled nursing facility  Current DME Used/Available at Home: None  Critical Behavior:  Neurologic State: Alert  Orientation Level: Oriented X4  Cognition: Follows commands     Strength:    Strength: Generally decreased, functional (LUE weakness)   Tone & Sensation:   Tone: Abnormal LUE     Range Of Motion:  AROM: Generally decreased, functional (L hip and knees limited ROM; LUE limited, )   Functional Mobility:  Bed Mobility:  Rolling: Maximum assistance; Additional time;Assist x1  Supine to Sit: Maximum assistance; Additional time;Assist x1  Sit to Supine: Maximum assistance; Additional time;Assist x1  Scooting: Maximum assistance; Additional time;Assist x1  Transfers:  Sit to Stand:  (nt)     Balance:   Sitting: Impaired;High guard; With support  Sitting - Static: Prop sitting;Supported sitting  Sitting - Dynamic: Poor (constant support)     Therapeutic Exercises:   AAROM muna LE's supine and seated AA LAQ muna x 10 ea. Pt.with an onset hiccups  Pain:  Pain Scale 1: Visual  Pain Intensity 1: 0  Activity Tolerance:   fair  Please refer to the flowsheet for vital signs taken during this treatment. After treatment:   [ ]         Patient left in no apparent distress sitting up in chair  [X]         Patient left in no apparent distress in bed  [X]         Call bell left within reach  [X]         Nursing notified  [ ]         Caregiver present  [ ]         Bed alarm activated      COMMUNICATION/EDUCATION:   [X]         Fall prevention education was provided and the patient/caregiver indicated understanding. [ ]         Patient/family have participated as able in goal setting and plan of care. [X]         Patient/family agree to work toward stated goals and plan of care.   [ ]         Patient understands intent and goals of therapy, but is neutral about his/her participation. [ ]         Patient is unable to participate in goal setting and plan of care.      Thank you for this referral.  Burt Miller, PT   Time Calculation: 19 mins

## 2017-08-25 NOTE — PROGRESS NOTES
PT evaluation orders received. Pt. Has bed rest orders. Will page MD to see if bedrest orders can be dc's prior to proceeding.

## 2017-08-25 NOTE — PROGRESS NOTES
[facility]  Progress Note    Patient: Brandon Tapia Age: 58 y.o. : 1955 MR#: [id number] SSN: [id number]  Date/Time: 2017 10:23 AM    Subjective/24-hour events:     Continues to improve. No new complaints, no new issues overnight. Assessment:   HCAP, RLL  Large R effusion, likely parapneumonic  HTN  Iron deficiency anemia  Nasal MRSA colonization  Recent R foot wound    Plan:  CXR from this AM shows worsening effusion - will order diagnostic tap. Continue antibiotic therapy as ordered. Follow H/H. Wound care as ordered. PT/OT - mobilize as tolerated. Case discussed with:  [x]Patient  []Family  [x]Nursing  []Case Management  DVT Prophylaxis:  [x]Lovenox  []Hep SQ  []SCDs  []Coumadin   []On Heparin gtt    Objective:   VS:   Visit Vitals    BP (!) 147/95 (BP 1 Location: Right arm)    Pulse 84    Temp 98.6 °F (37 °C)    Resp 18    Ht 5' 5\" (1.651 m)    Wt 65.8 kg (145 lb)    SpO2 100%    BMI 24.13 kg/m2      Tmax/24hrs: Temp (24hrs), Av.7 °F (37.1 °C), Min:97.8 °F (36.6 °C), Max:99.1 °F (37.3 °C)      Intake/Output Summary (Last 24 hours) at 17 1023  Last data filed at 17 0854   Gross per 24 hour   Intake             2980 ml   Output             2100 ml   Net              880 ml       General:  In NAD. Nontoxic-appearing. Cardiovascular:  RRR. Pulmonary:  No wheezes, effort nonlabored. GI:  Abdomen soft, NTTP. Extremities:  No ischemia. Additional:  Awake and alert.     Labs:    Recent Results (from the past 24 hour(s))   VANCOMYCIN, TROUGH    Collection Time: 17 10:50 AM   Result Value Ref Range    Vancomycin,trough 18.0 10.0 - 20.0 ug/mL    Reported dose date: 2017      Reported dose time: 1200      Reported dose: 1500 MG UNITS   HGB & HCT    Collection Time: 17  3:20 AM   Result Value Ref Range    HGB 9.8 (L) 13.0 - 16.0 g/dL    HCT 29.5 (L) 36.0 - 48.0 %       Signed By: Zaheer Finch MD     , 2017 10:23 AM

## 2017-08-26 LAB
DATE LAST DOSE: NORMAL
REPORTED DOSE,DOSE: NORMAL UNITS
REPORTED DOSE/TIME,TMG: 0
VANCOMYCIN TROUGH SERPL-MCNC: 17.7 UG/ML (ref 10–20)

## 2017-08-26 PROCEDURE — 65270000029 HC RM PRIVATE

## 2017-08-26 PROCEDURE — 74011250636 HC RX REV CODE- 250/636: Performed by: FAMILY MEDICINE

## 2017-08-26 PROCEDURE — 77010033678 HC OXYGEN DAILY

## 2017-08-26 PROCEDURE — 80202 ASSAY OF VANCOMYCIN: CPT | Performed by: FAMILY MEDICINE

## 2017-08-26 PROCEDURE — 74011250637 HC RX REV CODE- 250/637: Performed by: FAMILY MEDICINE

## 2017-08-26 PROCEDURE — 74011000258 HC RX REV CODE- 258: Performed by: FAMILY MEDICINE

## 2017-08-26 PROCEDURE — 36415 COLL VENOUS BLD VENIPUNCTURE: CPT | Performed by: FAMILY MEDICINE

## 2017-08-26 RX ADMIN — PIPERACILLIN SODIUM AND TAZOBACTAM SODIUM 4.5 G: 4; .5 INJECTION, POWDER, LYOPHILIZED, FOR SOLUTION INTRAVENOUS at 00:30

## 2017-08-26 RX ADMIN — PIPERACILLIN SODIUM AND TAZOBACTAM SODIUM 4.5 G: 4; .5 INJECTION, POWDER, LYOPHILIZED, FOR SOLUTION INTRAVENOUS at 06:07

## 2017-08-26 RX ADMIN — AMLODIPINE BESYLATE 10 MG: 10 TABLET ORAL at 08:36

## 2017-08-26 RX ADMIN — VANCOMYCIN HYDROCHLORIDE 1500 MG: 10 INJECTION, POWDER, LYOPHILIZED, FOR SOLUTION INTRAVENOUS at 13:25

## 2017-08-26 RX ADMIN — VANCOMYCIN HYDROCHLORIDE 1500 MG: 10 INJECTION, POWDER, LYOPHILIZED, FOR SOLUTION INTRAVENOUS at 00:35

## 2017-08-26 RX ADMIN — TAMSULOSIN HYDROCHLORIDE 0.4 MG: 0.4 CAPSULE ORAL at 08:36

## 2017-08-26 RX ADMIN — POLYETHYLENE GLYCOL 3350 17 G: 17 POWDER, FOR SOLUTION ORAL at 08:36

## 2017-08-26 RX ADMIN — THERA TABS 1 TABLET: TAB at 08:36

## 2017-08-26 RX ADMIN — FUROSEMIDE 40 MG: 40 TABLET ORAL at 08:36

## 2017-08-26 RX ADMIN — FOLIC ACID 1 MG: 1 TABLET ORAL at 08:36

## 2017-08-26 RX ADMIN — STANDARDIZED SENNA CONCENTRATE AND DOCUSATE SODIUM 1 TABLET: 8.6; 5 TABLET, FILM COATED ORAL at 21:28

## 2017-08-26 RX ADMIN — CYANOCOBALAMIN TAB 1000 MCG 1000 MCG: 1000 TAB at 08:36

## 2017-08-26 RX ADMIN — PIPERACILLIN SODIUM AND TAZOBACTAM SODIUM 4.5 G: 4; .5 INJECTION, POWDER, LYOPHILIZED, FOR SOLUTION INTRAVENOUS at 18:19

## 2017-08-26 RX ADMIN — ENOXAPARIN SODIUM 40 MG: 40 INJECTION SUBCUTANEOUS at 12:44

## 2017-08-26 RX ADMIN — PIPERACILLIN SODIUM AND TAZOBACTAM SODIUM 4.5 G: 4; .5 INJECTION, POWDER, LYOPHILIZED, FOR SOLUTION INTRAVENOUS at 12:50

## 2017-08-26 RX ADMIN — FERROUS SULFATE TAB 325 MG (65 MG ELEMENTAL FE) 325 MG: 325 (65 FE) TAB at 08:36

## 2017-08-26 RX ADMIN — Medication 250 MG: at 08:36

## 2017-08-26 RX ADMIN — ASPIRIN 81 MG 81 MG: 81 TABLET ORAL at 08:36

## 2017-08-26 NOTE — PROGRESS NOTES
[facility]  Progress Note    Patient: Dunia Koch Age: 58 y.o. : 1955 MR#: [id number] SSN: [id number]  Date/Time: 2017 12:11 PM    Subjective/24-hour events:     Clinically stable. Remains afebrile, no worsening SOB. Assessment:   HCAP, RLL  Large R effusion, likely parapneumonic  HTN  Iron deficiency anemia  Nasal MRSA colonization  Recent R foot wound    Plan:  Diagnostic thoracentesis ordered yesterday - pending. Antibiotic therapy to continue in interim. Wound care as ordered. PT/OT - mobilize as tolerated. Case discussed with:  [x]Patient  []Family  [x]Nursing  []Case Management  DVT Prophylaxis:  [x]Lovenox  []Hep SQ  []SCDs  []Coumadin   []On Heparin gtt    Objective:   VS:   Visit Vitals    /86 (BP 1 Location: Left arm, BP Patient Position: At rest)    Pulse 85    Temp 98.5 °F (36.9 °C)    Resp 18    Ht 5' 5\" (1.651 m)    Wt 65.3 kg (144 lb)    SpO2 95%    BMI 23.96 kg/m2      Tmax/24hrs: Temp (24hrs), Av °F (36.7 °C), Min:97.5 °F (36.4 °C), Max:98.7 °F (37.1 °C)      Intake/Output Summary (Last 24 hours) at 17 1212  Last data filed at 17 0607   Gross per 24 hour   Intake             1840 ml   Output             1600 ml   Net              240 ml       General:  In NAD. Nontoxic-appearing. Cardiovascular:  RRR. Pulmonary:  No wheezes, effort nonlabored. GI:  Abdomen soft, NTTP. Extremities:  No ischemia. Additional:  Awake and alert. Labs:    No results found for this or any previous visit (from the past 24 hour(s)).     Signed By: Justine Simmons MD     2017 12:11 PM

## 2017-08-26 NOTE — ROUTINE PROCESS
Received patient sitting up in bed awake alert and orient x 4. Patient denies pain and shortness of breath. Instructed to call for assistance. Call light in reach.

## 2017-08-27 ENCOUNTER — APPOINTMENT (OUTPATIENT)
Dept: GENERAL RADIOLOGY | Age: 62
DRG: 194 | End: 2017-08-27
Attending: RADIOLOGY
Payer: COMMERCIAL

## 2017-08-27 ENCOUNTER — APPOINTMENT (OUTPATIENT)
Dept: GENERAL RADIOLOGY | Age: 62
DRG: 194 | End: 2017-08-27
Attending: FAMILY MEDICINE
Payer: COMMERCIAL

## 2017-08-27 ENCOUNTER — APPOINTMENT (OUTPATIENT)
Dept: ULTRASOUND IMAGING | Age: 62
DRG: 194 | End: 2017-08-27
Attending: FAMILY MEDICINE
Payer: COMMERCIAL

## 2017-08-27 LAB
APPEARANCE FLD: ABNORMAL
BODY FLD TYPE: NORMAL
COLOR FLD: ABNORMAL
EOSINOPHIL NFR FLD MANUAL: 11 %
GLUCOSE FLD-MCNC: 88 MG/DL
LDH FLD L TO P-CCNC: 344 U/L
LYMPHOCYTES NFR FLD: 7 %
MACROPHAGES NFR FLD: 7 %
MONOCYTES NFR FLD: 0 %
NEUTROPHILS NFR FLD: 75 %
NEUTS BAND # FLD: 0 %
NUC CELL # FLD: 9688 /CU MM
PH FLD: 7.7 [PH]
PROT FLD-MCNC: 4.8 G/DL
RBC # FLD: ABNORMAL /CU MM
SPECIMEN SOURCE FLD: ABNORMAL
SPECIMEN SOURCE FLD: NORMAL
WBC MORPH BLD: ABNORMAL

## 2017-08-27 PROCEDURE — 83986 ASSAY PH BODY FLUID NOS: CPT | Performed by: FAMILY MEDICINE

## 2017-08-27 PROCEDURE — 0W993ZZ DRAINAGE OF RIGHT PLEURAL CAVITY, PERCUTANEOUS APPROACH: ICD-10-PCS | Performed by: RADIOLOGY

## 2017-08-27 PROCEDURE — 74011250636 HC RX REV CODE- 250/636: Performed by: FAMILY MEDICINE

## 2017-08-27 PROCEDURE — 32555 ASPIRATE PLEURA W/ IMAGING: CPT

## 2017-08-27 PROCEDURE — 84157 ASSAY OF PROTEIN OTHER: CPT | Performed by: FAMILY MEDICINE

## 2017-08-27 PROCEDURE — 88305 TISSUE EXAM BY PATHOLOGIST: CPT

## 2017-08-27 PROCEDURE — 87102 FUNGUS ISOLATION CULTURE: CPT | Performed by: FAMILY MEDICINE

## 2017-08-27 PROCEDURE — 74011000258 HC RX REV CODE- 258: Performed by: FAMILY MEDICINE

## 2017-08-27 PROCEDURE — 65270000029 HC RM PRIVATE

## 2017-08-27 PROCEDURE — 88112 CYTOPATH CELL ENHANCE TECH: CPT

## 2017-08-27 PROCEDURE — 82945 GLUCOSE OTHER FLUID: CPT | Performed by: FAMILY MEDICINE

## 2017-08-27 PROCEDURE — 77030011256 HC DRSG MEPILEX <16IN NO BORD MOLN -A

## 2017-08-27 PROCEDURE — C1729 CATH, DRAINAGE: HCPCS

## 2017-08-27 PROCEDURE — 74011250637 HC RX REV CODE- 250/637: Performed by: FAMILY MEDICINE

## 2017-08-27 PROCEDURE — 87070 CULTURE OTHR SPECIMN AEROBIC: CPT | Performed by: FAMILY MEDICINE

## 2017-08-27 PROCEDURE — 89051 BODY FLUID CELL COUNT: CPT | Performed by: FAMILY MEDICINE

## 2017-08-27 PROCEDURE — 87116 MYCOBACTERIA CULTURE: CPT | Performed by: FAMILY MEDICINE

## 2017-08-27 PROCEDURE — 83615 LACTATE (LD) (LDH) ENZYME: CPT | Performed by: FAMILY MEDICINE

## 2017-08-27 PROCEDURE — 71010 XR CHEST SNGL V INSPIR: CPT

## 2017-08-27 PROCEDURE — 71010 XR CHEST PORT: CPT

## 2017-08-27 PROCEDURE — 77030010545

## 2017-08-27 RX ADMIN — FERROUS SULFATE TAB 325 MG (65 MG ELEMENTAL FE) 325 MG: 325 (65 FE) TAB at 08:49

## 2017-08-27 RX ADMIN — Medication 250 MG: at 08:49

## 2017-08-27 RX ADMIN — AMLODIPINE BESYLATE 10 MG: 10 TABLET ORAL at 08:49

## 2017-08-27 RX ADMIN — TAMSULOSIN HYDROCHLORIDE 0.4 MG: 0.4 CAPSULE ORAL at 08:49

## 2017-08-27 RX ADMIN — PIPERACILLIN SODIUM AND TAZOBACTAM SODIUM 4.5 G: 4; .5 INJECTION, POWDER, LYOPHILIZED, FOR SOLUTION INTRAVENOUS at 12:01

## 2017-08-27 RX ADMIN — POLYETHYLENE GLYCOL 3350 17 G: 17 POWDER, FOR SOLUTION ORAL at 08:49

## 2017-08-27 RX ADMIN — VANCOMYCIN HYDROCHLORIDE 1500 MG: 10 INJECTION, POWDER, LYOPHILIZED, FOR SOLUTION INTRAVENOUS at 12:12

## 2017-08-27 RX ADMIN — THERA TABS 1 TABLET: TAB at 08:49

## 2017-08-27 RX ADMIN — PIPERACILLIN SODIUM AND TAZOBACTAM SODIUM 4.5 G: 4; .5 INJECTION, POWDER, LYOPHILIZED, FOR SOLUTION INTRAVENOUS at 17:50

## 2017-08-27 RX ADMIN — ENOXAPARIN SODIUM 40 MG: 40 INJECTION SUBCUTANEOUS at 12:06

## 2017-08-27 RX ADMIN — FOLIC ACID 1 MG: 1 TABLET ORAL at 08:49

## 2017-08-27 RX ADMIN — STANDARDIZED SENNA CONCENTRATE AND DOCUSATE SODIUM 1 TABLET: 8.6; 5 TABLET, FILM COATED ORAL at 22:50

## 2017-08-27 RX ADMIN — CYANOCOBALAMIN TAB 1000 MCG 1000 MCG: 1000 TAB at 08:49

## 2017-08-27 RX ADMIN — PIPERACILLIN SODIUM AND TAZOBACTAM SODIUM 4.5 G: 4; .5 INJECTION, POWDER, LYOPHILIZED, FOR SOLUTION INTRAVENOUS at 00:07

## 2017-08-27 RX ADMIN — ASPIRIN 81 MG 81 MG: 81 TABLET ORAL at 08:49

## 2017-08-27 RX ADMIN — VANCOMYCIN HYDROCHLORIDE 1500 MG: 10 INJECTION, POWDER, LYOPHILIZED, FOR SOLUTION INTRAVENOUS at 01:17

## 2017-08-27 RX ADMIN — FUROSEMIDE 40 MG: 40 TABLET ORAL at 08:49

## 2017-08-27 RX ADMIN — PIPERACILLIN SODIUM AND TAZOBACTAM SODIUM 4.5 G: 4; .5 INJECTION, POWDER, LYOPHILIZED, FOR SOLUTION INTRAVENOUS at 06:18

## 2017-08-27 NOTE — ROUTINE PROCESS
Bedside and Verbal shift change report given to Monisha RIVERO (oncoming nurse) by Nolvia Smith RN (offgoing nurse). Report included the following information SBAR, Kardex, MAR and Recent Results.     SITUATION:    Code Status: Full Code   Reason for Admission: Pneumonia    [facility] day: 4   Problem List:       Hospital Problems  Date Reviewed: 7/11/2017          Codes Class Noted POA    Pneumonia ICD-10-CM: J18.9  ICD-9-CM: 256  8/22/2017 Unknown              BACKGROUND:    Past Medical History:   Past Medical History:   Diagnosis Date    Hiccups     Hypertension     Hyponatremia     Lacunar infarction ([facility] [address] 2010    Lower extremity edema     Psychogenic polydipsia     Spinal stenosis          Patient taking anticoagulants yes     ASSESSMENT:    Changes in Assessment Throughout Shift: none     Patient has Central Line: no Reasons if yes: n/a   Patient has Reid Cath: no Reasons if yes: n/a      Last Vitals:     Vitals:    08/26/17 1008 08/26/17 1312 08/26/17 1522 08/26/17 1922   BP: 152/86 153/90 145/88 148/87   Pulse: 85 82 89 86   Resp: 18 18 18 19   Temp: 98.5 °F (36.9 °C) 99 °F (37.2 °C) 99.3 °F (37.4 °C) 99.3 °F (37.4 °C)   SpO2: 95% 98% 95% 99%   Weight:       Height:            IV and DRAINS (will only show if present)   [REMOVED] Peripheral IV 08/22/17 Right Hand-Site Assessment: Clean, dry, & intact  Peripheral IV 08/22/17 Left Hand-Site Assessment: Clean, dry, & intact     WOUND (if present)   Wound Type:  Vascular wound   Dressing present Dressing Present : No   Wound Concerns/Notes:  Yes,  Wound  consult in place     PAIN    Pain Assessment    Pain Intensity 1: 0 (08/26/17 2007)    Pain Location 1: Abdomen    Pain Intervention(s) 1: Medication (see MAR)    Patient Stated Pain Goal: 0  o Interventions for Pain:  percocet  o Intervention effective: yes  o Time of last intervention: 2014   o Reassessment Completed: yes      Last 3 Weights:  Last 3 Recorded Weights in this Encounter 08/22/17 0308 08/22/17 1153 08/26/17 0414   Weight: 99.3 kg (219 lb) 65.8 kg (145 lb) 65.3 kg (144 lb)     Weight change:      INTAKE/OUPUT    Current Shift:      Last three shifts: 08/25 0701 - 08/26 1900  In: 3829 [P.O.:1140; I.V.:1800]  Out: 4050 [Urine:4050]     LAB RESULTS     Recent Labs      08/25/17   0320  08/24/17   0314   WBC   --   6.6   HGB  9.8*  8.8*   HCT  29.5*  26.4*   PLT   --   296        Recent Labs      08/25/17   1010  08/24/17   0314   NA  139  136   K  3.7  3.7   GLU  128*  114*   BUN  10  9   CREA  0.71  0.69   CA  8.5  8.4*       RECOMMENDATIONS AND DISCHARGE PLANNING     1. Pending tests/procedures/ Plan of Care or Other Needs: antibiotics    2. Discharge plan for patient and Needs/Barriers: home    3. Estimated Discharge Date: tbd Posted on Whiteboard in [facility]: yes      4. The patient's care plan was reviewed with the oncoming nurse. \"HEALS\" SAFETY CHECK      Fall Risk    Total Score: 4    Safety Measures: Safety Measures: Bed/Chair-Wheels locked, Bed in low position, Call light within reach    A safety check occurred in the patient's room between off going nurse and oncoming nurse listed above. The safety check included the below items  Area Items   H  High Alert Medications - Verify all high alert medication drips (heparin, PCA, etc.)   E  Equipment - Suction is set up for ALL patients (with yanker)  - Red plugs utilized for all equipment (IV pumps, etc.)  - WOWs wiped down at end of shift.  - Room stocked with oxygen, suction, and other unit-specific supplies   A  Alarms - Bed alarm is set for fall risk patients  - Ensure chair alarm is in place and activated if patient is up in a chair   L  Lines - Check IV for any infiltration  - Reid bag is empty if patient has a Reid   - Tubing and IV bags are labeled   S  Safety   - Room is clean, patient is clean, and equipment is clean. - Hallways are clear from equipment besides carts.    - Fall bracelet on for fall risk patients  - Ensure room is clear and free of clutter  - Suction is set up for ALL patients (with erika)  - Hallways are clear from equipment besides carts.    - Isolation precautions followed, supplies available outside room, sign posted     Laurie Patiño RN

## 2017-08-27 NOTE — ROUTINE PROCESS
Bedside and Verbal shift change report given to Diya James RN (oncoming nurse) by Jeanne Dallas RN (offgoing nurse). Report included the following information SBAR, Kardex, MAR and Recent Results.     SITUATION:    Code Status: Full Code   Reason for Admission: Pneumonia    [facility] day: 5   Problem List:       Hospital Problems  Date Reviewed: 7/11/2017          Codes Class Noted POA    Pneumonia ICD-10-CM: J18.9  ICD-9-CM: 748  8/22/2017 Unknown              BACKGROUND:    Past Medical History:   Past Medical History:   Diagnosis Date    Hiccups     Hypertension     Hyponatremia     Lacunar infarction ([address] 2010    Lower extremity edema     Psychogenic polydipsia     Spinal stenosis          Patient taking anticoagulants yes     ASSESSMENT:    Changes in Assessment Throughout Shift: none     Patient has Central Line: no Reasons if yes: n/a   Patient has Reid Cath: no Reasons if yes: n/a      Last Vitals:     Vitals:    08/26/17 1312 08/26/17 1522 08/26/17 1922 08/27/17 0000   BP: 153/90 145/88 148/87 143/81   Pulse: 82 89 86 76   Resp: 18 18 19 18   Temp: 99 °F (37.2 °C) 99.3 °F (37.4 °C) 99.3 °F (37.4 °C) 98.8 °F (37.1 °C)   SpO2: 98% 95% 99% 98%   Weight:       Height:            IV and DRAINS (will only show if present)   Peripheral IV 08/26/17 Right Wrist-Site Assessment: Clean, dry, & intact  [REMOVED] Peripheral IV 08/22/17 Right Hand-Site Assessment: Clean, dry, & intact  [REMOVED] Peripheral IV 08/22/17 Left Hand-Site Assessment: Clean, dry, & intact     WOUND (if present)   Wound Type:  L leg ulcer   Dressing present Dressing Present : No   Wound Concerns/Notes:  none     PAIN    Pain Assessment    Pain Intensity 1: 0 (08/26/17 2007)    Pain Location 1: Abdomen    Pain Intervention(s) 1: Medication (see MAR)    Patient Stated Pain Goal: 0  o Interventions for Pain:  none  o Intervention effective: no c/o pain  o Time of last intervention: n/a   o Reassessment Completed: yes  Last 3 Weights:  Last 3 Recorded Weights in this Encounter    08/22/17 0308 08/22/17 1153 08/26/17 0414   Weight: 99.3 kg (219 lb) 65.8 kg (145 lb) 65.3 kg (144 lb)     Weight change:      INTAKE/OUPUT    Current Shift:      Last three shifts: 08/25 0701 - 08/26 1900  In: 0402 [P.O.:1140; I.V.:1800]  Out: 4050 [Urine:4050]     LAB RESULTS     Recent Labs      08/25/17   0320   HGB  9.8*   HCT  29.5*        Recent Labs      08/25/17   1010   NA  139   K  3.7   GLU  128*   BUN  10   CREA  0.71   CA  8.5       RECOMMENDATIONS AND DISCHARGE PLANNING     1. Pending tests/procedures/ Plan of Care or Other Needs: us thoracentesis    2. Discharge plan for patient and Needs/Barriers: home    3. Estimated Discharge Date: tbd Posted on Whiteboard in [facility]: yes      4. The patient's care plan was reviewed with the oncoming nurse. \"HEALS\" SAFETY CHECK      Fall Risk    Total Score: 4    Safety Measures: Safety Measures: Bed/Chair-Wheels locked, Bed in low position, Call light within reach    A safety check occurred in the patient's room between off going nurse and oncoming nurse listed above. The safety check included the below items  Area Items   H  High Alert Medications - Verify all high alert medication drips (heparin, PCA, etc.)   E  Equipment - Suction is set up for ALL patients (with yanker)  - Red plugs utilized for all equipment (IV pumps, etc.)  - WOWs wiped down at end of shift.  - Room stocked with oxygen, suction, and other unit-specific supplies   A  Alarms - Bed alarm is set for fall risk patients  - Ensure chair alarm is in place and activated if patient is up in a chair   L  Lines - Check IV for any infiltration  - Reid bag is empty if patient has a Reid   - Tubing and IV bags are labeled   S  Safety   - Room is clean, patient is clean, and equipment is clean. - Hallways are clear from equipment besides carts.    - Fall bracelet on for fall risk patients  - Ensure room is clear and free of clutter  - Suction is set up for ALL patients (with erika)  - Hallways are clear from equipment besides carts.    - Isolation precautions followed, supplies available outside room, sign posted     Charlene Perez RN

## 2017-08-27 NOTE — PROGRESS NOTES
Problem: Falls - Risk of  Goal: *Absence of Falls  Document Abena Fall Risk and appropriate interventions in the flowsheet.    Outcome: Progressing Towards Goal  Fall Risk Interventions:  Mobility Interventions: Bed/chair exit alarm     Mentation Interventions: Bed/chair exit alarm     Medication Interventions: Bed/chair exit alarm     Elimination Interventions: Call light in reach     History of Falls Interventions: Door open when patient unattended

## 2017-08-27 NOTE — PROCEDURES
RADIOLOGY POST PROCEDURE NOTE     August 27, 2017       9:34 AM     Preoperative Diagnosis:   Right pleural effusion    Postoperative Diagnosis:  Same. :  rachel    Assistant:  None. Type of Anesthesia: 1% plain lidocaine    Procedure/Description:  Right US guided thoracentesis    Findings:   Clear fluid removed and sent to lab. Estimated blood Loss:  Minimal    Specimen Removed:   yes    Blood transfusions:  None. Implants:  None.     Complications: None    Condition: Stable    Discharge Plan:  continue present therapy    Gulshan Tavarez MD

## 2017-08-27 NOTE — PROGRESS NOTES
[facility] Hospitalist Group  Progress Note    Patient: Jose David Azar Age: 58 y.o. : 1955 MR#: [id number] SSN: [id number]  Date/Time: 2017 2:17 PM    Subjective/24-hour events:     Thoracentesis done this AM.  Called re: concern for possible small apical pneumothorax post-procedure. No chest pain or SOB acutely. Assessment:   HCAP, RLL  Large R effusion, likely parapneumonic s/p thoracentesis  R pneumothorax ruled out  HTN  Iron deficiency anemia  Nasal MRSA colonization  Recent R foot wound    Plan:  Await pleural fluid studies. No evidence for pneumothorax on repeat CXR - suspect artifact. Monitor clinically - no further w/u. Antibiotic therapy to continue as ordered in interim. Wound care as ordered. Follow labs. PT/OT - mobilize as tolerated. Case discussed with:  [x]Patient  []Family  [x]Nursing  []Case Management  DVT Prophylaxis:  [x]Lovenox  []Hep SQ  []SCDs  []Coumadin   []On Heparin gtt    Objective:   VS:   Visit Vitals    /79 (BP 1 Location: Left arm, BP Patient Position: Head of bed elevated (Comment degrees))    Pulse 80    Temp 98.2 °F (36.8 °C)    Resp 18    Ht 5' 5\" (1.651 m)    Wt 65.3 kg (144 lb)    SpO2 100%    BMI 23.96 kg/m2      Tmax/24hrs: Temp (24hrs), Av.6 °F (37 °C), Min:97.9 °F (36.6 °C), Max:99.3 °F (37.4 °C)      Intake/Output Summary (Last 24 hours) at 17 1417  Last data filed at 17 1723   Gross per 24 hour   Intake              120 ml   Output              500 ml   Net             -380 ml       General:  In NAD. Nontoxic-appearing. Cardiovascular:  RRR. Pulmonary:  No wheezes, effort nonlabored. GI:  Abdomen soft, NTTP. Extremities:  No ischemia. Additional:  Awake and alert.     Labs:    Recent Results (from the past 24 hour(s))   CULTURE, BODY FLUID W GRAM STAIN    Collection Time: 17  9:00 AM   Result Value Ref Range    Special Requests: NO SPECIAL REQUESTS      GRAM STAIN FEW WBC'S GRAM STAIN NO ORGANISMS SEEN      Culture result: PENDING    CULTURE, FUNGUS    Collection Time: 08/27/17  9:00 AM   Result Value Ref Range    Special Requests: NO SPECIAL REQUESTS      FUNGUS SMEAR NO FUNGAL ELEMENTS SEEN      Culture result: PENDING    CELL COUNT AND DIFF, BODY FLUID    Collection Time: 08/27/17  9:55 AM   Result Value Ref Range    BODY FLUID TYPE ASCITIC FLUID       FLUID COLOR ORANGE      FLUID APPEARANCE CLOUDY      FLUID RBC COUNT 63824 (A) NRRE /cu mm    FLUID NUCLEATED CELLS 9688 (A) NRRE /cu mm    FLD NEUTROPHILS 75 %    FLD BANDS 0 %    FLD LYMPHS 7 %    FLD MONOCYTES 0 %    FLD EOSINS 11 %    MACROPHAGE 7 %    WBC COMMENTS MODERATE MESOTHELIAL CELLS SEEN    PROTEIN TOTAL, FLUID    Collection Time: 08/27/17  9:56 AM   Result Value Ref Range    Fluid Type: PLEURAL FLUID      Protein total, body fld. 4.8 g/dL   GLUCOSE, FLUID    Collection Time: 08/27/17  9:56 AM   Result Value Ref Range    Fluid Type: PLEURAL FLUID      Glucose, body fld.  88 MG/DL       Signed By: Lashawn Elizabeth MD     August 27, 2017 2:17 PM

## 2017-08-27 NOTE — ROUTINE PROCESS
Bedside and Verbal shift change report given to JOSEFA Bearden (oncoming nurse) by Crescencio Adkins RN (offgoing nurse). Report included the following information SBAR, Kardex, MAR and Recent Results.     SITUATION:    Code Status: Full Code   Reason for Admission: Pneumonia    [facility] day: 5   Problem List:       Hospital Problems  Date Reviewed: 7/11/2017          Codes Class Noted POA    Pneumonia ICD-10-CM: J18.9  ICD-9-CM: 813  8/22/2017 Unknown              BACKGROUND:    Past Medical History:   Past Medical History:   Diagnosis Date    Hiccups     Hypertension     Hyponatremia     Lacunar infarction ([address] 2010    Lower extremity edema     Psychogenic polydipsia     Spinal stenosis          Patient taking anticoagulants yes     ASSESSMENT:    Changes in Assessment Throughout Shift: none     Patient has Central Line: no Reasons if yes: n/a   Patient has Reid Cath: no Reasons if yes: n/a      Last Vitals:     Vitals:    08/27/17 0527 08/27/17 0835 08/27/17 1222 08/27/17 1600   BP: 156/90 (!) 165/92 137/79 135/78   Pulse: 79 79 80 75   Resp: 18 18 18 18   Temp: 98.2 °F (36.8 °C) 97.9 °F (36.6 °C) 98.2 °F (36.8 °C) 98.5 °F (36.9 °C)   SpO2: 97% 98% 100% 100%   Weight:       Height:            IV and DRAINS (will only show if present)   Peripheral IV 08/26/17 Right Wrist-Site Assessment: Clean, dry, & intact  [REMOVED] Peripheral IV 08/22/17 Right Hand-Site Assessment: Clean, dry, & intact  [REMOVED] Peripheral IV 08/22/17 Left Hand-Site Assessment: Clean, dry, & intact     WOUND (if present)   Wound Type:  L leg ulcer   Dressing present Dressing Present : No   Wound Concerns/Notes:  none     PAIN    Pain Assessment    Pain Intensity 1: 0 (08/27/17 1600)    Pain Location 1: Abdomen    Pain Intervention(s) 1: Medication (see MAR)    Patient Stated Pain Goal: 0  o Interventions for Pain:  none  o Intervention effective: no c/o pain  o Time of last intervention: n/a   o Reassessment Completed: yes  Last 3 Weights:  Last 3 Recorded Weights in this Encounter    08/22/17 0308 08/22/17 1153 08/26/17 0414   Weight: 99.3 kg (219 lb) 65.8 kg (145 lb) 65.3 kg (144 lb)     Weight change:      INTAKE/OUPUT    Current Shift:      Last three shifts: 08/25 1901 - 08/27 0700  In: 5412 [P.O.:480; I.V.:1100]  Out: 1500 [Urine:1500]     LAB RESULTS     Recent Labs      08/25/17   0320   HGB  9.8*   HCT  29.5*        Recent Labs      08/25/17   1010   NA  139   K  3.7   GLU  128*   BUN  10   CREA  0.71   CA  8.5       RECOMMENDATIONS AND DISCHARGE PLANNING     1. Pending tests/procedures/ Plan of Care or Other Needs: none    2. Discharge plan for patient and Needs/Barriers: home    3. Estimated Discharge Date: tbd Posted on Whiteboard in [facility]: yes      4. The patient's care plan was reviewed with the oncoming nurse. \"HEALS\" SAFETY CHECK      Fall Risk    Total Score: 4    Safety Measures: Safety Measures: Bed/Chair-Wheels locked, Bed in low position, Call light within reach    A safety check occurred in the patient's room between off going nurse and oncoming nurse listed above. The safety check included the below items  Area Items   H  High Alert Medications - Verify all high alert medication drips (heparin, PCA, etc.)   E  Equipment - Suction is set up for ALL patients (with yanker)  - Red plugs utilized for all equipment (IV pumps, etc.)  - WOWs wiped down at end of shift.  - Room stocked with oxygen, suction, and other unit-specific supplies   A  Alarms - Bed alarm is set for fall risk patients  - Ensure chair alarm is in place and activated if patient is up in a chair   L  Lines - Check IV for any infiltration  - Reid bag is empty if patient has a Reid   - Tubing and IV bags are labeled   S  Safety   - Room is clean, patient is clean, and equipment is clean. - Hallways are clear from equipment besides carts.    - Fall bracelet on for fall risk patients  - Ensure room is clear and free of clutter  - Suction is set up for ALL patients (with erika)  - Hallways are clear from equipment besides carts.    - Isolation precautions followed, supplies available outside room, sign posted     Kim Cuadra, JOSEFA

## 2017-08-28 LAB
BACTERIA SPEC CULT: NORMAL
BACTERIA SPEC CULT: NORMAL
ERYTHROCYTE [DISTWIDTH] IN BLOOD BY AUTOMATED COUNT: 14.4 % (ref 11.6–14.5)
HCT VFR BLD AUTO: 28.3 % (ref 36–48)
HGB BLD-MCNC: 9.4 G/DL (ref 13–16)
MCH RBC QN AUTO: 26.4 PG (ref 24–34)
MCHC RBC AUTO-ENTMCNC: 33.2 G/DL (ref 31–37)
MCV RBC AUTO: 79.5 FL (ref 74–97)
PLATELET # BLD AUTO: 464 K/UL (ref 135–420)
PMV BLD AUTO: 9.4 FL (ref 9.2–11.8)
RBC # BLD AUTO: 3.56 M/UL (ref 4.7–5.5)
SERVICE CMNT-IMP: NORMAL
SERVICE CMNT-IMP: NORMAL
WBC # BLD AUTO: 8.3 K/UL (ref 4.6–13.2)

## 2017-08-28 PROCEDURE — 65270000029 HC RM PRIVATE

## 2017-08-28 PROCEDURE — 74011250636 HC RX REV CODE- 250/636: Performed by: FAMILY MEDICINE

## 2017-08-28 PROCEDURE — 85027 COMPLETE CBC AUTOMATED: CPT | Performed by: FAMILY MEDICINE

## 2017-08-28 PROCEDURE — 77030027138 HC INCENT SPIROMETER -A

## 2017-08-28 PROCEDURE — 74011000258 HC RX REV CODE- 258: Performed by: FAMILY MEDICINE

## 2017-08-28 PROCEDURE — 36415 COLL VENOUS BLD VENIPUNCTURE: CPT | Performed by: FAMILY MEDICINE

## 2017-08-28 PROCEDURE — 74011250637 HC RX REV CODE- 250/637: Performed by: FAMILY MEDICINE

## 2017-08-28 PROCEDURE — 74011250637 HC RX REV CODE- 250/637: Performed by: INTERNAL MEDICINE

## 2017-08-28 PROCEDURE — 97165 OT EVAL LOW COMPLEX 30 MIN: CPT

## 2017-08-28 PROCEDURE — 94760 N-INVAS EAR/PLS OXIMETRY 1: CPT

## 2017-08-28 RX ADMIN — FERROUS SULFATE TAB 325 MG (65 MG ELEMENTAL FE) 325 MG: 325 (65 FE) TAB at 08:34

## 2017-08-28 RX ADMIN — ASPIRIN 81 MG 81 MG: 81 TABLET ORAL at 08:34

## 2017-08-28 RX ADMIN — AMLODIPINE BESYLATE 10 MG: 10 TABLET ORAL at 08:34

## 2017-08-28 RX ADMIN — THERA TABS 1 TABLET: TAB at 08:34

## 2017-08-28 RX ADMIN — FUROSEMIDE 40 MG: 40 TABLET ORAL at 08:34

## 2017-08-28 RX ADMIN — PIPERACILLIN SODIUM AND TAZOBACTAM SODIUM 4.5 G: 4; .5 INJECTION, POWDER, LYOPHILIZED, FOR SOLUTION INTRAVENOUS at 13:57

## 2017-08-28 RX ADMIN — ENOXAPARIN SODIUM 40 MG: 40 INJECTION SUBCUTANEOUS at 10:04

## 2017-08-28 RX ADMIN — VANCOMYCIN HYDROCHLORIDE 1500 MG: 10 INJECTION, POWDER, LYOPHILIZED, FOR SOLUTION INTRAVENOUS at 01:27

## 2017-08-28 RX ADMIN — PIPERACILLIN SODIUM AND TAZOBACTAM SODIUM 4.5 G: 4; .5 INJECTION, POWDER, LYOPHILIZED, FOR SOLUTION INTRAVENOUS at 17:05

## 2017-08-28 RX ADMIN — VANCOMYCIN HYDROCHLORIDE 1500 MG: 10 INJECTION, POWDER, LYOPHILIZED, FOR SOLUTION INTRAVENOUS at 12:00

## 2017-08-28 RX ADMIN — Medication 250 MG: at 08:34

## 2017-08-28 RX ADMIN — PIPERACILLIN SODIUM AND TAZOBACTAM SODIUM 4.5 G: 4; .5 INJECTION, POWDER, LYOPHILIZED, FOR SOLUTION INTRAVENOUS at 06:38

## 2017-08-28 RX ADMIN — STANDARDIZED SENNA CONCENTRATE AND DOCUSATE SODIUM 1 TABLET: 8.6; 5 TABLET, FILM COATED ORAL at 22:26

## 2017-08-28 RX ADMIN — TAMSULOSIN HYDROCHLORIDE 0.4 MG: 0.4 CAPSULE ORAL at 08:34

## 2017-08-28 RX ADMIN — CYANOCOBALAMIN TAB 1000 MCG 1000 MCG: 1000 TAB at 08:34

## 2017-08-28 RX ADMIN — ZOLPIDEM TARTRATE 5 MG: 5 TABLET ORAL at 01:43

## 2017-08-28 RX ADMIN — PIPERACILLIN SODIUM AND TAZOBACTAM SODIUM 4.5 G: 4; .5 INJECTION, POWDER, LYOPHILIZED, FOR SOLUTION INTRAVENOUS at 00:28

## 2017-08-28 RX ADMIN — FOLIC ACID 1 MG: 1 TABLET ORAL at 08:34

## 2017-08-28 RX ADMIN — POLYETHYLENE GLYCOL 3350 17 G: 17 POWDER, FOR SOLUTION ORAL at 08:34

## 2017-08-28 NOTE — ROUTINE PROCESS
Bedside and Verbal shift change report given to Natasha Quispe RN (oncoming nurse) by Chantelle Alexander RN (offgoing nurse). Report included the following information SBAR, Kardex, MAR and Recent Results.     SITUATION:    Code Status: Full Code   Reason for Admission: Pneumonia    [facility] day: 6   Problem List:       Hospital Problems  Date Reviewed: 7/11/2017          Codes Class Noted POA    Pneumonia ICD-10-CM: J18.9  ICD-9-CM: 774  8/22/2017 Unknown              BACKGROUND:    Past Medical History:   Past Medical History:   Diagnosis Date    Hiccups     Hypertension     Hyponatremia     Lacunar infarction ([facility] [address] 2010    Lower extremity edema     Psychogenic polydipsia     Spinal stenosis          Patient taking anticoagulants yes     ASSESSMENT:    Changes in Assessment Throughout Shift: none     Patient has Central Line: no Reasons if yes: n/a   Patient has Reid Cath: no Reasons if yes: n/a      Last Vitals:     Vitals:    08/27/17 1932 08/28/17 0010 08/28/17 0407 08/28/17 0711   BP: 116/73 114/71 122/78 150/81   Pulse: (!) 101 (!) 103 (!) 105 71   Resp: 20 18 20 19   Temp: 97.4 °F (36.3 °C) 97.6 °F (36.4 °C) 97.7 °F (36.5 °C) 99.3 °F (37.4 °C)   SpO2: 91% 94% 95% 99%   Weight:   85.3 kg (188 lb)    Height:            IV and DRAINS (will only show if present)   Peripheral IV 08/26/17 Right Wrist-Site Assessment: Clean, dry, & intact  [REMOVED] Peripheral IV 08/22/17 Right Hand-Site Assessment: Clean, dry, & intact  [REMOVED] Peripheral IV 08/22/17 Left Hand-Site Assessment: Clean, dry, & intact     WOUND (if present)   Wound Type:  L leg ulcer   Dressing present Dressing Present : No   Wound Concerns/Notes:  none     PAIN    Pain Assessment    Pain Intensity 1: 0 (08/28/17 0359)    Pain Location 1: Abdomen    Pain Intervention(s) 1: Medication (see MAR)    Patient Stated Pain Goal: 0  o Interventions for Pain:  none  o Intervention effective: no c/o pain  o Time of last intervention: n/a o Reassessment Completed: yes      Last 3 Weights:  Last 3 Recorded Weights in this Encounter    08/22/17 1153 08/26/17 0414 08/28/17 0407   Weight: 65.8 kg (145 lb) 84.8 kg (187 lb) 85.3 kg (188 lb)     Weight change:      INTAKE/OUPUT    Current Shift:      Last three shifts:       LAB RESULTS     Recent Labs      08/28/17   0355   WBC  8.3   HGB  9.4*   HCT  28.3*   PLT  464*        Recent Labs      08/25/17   1010   NA  139   K  3.7   GLU  128*   BUN  10   CREA  0.71   CA  8.5       RECOMMENDATIONS AND DISCHARGE PLANNING     1. Pending tests/procedures/ Plan of Care or Other Needs: antibiotics    2. Discharge plan for patient and Needs/Barriers: home    3. Estimated Discharge Date: tbd Posted on Whiteboard in [facility]: yes      4. The patient's care plan was reviewed with the oncoming nurse. \"HEALS\" SAFETY CHECK      Fall Risk    Total Score: 4    Safety Measures: Safety Measures: Bed/Chair-Wheels locked, Bed in low position, Call light within reach    A safety check occurred in the patient's room between off going nurse and oncoming nurse listed above. The safety check included the below items  Area Items   H  High Alert Medications - Verify all high alert medication drips (heparin, PCA, etc.)   E  Equipment - Suction is set up for ALL patients (with yanker)  - Red plugs utilized for all equipment (IV pumps, etc.)  - WOWs wiped down at end of shift.  - Room stocked with oxygen, suction, and other unit-specific supplies   A  Alarms - Bed alarm is set for fall risk patients  - Ensure chair alarm is in place and activated if patient is up in a chair   L  Lines - Check IV for any infiltration  - Reid bag is empty if patient has a Reid   - Tubing and IV bags are labeled   S  Safety   - Room is clean, patient is clean, and equipment is clean. - Hallways are clear from equipment besides carts.    - Fall bracelet on for fall risk patients  - Ensure room is clear and free of clutter  - Suction is set up for ALL patients (with erika)  - Hallways are clear from equipment besides carts.    - Isolation precautions followed, supplies available outside room, sign posted     Jeanne Dallas RN

## 2017-08-28 NOTE — PROGRESS NOTES
Problem: Falls - Risk of  Goal: *Absence of Falls  Document Abena Fall Risk and appropriate interventions in the flowsheet.    Outcome: Progressing Towards Goal  Fall Risk Interventions:  Mobility Interventions: Bed/chair exit alarm     Mentation Interventions: Bed/chair exit alarm, Door open when patient unattended     Medication Interventions: Bed/chair exit alarm     Elimination Interventions: Call light in reach, Patient to call for help with toileting needs     History of Falls Interventions: Door open when patient unattended

## 2017-08-28 NOTE — PROGRESS NOTES
Bedside shift change report given to Elham RIVERO (oncoming nurse) by Kena Emerson RN   (offgoing nurse). Report included the following information SBAR, Kardex, Intake/Output, MAR and Accordion.

## 2017-08-28 NOTE — PROGRESS NOTES
Problem: Self Care Deficits Care Plan (Adult)  Goal: *Acute Goals and Plan of Care (Insert Text)  Occupational Therapy Goals  Initiated 8/28/2017 within 7 day(s). 1. Patient will perform upper body bathing/dressing with modified independence   2. Patient will perform grooming with modified independence while sitting on the edge of the bed and progressing to standing at the sink as he is able  3. Patient will perform lower body dressing/bathing with minimal assistance  4. Patient will perform toilet transfers with minimal assistance  OCCUPATIONAL THERAPY EVALUATION     Patient: Dunia Koch (33 y.o. male)  Date: 8/28/2017  Primary Diagnosis: Pneumonia  Precautions:  Aspiration, Contact, Fall      ASSESSMENT :  Based on the objective data described below, the patient presents with decreased strength and functional mobility secondary to his overall malaise and nausea. This is limiting his functional abilities and his functional mobility at this time. Patient will benefit from skilled intervention to address the above impairments.   Patients rehabilitation potential is considered to be Good  Factors which may influence rehabilitation potential include:   [ ]             None noted  [ ]             Mental ability/status  [X]             Medical condition  [ ]             Home/family situation and support systems  [ ]             Safety awareness  [ ]             Pain tolerance/management  [ ]             Other:        PLAN :  Recommendations and Planned Interventions:  [X]               Self Care Training                  [X]        Therapeutic Activities  [X]               Functional Mobility Training    [ ]        Cognitive Retraining  [ ]               Therapeutic Exercises           [ ]        Endurance Activities  [ ]               Balance Training                   [ ]        Neuromuscular Re-Education  [ ]               Visual/Perceptual Training     [X]   Home Safety Training  [X]               Patient Education                 [ ]        Family Training/Education  [ ]               Other (comment):     Frequency/Duration: Patient will be followed by occupational therapy 1-2 times per day/4-7 days per week to address goals. Discharge Recommendations: [facility]  Further Equipment Recommendations for Discharge: N/A       PATIENT COMPLEXITY      Eval Complexity: History: LOW Complexity : Brief history review ; Examination: LOW Complexity : 1-3 performance deficits relating to physical, cognitive , or psychosocial skils that result in activity limitations and / or participation restrictions ; Decision Making:LOW Complexity : No comorbidities that affect functional and no verbal or physical assistance needed to complete eval tasks  Assessment: LOW Complexity       G-CODES:      Self Care  Current  CM= 80-99%   Goal  CJ= 20-39%. The severity rating is based on the Level of Assistance required for Functional Mobility and ADLs. SUBJECTIVE:   Patient stated I feel awful.       OBJECTIVE DATA SUMMARY:       Past Medical History:   Diagnosis Date    Hiccups      Hypertension      Hyponatremia      Lacunar infarction ([facility] [address] 2010    Lower extremity edema      Psychogenic polydipsia      Spinal stenosis       Past Surgical History:   Procedure Laterality Date    HX HEENT         pt reports past hx of surgery on ears unsure of what type     Prior Level of Function/Home Situation: he reports he wants to be independent with everything and he lost his independence when he had a stroke 3 years ago  [address]: [address] [facility] Name: per pt.[facility]  # Steps to Enter: 0  One/Two Story Residence: One story  Living Alone: No  Support Systems: /, Child(flako)  Patient Expects to be Discharged to[de-identified] Skilled nursing facility  Current DME Used/Available at Home: None  [X]  Right hand dominant          [ ]  Left hand dominant  Cognitive/Behavioral Status:  He is alert, oriented X 3 (not to situation)  Skin: no skin integrity issues noted during OT evaluation  Edema: no edema noted in extremities during OT evaluation  Vision/Perceptual:     tracking is [facility]      Coordination:   RUE: WFL  LUE: moderately impaired (his baseline)   Balance:   he could not tolerate unsupported sitting secondary to his extreme nausea  Strength:  RUE: 4+/5  LUE: 4-/5 in available range with 3-/5 intrinsic strength   Tone & Sensation:  RUE: WFL  LUE: min increased flexor tone, moderately impaired sensation   Range of Motion:  RUE: AROM is WFL (shoulder flexion limited to 90')  LUE: AROM is 70% (shoulder flexion limited to 90', elbow flexion -30 to 90' and digit flexion hyper extension to 80% digit flexion)   Functional Mobility and Transfers for ADLs:  Bed Mobility:   total assist secondary to his nausea which limited his sidelying to sit   ADL Assessment:   self feeding: set up (simulated)  Grooming: set up (simulated in supported sitting)  UB bathing/dressing: min to mod assist (simulated in supported sitting)  LB bathing/dressing: total assist  Pain:   0/10 pain prior to OT session  0/10 pain following OT session   Activity Tolerance:   Poor secondary to his nausea  Please refer to the flowsheet for vital signs taken during this treatment. After treatment:   [ ] Patient left in no apparent distress sitting up in chair  [X] Patient left in no apparent distress in bed  [X] Call bell left within reach  [ ] Nursing notified  [ ] Caregiver present  [ ] Bed alarm activated      COMMUNICATION/EDUCATION:   [ ] Home safety education was provided and the patient/caregiver indicated understanding. [ ] Patient/family have participated as able in goal setting and plan of care. [ ] Patient/family agree to work toward stated goals and plan of care. [X] Patient understands intent and goals of therapy, but is neutral about his/her participation.   [ ] Patient is unable to participate in goal setting and plan of care.      Thank you for this referral.  José Martinez, OTR/L  Time Calculation: 12 mins

## 2017-08-28 NOTE — INTERDISCIPLINARY ROUNDS
IDR/SLIDR Summary          Patient: Eric Perez MRN: [id number]    Age: 58 y.o. YOB: 1955 Room/Bed: [facility]   Admit Diagnosis: Pneumonia  Principal Diagnosis: <principal problem not specified>   Goals: Cont abt tx, resolve pna  Readmission: NO  Quality Measure: PNA  VTE Prophylaxis: Chemical  Influenza Vaccine screening completed? YES  Pneumococcal Vaccine screening completed? YES  Mobility needs: Yes   Nutrition plan:Yes  Consults: P. T and O.T. Financial concerns:No  Escalated to CM? NO  RRAT Score: 7   Interventions:   Testing due for pt today?  NO  LOS: 6 days Expected length of stay 5 days  Discharge plan: SNF   PCP: Alf Del Valle MD  Transportation needs: Yes    Days before discharge:two or more days before discharge   Discharge disposition: SNF    Signed:     Zan Schlatter, RN  8/28/2017  10:18 AM

## 2017-08-28 NOTE — PROGRESS NOTES
[facility]    Patient: Marguerite  Age: 58 y.o. : 1955 MR#: [id number] SSN: [id number]  Date/Time: 2017 8:30 AM    Subjective/24-hour events:     No new complaints. Respiratory status remains stable. Assessment:   HCAP, RLL  Large R effusion, likely parapneumonic s/p thoracentesis  R pneumothorax ruled out  HTN  Iron deficiency anemia  Nasal MRSA colonization  Recent R foot wound    Plan:  Pleural fluid studies are relatively unremarkable thus far. Cytology still pending - await. Continue antibiotic therapy as ordered for now. De-escalate/adjust antibiotics pending pleural fluid cultures. Wound care as ordered. Follow labs. PT/OT - mobilize as tolerated. Disposition TBD. Case discussed with:  [x]Patient  []Family  []Nursing  []Case Management  DVT Prophylaxis:  [x]Lovenox  []Hep SQ  []SCDs  []Coumadin   []On Heparin gtt    Objective:   VS:   Visit Vitals    /81 (BP 1 Location: Left arm, BP Patient Position: At rest)    Pulse 71    Temp 99.3 °F (37.4 °C)    Resp 19    Ht 5' 5\" (1.651 m)    Wt 85.3 kg (188 lb)    SpO2 99%    BMI 31.28 kg/m2      Tmax/24hrs: Temp (24hrs), Av.1 °F (36.7 °C), Min:97.4 °F (36.3 °C), Max:99.3 °F (37.4 °C)    No intake or output data in the 24 hours ending 17 0830    General:  In NAD. Nontoxic-appearing. Cardiovascular:  RRR. Pulmonary:  No wheezes, effort nonlabored. GI:  Abdomen soft, NTTP. Extremities:  No ischemia. Additional:  Awake and alert.     Labs:    Recent Results (from the past 24 hour(s))   CULTURE, BODY FLUID W GRAM STAIN    Collection Time: 17  9:00 AM   Result Value Ref Range    Special Requests: NO SPECIAL REQUESTS      GRAM STAIN FEW WBC'S      GRAM STAIN NO ORGANISMS SEEN      Culture result: PENDING    CULTURE, FUNGUS    Collection Time: 17  9:00 AM   Result Value Ref Range    Special Requests: NO SPECIAL REQUESTS      FUNGUS SMEAR NO FUNGAL ELEMENTS SEEN      Culture result: PENDING    CELL COUNT AND DIFF, BODY FLUID    Collection Time: 08/27/17  9:55 AM   Result Value Ref Range    BODY FLUID TYPE ASCITIC FLUID       FLUID COLOR ORANGE      FLUID APPEARANCE CLOUDY      FLUID RBC COUNT 34396 (A) NRRE /cu mm    FLUID NUCLEATED CELLS 9688 (A) NRRE /cu mm    FLD NEUTROPHILS 75 %    FLD BANDS 0 %    FLD LYMPHS 7 %    FLD MONOCYTES 0 %    FLD EOSINS 11 %    MACROPHAGE 7 %    WBC COMMENTS MODERATE MESOTHELIAL CELLS SEEN    PH, FLUID    Collection Time: 08/27/17  9:56 AM   Result Value Ref Range    FLUID TYPE(15) PLEURAL FLUID      FLUID PH 7.7     PROTEIN TOTAL, FLUID    Collection Time: 08/27/17  9:56 AM   Result Value Ref Range    Fluid Type: PLEURAL FLUID      Protein total, body fld. 4.8 g/dL   GLUCOSE, FLUID    Collection Time: 08/27/17  9:56 AM   Result Value Ref Range    Fluid Type: PLEURAL FLUID      Glucose, body fld. 88 MG/DL   LDH, FLUID    Collection Time: 08/27/17  9:57 AM   Result Value Ref Range    Fluid Type: PLEURAL FLUID      LD, body fld.  344 U/L   CBC W/O DIFF    Collection Time: 08/28/17  3:55 AM   Result Value Ref Range    WBC 8.3 4.6 - 13.2 K/uL    RBC 3.56 (L) 4.70 - 5.50 M/uL    HGB 9.4 (L) 13.0 - 16.0 g/dL    HCT 28.3 (L) 36.0 - 48.0 %    MCV 79.5 74.0 - 97.0 FL    MCH 26.4 24.0 - 34.0 PG    MCHC 33.2 31.0 - 37.0 g/dL    RDW 14.4 11.6 - 14.5 %    PLATELET 079 (H) 727 - 420 K/uL    MPV 9.4 9.2 - 11.8 FL       Signed By: Alejandra Olguin MD     August 28, 2017 8:30 AM

## 2017-08-29 LAB
ANION GAP SERPL CALC-SCNC: 8 MMOL/L (ref 3–18)
BUN SERPL-MCNC: 6 MG/DL (ref 7–18)
BUN/CREAT SERPL: 8 (ref 12–20)
CALCIUM SERPL-MCNC: 8.7 MG/DL (ref 8.5–10.1)
CHLORIDE SERPL-SCNC: 106 MMOL/L (ref 100–108)
CO2 SERPL-SCNC: 26 MMOL/L (ref 21–32)
CREAT SERPL-MCNC: 0.77 MG/DL (ref 0.6–1.3)
GLUCOSE SERPL-MCNC: 89 MG/DL (ref 74–99)
POTASSIUM SERPL-SCNC: 3.4 MMOL/L (ref 3.5–5.5)
SODIUM SERPL-SCNC: 140 MMOL/L (ref 136–145)

## 2017-08-29 PROCEDURE — 97116 GAIT TRAINING THERAPY: CPT

## 2017-08-29 PROCEDURE — 74011250637 HC RX REV CODE- 250/637: Performed by: FAMILY MEDICINE

## 2017-08-29 PROCEDURE — 74011000258 HC RX REV CODE- 258: Performed by: FAMILY MEDICINE

## 2017-08-29 PROCEDURE — 65270000029 HC RM PRIVATE

## 2017-08-29 PROCEDURE — 77030010545

## 2017-08-29 PROCEDURE — 97530 THERAPEUTIC ACTIVITIES: CPT

## 2017-08-29 PROCEDURE — 74011250636 HC RX REV CODE- 250/636: Performed by: FAMILY MEDICINE

## 2017-08-29 PROCEDURE — 77030011943

## 2017-08-29 PROCEDURE — 36415 COLL VENOUS BLD VENIPUNCTURE: CPT | Performed by: FAMILY MEDICINE

## 2017-08-29 PROCEDURE — 80048 BASIC METABOLIC PNL TOTAL CA: CPT | Performed by: FAMILY MEDICINE

## 2017-08-29 RX ORDER — POTASSIUM CHLORIDE 1.5 G/1.77G
40 POWDER, FOR SOLUTION ORAL EVERY 4 HOURS
Status: COMPLETED | OUTPATIENT
Start: 2017-08-29 | End: 2017-08-29

## 2017-08-29 RX ORDER — LEVOFLOXACIN 750 MG/1
750 TABLET ORAL
Status: DISCONTINUED | OUTPATIENT
Start: 2017-08-29 | End: 2017-09-01 | Stop reason: HOSPADM

## 2017-08-29 RX ADMIN — PIPERACILLIN SODIUM AND TAZOBACTAM SODIUM 4.5 G: 4; .5 INJECTION, POWDER, LYOPHILIZED, FOR SOLUTION INTRAVENOUS at 00:02

## 2017-08-29 RX ADMIN — STANDARDIZED SENNA CONCENTRATE AND DOCUSATE SODIUM 1 TABLET: 8.6; 5 TABLET, FILM COATED ORAL at 21:30

## 2017-08-29 RX ADMIN — THERA TABS 1 TABLET: TAB at 08:11

## 2017-08-29 RX ADMIN — LEVOFLOXACIN 750 MG: 750 TABLET, FILM COATED ORAL at 13:01

## 2017-08-29 RX ADMIN — POTASSIUM CHLORIDE 40 MEQ: 1.5 POWDER, FOR SOLUTION ORAL at 15:42

## 2017-08-29 RX ADMIN — AMLODIPINE BESYLATE 10 MG: 10 TABLET ORAL at 08:11

## 2017-08-29 RX ADMIN — VANCOMYCIN HYDROCHLORIDE 1500 MG: 10 INJECTION, POWDER, LYOPHILIZED, FOR SOLUTION INTRAVENOUS at 00:54

## 2017-08-29 RX ADMIN — FERROUS SULFATE TAB 325 MG (65 MG ELEMENTAL FE) 325 MG: 325 (65 FE) TAB at 08:12

## 2017-08-29 RX ADMIN — POLYETHYLENE GLYCOL 3350 17 G: 17 POWDER, FOR SOLUTION ORAL at 08:33

## 2017-08-29 RX ADMIN — POTASSIUM CHLORIDE 40 MEQ: 1.5 POWDER, FOR SOLUTION ORAL at 11:50

## 2017-08-29 RX ADMIN — ENOXAPARIN SODIUM 40 MG: 40 INJECTION SUBCUTANEOUS at 09:17

## 2017-08-29 RX ADMIN — FOLIC ACID 1 MG: 1 TABLET ORAL at 08:12

## 2017-08-29 RX ADMIN — ASPIRIN 81 MG 81 MG: 81 TABLET ORAL at 08:12

## 2017-08-29 RX ADMIN — CYANOCOBALAMIN TAB 1000 MCG 1000 MCG: 1000 TAB at 08:11

## 2017-08-29 RX ADMIN — PIPERACILLIN SODIUM AND TAZOBACTAM SODIUM 4.5 G: 4; .5 INJECTION, POWDER, LYOPHILIZED, FOR SOLUTION INTRAVENOUS at 05:47

## 2017-08-29 RX ADMIN — FUROSEMIDE 40 MG: 40 TABLET ORAL at 08:11

## 2017-08-29 RX ADMIN — TAMSULOSIN HYDROCHLORIDE 0.4 MG: 0.4 CAPSULE ORAL at 08:11

## 2017-08-29 RX ADMIN — Medication 250 MG: at 08:12

## 2017-08-29 NOTE — INTERDISCIPLINARY ROUNDS
IDR/SLIDR Summary          Patient: Jamilah Sterling MRN: [id number]    Age: 58 y.o. YOB: 1955 Room/Bed: [facility]   Admit Diagnosis: Pneumonia  Principal Diagnosis: <principal problem not specified>   Goals: Cont abt tx, resolve pna  Readmission: NO  Quality Measure: PNA  VTE Prophylaxis: Chemical  Influenza Vaccine screening completed? YES  Pneumococcal Vaccine screening completed? YES  Mobility needs: Yes   Nutrition plan:Yes  Consults: P. T and O.T. Financial concerns:No  Escalated to CM? NO  RRAT Score: 7   Interventions:   Testing due for pt today?  NO  LOS: 7 days Expected length of stay 5 days  Discharge plan: SNF   PCP: Delaney Mera MD  Transportation needs: Yes    Days before discharge:two or more days before discharge   Discharge disposition: SNF    Signed:     Elham Henry  5/70/6570  10:18 AM

## 2017-08-29 NOTE — PROGRESS NOTES
Bedside shift change report given to Thaddeus Casarez RN RN (oncoming nurse) by Blaine Echevarria   (offgoing nurse). Report included the following information SBAR, Kardex, Intake/Output, MAR and Accordion.

## 2017-08-29 NOTE — PROGRESS NOTES
[facility]  [facility]    Patient: Barry Sarmiento Age: 58 y.o. : 1955 MR#: [id number] SSN: [id number]  Date/Time: 2017 11:27 AM    Subjective/24-hour events:     No new complaints. Respiratory status remains stable. Assessment:   HCAP, RLL  Large R effusion, likely parapneumonic s/p thoracentesis  R pneumothorax ruled out  HTN  Iron deficiency anemia  Nasal MRSA colonization  Recent R foot wound    Plan:  De-escalate antibiotic therapy. Transition to PO levaquin today and monitor status. Replace potassium and monitor. Wound care as ordered. Mobilize as able/tolerated. SNF being recommended. Will discuss with [facility] today. Case discussed with:  [x]Patient  []Family  []Nursing  []Case Management  DVT Prophylaxis:  [x]Lovenox  []Hep SQ  []SCDs  []Coumadin   []On Heparin gtt    Objective:   VS:   Visit Vitals    /82 (BP 1 Location: Left arm, BP Patient Position: At rest)    Pulse 76    Temp 97.3 °F (36.3 °C)    Resp 22    Ht 5' 5\" (1.651 m)    Wt 85.3 kg (188 lb)    SpO2 99%    BMI 31.28 kg/m2      Tmax/24hrs: Temp (24hrs), Av °F (36.7 °C), Min:97.3 °F (36.3 °C), Max:99.6 °F (37.6 °C)      Intake/Output Summary (Last 24 hours) at 17 1127  Last data filed at 17 1220   Gross per 24 hour   Intake              120 ml   Output                0 ml   Net              120 ml       General:  In NAD. Nontoxic-appearing. Cardiovascular:  RRR. Pulmonary:  No wheezes, effort nonlabored. GI:  Abdomen soft, NTTP. Extremities:  No ischemia. Additional:  Awake and alert.     Labs:    Recent Results (from the past 24 hour(s))   METABOLIC PANEL, BASIC    Collection Time: 17  3:53 AM   Result Value Ref Range    Sodium 140 136 - 145 mmol/L    Potassium 3.4 (L) 3.5 - 5.5 mmol/L    Chloride 106 100 - 108 mmol/L    CO2 26 21 - 32 mmol/L    Anion gap 8 3.0 - 18 mmol/L    Glucose 89 74 - 99 mg/dL    BUN 6 (L) 7.0 - 18 MG/DL Creatinine 0.77 0.6 - 1.3 MG/DL    BUN/Creatinine ratio 8 (L) 12 - 20      GFR est AA >60 >60 ml/min/1.73m2    GFR est non-AA >60 >60 ml/min/1.73m2    Calcium 8.7 8.5 - 10.1 MG/DL       Signed By: Sudhir Peng MD     August 29, 2017 11:27 AM

## 2017-08-29 NOTE — PROGRESS NOTES
Care Management Interventions  PCP Verified by CM: Yes  Physical Therapy Consult: Yes  Occupational Therapy Consult: Yes  Plan discussed with Pt/Family/Caregiver: Yes  Freedom of Choice Offered: Yes (For SNF)  Discharge Location  Discharge Placement: Skilled nursing facility     Pt is a 58year old male admitted for pneumonia. Pt is alert and oriented x2. Pt was transferred to this facility from [facility] where pt was authorized for short term rehab. CM attempted to contact pt's son, there was no answer. CM contacted [facility] and admission coordinator confirms that pt was at facility for 30 days through [employer] from insurance. Coordinator shares that family's plan was for pt to transition to LTC bed once skill care is completed.

## 2017-08-29 NOTE — PROGRESS NOTES
Problem: Mobility Impaired (Adult and Pediatric)  Goal: *Acute Goals and Plan of Care (Insert Text)  Physical Therapy Goals  Initiated 8/29/2017 and to be accomplished within 7 day(s)  1. Patient will move from supine to sit and sit to supine, scoot up and down and roll side to side in bed with supervision/set-up. 2. Patient will transfer from bed to chair and chair to bed with minimal assistance using the least restrictive device. 3. Patient will perform sit to stand with minimal assistance. 4. Patient will ambulate with minimal assistance for >20 feet with the least restrictive device. 5. Patient will demonstrate good seated and standing balance for improved safety during functional mobility. PHYSICAL THERAPY TREATMENT     Patient: Cindi Sodo (10 y.o. male)  Date: 8/29/2017  Diagnosis: Pneumonia <principal problem not specified>       Precautions: Aspiration, Contact, Fall  Chart, physical therapy assessment, plan of care and goals were reviewed. ASSESSMENT:  Pt presents today alert and agreeable to therapy and was supine in bed. Pt transferred sup to sit and sat EOB for sit to stand transfer instruction. Pt then performed attempts x3 before successfully standing. Pt performed x2 additional tranfers for carryover. During one stand, pt used RW to take several side steps towards HOB. Upon final stand, pt used RW to ambulated 5 ft to locked recliner where pt transferred to sitting. Pt demonstrated bilateral crouched gait at hips and knees and required tactile and verbal cueing for improved upright posture. Pt also required eduction on regaining balance rather than performing uncontrolled descent into chair; pt able to re correct posture and with Lindsey sit into locked recliner. Pt oriented to call bell and was agreeable to sitting up in recliner for 1 hour and educated on calling nurse to get back into bed.    Progression toward goals:  [X]      Improving appropriately and progressing toward goals  [ ]      Improving slowly and progressing toward goals  [ ]      Not making progress toward goals and plan of care will be adjusted       PLAN:  Patient continues to benefit from skilled intervention to address the above impairments. Continue treatment per established plan of care. Discharge Recommendations:  Home Health  Further Equipment Recommendations for Discharge:  N/A       G-CODES:      Mobility  Current  CK= 40-59%   Goal  CJ= 20-39%. The severity rating is based on the Level of Assistance required for Functional Mobility and ADLs. SUBJECTIVE:   Patient stated I want to walk again.       OBJECTIVE DATA SUMMARY:   Critical Behavior:  Neurologic State: Alert  Orientation Level: Oriented to person, Oriented to place, Oriented to situation  Cognition: Follows commands     Functional Mobility Training:  Bed Mobility:  Rolling: Contact guard assistance  Supine to Sit: Minimum assistance   Scooting: Minimum assistance   Transfers:  Sit to Stand: Moderate assistance (blocking R foot to prevent foot sliding; x5 attempts for)  Stand to Sit: Minimum assistance      Balance:  Sitting: Impaired; With support  Standing: Impaired; With support  Standing - Static: Poor  Standing - Dynamic : Poor  Ambulation/Gait Training:  Distance (ft): 5 Feet (ft)  Assistive Device: Walker, rolling  Ambulation - Level of Assistance: Minimal assistance (ModA at times to maintain balance)   Gait Abnormalities: Decreased step clearance; Step to gait;Trunk sway increased (crouched posture at hips and knees)   Base of Support: Widened   Speed/Lili: Pace decreased (<100 feet/min); Slow  Step Length: Left shortened;Right shortened   Interventions: Safety awareness training; Tactile cues; Verbal cues; Visual/Demos   Pain:  Pt reports 0/10 pain or discomfort prior to treatment. Pt reports 0/10 pain or discomfort post treatment.       Activity Tolerance:   Pt tolerated activity well and was  Please refer to the flowsheet for vital signs taken during this treatment.   After treatment:   [X] Patient left in no apparent distress sitting up in chair  [ ] Patient left in no apparent distress in bed  [X] Call bell left within reach  [X] Nursing notified  [ ] Caregiver present  [ ] Bed alarm activated      Whitney Pennington, PT   Time Calculation: 27 mins

## 2017-08-30 PROCEDURE — 77010033678 HC OXYGEN DAILY

## 2017-08-30 PROCEDURE — 74011250636 HC RX REV CODE- 250/636: Performed by: FAMILY MEDICINE

## 2017-08-30 PROCEDURE — 97116 GAIT TRAINING THERAPY: CPT

## 2017-08-30 PROCEDURE — 74011250637 HC RX REV CODE- 250/637: Performed by: FAMILY MEDICINE

## 2017-08-30 PROCEDURE — 74011250637 HC RX REV CODE- 250/637: Performed by: INTERNAL MEDICINE

## 2017-08-30 PROCEDURE — 65270000029 HC RM PRIVATE

## 2017-08-30 RX ORDER — LEVOFLOXACIN 750 MG/1
750 TABLET ORAL
Qty: 1 TAB | Refills: 0 | Status: SHIPPED
Start: 2017-08-30 | End: 2017-09-22

## 2017-08-30 RX ORDER — OXYCODONE AND ACETAMINOPHEN 5; 325 MG/1; MG/1
1 TABLET ORAL
Qty: 20 TAB | Refills: 0 | Status: SHIPPED | OUTPATIENT
Start: 2017-08-30 | End: 2017-09-22

## 2017-08-30 RX ADMIN — AMLODIPINE BESYLATE 10 MG: 10 TABLET ORAL at 08:56

## 2017-08-30 RX ADMIN — OXYCODONE HYDROCHLORIDE AND ACETAMINOPHEN 1 TABLET: 5; 325 TABLET ORAL at 23:09

## 2017-08-30 RX ADMIN — DIPHENHYDRAMINE HYDROCHLORIDE 25 MG: 25 CAPSULE ORAL at 09:57

## 2017-08-30 RX ADMIN — FUROSEMIDE 40 MG: 40 TABLET ORAL at 08:56

## 2017-08-30 RX ADMIN — Medication 250 MG: at 08:56

## 2017-08-30 RX ADMIN — POLYETHYLENE GLYCOL 3350 17 G: 17 POWDER, FOR SOLUTION ORAL at 08:56

## 2017-08-30 RX ADMIN — FERROUS SULFATE TAB 325 MG (65 MG ELEMENTAL FE) 325 MG: 325 (65 FE) TAB at 08:56

## 2017-08-30 RX ADMIN — FOLIC ACID 1 MG: 1 TABLET ORAL at 08:56

## 2017-08-30 RX ADMIN — THERA TABS 1 TABLET: TAB at 08:56

## 2017-08-30 RX ADMIN — LEVOFLOXACIN 750 MG: 750 TABLET, FILM COATED ORAL at 08:00

## 2017-08-30 RX ADMIN — ASPIRIN 81 MG 81 MG: 81 TABLET ORAL at 08:56

## 2017-08-30 RX ADMIN — ENOXAPARIN SODIUM 40 MG: 40 INJECTION SUBCUTANEOUS at 09:00

## 2017-08-30 RX ADMIN — STANDARDIZED SENNA CONCENTRATE AND DOCUSATE SODIUM 1 TABLET: 8.6; 5 TABLET, FILM COATED ORAL at 23:09

## 2017-08-30 RX ADMIN — CYANOCOBALAMIN TAB 1000 MCG 1000 MCG: 1000 TAB at 08:56

## 2017-08-30 RX ADMIN — OXYCODONE HYDROCHLORIDE AND ACETAMINOPHEN 1 TABLET: 5; 325 TABLET ORAL at 09:52

## 2017-08-30 RX ADMIN — OXYCODONE HYDROCHLORIDE AND ACETAMINOPHEN 1 TABLET: 5; 325 TABLET ORAL at 13:48

## 2017-08-30 RX ADMIN — TAMSULOSIN HYDROCHLORIDE 0.4 MG: 0.4 CAPSULE ORAL at 08:56

## 2017-08-30 RX ADMIN — BACLOFEN 10 MG: 10 TABLET ORAL at 08:00

## 2017-08-30 RX ADMIN — ZOLPIDEM TARTRATE 5 MG: 5 TABLET ORAL at 23:09

## 2017-08-30 NOTE — PROGRESS NOTES
[facility]  Progress Note    Patient: Han Szymanski Age: 58 y.o. : 1955 MR#: [id number] SSN: [id number]  Date/Time: 2017 9:36 AM    Subjective/24-hour events:     No new issues. Afebrile and without any new respiratory issues. Assessment:   HCAP, RLL  Large R effusion, likely parapneumonic s/p thoracentesis  R pneumothorax ruled out  HTN  Iron deficiency anemia  Nasal MRSA colonization  Dorsal R foot vascular wound, POA and Pretibial LLE vascular wound, POA - without significant PAD  and Pretibial Left;Lateral  vascular wound POA with PVD (or without PVD)  Mild malnutrition    Plan:  Medically stable for return to NH today. Case discussed with:  [x]Patient  []Family  []Nursing  [x]Case Management  DVT Prophylaxis:  [x]Lovenox  []Hep SQ  []SCDs  []Coumadin   []On Heparin gtt    Objective:   VS:   Visit Vitals    /77    Pulse 81    Temp 97.5 °F (36.4 °C)    Resp 17    Ht 5' 5\" (1.651 m)    Wt 85.3 kg (188 lb)    SpO2 93%    BMI 31.28 kg/m2      Tmax/24hrs: Temp (24hrs), Av.8 °F (36.6 °C), Min:97.2 °F (36.2 °C), Max:99.2 °F (37.3 °C)      Intake/Output Summary (Last 24 hours) at 17 0936  Last data filed at 17 2342   Gross per 24 hour   Intake              245 ml   Output              625 ml   Net             -380 ml       General:  In NAD. Nontoxic-appearing. Cardiovascular:  RRR. Pulmonary:  No wheezes, effort nonlabored. GI:  Abdomen soft, NTTP. Extremities:  No ischemia. Additional:  Awake and alert. Labs:    No results found for this or any previous visit (from the past 24 hour(s)).     Signed By: Ted Dougherty MD     2017 9:36 AM

## 2017-08-30 NOTE — DISCHARGE SUMMARY
Discharge Summary    Patient: Michelle Corral MRN: [id number]  CSN: [id number]    YOB: 1955  Age: 58 y.o. Sex: male    DOA: 8/22/2017 LOS:  LOS: 8 days   Discharge Date: 8/30/2017     Admission Diagnoses:   Pneumonia     Discharge Diagnoses:    HCAP, RLL  Large R effusion, likely parapneumonic s/p thoracentesis  R pneumothorax ruled out  HTN  Hypokalemia, mild  Iron deficiency anemia  Nasal MRSA colonization  Dorsal R foot vascular wound, POA and Pretibial LLE vascular wound, POA - without significant PAD  and Pretibial Left;Lateral  vascular wound POA with PVD (or without PVD)  Mild malnutrition      Discharge Condition: Stable    PHYSICAL EXAM  Visit Vitals    /77    Pulse 81    Temp 97.5 °F (36.4 °C)    Resp 17    Ht 5' 5\" (1.651 m)    Wt 85.3 kg (188 lb)    SpO2 93%    BMI 31.28 kg/m2       General: In NAD. HEENT: NCAT. Sclerae anicteric. EOMI. Lungs:  Clear, no R/W/R. Effort nonlabored. Heart:  RRR. Abdomen: Soft, NTTP. Extremities: Warm, no ischemia or edema. Psych:   Calm, no agitation. Neurologic:  Awake and alert, moves extremities spontaneously. Hospital Course:   See admission H&P for full details of HPI. Patient admitted to medical bed for treatment of pneumonia. Antibiotic therapy was initiated in ED and this was escalated to cover for nosocomial organisms given multiple hospitalizations and NH placement. Patient began to improve relatively quickly and has not had any acute decompensation in respiratory status. CXR was followed up and patient was noted to have an increasing effusion, however. Thoracentesis was done without difficulty and pleural fluid studies to this point have been unremarkable. Patient has continued to remain clinically stable - he is without fever and has no evidence for any ongoing or systemic infection. He is stable for discharge back to the nursing facility today for continued care.         Consults:   None     Significant Diagnostic Studies:   CT abdomen/pelvis:  IMPRESSION:      1. Patchy airspace opacities in right lower lobe identified and suggesting  infectious airspace disease. Small loculated right pleural effusion.     2. Cholelithiasis with numerous tiny calculi along with debris layering in the  dependent gallbladder lumen.     3. Large hiatal hernia.     4. Extensive colonic diverticulosis without diverticulitis. Normal appendix.     5. Mild and asymmetric bladder wall thickening at inferior and posterior aspect  as nonspecific finding. Recommend clinical correlation and bladder ultrasound if  indicated. CXR 8/27: IMPRESSION:     No evidence of right apical pneumothorax at this time as described above.     Mildly increased atelectatic changes at the right lung base with small pleural  effusion versus pleural thickening.     No evidence of CHF.     Clear left lung. Discharge Medications:     Current Discharge Medication List      START taking these medications    Details   levoFLOXacin (LEVAQUIN) 750 mg tablet Take 1 Tab by mouth Daily (before breakfast). Qty: 1 Tab, Refills: 0         CONTINUE these medications which have CHANGED    Details   oxyCODONE-acetaminophen (PERCOCET) 5-325 mg per tablet Take 1 Tab by mouth every four (4) hours as needed. Max Daily Amount: 6 Tabs. Qty: 20 Tab, Refills: 0         CONTINUE these medications which have NOT CHANGED    Details   ascorbic acid, vitamin C, (VITAMIN C) 250 mg tablet Take 1 Tab by mouth daily. Qty: 30 Tab, Refills: 2      ferrous sulfate 325 mg (65 mg iron) tablet Take 1 Tab by mouth daily (with breakfast). Qty: 30 Tab, Refills: 2      polyethylene glycol (MIRALAX) 17 gram packet Take 1 Packet by mouth daily. Qty: 30 Packet, Refills: 2      senna-docusate (PERICOLACE) 8.6-50 mg per tablet Take 1 Tab by mouth nightly. HOLD for loose stool. Qty: 30 Tab, Refills: 2      tamsulosin (FLOMAX) 0.4 mg capsule Take 0.4 mg by mouth daily.  1 tab daily furosemide (LASIX) 40 mg tablet Take 40 mg by mouth daily. MULTIVITAMIN,THERAPEUTIC (THERA MULTI-VITAMIN PO) Take 500 mg by mouth daily. baclofen (LIORESAL) 10 mg tablet Take 1 Tab by mouth every twelve (12) hours as needed. Qty: 15 Tab, Refills: 0      amLODIPine (NORVASC) 10 mg tablet Take 1 Tab by mouth daily. Qty: 30 Tab, Refills: 2      metoprolol (LOPRESSOR) 25 mg tablet Take 1 Tab by mouth two (2) times a day. Qty: 60 Tab, Refills: 2      cyanocobalamin 1,000 mcg tablet Take 1 Tab by mouth daily. Qty: 30 Tab, Refills: 2      folic acid (FOLVITE) 1 mg tablet Take 1 Tab by mouth daily. Qty: 30 Tab, Refills: 1      aspirin 81 mg chewable tablet Take 1 Tab by mouth daily.   Qty: 30 Tab, Refills: 0         STOP taking these medications       chlorproMAZINE (THORAZINE) 25 mg tablet Comments:   Reason for Stopping:                 Labs:      8/29/2017  5:59 AM - Kristian, [facility]       Component Results      Component Value Flag Ref Range Units Status     Sodium 140  136 - 145 mmol/L Final     Potassium 3.4 (L) 3.5 - 5.5 mmol/L Final     Chloride 106  100 - 108 mmol/L Final     CO2 26  21 - 32 mmol/L Final     Anion gap 8  3.0 - 18 mmol/L Final     Glucose 89  74 - 99 mg/dL Final     BUN 6 (L) 7.0 - 18 MG/DL Final     Creatinine 0.77  0.6 - 1.3 MG/DL Final     BUN/Creatinine ratio 8 (L) 12 - 20   Final     GFR est AA >60  >60 ml/min/1.73m2 Final     GFR est non-AA >60  >60 ml/min/1.73m2 Final           8/28/2017  6:05 AM - Krist[facility], [facility]       Component Results      Component Value Flag Ref Range Units Status     WBC 8.3  4.6 - 13.2 K/uL Final     RBC 3.56 (L) 4.70 - 5.50 M/uL Final     HGB 9.4 (L) 13.0 - 16.0 g/dL Final     HCT 28.3 (L) 36.0 - 48.0 % Final     MCV 79.5  74.0 - 97.0 FL Final     MCH 26.4  24.0 - 34.0 PG Final     MCHC 33.2  31.0 - 37.0 g/dL Final     RDW 14.4  11.6 - 14.5 % Final     PLATELET 989 (H) 344 - 420 K/uL Final     MPV 9.4  9.2 - 11.8 FL Final           Activity: As tolerated    Diet: Cardiac - mechanical soft, consistent carb 2000 kcal.    Wound care:  Mepilex Border dressing to right dorsum foot & left lower leg q3days & prn soilage. Tubigrip reusable elastic socks over top. Minutes spent on discharge: >30 minutes spent coordinating this discharge. Martha Rivas.  Marina Murillo MD  [facility]

## 2017-08-30 NOTE — DISCHARGE INSTRUCTIONS
Pneumonia: Care Instructions  Your Care Instructions    Pneumonia is an infection of the lungs. Most cases are caused by infections from bacteria or viruses. Pneumonia may be mild or very severe. If it is caused by bacteria, you will be treated with antibiotics. It may take a few weeks to a few months to recover fully from pneumonia, depending on how sick you were and whether your overall health is good. Follow-up care is a key part of your treatment and safety. Be sure to make and go to all appointments, and call your doctor if you are having problems. Its also a good idea to know your test results and keep a list of the medicines you take. How can you care for yourself at home? · Take your antibiotics exactly as directed. Do not stop taking the medicine just because you are feeling better. You need to take the full course of antibiotics. · Take your medicines exactly as prescribed. Call your doctor if you think you are having a problem with your medicine. · Get plenty of rest and sleep. You may feel weak and tired for a while, but your energy level will improve with time. · To prevent dehydration, drink plenty of fluids, enough so that your urine is light yellow or clear like water. Choose water and other caffeine-free clear liquids until you feel better. If you have kidney, heart, or liver disease and have to limit fluids, talk with your doctor before you increase the amount of fluids you drink. · Take care of your cough so you can rest. A cough that brings up mucus from your lungs is common with pneumonia. It is one way your body gets rid of the infection. But if coughing keeps you from resting or causes severe fatigue and chest-wall pain, talk to your doctor. He or she may suggest that you take a medicine to reduce the cough. · Use a vaporizer or humidifier to add moisture to your bedroom. Follow the directions for cleaning the machine. · Do not smoke or allow others to smoke around you.  Smoke will make your cough last longer. If you need help quitting, talk to your doctor about stop-smoking programs and medicines. These can increase your chances of quitting for good. · Take an over-the-counter pain medicine, such as acetaminophen (Tylenol), ibuprofen (Advil, Motrin), or naproxen (Aleve). Read and follow all instructions on the label. · Do not take two or more pain medicines at the same time unless the doctor told you to. Many pain medicines have acetaminophen, which is Tylenol. Too much acetaminophen (Tylenol) can be harmful. · If you were given a spirometer to measure how well your lungs are working, use it as instructed. This can help your doctor tell how your recovery is going. · To prevent pneumonia in the future, talk to your doctor about getting a flu vaccine (once a year) and a pneumococcal vaccine (one time only for most people). When should you call for help? Call 911 anytime you think you may need emergency care. For example, call if:  · You have severe trouble breathing. Call your doctor now or seek immediate medical care if:  · You cough up dark brown or bloody mucus (sputum). · You have new or worse trouble breathing. · You are dizzy or lightheaded, or you feel like you may faint. Watch closely for changes in your health, and be sure to contact your doctor if:  · You have a new or higher fever. · You are coughing more deeply or more often. · You are not getting better after 2 days (48 hours). · You do not get better as expected. Where can you learn more? Go to http://lainey-millie.info/. Enter [id number] in the search box to learn more about \"Pneumonia: Care Instructions. \"  Current as of: March 25, 2017  Content Version: 11.3  © 8263-4058 [employer]. Care instructions adapted under license by [employer] (which disclaims liability or warranty for this information).  If you have questions about a medical condition or this instruction, always ask your healthcare professional. [place] [address] any warranty or liability for your use of this information. Patient armband removed and given to patient to take home. Patient was informed of the privacy risks if armband lost or stolen    MyChart Activation    Thank you for requesting access to [employer]. Please follow the instructions below to securely access and download your online medical record. [employer] allows you to send messages to your doctor, view your test results, renew your prescriptions, schedule appointments, and more. How Do I Sign Up? 1. In your internet browser, go to [email or web address]  2. Click on the First Time User? Click Here link in the Sign In box. You will be redirect to the New Member Sign Up page. 3. Enter your [employer] Access Code exactly as it appears below. You will not need to use this code after youve completed the sign-up process. If you do not sign up before the expiration date, you must request a new code. [employer] Access Code: [email or web address]  Expires: 2017  3:45 PM (This is the date your [employer] access code will )    4. Enter the last four digits of your Social Security Number (xxxx) and Date of Birth (mm/dd/yyyy) as indicated and click Submit. You will be taken to the next sign-up page. 5. Create a [employer] ID. This will be your [employer] login ID and cannot be changed, so think of one that is secure and easy to remember. 6. Create a [employer] password. You can change your password at any time. 7. Enter your Password Reset Question and Answer. This can be used at a later time if you forget your password. 8. Enter your e-mail address. You will receive e-mail notification when new information is available in [address] 9. Click Sign Up. You can now view and download portions of your medical record. 10. Click the Download Summary menu link to download a portable copy of your medical information.     Additional Information    If you have questions, please visit the Frequently Asked Questions section of the [employer] website at [email or web address] Remember, MyChart is NOT to be used for urgent needs. For medical emergencies, dial 911. DISCHARGE SUMMARY from Nurse    The following personal items are in your possession at time of discharge:    Dental Appliances: None  Visual Aid: None     Home Medications: None  Jewelry: None  Clothing: None  Other Valuables: None             PATIENT INSTRUCTIONS:    After general anesthesia or intravenous sedation, for 24 hours or while taking prescription Narcotics:  · Limit your activities  · Do not drive and operate hazardous machinery  · Do not make important personal or business decisions  · Do  not drink alcoholic beverages  · If you have not urinated within 8 hours after discharge, please contact your surgeon on call. Report the following to your surgeon:  · Excessive pain, swelling, redness or odor of or around the surgical area  · Temperature over 100.5  · Nausea and vomiting lasting longer than 4 hours or if unable to take medications  · Any signs of decreased circulation or nerve impairment to extremity: change in color, persistent  numbness, tingling, coldness or increase pain  · Any questions        What to do at Home:  Recommended activity: Activity as tolerated    If you experience any of the following symptoms Nausea, vomiting, diarrhea, fever greater than 100.5, dizziness, severe headache, shortness of breath, chest pain, increased pain, please follow up with PCP. *  Please give a list of your current medications to your Primary Care Provider. *  Please update this list whenever your medications are discontinued, doses are      changed, or new medications (including over-the-counter products) are added. *  Please carry medication information at all times in case of emergency situations.           These are general instructions for a healthy lifestyle:    No smoking/ No tobacco products/ Avoid exposure to second hand smoke    Surgeon General's Warning:  Quitting smoking now greatly reduces serious risk to your health. Obesity, smoking, and sedentary lifestyle greatly increases your risk for illness    A healthy diet, regular physical exercise & weight monitoring are important for maintaining a healthy lifestyle    You may be retaining fluid if you have a history of heart failure or if you experience any of the following symptoms:  Weight gain of 3 pounds or more overnight or 5 pounds in a week, increased swelling in our hands or feet or shortness of breath while lying flat in bed. Please call your doctor as soon as you notice any of these symptoms; do not wait until your next office visit. Recognize signs and symptoms of STROKE:    F-face looks uneven    A-arms unable to move or move unevenly    S-speech slurred or non-existent    T-time-call 911 as soon as signs and symptoms begin-DO NOT go       Back to bed or wait to see if you get better-TIME IS BRAIN. Warning Signs of HEART ATTACK     Call 911 if you have these symptoms:   Chest discomfort. Most heart attacks involve discomfort in the center of the chest that lasts more than a few minutes, or that goes away and comes back. It can feel like uncomfortable pressure, squeezing, fullness, or pain.  Discomfort in other areas of the upper body. Symptoms can include pain or discomfort in one or both arms, the back, neck, jaw, or stomach.  Shortness of breath with or without chest discomfort.  Other signs may include breaking out in a cold sweat, nausea, or lightheadedness. Don't wait more than five minutes to call 911 - MINUTES MATTER! Fast action can save your life. Calling 911 is almost always the fastest way to get lifesaving treatment. Emergency Medical Services staff can begin treatment when they arrive -- up to an hour sooner than if someone gets to the hospital by car.        The discharge information has been reviewed with the patient and caregiver. The patient and caregiver verbalized understanding. Discharge medications reviewed with the patient and caregiver and appropriate educational materials and side effects teaching were provided.

## 2017-08-30 NOTE — PROGRESS NOTES
Care Management Interventions  PCP Verified by CM: Yes  Physical Therapy Consult: Yes  Occupational Therapy Consult: Yes  Plan discussed with Pt/Family/Caregiver: Yes  Freedom of Choice Offered: Yes (For SNF)  Discharge Location  Discharge Placement: Skilled nursing facility     CM received phone call from pt's  who shares that pt does not have skill care benefits unless she authorizes it. CM contacted admission coordinator with [facility] who shares that she currently does not have a private room for pt on isolation. She shares that she is working on moving pt around to make room for this pt to return, but won't be able to do so until tomorrow. CM requested for facility to forward information to pt's insurance for authorization.  is aware.

## 2017-08-30 NOTE — PROGRESS NOTES
0710--Bedside shift change report given to CINDY Guerra (oncoming nurse) by [employer], RN (offgoing nurse). Report included the following information SBAR, MAR and Recent Results. 0830--Pt medicated for back pain, pt anxious, sat with pt. HOB elevated for comfort. Pt refusing OOB for bfast this am.     1400--Pt anxious, medicated for pain, per request. Awaiting d/c placement. 1900--Bedside shift change report given to JOSEFA Lizarraga (oncoming nurse) by Kathya Sanchez RN (offgoing nurse). Report included the following information SBAR, Kardex and MAR.

## 2017-08-30 NOTE — PROGRESS NOTES
Problem: Mobility Impaired (Adult and Pediatric)  Goal: *Acute Goals and Plan of Care (Insert Text)  Physical Therapy Goals  Initiated 8/29/2017 and to be accomplished within 7 day(s)  1. Patient will move from supine to sit and sit to supine, scoot up and down and roll side to side in bed with supervision/set-up. 2. Patient will transfer from bed to chair and chair to bed with minimal assistance using the least restrictive device. 3. Patient will perform sit to stand with minimal assistance. 4. Patient will ambulate with minimal assistance for >20 feet with the least restrictive device. 5. Patient will demonstrate good seated and standing balance for improved safety during functional mobility. PHYSICAL THERAPY TREATMENT     Patient: Pauline Laws (12 y.o. male)  Date: 8/30/2017  Diagnosis: Pneumonia <principal problem not specified>       Precautions: Aspiration, Contact, Fall  Chart, physical therapy assessment, plan of care and goals were reviewed. ASSESSMENT:  Pt presents today alert and agreeable to therapy, supine in bed. Pt transferred sup to sit and was instructed on sit to stand transfer. Pt required manual cue for hand placement on bed; pt demonstrated little carryover during session and required VC's for each transfer. Pt then used RW to ambulate 12ft with Min/ModA and required verbal and tactile cues for walker sequencing. Pt also required tactile cues at sacrum and sternum for upright posture. Pt then transferred to sitting and was given demo on gait training of his deficits and how to correct them. Pt then stood for 2nd 12ft ambulation and required ModA once at EOB as pt states, \"I'm falling\" however pt assisting with ModA to stay standing. Pt required tactile cues to bring Geovanna over feet. Pt then reached to sit. Pt performed final trial 4ft,hwoever due to fatigue, quality of gait declining and pt unsafe to continue at this point.  Pt assisted back to supine in bed and was left resting with call bell by his side. Progression toward goals:  [X]      Improving appropriately and progressing toward goals  [ ]      Improving slowly and progressing toward goals  [ ]      Not making progress toward goals and plan of care will be adjusted       PLAN:  Patient continues to benefit from skilled intervention to address the above impairments. Continue treatment per established plan of care. Discharge Recommendations:  Kj Sheppard  Further Equipment Recommendations for Discharge:  N/A       G-CODES:      Mobility  Current  CL= 60-79%   Goal  CJ= 20-39%. The severity rating is based on the Level of Assistance required for Functional Mobility and ADLs. SUBJECTIVE:   Patient stated I'm sorry I look so mean. I was a  all my life so I want to do better.       OBJECTIVE DATA SUMMARY:   Critical Behavior:  Neurologic State: Alert  Orientation Level: Oriented X4  Cognition: Memory loss   Functional Mobility Training:  Bed Mobility:   Supine to Sit: Minimum assistance  Sit to Supine: Moderate assistance  Scooting: Minimum assistance   Transfers:  Sit to Stand: Moderate assistance;Maximum assistance (x3 trasnfers for rest breaks & carryover)  Stand to Sit: Minimum assistance     Balance:  Sitting: Impaired; With support  Sitting - Static: Good (unsupported)  Sitting - Dynamic: Fair (occasional)  Standing: Impaired; With support  Standing - Static: Poor  Standing - Dynamic : Poor  Ambulation/Gait Training:  Distance (ft): 12 Feet (ft) (x2, additional 4ft for 3rd trial)  Assistive Device: Walker, rolling  Ambulation - Level of Assistance: Minimal assistance; Moderate assistance (ModA at times for LOB/backwards ambulation)   Gait Abnormalities: Decreased step clearance;Trunk sway increased (crouched posture)   Base of Support: Widened   Speed/Lili: Pace decreased (<100 feet/min); Slow  Step Length: Left shortened;Right shortened (L > R)   Interventions: Safety awareness training; Tactile cues; Verbal cues; Visual/Demos     Pain:  Pt reports 0/10 pain or discomfort prior to treatment. Pt reports 0/10 pain or discomfort post treatment. Activity Tolerance:   Pt tolerated improve ambulation distance this session, however also required some increased assistance. Pt tolerated activity with no c/o chest pain, dizziness, or SOB. Please refer to the flowsheet for vital signs taken during this treatment.   After treatment:   [ ] Patient left in no apparent distress sitting up in chair  [X] Patient left in no apparent distress in bed  [X] Call bell left within reach  [X] Nursing notified  [ ] Caregiver present  [X] Bed alarm activated      Brittaney Min PT   Time Calculation: 24 mins

## 2017-08-30 NOTE — PROGRESS NOTES
Bedside shift change report given to Jonathon Gallo (oncoming nurse) by Kishore Carmona   (offgoing nurse). Report included the following information SBAR, Kardex, Intake/Output, MAR and Accordion.

## 2017-08-30 NOTE — PROGRESS NOTES
NUTRITION    Nursing Referral: [facility]  Nutrition Consult: General Nutrition Management & Supplements     RECOMMENDATIONS / PLAN:     - Continue with current nutrition interventions  - Continue RD inpatient monitoring and evaluation. NUTRITION INTERVENTIONS & DIAGNOSIS:     [x] Meals/Snacks: modified diet  [x] Medical food supplementation: Ensure Daily, Simon BID. [x] Vitamin/mineral supplementation: MVI, folvite, Vitamin C, ferrous sulfate-continue as medically appropriate        Nutrition Diagnosis: Inadequate oral intake related to increased needs of wound healing as evidenced by PO intake not meeting estimated needs. ASSESSMENT:     8/30: Pt has fair/good meal intake    8/23: Variations in height and weight noted. Pt denies recent weight loss and reports fair appetite.  Skin breakdown noted    Average po intake adequate to meet patients estimated nutritional needs:   [] Yes     [x] No   [] Unable to determine at this time    Diet: DIET CARDIAC Mechanical Soft; Consistent Carb 1800kcal  DIET NUTRITIONAL SUPPLEMENTS Breakfast, Dinner; Adela Glaze MIX  DIET NUTRITIONAL SUPPLEMENTS Lunch; ENSURE ENLIVE      Food Allergies: none  Current Appetite:   [] Good     [x] Fair     [] Poor     [] Other:  Appetite/meal intake prior to admission:   [x] Good     [] Fair     [] Poor     [] Other:  Feeding Limitations: pt denies issues chewing or swallowing  [] Swallowing difficulty    [] Chewing difficulty    [] Other:  Current Meal Intake:   Patient Vitals for the past 100 hrs:   % Diet Eaten   08/29/17 1351 0 %   08/28/17 0948 70 %   08/26/17 1723 50 %       BM:  8/28  Skin Integrity: R foot wound, L pretibial wound  Edema: 1+ LEs  Pertinent Medications: Reviewed, Vitamin C, ferrous sulfate, folvite, MVI    Recent Labs      08/29/17   0353   NA  140   K  3.4*   CL  106   CO2  26   GLU  89   BUN  6*   CREA  0.77   CA  8.7       Intake/Output Summary (Last 24 hours) at 08/30/17 1703  Last data filed at 08/29/17 2342   Gross per 24 hour   Intake                0 ml   Output              625 ml   Net             -625 ml       Anthropometrics: Per chart review pt was 5'11\". Pt appears taller than 5'5\". Ht Readings from Last 1 Encounters:   08/22/17 5' 5\" (1.651 m)     Last 3 Recorded Weights in this Encounter    08/22/17 1153 08/26/17 0414 08/28/17 0407   Weight: 65.8 kg (145 lb) 84.8 kg (187 lb) 85.3 kg (188 lb)     Body mass index is 31.28 kg/(m^2). BMI: 27.3 based on Height 71\", 195 lbs. Weight History: Pt reports  lbs. Change in weight documented noted-RD took bed weight of 195 lbs. Pt denies any recent weight loss. Weight Metrics 8/28/2017 8/18/2017 7/19/2017 7/6/2017 7/6/2017 7/6/2017 6/24/2017   Weight 188 lb 195 lb 195 lb - 200 lb - 200 lb   BMI 31.28 kg/m2 27.2 kg/m2 - 27.2 kg/m2 - 27.89 kg/m2 27.89 kg/m2        Admitting Diagnosis: Pneumonia  Pertinent PMHx: HTN, recent admission for foot ulcer and foot infection 7/7/17    Education Needs:        [x] None identified  [] Identified - Not appropriate at this time  []  Identified and addressed - refer to education log  Learning Limitations:   [x] None identified  [] Identified    Cultural, Baptist & ethnic food preferences:  [x] None identified    [] Identified and addressed     ESTIMATED NUTRITION NEEDS:     Calories: 4340-3454 kcal (MSJx1.2-1.3) based on  [x] Actual BW 88.6 kg     [] IBW   Protein:  gm (0.8-1.2 gm/kg) based on  [x] Actual BW      [] IBW   Fluid: 1 mL/kcal     MONITORING & EVALUATION:     Nutrition Goal(s):   1. Po intake of meals will meet >75% of patient estimated nutritional needs within the next 7 days.   Outcome:  [] Met/Ongoing    [x]  Not Met/Progressing    [] New/Initial Goal     Monitoring:   [x] Diet tolerance   [x] Meal intake   [x] Supplement intake   [] GI symptoms/ability to tolerate po diet   [] Respiratory status   [] Plan of care      Previous Recommendations (for follow-up assessments only):     [x]   Implemented []   Not Implemented (RD to address)     [] No Recommendation Made     Discharge Planning:  Cardiac diet  [x] Participated in care planning, discharge planning, & interdisciplinary rounds as appropriate      Albertina Buitrago RD  Pager: [phone]

## 2017-08-30 NOTE — PROGRESS NOTES
PT attempted tx, however pt in room undergoing routine care with nursing staff. Will follow up as pt schedule allows. Thank you for this referral. Maria Elena Viera, PT, DPT.

## 2017-08-31 PROCEDURE — 65270000029 HC RM PRIVATE

## 2017-08-31 PROCEDURE — 74011250636 HC RX REV CODE- 250/636: Performed by: FAMILY MEDICINE

## 2017-08-31 PROCEDURE — 74011250637 HC RX REV CODE- 250/637: Performed by: FAMILY MEDICINE

## 2017-08-31 PROCEDURE — 77010033678 HC OXYGEN DAILY: Performed by: INTERNAL MEDICINE

## 2017-08-31 PROCEDURE — 97530 THERAPEUTIC ACTIVITIES: CPT

## 2017-08-31 PROCEDURE — 97535 SELF CARE MNGMENT TRAINING: CPT

## 2017-08-31 RX ADMIN — THERA TABS 1 TABLET: TAB at 08:27

## 2017-08-31 RX ADMIN — ASPIRIN 81 MG 81 MG: 81 TABLET ORAL at 08:27

## 2017-08-31 RX ADMIN — CYANOCOBALAMIN TAB 1000 MCG 1000 MCG: 1000 TAB at 08:27

## 2017-08-31 RX ADMIN — TAMSULOSIN HYDROCHLORIDE 0.4 MG: 0.4 CAPSULE ORAL at 08:26

## 2017-08-31 RX ADMIN — FOLIC ACID 1 MG: 1 TABLET ORAL at 08:26

## 2017-08-31 RX ADMIN — Medication 250 MG: at 08:27

## 2017-08-31 RX ADMIN — AMLODIPINE BESYLATE 10 MG: 10 TABLET ORAL at 08:26

## 2017-08-31 RX ADMIN — LEVOFLOXACIN 750 MG: 750 TABLET, FILM COATED ORAL at 08:26

## 2017-08-31 RX ADMIN — FUROSEMIDE 40 MG: 40 TABLET ORAL at 08:27

## 2017-08-31 RX ADMIN — POLYETHYLENE GLYCOL 3350 17 G: 17 POWDER, FOR SOLUTION ORAL at 08:26

## 2017-08-31 RX ADMIN — ENOXAPARIN SODIUM 40 MG: 40 INJECTION SUBCUTANEOUS at 09:39

## 2017-08-31 RX ADMIN — FERROUS SULFATE TAB 325 MG (65 MG ELEMENTAL FE) 325 MG: 325 (65 FE) TAB at 08:27

## 2017-08-31 NOTE — PROGRESS NOTES
Problem: Self Care Deficits Care Plan (Adult)  Goal: *Acute Goals and Plan of Care (Insert Text)  Occupational Therapy Goals  Initiated 8/28/2017 within 7 day(s). 1. Patient will perform upper body bathing/dressing with modified independence   2. Patient will perform grooming with modified independence while sitting on the edge of the bed and progressing to standing at the sink as he is able  3. Patient will perform lower body dressing/bathing with minimal assistance  4. Patient will perform toilet transfers with minimal assistance   Outcome: Progressing Towards Goal  OCCUPATIONAL THERAPY TREATMENT     Patient: Barry Sarmiento (24 y.o. male)  Date: 8/31/2017  Diagnosis: Pneumonia <principal problem not specified>       Precautions: Aspiration, Contact, Fall  Chart, occupational therapy assessment, plan of care, and goals were reviewed. ASSESSMENT: Pt presented sitting up in bed AxOx3. Provided pt education on role of Occupational Therapy in recovery of safe and independent participation in self care tasks. Pt demonstrated understanding through return verbalization and participation in ADL transfers (simulated bed<>chair) and seated ADL tasks. Pt demonstrates posterior lean during standing tasks and transfers requiring Mod Ax2, and moderate verbal cues for posture correction. Please see below for levels of assistance/independence. Progression toward goals:  [ ]          Improving appropriately and progressing toward goals  [X]          Improving slowly and progressing toward goals  [ ]          Not making progress toward goals and plan of care will be adjusted       PLAN:  Patient continues to benefit from skilled intervention to address the above impairments. Continue treatment per established plan of care. Discharge Recommendations:  Skilled Nursing Facility  Further Equipment Recommendations for Discharge:  tbd       SUBJECTIVE:   Patient stated Tierra Truong you help me get up? Deejay Board      G-CODES:      Self Care  Current  CL= 60-79%. The severity rating is based on the Level of Assistance required for Functional Mobility and ADLs. OBJECTIVE DATA SUMMARY:   Cognitive/Behavioral Status:  Neurologic State: Alert  Orientation Level: Oriented X4  Cognition: Appropriate for age attention/concentration, Follows commands  Functional Mobility and Transfers for ADLs:              Bed Mobility:  Rolling: Moderate assistance  Supine to Sit: Moderate assistance; Additional time;Assist x1  Scooting: Moderate assistance; Additional time;Assist x1              Transfers:  Sit to Stand: Moderate assistance; Additional time;Assist x2  Bed to Chair: Moderate assistance; Additional time;Assist x2              Toilet Transfer : Moderate assistance;Assist x2              Balance:  Sitting: Impaired  Sitting - Static: Fair (occasional)  Sitting - Dynamic: Fair (occasional)  Standing: Impaired;Pull to stand; With support  Standing - Static: Constant support; Fair  Standing - Dynamic : Fair (minus; leans to left;, mod cues to maintain erect posture)  ADL Intervention:  Grooming  Washing Face: Supervision/set-up  Washing Hands: Supervision/set-up  Cues: Verbal cues provided  Upper Body [address]: Supervision/ set-up  Toileting  Bowel Hygiene: Total assistance (dependent)  Pain:  Pt reports 4/10 pain or discomfort in L shoulder prior to treatment. Pt reports 4/10 pain or discomfort  In L shoulder post treatment. Activity Tolerance:    Fair  Please refer to the flowsheet for vital signs taken during this treatment.   After treatment:   [X]  Patient left in no apparent distress sitting up in chair  [ ]  Patient left in no apparent distress in bed  [X]  Call bell left within reach  [X]  Nursing notified  [ ]  Caregiver present  [ ]  Bed alarm activated     ALEXANDRA Rutherford  Time Calculation: 21 mins

## 2017-08-31 NOTE — PROGRESS NOTES
Verbal report given by this writer to [place] at [address] at 1600. Transport pending for 1630 to [facility].

## 2017-08-31 NOTE — PROGRESS NOTES
Care Management Interventions  PCP Verified by CM: Yes  Physical Therapy Consult: Yes  Occupational Therapy Consult: Yes  Plan discussed with Pt/Family/Caregiver: Yes  Freedom of Choice Offered: Yes (For SNF)  Discharge Location  Discharge Placement: [address] has not been obtained for pt's transfer. CM left messages on [employer] 's (Roger Kimball) voicemail regarding updates on auth. Physician has been informed. CM contacted admission liaison at [facility] who shares that she has not heard from  and she's also left messages for her. CM was informed by admission liaison at [facility] that she was finally able to reach  and she was told that review will not be done until tomorrow.

## 2017-08-31 NOTE — PROGRESS NOTES
patient receieved wound care as per order from this writer at approx 11 am. Left leg is pink, no noted slough, non odorous. Rt foot noted open area cleansed and recovered with mepilex. Stage 1-2, with intact edges. No slough noted, non odorous.  Dressing dated and labeled by this writer

## 2017-08-31 NOTE — PROGRESS NOTES
NATALY contacted Mary Kay Acosta from [facility] who stated they are discussing the movement of patient rooms during their morning meeting. They will be able to accept the patient this afternoon.

## 2017-08-31 NOTE — PROGRESS NOTES
Bedside shift change report given to Huyen Guerrier RN (oncoming nurse) by Júnior Ochoa   (offgoing nurse). Report included the following information SBAR, Kardex, Intake/Output, MAR and Accordion.

## 2017-08-31 NOTE — PROGRESS NOTES
Problem: Mobility Impaired (Adult and Pediatric)  Goal: *Acute Goals and Plan of Care (Insert Text)  Physical Therapy Goals  Initiated 8/29/2017 and to be accomplished within 7 day(s)  1. Patient will move from supine to sit and sit to supine, scoot up and down and roll side to side in bed with supervision/set-up. 2. Patient will transfer from bed to chair and chair to bed with minimal assistance using the least restrictive device. 3. Patient will perform sit to stand with minimal assistance. 4. Patient will ambulate with minimal assistance for >20 feet with the least restrictive device. 5. Patient will demonstrate good seated and standing balance for improved safety during functional mobility. PHYSICAL THERAPY TREATMENT     Patient: Jose David Azar (32 y.o. male)  Date: 8/31/2017  Diagnosis: Pneumonia <principal problem not specified>       Precautions: Aspiration, Contact, Fall  Chart, physical therapy assessment, plan of care and goals were reviewed. ASSESSMENT:  Pt. Demonstrated decreased activity tolerance today. [place] with OT to maximize functional outcomes for transfers, standing and bed mobility. Pt. Required tiffanie care upon initial presentation. Assisted pt. In standing while OT and Nsg assisted pt. In clean up. Pt. Leaning towards the left with frequent cues to maintain erect posture. Pt. Is able to correct briefly however does not demonstrate carryover without cues. Pt. Was able to take a few side steps towards the chair, however demonstrated poor activity tolerance after standing for extended periods of time. Progression toward goals:  [ ]      Improving appropriately and progressing toward goals  [X]      Improving slowly and progressing toward goals  [ ]      Not making progress toward goals and plan of care will be adjusted       PLAN:  Patient continues to benefit from skilled intervention to address the above impairments.   Continue treatment per established plan of care.  Discharge Recommendations:  [facility]  Further Equipment Recommendations for Discharge:  rolling walker and wc       SUBJECTIVE:   Patient stated I am wet.       OBJECTIVE DATA SUMMARY:   Critical Behavior:  Neurologic State: Alert, Confused  Orientation Level: Oriented X4  Cognition: Memory loss     Functional Mobility Training:  Bed Mobility:  Rolling: Moderate assistance  Supine to Sit: Moderate assistance; Additional time;Assist x1     Scooting: Moderate assistance; Additional time;Assist x1  Level of Assistance: Moderate assistance              Interventions: Demonstration;Manual cues; Tactile cues; Verbal cues; Visual cues; Weight shifting training/Pressure relief  Transfers:  Sit to Stand: Moderate assistance; Additional time;Assist x2  Stand to Sit: Moderate assistance; Additional time;Assist x1        Bed to Chair: Moderate assistance; Additional time;Assist x2  Balance:  Sitting: Impaired  Sitting - Static: Fair (occasional)  Sitting - Dynamic: Fair (occasional)  Standing: Impaired;Pull to stand; With support  Standing - Static: Constant support; Fair  Standing - Dynamic : Fair (minus; leans to left;, mod cues to maintain erect posture)  Ambulation/Gait Training:  Distance (ft):  (2- 3 steps to chair)  Assistive Device: Walker  Ambulation - Level of Assistance: Moderate assistance; Additional time;Assist x2        Gait Abnormalities: Decreased step clearance;Shuffling gait        Base of Support: Center of gravity altered;Shift to left; Widened     Speed/Lili: Shuffled; Slow  Step Length: Left shortened;Right shortened        Interventions: Manual cues; Safety awareness training; Tactile cues; Verbal cues; Visual/Demos     Therapeutic Exercises:   Supine LE heel slides x 10  Pain:  Pain Scale 1: Numeric (0 - 10)  Pain Intensity 1: 0    \Activity Tolerance:   Fair-  Please refer to the flowsheet for vital signs taken during this treatment.   After treatment:   [X] Patient left in no apparent distress sitting up in chair  [ ] Patient left in no apparent distress in bed  [X] Call bell left within reach  [X] Nursing notified  [ ] Caregiver present  [ ] Bed alarm activated      Mine Beyer, PT   Time Calculation: 21 mins

## 2017-08-31 NOTE — PROGRESS NOTES
Medical Transport set up for today to [address] [address] for 4:30 p.m. Left voice message for Taco Hammer of  time. Notified Yael of  information and v/m message left for Taco Hammer.

## 2017-09-01 VITALS
HEART RATE: 102 BPM | HEIGHT: 65 IN | DIASTOLIC BLOOD PRESSURE: 75 MMHG | WEIGHT: 188 LBS | RESPIRATION RATE: 18 BRPM | SYSTOLIC BLOOD PRESSURE: 122 MMHG | OXYGEN SATURATION: 99 % | TEMPERATURE: 97.3 F | BODY MASS INDEX: 31.32 KG/M2

## 2017-09-01 LAB
BACTERIA SPEC CULT: NORMAL
GRAM STN SPEC: NORMAL
GRAM STN SPEC: NORMAL
SERVICE CMNT-IMP: NORMAL

## 2017-09-01 PROCEDURE — 97164 PT RE-EVAL EST PLAN CARE: CPT

## 2017-09-01 PROCEDURE — 97530 THERAPEUTIC ACTIVITIES: CPT

## 2017-09-01 PROCEDURE — 74011250636 HC RX REV CODE- 250/636: Performed by: FAMILY MEDICINE

## 2017-09-01 PROCEDURE — 74011250637 HC RX REV CODE- 250/637: Performed by: FAMILY MEDICINE

## 2017-09-01 RX ADMIN — ASPIRIN 81 MG 81 MG: 81 TABLET ORAL at 08:24

## 2017-09-01 RX ADMIN — FUROSEMIDE 40 MG: 40 TABLET ORAL at 08:24

## 2017-09-01 RX ADMIN — THERA TABS 1 TABLET: TAB at 08:24

## 2017-09-01 RX ADMIN — POLYETHYLENE GLYCOL 3350 17 G: 17 POWDER, FOR SOLUTION ORAL at 08:23

## 2017-09-01 RX ADMIN — ENOXAPARIN SODIUM 40 MG: 40 INJECTION SUBCUTANEOUS at 09:47

## 2017-09-01 RX ADMIN — Medication 250 MG: at 08:24

## 2017-09-01 RX ADMIN — LEVOFLOXACIN 750 MG: 750 TABLET, FILM COATED ORAL at 08:24

## 2017-09-01 RX ADMIN — AMLODIPINE BESYLATE 10 MG: 10 TABLET ORAL at 08:24

## 2017-09-01 RX ADMIN — CYANOCOBALAMIN TAB 1000 MCG 1000 MCG: 1000 TAB at 08:24

## 2017-09-01 RX ADMIN — FOLIC ACID 1 MG: 1 TABLET ORAL at 08:24

## 2017-09-01 RX ADMIN — FERROUS SULFATE TAB 325 MG (65 MG ELEMENTAL FE) 325 MG: 325 (65 FE) TAB at 08:24

## 2017-09-01 RX ADMIN — TAMSULOSIN HYDROCHLORIDE 0.4 MG: 0.4 CAPSULE ORAL at 08:24

## 2017-09-01 NOTE — PROGRESS NOTES
Problem: Mobility Impaired (Adult and Pediatric)  Goal: *Acute Goals and Plan of Care (Insert Text)  Physical Therapy Goals  Initiated 8/29/2017 and to be accomplished within 7 day(s), reviewed 9/1/2017  1. Patient will move from supine to sit and sit to supine, scoot up and down and roll side to side in bed with supervision/set-up. 2. Patient will transfer from bed to chair and chair to bed with minimal assistance using the least restrictive device. 3. Patient will perform sit to stand with minimal assistance. 4. Patient will ambulate with minimal assistance for >20 feet with the least restrictive device. 5. Patient will demonstrate good seated and standing balance for improved safety during functional mobility. Outcome: Progressing Towards Goal  PHYSICAL THERAPY RE-EVALUATION AND TREATMENT     Patient: Dionne Fonseca (7 y.o. male)  Date: 9/1/2017  Diagnosis: Pneumonia <principal problem not specified>       Precautions: Aspiration, Contact, Fall      ASSESSMENT:  Patient presents today supine in bed with HOB elevated, reports he is leaving today however agreeable to PT treatment in the meantime. Patient transferred to sitting EOB with Lindsey, performed sit to stand with Lindsey from elevated bed with Lindsey using RW. He transferred to locked recliner using RW, demonstrates impaired dynamic standing balance with altered center of gravity and crouched posture. Patient performed 4 trials of sit to stand training from low recliner, required Min-ModA X 2, trained in rocking for momentum however patient actively resists rocking assistance. Patient requirec consistent cuing for forward trunk lean during initiation of sit to stand transfers. He ambulated ~12 ft with RW forward and backward, requires Lindsey for balance. Patient assisted back to supine in bed per his request as he states he is waiting for transportation to D/C from hospital. Patient left with call bell in reach and HOB elevated.   Patient's progression toward goals since last assessment: Patient making slow progress towards established goals, requires consistent verbal and visual cuing for safe functional mobility training. PLAN:  Goals have been updated based on progression since last assessment. Patient continues to benefit from skilled intervention to address the above impairments. Continue to follow the patient 1-2 times per day/4-7 days per week to address goals. Planned Interventions:  [X]     Bed Mobility Training          [X]     Neuromuscular Re-Education  [X]     Transfer Training                [ ]    Orthotic/Prosthetic Training  [X]     Gait Training                       [ ]     Modalities  [X]     Therapeutic Exercises       [ ]     Edema Management/Control  [X]     Therapeutic Activities         [X]     Patient and Family Training/Education  [ ]     Other (comment):  Dis[facility] Recommendations: [facility]  Further Equipment Recommendations for Discharge: rolling walker       SUBJECTIVE:   Patient stated It's up to you.       OBJECTIVE DATA SUMMARY:   Critical Behavior:  Neurologic State: Alert  Orientation Level: Oriented X4  Cognition: Follows commands  Functional Mobility Training:  Bed Mobility:  Supine to Sit: Minimum assistance  Sit to Supine: Moderate assistance  Scooting: Minimum assistance  Transfers:  Sit to Stand: Minimum assistance; Moderate assistance;Assist x2 (5 repetitions)  Stand to Sit: Moderate assistance; Additional time (for eccentric control)  Interventions: Verbal cues; Tactile cues; Safety awareness training;Visual cues  Balance:  Sitting: Impaired  Sitting - Static: Fair (occasional)  Sitting - Dynamic: Fair (occasional)  Standing: Impaired; With support (RW)  Standing - Static: Fair  Standing - Dynamic : Fair (Fair -)  Ambulation/Gait Training:  Distance (ft): 12 Feet (ft)  Assistive Device: Walker  Ambulation - Level of Assistance: Minimal assistance; Additional time  Gait Abnormalities: Decreased step clearance;Trunk sway increased  Base of Support: Center of gravity altered  Speed/Lili: Pace decreased (<100 feet/min); Shuffled; Slow  Step Length: Left shortened;Right shortened  Interventions: Manual cues; Safety awareness training;Verbal cues  Pain:  Pain Scale 1: Numeric (0 - 10)  Pain Intensity 1: 0  Activity Tolerance:   Fair/good  Please refer to the flowsheet for vital signs taken during this treatment. After treatment:   [ ]  Patient left in no apparent distress sitting up in chair  [X]  Patient left in no apparent distress in bed  [X]  Call bell left within reach  [X]  Nursing notified Javier Rondon RN)  [ ]  Caregiver present  [ ]  Bed alarm activated     Feliz Johnston   Time Calculation: 23 mins      Mobility [id number] Current  CK= 40-59%   Goal  CJ= 20-39%. The severity rating is based on the Level of Assistance required for Functional Mobility and ADLs.

## 2017-09-01 NOTE — DISCHARGE SUMMARY
ADDENDUM:  Discharge was tentatively scheduled for two days ago, but was cancelled twice for logistical reasons. Patient has remained medically stable and is stable to transition to the nursing facility today. Please see previously dictated discharge summary for full hospital course and additional details.

## 2017-09-01 NOTE — PROGRESS NOTES
I have reviewed discharge instructions and given report to Estephanie Travis LPN at [facility].  Estella Nicholas RN

## 2017-09-01 NOTE — PROGRESS NOTES
Care Management Interventions  PCP Verified by CM: Yes  Mode of Transport at Discharge: [address]  [address] Time of Discharge: 1700  Physical Therapy Consult: Yes  Occupational Therapy Consult: Yes  Plan discussed with Pt/Family/Caregiver: Yes  Freedom of Choice Offered: Yes (For SNF)  Discharge Location  Discharge Placement: [address] has been obtained and transport  scheduled for 1700 with [facility]. Pt, nurse, physician and facility are aware. CM attempted to contact pt's son whose voicemail is full.

## 2017-09-01 NOTE — INTERDISCIPLINARY ROUNDS
IDR/SLIDR Summary          Patient: Marguerite Preciado MRN: [id number]    Age: 58 y.o. YOB: 1955 Room/Bed: [facility]   Admit Diagnosis: Pneumonia  Principal Diagnosis: <principal problem not specified>   Goals: Discharge today to [facility]  Readmission: NO  Quality Measure: PNA  VTE Prophylaxis: Chemical  Influenza Vaccine screening completed? YES  Pneumococcal Vaccine screening completed? YES  Mobility needs: Yes   Nutrition plan:Yes  Consults: P. T and O.T. Financial concerns:No  Escalated to CM? NO  RRAT Score: 7   Interventions:   Testing due for pt today?  NO  LOS: 10 days Expected length of stay 5 days  Discharge plan: SNF   PCP: Eloy Hendrickson MD  Transportation needs: Yes    Days before discharge:two or more days before discharge   Discharge disposition: SNF    Signed:     Mahad De Jesus RN  9/1/2017  10:18 AM

## 2017-09-01 NOTE — PROGRESS NOTES
[facility] Hospitalist Group  Progress Note    Patient: Marguerite  Age: 58 y.o. : 1955 MR#: [id number] SSN: [id number]  Date/Time: 2017 5:03 PM    Subjective/24-hour events:     Remains clinically stable. Assessment:   HCAP, RLL  Large R effusion, likely parapneumonic s/p thoracentesis  R pneumothorax ruled out  HTN  Iron deficiency anemia  Nasal MRSA colonization  Dorsal R foot vascular wound, POA and Pretibial LLE vascular wound, POA - without significant PAD  and Pretibial Left;Lateral  vascular wound POA with PVD (or without PVD)  Mild malnutrition    Plan:  Discharge remains on hold again today due to logistical issues. Just notified by [facility]. Re-attempt tomorrow. Case discussed with:  []Patient  []Family  []Nursing  [x]Case Management  DVT Prophylaxis:  [x]Lovenox  []Hep SQ  []SCDs  []Coumadin   []On Heparin gtt    Objective:   VS:   Visit Vitals    BP (!) 144/97 (BP 1 Location: Right arm, BP Patient Position: At rest)    Pulse (!) 109    Temp 97.2 °F (36.2 °C)    Resp 19    Ht 5' 5\" (1.651 m)    Wt 85.3 kg (188 lb)    SpO2 100%    BMI 31.28 kg/m2      Tmax/24hrs: Temp (24hrs), Av.2 °F (36.8 °C), Min:97.2 °F (36.2 °C), Max:99.4 °F (37.4 °C)    General:  In NAD. Nontoxic-appearing. Cardiovascular:  RRR. Pulmonary:  No wheezes, effort nonlabored. GI:  Abdomen soft, NTTP. Extremities:  No ischemia. Additional:  Awake and alert. Labs:    No results found for this or any previous visit (from the past 24 hour(s)).     Signed By: Abril Barajas MD     2017 5:03 PM

## 2017-09-01 NOTE — ROUTINE PROCESS
Bedside and Verbal shift change report given to Giacomo Hill RN  (oncoming nurse) by Gabriel Torrez RN (offgoing nurse). Report included the following information SBAR, Kardex, MAR and Recent Results.     SITUATION:    Code Status: Full Code   Reason for Admission: Pneumonia    [facility] day: 10   Problem List:       Hospital Problems  Date Reviewed: 7/11/2017          Codes Class Noted POA    Pneumonia ICD-10-CM: J18.9  ICD-9-CM: 631  8/22/2017 Unknown              BACKGROUND:    Past Medical History:   Past Medical History:   Diagnosis Date    Hiccups     Hypertension     Hyponatremia     Lacunar infarction ([facility] [address] 2010    Lower extremity edema     Psychogenic polydipsia     Spinal stenosis          Patient taking anticoagulants yes     ASSESSMENT:    Changes in Assessment Throughout Shift: none     Patient has Central Line: no Reasons if yes: n/a   Patient has Reid Cath: no Reasons if yes: n/a      Last Vitals:     Vitals:    08/31/17 1543 08/31/17 1913 08/31/17 2300 09/01/17 0335   BP: 126/82 (!) 144/97 138/72 126/78   Pulse: (!) 107 (!) 109 99 93   Resp: 18 19 19 18   Temp: 97.5 °F (36.4 °C) 97.2 °F (36.2 °C) 97.4 °F (36.3 °C) 97.6 °F (36.4 °C)   SpO2: 98% 100% 100% 100%   Weight:       Height:            IV and DRAINS (will only show if present)   [REMOVED] Peripheral IV 08/26/17 Right Wrist-Site Assessment: Clean, dry, & intact  [REMOVED] Peripheral IV 08/29/17 Left Forearm-Site Assessment: Clean, dry, & intact  [REMOVED] Peripheral IV 08/22/17 Right Hand-Site Assessment: Clean, dry, & intact  [REMOVED] Peripheral IV 08/22/17 Left Hand-Site Assessment: Clean, dry, & intact     WOUND (if present)   Wound Type:  L leg ulcer   Dressing present Dressing Present : No   Wound Concerns/Notes:  none     PAIN    Pain Assessment    Pain Intensity 1: 0 (09/01/17 0400)    Pain Location 1: Back, Chest    Pain Intervention(s) 1: Medication (see MAR), Distraction, Repositioned    Patient Stated Pain Goal: 0  o Interventions for Pain:  none  o Intervention effective: no c/o pain  o Time of last intervention: n/a   o Reassessment Completed: yes      Last 3 Weights:  Last 3 Recorded Weights in this Encounter    08/22/17 1153 08/26/17 0414 08/28/17 0407   Weight: 65.8 kg (145 lb) 84.8 kg (187 lb) 85.3 kg (188 lb)     Weight change:      INTAKE/OUPUT    Current Shift:      Last three shifts: 08/30 0701 - 08/31 1900  In: 600 [P.O.:600]  Out: -      LAB RESULTS     No results for input(s): WBC, HGB, HCT, PLT, HGBEXT, HCTEXT, PLTEXT, HGBEXT, HCTEXT, PLTEXT in the last 72 hours. No results for input(s): NA, K, GLU, BUN, CREA, CA, MG, INR in the last 72 hours. No lab exists for component: PT, PTT, INREXT, INREXT    RECOMMENDATIONS AND DISCHARGE PLANNING     1. Pending tests/procedures/ Plan of Care or Other Needs: none    2. Discharge plan for patient and Needs/Barriers: home    3. Estimated Discharge Date: [id number] Posted on Whiteboard in [facility] [place]: yes      4. The patient's care plan was reviewed with the oncoming nurse. \"HEALS\" SAFETY CHECK      Fall Risk    Total Score: 5    Safety Measures: Safety Measures: Bed/Chair-Wheels locked, Bed in low position, Call light within reach    A safety check occurred in the patient's room between off going nurse and oncoming nurse listed above.     The safety check included the below items  Area Items   H  High Alert Medications - Verify all high alert medication drips (heparin, PCA, etc.)   E  Equipment - Suction is set up for ALL patients (with yanker)  - Red plugs utilized for all equipment (IV pumps, etc.)  - WOWs wiped down at end of shift.  - Room stocked with oxygen, suction, and other unit-specific supplies   A  Alarms - Bed alarm is set for fall risk patients  - Ensure chair alarm is in place and activated if patient is up in a chair   L  Lines - Check IV for any infiltration  - Reid bag is empty if patient has a Reid   - Tubing and IV bags are labeled   S  Safety   - Room is clean, patient is clean, and equipment is clean. - Hallways are clear from equipment besides carts. - Fall bracelet on for fall risk patients  - Ensure room is clear and free of clutter  - Suction is set up for ALL patients (with ellaker)  - Hallways are clear from equipment besides carts.    - Isolation precautions followed, supplies available outside room, sign posted     Jonnie Perdomo RN

## 2017-09-16 ENCOUNTER — HOSPITAL ENCOUNTER (INPATIENT)
Age: 62
LOS: 6 days | Discharge: NURSING, REHAB OR LONG-TERM CARE | DRG: 065 | End: 2017-09-22
Attending: EMERGENCY MEDICINE | Admitting: FAMILY MEDICINE
Payer: COMMERCIAL

## 2017-09-16 ENCOUNTER — APPOINTMENT (OUTPATIENT)
Dept: CT IMAGING | Age: 62
DRG: 065 | End: 2017-09-16
Attending: EMERGENCY MEDICINE
Payer: COMMERCIAL

## 2017-09-16 ENCOUNTER — APPOINTMENT (OUTPATIENT)
Dept: MRI IMAGING | Age: 62
DRG: 065 | End: 2017-09-16
Attending: EMERGENCY MEDICINE
Payer: COMMERCIAL

## 2017-09-16 ENCOUNTER — APPOINTMENT (OUTPATIENT)
Dept: CT IMAGING | Age: 62
DRG: 065 | End: 2017-09-16
Attending: PSYCHIATRY & NEUROLOGY
Payer: COMMERCIAL

## 2017-09-16 ENCOUNTER — APPOINTMENT (OUTPATIENT)
Dept: GENERAL RADIOLOGY | Age: 62
DRG: 065 | End: 2017-09-16
Attending: PSYCHIATRY & NEUROLOGY
Payer: COMMERCIAL

## 2017-09-16 ENCOUNTER — APPOINTMENT (OUTPATIENT)
Dept: GENERAL RADIOLOGY | Age: 62
DRG: 065 | End: 2017-09-16
Attending: EMERGENCY MEDICINE
Payer: COMMERCIAL

## 2017-09-16 DIAGNOSIS — R53.1 RIGHT SIDED WEAKNESS: Primary | ICD-10-CM

## 2017-09-16 PROBLEM — I63.9 STROKE (HCC): Status: ACTIVE | Noted: 2017-09-16

## 2017-09-16 LAB
ALBUMIN SERPL-MCNC: 2.9 G/DL (ref 3.4–5)
ALBUMIN/GLOB SERPL: 0.5 {RATIO} (ref 0.8–1.7)
ALP SERPL-CCNC: 72 U/L (ref 45–117)
ALT SERPL-CCNC: 11 U/L (ref 16–61)
ANION GAP SERPL CALC-SCNC: 4 MMOL/L (ref 3–18)
APPEARANCE UR: CLEAR
AST SERPL-CCNC: 8 U/L (ref 15–37)
ATRIAL RATE: 105 BPM
BACTERIA URNS QL MICRO: ABNORMAL /HPF
BASOPHILS # BLD: 0 K/UL (ref 0–0.1)
BASOPHILS NFR BLD: 0 % (ref 0–2)
BILIRUB SERPL-MCNC: 0.3 MG/DL (ref 0.2–1)
BILIRUB UR QL: NEGATIVE
BUN SERPL-MCNC: 10 MG/DL (ref 7–18)
BUN/CREAT SERPL: 13 (ref 12–20)
CALCIUM SERPL-MCNC: 9.1 MG/DL (ref 8.5–10.1)
CALCULATED P AXIS, ECG09: 50 DEGREES
CALCULATED R AXIS, ECG10: 47 DEGREES
CALCULATED T AXIS, ECG11: 9 DEGREES
CHLORIDE SERPL-SCNC: 106 MMOL/L (ref 100–108)
CO2 SERPL-SCNC: 30 MMOL/L (ref 21–32)
COLOR UR: YELLOW
CREAT SERPL-MCNC: 0.78 MG/DL (ref 0.6–1.3)
DIAGNOSIS, 93000: NORMAL
DIFFERENTIAL METHOD BLD: ABNORMAL
EOSINOPHIL # BLD: 0.3 K/UL (ref 0–0.4)
EOSINOPHIL NFR BLD: 6 % (ref 0–5)
EPITH CASTS URNS QL MICRO: ABNORMAL /LPF (ref 0–5)
ERYTHROCYTE [DISTWIDTH] IN BLOOD BY AUTOMATED COUNT: 14.6 % (ref 11.6–14.5)
GLOBULIN SER CALC-MCNC: 5.4 G/DL (ref 2–4)
GLUCOSE BLD STRIP.AUTO-MCNC: 102 MG/DL (ref 70–110)
GLUCOSE BLD STRIP.AUTO-MCNC: 82 MG/DL (ref 70–110)
GLUCOSE BLD STRIP.AUTO-MCNC: 90 MG/DL (ref 70–110)
GLUCOSE SERPL-MCNC: 106 MG/DL (ref 74–99)
GLUCOSE UR STRIP.AUTO-MCNC: NEGATIVE MG/DL
HCT VFR BLD AUTO: 34.2 % (ref 36–48)
HGB BLD-MCNC: 11.6 G/DL (ref 13–16)
HGB UR QL STRIP: NEGATIVE
KETONES UR QL STRIP.AUTO: NEGATIVE MG/DL
LACTATE BLD-SCNC: 1.5 MMOL/L (ref 0.4–2)
LEUKOCYTE ESTERASE UR QL STRIP.AUTO: ABNORMAL
LYMPHOCYTES # BLD: 1.7 K/UL (ref 0.9–3.6)
LYMPHOCYTES NFR BLD: 28 % (ref 21–52)
MCH RBC QN AUTO: 26.5 PG (ref 24–34)
MCHC RBC AUTO-ENTMCNC: 33.9 G/DL (ref 31–37)
MCV RBC AUTO: 78.1 FL (ref 74–97)
MONOCYTES # BLD: 0.2 K/UL (ref 0.05–1.2)
MONOCYTES NFR BLD: 4 % (ref 3–10)
NEUTS SEG # BLD: 3.6 K/UL (ref 1.8–8)
NEUTS SEG NFR BLD: 62 % (ref 40–73)
NITRITE UR QL STRIP.AUTO: NEGATIVE
P-R INTERVAL, ECG05: 210 MS
PH UR STRIP: >8.5 [PH] (ref 5–8)
PLATELET # BLD AUTO: 202 K/UL (ref 135–420)
PMV BLD AUTO: 9.5 FL (ref 9.2–11.8)
POTASSIUM SERPL-SCNC: 3.7 MMOL/L (ref 3.5–5.5)
PROT SERPL-MCNC: 8.3 G/DL (ref 6.4–8.2)
PROT UR STRIP-MCNC: NEGATIVE MG/DL
Q-T INTERVAL, ECG07: 320 MS
QRS DURATION, ECG06: 96 MS
QTC CALCULATION (BEZET), ECG08: 422 MS
RBC # BLD AUTO: 4.38 M/UL (ref 4.7–5.5)
RBC #/AREA URNS HPF: ABNORMAL /HPF (ref 0–5)
SODIUM SERPL-SCNC: 140 MMOL/L (ref 136–145)
SP GR UR REFRACTOMETRY: 1.01 (ref 1–1.03)
UROBILINOGEN UR QL STRIP.AUTO: 0.2 EU/DL (ref 0.2–1)
VENTRICULAR RATE, ECG03: 105 BPM
WBC # BLD AUTO: 5.8 K/UL (ref 4.6–13.2)
WBC URNS QL MICRO: ABNORMAL /HPF (ref 0–4)

## 2017-09-16 PROCEDURE — 82962 GLUCOSE BLOOD TEST: CPT

## 2017-09-16 PROCEDURE — 85025 COMPLETE CBC W/AUTO DIFF WBC: CPT | Performed by: EMERGENCY MEDICINE

## 2017-09-16 PROCEDURE — 83605 ASSAY OF LACTIC ACID: CPT

## 2017-09-16 PROCEDURE — 93005 ELECTROCARDIOGRAM TRACING: CPT

## 2017-09-16 PROCEDURE — 65660000000 HC RM CCU STEPDOWN

## 2017-09-16 PROCEDURE — 80053 COMPREHEN METABOLIC PANEL: CPT | Performed by: EMERGENCY MEDICINE

## 2017-09-16 PROCEDURE — 81001 URINALYSIS AUTO W/SCOPE: CPT | Performed by: EMERGENCY MEDICINE

## 2017-09-16 PROCEDURE — 87040 BLOOD CULTURE FOR BACTERIA: CPT | Performed by: EMERGENCY MEDICINE

## 2017-09-16 PROCEDURE — 74000 XR ABD (KUB): CPT

## 2017-09-16 PROCEDURE — 99285 EMERGENCY DEPT VISIT HI MDM: CPT

## 2017-09-16 PROCEDURE — 71010 XR CHEST PORT: CPT

## 2017-09-16 PROCEDURE — 74011250636 HC RX REV CODE- 250/636: Performed by: PSYCHIATRY & NEUROLOGY

## 2017-09-16 PROCEDURE — 70450 CT HEAD/BRAIN W/O DYE: CPT

## 2017-09-16 PROCEDURE — 74011250636 HC RX REV CODE- 250/636: Performed by: FAMILY MEDICINE

## 2017-09-16 RX ORDER — PRAVASTATIN SODIUM 20 MG/1
40 TABLET ORAL
Status: DISCONTINUED | OUTPATIENT
Start: 2017-09-16 | End: 2017-09-22 | Stop reason: HOSPADM

## 2017-09-16 RX ORDER — SODIUM CHLORIDE 0.9 % (FLUSH) 0.9 %
5-10 SYRINGE (ML) INJECTION AS NEEDED
Status: DISCONTINUED | OUTPATIENT
Start: 2017-09-16 | End: 2017-09-22 | Stop reason: HOSPADM

## 2017-09-16 RX ORDER — BACLOFEN 10 MG/1
10 TABLET ORAL
Status: DISCONTINUED | OUTPATIENT
Start: 2017-09-16 | End: 2017-09-22 | Stop reason: HOSPADM

## 2017-09-16 RX ORDER — ENOXAPARIN SODIUM 100 MG/ML
40 INJECTION SUBCUTANEOUS EVERY 24 HOURS
Status: DISCONTINUED | OUTPATIENT
Start: 2017-09-16 | End: 2017-09-22 | Stop reason: HOSPADM

## 2017-09-16 RX ORDER — ONDANSETRON 2 MG/ML
4 INJECTION INTRAMUSCULAR; INTRAVENOUS
Status: DISCONTINUED | OUTPATIENT
Start: 2017-09-16 | End: 2017-09-22 | Stop reason: HOSPADM

## 2017-09-16 RX ORDER — SODIUM CHLORIDE 0.9 % (FLUSH) 0.9 %
5-10 SYRINGE (ML) INJECTION EVERY 8 HOURS
Status: DISCONTINUED | OUTPATIENT
Start: 2017-09-16 | End: 2017-09-22 | Stop reason: HOSPADM

## 2017-09-16 RX ORDER — LANOLIN ALCOHOL/MO/W.PET/CERES
1000 CREAM (GRAM) TOPICAL DAILY
Status: DISCONTINUED | OUTPATIENT
Start: 2017-09-17 | End: 2017-09-22 | Stop reason: HOSPADM

## 2017-09-16 RX ORDER — OXYCODONE AND ACETAMINOPHEN 5; 325 MG/1; MG/1
1 TABLET ORAL
Status: DISCONTINUED | OUTPATIENT
Start: 2017-09-16 | End: 2017-09-22 | Stop reason: HOSPADM

## 2017-09-16 RX ORDER — AMOXICILLIN 250 MG
1 CAPSULE ORAL
Status: DISCONTINUED | OUTPATIENT
Start: 2017-09-16 | End: 2017-09-22 | Stop reason: HOSPADM

## 2017-09-16 RX ORDER — GUAIFENESIN 100 MG/5ML
81 LIQUID (ML) ORAL DAILY
Status: DISCONTINUED | OUTPATIENT
Start: 2017-09-17 | End: 2017-09-22 | Stop reason: HOSPADM

## 2017-09-16 RX ORDER — LANOLIN ALCOHOL/MO/W.PET/CERES
325 CREAM (GRAM) TOPICAL
Status: DISCONTINUED | OUTPATIENT
Start: 2017-09-17 | End: 2017-09-22 | Stop reason: HOSPADM

## 2017-09-16 RX ORDER — TAMSULOSIN HYDROCHLORIDE 0.4 MG/1
0.4 CAPSULE ORAL DAILY
Status: DISCONTINUED | OUTPATIENT
Start: 2017-09-17 | End: 2017-09-22 | Stop reason: HOSPADM

## 2017-09-16 RX ORDER — HALOPERIDOL 5 MG/ML
2 INJECTION INTRAMUSCULAR ONCE
Status: COMPLETED | OUTPATIENT
Start: 2017-09-16 | End: 2017-09-16

## 2017-09-16 RX ORDER — FOLIC ACID 1 MG/1
1 TABLET ORAL DAILY
Status: DISCONTINUED | OUTPATIENT
Start: 2017-09-17 | End: 2017-09-22 | Stop reason: HOSPADM

## 2017-09-16 RX ORDER — THERA TABS 400 MCG
1 TAB ORAL DAILY
Status: DISCONTINUED | OUTPATIENT
Start: 2017-09-17 | End: 2017-09-22 | Stop reason: HOSPADM

## 2017-09-16 RX ORDER — POLYETHYLENE GLYCOL 3350 17 G/17G
17 POWDER, FOR SOLUTION ORAL DAILY
Status: DISCONTINUED | OUTPATIENT
Start: 2017-09-17 | End: 2017-09-22 | Stop reason: HOSPADM

## 2017-09-16 RX ORDER — GUAIFENESIN 100 MG/5ML
81 LIQUID (ML) ORAL
Status: DISPENSED | OUTPATIENT
Start: 2017-09-16 | End: 2017-09-16

## 2017-09-16 RX ADMIN — HALOPERIDOL LACTATE 2 MG: 5 INJECTION, SOLUTION INTRAMUSCULAR at 17:10

## 2017-09-16 RX ADMIN — ENOXAPARIN SODIUM 40 MG: 40 INJECTION SUBCUTANEOUS at 15:36

## 2017-09-16 RX ADMIN — Medication 10 ML: at 14:21

## 2017-09-16 RX ADMIN — ONDANSETRON 4 MG: 2 INJECTION INTRAMUSCULAR; INTRAVENOUS at 15:48

## 2017-09-16 RX ADMIN — Medication 10 ML: at 21:23

## 2017-09-16 NOTE — ED TRIAGE NOTES
PT arrived via EMS from [address] [facility], last known well last night, AM nurse found patient with right sided weakness and facial droop, patient c/o difficulty breathing,

## 2017-09-16 NOTE — ED NOTES
Condom catheter disconnected. The patient was incontinent of urine. The patient was cleaned. Linens changed. The patient was repositioned to comfort.

## 2017-09-16 NOTE — IP AVS SNAPSHOT
Kelsey Gonzalez 
 
 
 [address] [facility] [address] Devlin Patient: Eric Perez MRN: [id number] IVF:1/7/5092 You are allergic to the following No active allergies Recent Documentation Weight BMI Smoking Status 80.7 kg 29.62 kg/m2 Former Smoker Emergency Contacts Name Discharge Info Relation Home Work Mobile [facility] DISCHARGE CAREGIVER [3] Son [22]   [phone] Mary Preciado  Daughter [21] [phone] Mary Preciado  Child [2] [phone] Hermelindo Goel  Child [2] [phone] About your hospitalization You were admitted on:  September 16, 2017 You last received care in the:  [facility] [address] You were discharged on:  September 22, 2017 Unit phone number:  [phone] Why you were hospitalized Your primary diagnosis was:  Not on File Your diagnoses also included:  Stroke (Hcc) Providers Seen During Your Hospitalizations Provider Role Specialty Primary office phone Kaushal Moreno MD Attending Provider Emergency Medicine [phone] Yoon Bianchi MD Attending Provider [facility] [phone] Your Primary Care Physician (PCP) Primary Care Physician Office Phone Office Fax Fransico Laker [phone] Follow-up Information Follow up With Details Comments Contact Info Jamila Cabrera MD   [address] [facility] [place] [address] [phone] 
  
   patient going to snf no appt needed at this time [address] [address] [phone] Current Discharge Medication List  
  
START taking these medications Dose & Instructions Dispensing Information Comments Morning Noon Evening Bedtime  
 clopidogrel 75 mg Tab Commonly known as:  PLAVIX Your last dose was: Your next dose is: Dose:  75 mg Take 1 Tab by mouth daily. Quantity:  90 Tab Refills:  0  
     
   
   
   
  
 levETIRAcetam 750 mg tablet Commonly known as:  KEPPRA Your last dose was: Your next dose is:    
   
   
 Dose:  750 mg Take 1 Tab by mouth two (2) times a day. Quantity:  60 Tab Refills:  1  
     
   
   
   
  
 pravastatin 40 mg tablet Commonly known as:  PRAVACHOL Your last dose was: Your next dose is:    
   
   
 Dose:  40 mg Take 1 Tab by mouth nightly. Quantity:  30 Tab Refills:  1 CONTINUE these medications which have CHANGED Dose & Instructions Dispensing Information Comments Morning Noon Evening Bedtime  
 baclofen 10 mg tablet Commonly known as:  LIORESAL What changed:   
- reasons to take this 
- additional instructions Your last dose was: Your next dose is:    
   
   
 Dose:  10 mg Take 1 Tab by mouth every twelve (12) hours as needed. Quantity:  15 Tab Refills:  0  
     
   
   
   
  
 metoprolol tartrate 25 mg tablet Commonly known as:  LOPRESSOR What changed:  additional instructions Your last dose was: Your next dose is:    
   
   
 Dose:  25 mg Take 1 Tab by mouth two (2) times a day. Quantity:  60 Tab Refills:  2 CONTINUE these medications which have NOT CHANGED Dose & Instructions Dispensing Information Comments Morning Noon Evening Bedtime  
 ascorbic acid (vitamin C) 250 mg tablet Commonly known as:  VITAMIN C Your last dose was: Your next dose is:    
   
   
 Dose:  250 mg Take 1 Tab by mouth daily. Quantity:  30 Tab Refills:  2  
     
   
   
   
  
 aspirin 81 mg chewable tablet Your last dose was: Your next dose is:    
   
   
 Dose:  81 mg Take 1 Tab by mouth daily. Quantity:  30 Tab Refills:  0  
     
   
   
   
  
 cyanocobalamin 1,000 mcg tablet Your last dose was: Your next dose is:    
   
   
 Dose:  1000 mcg Take 1 Tab by mouth daily. Quantity:  30 Tab Refills:  2  
     
   
   
   
  
 ferrous sulfate 325 mg (65 mg iron) tablet Your last dose was: Your next dose is:    
   
   
 Dose:  325 mg Take 1 Tab by mouth daily (with breakfast). Quantity:  30 Tab Refills:  2 FLOMAX 0.4 mg capsule Generic drug:  tamsulosin Your last dose was: Your next dose is:    
   
   
 Dose:  0.4 mg Take 0.4 mg by mouth daily. 1 tab daily Refills:  0  
     
   
   
   
  
 folic acid 1 mg tablet Commonly known as:  Google Your last dose was: Your next dose is:    
   
   
 Dose:  1 mg Take 1 Tab by mouth daily. Quantity:  30 Tab Refills:  1  
     
   
   
   
  
 polyethylene glycol 17 gram packet Commonly known as:  Isaías Heredia Your last dose was: Your next dose is:    
   
   
 Dose:  17 g Take 1 Packet by mouth daily. Quantity:  30 Packet Refills:  2  
     
   
   
   
  
 senna-docusate 8.6-50 mg per tablet Commonly known as:  Mingoon Carlie Your last dose was: Your next dose is:    
   
   
 Dose:  1 Tab Take 1 Tab by mouth nightly. HOLD for loose stool. Quantity:  30 Tab Refills:  2 THERA MULTI-VITAMIN PO Your last dose was: Your next dose is:    
   
   
 Dose:  500 mg Take 500 mg by mouth daily. Refills:  0 STOP taking these medications   
 amLODIPine 10 mg tablet Commonly known as:  NORVASC  
   
  
 LASIX 40 mg tablet Generic drug:  furosemide  
   
  
 levoFLOXacin 750 mg tablet Commonly known as:  LEVAQUIN  
   
  
 oxyCODONE-acetaminophen 5-325 mg per tablet Commonly known as:  PERCOCET Where to Get Your Medications These medications were sent to [facility] [place], [place] - [address] [place], [address]  [address] Hours:  24-hours Phone:  [phone]  
  clopidogrel 75 mg Tab  
 levETIRAcetam 750 mg tablet  
 pravastatin 40 mg tablet Discharge Instructions Stroke: Care Instructions Your Care Instructions You have had a stroke. This means that the blood flow to a part of your brain was blocked for some time, which damages the nerve cells in that part of the brain. The part of your body controlled by that part of your brain may not function properly now. The brain is an amazing organ that can heal itself to some degree. The stroke you had damaged part of your brain. But other parts of your brain may take over in some way for the damaged areas. You have already started this process. Your doctor will talk with you about what you can do to prevent another stroke. High blood pressure, high cholesterol, and diabetes are all risk factors for stroke. If you have any of these conditions, work with your doctor to make sure they are under control. Other risk factors for stroke include being overweight, smoking, and not getting regular exercise. Going home may be hard for you and your family. The more you can try to do for yourself, the better. Remember to take each day one at a time. Follow-up care is a key part of your treatment and safety. Be sure to make and go to all appointments, and call your doctor if you are having problems. It's also a good idea to know your test results and keep a list of the medicines you take. How can you care for yourself at home? · Enter a stroke rehabilitation (rehab) program, if your doctor recommends it. Physical, speech, and occupational therapies can help you manage bathing, dressing, eating, and other basics of daily living. · Do not drive until your doctor says it is okay. · It is normal to feel sad or depressed after a stroke.  If these feelings last, talk to your doctor. · If you are having problems with urine leakage, go to the bathroom at regular times, including when you first wake up and at bedtime. Also, limit fluids after dinner. · If you are constipated, drink plenty of fluids, enough so that your urine is light yellow or clear like water. If you have kidney, heart, or liver disease and have to limit fluids, talk with your doctor before you increase the amount of fluids you drink. Set up a regular time for using the toilet. If you continue to have constipation, your doctor may suggest using a bulking agent, such as Metamucil, or a stool softener, laxative, or enema. Medicines · Take your medicines exactly as prescribed. Call your doctor if you think you are having a problem with your medicine. You may be taking several medicines. ACE (angiotensin-converting enzyme) inhibitors, angiotensin II receptor blockers (ARBs), beta-blockers, diuretics (water pills), and calcium channel blockers control your blood pressure. Statins help lower cholesterol. Your doctor may also prescribe medicines for depression, pain, sleep problems, anxiety, or agitation. · If your doctor has given you a blood thinner to prevent another stroke, be sure you get instructions about how to take your medicine safely. Blood thinners can cause serious bleeding problems. · Do not take any over-the-counter medicines or herbal products without talking to your doctor first. 
· If you take birth control pills or hormone therapy, talk to your doctor about whether they are right for you. For family members and caregivers · Make the home safe. Set up a room so that your loved one does not have to climb stairs. Be sure the bathroom is on the same floor. Move throw rugs and furniture that could cause falls. Make sure that the lighting is good. Put grab bars and seats in tubs and showers. · Find out what your loved one can do and what he or she needs help with. Try not to do things for your loved one that your loved one can do on his or her own. Help him or her learn and practice new skills. · Visit and talk with your loved one often. Try doing activities together that you both enjoy, such as playing cards or board games. Keep in touch with your loved one's friends as much as you can. Encourage them to visit. · Take care of yourself. Do not try to do everything yourself. Ask other family members to help. Eat well, get enough rest, and take time to do things that you enjoy. Keep up with your own doctor visits, and make sure to take your medicines regularly. Get out of the house as much as you can. Join a local support group. Find out if you qualify for home health care visits to help with rehab or for adult day care. When should you call for help? Call 911 anytime you think you may need emergency care. For example, call if: 
· You have signs of another stroke. These may include: 
¨ Sudden numbness, tingling, weakness, or loss of movement in your face, arm, or leg, especially on only one side of your body. ¨ Sudden vision changes. ¨ Sudden trouble speaking. ¨ Sudden confusion or trouble understanding simple statements. ¨ Sudden problems with walking or balance. ¨ A sudden, severe headache that is different from past headaches. Call 911 even if these symptoms go away in a few minutes. Call your doctor now or seek immediate medical care if: 
· You have new symptoms that may be related to your stroke, such as falls or trouble swallowing. Watch closely for changes in your health, and be sure to contact your doctor if you have any problems. Where can you learn more? Go to http://lainey-millie.info/. Enter U931 in the search box to learn more about \"Stroke: Care Instructions. \" Current as of: March 20, 2017 Content Version: 11.3 © 2623-4293 [employer].  Care instructions adapted under license by [address] (which disclaims liability or warranty for this information). If you have questions about a medical condition or this instruction, always ask your healthcare professional. [address] any warranty or liability for your use of this information. Patient armband removed and shredded MyChart Activation Thank you for requesting access to [employer]. Please follow the instructions below to securely access and download your online medical record. [employer] allows you to send messages to your doctor, view your test results, renew your prescriptions, schedule appointments, and more. How Do I Sign Up? 1. In your internet browser, go to [email or web address] 
2. Click on the First Time User? Click Here link in the Sign In box. You will be redirect to the New Member Sign Up page. 3. Enter your [employer] Access Code exactly as it appears below. You will not need to use this code after youve completed the sign-up process. If you do not sign up before the expiration date, you must request a new code. [employer] Access Code: [email or web address] Expires: 2017  8:38 AM (This is the date your [employer] access code will ) 4. Enter the last four digits of your Social Security Number (xxxx) and Date of Birth (mm/dd/yyyy) as indicated and click Submit. You will be taken to the next sign-up page. 5. Create a [employer] ID. This will be your [employer] login ID and cannot be changed, so think of one that is secure and easy to remember. 6. Create a [employer] password. You can change your password at any time. 7. Enter your Password Reset Question and Answer. This can be used at a later time if you forget your password. 8. Enter your e-mail address. You will receive e-mail notification when new information is available in [address] 9. Click Sign Up. You can now view and download portions of your medical record. 10. Click the Download Summary menu link to download a portable copy of your medical information. Additional Information If you have questions, please visit the Frequently Asked Questions section of the [employer] website at [email or web address] Remember, [employer] is NOT to be used for urgent needs. For medical emergencies, dial 911. DISCHARGE SUMMARY from Nurse The following personal items are in your possession at time of discharge: 
 
  
Visual Aid: None PATIENT INSTRUCTIONS: 
 
 
F-face looks uneven A-arms unable to move or move unevenly S-speech slurred or non-existent T-time-call 911 as soon as signs and symptoms begin-DO NOT go Back to bed or wait to see if you get better-TIME IS BRAIN. Warning Signs of HEART ATTACK Call 911 if you have these symptoms: 
? Chest discomfort. Most heart attacks involve discomfort in the center of the chest that lasts more than a few minutes, or that goes away and comes back. It can feel like uncomfortable pressure, squeezing, fullness, or pain. ? Discomfort in other areas of the upper body. Symptoms can include pain or discomfort in one or both arms, the back, neck, jaw, or stomach. ? Shortness of breath with or without chest discomfort. ? Other signs may include breaking out in a cold sweat, nausea, or lightheadedness. Don't wait more than five minutes to call [address]! Fast action can save your life. Calling 911 is almost always the fastest way to get lifesaving treatment.  Emergency Medical Services staff can begin treatment when they arrive  up to an hour sooner than if someone gets to the hospital by car. The discharge information has been reviewed with the patient and caregiver. The patient and caregiver verbalized understanding. Discharge medications reviewed with the patient and caregiver and appropriate educational materials and side effects teaching were provided. Discharge Orders None [employer] Announcement We are excited to announce that we are making your provider's discharge notes available to you in ABB. You will see these notes when they are completed and signed by the physician that discharged you from your recent hospital stay. If you have any questions or concerns about any information you see in ABB, please call the Health Information Department where you were seen or reach out to your Primary Care Provider for more information about your plan of care. Introducing [facility] SERVICES! Hyacinth Francis introduces ABB patient portal. Now you can access parts of your medical record, email your doctor's office, and request medication refills online. 1. In your internet browser, go to [email or web address] 2. Click on the First Time User? Click Here link in the Sign In box. You will see the New Member Sign Up page. 3. Enter your ABB Access Code exactly as it appears below. You will not need to use this code after youve completed the sign-up process. If you do not sign up before the expiration date, you must request a new code. · ABB Access Code: [email or web address] Expires: 12/19/2017  8:38 AM 
 
4. Enter the last four digits of your Social Security Number (xxxx) and Date of Birth (mm/dd/yyyy) as indicated and click Submit. You will be taken to the next sign-up page. 5. Create a [employer] ID. This will be your ABB login ID and cannot be changed, so think of one that is secure and easy to remember. 6. Create a [employer] Platform password. You can change your password at any time. 7. Enter your Password Reset Question and Answer. This can be used at a later time if you forget your password. 8. Enter your e-mail address. You will receive e-mail notification when new information is available in [address] 9. Click Sign Up. You can now view and download portions of your medical record. 10. Click the Download Summary menu link to download a portable copy of your medical information. If you have questions, please visit the Frequently Asked Questions section of the [employer] website. Remember, [employer] is NOT to be used for urgent needs. For medical emergencies, dial 911. Now available from your iPhone and Android! General Information Please provide this summary of care documentation to your next provider. Patient Signature:  ____________________________________________________________ Date:  ____________________________________________________________  
  
Rufino Sherman Provider Signature:  ____________________________________________________________ Date:  ____________________________________________________________

## 2017-09-16 NOTE — H&P
History & Physical    Patient: Eric Perez MRN: [id number]  CSN: [id number]    YOB: 1955  Age: 58 y.o. Sex: male      DOA: 9/16/2017    Chief Complaint:  R sided weakness       HPI:     Eric Perez is a 58 y.o.  male who is well-known to service from multiple prior admissions. He is a resident of a local nursing facility and was most recently admitted to the hospital here late last month for treatment of pneumonia with parapneumonic effusion. Patient actually did quite well with treatment and did not have any significant complications. He was apparently doing relatively well until early this morning, at which time he was noted by staff to have some R sided weakness and SOB. On arrival to the ED, CT of head was negative but concern remains for possible CVA. Teleneurology evaluation has been completed and patient has been referred for hospital admission for further evaluation and treatment. Past Medical History:   Diagnosis Date    Hiccups     Hypertension     Hyponatremia     Lacunar infarction ([facility] [address] 2010    Lower extremity edema     Psychogenic polydipsia     Spinal stenosis        Past Surgical History:   Procedure Laterality Date    HX HEENT      pt reports past hx of surgery on ears unsure of what type       Family History   Problem Relation Age of Onset    Hypertension Other        Social History     Social History    Marital status:      Spouse name: N/A    Number of children: N/A    Years of education: N/A     Social History Main Topics    Smoking status: Former Smoker     Packs/day: 0.50     Quit date: 6/22/2008    Smokeless tobacco: Never Used    Alcohol use No    Drug use: No    Sexual activity: Not Currently     Other Topics Concern    Not on file     Social History Narrative       Prior to Admission medications    Medication Sig Start Date End Date Taking?  Authorizing Provider   oxyCODONE-acetaminophen (PERCOCET) 5-325 mg per tablet Take 1 Tab by mouth every four (4) hours as needed. Max Daily Amount: 6 Tabs. 8/30/17   Yoon Bianchi MD   levoFLOXacin (LEVAQUIN) 750 mg tablet Take 1 Tab by mouth Daily (before breakfast). 8/30/17   Yoon Bianchi MD   ascorbic acid, vitamin C, (VITAMIN C) 250 mg tablet Take 1 Tab by mouth daily. 7/13/17   Madeleine Aguilera MD   ferrous sulfate 325 mg (65 mg iron) tablet Take 1 Tab by mouth daily (with breakfast). 7/13/17   Madeleine Aguilera MD   polyethylene glycol (MIRALAX) 17 gram packet Take 1 Packet by mouth daily. 7/13/17   Madeleine Aguilera MD   senna-docusate (PERICOLACE) 8.6-50 mg per tablet Take 1 Tab by mouth nightly. HOLD for loose stool. 7/13/17   Madeleine Aguilera MD   tamsulosin (FLOMAX) 0.4 mg capsule Take 0.4 mg by mouth daily. 1 tab daily    Historical Provider   furosemide (LASIX) 40 mg tablet Take 40 mg by mouth daily. Jamila Cabrera MD   MULTIVITAMIN,THERAPEUTIC [facility] MULTI-VITAMIN PO) Take 500 mg by mouth daily. Historical Provider   baclofen (LIORESAL) 10 mg tablet Take 1 Tab by mouth every twelve (12) hours as needed. Patient taking differently: Take 1 Tab by mouth every twelve (12) hours as needed (for back pain, muscle ralaxant). as needed for pain, 1/25/16   ARSEN Medina   amLODIPine (NORVASC) 10 mg tablet Take 1 Tab by mouth daily. 8/18/14   Madeleine Aguilera MD   metoprolol (LOPRESSOR) 25 mg tablet Take 1 Tab by mouth two (2) times a day. Patient taking differently: Take 1 Tab by mouth two (2) times a day. 1/2 tablet bid 8/18/14   Madeleine Aguilera MD   cyanocobalamin 1,000 mcg tablet Take 1 Tab by mouth daily. 8/18/14   Madeleine Aguilera MD   folic acid (FOLVITE) 1 mg tablet Take 1 Tab by mouth daily. 8/18/14   Madeleine Aguilera MD   aspirin 81 mg chewable tablet Take 1 Tab by mouth daily. 9/18/12   Madeleien Aguilera MD       No Known Allergies      Review of Systems  GENERAL: No F/C. HEENT: No change in vision, no earache, tinnitus, sore throat or sinus congestion. NECK: No pain or stiffness. PULMONARY: + SOB (resolved). No cough or wheeze. Cardiovascular: No PND, orthopnea or chest pain. GASTROINTESTINAL: No abdominal pain, nausea, vomiting or diarrhea, melena or bright red blood per rectum.  + hiccups. GENITOURINARY: No urinary frequency, urgency, hesitancy or dysuria. MUSCULOSKELETAL: No joint or muscle pain, no back pain, no recent trauma. DERMATOLOGIC: No rash, no itching, no lesions. ENDOCRINE: No polyuria, polydipsia, no heat or cold intolerance. No recent change in weight. HEMATOLOGICAL: No easy bruising or bleeding. NEUROLOGIC: No headache, seizures. + R sided weakness. Physical Exam:     Physical Exam:  Visit Vitals    /85 (BP 1 Location: Left arm, BP Patient Position: At rest)    Pulse 82    Temp 99.4 °F (37.4 °C)    Resp 16    Wt 86.9 kg (191 lb 8 oz)    SpO2 97%    BMI 31.87 kg/m2      O2 Device: Room air    Temp (24hrs), Av °F (37.2 °C), Min:98.5 °F (36.9 °C), Max:99.4 °F (37.4 °C)             General:  In NAD. Head: NCAT. Eyes:  Conjunctivae/corneas clear, sclerae anicteric. Nose: Nares normal, no drainage. Throat: Deferred. Neck: Supple, symmetrical, trachea midline. Back:   No bony/paraspinous TTP. Lungs: No wheezes, effort nonlabored. Chest wall:  No tenderness or deformity. Heart:  RRR. Abdomen: Soft, NTTP. Extremities: No edema. Pulses: Extremities warm, no ischemia. Skin:  No rashes or lesions   Neurologic: Awake and alert, R sided weakness present.        Labs Reviewed:  BMP:   Lab Results   Component Value Date/Time     2017 09:32 AM    K 3.7 2017 09:32 AM     2017 09:32 AM    CO2 30 2017 09:32 AM    AGAP 4 2017 09:32 AM     (H) 2017 09:32 AM    BUN 10 2017 09:32 AM    CREA 0.78 2017 09:32 AM    GFRAA >60 2017 09:32 AM    GFRNA >60 2017 09:32 AM          CBC WITH AUTOMATED DIFF    Collection Time: 09/16/17  9:32 AM   Result Value Ref Range    WBC 5.8 4.6 - 13.2 K/uL    RBC 4.38 (L) 4.70 - 5.50 M/uL    HGB 11.6 (L) 13.0 - 16.0 g/dL    HCT 34.2 (L) 36.0 - 48.0 %    MCV 78.1 74.0 - 97.0 FL    MCH 26.5 24.0 - 34.0 PG    MCHC 33.9 31.0 - 37.0 g/dL    RDW 14.6 (H) 11.6 - 14.5 %    PLATELET 981 140 - 883 K/uL    MPV 9.5 9.2 - 11.8 FL    NEUTROPHILS 62 40 - 73 %    LYMPHOCYTES 28 21 - 52 %    MONOCYTES 4 3 - 10 %    EOSINOPHILS 6 (H) 0 - 5 %    BASOPHILS 0 0 - 2 %    ABS. NEUTROPHILS 3.6 1.8 - 8.0 K/UL    ABS. LYMPHOCYTES 1.7 0.9 - 3.6 K/UL    ABS. MONOCYTES 0.2 0.05 - 1.2 K/UL    ABS. EOSINOPHILS 0.3 0.0 - 0.4 K/UL    ABS. BASOPHILS 0.0 0.0 - 0.1 K/UL    DF AUTOMATED         CXR:   IMPRESSION:  1. Unchanged right lung base airspace opacity with associated small right  pleural effusion without pneumothorax. Atelectasis versus pneumonia. Follow-up  with plain imaging until complete resolution. CT head:  IMPRESSION:  1. No acute intracranial hemorrhage, mass effect or midline structural shift. Limited exam given motion artifact and field cut off. No significant change.     Follow-up with MRI is recommended if acute ischemia is suspected given  limitations of CT. Assessment:   R sided weakness, concern for acute CVA  HTN  Iron deficiency anemia  MRSA nasal colonization  Recent RLL HCAP with large R pleural effusion s/p thoracentesis    Plan:  Admit to telemetry bed. Neurology evaluation. MRI/MRA of brain. ASA/statin. Swallow eval, resume previously prescribed meds if passes.   DVT/GI Prophylaxis: Lovenox      Malvin Phillips MD  9/16/2017 3:23 PM

## 2017-09-16 NOTE — IP AVS SNAPSHOT
Dayanna Larkin 
 
 
 [address] [facility] [address] Patient: Taylor Joshi MRN: [id number] Current Discharge Medication List  
  
START taking these medications Dose & Instructions Dispensing Information Comments Morning Noon Evening Bedtime  
 clopidogrel 75 mg Tab Commonly known as:  PLAVIX Your last dose was: Your next dose is:    
   
   
 Dose:  75 mg Take 1 Tab by mouth daily. Quantity:  90 Tab Refills:  0  
     
   
   
   
  
 levETIRAcetam 750 mg tablet Commonly known as:  KEPPRA Your last dose was: Your next dose is:    
   
   
 Dose:  750 mg Take 1 Tab by mouth two (2) times a day. Quantity:  60 Tab Refills:  1  
     
   
   
   
  
 pravastatin 40 mg tablet Commonly known as:  PRAVACHOL Your last dose was: Your next dose is:    
   
   
 Dose:  40 mg Take 1 Tab by mouth nightly. Quantity:  30 Tab Refills:  1 CONTINUE these medications which have CHANGED Dose & Instructions Dispensing Information Comments Morning Noon Evening Bedtime  
 baclofen 10 mg tablet Commonly known as:  LIORESAL What changed:   
- reasons to take this 
- additional instructions Your last dose was: Your next dose is:    
   
   
 Dose:  10 mg Take 1 Tab by mouth every twelve (12) hours as needed. Quantity:  15 Tab Refills:  0  
     
   
   
   
  
 metoprolol tartrate 25 mg tablet Commonly known as:  LOPRESSOR What changed:  additional instructions Your last dose was: Your next dose is:    
   
   
 Dose:  25 mg Take 1 Tab by mouth two (2) times a day. Quantity:  60 Tab Refills:  2 CONTINUE these medications which have NOT CHANGED Dose & Instructions Dispensing Information Comments Morning Noon Evening Bedtime  
 ascorbic acid (vitamin C) 250 mg tablet Commonly known as:  VITAMIN C Your last dose was: Your next dose is:    
   
   
 Dose:  250 mg Take 1 Tab by mouth daily. Quantity:  30 Tab Refills:  2  
     
   
   
   
  
 aspirin 81 mg chewable tablet Your last dose was: Your next dose is:    
   
   
 Dose:  81 mg Take 1 Tab by mouth daily. Quantity:  30 Tab Refills:  0  
     
   
   
   
  
 cyanocobalamin 1,000 mcg tablet Your last dose was: Your next dose is:    
   
   
 Dose:  1000 mcg Take 1 Tab by mouth daily. Quantity:  30 Tab Refills:  2  
     
   
   
   
  
 ferrous sulfate 325 mg (65 mg iron) tablet Your last dose was: Your next dose is:    
   
   
 Dose:  325 mg Take 1 Tab by mouth daily (with breakfast). Quantity:  30 Tab Refills:  2 FLOMAX 0.4 mg capsule Generic drug:  tamsulosin Your last dose was: Your next dose is:    
   
   
 Dose:  0.4 mg Take 0.4 mg by mouth daily. 1 tab daily Refills:  0  
     
   
   
   
  
 folic acid 1 mg tablet Commonly known as:  Randy Your last dose was: Your next dose is:    
   
   
 Dose:  1 mg Take 1 Tab by mouth daily. Quantity:  30 Tab Refills:  1  
     
   
   
   
  
 polyethylene glycol 17 gram packet Commonly known as:  Angelika Jeff Your last dose was: Your next dose is:    
   
   
 Dose:  17 g Take 1 Packet by mouth daily. Quantity:  30 Packet Refills:  2  
     
   
   
   
  
 senna-docusate 8.6-50 mg per tablet Commonly known as:  Holli Velazquez Your last dose was: Your next dose is:    
   
   
 Dose:  1 Tab Take 1 Tab by mouth nightly. HOLD for loose stool. Quantity:  30 Tab Refills:  2 THERA MULTI-VITAMIN PO Your last dose was: Your next dose is:    
   
   
 Dose:  500 mg Take 500 mg by mouth daily. Refills:  0 STOP taking these medications   
 amLODIPine 10 mg tablet Commonly known as:  NORVASC  
   
  
 LASIX 40 mg tablet Generic drug:  furosemide  
   
  
 levoFLOXacin 750 mg tablet Commonly known as:  LEVAQUIN  
   
  
 oxyCODONE-acetaminophen 5-325 mg per tablet Commonly known as:  PERCOCET Where to Get Your Medications These medications were sent to [facility] [address], [address] [place] [id number] Hours:  24-hours Phone:  [phone]  
  clopidogrel 75 mg Tab  
 levETIRAcetam 750 mg tablet  
 pravastatin 40 mg tablet

## 2017-09-16 NOTE — ED NOTES
TRANSFER - OUT REPORT:    Verbal report given to Antonia Lovell RN (name) on Han Szymanski  being transferred to [address] [place] [address] Drive room 410 (unit) for routine progression of care       Report consisted of patients Situation, Background, Assessment and   Recommendations(SBAR). Information from the following report(s) SBAR, ED Summary, [facility] and Recent Results was reviewed with the receiving nurse. Lines:   Peripheral IV 09/16/17 Left Forearm (Active)   Site Assessment Clean, dry, & intact 9/16/2017 11:35 AM   Phlebitis Assessment 0 9/16/2017 11:35 AM   Infiltration Assessment 0 9/16/2017 11:35 AM   Dressing Status Clean, dry, & intact 9/16/2017 11:35 AM   Dressing Type Transparent 9/16/2017 11:35 AM        Opportunity for questions and clarification was provided.       Patient transported with:   [facility]

## 2017-09-16 NOTE — ED PROVIDER NOTES
HPI Comments: 9:43 AM Dunia Koch is a 58 y.o. male with h/o Lacunar infarction and HTN who presents to ED via EMS complaining of right sided weakness and difficulties breathing. Per the EMS according to the pt care giver at [facility] the pt last was seen normal around 7:00 pm last night. The pt cant move his right hand and has been drooling. Per the EMS the pt also been having some SOB that they treated with O2 which makes him feel better, he is slightly congested, and his last sugar level was 141. Per the pt he first started feeling off around 0200 this morning. The pt states he is right handed and he was unable to move right side. The pt also is c/o mild abdominal pain. The pt denies CP, HA, neck pain, and trauma. Pt had no other complaints or concerns. PCP: Deangelo Perez MD         Past Medical History:   Diagnosis Date    Hiccups     Hypertension     Hyponatremia     Lacunar infarction ([facility] [address] 2010    Lower extremity edema     Psychogenic polydipsia     Spinal stenosis        Past Surgical History:   Procedure Laterality Date    HX HEENT      pt reports past hx of surgery on ears unsure of what type         Family History:   Problem Relation Age of Onset    Hypertension Other        Social History     Social History    Marital status:      Spouse name: N/A    Number of children: N/A    Years of education: N/A     Occupational History    Not on file. Social History Main Topics    Smoking status: Former Smoker     Packs/day: 0.50     Quit date: 6/22/2008    Smokeless tobacco: Never Used    Alcohol use No    Drug use: No    Sexual activity: Not Currently     Other Topics Concern    Not on file     Social History Narrative         ALLERGIES: Review of patient's allergies indicates no known allergies. Review of Systems   HENT: Positive for congestion. Respiratory: Positive for shortness of breath. Cardiovascular: Negative for chest pain.    Gastrointestinal: Positive for abdominal pain. Musculoskeletal: Negative for neck pain. Neurological: Positive for weakness (right side ). Negative for headaches. All other systems reviewed and are negative. Vitals:    09/16/17 1145 09/16/17 1200 09/16/17 1316 09/16/17 1328   BP: 134/85 (!) 131/92 132/85    Pulse: 92 80 82    Resp: 16 15 16    Temp:   99.4 °F (37.4 °C)    SpO2: 99% 100% 97%    Weight:    86.9 kg (191 lb 8 oz)            Physical Exam   Constitutional: He is oriented to person, place, and time. He appears well-developed. HENT:   Head: Normocephalic and atraumatic. Eyes: Conjunctivae and EOM are normal.   Neck: Normal range of motion. Cardiovascular: Normal heart sounds. Tachycardia present. Exam reveals no gallop and no friction rub. No murmur heard. Tachycardic   Pulmonary/Chest: Effort normal. No stridor. He has decreased breath sounds in the right lower field. decreased right lung sounds with crackles    Abdominal: Soft. There is no tenderness. Musculoskeletal: Normal range of motion. He exhibits no tenderness. Neurological: He is alert and oriented to person, place, and time. Skin: Skin is warm and dry. He is not diaphoretic. Psychiatric: He has a normal mood and affect. His behavior is normal.   Nursing note and vitals reviewed. MDM  Number of Diagnoses or Management Options  Right sided weakness:   Diagnosis management comments: New right sided weakness but out of window for tPA, but will cont with urgent eval for stroke, bleeding, etc. Per chart review recent admit for PNA, may still be infected, vs new aspiration, but afebrile, normotensive here.      ED Course       Procedures    Vitals:  Patient Vitals for the past 12 hrs:   Temp Pulse Resp BP SpO2   09/16/17 1316 99.4 °F (37.4 °C) 82 16 132/85 97 %   09/16/17 1200 - 80 15 (!) 131/92 100 %   09/16/17 1145 - 92 16 134/85 99 %   09/16/17 1130 - 83 18 131/75 100 %   09/16/17 1115 - 89 14 137/84 100 %   09/16/17 0945 - (!) 111 22 161/88 100 %   09/16/17 0938 98.5 °F (36.9 °C) (!) 114 22 (!) 151/91 100 %       Medications Ordered:  Medications   sodium chloride (NS) flush 5-10 mL (not administered)   aspirin chewable tablet 81 mg (not administered)   tamsulosin (FLOMAX) capsule 0.4 mg (not administered)   senna-docusate (PERICOLACE) 8.6-50 mg per tablet 1 Tab (not administered)   therapeutic multivitamin (THERAGRAN) tablet 1 Tab (not administered)   oxyCODONE-acetaminophen (PERCOCET) 5-325 mg per tablet 1 Tab (not administered)   polyethylene glycol (MIRALAX) packet 17 g (not administered)   aspirin chewable tablet 81 mg (not administered)   baclofen (LIORESAL) tablet 10 mg (not administered)   cyanocobalamin tablet 1,000 mcg (not administered)   ferrous sulfate tablet 325 mg (not administered)   folic acid (FOLVITE) tablet 1 mg (not administered)   sodium chloride (NS) flush 5-10 mL (not administered)   sodium chloride (NS) flush 5-10 mL (not administered)   pravastatin (PRAVACHOL) tablet 40 mg (not administered)   enoxaparin (LOVENOX) injection 40 mg (not administered)   ondansetron (ZOFRAN) 4 mg in 0.9% sodium chloride 50 mL IVPB (not administered)       Lab Findings:  Recent Results (from the past 12 hour(s))   GLUCOSE, POC    Collection Time: 09/16/17  9:32 AM   Result Value Ref Range    Glucose (POC) 102 70 - 110 mg/dL   CBC WITH AUTOMATED DIFF    Collection Time: 09/16/17  9:32 AM   Result Value Ref Range    WBC 5.8 4.6 - 13.2 K/uL    RBC 4.38 (L) 4.70 - 5.50 M/uL    HGB 11.6 (L) 13.0 - 16.0 g/dL    HCT 34.2 (L) 36.0 - 48.0 %    MCV 78.1 74.0 - 97.0 FL    MCH 26.5 24.0 - 34.0 PG    MCHC 33.9 31.0 - 37.0 g/dL    RDW 14.6 (H) 11.6 - 14.5 %    PLATELET 918 711 - 134 K/uL    MPV 9.5 9.2 - 11.8 FL    NEUTROPHILS 62 40 - 73 %    LYMPHOCYTES 28 21 - 52 %    MONOCYTES 4 3 - 10 %    EOSINOPHILS 6 (H) 0 - 5 %    BASOPHILS 0 0 - 2 %    ABS. NEUTROPHILS 3.6 1.8 - 8.0 K/UL    ABS. LYMPHOCYTES 1.7 0.9 - 3.6 K/UL    ABS.  MONOCYTES 0.2 0.05 - 1.2 K/UL    ABS. EOSINOPHILS 0.3 0.0 - 0.4 K/UL    ABS. BASOPHILS 0.0 0.0 - 0.1 K/UL    DF AUTOMATED     METABOLIC PANEL, COMPREHENSIVE    Collection Time: 09/16/17  9:32 AM   Result Value Ref Range    Sodium 140 136 - 145 mmol/L    Potassium 3.7 3.5 - 5.5 mmol/L    Chloride 106 100 - 108 mmol/L    CO2 30 21 - 32 mmol/L    Anion gap 4 3.0 - 18 mmol/L    Glucose 106 (H) 74 - 99 mg/dL    BUN 10 7.0 - 18 MG/DL    Creatinine 0.78 0.6 - 1.3 MG/DL    BUN/Creatinine ratio 13 12 - 20      GFR est AA >60 >60 ml/min/1.73m2    GFR est non-AA >60 >60 ml/min/1.73m2    Calcium 9.1 8.5 - 10.1 MG/DL    Bilirubin, total 0.3 0.2 - 1.0 MG/DL    ALT (SGPT) 11 (L) 16 - 61 U/L    AST (SGOT) 8 (L) 15 - 37 U/L    Alk.  phosphatase 72 45 - 117 U/L    Protein, total 8.3 (H) 6.4 - 8.2 g/dL    Albumin 2.9 (L) 3.4 - 5.0 g/dL    Globulin 5.4 (H) 2.0 - 4.0 g/dL    A-G Ratio 0.5 (L) 0.8 - 1.7     EKG, 12 LEAD, INITIAL    Collection Time: 09/16/17  9:47 AM   Result Value Ref Range    Ventricular Rate 105 BPM    Atrial Rate 105 BPM    P-R Interval 210 ms    QRS Duration 96 ms    Q-T Interval 320 ms    QTC Calculation (Bezet) 422 ms    Calculated P Axis 50 degrees    Calculated R Axis 47 degrees    Calculated T Axis 9 degrees    Diagnosis       Sinus tachycardia with 1st degree AV block  Otherwise normal ECG  When compared with ECG of 22-AUG-2017 03:05,  CO interval has increased  Confirmed by Cachorro Lay MD, Joshua Boykin ([id number]) on 9/16/2017 12:11:07 PM     POC LACTIC ACID    Collection Time: 09/16/17  9:54 AM   Result Value Ref Range    Lactic Acid (POC) 1.5 0.4 - 2.0 mmol/L   URINALYSIS W/ RFLX MICROSCOPIC    Collection Time: 09/16/17 11:50 AM   Result Value Ref Range    Color YELLOW      Appearance CLEAR      Specific gravity 1.013 1.005 - 1.030      pH (UA) >8.5 (H) 5.0 - 8.0    Protein NEGATIVE  NEG mg/dL    Glucose NEGATIVE  NEG mg/dL    Ketone NEGATIVE  NEG mg/dL    Bilirubin NEGATIVE  NEG      Blood NEGATIVE  NEG      Urobilinogen 0.2 0.2 - 1.0 EU/dL Nitrites NEGATIVE  NEG      Leukocyte Esterase TRACE (A) NEG     URINE MICROSCOPIC ONLY    Collection Time: 17 11:50 AM   Result Value Ref Range    WBC 0 to 3 0 - 4 /hpf    RBC NONE 0 - 5 /hpf    Epithelial cells FEW 0 - 5 /lpf    Bacteria FEW (A) NEG /hpf       EKG Interpretation by ED physician:  Rhythm: sinus tachycardia   Rate: 105 bpm  Interpretation: Non specific ST  EKG interpret by Shandra Velásquez MD 9:52 AM          X-ray, CT or radiology findings or impressions:  XR CHEST PORT; Dr. Aleksandr Ramos preliminary interpretation, right pleural effusion. CT HEAD WO CONT  Consult with radiologist Dr. Bear Rodriguez:  1. No acute intracranial hemorrhage, mass effect or midline structural shift. Limited exam given motion artifact and field cut off. No significant change. Progress notes, consult notes, or additional procedure notes:  10:14 AM, 2017   Consult:  Discussed care with Dr. Jose Redmond Radiology. Standard discussion; including history of patients chief complaint, available diagnostic results, and treatment course. Will consult with pt.   11:04 AM, 2017   Consult:  Discussed care with Dr. Lasha Louis discussion; including history of patients chief complaint, available diagnostic results, and treatment course. Concerned about new stroke but is out of the window. Will start pt on Asprin and hydrate with normal saline and admit for further work up. 11:15 AM- Re eval- improving vitals- do not think pt is septic now, no wbc or lactate, neg CXR or UA for new or worsening infection- will cont neurologic work up on the stroke floor. 11:26 AM, 2017   Consult:  Discussed care with Dr. Eliezer Maurice Neurology. Standard discussion; including history of patients chief complaint, available diagnostic results, and treatment course. Agree to pt consult.    11:39 AM, 2017   Consult:  Discussed care with Dr. Jean Claude Golden. Standard discussion; including history of patients chief complaint, available diagnostic results, and treatment course. Will admit the pt. Diagnosis:   1. Right sided weakness        Disposition: Admit    Follow-up Information     None           Current Discharge Medication List      CONTINUE these medications which have NOT CHANGED    Details   oxyCODONE-acetaminophen (PERCOCET) 5-325 mg per tablet Take 1 Tab by mouth every four (4) hours as needed. Max Daily Amount: 6 Tabs. Qty: 20 Tab, Refills: 0      levoFLOXacin (LEVAQUIN) 750 mg tablet Take 1 Tab by mouth Daily (before breakfast). Qty: 1 Tab, Refills: 0      ascorbic acid, vitamin C, (VITAMIN C) 250 mg tablet Take 1 Tab by mouth daily. Qty: 30 Tab, Refills: 2      ferrous sulfate 325 mg (65 mg iron) tablet Take 1 Tab by mouth daily (with breakfast). Qty: 30 Tab, Refills: 2      polyethylene glycol (MIRALAX) 17 gram packet Take 1 Packet by mouth daily. Qty: 30 Packet, Refills: 2      senna-docusate (PERICOLACE) 8.6-50 mg per tablet Take 1 Tab by mouth nightly. HOLD for loose stool. Qty: 30 Tab, Refills: 2      tamsulosin (FLOMAX) 0.4 mg capsule Take 0.4 mg by mouth daily. 1 tab daily      furosemide (LASIX) 40 mg tablet Take 40 mg by mouth daily. MULTIVITAMIN,THERAPEUTIC (THERA MULTI-VITAMIN PO) Take 500 mg by mouth daily. baclofen (LIORESAL) 10 mg tablet Take 1 Tab by mouth every twelve (12) hours as needed. Qty: 15 Tab, Refills: 0      amLODIPine (NORVASC) 10 mg tablet Take 1 Tab by mouth daily. Qty: 30 Tab, Refills: 2      metoprolol (LOPRESSOR) 25 mg tablet Take 1 Tab by mouth two (2) times a day. Qty: 60 Tab, Refills: 2      cyanocobalamin 1,000 mcg tablet Take 1 Tab by mouth daily. Qty: 30 Tab, Refills: 2      folic acid (FOLVITE) 1 mg tablet Take 1 Tab by mouth daily. Qty: 30 Tab, Refills: 1      aspirin 81 mg chewable tablet Take 1 Tab by mouth daily.   Qty: 30 Tab, Refills: 0             Scribe Via Vicente Robbins acting as a scribe for and in the presence of Neno Jaycee Gomez MD      September 16, 2017 at 9:43 AM       Provider Attestation:      I personally performed the services described in the documentation, reviewed the documentation, as recorded by the scribe in my presence, and it accurately and completely records my words and actions.  September 16, 2017 at 9:43 AM - Noreen Sam MD

## 2017-09-17 ENCOUNTER — APPOINTMENT (OUTPATIENT)
Dept: CT IMAGING | Age: 62
DRG: 065 | End: 2017-09-17
Attending: PSYCHIATRY & NEUROLOGY
Payer: COMMERCIAL

## 2017-09-17 LAB
CHOLEST SERPL-MCNC: 95 MG/DL
EST. AVERAGE GLUCOSE BLD GHB EST-MCNC: 114 MG/DL
GLUCOSE BLD STRIP.AUTO-MCNC: 101 MG/DL (ref 70–110)
GLUCOSE BLD STRIP.AUTO-MCNC: 102 MG/DL (ref 70–110)
GLUCOSE BLD STRIP.AUTO-MCNC: 79 MG/DL (ref 70–110)
HBA1C MFR BLD: 5.6 % (ref 4.2–5.6)
HDLC SERPL-MCNC: 31 MG/DL (ref 40–60)
HDLC SERPL: 3.1 {RATIO} (ref 0–5)
LDLC SERPL CALC-MCNC: 53.4 MG/DL (ref 0–100)
LIPID PROFILE,FLP: ABNORMAL
TRIGL SERPL-MCNC: 53 MG/DL (ref ?–150)
VLDLC SERPL CALC-MCNC: 10.6 MG/DL

## 2017-09-17 PROCEDURE — 80061 LIPID PANEL: CPT | Performed by: FAMILY MEDICINE

## 2017-09-17 PROCEDURE — 36415 COLL VENOUS BLD VENIPUNCTURE: CPT | Performed by: FAMILY MEDICINE

## 2017-09-17 PROCEDURE — 65660000000 HC RM CCU STEPDOWN

## 2017-09-17 PROCEDURE — 74011250637 HC RX REV CODE- 250/637: Performed by: FAMILY MEDICINE

## 2017-09-17 PROCEDURE — 74011000258 HC RX REV CODE- 258: Performed by: INTERNAL MEDICINE

## 2017-09-17 PROCEDURE — 97530 THERAPEUTIC ACTIVITIES: CPT

## 2017-09-17 PROCEDURE — 92610 EVALUATE SWALLOWING FUNCTION: CPT

## 2017-09-17 PROCEDURE — 97162 PT EVAL MOD COMPLEX 30 MIN: CPT

## 2017-09-17 PROCEDURE — 74011250636 HC RX REV CODE- 250/636: Performed by: INTERNAL MEDICINE

## 2017-09-17 PROCEDURE — 83036 HEMOGLOBIN GLYCOSYLATED A1C: CPT | Performed by: FAMILY MEDICINE

## 2017-09-17 PROCEDURE — 82962 GLUCOSE BLOOD TEST: CPT

## 2017-09-17 RX ADMIN — Medication 10 ML: at 21:10

## 2017-09-17 RX ADMIN — THERA TABS 1 TABLET: TAB at 10:05

## 2017-09-17 RX ADMIN — CYANOCOBALAMIN TAB 1000 MCG 1000 MCG: 1000 TAB at 10:05

## 2017-09-17 RX ADMIN — TAMSULOSIN HYDROCHLORIDE 0.4 MG: 0.4 CAPSULE ORAL at 10:05

## 2017-09-17 RX ADMIN — FOLIC ACID 1 MG: 1 TABLET ORAL at 10:05

## 2017-09-17 RX ADMIN — Medication 325 MG: at 10:05

## 2017-09-17 RX ADMIN — Medication 10 ML: at 06:24

## 2017-09-17 RX ADMIN — ASPIRIN 81 MG 81 MG: 81 TABLET ORAL at 10:05

## 2017-09-17 RX ADMIN — SODIUM CHLORIDE 750 MG: 900 INJECTION, SOLUTION INTRAVENOUS at 23:22

## 2017-09-17 NOTE — PROGRESS NOTES
Problem: Dysphagia (Adult)  Goal: *Acute Goals and Plan of Care (Insert Text)  Patient will:  1. Tolerate PO trials with 0 s/s overt distress in 4/5 trials  2. Utilize compensatory swallow strategies/maneuvers (decrease bite/sip, size/rate, alt. liq/sol) with min cues in 4/5 trials  3. Perform oral-motor/laryngeal exercises to increase oropharyngeal swallow function with min cues  4. Complete an objective swallow study (i.e., MBSS) to assess swallow integrity, r/o aspiration, and determine of safest LRD, min A    Rec:   Reg diet with honey-thick liquids  Assistance with PO  Aspiration precautions  HOB >45 during po intake, remain >30 for 30-45 minutes after po   Small bites/sips; alternate liquid/solid with slow feeding rate   Oral care TID  Meds crushed  MBS to further assess oropharyngeal swallow fxn  SPEECH LANGUAGE PATHOLOGY BEDSIDE SWALLOW EVALUATION     Patient: Han Szymanski (2 y.o. male)  Date: 9/17/2017  Primary Diagnosis: Stroke [facility])        Precautions: aspiration         ASSESSMENT :  Based on the objective data described below, the patient presents with mod-sev pharyngeal dysphagia c/b altered vocal quality and clearing of throat with thin and nectar-thick liquid trials. Improved tolerance with reg solid, puree, and honey-thick liquids; however, continue to note audible swallow and weak laryngeal elevation to palpation. Pt with adequate mastication and a-p transit of reg solids. Positive oral clearance appreciated. Pt with mod dysarthria secondary to decreased strength, ROM, and coordination of orofacial musculature. Rec initiation of reg solid diet with honey-thick liquids, aspiration precautions, oral care TID, and meds crushed. Pt will require assistance with PO. Rec MBS to r/o aspiration and further assess oropharyngeal swallow fxn. Pt may benefit from a speech/language eval at a later time/date or as medical condition permits. D/w pt and RN. ST will continue to follow.       Patient will benefit from skilled intervention to address the above impairments. Patients rehabilitation potential is considered to be Fair  Factors which may influence rehabilitation potential include:   [X]            Mental ability/status  [X]            Medical condition       PLAN :  Recommendations and Planned Interventions: See above  Frequency/Duration: Patient will be followed by speech-language pathology 1-2 times per day/4-7 days per week to address goals. Discharge Recommendations: Home Health, Inpatient Rehab and To Be Determined       SUBJECTIVE:   Patient stated I can't move this right arm. OBJECTIVE:       Past Medical History:   Diagnosis Date    Hiccups      Hypertension      Hyponatremia      Lacunar infarction ([facility] [address] 2010    Lower extremity edema      Psychogenic polydipsia      Spinal stenosis       Past Surgical History:   Procedure Laterality Date    HX HEENT         pt reports past hx of surgery on ears unsure of what type     Prior Level of Function/Home Situation: unknown  Diet prior to admission: reg with thin  Current Diet:  Reg with honey-thick, assistance with PO   Cognitive and Communication Status:  Neurologic State: Alert  Orientation Level: Oriented X4  Cognition: Follows commands   Oral Assessment:  Oral Assessment  Labial: Right droop  Dentition: Natural;Intact  Oral Hygiene: Adequate  Lingual: Right deviation  Velum: No impairment  Mandible: No impairment  P.O. Trials:  Patient Position: 55 at [facility]  Vocal quality prior to P.O.: No impairment  Consistency Presented: Thin liquid; Solid;Puree;Nectar thick liquid;Honey thick liquid  How Presented: SLP-fed/presented;Cup/sip;Spoon;Straw  Bolus Acceptance: No impairment  Bolus Formation/Control: No impairment  Propulsion: No impairment  Oral Residue: None  Initiation of Swallow: Delayed (# of seconds)  Laryngeal Elevation: Weak;Decreased  Aspiration Signs/Symptoms: Change vocal quality;Clear throat  Pharyngeal Phase Characteristics: Audible swallow; Altered vocal quality;Multiple swallows; Suspected pharyngeal residue  Effective Modifications: Small sips and bites  Cues for Modifications: Minimal     Oral Phase Severity: No impairment  Pharyngeal Phase Severity : Moderate-severe     GCODESwallowing:  Swallow Current Status CL= 60-79%   Swallow Goal Status CK= 40-59%     The severity rating is based on the following outcomes:             Clinical Judgment     Pain:  Pt c/o 0/10 pain prior to evaluation. Pt c/o 0/10 pain post evaluation. After treatment:   [ ]            Patient left in no apparent distress sitting up in chair  [X]            Patient left in no apparent distress in bed  [X]            Call bell left within reach  [X]            Nursing notified  [ ]            Caregiver present  [ ]            Bed alarm activated      COMMUNICATION/EDUCATION:   [X]            SLP educated pt with regard to compensatory swallow strategies and                  aspiration/reflux precautions including: small bites/sips,                  alternate liquids/solids, decrease feeding rate, HOB > 45 with all po, and                             upright in bed at 30 degrees after po for at least 45 minutes. [X]            Patient/family have participated as able in goal setting and plan of care.      Thank you for this referral.     Phyllis Le M.S. CCC-SLP/L  Speech-Language Pathologist

## 2017-09-17 NOTE — ROUTINE PROCESS
Primary Nurse Katie Alexander RN and Omer Pennington RN performed a dual skin assessment on this patient Impairment noted- see wound doc flow sheet.   Escar tissue to LLE (anterior) due to previous inpatient wound   Brian score is 12

## 2017-09-17 NOTE — ROUTINE PROCESS
Bedside shift change report given to Floyd Bustos RN (oncoming nurse) by Shayan Foss RN (offgoing nurse). Report included the following information SBAR, ED Summary, MAR and Recent Results.

## 2017-09-17 NOTE — PROGRESS NOTES
Problem: Mobility Impaired (Adult and Pediatric)  Goal: *Acute Goals and Plan of Care (Insert Text)  Physical Therapy Goals  Initiated 9/17/2017 and to be accomplished within 7 day(s)  1. Patient will move from supine to sit and sit to supine , scoot up and down and roll side to side in bed with minimal assistance/contact guard assist.   2. Patient will transfer from bed to chair and chair to bed with minimal assistance/contact guard assist using the least restrictive device. 3. Patient will perform sit to stand with minimal assistance/contact guard assist.  4. Patient will ambulate with minimal assistance/contact guard assist for 25 feet with the least restrictive device. Outcome: Progressing Towards Goal  PHYSICAL THERAPY EVALUATION     Patient: Dionne Fonseca (62 y.o. male)  Date: 9/17/2017  Primary Diagnosis: Stroke [facility])  Precautions: Fall, Skin, Contact      ASSESSMENT :  Based on the objective data described below, the patient presents with impaired functional mobility including bed mobility, transfers, ambulation, and general activity tolerance following CVA with R sided weakness and slurred speech. Patient has history of prior CVA with L sided weakness as well. Patient presents today sitting up in bed with HOB elevated, nurse providing medication, alert and agreeable to PT evaluation. Patient transferred to EOB with MaxA, requiring consistent cuing/assist to achieve midline orientation as patient demonstrates R sided lean. Patient then requesting to stand, performed using RW and MaxA, again cuing/assist to achieve midline orientation. Patient requesting to ambulate, however suggested return to bed at this time d/t poor standing balance, patient agreeable. Patient assisted back to supine with MaxA to scoot to [facility] (patient using B LE to assist). During transfer, patient's condom catheter became dislodged.  Nursing notified of patient's need for new catheter and adapted call bell as he difficulty pressing button on standard bell. Patient highly motivated to participate with PT, would benefit from intensive rehab following D/C to achieve maximal functional gains. Patient will benefit from skilled intervention to address the above impairments. Patients rehabilitation potential is considered to be Good  Factors which may influence rehabilitation potential include:   [ ]         None noted  [ ]         Mental ability/status  [X]         Medical condition  [ ]         Home/family situation and support systems  [X]         Safety awareness  [ ]         Pain tolerance/management  [ ]         Other:        PLAN :  Recommendations and Planned Interventions:  [X]           Bed Mobility Training             [X]    Neuromuscular Re-Education  [X]           Transfer Training                   [ ]    Orthotic/Prosthetic Training  [X]           Gait Training                          [ ]    Modalities  [X]           Therapeutic Exercises          [ ]    Edema Management/Control  [X]           Therapeutic Activities            [X]    Patient and Family Training/Education  [ ]           Other (comment):     Frequency/Duration: Patient will be followed by physical therapy 1-2 times per day to address goals. Discharge Recommendations: Rehab  Further Equipment Recommendations for Discharge: rolling walker       SUBJECTIVE:   Patient stated Lorna Flakes are we going to do next?       OBJECTIVE DATA SUMMARY:       Past Medical History:   Diagnosis Date    Hiccups      Hypertension      Hyponatremia      Lacunar infarction ([facility] [address] 2010    Lower extremity edema      Psychogenic polydipsia      Spinal stenosis       Past Surgical History:   Procedure Laterality Date    HX HEENT         pt reports past hx of surgery on ears unsure of what type     Barriers to Learning/Limitations: yes;  sensory deficits-vision/hearing/speech  Compensate with: Visual Cues, Verbal Cues and Tactile Cues  Prior Level of Function/Home Situation: Patient was using RW at SNF with assistance for transfers, ambulation, and dressing. He would also use w/c for longer distances. Home Situation  Home Environment: Skilled nursing facility  # Steps to Enter: 0  One/Two Story Residence: One story  Living Alone: No  Support Systems: Skilled nursing facility  Patient Expects to be Discharged to[de-identified] Skilled nursing facility  Critical Behavior:  Neurologic State: Alert  Psychosocial  Patient Behaviors: Calm; Cooperative  Strength:    Strength: Generally decreased, functional (B LE)  Tone & Sensation:   Sensation: Intact (B LE to LT)   Range Of Motion:  AROM: Generally decreased, functional (B LE)  Functional Mobility:  Bed Mobility:  Rolling: Moderate assistance  Supine to Sit: Maximum assistance  Sit to Supine: Maximum assistance  Scooting: Maximum assistance  Transfers:  Sit to Stand: Maximum assistance  Stand to Sit: Maximum assistance  Balance:   Sitting: Impaired; With support  Sitting - Static: Prop sitting; Fair (occasional)  Sitting - Dynamic: Poor (constant support)  Standing: Impaired; With support (RW, PT assist to maintain midline)  Standing - Static: Constant support; Fair  Standing - Dynamic : Poor  Ambulation/Gait Training:   N/A poor standing balance  Pain:  Pain Scale 1: Numeric (0 - 10)  Pain Intensity 1: 0  Activity Tolerance:   Good  Please refer to the flowsheet for vital signs taken during this treatment. After treatment:   [ ] Patient left in no apparent distress sitting up in chair  [ ] Patient left sitting on EOB  [X] Patient left in no apparent distress in bed  [ ] Patient declined to be OOB at this time due to  [X] Call bell left within reach  [X] Nursing notified [facility] [place])  [ ] Caregiver present  [ ] Bed alarm activated      COMMUNICATION/EDUCATION:   [X]         Fall prevention education was provided and the patient/caregiver indicated understanding. [X]         Patient/family have participated as able in goal setting and plan of care.   [X] Patient/family agree to work toward stated goals and plan of care. [ ]         Patient understands intent and goals of therapy, but is neutral about his/her participation. [ ]         Patient is unable to participate in goal setting and plan of care. Thank you for this referral.  Barrie Sharon   Time Calculation: 31 mins      Mobility  Current  CM= 80-99%   Goal  CJ= 20-39%. The severity rating is based on the Level of Assistance required for Functional Mobility and ADLs.      Eval Complexity: History: HIGH Complexity :3+ comorbidities / personal factors will impact the outcome/ POC Exam:MEDIUM Complexity : 3 Standardized tests and measures addressing body structure, function, activity limitation and / or participation in recreation  Presentation: MEDIUM Complexity : Evolving with changing characteristics Overall Complexity:MEDIUM

## 2017-09-17 NOTE — PROGRESS NOTES
Dysphagia eval completed with recs of reg solid diet and honey-thick liquids, meds crushed. Pt will require assistance with PO. Rec MBS tomorrow to further assess oropharyngeal swallow fxn and r/o aspiration. Full report to follow.      Thank you for this referral.    Andres Gregory M.S. CCC-SLP/L  Speech-Language Pathologist

## 2017-09-17 NOTE — PROGRESS NOTES
conducted an initial consultation and Spiritual Assessment for Juanita Morataya, who is a 58 y. o.,male. Patients Primary Language is: Arlen Lew. According to the patients EMR [employer] Affiliation is: [facility].     The reason the Patient came to the hospital is:   Patient Active Problem List    Diagnosis Date Noted    Stroke [facility]) 09/16/2017    Pneumonia 08/22/2017    Right foot infection 07/06/2017    Bilateral leg and foot pain 07/06/2017    Cellulitis 12/20/2016    Hyponatremia 01/01/2015    Psychogenic polydipsia 05/22/2014    First degree AV block 09/01/2013    Former heavy tobacco smoker 09/01/2013    Intractable hiccups 09/02/2012    Essential hypertension, benign 09/02/2012        The  provided the following Interventions:  Initiated a relationship of care and support. Explored issues of kitty, belief, spirituality and Scientologist/ritual needs while hospitalized. Listened empathically. Provided chaplaincy education. Provided information about Spiritual Care Services. Offered prayer and assurance of continued prayers on patient's behalf. Chart reviewed. The following outcomes were achieved:  Patient shared limited information about both their medical narrative and spiritual journey/beliefs. Patient processed feeling about current hospitalization. Patient expressed gratitude for the 's visit. Assessment:  Patient does not have any Scientologist/cultural needs that will affect patients preferences in health care. Plan:  Chaplains will continue to follow and will provide pastoral care on an as needed/requested basis.  recommends bedside caregivers page  on duty if patient shows signs of acute spiritual or emotional distress.     Chaplain Rosemary Erickson

## 2017-09-17 NOTE — PROGRESS NOTES
[facility] Hospitalist Group  Progress Note    Patient: Paul Son Age: 58 y.o. : 1955 MR#: [id number] SSN: [id number]  Date: 2017     Subjective:     No F/C, N/V, CP, SOB. Has no c/o. Says he feels \"ok. \"     Assessment/Plan:   1. Acute CVA - R side hemiplegia. Could not tolerate MRI earlier, will re-attempt. Maintain CVA protocol, ASA, statin. Will f/u imaging. PT/OT. 2. HTN - permissive HTN. Holding Norvasc, Lopressor. 3. Fe-def anemia - hemodynamically stable. Supplementing. 4. Noted recent hx HCAPNA, large R pleural effusion s/p thoracentesis. Additional Notes:      Case discussed with:  [x]Patient  []Family  []Nursing  []Case Management  DVT Prophylaxis:  [x]Lovenox  []Hep SQ  []SCDs  []Coumadin   []On Heparin gtt    Objective:   VS:   Visit Vitals    /73 (BP 1 Location: Left arm, BP Patient Position: At rest)    Pulse 84    Temp 99.3 °F (37.4 °C)    Resp 16    Wt 83.2 kg (183 lb 8 oz)    SpO2 99%    BMI 30.54 kg/m2      Tmax/24hrs: Temp (24hrs), Av.6 °F (37 °C), Min:97.5 °F (36.4 °C), Max:99.4 °F (37.4 °C)    Intake/Output Summary (Last 24 hours) at 17 1636  Last data filed at 17 1000   Gross per 24 hour   Intake              473 ml   Output              925 ml   Net             -452 ml       General:  Awake, alert, NAD. Cardiovascular:  RRR. Pulmonary:  CTA B.  GI:  Soft, NT/ND, NABS. Extremities:  No CT or edema.    Additional:      Labs:    Recent Results (from the past 24 hour(s))   GLUCOSE, POC    Collection Time: 17  7:00 PM   Result Value Ref Range    Glucose (POC) 90 70 - 110 mg/dL   LIPID PANEL    Collection Time: 17  3:13 AM   Result Value Ref Range    LIPID PROFILE          Cholesterol, total 95 <200 MG/DL    Triglyceride 53 <150 MG/DL    HDL Cholesterol 31 (L) 40 - 60 MG/DL    LDL, calculated 53.4 0 - 100 MG/DL    VLDL, calculated 10.6 MG/DL    CHOL/HDL Ratio 3.1 0 - 5.0     HEMOGLOBIN A1C WITH EAG Collection Time: 09/17/17  3:13 AM   Result Value Ref Range    Hemoglobin A1c 5.6 4.2 - 5.6 %    Est. average glucose 114 mg/dL   GLUCOSE, POC    Collection Time: 09/17/17  6:22 AM   Result Value Ref Range    Glucose (POC) 79 70 - 110 mg/dL   GLUCOSE, POC    Collection Time: 09/17/17 11:37 AM   Result Value Ref Range    Glucose (POC) 102 70 - 110 mg/dL   GLUCOSE, POC    Collection Time: 09/17/17  4:30 PM   Result Value Ref Range    Glucose (POC) 101 70 - 110 mg/dL       Signed By: Amanda Multani MD     September 17, 2017 4:36 PM

## 2017-09-17 NOTE — PROGRESS NOTES
Unable to tolerate MRI=  Was \"choking and gaging\". Aborted procedure  Will need to retry    New onset R facial twitching  Sustained and anibal rhythmical.  Predominantly R lower lip  Never had before      Temp: 98.5 °F (36.9 °C) (09/17/17 1200) Pulse (Heart Rate): 93 (09/17/17 1200) Resp Rate: 18 (09/17/17 1200) BP: 150/90 (09/17/17 1200) O2 Sat (%): 98 % (09/17/17 1200) Weight: 83.2 kg (183 lb 8 oz) (09/16/17 1328)     Assessment. Acute neurologic sx concerning for stroke  Could not tolerate mri yesterday.   Retry tomorrow  Appears to be having a simple partial (focal) seizure  Start keppra  EEG tomorrow

## 2017-09-18 ENCOUNTER — APPOINTMENT (OUTPATIENT)
Dept: MRI IMAGING | Age: 62
DRG: 065 | End: 2017-09-18
Attending: FAMILY MEDICINE
Payer: COMMERCIAL

## 2017-09-18 ENCOUNTER — APPOINTMENT (OUTPATIENT)
Dept: GENERAL RADIOLOGY | Age: 62
DRG: 065 | End: 2017-09-18
Attending: FAMILY MEDICINE
Payer: COMMERCIAL

## 2017-09-18 LAB
GLUCOSE BLD STRIP.AUTO-MCNC: 103 MG/DL (ref 70–110)
GLUCOSE BLD STRIP.AUTO-MCNC: 106 MG/DL (ref 70–110)
GLUCOSE BLD STRIP.AUTO-MCNC: 85 MG/DL (ref 70–110)
GLUCOSE BLD STRIP.AUTO-MCNC: 87 MG/DL (ref 70–110)

## 2017-09-18 PROCEDURE — 82962 GLUCOSE BLOOD TEST: CPT

## 2017-09-18 PROCEDURE — 74011000255 HC RX REV CODE- 255: Performed by: FAMILY MEDICINE

## 2017-09-18 PROCEDURE — 74011250636 HC RX REV CODE- 250/636: Performed by: INTERNAL MEDICINE

## 2017-09-18 PROCEDURE — 97112 NEUROMUSCULAR REEDUCATION: CPT

## 2017-09-18 PROCEDURE — 65660000000 HC RM CCU STEPDOWN

## 2017-09-18 PROCEDURE — 95816 EEG AWAKE AND DROWSY: CPT | Performed by: PSYCHIATRY & NEUROLOGY

## 2017-09-18 PROCEDURE — 97166 OT EVAL MOD COMPLEX 45 MIN: CPT

## 2017-09-18 PROCEDURE — 74011000258 HC RX REV CODE- 258: Performed by: INTERNAL MEDICINE

## 2017-09-18 PROCEDURE — 92611 MOTION FLUOROSCOPY/SWALLOW: CPT

## 2017-09-18 PROCEDURE — 74230 X-RAY XM SWLNG FUNCJ C+: CPT

## 2017-09-18 PROCEDURE — 74011250637 HC RX REV CODE- 250/637: Performed by: FAMILY MEDICINE

## 2017-09-18 PROCEDURE — 70551 MRI BRAIN STEM W/O DYE: CPT

## 2017-09-18 PROCEDURE — 97530 THERAPEUTIC ACTIVITIES: CPT

## 2017-09-18 RX ADMIN — ASPIRIN 81 MG 81 MG: 81 TABLET ORAL at 10:54

## 2017-09-18 RX ADMIN — FOLIC ACID 1 MG: 1 TABLET ORAL at 10:54

## 2017-09-18 RX ADMIN — Medication 10 ML: at 06:06

## 2017-09-18 RX ADMIN — BARIUM SULFATE 700 MG: 700 TABLET ORAL at 12:55

## 2017-09-18 RX ADMIN — STANDARDIZED SENNA CONCENTRATE AND DOCUSATE SODIUM 1 TABLET: 8.6; 5 TABLET, FILM COATED ORAL at 23:49

## 2017-09-18 RX ADMIN — Medication 325 MG: at 10:54

## 2017-09-18 RX ADMIN — BARIUM SULFATE 75 ML: 400 SUSPENSION ORAL at 12:55

## 2017-09-18 RX ADMIN — Medication 10 ML: at 23:50

## 2017-09-18 RX ADMIN — BARIUM SULFATE 60 ML: 400 SUSPENSION ORAL at 12:55

## 2017-09-18 RX ADMIN — PRAVASTATIN SODIUM 40 MG: 20 TABLET ORAL at 23:49

## 2017-09-18 RX ADMIN — POLYETHYLENE GLYCOL 3350 17 G: 17 POWDER, FOR SOLUTION ORAL at 10:56

## 2017-09-18 RX ADMIN — BARIUM SULFATE 30 ML: 400 PASTE ORAL at 12:55

## 2017-09-18 RX ADMIN — CYANOCOBALAMIN TAB 1000 MCG 1000 MCG: 1000 TAB at 10:54

## 2017-09-18 RX ADMIN — SODIUM CHLORIDE 750 MG: 900 INJECTION, SOLUTION INTRAVENOUS at 23:49

## 2017-09-18 RX ADMIN — TAMSULOSIN HYDROCHLORIDE 0.4 MG: 0.4 CAPSULE ORAL at 10:54

## 2017-09-18 RX ADMIN — THERA TABS 1 TABLET: TAB at 10:54

## 2017-09-18 NOTE — PROGRESS NOTES
Problem: Self Care Deficits Care Plan (Adult)  Goal: *Acute Goals and Plan of Care (Insert Text)  Occupational Therapy Goals  Initiated 9/18/2017 within 7 day(s). 1. Patient will perform self-feeding with supervision/set-up   2. Patient will perform grooming with supervision/set-up. 3. Patient will perform upper body dressing with supervision/set-up. 4. Patient will perform maneuver in bed with moderate assistance  5. Patient will participate in upper extremity therapeutic exercise/activities with supervision/set-up in preparation for self-feeding tasks  Outcome: Progressing Towards Goal  OCCUPATIONAL THERAPY EVALUATION     Patient: Nafisa Arteaga (41 y.o. male)  Date: 9/18/2017  Primary Diagnosis: Stroke [facility])        Precautions:   Fall, Contact, Skin      ASSESSMENT :  Based on the objective data described below, the patient was in bed upon arrival leaning to right side in bed. Patient needed max A to reposition in bed. Patient has decreased RUE AROM and strength. Patent needed max A x2 to perform bed mobility with poor sitting balance on EOB. Patient educated on use of RUE for support; patient needed mod/max verbal and physical cues for RUE for support. Patient assisted back to supine and scooted up in bed with max A x2. Patient educated on AROM of elbow/hand and weight bearing exercises for RUE to increased strength in preparation for self care tasks. Patient set up with lunch tray; patient needed max A to use R hand and mod A with set up to use L hand. Nurse notified ad aware. Patient will benefit from skilled intervention to address the above impairments.   Patients rehabilitation potential is considered to be Good  Factors which may influence rehabilitation potential include:   [ ]             None noted  [ ]             Mental ability/status  [X]             Medical condition  [ ]             Home/family situation and support systems  [ ]             Safety awareness  [ ]             Pain tolerance/management  [ ]             Other:        PLAN :  Recommendations and Planned Interventions:  [X]               Self Care Training                  [ ]        Therapeutic Activities  [X]               Functional Mobility Training    [ ]        Cognitive Retraining  [X]               Therapeutic Exercises           [ ]        Endurance Activities  [ ]               Balance Training                   [ ]        Neuromuscular Re-Education  [ ]               Visual/Perceptual Training     [ ]   Home Safety Training  [ ]               Patient Education                 [ ]        Family Training/Education  [ ]               Other (comment):     Frequency/Duration: Patient will be followed by occupational therapy 1-2 times per day/4-7 days per week to address goals. Discharge Recommendations: [facility]  Further Equipment Recommendations for Discharge: TBD      Barriers to Learning/Limitations: yes;  physical  Compensate with: visual, verbal, tactile, kinesthetic cues/model       PATIENT COMPLEXITY      Eval Complexity: History: MEDIUM Complexity : Expanded review of history including physical, cognitive and psychosocial  history ; Examination: MEDIUM Complexity : 3-5 performance deficits relating to physical, cognitive , or psychosocial skils that result in activity limitations and / or participation restrictions; Decision Making:MEDIUM Complexity : Patient may present with comorbidities that affect occupational performnce. Miniml to moderate modification of tasks or assistance (eg, physical or verbal ) with assesment(s) is necessary to enable patient to complete evaluation  Assessment: Moderate Complexity       G-CODES:      Self Care  Current  CM= 80-99%   Goal  CK= 40-59%. The severity rating is based on the Level of Assistance required for Functional Mobility and ADLs. SUBJECTIVE:   Patient stated I used to feed myself with my right hand.       OBJECTIVE DATA SUMMARY: Past Medical History:   Diagnosis Date    Hiccups      Hypertension      Hyponatremia      Lacunar infarction ([facility] [address] 2010    Lower extremity edema      Psychogenic polydipsia      Spinal stenosis       Past Surgical History:   Procedure Laterality Date    HX HEENT         pt reports past hx of surgery on ears unsure of what type     Prior Level of Function/Home Situation: Patient was at a SNF PTA and reported he was progressing with self care tasks and w/c transfers PTA  Home Situation  Home Environment: Skilled nursing facility  # Steps to Enter: 0  One/Two Story Residence: One story  Living Alone: No  Support Systems: Skilled nursing facility  Patient Expects to be Discharged to[de-identified] Skilled nursing facility  [ ]  Right hand dominant           [X]  Left hand dominant (uses right hand to self-feed PTA)  Cognitive/Behavioral Status:  Neurologic State: Alert  Orientation Level: Oriented to person;Oriented to place;Oriented to situation  Cognition: Follows commands     Skin: No skin changes noted     Edema: No edema noted     Vision/Perceptual:       Acuity:  (WFL)       Coordination:  Coordination: Grossly decreased, non-functional (BUEs)      Balance:  Sitting: Impaired; With support  Sitting - Static: Poor (constant support)  Sitting - Dynamic: Poor (constant support)     Strength:  LUE: 2+/5 shoulder, 3/5 elbow, 2+/5 hand  RUE: 2/5 shoulder, 2+/5 elbow flexion, 2/5 elbow extension, 2/5 digit flexion     Tone & Sensation:  Tone: Abnormal (RUE)  Sensation:  (not formally assessed)     Range of Motion:  AROM: Grossly decreased, non-functional               LUE: approx 0-90 degrees, WFL elbow flexion, limited extension, WFL hand              RUE: approx 0-45 degrees shoulder flexion, 75% elbow flexion/extension, 75% digit flexion/extension   PROM: Within functional limits (RUE)     Functional Mobility and Transfers for ADLs:  Bed Mobility:  Rolling: Maximum assistance;Assist x2  Supine to Sit: Maximum assistance;Assist x2  Sit to Supine: Maximum assistance;Assist x2  Scooting: Maximum assistance;Assist x2  Transfers:  Sit to Stand:  (not assessed)     ADL Assessment:(clinical judgement)  Feeding: Maximum assistance     Oral Facial Hygiene/Grooming: Maximum assistance     Bathing: Total assistance     Upper Body Dressing: Maximum assistance     Lower Body Dressing: Total assistance     Toileting: Total assistance     Pain:  Pt reports 0/10 pain or discomfort prior to treatment. Pt reports 0/10 pain or discomfort post treatment. Activity Tolerance:   Poor     Please refer to the flowsheet for vital signs taken during this treatment. After treatment:   [ ] Patient left in no apparent distress sitting up in chair  [X] Patient left in no apparent distress in bed  [X] Call bell left within reach  [X] Nursing notified  [X] Caregiver present  [ ] Bed alarm activated      COMMUNICATION/EDUCATION:   [ ] Home safety education was provided and the patient/caregiver indicated understanding. [X] Patient/family have participated as able in goal setting and plan of care. [X] Patient/family agree to work toward stated goals and plan of care. [ ] Patient understands intent and goals of therapy, but is neutral about his/her participation. [ ] Patient is unable to participate in goal setting and plan of care.      Thank you for this referral.  Hari Jacobs OTR/L  Time Calculation: 43 mins

## 2017-09-18 NOTE — PROGRESS NOTES
Problem: Mobility Impaired (Adult and Pediatric)  Goal: *Acute Goals and Plan of Care (Insert Text)  Physical Therapy Goals  Initiated 9/17/2017 and to be accomplished within 7 day(s)  1. Patient will move from supine to sit and sit to supine , scoot up and down and roll side to side in bed with minimal assistance/contact guard assist.   2. Patient will transfer from bed to chair and chair to bed with minimal assistance/contact guard assist using the least restrictive device. 3. Patient will perform sit to stand with minimal assistance/contact guard assist.  4. Patient will ambulate with minimal assistance/contact guard assist for 25 feet with the least restrictive device. Outcome: Not Progressing Towards Goal  PHYSICAL THERAPY TREATMENT     Patient: Eric Perez (45 y.o. male)  Date: 9/18/2017  Diagnosis: Stroke [facility]) <principal problem not specified>       Precautions: Fall, Contact, Skin   Chart, physical therapy assessment, plan of care and goals were reviewed. ASSESSMENT:  Pt is eager to participate, but was very focused on whereabouts of his son. Able to assist to EOB with less assist than session with OT earlier. Pt responded well to propping and reaching to improve midline sitting. Pt transitions to standing x 2 (1min per trial), but unable to take steps due to return of Right side bias. No improvement of standing ability, with R sided pelvic loading. Progression toward goals:  [ ]      Improving appropriately and progressing toward goals  [X]      Improving slowly and progressing toward goals  [ ]      Not making progress toward goals and plan of care will be adjusted       PLAN:  Patient continues to benefit from skilled intervention to address the above impairments. Continue treatment per established plan of care.   Discharge Recommendations:  [facility]  Further Equipment Recommendations for Discharge:  hospital bed and standing frame       SUBJECTIVE:   Patient stated Call my son! I don't know why he left.       OBJECTIVE DATA SUMMARY:   Critical Behavior:  Neurologic State: Alert  Orientation Level: Oriented to person, Oriented to place, Oriented to situation  Cognition: Follows commands  Safety/Judgement: Awareness of environment, Fall prevention, Insight into deficits  Functional Mobility Training:  Bed Mobility:  Rolling: Moderate assistance  Supine to Sit: Moderate assistance  Sit to Supine: Moderate assistance  Scooting: Maximum assistance  Transfers:  Sit to Stand: Moderate assistance;Maximum assistance  Stand to Sit: Maximum assistance  Balance:  Sitting: Impaired  Sitting - Static: Fair (occasional)  Sitting - Dynamic: Fair (occasional)  Therapeutic Exercises:   AAROM of UE and AROM of LE   Neuro Re-Education:  Postural corrections with Left sided elbow propping and reaching actions to reduce Right side lean/push. Left side hip loading in standing to promote better weight distribution. Pain:  Pain Scale 1: Numeric (0 - 10)  Pain Intensity 1: 0  Activity Tolerance:   Fair  Please refer to the flowsheet for vital signs taken during this treatment.   After treatment:   [ ] Patient left in no apparent distress sitting up in chair  [X] Patient left in no apparent distress in bed  [X] Call bell left within reach  [X] Nursing notified  [ ] Caregiver present  [X] Bed alarm activated      Jai Dey PTA   Time Calculation: 35 mins

## 2017-09-18 NOTE — PROGRESS NOTES
MBS completed with recs of reg solid and honey-thick liquids, meds as tolerated. Full report to follow.      Thank you for this referral.    Lisa Willis M.S. CCC-SLP/L  Speech-Language Pathologist

## 2017-09-18 NOTE — PROGRESS NOTES
Care Management Interventions  Mode of Transport at Discharge:  (TBD)  Transition of Care Consult (CM Consult):  (From [facility] short term SNF with [employer] FEP created short term benefit )  Discharge Durable Medical Equipment: No (TBD)  Physical Therapy Consult: Yes  Occupational Therapy Consult: Yes  Speech Therapy Consult: Yes  Current Support Network: [address] [place] [address] Av (From [facility] short term SNF )  Confirm Follow Up Transport:  (TBD)  Plan discussed with Pt/Family/Caregiver:  (FERNANDO FELIX  Anupam Yepez RN via phone )  Discharge Location  Discharge Placement: Skilled nursing facility   Anupam Yepez RN --ELVIRA - [phone]  [facility] [phone]    Adm from [facility] for neuro maciel for poss CVA. For MRI & EEg today. FERNANDO Yepez RN CM at [facility] . Pt has BC FEP with no SNF benefit ,but she is willing to create a short term SNF benefit upon dc because he was there on one prior to adm here. She is concerned tho that pt may require long term stay & she has been trying to contact son at above phone # without success. No PT/OT evals on chart yet. CM will follow for return back to SNF.

## 2017-09-18 NOTE — PROGRESS NOTES
[facility] Hospitalist Group  Progress Note    Patient: Allison Leiva Age: 58 y.o. : 1955 MR#: [id number] SSN: [id number]  Date: 2017     Subjective:     Denies pain, F/C, N/V, CP, SOB. R side still weak-- no improvement. Discussed MRI, pt states he's willing to try again. Has some claustrophobia. Offered anxiolytic, but he prefers not to take any. Case d/w neurology. Assessment/Plan:   1. Acute CVA - R side hemiplegia. Could not tolerate MRI earlier, will re-attempt today. Maintain CVA protocol, ASA, statin. PT/OT. Add Plavix for 90 days if MRI positive for CVA, then full-dose ASA alone. 2. HTN - permissive HTN. Holding Norvasc, Lopressor. 3. Fe-def anemia - hemodynamically stable. Supplementing. 4. Noted recent hx HCAPNA, large R pleural effusion s/p thoracentesis. 5. Noted to appear to have focal Sz per neurology with R facial twitching, see neurology note from . On Keppra. Will f/u EEG. Additional Notes:      Case discussed with:  [x]Patient  []Family  []Nursing  []Case Management  DVT Prophylaxis:  [x]Lovenox  []Hep SQ  []SCDs  []Coumadin   []On Heparin gtt    Objective:   VS:   Visit Vitals    /89 (BP 1 Location: Right arm, BP Patient Position: Sitting)    Pulse 86    Temp 99.3 °F (37.4 °C)    Resp 18    Wt 83.2 kg (183 lb 8 oz)    SpO2 94%    BMI 30.54 kg/m2      Tmax/24hrs: Temp (24hrs), Av.9 °F (37.2 °C), Min:98.4 °F (36.9 °C), Max:99.3 °F (37.4 °C)      Intake/Output Summary (Last 24 hours) at 17 1532  Last data filed at 17 1220   Gross per 24 hour   Intake              833 ml   Output             1100 ml   Net             -267 ml       General:  Awake, alert, NAD. Cardiovascular:  RRR. Pulmonary:  CTA B.  GI:  Soft, NT/ND, NABS. Extremities:  No CT or edema.    Additional:    Labs:    Recent Results (from the past 24 hour(s))   GLUCOSE, POC    Collection Time: 17  4:30 PM   Result Value Ref Range    Glucose (POC) 101 70 - 110 mg/dL   GLUCOSE, POC    Collection Time: 09/18/17  8:17 AM   Result Value Ref Range    Glucose (POC) 85 70 - 110 mg/dL   GLUCOSE, POC    Collection Time: 09/18/17 12:07 PM   Result Value Ref Range    Glucose (POC) 103 70 - 110 mg/dL       Signed By: Sri Wesley MD     September 18, 2017 4:36 PM

## 2017-09-18 NOTE — PROGRESS NOTES
Problem: Dysphagia (Adult)  Goal: *Acute Goals and Plan of Care (Insert Text)  Patient will:  1. Tolerate PO trials with 0 s/s overt distress in 4/5 trials  2. Utilize compensatory swallow strategies/maneuvers (decrease bite/sip, size/rate, alt. liq/sol) with min cues in 4/5 trials  3. Perform oral-motor/laryngeal exercises to increase oropharyngeal swallow function with min cues  4. Complete an objective swallow study (i.e., MBSS) to assess swallow integrity, r/o aspiration, and determine of safest LRD, min A - met 9/18/17    Rec:   Reg diet with honey-thick liquids  Assistance with PO  Aspiration precautions  HOB >45 during po intake, remain >30 for 30-45 minutes after po   Small bites/sips; alternate liquid/solid with slow feeding rate   Oral care TID  Meds as tolerated   Outcome: Progressing Towards Goal  SPEECH PATHOLOGY MODIFIED BARIUM SWALLOW STUDY     Patient: Eric Perez (16 y.o. male)  Date: 9/18/2017  Primary Diagnosis: Stroke [facility])        Precautions: aspiration   Fall, Contact, Skin      ASSESSMENT :  Based on the objective data described below, the patient presents with mild oral and mod-sev pharyngeal dysphagia c/b SILENT aspiration of nectar-thick liquids during the swallow. Pt tolerated reg solid, puree, honey-thick liquids, and 13 mm Ba pill with honey-thick wash without aspiration/penetration events. Deficits include lingual pumping, premature spillage, poor laryngeal elevation, and impaired pharyngeal motility/sensation. Positive oropharyngeal clearance appreciated throughout study. Rec reg solid diet with honey-thick liquids, meds as tolerated, oral care TID, and aspiration precautions. Pt verbalized understanding to diet recs and safe swallowing techniques/strategies. ST will continue to follow. He will benefit from skilled SLP services upon DC from this facility to address above stated deficits and dysarthria.      Patient will benefit from skilled intervention to address the above impairments. Patients rehabilitation potential is considered to be Fair  Factors which may influence rehabilitation potential include:   [X]              Medical condition       PLAN :  Recommendations and Planned Interventions: See above  Frequency/Duration: Patient will be followed by speech-language pathology 1-2 times per day/4-7 days per week to address goals. Discharge Recommendations: Inpatient Rehab, [place] and To Be Determined       SUBJECTIVE:   Patient stated Thank you. OBJECTIVE:       Past Medical History:   Diagnosis Date    Hiccups      Hypertension      Hyponatremia      Lacunar infarction ([facility] [address] 2010    Lower extremity edema      Psychogenic polydipsia      Spinal stenosis       Past Surgical History:   Procedure Laterality Date    HX HEENT         pt reports past hx of surgery on ears unsure of what type     Prior Level of Function/Home Situation: SNF  Home Situation  Home Environment: Skilled nursing facility  # Steps to Enter: 0  One/Two Story Residence: One story  Living Alone: No  Support Systems: Skilled nursing facility  Patient Expects to be Discharged to[de-identified] Skilled nursing facility  Diet prior to admission: suspect reg with thin  Current Diet:  Reg with honey-thick   Radiologist:    Film Views: Lateral;Fluoro  Patient Position: 90 in fluoro chair      Trial 1: Trial 2:   Consistency Presented: Honey thick liquid;Puree; Solid;Pill/Tablet Consistency Presented: Nectar thick liquid   How Presented: Self-fed/presented;Cup/sip;Spoon;Straw How Presented: SLP-fed/presented;Cup/sip;Straw         Bolus Acceptance: No impairment Bolus Acceptance: No impairment   Bolus Formation/Control: Impaired:   Bolus Formation/Control: No impairment: Premature spillage   Propulsion: Tongue pumping Propulsion: Tongue pumping   Oral Residue: None Oral Residue: None   Initiation of Swallow: Triggered at pyriform sinus(es) Initiation of Swallow: Triggered at pyriform sinus(es);Triggered at valleculae   Timing: Pooling 1-5 sec;Pyriform sinus;Vallecular Timing: Pooling 1-5 sec   Penetration: None Penetration: None   Aspiration/Timing: No evidence of aspiration Aspiration/Timing: During;Silent    Pharyngeal Clearance: No residue Pharyngeal Clearance: No residue           Effective Modifications: None     Cues for Modifications: None         Decreased Tongue Base Retraction?: Yes  Laryngeal Elevation: Incomplete laryngeal closure; Reduced excursion with laryngeal vestibule gap  Aspiration/Penetration Score: 8 (Aspiration-Contrast passes cords/glottis with no effort to eject, ie/silent aspiration)  Pharyngeal Symmetry: Not assessed  Pharyngeal-Esophageal Segment: No impairment  Pharyngeal Dysfunction: Decreased strength;Decreased elevation/closure;Decreased tongue base retraction     Oral Phase Severity: Mild  Pharyngeal Phase Severity: Moderately severe     GCODESwallowing:  Swallow Current Status CL= 60-79%   Swallow Goal Status CK= 40-59%     The severity rating is based on the following outcomes:             8-point Penetration-Aspiration Scale: Score 8  Professional Judgment     PAIN:  Pt reports 0/10 pain or discomfort prior to MBS. Pt reports 0/10 pain or discomfort post MBS. COMMUNICATION/EDUCATION:   [X]  Patient educated regarding MBS results and diet recommendations. [X]  Patient/family have participated as able in goal setting and plan of care.      Thank you for this referral.     Jennifer Pizano M.S. CCC-SLP/L  Speech-Language Pathologist

## 2017-09-18 NOTE — ROUTINE PROCESS
F/u with MRI regarding Mr.Early, Pt is able to tolerate supine position with no signs of aspiration noted and states he is able to comply.

## 2017-09-18 NOTE — PROGRESS NOTES
Re:  Joyce Cottrell up visit     9/18/2017 3:06 PM    SSN: [id number]    Subjective: Toña Mahoney is found resting in bed. Noted he was unable to tolerate the MRI scan earlier, but according to Dr Joan Collier will try again. Medications:    Current Facility-Administered Medications   Medication Dose Route Frequency Provider Last Rate Last Dose    levETIRAcetam (KEPPRA) 750 mg in 0.9% sodium chloride IVPB  750 mg IntraVENous Q12H Delvis Elliott  mL/hr at 09/17/17 2322 750 mg at 09/17/17 2322    sodium chloride (NS) flush 5-10 mL  5-10 mL IntraVENous PRN Mehul Pederson MD        [facility]) capsule 0.4 mg  0.4 mg Oral DAILY Juve Jefferson MD   0.4 mg at 09/18/17 1054    senna-docusate (Cleavon Hope) 8.6-50 mg per tablet 1 Tab  1 Tab Oral QHS Juve Jefferson MD   Stopped at 09/16/17 2200    therapeutic multivitamin (THERAGRAN) tablet 1 Tab  1 Tab Oral DAILY Juve Jefferson MD   1 Tab at 09/18/17 1054    oxyCODONE-acetaminophen (PERCOCET) 5-325 mg per tablet 1 Tab  1 Tab Oral Q4H PRN Juve Jefferson MD        polyethylene glycol (MIRALAX) packet 17 g  17 g Oral DAILY Juve Jefferson MD   17 g at 09/18/17 1056    aspirin chewable tablet 81 mg  81 mg Oral DAILY Juve Jefferson MD   81 mg at 09/18/17 1054    baclofen (LIORESAL) tablet 10 mg  10 mg Oral Q12H PRN Juve Jefferson MD        cyanocobalamin tablet 1,000 mcg  1,000 mcg Oral DAILY Juve Jefferson MD   1,000 mcg at 09/18/17 1054    ferrous sulfate tablet 325 mg  325 mg Oral DAILY WITH BREAKFAST Juve Jefferson MD   325 mg at 79/30/49 3835    folic acid (FOLVITE) tablet 1 mg  1 mg Oral DAILY Juve Jefferson MD   1 mg at 09/18/17 1054    sodium chloride (NS) flush 5-10 mL  5-10 mL IntraVENous Q8H Juve Jefferson MD   10 mL at 09/18/17 0606    sodium chloride (NS) flush 5-10 mL  5-10 mL IntraVENous PRN Juve Jefferson MD        pravastatin (PRAVACHOL) tablet 40 mg  40 mg Oral QHS Juve Jefferson MD   Stopped at 09/16/17 2200    enoxaparin (LOVENOX) injection 40 mg  40 mg SubCUTAneous Q24H Yoon Bianchi MD   40 mg at 09/16/17 1536    ondansetron (ZOFRAN) injection 4 mg  4 mg IntraVENous Q6H PRN Tin Nelson II, MD   4 mg at 09/16/17 1548       Vital signs:    Visit Vitals    /89 (BP 1 Location: Right arm, BP Patient Position: Sitting)    Pulse 86    Temp 99.3 °F (37.4 °C)    Resp 18    Wt 83.2 kg (183 lb 8 oz)    SpO2 94%    BMI 30.54 kg/m2       Review of Systems:   As above otherwise 11 point review of systems negative including;   Constitutional no fever or chills  Skin denies rash or itching  HEENT  Denies tinnitus, hearing lose  Eyes denies diplopia vision lose  Respiratory denies sortness of breath  Cardiovascular denies chest pain, dyspnea on exertion  Gastrointestinal denies nausea, vomiting, diarrhea, constipation  Genitourinary denies incontinence  Musculoskeletal denies joint pain or swelling  Endocrine denies weight change  Hematology denies easy bruising or bleeding   Neurological as above in HPI      Patient Active Problem List   Diagnosis Code    Intractable hiccups R06.6    Essential hypertension, benign I10    First degree AV block I44.0    Former heavy tobacco smoker Z87.891    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 Fund of knowledge is adequate. Speech is slurred but fluent and memory is intact. Cranial Nerves: II - Visual fields are full to confrontation. III, IV, VI - Extraocular movements are intact. There is no nystagmus. V - Facial sensation is intact to pinprick. VII - Face is symmetrical.  VIII - Hearing is present. IX, X, XII - Palate is symmetrical.   XI - Shoulder shrugging and head turning intact  Motor:   The patient has a slight right hemiparesis, about 4+/5 throughout the right. Tone is normal. Reflexes are 2+ and symmetrical. Plantars are down going. Gait is not tested. CBC:   Lab Results   Component Value Date/Time    WBC 5.8 09/16/2017 09:32 AM    RBC 4.38 09/16/2017 09:32 AM    HGB 11.6 09/16/2017 09:32 AM    HCT 34.2 09/16/2017 09:32 AM    PLATELET 281 60/51/7389 09:32 AM     BMP:   Lab Results   Component Value Date/Time    Glucose 106 09/16/2017 09:32 AM    Sodium 140 09/16/2017 09:32 AM    Potassium 3.7 09/16/2017 09:32 AM    Chloride 106 09/16/2017 09:32 AM    CO2 30 09/16/2017 09:32 AM    BUN 10 09/16/2017 09:32 AM    Creatinine 0.78 09/16/2017 09:32 AM    Calcium 9.1 09/16/2017 09:32 AM     CMP:   Lab Results   Component Value Date/Time    Glucose 106 09/16/2017 09:32 AM    Sodium 140 09/16/2017 09:32 AM    Potassium 3.7 09/16/2017 09:32 AM    Chloride 106 09/16/2017 09:32 AM    CO2 30 09/16/2017 09:32 AM    BUN 10 09/16/2017 09:32 AM    Creatinine 0.78 09/16/2017 09:32 AM    Calcium 9.1 09/16/2017 09:32 AM    Anion gap 4 09/16/2017 09:32 AM    BUN/Creatinine ratio 13 09/16/2017 09:32 AM    Alk. phosphatase 72 09/16/2017 09:32 AM    Protein, total 8.3 09/16/2017 09:32 AM    Albumin 2.9 09/16/2017 09:32 AM    Globulin 5.4 09/16/2017 09:32 AM    A-G Ratio 0.5 09/16/2017 09:32 AM     Coagulation:   Lab Results   Component Value Date/Time    Prothrombin time 14.8 07/18/2017 02:57 AM    INR 1.2 07/18/2017 02:57 AM    aPTT 25.5 05/11/2017 12:45 PM     Cardiac markers:   Lab Results   Component Value Date/Time    CK 27 08/22/2017 03:25 AM    CK-MB Index  08/22/2017 03:25 AM     CALCULATION NOT PERFORMED WHEN RESULT IS BELOW LINEAR LIMIT       Assessment:  Probable new left stroke in this man who has risk factors including hypertension, prior stroke, some evidence of non-compliance. Plan:  Check MRI. Aspirin and plavix for 90 days if positive for stroke then aspirin only full dose. Sincerely,        Carlota Mar. Lelo Martinez M.D.

## 2017-09-19 ENCOUNTER — APPOINTMENT (OUTPATIENT)
Dept: CT IMAGING | Age: 62
DRG: 065 | End: 2017-09-19
Attending: PSYCHIATRY & NEUROLOGY
Payer: COMMERCIAL

## 2017-09-19 LAB
ANION GAP SERPL CALC-SCNC: 8 MMOL/L (ref 3–18)
BASOPHILS # BLD: 0 K/UL (ref 0–0.06)
BASOPHILS NFR BLD: 0 % (ref 0–2)
BUN SERPL-MCNC: 8 MG/DL (ref 7–18)
BUN/CREAT SERPL: 10 (ref 12–20)
CALCIUM SERPL-MCNC: 8.8 MG/DL (ref 8.5–10.1)
CHLORIDE SERPL-SCNC: 109 MMOL/L (ref 100–108)
CO2 SERPL-SCNC: 25 MMOL/L (ref 21–32)
CREAT SERPL-MCNC: 0.8 MG/DL (ref 0.6–1.3)
DIFFERENTIAL METHOD BLD: ABNORMAL
EOSINOPHIL # BLD: 0.3 K/UL (ref 0–0.4)
EOSINOPHIL NFR BLD: 3 % (ref 0–5)
ERYTHROCYTE [DISTWIDTH] IN BLOOD BY AUTOMATED COUNT: 14.8 % (ref 11.6–14.5)
GLUCOSE BLD STRIP.AUTO-MCNC: 84 MG/DL (ref 70–110)
GLUCOSE BLD STRIP.AUTO-MCNC: 91 MG/DL (ref 70–110)
GLUCOSE SERPL-MCNC: 86 MG/DL (ref 74–99)
HCT VFR BLD AUTO: 31.3 % (ref 36–48)
HGB BLD-MCNC: 10.4 G/DL (ref 13–16)
LYMPHOCYTES # BLD: 2 K/UL (ref 0.9–3.6)
LYMPHOCYTES NFR BLD: 24 % (ref 21–52)
MCH RBC QN AUTO: 26.2 PG (ref 24–34)
MCHC RBC AUTO-ENTMCNC: 33.2 G/DL (ref 31–37)
MCV RBC AUTO: 78.8 FL (ref 74–97)
MONOCYTES # BLD: 0.6 K/UL (ref 0.05–1.2)
MONOCYTES NFR BLD: 8 % (ref 3–10)
NEUTS SEG # BLD: 5.4 K/UL (ref 1.8–8)
NEUTS SEG NFR BLD: 65 % (ref 40–73)
PLATELET # BLD AUTO: 213 K/UL (ref 135–420)
PMV BLD AUTO: 11 FL (ref 9.2–11.8)
POTASSIUM SERPL-SCNC: 3.7 MMOL/L (ref 3.5–5.5)
RBC # BLD AUTO: 3.97 M/UL (ref 4.7–5.5)
SODIUM SERPL-SCNC: 142 MMOL/L (ref 136–145)
WBC # BLD AUTO: 8.3 K/UL (ref 4.6–13.2)

## 2017-09-19 PROCEDURE — 97535 SELF CARE MNGMENT TRAINING: CPT

## 2017-09-19 PROCEDURE — 92526 ORAL FUNCTION THERAPY: CPT

## 2017-09-19 PROCEDURE — 97530 THERAPEUTIC ACTIVITIES: CPT

## 2017-09-19 PROCEDURE — 92523 SPEECH SOUND LANG COMPREHEN: CPT

## 2017-09-19 PROCEDURE — 70496 CT ANGIOGRAPHY HEAD: CPT

## 2017-09-19 PROCEDURE — 74011250636 HC RX REV CODE- 250/636: Performed by: PSYCHIATRY & NEUROLOGY

## 2017-09-19 PROCEDURE — 74011250637 HC RX REV CODE- 250/637: Performed by: FAMILY MEDICINE

## 2017-09-19 PROCEDURE — 74011000258 HC RX REV CODE- 258: Performed by: INTERNAL MEDICINE

## 2017-09-19 PROCEDURE — 74011636320 HC RX REV CODE- 636/320: Performed by: FAMILY MEDICINE

## 2017-09-19 PROCEDURE — 97110 THERAPEUTIC EXERCISES: CPT

## 2017-09-19 PROCEDURE — 80048 BASIC METABOLIC PNL TOTAL CA: CPT | Performed by: FAMILY MEDICINE

## 2017-09-19 PROCEDURE — 82962 GLUCOSE BLOOD TEST: CPT

## 2017-09-19 PROCEDURE — 85025 COMPLETE CBC W/AUTO DIFF WBC: CPT | Performed by: FAMILY MEDICINE

## 2017-09-19 PROCEDURE — 36415 COLL VENOUS BLD VENIPUNCTURE: CPT | Performed by: FAMILY MEDICINE

## 2017-09-19 PROCEDURE — 74011250636 HC RX REV CODE- 250/636: Performed by: FAMILY MEDICINE

## 2017-09-19 PROCEDURE — 74011250636 HC RX REV CODE- 250/636: Performed by: INTERNAL MEDICINE

## 2017-09-19 PROCEDURE — 65660000000 HC RM CCU STEPDOWN

## 2017-09-19 RX ORDER — CLOPIDOGREL BISULFATE 75 MG/1
75 TABLET ORAL DAILY
Status: DISCONTINUED | OUTPATIENT
Start: 2017-09-19 | End: 2017-09-22 | Stop reason: HOSPADM

## 2017-09-19 RX ADMIN — SODIUM CHLORIDE 750 MG: 900 INJECTION, SOLUTION INTRAVENOUS at 13:14

## 2017-09-19 RX ADMIN — CLOPIDOGREL 75 MG: 75 TABLET, FILM COATED ORAL at 10:00

## 2017-09-19 RX ADMIN — FOLIC ACID 1 MG: 1 TABLET ORAL at 12:59

## 2017-09-19 RX ADMIN — ONDANSETRON 4 MG: 2 INJECTION INTRAMUSCULAR; INTRAVENOUS at 13:28

## 2017-09-19 RX ADMIN — PRAVASTATIN SODIUM 40 MG: 20 TABLET ORAL at 23:19

## 2017-09-19 RX ADMIN — IOPAMIDOL 100 ML: 755 INJECTION, SOLUTION INTRAVENOUS at 19:00

## 2017-09-19 RX ADMIN — OXYCODONE HYDROCHLORIDE AND ACETAMINOPHEN 1 TABLET: 5; 325 TABLET ORAL at 23:24

## 2017-09-19 RX ADMIN — Medication 10 ML: at 13:41

## 2017-09-19 RX ADMIN — Medication 325 MG: at 12:59

## 2017-09-19 RX ADMIN — ASPIRIN 81 MG 81 MG: 81 TABLET ORAL at 12:58

## 2017-09-19 RX ADMIN — THERA TABS 1 TABLET: TAB at 12:59

## 2017-09-19 RX ADMIN — LEVETIRACETAM 750 MG: 500 TABLET ORAL at 23:19

## 2017-09-19 RX ADMIN — TAMSULOSIN HYDROCHLORIDE 0.4 MG: 0.4 CAPSULE ORAL at 12:59

## 2017-09-19 RX ADMIN — POLYETHYLENE GLYCOL 3350 17 G: 17 POWDER, FOR SOLUTION ORAL at 13:09

## 2017-09-19 RX ADMIN — CYANOCOBALAMIN TAB 1000 MCG 1000 MCG: 1000 TAB at 12:59

## 2017-09-19 RX ADMIN — ENOXAPARIN SODIUM 40 MG: 40 INJECTION SUBCUTANEOUS at 13:09

## 2017-09-19 RX ADMIN — STANDARDIZED SENNA CONCENTRATE AND DOCUSATE SODIUM 1 TABLET: 8.6; 5 TABLET, FILM COATED ORAL at 23:19

## 2017-09-19 NOTE — ROUTINE PROCESS
Bedside and Verbal shift change report given to Katelyn  (oncoming nurse) by Carmen Reed RN  (offgoing nurse). Report included the following information SBAR, MAR and Recent Results.

## 2017-09-19 NOTE — PROGRESS NOTES
ARU/IPR REFERRAL CONTACT NOTE  [address] [place] [address] for Physical Rehabilitation    RE: Jose Lopez     Thank you for the opportunity to review this patient's case for admission to [address] [place] [address] for Physical Rehabilitation. Based on our pre-admission screening:       [x ] This patient does not meet criteria for admission to [facility] for Physical  Rehabilitation due to:    [x ] Too low level, per documentation, patient has not demonstrated tolerance for acute rehabilitation level of intensity. [x ] We recommend the following:  [ ] Re-evaluation of this patient's status when appropriate  [ ] Home with Home Care Services  [ ] Outpatient Therapy Services  [x ] Skilled Nursing Facility/Sub Acute Services with extended stay option  [ ] Assisted Living/ Adult Home    Again, Thank you for this referral. Should you have any questions please do not hesitate to call. Sincerely,  Tarik Pizarro. Amber Montenegro, [address] [address]   Amber Montenegro, RN  Admissions [facility] for Physical Rehabilitation  [phone]

## 2017-09-19 NOTE — PROGRESS NOTES
Re:  Stephenie Neighbors up visit     9/19/2017 3:45 PM    SSN: [id number]    Subjective: Camille Cruz is found resting in bed. Noted he was unable to tolerate the MRI scan earlier, but according to Dr Danyelle Mcknight will try again.     Medications:    Current Facility-Administered Medications   Medication Dose Route Frequency Provider Last Rate Last Dose    clopidogrel (PLAVIX) tablet 75 mg  75 mg Oral DAILY Jasper Rhoades MD   75 mg at 09/19/17 1000    levETIRAcetam (KEPPRA) tablet 750 mg  750 mg Oral BID Jasper Rhoades MD        sodium chloride (NS) flush 5-10 mL  5-10 mL IntraVENous PRN Marin Lucio MD        [facility]) capsule 0.4 mg  0.4 mg Oral DAILY Wale Lainez MD   0.4 mg at 09/19/17 1259    senna-docusate (Lender Motto) 8.6-50 mg per tablet 1 Tab  1 Tab Oral QHS Wale Lainez MD   1 Tab at 09/18/17 2349    therapeutic multivitamin (THERAGRAN) tablet 1 Tab  1 Tab Oral DAILY Wale Lainez MD   1 Tab at 09/19/17 1259    oxyCODONE-acetaminophen (PERCOCET) 5-325 mg per tablet 1 Tab  1 Tab Oral Q4H PRN Wale Lainez MD        polyethylene glycol (MIRALAX) packet 17 g  17 g Oral DAILY Wale Lainez MD   17 g at 09/19/17 1309    aspirin chewable tablet 81 mg  81 mg Oral DAILY Wale Lainez MD   81 mg at 09/19/17 1258    baclofen (LIORESAL) tablet 10 mg  10 mg Oral Q12H PRN Wale Lainez MD        cyanocobalamin tablet 1,000 mcg  1,000 mcg Oral DAILY Wale Lainez MD   1,000 mcg at 09/19/17 1259    ferrous sulfate tablet 325 mg  325 mg Oral DAILY WITH BREAKFAST Wale Lainez MD   325 mg at 18/83/15 2306    folic acid (FOLVITE) tablet 1 mg  1 mg Oral DAILY Wale Lainez MD   1 mg at 09/19/17 1259    sodium chloride (NS) flush 5-10 mL  5-10 mL IntraVENous Q8H Wale Lainez MD   10 mL at 09/19/17 1341    sodium chloride (NS) flush 5-10 mL  5-10 mL IntraVENous PRN Wale Lainez MD        pravastatin (PRAVACHOL) tablet 40 mg  40 mg Oral QHS Wale Lainez MD 40 mg at 09/18/17 2349    enoxaparin (LOVENOX) injection 40 mg  40 mg SubCUTAneous Q24H Tye Cordero MD   40 mg at 09/19/17 1309    ondansetron (ZOFRAN) injection 4 mg  4 mg IntraVENous Q6H PRN Rico Darnell II, MD   4 mg at 09/19/17 1328       Vital signs:    Visit Vitals    BP (!) 179/100 (BP 1 Location: Right arm, BP Patient Position: Supine)    Pulse 79    Temp 98.6 °F (37 °C)    Resp 18    Wt 84.3 kg (185 lb 13.6 oz)    SpO2 90%    BMI 30.93 kg/m2       Review of Systems:   As above otherwise 11 point review of systems negative including;   Constitutional no fever or chills  Skin denies rash or itching  HEENT  Denies tinnitus, hearing lose  Eyes denies diplopia vision lose  Respiratory denies sortness of breath  Cardiovascular denies chest pain, dyspnea on exertion  Gastrointestinal denies nausea, vomiting, diarrhea, constipation  Genitourinary denies incontinence  Musculoskeletal denies joint pain or swelling  Endocrine denies weight change  Hematology denies easy bruising or bleeding   Neurological as above in HPI      Patient Active Problem List   Diagnosis Code    Intractable hiccups R06.6    Essential hypertension, benign I10    First degree AV block I44.0    Former heavy tobacco smoker Z87.891    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 Fund of knowledge is adequate. Speech is slurred but fluent and memory is intact. Cranial Nerves: II - Visual fields are full to confrontation. III, IV, VI - Extraocular movements are intact. There is no nystagmus. V - Facial sensation is intact to pinprick. VII - Face is symmetrical.  VIII - Hearing is present.   IX, X, XII - Palate is symmetrical.   XI - Shoulder shrugging and head turning intact  Motor: The patient has a slight right hemiparesis, about 4+/5 throughout the right. Tone is increased on the right. Reflexes are 2+ and symmetrical. Plantars are down going. Gait is not tested. Final result (Exam End: 9/18/2017  7:36 PM) Open    Study Result   MR Brain Without Contrast     CPT CODE: 66723     HISTORY:  Stroke. New-onset right-sided weakness.     COMPARISON: CT head 9/16/2017.     TECHNIQUE: Brain scanned with axial and sagittal T1W scans, axial T2W scans,  GRE, axial FLAIR, axial diffusion weighted images.     FINDINGS:      On DWI image 17 there is a 9 mm length focus of abnormal hyperintense and  restricted diffusion signal consistent with acute lacunar infarct in the left  corona radiata. No other findings of acute ischemia. Grossly patent flow signal  in the basilar artery and both internal carotid arteries. No abnormal  extra-axial fluid collections over the cerebral convexities. Chronic lacunar infarcts in the bilateral basal ganglia and bilateral thalami. Additional chronic lacunar infarcts involving the centrum semiovale and stenting  across the body of the corpus callosum anteriorly. Patchy increased T2 FLAIR  signal in the periventricular white matter consistent with chronic small vessel  ischemic changes. Enlargement of the lateral ventricles and third ventricle  consistent with the degree of cerebral volume loss. No hydrocephalus. GRE  sequence shows no findings of chronic intracranial hemorrhage. Multiple small  chronic lacunar infarcts in the central lulu. No chronic infarcts in the  cerebellum. Paranasal sinuses are clear.     IMPRESSION  IMPRESSION:     1. Small acute lacunar infarct in the left corona radiata. No associated  hemorrhage. 2. Extensive changes of chronic small vessel ischemia including multifocal  chronic lacunar infarcts and moderate chronic white matter changes.          CBC:   Lab Results   Component Value Date/Time    WBC 8.3 09/19/2017 03:47 AM RBC 3.97 09/19/2017 03:47 AM    HGB 10.4 09/19/2017 03:47 AM    HCT 31.3 09/19/2017 03:47 AM    PLATELET 367 16/29/9071 03:47 AM     BMP:   Lab Results   Component Value Date/Time    Glucose 86 09/19/2017 03:47 AM    Sodium 142 09/19/2017 03:47 AM    Potassium 3.7 09/19/2017 03:47 AM    Chloride 109 09/19/2017 03:47 AM    CO2 25 09/19/2017 03:47 AM    BUN 8 09/19/2017 03:47 AM    Creatinine 0.80 09/19/2017 03:47 AM    Calcium 8.8 09/19/2017 03:47 AM     CMP:   Lab Results   Component Value Date/Time    Glucose 86 09/19/2017 03:47 AM    Sodium 142 09/19/2017 03:47 AM    Potassium 3.7 09/19/2017 03:47 AM    Chloride 109 09/19/2017 03:47 AM    CO2 25 09/19/2017 03:47 AM    BUN 8 09/19/2017 03:47 AM    Creatinine 0.80 09/19/2017 03:47 AM    Calcium 8.8 09/19/2017 03:47 AM    Anion gap 8 09/19/2017 03:47 AM    BUN/Creatinine ratio 10 09/19/2017 03:47 AM    Alk. phosphatase 72 09/16/2017 09:32 AM    Protein, total 8.3 09/16/2017 09:32 AM    Albumin 2.9 09/16/2017 09:32 AM    Globulin 5.4 09/16/2017 09:32 AM    A-G Ratio 0.5 09/16/2017 09:32 AM     Coagulation:   Lab Results   Component Value Date/Time    Prothrombin time 14.8 07/18/2017 02:57 AM    INR 1.2 07/18/2017 02:57 AM    aPTT 25.5 05/11/2017 12:45 PM     Cardiac markers:   Lab Results   Component Value Date/Time    CK 27 08/22/2017 03:25 AM    CK-MB Index  08/22/2017 03:25 AM     CALCULATION NOT PERFORMED WHEN RESULT IS BELOW LINEAR LIMIT       Assessment:  New left stroke in this man who has risk factors including hypertension, prior stroke, some evidence of non-compliance. Plan:  Aspirin and plavix for 90 days if positive for stroke then aspirin only full dose. Will get a CTA to make sure we're not missing any large vessel disease. Sincerely,        Evangelista Spencer.  Aleksandr Barriga M.D.

## 2017-09-19 NOTE — PROGRESS NOTES
[facility] Hospitalist Group  Progress Note    Patient: Paul Son Age: 58 y.o. : 1955 MR#: [id number] SSN: [id number]  Date: 2017     Subjective:     No F/C, N/V, CP, SOB. Appears comfortable. Seen with nurse, no new issues. Assessment/Plan:   1. Acute CVA - R side hemiplegia. MRI reviewed, results pasted below: small lacunar infarct. Maintain CVA protocol, ASA, Plavix, statin. PT/OT. Add Plavix for 90 days, then full-strength ASA daily. Checking CTA head/neck. 2. HTN - permissive HTN. Holding Norvasc, Lopressor. 3. Fe-def anemia - hemodynamically stable. Supplementing. 4. Noted recent hx HCAPNA, large R pleural effusion s/p thoracentesis. 5. Noted to appear to have focal Sz per neurology with R facial twitching, see neurology note from . On Keppra, changing to PO. EEG pending. 6. Aim for xfer to SNF. Additional Notes:     MRI brain:   1. Small acute lacunar infarct in the left corona radiata. No associated hemorrhage. 2. Extensive changes of chronic small vessel ischemia including multifocal chronic lacunar infarcts and moderate chronic white matter changes. Case discussed with:  [x]Patient  []Family  [x]Nursing  []Case Management  DVT Prophylaxis:  [x]Lovenox  []Hep SQ  []SCDs  []Coumadin   []On Heparin gtt    Objective:   VS:   Visit Vitals    /83 (BP 1 Location: Right arm, BP Patient Position: Supine)    Pulse 71    Temp 97.5 °F (36.4 °C)    Resp 16    Wt 84.3 kg (185 lb 13.6 oz)    SpO2 95%    BMI 30.93 kg/m2      Tmax/24hrs: Temp (24hrs), Av.8 °F (36.6 °C), Min:97.5 °F (36.4 °C), Max:98.1 °F (36.7 °C)      Intake/Output Summary (Last 24 hours) at 17 1231  Last data filed at 17 0702   Gross per 24 hour   Intake              510 ml   Output              100 ml   Net              410 ml       General:  Awake, alert, NAD. Cardiovascular:  RRR. Pulmonary:  CTA B.  GI:  Soft, NT/ND, NABS.    Extremities:  No CT or edema. Additional:    Labs:    Recent Results (from the past 24 hour(s))   GLUCOSE, POC    Collection Time: 09/18/17  4:40 PM   Result Value Ref Range    Glucose (POC) 87 70 - 110 mg/dL   GLUCOSE, POC    Collection Time: 09/18/17  9:15 PM   Result Value Ref Range    Glucose (POC) 106 70 - 110 mg/dL   CBC WITH AUTOMATED DIFF    Collection Time: 09/19/17  3:47 AM   Result Value Ref Range    WBC 8.3 4.6 - 13.2 K/uL    RBC 3.97 (L) 4.70 - 5.50 M/uL    HGB 10.4 (L) 13.0 - 16.0 g/dL    HCT 31.3 (L) 36.0 - 48.0 %    MCV 78.8 74.0 - 97.0 FL    MCH 26.2 24.0 - 34.0 PG    MCHC 33.2 31.0 - 37.0 g/dL    RDW 14.8 (H) 11.6 - 14.5 %    PLATELET 952 075 - 712 K/uL    MPV 11.0 9.2 - 11.8 FL    NEUTROPHILS 65 40 - 73 %    LYMPHOCYTES 24 21 - 52 %    MONOCYTES 8 3 - 10 %    EOSINOPHILS 3 0 - 5 %    BASOPHILS 0 0 - 2 %    ABS. NEUTROPHILS 5.4 1.8 - 8.0 K/UL    ABS. LYMPHOCYTES 2.0 0.9 - 3.6 K/UL    ABS. MONOCYTES 0.6 0.05 - 1.2 K/UL    ABS. EOSINOPHILS 0.3 0.0 - 0.4 K/UL    ABS.  BASOPHILS 0.0 0.0 - 0.06 K/UL    DF AUTOMATED     METABOLIC PANEL, BASIC    Collection Time: 09/19/17  3:47 AM   Result Value Ref Range    Sodium 142 136 - 145 mmol/L    Potassium 3.7 3.5 - 5.5 mmol/L    Chloride 109 (H) 100 - 108 mmol/L    CO2 25 21 - 32 mmol/L    Anion gap 8 3.0 - 18 mmol/L    Glucose 86 74 - 99 mg/dL    BUN 8 7.0 - 18 MG/DL    Creatinine 0.80 0.6 - 1.3 MG/DL    BUN/Creatinine ratio 10 (L) 12 - 20      GFR est AA >60 >60 ml/min/1.73m2    GFR est non-AA >60 >60 ml/min/1.73m2    Calcium 8.8 8.5 - 10.1 MG/DL   GLUCOSE, POC    Collection Time: 09/19/17  6:48 AM   Result Value Ref Range    Glucose (POC) 91 70 - 110 mg/dL   GLUCOSE, POC    Collection Time: 09/19/17 12:28 PM   Result Value Ref Range    Glucose (POC) 84 70 - 110 mg/dL       Signed By: Farhad Abarca MD     September 19, 2017 4:36 PM

## 2017-09-19 NOTE — PROGRESS NOTES
Problem: Mobility Impaired (Adult and Pediatric)  Goal: *Acute Goals and Plan of Care (Insert Text)  Physical Therapy Goals  Initiated 9/17/2017 and to be accomplished within 7 day(s)  1. Patient will move from supine to sit and sit to supine , scoot up and down and roll side to side in bed with minimal assistance/contact guard assist.   2. Patient will transfer from bed to chair and chair to bed with minimal assistance/contact guard assist using the least restrictive device. 3. Patient will perform sit to stand with minimal assistance/contact guard assist.  4. Patient will ambulate with minimal assistance/contact guard assist for 25 feet with the least restrictive device. PHYSICAL THERAPY TREATMENT     Patient: Gianfranco Lugo (48 y.o. male)  Date: 9/19/2017  Diagnosis: Stroke [facility])   Precautions: Fall, Contact, Skin  Chart, physical therapy assessment, plan of care and goals were reviewed. ASSESSMENT:  Patient demonstrates continued deficit specific to midline orientation and L to R push in both sitting and standing trials. Patient reports depressed mood 2/2 his limitations though he states that he will try to participate in PT session. Patient demonstrates reduced ability to achieve neutral sitting with significant kyphotic posturing. Patient demonstrates continued strong L to R push in standing tolerating ~90 sec stand with max a x 1 from PT. Patient demonstrates improved tolerance for static sitting, however he is unable to initiate neutral position of pelvis and lumbar spine despite max a x 1 by this PT. Patient reports that he is unable to continue following standing trial requiring total a x 1 to return to sitting EOB with quick, but safe, succession to laying supine; patient refuses further treatment at this time and requires total a x 1 to scoot to [facility].  Patient discharge disposition pending ability to demonstrate improved tolerance in treatment despite patient reports that he will \"be able to handle\" 3 hr of therapy per day. Patient will require further treatment at his current level of care to demonstrate progression and initiate OOB assessment via bed to chair transfer to allow for true understanding of appropriate level of care. Progression toward goals:  [ ]      Improving appropriately and progressing toward goals  [X]      Improving slowly and progressing toward goals  [ ]      Not making progress toward goals and plan of care will be adjusted       PLAN:  Patient continues to benefit from skilled intervention to address the above impairments. Continue treatment per established plan of care. Discharge Recommendations: Acute Rehab vs. [facility] pending demonstration of improved tolerance   Further Equipment Recommendations for Discharge:  TBD       G-CODES:      Mobility  Current  CM= 80-99%   Goal  CJ= 20-39%. The severity rating is based on the Level of Assistance required for Functional Mobility and ADLs. SUBJECTIVE:   Patient stated I am about to give it all up.  Patient requires motivation to participate 2/2 reports of depressed mood 2/2 current limitations. OBJECTIVE DATA SUMMARY:   Critical Behavior:  Neurologic State: Alert  Orientation Level: Oriented to person, Oriented to place  Cognition: Follows commands  Safety/Judgement: Awareness of environment, Fall prevention, Insight into deficits  Functional Mobility Training:  Bed Mobility:  Rolling: Maximum assistance  Supine to Sit: Maximum assistance  Sit to Supine: Total assistance  Scooting:  Total assistance (slide up in bed total a; minimal B LE assistance from pt)  Transfers:  Sit to Stand: Maximum assistance  Stand to Sit: Total assistance  Bed to Chair:  (Unable to safely perform at this time)  Other: Patient requiring total A x 1 to safely manage STS transition with inability to safely participate in Bed to Chair transfer at this time 2/2 significant L to R push and increased rate of fatigue leading to required transition to sitting and quick progression to laying in bed 2/2 patient refusal of further treatment stating \"I can't do anymore, I need to lay down\"  Balance:  Sitting: Impaired  Sitting - Static: Poor (constant support)  Sitting - Dynamic: Poor (constant support)  Standing: Impaired  Standing - Static: Poor  Standing - Dynamic : Poor  Ambulation/Gait Training:  Gait Description (WDL): Exceptions to WDL (Unable to safely participate at this time)  Neuro Re-Education:  EOB sitting with tactile and visual feedback to promote self initiation of midline orientation strategy. Patient able to sit EOB 12 min with intermittent mod/max a x 1 though during this time he is unable to achieve neutral spine demonstrating significant forward flexed posture with \"bucketed\" sitting and PPT with posterior and R COG shift. Therapeutic Exercises:   None  Pain:  Pt reports 0/10 pain or discomfort prior to treatment. Pt reports 0/10 pain or discomfort post treatment. Activity Tolerance:   Fair/poor  Please refer to the flowsheet for vital signs taken during this treatment. After treatment:   [ ] Patient left in no apparent distress sitting up in chair  [X] Patient left in no apparent distress in bed  [X] Call bell left within reach  [X] Nursing notified  [ ] Caregiver present  [ ] Bed alarm activated      Sonali Lanes.  Demetri, PT DPT   Time Calculation: 25 mins

## 2017-09-19 NOTE — PROGRESS NOTES
Problem: Neurolinguistics Impaired (Adult)  Goal: *Acute Goals and Plan of Care (Insert Text)  Goals:  1. Utilize compensatory strategies (decrease rate, overarticulate, increase intensity) to increase intelligibility to >90% at word level with mod A visual/verbal cues in 4/5 trials. 2. Perform oral motor exercises (with and without resistance) in therapy and at home to increase oral motor strength/range-of-motion for articulation tasks with mod A with visual/verbal cues in 4/5 trials. 3. Complete articulatory agility tasks (reading-conversation) with mod A with visual/verbal cues in 4/5 trials. 4. Demonstrate functional increase in articulation skills for effective participation in communication ADLs with mod A.   5. Complete assorted STM tasks with 80% accuracy with min cues. 6. Complete assorted categorization tasks with 80% with min cues. 7. Complete further diagnostic testing as indicated by SLP. SPEECH LANGUAGE PATHOLOGY EVALUATION     Patient: Lashawn Richards (84 y.o. male)  Date: 9/19/2017  Primary Diagnosis: Stroke [facility])        Precautions: aspiration  Fall, Contact, Skin      ASSESSMENT :  Based on the objective data described below, the patient presents with mod dysarthria and mild STM deficits. Pt with right orofacial droop/weakness resulting in blended word boundaries, decreased rate, decreased volume, and imprecise articulation. Speech intelligibility ~50% at spontaneous conversation level to unfamiliar listener. SLP reviewed compensatory speech strategies including overarticulation and increasing loudness. An informal cognitive screening administered at this time. Pt oriented x 4 villa. He was able to immediately recall 5/5 unrelated words. He was unable to recall 3 unrelated words after a delay with an intervening tasks. Pt able to name 2/5 concrete and 0/5 abstract categories. He was unable to identify an object that did not belong out of a field of four.  LTM, categorization, and problem solving skills all appeared to be [facility]. Semantic paraphasias and perseverations noted during elicited speech production. After ~13 min of evaluation, pt stated, \"I'm so sick, miss. Just leave me alone. I'm done for the day. \" Pt will benefit from OP SLP services upon discharge to address above deficits as well as dysphagia. ST will continue to follow. Patient will benefit from skilled intervention to address the above impairments. Patients rehabilitation potential is considered to be Fair  Factors which may influence rehabilitation potential include:   [X]              Mental ability/status  [X]              Medical condition       PLAN :  Recommendations and Planned Interventions: See above  Frequency/Duration: Patient will be followed by speech-language pathology 1-2 times per day/4-7 days per week to address goals. Discharge Recommendations: Inpatient Rehab, [place] and To Be Determined       SUBJECTIVE:   Patient stated I'm done for the day.       OBJECTIVE:       Past Medical History:   Diagnosis Date    Hiccups      Hypertension      Hyponatremia      Lacunar infarction ([facility] [address] 2010    Lower extremity edema      Psychogenic polydipsia      Spinal stenosis       Past Surgical History:   Procedure Laterality Date    HX HEENT         pt reports past hx of surgery on ears unsure of what type     Prior Level of Function/Home Situation: SNF  Home Situation  Home Environment: Skilled nursing facility  # Steps to Enter: 0  One/Two Story Residence: One story  Living Alone: No  Support Systems: Skilled nursing facility  Patient Expects to be Discharged to[de-identified] Skilled nursing facility  Mental Status:  Neurologic State: Alert  Orientation Level: Oriented to person  Cognition: Follows commands  Perception: Cues to maintain midline in sitting, Cues to maintain midline in standing, Tactile, Verbal  Perseveration: No perseveration noted  Safety/Judgement: Awareness of environment, Fall prevention, Insight into deficits  Motor Speech:  Oral-Motor Structure/Motor Speech  Face: Lower facial weakness  Labial: Right droop  Dentition: Natural;Intact  Oral Hygiene: Adequate  Lingual: Right deviation  Velum: No impairment  Mandible: No impairment  Apraxic Characteristics: None  Dysarthric Characteristics: Blended word boundaries; Decreased rate; Imprecise  Intelligibility: Impaired  Word Intelligibility (%): 100 %  Phrase Intelligibility (%): 75 %  Sentence Intelligibility (%): 75 %  Conversation Intelligibility (%): 50 %  Intonation: Flat  Rate: Decreased  Prosody: Decreased  Overall Impairment Severity: Moderate  Compensatory Strategies for Motor Speech: dysarthria  Language Comprehension and Expression: see above   Neuro-Linguistics: see above  SPEECH GCODE: GCODEMotor:  Current  CL= 60-79%   Goal  CK= 40-59%     The severity rating is based on the following outcomes:             Clinical judgment      PAIN:  Pt reports 0/10 pain or discomfort prior to evaluation. Pt reports 0/10 pain or discomfort post evaluation. After treatment:   [ ]              Patient left in no apparent distress sitting up in chair  [X]              Patient left in no apparent distress in bed  [X]              Call bell left within reach  [X]              Nursing notified  [ ]              Caregiver present  [ ]              Bed alarm activated      COMMUNICATION/EDUCATION:   [X] Patient educated regarding speech deficits, compensatory speech strategies, and role of SLP. [X] Patient/family have participated as able in goal setting and plan of care.      Thank you for this referral.     Jennifer Pizano M.S. CCC-SLP/L  Speech-Language Pathologist

## 2017-09-19 NOTE — PROGRESS NOTES
Problem: Self Care Deficits Care Plan (Adult)  Goal: *Acute Goals and Plan of Care (Insert Text)  Occupational Therapy Goals  Initiated 9/18/2017 within 7 day(s). 1. Patient will perform self-feeding with supervision/set-up   2. Patient will perform grooming with supervision/set-up. 3. Patient will perform upper body dressing with supervision/set-up. 4. Patient will perform maneuver in bed with moderate assistance  5. Patient will participate in upper extremity therapeutic exercise/activities with supervision/set-up in preparation for self-feeding tasks   Outcome: Progressing Towards Goal  OCCUPATIONAL THERAPY TREATMENT     Patient: Marguerite Preciado (18 y.o. male)  Date: 9/19/2017  Diagnosis: Stroke [facility]) <principal problem not specified>       Precautions: Fall, Contact, Skin  Chart, occupational therapy assessment, plan of care, and goals were reviewed. ASSESSMENT:  Pt is alert and presents w/HOB elevated, in front of lunch tray. Pt reports he needs assistance with self-feeding. Pt required repositioning in bed prior to self-feeding, pt required Max A and additional time w/scooting in bed, however was able to follow commands well for technique. Following repositioning pt participated in RUE TherEx including AROM for R hand and elbow, and WB activities to promote function and efficient use of RUE for self-feeding. Following TherEx pt participated in self-feeding, pt required Mod A for bringing food to mouth with RUE and LUE, and Min A for bringing drink to mouth (thickened). Pt required encouragement to use his BUE for self-feeding, however is receptive to education on the importance of using his UE for ADLs. Following self-feeding pt was able to complete grooming task w/CGA w/LUE at first and RUE. Pt reports difficulty w/using call button, provided w/electrodes placed over call button, which allowed pt to use call bell w/LUE independently.   Progression toward goals:  [ ]          Improving appropriately and progressing toward goals  [X]          Improving slowly and progressing toward goals  [ ]          Not making progress toward goals and plan of care will be adjusted       PLAN:  Patient continues to benefit from skilled intervention to address the above impairments. Continue treatment per established plan of care. Discharge Recommendations:  [facility]  Further Equipment Recommendations for Discharge:  N/A      G-CODES:      Self Care  Current  CM= 80-99%. The severity rating is based on the Level of Assistance required for Functional Mobility and ADLs. SUBJECTIVE:   Patient stated I am so thirsty.       OBJECTIVE DATA SUMMARY:   Cognitive/Behavioral Status:  Neurologic State: Alert  Orientation Level: Oriented to person, Oriented to place  Cognition: Follows commands  Safety/Judgement: Awareness of environment, Fall prevention, Insight into deficits     Functional Mobility and Transfers for ADLs:              Bed Mobility:   Scooting: Maximum assistance              ADL Intervention:   Feeding  Utensil Management: Maximum assistance  Food to Mouth: Moderate assistance (w/RUE and LUE)  Drink to Mouth: Minimum assistance (w/LUE)     Grooming  Grooming Assistance: Contact guard assistance (RUE and LUE)  Washing Face: Contact guard assistance  Washing Hands: Contact guard assistance  Therapeutic Exercises:   Pt participated in RUE TherEx including AROM for R hand and elbow (elbow flex/ext, wrist flex/ext, digits flex) and WB activities to promote function and efficient use of RUE for self-feeding. Pain:  Pt reports 0/10 pain or discomfort prior to treatment. Pt reports 0/10 pain or discomfort post treatment. Activity Tolerance:    Fair     Please refer to the flowsheet for vital signs taken during this treatment.   After treatment:   [ ]  Patient left in no apparent distress sitting up in chair  [X]  Patient left in no apparent distress in bed  [X]  Call bell left within reach  [X]  Nursing notified  [ ]  Caregiver present  [ ]  Bed alarm activated     JILL Dang  Time Calculation: 40 mins

## 2017-09-19 NOTE — PROGRESS NOTES
Bedside and Verbal shift change report given to [address]  by  Justina Urban RN Report included the following information SBAR, Kardex, ED Summary, Recent Results and Med Rec Status.

## 2017-09-19 NOTE — PROGRESS NOTES
Problem: Dysphagia (Adult)  Goal: *Acute Goals and Plan of Care (Insert Text)  Patient will:  1. Tolerate PO trials with 0 s/s overt distress in 4/5 trials  2. Utilize compensatory swallow strategies/maneuvers (decrease bite/sip, size/rate, alt. liq/sol) with min cues in 4/5 trials  3. Perform oral-motor/laryngeal exercises to increase oropharyngeal swallow function with min cues  4. Complete an objective swallow study (i.e., MBSS) to assess swallow integrity, r/o aspiration, and determine of safest LRD, min A - met 9/18/17    Rec:   Reg diet with honey-thick liquids  Assistance with PO  Aspiration precautions  HOB >45 during po intake, remain >30 for 30-45 minutes after po   Small bites/sips; alternate liquid/solid with slow feeding rate   Oral care TID  Meds as tolerated   Outcome: Progressing Towards Goal  SPEECH LANGUAGE PATHOLOGY DYSPHAGIA TREATMENT     Patient: Kain Sanders (82 y.o. male)  Date: 9/19/2017  Diagnosis: Stroke [facility]) <principal problem not specified>       Precautions: aspiration Fall, Contact, Skin      ASSESSMENT:  Pt was seen at bedside for f/u dysphagia management. Pt educated on results from [facility] including: \"mod-sev pharyngeal dysphagia c/b SILENT aspiration of nectar-thick liquids during the swallow. Pt tolerated reg solid, puree, honey-thick liquids, and 13 mm Ba pill with honey-thick wash without aspiration/penetration events. \" This AM, pt with audible swallow and delayed swallow initiation with honey-thick liquid trials. No overt s/sx of aspiration appreciated. Pt refused further PO and initiation of oropharyngeal strengthening exercises stating, \"I'm tired. Please leave me alone. \" Continue to rec reg diet with honey-thick liquids, aspiration precautions, oral care TID, and meds as tolerated. Pt will benefit from OP SLP services upon DC from this facility to address dysarthria, cog impairments, and dysphagia. ST will continue to follow.       Progression toward goals:  [ ] Improving appropriately and progressing toward goals   [X]         Improving slowly and progressing toward goals  [ ]         Not making progress toward goals and plan of care will be adjusted       PLAN:  Recommendations and Planned Interventions: See above  Patient continues to benefit from skilled intervention to address the above impairments. Continue treatment per established plan of care. Discharge Recommendations:  Inpatient Rehab, [place] and To Be Determined       SUBJECTIVE:   Patient stated Please leave me alone. OBJECTIVE:   Cognitive and Communication Status:  Neurologic State: Alert  Orientation Level: Oriented to person  Cognition: Follows commands  Perception: Cues to maintain midline in sitting, Cues to maintain midline in standing, Tactile, Verbal  Perseveration: No perseveration noted  Safety/Judgement: Awareness of environment, Fall prevention, Insight into deficits  Dysphagia Treatment:  Oral Assessment:  Oral Assessment  Labial: Right droop  Dentition: Natural, Intact  Oral Hygiene: Adequate  Lingual: Right deviation  Velum: No impairment  Mandible: No impairment  P.O.  Trials:              Patient Position: [facility] at [facility]              Vocal quality prior to P.O.: No impairment              Consistency Presented: Honey thick liquid              How Presented: SLP-fed/presented, Cup/sip, Spoon, Straw              Bolus Acceptance: No impairment              Bolus Formation/Control: No impairment              Propulsion: No impairment              Oral Residue: None              Initiation of Swallow: Delayed (# of seconds)              Laryngeal Elevation: Weak, Decreased              Aspiration Signs/Symptoms: Change vocal quality, Clear throat              Pharyngeal Phase Characteristics: Audible swallow, Altered vocal quality, Multiple swallows, Suspected pharyngeal residue              Effective Modifications: Small sips and bites              Cues for Modifications: Minimal                             Oral Phase Severity: No impairment              Pharyngeal Phase Severity : Moderate-severe                PAIN:  Pt reports 0/10 pain or discomfort prior to tx. Pt reports 0/10 pain or discomfort post tx. After treatment:   [ ]              Patient left in no apparent distress sitting up in chair  [X]              Patient left in no apparent distress in bed  [X]              Call bell left within reach  [X]              Nursing notified  [ ]              Caregiver present  [ ]              Bed alarm activated         COMMUNICATION/EDUCATION:   [X]              SLP educated pt with regard to compensatory swallow strategies and                   aspiration/reflux precautions including: small bites/sips,                   alternate liquids/solids, decrease feeding rate, HOB > 45 with all po, and                   upright in bed at 30 degrees after po for at least 45 minutes.       Thank you for this referral.     Phyllis Le M.S. CCC-SLP/L  Speech-Language Pathologist

## 2017-09-20 LAB
BASOPHILS # BLD: 0 K/UL (ref 0–0.1)
BASOPHILS NFR BLD: 0 % (ref 0–2)
DIFFERENTIAL METHOD BLD: ABNORMAL
EOSINOPHIL # BLD: 0.2 K/UL (ref 0–0.4)
EOSINOPHIL NFR BLD: 3 % (ref 0–5)
ERYTHROCYTE [DISTWIDTH] IN BLOOD BY AUTOMATED COUNT: 14.6 % (ref 11.6–14.5)
GLUCOSE BLD STRIP.AUTO-MCNC: 106 MG/DL (ref 70–110)
GLUCOSE BLD STRIP.AUTO-MCNC: 99 MG/DL (ref 70–110)
HCT VFR BLD AUTO: 33 % (ref 36–48)
HGB BLD-MCNC: 11 G/DL (ref 13–16)
LYMPHOCYTES # BLD: 2.5 K/UL (ref 0.9–3.6)
LYMPHOCYTES NFR BLD: 36 % (ref 21–52)
MCH RBC QN AUTO: 26.1 PG (ref 24–34)
MCHC RBC AUTO-ENTMCNC: 33.3 G/DL (ref 31–37)
MCV RBC AUTO: 78.4 FL (ref 74–97)
MONOCYTES # BLD: 0.5 K/UL (ref 0.05–1.2)
MONOCYTES NFR BLD: 8 % (ref 3–10)
NEUTS SEG # BLD: 3.7 K/UL (ref 1.8–8)
NEUTS SEG NFR BLD: 53 % (ref 40–73)
PLATELET # BLD AUTO: 233 K/UL (ref 135–420)
PMV BLD AUTO: 10.4 FL (ref 9.2–11.8)
RBC # BLD AUTO: 4.21 M/UL (ref 4.7–5.5)
WBC # BLD AUTO: 6.9 K/UL (ref 4.6–13.2)

## 2017-09-20 PROCEDURE — 74011250637 HC RX REV CODE- 250/637: Performed by: FAMILY MEDICINE

## 2017-09-20 PROCEDURE — 36415 COLL VENOUS BLD VENIPUNCTURE: CPT | Performed by: FAMILY MEDICINE

## 2017-09-20 PROCEDURE — 97110 THERAPEUTIC EXERCISES: CPT

## 2017-09-20 PROCEDURE — 74011250636 HC RX REV CODE- 250/636: Performed by: FAMILY MEDICINE

## 2017-09-20 PROCEDURE — 65660000000 HC RM CCU STEPDOWN

## 2017-09-20 PROCEDURE — 92507 TX SP LANG VOICE COMM INDIV: CPT

## 2017-09-20 PROCEDURE — 97535 SELF CARE MNGMENT TRAINING: CPT

## 2017-09-20 PROCEDURE — 92526 ORAL FUNCTION THERAPY: CPT

## 2017-09-20 PROCEDURE — 82962 GLUCOSE BLOOD TEST: CPT

## 2017-09-20 PROCEDURE — 85025 COMPLETE CBC W/AUTO DIFF WBC: CPT | Performed by: FAMILY MEDICINE

## 2017-09-20 PROCEDURE — 97530 THERAPEUTIC ACTIVITIES: CPT

## 2017-09-20 RX ORDER — CLOPIDOGREL BISULFATE 75 MG/1
75 TABLET ORAL DAILY
Qty: 90 TAB | Refills: 0 | Status: SHIPPED | OUTPATIENT
Start: 2017-09-20 | End: 2022-10-22

## 2017-09-20 RX ORDER — PRAVASTATIN SODIUM 40 MG/1
40 TABLET ORAL
Qty: 30 TAB | Refills: 1 | Status: SHIPPED | OUTPATIENT
Start: 2017-09-20

## 2017-09-20 RX ORDER — LEVETIRACETAM 750 MG/1
750 TABLET ORAL 2 TIMES DAILY
Qty: 60 TAB | Refills: 1 | Status: SHIPPED | OUTPATIENT
Start: 2017-09-20 | End: 2021-04-23 | Stop reason: DRUGHIGH

## 2017-09-20 RX ADMIN — Medication 10 ML: at 06:56

## 2017-09-20 RX ADMIN — FOLIC ACID 1 MG: 1 TABLET ORAL at 09:52

## 2017-09-20 RX ADMIN — Medication 325 MG: at 09:53

## 2017-09-20 RX ADMIN — PRAVASTATIN SODIUM 40 MG: 20 TABLET ORAL at 23:28

## 2017-09-20 RX ADMIN — LEVETIRACETAM 750 MG: 500 TABLET ORAL at 09:52

## 2017-09-20 RX ADMIN — Medication 10 ML: at 01:57

## 2017-09-20 RX ADMIN — ENOXAPARIN SODIUM 40 MG: 40 INJECTION SUBCUTANEOUS at 15:07

## 2017-09-20 RX ADMIN — ASPIRIN 81 MG 81 MG: 81 TABLET ORAL at 09:53

## 2017-09-20 RX ADMIN — LEVETIRACETAM 750 MG: 500 TABLET ORAL at 23:28

## 2017-09-20 RX ADMIN — TAMSULOSIN HYDROCHLORIDE 0.4 MG: 0.4 CAPSULE ORAL at 09:52

## 2017-09-20 RX ADMIN — CLOPIDOGREL 75 MG: 75 TABLET, FILM COATED ORAL at 09:53

## 2017-09-20 RX ADMIN — Medication 10 ML: at 15:08

## 2017-09-20 RX ADMIN — CYANOCOBALAMIN TAB 1000 MCG 1000 MCG: 1000 TAB at 09:52

## 2017-09-20 RX ADMIN — Medication 10 ML: at 23:30

## 2017-09-20 RX ADMIN — THERA TABS 1 TABLET: TAB at 09:52

## 2017-09-20 RX ADMIN — STANDARDIZED SENNA CONCENTRATE AND DOCUSATE SODIUM 1 TABLET: 8.6; 5 TABLET, FILM COATED ORAL at 23:28

## 2017-09-20 RX ADMIN — POLYETHYLENE GLYCOL 3350 17 G: 17 POWDER, FOR SOLUTION ORAL at 09:52

## 2017-09-20 NOTE — PROGRESS NOTES
Problem: Neurolinguistics Impaired (Adult)  Goal: *Acute Goals and Plan of Care (Insert Text)  Goals:  1. Utilize compensatory strategies (decrease rate, overarticulate, increase intensity) to increase intelligibility to >90% at word level with mod A visual/verbal cues in 4/5 trials. 2. Perform oral motor exercises (with and without resistance) in therapy and at home to increase oral motor strength/range-of-motion for articulation tasks with mod A with visual/verbal cues in 4/5 trials. 3. Complete articulatory agility tasks (reading-conversation) with mod A with visual/verbal cues in 4/5 trials. 4. Demonstrate functional increase in articulation skills for effective participation in communication ADLs with mod A.   5. Complete assorted STM tasks with 80% accuracy with min cues. 6. Complete assorted categorization tasks with 80% with min cues. 7. Complete further testing as indicated by SLP. Outcome: Progressing Towards Goal  SPEECH LANGUAGE PATHOLOGY TREATMENT     Patient: Bebo Segura (8 y.o. male)  Date: 9/20/2017  Diagnosis: Stroke [facility]) <principal problem not specified>           ASSESSMENT:  Pt was seen at bedside for f/u speech/langauge therapy. Pt completed tongue base retraction and labial protrusion exercises 5 rep x 1 with mod-max cues, refusing further exercises stating, \"You just don't understand how hard this is. \" SLP reviewed compensatory speech strategies including overarticulation and increasing loudness with verbalized understanding. Pt refused further therapeutic tasks at this time stating, \"You just need to get me out of bed. \" Abisai Plummer will continue to follow. Progression toward goals:  [ ]       Improving appropriately and progressing toward goals  [X]       Improving slowly and progressing toward goals  [ ]       Not making progress toward goals and plan of care will be adjusted       PLAN:  Patient continues to benefit from skilled intervention to address the above impairments. Continue treatment per established plan of care. Discharge Recommendations:  Inpatient Rehab, [place] and To Be Determined       SUBJECTIVE:   Patient stated You just need to get me out of bed. OBJECTIVE:   Mental Status:  Neurologic State: Alert  Orientation Level: Oriented to person, Oriented to place, Oriented to situation, Appropriate for age  Cognition: Follows commands  Perception: Cues to maintain midline in sitting, Cues to maintain midline in standing, Tactile, Verbal  Perseveration: No perseveration noted  Safety/Judgement: Awareness of environment, Fall prevention, Insight into deficits  Treatment & Interventions: see above  Response & Tolerance to Activities: improving, but limited     PAIN:  Pt reports 0/10 pain or discomfort prior to tx. Pt reports 0/10 pain or discomfort post tx.      After treatment:   [ ]       Patient left in no apparent distress sitting up in chair  [X]       Patient left in no apparent distress in bed  [X]       Call bell left within reach  [X]       Nursing notified  [ ]       Caregiver present  [ ]       Bed alarm activated         COMMUNICATION/EDUCATION:   [X]             Compensatory speech/language/comprhension techniques     Thank you for this referral.     Fili Garcia M.S. CCC-SLP/L  Speech-Language Pathologist

## 2017-09-20 NOTE — PROGRESS NOTES
[facility] Hospitalist Group  Progress Note    Patient: Brandon Tapia Age: 58 y.o. : 1955 MR#: [id number] SSN: [id number]  Date: 2017     Subjective:     Working with therapy. No F/C, N/V, CP, SOB, pain c/o. Case d/w case mgmt, awaiting approval for placement, aim for xfer tomorrow. Assessment/Plan:   1. Acute CVA - R side hemiplegia. MRI reviewed, results pasted below: small lacunar infarct. Maintain CVA protocol, ASA, Plavix, statin. PT/OT. Add Plavix for 90 days, then full-strength ASA daily. CTA pending. 2. HTN - permissive HTN. Holding Norvasc, Lopressor. 3. Fe-def anemia - hemodynamically stable. H/H stable. Supplementing. 4. Noted recent hx HCAPNA, large R pleural effusion s/p thoracentesis. 5. Noted to appear to have focal Sz per neurology with R facial twitching, see neurology note from . Keppra. Reviewed. 6. Aim for xfer to SNF tomorrow. Additional Notes:     MRI brain:   1. Small acute lacunar infarct in the left corona radiata. No associated hemorrhage. 2. Extensive changes of chronic small vessel ischemia including multifocal chronic lacunar infarcts and moderate chronic white matter changes. Case discussed with:  [x]Patient  []Family  []Nursing  [x]Case Management  DVT Prophylaxis:  [x]Lovenox  []Hep SQ  []SCDs  []Coumadin   []On Heparin gtt    Objective:   VS:   Visit Vitals    /90 (BP 1 Location: Right arm, BP Patient Position: Sitting)    Pulse 77    Temp 98.7 °F (37.1 °C)    Resp 18    Wt 84.6 kg (186 lb 6.8 oz)    SpO2 98%    BMI 31.02 kg/m2      Tmax/24hrs: Temp (24hrs), Av.3 °F (36.8 °C), Min:97.4 °F (36.3 °C), Max:98.7 °F (37.1 °C)      Intake/Output Summary (Last 24 hours) at 17 1528  Last data filed at 17 1257   Gross per 24 hour   Intake              220 ml   Output                0 ml   Net              220 ml       General:  Awake, alert, NAD, in chair. Cardiovascular:  RRR.   Pulmonary:  CTA B.  GI:  Soft, NT/ND, NABS. Extremities:  No CT or edema. Additional:    Labs:    Recent Results (from the past 24 hour(s))   CBC WITH AUTOMATED DIFF    Collection Time: 09/20/17  3:57 AM   Result Value Ref Range    WBC 6.9 4.6 - 13.2 K/uL    RBC 4.21 (L) 4.70 - 5.50 M/uL    HGB 11.0 (L) 13.0 - 16.0 g/dL    HCT 33.0 (L) 36.0 - 48.0 %    MCV 78.4 74.0 - 97.0 FL    MCH 26.1 24.0 - 34.0 PG    MCHC 33.3 31.0 - 37.0 g/dL    RDW 14.6 (H) 11.6 - 14.5 %    PLATELET 440 326 - 220 K/uL    MPV 10.4 9.2 - 11.8 FL    NEUTROPHILS 53 40 - 73 %    LYMPHOCYTES 36 21 - 52 %    MONOCYTES 8 3 - 10 %    EOSINOPHILS 3 0 - 5 %    BASOPHILS 0 0 - 2 %    ABS. NEUTROPHILS 3.7 1.8 - 8.0 K/UL    ABS. LYMPHOCYTES 2.5 0.9 - 3.6 K/UL    ABS. MONOCYTES 0.5 0.05 - 1.2 K/UL    ABS. EOSINOPHILS 0.2 0.0 - 0.4 K/UL    ABS.  BASOPHILS 0.0 0.0 - 0.1 K/UL    DF AUTOMATED     GLUCOSE, POC    Collection Time: 09/20/17 12:42 PM   Result Value Ref Range    Glucose (POC) 99 70 - 110 mg/dL       Signed By: Delphine Burkitt, MD     September 20, 2017 4:36 PM

## 2017-09-20 NOTE — PROGRESS NOTES
Re:  Ana Lilia Rose up visit     9/20/2017  1:44 PM      SSN: [id number]    Subjective: Kirti Reed is found resting in bed. He's just back from CTA head and neck.     Medications:    Current Facility-Administered Medications   Medication Dose Route Frequency Provider Last Rate Last Dose    clopidogrel (PLAVIX) tablet 75 mg  75 mg Oral DAILY Roya Wick MD   75 mg at 09/20/17 1593    levETIRAcetam (KEPPRA) tablet 750 mg  750 mg Oral BID Roya Wick MD   750 mg at 09/20/17 6817    sodium chloride (NS) flush 5-10 mL  5-10 mL IntraVENous PRN Artur German MD        [facility] capsule 0.4 mg  0.4 mg Oral DAILY Benita Edge MD   0.4 mg at 09/20/17 0952    senna-docusate (PERICOLACE) 8.6-50 mg per tablet 1 Tab  1 Tab Oral QHS Benita Edge MD   1 Tab at 09/19/17 2319    therapeutic multivitamin (THERAGRAN) tablet 1 Tab  1 Tab Oral DAILY Benita Edge MD   1 Tab at 09/20/17 0952    oxyCODONE-acetaminophen (PERCOCET) 5-325 mg per tablet 1 Tab  1 Tab Oral Q4H PRN Benita Edge MD   1 Tab at 09/19/17 2324    polyethylene glycol (MIRALAX) packet 17 g  17 g Oral DAILY Benita Edge MD   17 g at 09/20/17 0247    aspirin chewable tablet 81 mg  81 mg Oral DAILY Benita Edge MD   81 mg at 09/20/17 2302    baclofen (LIORESAL) tablet 10 mg  10 mg Oral Q12H PRN Benita Edge MD        cyanocobalamin tablet 1,000 mcg  1,000 mcg Oral DAILY Benita Edge MD   1,000 mcg at 09/20/17 2223    ferrous sulfate tablet 325 mg  325 mg Oral DAILY WITH BREAKFAST Benita Edge MD   325 mg at 21/84/95 6213    folic acid (FOLVITE) tablet 1 mg  1 mg Oral DAILY Benita Edge MD   1 mg at 09/20/17 3657    sodium chloride (NS) flush 5-10 mL  5-10 mL IntraVENous Q8H Benita Edge MD   10 mL at 09/20/17 0656    sodium chloride (NS) flush 5-10 mL  5-10 mL IntraVENous PRN Benita Edge MD        pravastatin (PRAVACHOL) tablet 40 mg  40 mg Oral QHS Benita Edge MD   40 mg at 09/19/17 2319    enoxaparin (LOVENOX) injection 40 mg  40 mg SubCUTAneous Q24H Lester Bellamy MD   40 mg at 09/19/17 1309    ondansetron (ZOFRAN) injection 4 mg  4 mg IntraVENous Q6H PRN Roberta Chapa II, MD   4 mg at 09/19/17 1328       Vital signs:    Visit Vitals    /90 (BP 1 Location: Right arm, BP Patient Position: Sitting)    Pulse 77    Temp 98.7 °F (37.1 °C)    Resp 18    Wt 84.6 kg (186 lb 6.8 oz)    SpO2 98%    BMI 31.02 kg/m2       Review of Systems:   As above otherwise 11 point review of systems negative including;   Constitutional no fever or chills  Skin denies rash or itching  HEENT  Denies tinnitus, hearing lose  Eyes denies diplopia vision lose  Respiratory denies sortness of breath  Cardiovascular denies chest pain, dyspnea on exertion  Gastrointestinal denies nausea, vomiting, diarrhea, constipation  Genitourinary denies incontinence  Musculoskeletal denies joint pain or swelling  Endocrine denies weight change  Hematology denies easy bruising or bleeding   Neurological as above in HPI      Patient Active Problem List   Diagnosis Code    Intractable hiccups R06.6    Essential hypertension, benign I10    First degree AV block I44.0    Former heavy tobacco smoker Z87.891    Psychogenic polydipsia R63.1, 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 Fund of knowledge is adequate. Speech is slurred but fluent and memory is intact. Cranial Nerves: II - Visual fields are full to confrontation. III, IV, VI - Extraocular movements are intact. There is no nystagmus. V - Facial sensation is intact to pinprick. VII - Face is symmetrical.  VIII - Hearing is present. IX, X, XII - Palate is symmetrical.   XI - Shoulder shrugging and head turning intact  Motor:   The patient has a slight right hemiparesis, about 4+/5 throughout the right. Tone is increased on the right. Reflexes are 2+ and symmetrical. Plantars are down going. Gait is not tested. CBC:   Lab Results   Component Value Date/Time    WBC 6.9 09/20/2017 03:57 AM    RBC 4.21 09/20/2017 03:57 AM    HGB 11.0 09/20/2017 03:57 AM    HCT 33.0 09/20/2017 03:57 AM    PLATELET 797 23/35/5083 03:57 AM     BMP:   Lab Results   Component Value Date/Time    Glucose 86 09/19/2017 03:47 AM    Sodium 142 09/19/2017 03:47 AM    Potassium 3.7 09/19/2017 03:47 AM    Chloride 109 09/19/2017 03:47 AM    CO2 25 09/19/2017 03:47 AM    BUN 8 09/19/2017 03:47 AM    Creatinine 0.80 09/19/2017 03:47 AM    Calcium 8.8 09/19/2017 03:47 AM     CMP:   Lab Results   Component Value Date/Time    Glucose 86 09/19/2017 03:47 AM    Sodium 142 09/19/2017 03:47 AM    Potassium 3.7 09/19/2017 03:47 AM    Chloride 109 09/19/2017 03:47 AM    CO2 25 09/19/2017 03:47 AM    BUN 8 09/19/2017 03:47 AM    Creatinine 0.80 09/19/2017 03:47 AM    Calcium 8.8 09/19/2017 03:47 AM    Anion gap 8 09/19/2017 03:47 AM    BUN/Creatinine ratio 10 09/19/2017 03:47 AM    Alk. phosphatase 72 09/16/2017 09:32 AM    Protein, total 8.3 09/16/2017 09:32 AM    Albumin 2.9 09/16/2017 09:32 AM    Globulin 5.4 09/16/2017 09:32 AM    A-G Ratio 0.5 09/16/2017 09:32 AM     Coagulation:   Lab Results   Component Value Date/Time    Prothrombin time 14.8 07/18/2017 02:57 AM    INR 1.2 07/18/2017 02:57 AM    aPTT 25.5 05/11/2017 12:45 PM     Cardiac markers:   Lab Results   Component Value Date/Time    CK 27 08/22/2017 03:25 AM    CK-MB Index  08/22/2017 03:25 AM     CALCULATION NOT PERFORMED WHEN RESULT IS BELOW LINEAR LIMIT       Assessment:  New left stroke in this man who has risk factors including hypertension, prior stroke, some evidence of non-compliance. Plan:  Aspirin and plavix for 90 days then aspirin only full dose.   Will check CTA to make sure we're not missing any large vessel disease. Sincerely,        Malina Snow.  Cherrie Dancer, M.D.

## 2017-09-20 NOTE — PROGRESS NOTES
Problem: Dysphagia (Adult)  Goal: *Acute Goals and Plan of Care (Insert Text)  Patient will:  1. Tolerate PO trials with 0 s/s overt distress in 4/5 trials  2. Utilize compensatory swallow strategies/maneuvers (decrease bite/sip, size/rate, alt. liq/sol) with min cues in 4/5 trials  3. Perform oral-motor/laryngeal exercises to increase oropharyngeal swallow function with min cues  4. Complete an objective swallow study (i.e., MBSS) to assess swallow integrity, r/o aspiration, and determine of safest LRD, min A - met 9/18/17    Rec:   Reg diet with honey-thick liquids  Assistance with PO  Aspiration precautions  HOB >45 during po intake, remain >30 for 30-45 minutes after po   Small bites/sips; alternate liquid/solid with slow feeding rate   Oral care TID  Meds as tolerated   Outcome: Progressing Towards Goal  SPEECH LANGUAGE PATHOLOGY DYSPHAGIA TREATMENT     Patient: Allison Leiva (89 y.o. male)  Date: 9/20/2017  Diagnosis: Stroke [facility]) <principal problem not specified>       Precautions: aspiration Fall, Contact, Skin      ASSESSMENT:  Pt was seen at bedside for f/u dysphagia management. Pt fed entirety of breakfast tray of reg solid with honey-thick liquids without any overt s/sx of aspiration. Laryngeal elevation appeared weak to palpation with multiple swallows per bolus presentation. Adequate mastication and a-p transit appreciated across all trials. Pt completed tongue base retraction exercise 5 rep x 1, refusing further oropharyngeal strengthening exercises stating, \"You don't understand how hard this is. \" Pt with altered vocal quality post lowering of bed to reposition. Continue to rec reg solid with honey-thick liquids, aspiration precautions, oral care TID, and meds as tolerated. Pt will continue to require assistance with Chetan Fiddler will continue to follow.       Progression toward goals:  [ ]         Improving appropriately and progressing toward goals  [X]         Improving slowly and progressing toward goals  [ ]         Not making progress toward goals and plan of care will be adjusted       PLAN:  Recommendations and Planned Interventions: See above  Patient continues to benefit from skilled intervention to address the above impairments. Continue treatment per established plan of care. Discharge Recommendations:  Inpatient Rehab, [place] and To Be Determined       SUBJECTIVE:   Patient stated You don't understand how hard this is. OBJECTIVE:   Cognitive and Communication Status:  Neurologic State: Alert  Orientation Level: Oriented to person, Oriented to place, Oriented to situation, Appropriate for age  Cognition: Follows commands  Perception: Cues to maintain midline in sitting, Cues to maintain midline in standing, Tactile, Verbal  Perseveration: No perseveration noted  Safety/Judgement: Awareness of environment, Fall prevention, Insight into deficits  Dysphagia Treatment:  Oral Assessment:  Oral Assessment  Labial: Right droop  Dentition: Natural, Intact  Oral Hygiene: Adequate  Lingual: Right deviation  Velum: No impairment  Mandible: No impairment  P.O.  Trials:              Patient Position: 55 at [facility]              Vocal quality prior to P.O.: No impairment              Consistency Presented: Solid, Honey thick liquid              How Presented: SLP-fed/presented, Cup/sip, Spoon, Straw              Bolus Acceptance: No impairment              Bolus Formation/Control: No impairment              Propulsion: No impairment              Oral Residue: None              Initiation of Swallow: Delayed (# of seconds)              Laryngeal Elevation: Weak, Decreased              Aspiration Signs/Symptoms: Change vocal quality              Pharyngeal Phase Characteristics: Mult swallows              Effective Modifications: Small sips and bites              Cues for Modifications: Minimal                             Oral Phase Severity: No impairment              Pharyngeal Phase Severity : Moderate-severe  PAIN:  Pt reports 0/10 pain or discomfort prior to tx. Pt reports 0/10 pain or discomfort post tx. After treatment:   [ ]              Patient left in no apparent distress sitting up in chair  [X]              Patient left in no apparent distress in bed  [X]              Call bell left within reach  [X]              Nursing notified  [ ]              Caregiver present  [ ]              Bed alarm activated         COMMUNICATION/EDUCATION:   [X]              SLP educated pt with regard to compensatory swallow strategies and                   aspiration/reflux precautions including: small bites/sips,                   alternate liquids/solids, decrease feeding rate, HOB > 45 with all po, and                   upright in bed at 30 degrees after po for at least 45 minutes.       Thank you for this referral.     Pamela Hanna M.S. CCC-SLP/L  Speech-Language Pathologist

## 2017-09-20 NOTE — PROGRESS NOTES
Problem: Self Care Deficits Care Plan (Adult)  Goal: *Acute Goals and Plan of Care (Insert Text)  Occupational Therapy Goals  Initiated 9/18/2017 within 7 day(s). 1. Patient will perform self-feeding with supervision/set-up   2. Patient will perform grooming with supervision/set-up. 3. Patient will perform upper body dressing with supervision/set-up. 4. Patient will perform maneuver in bed with moderate assistance  5. Patient will participate in upper extremity therapeutic exercise/activities with supervision/set-up in preparation for self-feeding tasks   Outcome: Progressing Towards Goal  OCCUPATIONAL THERAPY TREATMENT     Patient: Gianfranco Lugo (4 y.o. male)  Date: 9/20/2017  Diagnosis: Stroke [facility]) <principal problem not specified>       Precautions: Fall, Contact, Skin  Chart, occupational therapy assessment, plan of care, and goals were reviewed. ASSESSMENT:  Pt is up in the chair upon entry, asleep. Pt required repeated VCs and tactile cues to wake up, once awake, pt was able to follow commands and participate in BUE There activities and TherEx, however required VCs to keep eyes open. Following scapular mobilization pt was able to participate in BUE AROM There Ex, occasionally requiring Min A 2/2 increased tone in proximal UE. Pt participated in lateral WB activity to UE seated in the chair, requiring constant support posteriorly. Following Ther Act pt was able to bring drink to mouth w/RUE initially w/Min A which improved to CGA w/repetition. Pt required Min A to don gown for compensatory technique training. Pt was encouraged to remain in the chair to improve endurance and overall activity tolerance for carryover w/ADLs.   Progression toward goals:  [X]          Improving appropriately and progressing toward goals  [ ]          Improving slowly and progressing toward goals  [ ]          Not making progress toward goals and plan of care will be adjusted       PLAN:  Patient continues to benefit from skilled intervention to address the above impairments. Continue treatment per established plan of care. Discharge Recommendations:  Rehab  Further Equipment Recommendations for Discharge:  N/A      G-CODES:      Self Care  Current  CL= 60-79%. The severity rating is based on the Level of Assistance required for Functional Mobility and ADLs. SUBJECTIVE:   Patient stated I have been moving my arm.       OBJECTIVE DATA SUMMARY:   Cognitive/Behavioral Status:  Neurologic State: Alert  Orientation Level: Oriented X4  Cognition: Follows commands  Safety/Judgement: Awareness of environment, Fall prevention, Insight into deficits     Functional Mobility and Transfers for ADLs:              Bed Mobility:  Rolling: Moderate assistance  Supine to Sit: Moderate assistance;Maximum assistance     Scooting: Maximum assistance              Transfers:  Sit to Stand: Moderate assistance (x's 2 trials (assist for balance) good B LE quad engagement)     Bed to Chair: Moderate assistance     Balance:  Sitting: With support  Sitting - Static:  (fair-/poor+)  Sitting - Dynamic: Poor (constant support)  Standing: Impaired; With support  Standing - Static: Poor;Constant support  Standing - Dynamic : Poor     ADL Intervention:        Feeding  Drink to Mouth: Minimum assistance;Contact guard assistance     Grooming  Grooming Assistance: Contact guard assistance  Washing Face: Contact guard assistance  Washing Hands: Contact guard assistance     Upper Body [address]: Minimum  assistance; Moderate assistance  Therapeutic Exercises:   Scapular mobilization, setting in preparation for ADLs. AROM/AAROM BUE TherEx, in preparation for self-feeding task, elbow flex/ext, wrist ROM, digits flex/ext x10, pt required AAROM for shd flex. Pain:  Pt reports 0/10 pain or discomfort prior to treatment. Pt reports 0/10 pain or discomfort post treatment.       Activity Tolerance:    Fair     Please refer to the flowsheet for vital signs taken during this treatment.   After treatment:   [X]  Patient left in no apparent distress sitting up in chair  [ ]  Patient left in no apparent distress in bed  [X]  Call bell left within reach  [X]  Nursing notified  [ ]  Caregiver present  [ ]  Bed alarm activated     Elinore Alt, MORRIS/L  Time Calculation: 24 mins

## 2017-09-20 NOTE — ROUTINE PROCESS
Pt hollers out constantly every 5-10 minutes requesting something. Anytime pt hears foot steps he hollers out for help or makes bird noises. Pt will say he can not breathe upon checking oxygen levels, they are  on room air. Pt has had 3 baths in the course of the night and every nurse goes in there when they around his door. Other pts come to the nurses station saying \"the pt in 410 needs help, he's saying. Alma Rosa Peaks Alma Rosa Peaks Alma Rosa Peaks \" after nurse or cna has left out of the room almost 10 min prior.

## 2017-09-20 NOTE — PROGRESS NOTES
Patient was denied by ARU for post acute care. CM saw the patient in room to discuss [address] [place] [address] for SNF, patient asked this cm to contact his son-Hermelindo.    CM called pt's son PORFIRIO Mendoza.

## 2017-09-20 NOTE — PROCEDURES
[place]    Name:  Kojo Oseguera  MR#:  [id number]  :  1955  Account #:  [de-identified]  Date of Adm:  2017  Date of Service:  2017      ELECTROENCEPHALOGRAM NUMBER: [facility] . REFERRING PHYSICIAN: Brayan Ge MD    CLINICAL: This is an apparently wakeful EEG on this 54-year-old man  being evaluated for possible seizures. He has some right-sided  weakness and some twitching of his face when he was admitted to the  hospital.    MEDICATIONS INCLUDE:  1. Keppra. 2. Flomax. ELECTROENCEPHALOGRAM REPORT: The predominant wakeful  background consists of 40-50 microvolts, sinusoidal and symmetrical,  9-10 Hz waves which attenuate well with eye opening. Bifrontal 3-5  microvolts, 16-20 Hz activity is seen which is also symmetrical in its  occurrence. Step-flash photic stimulation was performed that caused no driving. IMPRESSION: This is a normal wakeful electroencephalogram. No  epileptiform or focal abnormality was identified.         MD Byron Mckee / [facility]  D:  2017   12:15  T:  2017   13:45  Job #:  [id number]

## 2017-09-20 NOTE — PROGRESS NOTES
Problem: Mobility Impaired (Adult and Pediatric)  Goal: *Acute Goals and Plan of Care (Insert Text)  Physical Therapy Goals  Initiated 9/17/2017 and to be accomplished within 7 day(s)  1. Patient will move from supine to sit and sit to supine , scoot up and down and roll side to side in bed with minimal assistance/contact guard assist.   2. Patient will transfer from bed to chair and chair to bed with minimal assistance/contact guard assist using the least restrictive device. 3. Patient will perform sit to stand with minimal assistance/contact guard assist.  4. Patient will ambulate with minimal assistance/contact guard assist for 25 feet with the least restrictive device. PHYSICAL THERAPY TREATMENT     Patient: Eric Perez (5 y.o. male)  Date: 9/20/2017  Diagnosis: Stroke [facility]) <principal problem not specified>       Precautions: Fall, Contact, Skin  Chart, physical therapy assessment, plan of care and goals were reviewed. ASSESSMENT:  Pt remains fairly impulsive,however, is receptive to all cues for safety and coordination once redirected. Pt sits EOB for increased time with the ability to maintain midline for ~15\" at a time. Pt is not void of righting reaction,however, req's cues for initiation every time. Increased balance noted with standing trials as pt does not present with any pushing. Pt is progressing well as indicated by increased sitting/standing time and balance as well as improved mental state throughout. Pt will benefit greatly from ARU rehab as indicated by multidisciplinary need,ability to progress, and motivation to participate in order to achieve maximal gains.   Progression toward goals:  [X]      Improving appropriately and progressing toward goals  [ ]      Improving slowly and progressing toward goals  [ ]      Not making progress toward goals and plan of care will be adjusted       PLAN:  Patient continues to benefit from skilled intervention to address the above impairments. Continue treatment per established plan of care. Discharge Recommendations:  Rehab  Further Equipment Recommendations for Discharge:  N/A          SUBJECTIVE:   Patient stated It don't take that long to put on one of those man.   Pt jokes about how long it takes to don an isolation gown. Pt is fairly perseverative on being a state level ,however, when instructed with team sport background in mind pt is quite receptive. OBJECTIVE DATA SUMMARY:   Critical Behavior:  Neurologic State: Alert  Orientation Level: Oriented X4  Cognition: Follows commands  Safety/Judgement: Awareness of environment, Fall prevention, Insight into deficits  Functional Mobility Training:  Bed Mobility:  Rolling: Moderate assistance  Supine to Sit: Moderate assistance;Maximum assistance  Scooting: Maximum assistance  Transfers:  Sit to Stand: Moderate assistance (x's 2 trials (assist for balance) good B LE quad engagement)  Stand to Sit: Moderate assistance  Bed to Chair: Moderate assistance  Other: stand step with 0 AD/anterior approach   Pretrial education regarding coordination of movement between pt/therapist to maximize safety and success. Good bilateral quad engagement noted throughout lift off and bulk of assist req'd for balance throughout trial.    Balance:  Sitting: Impaired; With support (x's 10 minute trial)  Sitting - Static:  (fair-/poor+)  Sitting - Dynamic: Poor (constant support)  Standing: Impaired; With support  Standing - Static: Poor;Constant support  Standing - Dynamic : Poor   Visual cues utilized for midline orientation for pt to have landmark for self awareness/policing. Intermittent minimal assist req'd for balance and pt is able to maintain midline for ~15\" each trial.  Standing trial x's 2'25\" for tiffanie-care post incontinence of bowel. Pain:  Pt reports 0/10 pain or discomfort prior to treatment. Pt reports 0/10 pain or discomfort post treatment.    Activity Tolerance: Fair: Pt tolerance to activity is improving rapidly with 0 c/o of over exertion and and full participation throughout session. Please refer to the flowsheet for vital signs taken during this treatment.   After treatment:   [X] Patient left in no apparent distress sitting up in chair  [ ] Patient left in no apparent distress in bed  [X] Call bell left within reach  [ ] Nursing notified  [ ] Caregiver present  [ ] Bed alarm activated      Mirela Wahl PTA   Time Calculation: 24 mins

## 2017-09-20 NOTE — ROUTINE PROCESS
Bedside and Verbal shift change report given to  Katelyn (oncoming nurse) by Harjinder Perrin RN  (offgoing nurse). Report included the following information SBAR, MAR and Recent Results.

## 2017-09-20 NOTE — PROGRESS NOTES
Problem: Falls - Risk of  Goal: *Absence of Falls  Document Abena Fall Risk and appropriate interventions in the flowsheet.    Outcome: Progressing Towards Goal  Fall Risk Interventions:        Mentation Interventions: Door open when patient unattended, More frequent rounding, Reorient patient     Medication Interventions: Bed/chair exit alarm     Elimination Interventions: Call light in reach

## 2017-09-21 PROCEDURE — 97535 SELF CARE MNGMENT TRAINING: CPT

## 2017-09-21 PROCEDURE — 97530 THERAPEUTIC ACTIVITIES: CPT

## 2017-09-21 PROCEDURE — 74011250636 HC RX REV CODE- 250/636: Performed by: FAMILY MEDICINE

## 2017-09-21 PROCEDURE — 65660000000 HC RM CCU STEPDOWN

## 2017-09-21 PROCEDURE — 74011250637 HC RX REV CODE- 250/637: Performed by: INTERNAL MEDICINE

## 2017-09-21 PROCEDURE — 74011250637 HC RX REV CODE- 250/637: Performed by: FAMILY MEDICINE

## 2017-09-21 RX ORDER — DIPHENHYDRAMINE HCL 25 MG
25 CAPSULE ORAL
Status: DISCONTINUED | OUTPATIENT
Start: 2017-09-21 | End: 2017-09-22 | Stop reason: HOSPADM

## 2017-09-21 RX ADMIN — Medication 325 MG: at 10:22

## 2017-09-21 RX ADMIN — LEVETIRACETAM 750 MG: 500 TABLET ORAL at 10:21

## 2017-09-21 RX ADMIN — CLOPIDOGREL 75 MG: 75 TABLET, FILM COATED ORAL at 10:21

## 2017-09-21 RX ADMIN — ASPIRIN 81 MG 81 MG: 81 TABLET ORAL at 10:21

## 2017-09-21 RX ADMIN — TAMSULOSIN HYDROCHLORIDE 0.4 MG: 0.4 CAPSULE ORAL at 10:21

## 2017-09-21 RX ADMIN — POLYETHYLENE GLYCOL 3350 17 G: 17 POWDER, FOR SOLUTION ORAL at 10:22

## 2017-09-21 RX ADMIN — CYANOCOBALAMIN TAB 1000 MCG 1000 MCG: 1000 TAB at 10:21

## 2017-09-21 RX ADMIN — Medication 10 ML: at 21:08

## 2017-09-21 RX ADMIN — ENOXAPARIN SODIUM 40 MG: 40 INJECTION SUBCUTANEOUS at 13:50

## 2017-09-21 RX ADMIN — STANDARDIZED SENNA CONCENTRATE AND DOCUSATE SODIUM 1 TABLET: 8.6; 5 TABLET, FILM COATED ORAL at 21:08

## 2017-09-21 RX ADMIN — THERA TABS 1 TABLET: TAB at 10:22

## 2017-09-21 RX ADMIN — FOLIC ACID 1 MG: 1 TABLET ORAL at 10:22

## 2017-09-21 RX ADMIN — PRAVASTATIN SODIUM 40 MG: 20 TABLET ORAL at 21:08

## 2017-09-21 RX ADMIN — LEVETIRACETAM 750 MG: 500 TABLET ORAL at 21:08

## 2017-09-21 RX ADMIN — OXYCODONE HYDROCHLORIDE AND ACETAMINOPHEN 1 TABLET: 5; 325 TABLET ORAL at 10:22

## 2017-09-21 RX ADMIN — DIPHENHYDRAMINE HYDROCHLORIDE 25 MG: 25 CAPSULE ORAL at 21:08

## 2017-09-21 NOTE — PROGRESS NOTES
Re:  Eloina Dense up visit     9/21/2017  3:41 PM      SSN: [id number]    Subjective: Emerald Young is found resting in bed.       Medications:    Current Facility-Administered Medications   Medication Dose Route Frequency Provider Last Rate Last Dose    clopidogrel (PLAVIX) tablet 75 mg  75 mg Oral DAILY Dmitri Harper MD   75 mg at 09/21/17 1021    levETIRAcetam (KEPPRA) tablet 750 mg  750 mg Oral BID Dmitri Harper MD   750 mg at 09/21/17 1021    sodium chloride (NS) flush 5-10 mL  5-10 mL IntraVENous PRN Adali Haynes MD        tamsulosin (FLOMAX) capsule 0.4 mg  0.4 mg Oral DAILY Navneet Wright MD   0.4 mg at 09/21/17 1021    senna-docusate (Vernon Kelly) 8.6-50 mg per tablet 1 Tab  1 Tab Oral QHS Navneet Wright MD   1 Tab at 09/20/17 2328    therapeutic multivitamin (THERAGRAN) tablet 1 Tab  1 Tab Oral DAILY Navneet Wright MD   1 Tab at 09/21/17 1022    oxyCODONE-acetaminophen (PERCOCET) 5-325 mg per tablet 1 Tab  1 Tab Oral Q4H PRN Navneet Wright MD   1 Tab at 09/21/17 1022    polyethylene glycol (MIRALAX) packet 17 g  17 g Oral DAILY Navneet Wright MD   17 g at 09/21/17 1022    aspirin chewable tablet 81 mg  81 mg Oral DAILY Navneet Wright MD   81 mg at 09/21/17 1021    baclofen (LIORESAL) tablet 10 mg  10 mg Oral Q12H PRN Navneet Wright MD        cyanocobalamin tablet 1,000 mcg  1,000 mcg Oral DAILY Navneet Wright MD   1,000 mcg at 09/21/17 1021    ferrous sulfate tablet 325 mg  325 mg Oral DAILY WITH BREAKFAST Navneet Wright MD   325 mg at 49/10/33 6954    folic acid (FOLVITE) tablet 1 mg  1 mg Oral DAILY Navneet Wright MD   1 mg at 09/21/17 1022    sodium chloride (NS) flush 5-10 mL  5-10 mL IntraVENous Q8H Navneet Wright MD   10 mL at 09/20/17 2330    sodium chloride (NS) flush 5-10 mL  5-10 mL IntraVENous PRN Navneet Wright MD        pravastatin (PRAVACHOL) tablet 40 mg  40 mg Oral QHS Navneet Wright MD   40 mg at 09/20/17 1660    enoxaparin (LOVENOX) injection 40 mg  40 mg SubCUTAneous Q24H Minh Stewart MD   40 mg at 09/21/17 1350    ondansetron (ZOFRAN) injection 4 mg  4 mg IntraVENous Q6H PRN Darcy Flores II, MD   4 mg at 09/19/17 1328       Vital signs:    Visit Vitals    BP (!) 132/94 (BP 1 Location: Right arm, BP Patient Position: Supine)    Pulse 75    Temp 99 °F (37.2 °C)    Resp 20    Wt 84.6 kg (186 lb 6.8 oz)    SpO2 97%    BMI 31.02 kg/m2       Review of Systems:   As above otherwise 11 point review of systems negative including;   Constitutional no fever or chills  Skin denies rash or itching  HEENT  Denies tinnitus, hearing lose  Eyes denies diplopia vision lose  Respiratory denies sortness of breath  Cardiovascular denies chest pain, dyspnea on exertion  Gastrointestinal denies nausea, vomiting, diarrhea, constipation  Genitourinary denies incontinence  Musculoskeletal denies joint pain or swelling  Endocrine denies weight change  Hematology denies easy bruising or bleeding   Neurological as above in HPI      Patient Active Problem List   Diagnosis Code    Intractable hiccups R06.6    Essential hypertension, benign I10    First degree AV block I44.0    Former heavy tobacco smoker Z87.891    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 Fund of knowledge is adequate. Speech is slurred but fluent and memory is intact. Cranial Nerves: II - Visual fields are full to confrontation. III, IV, VI - Extraocular movements are intact. There is no nystagmus. V - Facial sensation is intact to pinprick. VII - Face is symmetrical.  VIII - Hearing is present. IX, X, XII - Palate is symmetrical.   XI - Shoulder shrugging and head turning intact  Motor:   The patient has a slight right hemiparesis, about 4+/5 throughout the right. Tone is increased on the right. Reflexes are 2+ and symmetrical. Plantars are down going. Gait 6 feet with maximal assist.    CBC:   Lab Results   Component Value Date/Time    WBC 6.9 09/20/2017 03:57 AM    RBC 4.21 09/20/2017 03:57 AM    HGB 11.0 09/20/2017 03:57 AM    HCT 33.0 09/20/2017 03:57 AM    PLATELET 232 79/37/7235 03:57 AM     BMP:   Lab Results   Component Value Date/Time    Glucose 86 09/19/2017 03:47 AM    Sodium 142 09/19/2017 03:47 AM    Potassium 3.7 09/19/2017 03:47 AM    Chloride 109 09/19/2017 03:47 AM    CO2 25 09/19/2017 03:47 AM    BUN 8 09/19/2017 03:47 AM    Creatinine 0.80 09/19/2017 03:47 AM    Calcium 8.8 09/19/2017 03:47 AM     CMP:   Lab Results   Component Value Date/Time    Glucose 86 09/19/2017 03:47 AM    Sodium 142 09/19/2017 03:47 AM    Potassium 3.7 09/19/2017 03:47 AM    Chloride 109 09/19/2017 03:47 AM    CO2 25 09/19/2017 03:47 AM    BUN 8 09/19/2017 03:47 AM    Creatinine 0.80 09/19/2017 03:47 AM    Calcium 8.8 09/19/2017 03:47 AM    Anion gap 8 09/19/2017 03:47 AM    BUN/Creatinine ratio 10 09/19/2017 03:47 AM    Alk.  phosphatase 72 09/16/2017 09:32 AM    Protein, total 8.3 09/16/2017 09:32 AM    Albumin 2.9 09/16/2017 09:32 AM    Globulin 5.4 09/16/2017 09:32 AM    A-G Ratio 0.5 09/16/2017 09:32 AM     Coagulation:   Lab Results   Component Value Date/Time    Prothrombin time 14.8 07/18/2017 02:57 AM    INR 1.2 07/18/2017 02:57 AM    aPTT 25.5 05/11/2017 12:45 PM     Cardiac markers:   Lab Results   Component Value Date/Time    CK 27 08/22/2017 03:25 AM    CK-MB Index  08/22/2017 03:25 AM     CALCULATION NOT PERFORMED WHEN RESULT IS BELOW LINEAR LIMIT     Final result (Exam End: 9/19/2017  6:50 PM) Open    Study Result   CTA NECK/BRAIN     CLINICAL INDICATIONS: New left stroke in this man with risks factors including  hypertension, prior stroke and some evidence of noncompliance.     TECHNIQUE: Helical CT scan of the brain and neck were performed at 1.25 mm  intervals during rapid IV bolus contrast administration. These data were  reconstructed at .625 mm intervals for vascular analysis. The data was also  reviewed at 2.5 mm intervals for accompanying soft tissue analysis. 3D post  processed images, including surface shaded displays, were produced for this exam  on independent console, permanently archived and interpreted.     All CT scans at this facility are performed using dose optimization technique as  appropriate to a performed exam, to include automated exposure control,  adjustment of the MA and/or kV according to patient size (including appropriate  matching for site-specific examinations) or use of  iterative reconstruction  technique.        CONTRAST: 100 cc Isovue-370 IV     CTA SOFT TISSUE ANALYSIS: Confluent periventricular white matter lucency. Focal  hypodensities in the left caudate body, left lentiform nucleus and bilateral  thalami consistent with chronic lacunar infarcts. The acutely evolving left  corona radiata lacunar infarct is poorly seen. Brain volumes and ventricular  spaces are within normal limits for age. No mass effect or gross mass lesion. Orbits are grossly normal. No neck mass or lymphadenopathy. Small layering right  pleural effusion.     CTA NECK VASCULAR ANALYSIS:      Aortic arch: Left-sided with common origin of the innominate and left common  carotid artery. No aneurysm or dissection flap.     Innominate: Patent.     Right Subclavian:Patent. Left Subclavian:Patent.     Right carotid:  -CCA: Patent. No stenosis. -ECA: Patent. No origin stenosis. -ICA: Patent. 0% stenosis of proximal ICA by NASCET criteria.     Left carotid:  -CCA: Patent. No stenosis. -ECA: Patent. No origin stenosis. -ICA: Patent. 0% stenosis of proximal ICA by NASCET criteria.     Right vertebral: Patent. No stenosis.      Left vertebral: Patent. No stenosis.         CTA BRAIN VASCULAR ANALYSIS:      Right anterior circulation:  -ICA:Patent. No stenosis or aneurysm. -PComm: Patent with partially intrasellar course. Normal origin. -ANDIE:Patent. No stenosis. -AComm: Normal.  -MCA: Patent. No stenosis. Normal bifurcation.     Left anterior circulation:  -ICA: Patent. No stenosis or aneurysm. -PComm: Not confidently visualized. -ANDIE: Patent. No stenosis. -MCA: Patent. No stenosis. Normal bifurcation.     Posterior circulation:  -RVA: Patent. No stenosis. Normal PICA origin. -LVA: Patent. No stenosis. Normal PICA origin.  -Basilar: Patent. No stenosis. Normal terminus.  -Right PCA: Patent. No stenosis. -Left PCA: Patent. No stenosis.       IMPRESSION  Impression:     Patent intra- and extracranial cerebral arteries. No evidence of a large vessel  occlusion or stenosis.     Small vessel ischemic white matter disease with multiple chronic lacunar  infarctions.     Right pleural effusion. Assessment:  New left stroke in this man who has risk factors including hypertension, prior stroke, some evidence of non-compliance. Plan:  Aspirin and plavix for 90 days then aspirin only full dose. Being discharged to SNF. Sincerely,        Evelio Bullock.  Minh Woods M.D.

## 2017-09-21 NOTE — ROUTINE PROCESS
Bedside shift change report given to Floyd Bustos RN (oncoming nurse) by Shayan Foss RN (offgoing nurse). Report included the following information SBAR, ED Summary, Intake/Output and Recent Results.

## 2017-09-21 NOTE — PROGRESS NOTES
CM called pt's son Allie Pleitez, VML.       CM also called pt's daughter-Mary [phone], no option to leave voice mail available. Patient says, he was in a pat in [place]. CM contacted [facility] admissions, she confirmed this patient was in the facility recently. CM asked her to initiate auth process and call this cm back when obtained. .    1:06 PM still awaiting for the auth from pt's insurance. 3:35 PM CM spoke to Amanda, she says, pt's insurance -Mrs. Zuleyka Rios, informed her before she will authorize the patient for skilled care, she will need to speak to pt's son, but she can not get hold of him. CM made another attempt to get hold of pt's son-no answer, VML. MILLIE also called pt's daughter-Mrs. [facility] and informed her that Liz Ruiz  will need to speak to the family in order for her to issue the auth, she agreed and her contact number was provided to Fab. CM awaiting the auth.

## 2017-09-21 NOTE — DISCHARGE SUMMARY
[address] [facility]  TRANSFER SUMMARY    Name:  Yulisa Santos  MR#:  [id number]  :  1955  Account #:  [de-identified]  Date of Adm:  2017  Date of Transfer:  2017      DISCHARGE DIAGNOSES  1. Acute cerebrovascular accident with right-sided hemiplegia. 2. Hypertension. 3. Iron-deficiency anemia. 4. Recent history of healthcare-associated pneumonia with a large  right pleural effusion, status post thoracentesis. 5. Seizure disorder. SERVICE: The patient was admitted to the hospitalist service. CONSULTS: Dr. Mahi Aguilar was consulted for Neurology. PROCEDURES: The patient underwent EEG on the , this showed  normal wakeful electroencephalogram. No epileptiform or focal  abnormality was identified. Relevant studies also included an MRI of the brain on the , this  shows small acute lacunar infarct in the left corona radiata. No  associated hemorrhage. Extensive changes of chronic small vessel  ischemia including multifocal chronic lacunar infarcts and moderate  chronic white matter changes. He had a CT scan on admission. This showed no acute intracranial  hemorrhage, mass effect or midline structural shift. Limited exam given  motion artifact and field cut off. No significant change. Finally, he had a CTA of the head and neck with contrast on the ,  this showed, by report, patent intra and extracranial cerebral arteries. No evidence of a large vessel occlusion or stenosis. Small vessel  ischemic white matter disease with multiple chronic lacunar infarctions. Right pleural effusion. HISTORY OF PRESENT ILLNESS: Please refer to admission H and  P.    Briefly, the patient is a 20-year-old gentleman with a history significant  for the above, who came to the emergency department from the  nursing facility for right-sided weakness.  He seemed to be doing well  there, but on the morning of presentation noted by staff to have some  right-sided weakness and some complaints of shortness of breath. He  was brought to the emergency department for further evaluation and  seen by Teleneurology. When we saw him, vital signs were stable and normal. He appeared to  be in no distress, he had a regular heart, clear lungs, benign abdomen. No edema. He did have some right-sided weakness. Admission labs included normal metabolic panel, normal CBC as well  with an hemoglobin and hematocrit of 11.6/34.2. FINDINGS: As per above in terms of CT scan of his head. He had a chest x-ray which, by report, was significant for unchanged  right lung base air space opacity with associated small right pleural  effusion, without pneumothorax. Atelectasis versus pneumonia. Follow  up with plain imaging until complete resolution. HOSPITAL COURSE BY PROBLEM  1. Acute cerebrovascular accident: Maintained on stroke protocol. Maintained on aspirin. MRI was positive. Case was discussed with  Neurology. Plan at the current time of dictation is for maintaining  aspirin and Plavix. The Plavix should be continued for 90 days. Thereafter, he should be on a full-strength aspirin daily. Continue PT,  OT efforts. 2. Hypertension: Maintain on permissive hypertension. He can resume  his outpatient antihypertensives on discharge consisting of his  metoprolol. His pressures have been a little bit elevated, so we are  discontinuing his Norvasc at the current time. 3. Iron-deficiency anemia: Hemodynamically stable, hemoglobin and  hematocrit have been stable. Supplemented with iron sulfate. Hemoglobin and hematocrit today is 11.0/33.0.  4. Recent history of healthcare-associated pneumonia with large right  pleural effusion, status post thoracentesis. No acute issues. White  count normal. No fevers. I would recommend outpatient followup for  chest x-ray in 3-4 weeks to demonstrate resolution. 5. Seizure disorder: Noted by Neurology early in the hospital course to  have right facial twitching.  It was sustained and semi-rhythmic. We started the  patient on Keppra IV. This was transitioned over to p.o. Maintain  seizure precautions. On examination dated today, vital signs are stable and normal, last  pressure 132/94. The patient denies any fevers, chills, nausea,  vomiting, chest pain or shortness of breath. Denies any pain  complaints. He has a regular heart, clear lungs, benign abdomen. No calf  tenderness or edema. Labs today reviewed, include a CBC with an hemoglobin and  hematocrit of 11.0/33.0.    DISPOSITION: He will be transferred to skilled nursing facility to  continue PT, OT.    MEDICATIONS  NEW MEDICINES AS FOLLOWS  1. Plavix 75 mg p.o. daily. Continue for 90 days. Afterwards, aspirin  should be given at full dose alone. 2. Keppra 750 mg p.o. b.i.d.  3. Pravachol 40 mg p.o. at bedtime. OTHERWISE, CONTINUE THE FOLLOWING  1. Baclofen 10 mg p.o. every 12 hours p.r.n. muscle spasm. 2. Metoprolol 25 mg p.o. b.i.d.  3. Vitamin C 250 mg p.o. daily. 4. Aspirin 81 mg p.o. daily, again, after completion of 90 days of Plavix,  this should be adjusted to 325 mg daily. 5. Cyanocobalamin 1000 mcg p.o. daily. 6. Iron sulfate 325 mg p.o. daily. 7. Flomax 0.4 mg p.o. daily. 8. Folic acid 1 mg p.o. daily. 9. MiraLax 17 g p.o. daily, hold for loose stool. 10. Stephenie-Colace 1 tab p.o. every p.m. Hold for loose stool. 11. Multivitamin p.o. daily. DISCONTINUE  1. Norvasc 10 mg.  2. Lasix 40 mg.  3. Levaquin 750 mg.  4. Percocet 5/325 mg. Chart was reviewed, he has an echocardiogram from 2016, this  showed an EF of 60% to 65%. FOLLOWUP: With his PCP, Dr. Bronwyn Holbrook in 7-10 days. With Neurology  in 3-4 weeks. TIME SPENT: Total time of discharge greater than 30 minutes.         MD Gustavo Noonan / Jacky  D:  09/21/2017   12:54  T:  09/21/2017   13:33  Job #:  [id number]

## 2017-09-21 NOTE — PROGRESS NOTES
Problem: Self Care Deficits Care Plan (Adult)  Goal: *Acute Goals and Plan of Care (Insert Text)  Occupational Therapy Goals  Initiated 9/18/2017 within 7 day(s). 1. Patient will perform self-feeding with supervision/set-up   2. Patient will perform grooming with supervision/set-up. 3. Patient will perform upper body dressing with supervision/set-up. 4. Patient will perform maneuver in bed with moderate assistance  5. Patient will participate in upper extremity therapeutic exercise/activities with supervision/set-up in preparation for self-feeding tasks   Outcome: Progressing Towards Goal  OCCUPATIONAL THERAPY TREATMENT     Patient: Brandon Tapia (89 y.o. male)  Date: 9/21/2017  Diagnosis: Stroke [facility]) <principal problem not specified>       Precautions: Fall, Contact, Skin  Chart, occupational therapy assessment, plan of care, and goals were reviewed. ASSESSMENT:  Pt is asleep in bed, easily awakens, agreeable to participate in OT. This session focused on pt's incorporating his RUE into his ADLs. Pt initially stating he is not able to do anything w/RUE, however following scapular mobilization and functional ROM activities pt was able to complete simple grooming task w/CGA/SBA w/RUE (washed face), and bring drink (thickened) to mouth w/RUE and Supervision. Following ADL training pt requesting to reposition for comfort. Pt was educated on techniques for maneuvering in bed for ADLs, pt required Max A to scoot to [facility], and Mod A to scoot laterally in bed for LB management. Progression toward goals:  [ ]          Improving appropriately and progressing toward goals  [X]          Improving slowly and progressing toward goals  [ ]          Not making progress toward goals and plan of care will be adjusted       PLAN:  Patient continues to benefit from skilled intervention to address the above impairments. Continue treatment per established plan of care.   Discharge Recommendations:  Skilled Nursing Facility  Further Equipment Recommendations for Discharge:  N/A      G-CODES:      Self Care  Current  CM= 80-99%. The severity rating is based on the Level of Assistance required for Functional Mobility and ADLs. SUBJECTIVE:   Patient stated I am tired now, leave me be.       OBJECTIVE DATA SUMMARY:   Cognitive/Behavioral Status:  Neurologic State: Alert  Orientation Level: Oriented X4  Cognition: Impaired decision making, Poor safety awareness  Safety/Judgement: Awareness of environment, Fall prevention, Insight into deficits     Functional Mobility and Transfers for ADLs:              Bed Mobility:   Scooting: Maximum assistance; Moderate assistance              ADL Intervention:   Grooming  Grooming Assistance: Contact guard assistance;Stand-by assistance (RUE)  Washing Face: Contact guard assistance;Stand-by assistance  Washing Hands: Stand-by assistance  Therapeutic Exercises:   Scapular mobilization techniques followed by functional ROM for RUE     Pain:  Pt reports 0/10 pain or discomfort prior to treatment. Pt reports 0/10 pain or discomfort post treatment. Activity Tolerance:    Fair     Please refer to the flowsheet for vital signs taken during this treatment.   After treatment:   [ ]  Patient left in no apparent distress sitting up in chair  [X]  Patient left in no apparent distress in bed  [X]  Call bell left within reach  [X]  Nursing notified  [ ]  Caregiver present  [ ]  Bed alarm activated     JILL Gamboa  Time Calculation: 24 mins

## 2017-09-22 VITALS
BODY MASS INDEX: 29.62 KG/M2 | HEART RATE: 76 BPM | TEMPERATURE: 99 F | WEIGHT: 178 LBS | OXYGEN SATURATION: 95 % | SYSTOLIC BLOOD PRESSURE: 156 MMHG | RESPIRATION RATE: 19 BRPM | DIASTOLIC BLOOD PRESSURE: 95 MMHG

## 2017-09-22 LAB
BACTERIA SPEC CULT: NORMAL
BACTERIA SPEC CULT: NORMAL
GLUCOSE BLD STRIP.AUTO-MCNC: 83 MG/DL (ref 70–110)
GLUCOSE BLD STRIP.AUTO-MCNC: 89 MG/DL (ref 70–110)
SERVICE CMNT-IMP: NORMAL
SERVICE CMNT-IMP: NORMAL

## 2017-09-22 PROCEDURE — 92526 ORAL FUNCTION THERAPY: CPT

## 2017-09-22 PROCEDURE — 74011250637 HC RX REV CODE- 250/637: Performed by: FAMILY MEDICINE

## 2017-09-22 PROCEDURE — 92507 TX SP LANG VOICE COMM INDIV: CPT

## 2017-09-22 PROCEDURE — 82962 GLUCOSE BLOOD TEST: CPT

## 2017-09-22 PROCEDURE — 74011250636 HC RX REV CODE- 250/636: Performed by: FAMILY MEDICINE

## 2017-09-22 RX ADMIN — POLYETHYLENE GLYCOL 3350 17 G: 17 POWDER, FOR SOLUTION ORAL at 11:13

## 2017-09-22 RX ADMIN — FOLIC ACID 1 MG: 1 TABLET ORAL at 11:12

## 2017-09-22 RX ADMIN — ENOXAPARIN SODIUM 40 MG: 40 INJECTION SUBCUTANEOUS at 15:40

## 2017-09-22 RX ADMIN — Medication 325 MG: at 11:12

## 2017-09-22 RX ADMIN — CYANOCOBALAMIN TAB 1000 MCG 1000 MCG: 1000 TAB at 11:12

## 2017-09-22 RX ADMIN — LEVETIRACETAM 750 MG: 500 TABLET ORAL at 11:12

## 2017-09-22 RX ADMIN — Medication 10 ML: at 15:41

## 2017-09-22 RX ADMIN — CLOPIDOGREL 75 MG: 75 TABLET, FILM COATED ORAL at 11:12

## 2017-09-22 RX ADMIN — ASPIRIN 81 MG 81 MG: 81 TABLET ORAL at 11:12

## 2017-09-22 RX ADMIN — TAMSULOSIN HYDROCHLORIDE 0.4 MG: 0.4 CAPSULE ORAL at 11:13

## 2017-09-22 RX ADMIN — THERA TABS 1 TABLET: TAB at 11:12

## 2017-09-22 NOTE — PROGRESS NOTES
Problem: Dysphagia (Adult)  Goal: *Acute Goals and Plan of Care (Insert Text)  Patient will:  1. Tolerate PO trials with 0 s/s overt distress in 4/5 trials  2. Utilize compensatory swallow strategies/maneuvers (decrease bite/sip, size/rate, alt. liq/sol) with min cues in 4/5 trials  3. Perform oral-motor/laryngeal exercises to increase oropharyngeal swallow function with min cues  4. Complete an objective swallow study (i.e., MBSS) to assess swallow integrity, r/o aspiration, and determine of safest LRD, min A - met 9/18/17    Rec:   Reg diet with honey-thick liquids  Assistance with PO  Aspiration precautions  HOB >45 during po intake, remain >30 for 30-45 minutes after po   Small bites/sips; alternate liquid/solid with slow feeding rate   Oral care TID  Meds as tolerated   Outcome: Progressing Towards Goal  SPEECH LANGUAGE PATHOLOGY DYSPHAGIA TREATMENT     Patient: Barry Sarmiento (38 y.o. male)  Date: 9/22/2017  Diagnosis: Stroke [facility]) <principal problem not specified>       Precautions: aspiration Fall, Contact, Skin      ASSESSMENT:  Pt was seen at bedside for f/u dysphagia management. Pt tolerating reg solid and honey-thick liquids with audible swallow across all trials; however, no overt s/sx of aspiration indicated at this time. Pt completed tongue base retraction 10 rep x 1 and effortful swallows 5 rep x 1 with mod-max clinician cues/encouragement. Pt educated on safe swallowing techniques/strategies, importance of oropharyngeal strengthening exercises, and role of SLP with verbalized understanding. ST will continue to follow.       Progression toward goals:  [ ]         Improving appropriately and progressing toward goals  [X]         Improving slowly and progressing toward goals  [ ]         Not making progress toward goals and plan of care will be adjusted       PLAN:  Recommendations and Planned Interventions: See above  Patient continues to benefit from skilled intervention to address the above impairments. Continue treatment per established plan of care. Discharge Recommendations:  [facility] and To Be Determined       SUBJECTIVE:   Patient stated I just want to get out of here. OBJECTIVE:   Cognitive and Communication Status:  Neurologic State: Alert  Orientation Level: Oriented X4  Cognition: Poor safety awareness, Impaired decision making  Perception: Cues to maintain midline in sitting, Cues to maintain midline in standing, Tactile, Verbal  Perseveration: No perseveration noted  Safety/Judgement: Awareness of environment, Fall prevention, Insight into deficits  Dysphagia Treatment:  Oral Assessment:  Oral Assessment  Labial: Right droop  Dentition: Natural, Intact  Oral Hygiene: Adequate  Lingual: Right deviation  Velum: No impairment  Mandible: No impairment  P.O. Trials:              Patient Position: 55 at [facility]              Vocal quality prior to P.O.: No impairment              Consistency Presented: Solid, Honey thick liquid              How Presented: SLP-fed/presented, Cup/sip, Spoon, Straw              Bolus Acceptance: No impairment              Bolus Formation/Control: No impairment              Propulsion: No impairment              Oral Residue: None              Initiation of Swallow: Delayed (# of seconds)              Laryngeal Elevation: Weak, Decreased              Aspiration Signs/Symptoms: Change vocal quality              Pharyngeal Phase Characteristics: Mult swallows              Effective Modifications: Small sips and bites              Cues for Modifications: Minimal                  Oral Phase Severity: No impairment              Pharyngeal Phase Severity : Moderate-severe                PAIN:  Pt reports 0/10 pain or discomfort prior to tx. Pt reports 0/10 pain or discomfort post tx.       After treatment:   [ ]              Patient left in no apparent distress sitting up in chair  [X]              Patient left in no apparent distress in bed  [X] Call bell left within reach  [X]              Nursing notified  [ ]              Caregiver present  [ ]              Bed alarm activated         COMMUNICATION/EDUCATION:   [X]              SLP educated pt with regard to compensatory swallow strategies and                   aspiration/reflux precautions including: small bites/sips,                   alternate liquids/solids, decrease feeding rate, HOB > 45 with all po, and                   upright in bed at 30 degrees after po for at least 45 minutes.       Thank you for this referral.     Daniella Da Silva M.S. CCC-SLP/L  Speech-Language Pathologist

## 2017-09-22 NOTE — DISCHARGE INSTRUCTIONS
Stroke: Care Instructions  Your Care Instructions    You have had a stroke. This means that the blood flow to a part of your brain was blocked for some time, which damages the nerve cells in that part of the brain. The part of your body controlled by that part of your brain may not function properly now. The brain is an amazing organ that can heal itself to some degree. The stroke you had damaged part of your brain. But other parts of your brain may take over in some way for the damaged areas. You have already started this process. Your doctor will talk with you about what you can do to prevent another stroke. High blood pressure, high cholesterol, and diabetes are all risk factors for stroke. If you have any of these conditions, work with your doctor to make sure they are under control. Other risk factors for stroke include being overweight, smoking, and not getting regular exercise. Going home may be hard for you and your family. The more you can try to do for yourself, the better. Remember to take each day one at a time. Follow-up care is a key part of your treatment and safety. Be sure to make and go to all appointments, and call your doctor if you are having problems. It's also a good idea to know your test results and keep a list of the medicines you take. How can you care for yourself at home? · Enter a stroke rehabilitation (rehab) program, if your doctor recommends it. Physical, speech, and occupational therapies can help you manage bathing, dressing, eating, and other basics of daily living. · Do not drive until your doctor says it is okay. · It is normal to feel sad or depressed after a stroke. If these feelings last, talk to your doctor. · If you are having problems with urine leakage, go to the bathroom at regular times, including when you first wake up and at bedtime. Also, limit fluids after dinner.   · If you are constipated, drink plenty of fluids, enough so that your urine is light yellow or clear like water. If you have kidney, heart, or liver disease and have to limit fluids, talk with your doctor before you increase the amount of fluids you drink. Set up a regular time for using the toilet. If you continue to have constipation, your doctor may suggest using a bulking agent, such as Metamucil, or a stool softener, laxative, or enema. Medicines  · Take your medicines exactly as prescribed. Call your doctor if you think you are having a problem with your medicine. You may be taking several medicines. ACE (angiotensin-converting enzyme) inhibitors, angiotensin II receptor blockers (ARBs), beta-blockers, diuretics (water pills), and calcium channel blockers control your blood pressure. Statins help lower cholesterol. Your doctor may also prescribe medicines for depression, pain, sleep problems, anxiety, or agitation. · If your doctor has given you a blood thinner to prevent another stroke, be sure you get instructions about how to take your medicine safely. Blood thinners can cause serious bleeding problems. · Do not take any over-the-counter medicines or herbal products without talking to your doctor first.  · If you take birth control pills or hormone therapy, talk to your doctor about whether they are right for you. For family members and caregivers  · Make the home safe. Set up a room so that your loved one does not have to climb stairs. Be sure the bathroom is on the same floor. Move throw rugs and furniture that could cause falls. Make sure that the lighting is good. Put grab bars and seats in tubs and showers. · Find out what your loved one can do and what he or she needs help with. Try not to do things for your loved one that your loved one can do on his or her own. Help him or her learn and practice new skills. · Visit and talk with your loved one often. Try doing activities together that you both enjoy, such as playing cards or board games.  Keep in touch with your loved one's friends as much as you can. Encourage them to visit. · Take care of yourself. Do not try to do everything yourself. Ask other family members to help. Eat well, get enough rest, and take time to do things that you enjoy. Keep up with your own doctor visits, and make sure to take your medicines regularly. Get out of the house as much as you can. Join a local support group. Find out if you qualify for home health care visits to help with rehab or for adult day care. When should you call for help? Call 911 anytime you think you may need emergency care. For example, call if:  · You have signs of another stroke. These may include:  ¨ Sudden numbness, tingling, weakness, or loss of movement in your face, arm, or leg, especially on only one side of your body. ¨ Sudden vision changes. ¨ Sudden trouble speaking. ¨ Sudden confusion or trouble understanding simple statements. ¨ Sudden problems with walking or balance. ¨ A sudden, severe headache that is different from past headaches. Call 911 even if these symptoms go away in a few minutes. Call your doctor now or seek immediate medical care if:  · You have new symptoms that may be related to your stroke, such as falls or trouble swallowing. Watch closely for changes in your health, and be sure to contact your doctor if you have any problems. Where can you learn more? Go to http://lainey-millie.info/. Enter P534 in the search box to learn more about \"Stroke: Care Instructions. \"  Current as of: March 20, 2017  Content Version: 11.3  © 3931-9195 [employer] (formerly known as [employer]). Care instructions adapted under license by [employer] (which disclaims liability or warranty for this information). If you have questions about a medical condition or this instruction, always ask your healthcare professional. [place] [address] any warranty or liability for your use of this information.     Patient armband removed and shredded    MyChart Activation    Thank you for requesting access to [facility]. Please follow the instructions below to securely access and download your online medical record. [facility] allows you to send messages to your doctor, view your test results, renew your prescriptions, schedule appointments, and more. How Do I Sign Up? 1. In your internet browser, go to [email or web address]  2. Click on the First Time User? Click Here link in the Sign In box. You will be redirect to the New Member Sign Up page. 3. Enter your [facility] Access Code exactly as it appears below. You will not need to use this code after youve completed the sign-up process. If you do not sign up before the expiration date, you must request a new code. [facility] Access Code: [email or web address]  Expires: 2017  8:38 AM (This is the date your [facility] access code will )    4. Enter the last four digits of your Social Security Number (xxxx) and Date of Birth (mm/dd/yyyy) as indicated and click Submit. You will be taken to the next sign-up page. 5. Create a [facility] ID. This will be your [facility] login ID and cannot be changed, so think of one that is secure and easy to remember. 6. Create a [facility] password. You can change your password at any time. 7. Enter your Password Reset Question and Answer. This can be used at a later time if you forget your password. 8. Enter your e-mail address. You will receive e-mail notification when new information is available in [address] 9. Click Sign Up. You can now view and download portions of your medical record. 10. Click the Download Summary menu link to download a portable copy of your medical information. Additional Information    If you have questions, please visit the Frequently Asked Questions section of the [employer] website at [email or web address]. [employer]. [employer]/Locassahart/. Remember, [facility] is NOT to be used for urgent needs. For medical emergencies, dial 911.       DISCHARGE SUMMARY from Nurse    The following personal items are in your possession at time of discharge:       Visual Aid: None                            PATIENT INSTRUCTIONS:    After general anesthesia or intravenous sedation, for 24 hours or while taking prescription Narcotics:  · Limit your activities  · Do not drive and operate hazardous machinery  · Do not make important personal or business decisions  · Do  not drink alcoholic beverages  · If you have not urinated within 8 hours after discharge, please contact your surgeon on call. Report the following to your surgeon:  · Excessive pain, swelling, redness or odor of or around the surgical area  · Temperature over 100.5  · Nausea and vomiting lasting longer than 4 hours or if unable to take medications  · Any signs of decreased circulation or nerve impairment to extremity: change in color, persistent  numbness, tingling, coldness or increase pain  · Any questions        What to do at Home:  Recommended activity: Activity as tolerated    If you experience any of the following symptoms Nausea, vomiting, diarrhea, fever greater than 100.5, dizziness, severe headache, shortness of breath, chest pain, increased pain, please follow up with PCP. *  Please give a list of your current medications to your Primary Care Provider. *  Please update this list whenever your medications are discontinued, doses are      changed, or new medications (including over-the-counter products) are added. *  Please carry medication information at all times in case of emergency situations. These are general instructions for a healthy lifestyle:    No smoking/ No tobacco products/ Avoid exposure to second hand smoke    Surgeon General's Warning:  Quitting smoking now greatly reduces serious risk to your health.     Obesity, smoking, and sedentary lifestyle greatly increases your risk for illness    A healthy diet, regular physical exercise & weight monitoring are important for maintaining a healthy lifestyle    You may be retaining fluid if you have a history of heart failure or if you experience any of the following symptoms:  Weight gain of 3 pounds or more overnight or 5 pounds in a week, increased swelling in our hands or feet or shortness of breath while lying flat in bed. Please call your doctor as soon as you notice any of these symptoms; do not wait until your next office visit. Recognize signs and symptoms of STROKE:    F-face looks uneven    A-arms unable to move or move unevenly    S-speech slurred or non-existent    T-time-call 911 as soon as signs and symptoms begin-DO NOT go       Back to bed or wait to see if you get better-TIME IS BRAIN. Warning Signs of HEART ATTACK     Call 911 if you have these symptoms:   Chest discomfort. Most heart attacks involve discomfort in the center of the chest that lasts more than a few minutes, or that goes away and comes back. It can feel like uncomfortable pressure, squeezing, fullness, or pain.  Discomfort in other areas of the upper body. Symptoms can include pain or discomfort in one or both arms, the back, neck, jaw, or stomach.  Shortness of breath with or without chest discomfort.  Other signs may include breaking out in a cold sweat, nausea, or lightheadedness. Don't wait more than five minutes to call 911 - MINUTES MATTER! Fast action can save your life. Calling 911 is almost always the fastest way to get lifesaving treatment. Emergency Medical Services staff can begin treatment when they arrive -- up to an hour sooner than if someone gets to the hospital by car. The discharge information has been reviewed with the patient and caregiver. The patient and caregiver verbalized understanding. Discharge medications reviewed with the patient and caregiver and appropriate educational materials and side effects teaching were provided.

## 2017-09-22 NOTE — PROGRESS NOTES
CM called, spoke to [facility], she will f/u with pt's insurance on progress of the auth.     2:44 PM CM spoke to RN-Luzma Gustafson from pt's insurance [facility] stating she created skilled benefit for the patient and provided authorization # [id number]. Patient was authorized until Oct 3rd. MILLIE called, spoke to [facility], she was provided with the auth number and Vaishnavi Ennis says, patient can come in today. Med Transport arranged with [facility],  time at 1800, or 6 PM.     CM called pt's son-Hermelindo Goel-[phone] informed about dc time.

## 2017-09-22 NOTE — PROGRESS NOTES
Problem: Mobility Impaired (Adult and Pediatric)  Goal: *Acute Goals and Plan of Care (Insert Text)  Physical Therapy Goals  Initiated 9/17/2017 and to be accomplished within 7 day(s)  1. Patient will move from supine to sit and sit to supine , scoot up and down and roll side to side in bed with minimal assistance/contact guard assist.   2. Patient will transfer from bed to chair and chair to bed with minimal assistance/contact guard assist using the least restrictive device. 3. Patient will perform sit to stand with minimal assistance/contact guard assist.  4. Patient will ambulate with minimal assistance/contact guard assist for 25 feet with the least restrictive device. Time: 1627                 Pt is found in bed and sleeping heavily. Pt is not easily aroused with moderate verbal stim. Will allow for pt rest as transfer to SNF is imminent this evening. Will follow up as pt is available for PT.      Niki Smith PTA  9/22/2017

## 2017-09-22 NOTE — DISCHARGE SUMMARY
[facility]  TRANSFER SUMMARY    Name:  Skylar Ga  MR#:  [id number]  :  1955  Account #:  [de-identified]  Date of Adm:  2017  Date of Transfer:  2017      ADDENDUM    Please refer to my original transfer summary. Disposition has been secured, case discussed with Case  Management. The patient will be transferred today. Overnight, no new issues, the patient has no complaints. He denies  any fevers or chills, nausea, vomiting, chest pain, or shortness of  breath. Vital signs are stable and normal, last pressure 153/75. He has a regular heart, clear lungs, benign abdomen, and no calf  tenderness or edema. No change to his discharge medications or plan of care on followup. Total time of discharge greater than 30 minutes.         Destiney Armas MD PP / JENNA  D:  2017   13:50  T:  2017   14:00  Job #:  [id number]

## 2017-09-22 NOTE — PROGRESS NOTES
Problem: Neurolinguistics Impaired (Adult)  Goal: *Acute Goals and Plan of Care (Insert Text)  Goals:  1. Utilize compensatory strategies (decrease rate, overarticulate, increase intensity) to increase intelligibility to >90% at word level with mod A visual/verbal cues in 4/5 trials. 2. Perform oral motor exercises (with and without resistance) in therapy and at home to increase oral motor strength/range-of-motion for articulation tasks with mod A with visual/verbal cues in 4/5 trials. 3. Complete articulatory agility tasks (reading-conversation) with mod A with visual/verbal cues in 4/5 trials. 4. Demonstrate functional increase in articulation skills for effective participation in communication ADLs with mod A.   5. Complete assorted STM tasks with 80% accuracy with min cues. 6. Complete assorted categorization tasks with 80% with min cues. 7. Complete further testing as indicated by SLP. Outcome: Progressing Towards Goal  SPEECH LANGUAGE PATHOLOGY TREATMENT     Patient: Barry Sarmiento (25 y.o. male)  Date: 9/22/2017  Diagnosis: Stroke [facility]) <principal problem not specified>      ASSESSMENT:  Pt was seen at bedside for f/u speech/language therapy. Pt required mod-max encouragement for participation with therapeutic tasks. Pt completed oral motor exercises 10 rep x 1 with mod-max encouragement/cues. Pt educated on compensatory speech strategies including increasing loudness, decreasing rate, and overarticulation with verbalized understanding. Pt refused further therapeutic tasks at this time despite max encouragement. ST will continue to follow. Progression toward goals:  [ ]       Improving appropriately and progressing toward goals  [X]       Improving slowly and progressing toward goals  [ ]       Not making progress toward goals and plan of care will be adjusted       PLAN:  Patient continues to benefit from skilled intervention to address the above impairments.  Continue treatment per established plan of care. Discharge Recommendations:  [facility] and To Be Determined       SUBJECTIVE:   Patient stated I'm just not feeling this today. OBJECTIVE:   Mental Status:  Neurologic State: Alert  Orientation Level: Oriented X4  Cognition: Poor safety awareness, Impaired decision making  Perception: Cues to maintain midline in sitting, Cues to maintain midline in standing, Tactile, Verbal  Perseveration: No perseveration noted  Safety/Judgement: Awareness of environment, Fall prevention, Insight into deficits      Treatment & Interventions: see above     PAIN:  Pt reports 0/10 pain or discomfort prior to tx. Pt reports 0/10 pain or discomfort post tx.      After treatment:   [ ]       Patient left in no apparent distress sitting up in chair  [X]       Patient left in no apparent distress in bed  [X]       Call bell left within reach  [X]       Nursing notified  [ ]       Caregiver present  [ ]       Bed alarm activated         COMMUNICATION/EDUCATION:   [X]             Compensatory speech/language/comprhension techniques     Thank you for this referral.     Jayson Henriquez M.S. CCC-SLP/L  Speech-Language Pathologist

## 2017-09-22 NOTE — ROUTINE PROCESS
Bedside shift change report given to [employer], RN (oncoming nurse) by Jatinder Mar RN (offgoing nurse). Report included the following information SBAR, ED Summary, MAR and Recent Results.

## 2017-10-02 LAB
BACTERIA SPEC CULT: NORMAL
FUNGUS SMEAR,FNGSMR: NORMAL
SERVICE CMNT-IMP: NORMAL

## 2017-10-11 LAB
ACID FAST STN SPEC: NEGATIVE
MYCOBACTERIUM SPEC QL CULT: NEGATIVE
SPECIMEN PREPARATION: NORMAL
SPECIMEN SOURCE: NORMAL

## 2018-03-19 ENCOUNTER — APPOINTMENT (OUTPATIENT)
Dept: CT IMAGING | Age: 63
End: 2018-03-19
Attending: EMERGENCY MEDICINE
Payer: COMMERCIAL

## 2018-03-19 ENCOUNTER — APPOINTMENT (OUTPATIENT)
Dept: GENERAL RADIOLOGY | Age: 63
End: 2018-03-19
Attending: EMERGENCY MEDICINE
Payer: COMMERCIAL

## 2018-03-19 ENCOUNTER — HOSPITAL ENCOUNTER (EMERGENCY)
Age: 63
Discharge: HOME | End: 2018-03-19
Attending: EMERGENCY MEDICINE
Payer: COMMERCIAL

## 2018-03-19 VITALS
DIASTOLIC BLOOD PRESSURE: 84 MMHG | SYSTOLIC BLOOD PRESSURE: 136 MMHG | RESPIRATION RATE: 13 BRPM | TEMPERATURE: 97.9 F | HEART RATE: 65 BPM | BODY MASS INDEX: 27.99 KG/M2 | OXYGEN SATURATION: 95 % | HEIGHT: 69 IN | WEIGHT: 189 LBS

## 2018-03-19 DIAGNOSIS — N39.0 URINARY TRACT INFECTION WITHOUT HEMATURIA, SITE UNSPECIFIED: Primary | ICD-10-CM

## 2018-03-19 LAB
ALBUMIN SERPL-MCNC: 3.1 G/DL (ref 3.4–5)
ALBUMIN/GLOB SERPL: 0.6 {RATIO} (ref 0.8–1.7)
ALP SERPL-CCNC: 73 U/L (ref 45–117)
ALT SERPL-CCNC: 12 U/L (ref 16–61)
ANION GAP SERPL CALC-SCNC: 7 MMOL/L (ref 3–18)
APPEARANCE UR: ABNORMAL
AST SERPL-CCNC: 6 U/L (ref 15–37)
BACTERIA URNS QL MICRO: ABNORMAL /HPF
BASOPHILS # BLD: 0 K/UL (ref 0–0.1)
BASOPHILS NFR BLD: 0 % (ref 0–2)
BILIRUB SERPL-MCNC: 0.2 MG/DL (ref 0.2–1)
BILIRUB UR QL: NEGATIVE
BUN SERPL-MCNC: 11 MG/DL (ref 7–18)
BUN/CREAT SERPL: 12 (ref 12–20)
CALCIUM SERPL-MCNC: 9 MG/DL (ref 8.5–10.1)
CHLORIDE SERPL-SCNC: 106 MMOL/L (ref 100–108)
CO2 SERPL-SCNC: 30 MMOL/L (ref 21–32)
COLOR UR: YELLOW
CREAT SERPL-MCNC: 0.94 MG/DL (ref 0.6–1.3)
DIFFERENTIAL METHOD BLD: ABNORMAL
EOSINOPHIL # BLD: 0.2 K/UL (ref 0–0.4)
EOSINOPHIL NFR BLD: 5 % (ref 0–5)
EPITH CASTS URNS QL MICRO: ABNORMAL /LPF (ref 0–5)
ERYTHROCYTE [DISTWIDTH] IN BLOOD BY AUTOMATED COUNT: 13.5 % (ref 11.6–14.5)
GLOBULIN SER CALC-MCNC: 5.3 G/DL (ref 2–4)
GLUCOSE BLD STRIP.AUTO-MCNC: 87 MG/DL (ref 70–110)
GLUCOSE SERPL-MCNC: 111 MG/DL (ref 74–99)
GLUCOSE UR STRIP.AUTO-MCNC: NEGATIVE MG/DL
HCT VFR BLD AUTO: 37.1 % (ref 36–48)
HGB BLD-MCNC: 12.2 G/DL (ref 13–16)
HGB UR QL STRIP: NEGATIVE
KETONES UR QL STRIP.AUTO: NEGATIVE MG/DL
LEUKOCYTE ESTERASE UR QL STRIP.AUTO: ABNORMAL
LYMPHOCYTES # BLD: 1.8 K/UL (ref 0.9–3.6)
LYMPHOCYTES NFR BLD: 40 % (ref 21–52)
MCH RBC QN AUTO: 26.6 PG (ref 24–34)
MCHC RBC AUTO-ENTMCNC: 32.9 G/DL (ref 31–37)
MCV RBC AUTO: 80.8 FL (ref 74–97)
MONOCYTES # BLD: 0.3 K/UL (ref 0.05–1.2)
MONOCYTES NFR BLD: 6 % (ref 3–10)
NEUTS SEG # BLD: 2.2 K/UL (ref 1.8–8)
NEUTS SEG NFR BLD: 49 % (ref 40–73)
NITRITE UR QL STRIP.AUTO: POSITIVE
PH UR STRIP: 7.5 [PH] (ref 5–8)
PLATELET # BLD AUTO: 212 K/UL (ref 135–420)
PMV BLD AUTO: 10.6 FL (ref 9.2–11.8)
POTASSIUM SERPL-SCNC: 4.1 MMOL/L (ref 3.5–5.5)
PROT SERPL-MCNC: 8.4 G/DL (ref 6.4–8.2)
PROT UR STRIP-MCNC: NEGATIVE MG/DL
RBC # BLD AUTO: 4.59 M/UL (ref 4.7–5.5)
RBC #/AREA URNS HPF: ABNORMAL /HPF (ref 0–5)
SODIUM SERPL-SCNC: 143 MMOL/L (ref 136–145)
SP GR UR REFRACTOMETRY: 1.01 (ref 1–1.03)
UROBILINOGEN UR QL STRIP.AUTO: 1 EU/DL (ref 0.2–1)
WBC # BLD AUTO: 4.5 K/UL (ref 4.6–13.2)
WBC URNS QL MICRO: ABNORMAL /HPF (ref 0–4)

## 2018-03-19 PROCEDURE — 93005 ELECTROCARDIOGRAM TRACING: CPT

## 2018-03-19 PROCEDURE — 96361 HYDRATE IV INFUSION ADD-ON: CPT

## 2018-03-19 PROCEDURE — 99285 EMERGENCY DEPT VISIT HI MDM: CPT

## 2018-03-19 PROCEDURE — 70450 CT HEAD/BRAIN W/O DYE: CPT

## 2018-03-19 PROCEDURE — 74011250636 HC RX REV CODE- 250/636: Performed by: EMERGENCY MEDICINE

## 2018-03-19 PROCEDURE — 96374 THER/PROPH/DIAG INJ IV PUSH: CPT

## 2018-03-19 PROCEDURE — 71045 X-RAY EXAM CHEST 1 VIEW: CPT

## 2018-03-19 PROCEDURE — 87077 CULTURE AEROBIC IDENTIFY: CPT | Performed by: EMERGENCY MEDICINE

## 2018-03-19 PROCEDURE — 87086 URINE CULTURE/COLONY COUNT: CPT | Performed by: EMERGENCY MEDICINE

## 2018-03-19 PROCEDURE — 81001 URINALYSIS AUTO W/SCOPE: CPT | Performed by: EMERGENCY MEDICINE

## 2018-03-19 PROCEDURE — 80053 COMPREHEN METABOLIC PANEL: CPT | Performed by: EMERGENCY MEDICINE

## 2018-03-19 PROCEDURE — 87186 SC STD MICRODIL/AGAR DIL: CPT | Performed by: EMERGENCY MEDICINE

## 2018-03-19 PROCEDURE — 82962 GLUCOSE BLOOD TEST: CPT

## 2018-03-19 PROCEDURE — 74011000250 HC RX REV CODE- 250: Performed by: EMERGENCY MEDICINE

## 2018-03-19 PROCEDURE — 85025 COMPLETE CBC W/AUTO DIFF WBC: CPT | Performed by: EMERGENCY MEDICINE

## 2018-03-19 RX ORDER — NITROFURANTOIN 25; 75 MG/1; MG/1
100 CAPSULE ORAL 2 TIMES DAILY
Qty: 14 CAP | Refills: 0 | Status: SHIPPED | OUTPATIENT
Start: 2018-03-19 | End: 2018-03-26

## 2018-03-19 RX ADMIN — SODIUM CHLORIDE 1000 ML: 900 INJECTION, SOLUTION INTRAVENOUS at 20:08

## 2018-03-19 RX ADMIN — WATER 1 G: 1 INJECTION INTRAMUSCULAR; INTRAVENOUS; SUBCUTANEOUS at 22:07

## 2018-03-19 NOTE — ED TRIAGE NOTES
Pt reports to ED via [facility] from [address] with CC of increased weakness. PT A/Ox4 upon arrival with slurred speech, right facial droop. Pt has PMH of CVA (pt says residual left weakness, papers from nursing home say right weakness) Pt reports that he is bedbound and has had seizures.

## 2018-03-19 NOTE — ED PROVIDER NOTES
EMERGENCY DEPARTMENT HISTORY AND PHYSICAL EXAM    7:50 PM      Date: 3/19/2018  Patient Name: Karon Prasad    History of Presenting Illness     Chief Complaint   Patient presents with    Lethargy         History Provided By: EMS and Nurse, Patient    Chief Complaint: Screen for CVA  Duration:  Minutes  Timing:  Acute  Location: N/A  Quality: N/A  Severity: N/A  Modifying Factors: N/A  Associated Symptoms: denies any other associated signs or symptoms      Additional History (Context): Karon Prasad is a 61 y.o. male with history of HTN, Lacunar Infarct and Spinal Stenosis who presents to the ED via EMS for stroke screen. Per EMS, Nursing home called EMS because pt was not behaving as he usually does per Nurse at shift-change. Nursing home unsure of pt's baseline, but notes that his speech is a bit more slurred and wants pt to be evaluated. Pt has hx of multiple CVA's with residual right hemiparesis. As the patient is without physical symptoms or complaints of pain, there is no severity of pain, quality of pain, duration, modifying factors, or associated signs and symptoms regarding the pt's presenting complaint.       PCP: Daisha Jordan MD      Past History     Past Medical History:  Past Medical History:   Diagnosis Date    Hiccups     Hypertension     Hyponatremia     Lacunar infarction ([facility] [address] 2010    Lower extremity edema     Psychogenic polydipsia     Spinal stenosis        Past Surgical History:  Past Surgical History:   Procedure Laterality Date    HX HEENT      pt reports past hx of surgery on ears unsure of what type       Family History:  Family History   Problem Relation Age of Onset    Hypertension Other        Social History:  Social History   Substance Use Topics    Smoking status: Former Smoker     Packs/day: 0.50     Quit date: 6/22/2008    Smokeless tobacco: Never Used    Alcohol use No       Allergies:  No Known Allergies      Review of Systems     Review of Systems Constitutional: Positive for activity change. Negative for fever. HENT: Negative for sore throat. Eyes: Negative for redness and visual disturbance. Respiratory: Negative for shortness of breath and wheezing. Cardiovascular: Negative for chest pain. Gastrointestinal: Negative for abdominal pain and nausea. Endocrine: Negative for polyuria. Genitourinary: Negative for dysuria. Musculoskeletal: Negative for arthralgias and neck stiffness. Skin: Negative for rash. Neurological: Negative for headaches. All other systems reviewed and are negative. Physical Exam     Visit Vitals    /87    Pulse (!) 58    Temp 97.9 °F (36.6 °C)    Resp 12    Ht 5' 9\" (1.753 m)    Wt 85.7 kg (189 lb)    SpO2 100%    BMI 27.91 kg/m2       Physical Exam   Constitutional: He is oriented to person, place, and time. He appears well-developed and well-nourished. No distress. HENT:   Head: Normocephalic and atraumatic. Mouth/Throat: Oropharynx is clear and moist.   Eyes: Conjunctivae are normal. Pupils are equal, round, and reactive to light. No scleral icterus. Neck: Normal range of motion. Neck supple. Cardiovascular: Intact distal pulses. Capillary refill < 3 seconds   Pulmonary/Chest: Effort normal and breath sounds normal. No respiratory distress. He has no wheezes. Abdominal: Soft. Bowel sounds are normal. He exhibits no distension. There is no tenderness. Musculoskeletal: Normal range of motion. He exhibits edema (Mild bilateral lower extremity). Lymphadenopathy:     He has no cervical adenopathy. Neurological: He is alert and oriented to person, place, and time. No cranial nerve deficit. Right-sided hemiparesis w/ R facial droop. L hand strength 5/5. Skin: Skin is warm and dry. He is not diaphoretic. Old healing wound to L lower extremity at distal tib/fib area. No erythema. No warmth. Psychiatric:   Pleasant demeanor.    Nursing note and vitals reviewed. Diagnostic Study Results     Labs -  Recent Results (from the past 12 hour(s))   GLUCOSE, POC    Collection Time: 03/19/18  7:47 PM   Result Value Ref Range    Glucose (POC) 87 70 - 110 mg/dL   EKG, 12 LEAD, INITIAL    Collection Time: 03/19/18  7:51 PM   Result Value Ref Range    Ventricular Rate 71 BPM    Atrial Rate 71 BPM    P-R Interval 222 ms    QRS Duration 96 ms    Q-T Interval 376 ms    QTC Calculation (Bezet) 408 ms    Calculated P Axis 119 degrees    Calculated R Axis 39 degrees    Calculated T Axis 10 degrees    Diagnosis       Sinus rhythm with 1st degree AV block  Otherwise normal ECG  When compared with ECG of 16-SEP-2017 09:47,  No significant change was found     CBC WITH AUTOMATED DIFF    Collection Time: 03/19/18  8:15 PM   Result Value Ref Range    WBC 4.5 (L) 4.6 - 13.2 K/uL    RBC 4.59 (L) 4.70 - 5.50 M/uL    HGB 12.2 (L) 13.0 - 16.0 g/dL    HCT 37.1 36.0 - 48.0 %    MCV 80.8 74.0 - 97.0 FL    MCH 26.6 24.0 - 34.0 PG    MCHC 32.9 31.0 - 37.0 g/dL    RDW 13.5 11.6 - 14.5 %    PLATELET 704 891 - 855 K/uL    MPV 10.6 9.2 - 11.8 FL    NEUTROPHILS 49 40 - 73 %    LYMPHOCYTES 40 21 - 52 %    MONOCYTES 6 3 - 10 %    EOSINOPHILS 5 0 - 5 %    BASOPHILS 0 0 - 2 %    ABS. NEUTROPHILS 2.2 1.8 - 8.0 K/UL    ABS. LYMPHOCYTES 1.8 0.9 - 3.6 K/UL    ABS. MONOCYTES 0.3 0.05 - 1.2 K/UL    ABS. EOSINOPHILS 0.2 0.0 - 0.4 K/UL    ABS.  BASOPHILS 0.0 0.0 - 0.1 K/UL    DF AUTOMATED     METABOLIC PANEL, COMPREHENSIVE    Collection Time: 03/19/18  8:15 PM   Result Value Ref Range    Sodium 143 136 - 145 mmol/L    Potassium 4.1 3.5 - 5.5 mmol/L    Chloride 106 100 - 108 mmol/L    CO2 30 21 - 32 mmol/L    Anion gap 7 3.0 - 18 mmol/L    Glucose 111 (H) 74 - 99 mg/dL    BUN 11 7.0 - 18 MG/DL    Creatinine 0.94 0.6 - 1.3 MG/DL    BUN/Creatinine ratio 12 12 - 20      GFR est AA >60 >60 ml/min/1.73m2    GFR est non-AA >60 >60 ml/min/1.73m2    Calcium 9.0 8.5 - 10.1 MG/DL    Bilirubin, total 0.2 0.2 - 1.0 MG/DL ALT (SGPT) 12 (L) 16 - 61 U/L    AST (SGOT) 6 (L) 15 - 37 U/L    Alk. phosphatase 73 45 - 117 U/L    Protein, total 8.4 (H) 6.4 - 8.2 g/dL    Albumin 3.1 (L) 3.4 - 5.0 g/dL    Globulin 5.3 (H) 2.0 - 4.0 g/dL    A-G Ratio 0.6 (L) 0.8 - 1.7     URINALYSIS W/ RFLX MICROSCOPIC    Collection Time: 03/19/18  9:56 PM   Result Value Ref Range    Color YELLOW      Appearance CLOUDY      Specific gravity 1.012 1.005 - 1.030      pH (UA) 7.5 5.0 - 8.0      Protein NEGATIVE  NEG mg/dL    Glucose NEGATIVE  NEG mg/dL    Ketone NEGATIVE  NEG mg/dL    Bilirubin NEGATIVE  NEG      Blood NEGATIVE  NEG      Urobilinogen 1.0 0.2 - 1.0 EU/dL    Nitrites POSITIVE (A) NEG      Leukocyte Esterase LARGE (A) NEG     URINE MICROSCOPIC ONLY    Collection Time: 03/19/18  9:56 PM   Result Value Ref Range    WBC 15 to 20 0 - 4 /hpf    RBC NONE 0 - 5 /hpf    Epithelial cells FEW 0 - 5 /lpf    Bacteria 4+ (A) NEG /hpf       Radiologic Studies -   CT HEAD WO CONT   Final Result   IMPRESSION:  1. No acute intracranial process identified. If continued clinical concern for  acute ischemia, consider MR for further evaluation. XR CHEST PORT    (Results Pending)   9:28 PM Preliminary Reading by Dr. Narendra Goss - No acute process. Medical Decision Making   I am the first provider for this patient. I reviewed the vital signs, available nursing notes, past medical history, past surgical history, family history and social history. Vital Signs-Reviewed the patient's vital signs. Pulse Oximetry Analysis -  95% on room air (Normal)        Records Reviewed: Nursing Notes and Old Medical Records (Time of Review: 7:50 PM)    Provider Notes (Medical Decision Making):  MDM  Number of Diagnoses or Management Options  Diagnosis management comments: DDx: Dehydration, Metabolic, Infectious, Stroke. Will get CT Head, check labs, Give IV fluids, Get CXR  Blood Sugar is 87.       Medications   sodium chloride 0.9 % bolus infusion 1,000 mL (0 mL IntraVENous IV Completed 3/19/18 2233)   cefTRIAXone (ROCEPHIN) 1 g in sterile water (preservative free) 10 mL IV syringe (1 g IntraVENous Given 3/19/18 2207)         ED Course: Progress Notes, Reevaluation, and Consults:  Has uti, CT head nothing acute. Pt in no distress, talking, smiling, pleasant. I have reassessed the patient. I have discussed the workup, results and plan with the patient and patient is in agreement. Patient is feeling better. Patient will be prescribed macrobid. Patient was discharge in stable condition. Patient was given outpatient follow up. Patient is to return to emergency department if any new or worsening condition. Diagnosis     Clinical Impression:   1. Urinary tract infection without hematuria, site unspecified        Disposition: DISCHARGE    Follow-up Information     Follow up With Details Disha Carr in 1 day for evaluation At [facility] [place] [facility] Go to For emergency [address] [place] [id number]  [phone]           Patient's Medications   Start Taking    NITROFURANTOIN, MACROCRYSTAL-MONOHYDRATE, (MACROBID) 100 MG CAPSULE    Take 1 Cap by mouth two (2) times a day for 7 days. Indications: BACTERIAL URINARY TRACT INFECTION   Continue Taking    ASCORBIC ACID, VITAMIN C, (VITAMIN C) 250 MG TABLET    Take 1 Tab by mouth daily. ASPIRIN 81 MG CHEWABLE TABLET    Take 1 Tab by mouth daily. BACLOFEN (LIORESAL) 10 MG TABLET    Take 1 Tab by mouth every twelve (12) hours as needed. CLOPIDOGREL (PLAVIX) 75 MG TAB    Take 1 Tab by mouth daily. CYANOCOBALAMIN 1,000 MCG TABLET    Take 1 Tab by mouth daily. FERROUS SULFATE 325 MG (65 MG IRON) TABLET    Take 1 Tab by mouth daily (with breakfast). FOLIC ACID (FOLVITE) 1 MG TABLET    Take 1 Tab by mouth daily. LEVETIRACETAM (KEPPRA) 750 MG TABLET    Take 1 Tab by mouth two (2) times a day. METOPROLOL (LOPRESSOR) 25 MG TABLET    Take 1 Tab by mouth two (2) times a day. MULTIVITAMIN,THERAPEUTIC (THERA MULTI-VITAMIN PO)    Take 500 mg by mouth daily. POLYETHYLENE GLYCOL (MIRALAX) 17 GRAM PACKET    Take 1 Packet by mouth daily. PRAVASTATIN (PRAVACHOL) 40 MG TABLET    Take 1 Tab by mouth nightly. SENNA-DOCUSATE (PERICOLACE) 8.6-50 MG PER TABLET    Take 1 Tab by mouth nightly. HOLD for loose stool. TAMSULOSIN (FLOMAX) 0.4 MG CAPSULE    Take 0.4 mg by mouth daily. 1 tab daily   These Medications have changed    No medications on file   Stop Taking    No medications on file     _______________________________    Attestations:  [facility] acting as a scribe for and in the presence of Casey Kathleen DO      March 19, 2018 at 7:50 PM       Provider Attestation:      I personally performed the services described in the documentation, reviewed the documentation, as recorded by the scribe in my presence, and it accurately and completely records my words and actions.  March 19, 2018 at 7:50 PM - Casey Kathleen DO    _______________________________

## 2018-03-20 LAB
ATRIAL RATE: 71 BPM
CALCULATED P AXIS, ECG09: 119 DEGREES
CALCULATED R AXIS, ECG10: 39 DEGREES
CALCULATED T AXIS, ECG11: 10 DEGREES
DIAGNOSIS, 93000: NORMAL
P-R INTERVAL, ECG05: 222 MS
Q-T INTERVAL, ECG07: 376 MS
QRS DURATION, ECG06: 96 MS
QTC CALCULATION (BEZET), ECG08: 408 MS
VENTRICULAR RATE, ECG03: 71 BPM

## 2018-03-20 NOTE — DISCHARGE INSTRUCTIONS

## 2018-03-21 LAB
BACTERIA SPEC CULT: ABNORMAL
SERVICE CMNT-IMP: ABNORMAL

## 2019-11-25 NOTE — PROGRESS NOTES
Dear Rafa Watters        Medications:     Stop Spironolactone  Start Eplerenone 25 mg daily. If it is too expensive, you may stay on spironolactone instead.   Take Lisinopril 20 mg at bedtime.      Please remember to:   1) Record weights on a daily basis.    2) Call me with a weight gain greater than 3 pounds in one day or 5 pounds in one week.  Call if you gain 5 lbs in one month Also call for weight loss of greater than 5 lbs in one week.  3) Call the clinic if you have lightheadedness or dizziness,   4) Follow a low salt diet keep to eat less than 2 gram sodium (2000mg) per day  and limit fluid intake to 1790-5022 ml or 6-8 cups fluid restricted diet.  5) Avoid the use of NSAID including but not limited to Ibuprofen, naprosyn (Naproxen), Advil and Aleve.  You may take Tylenol (Acetaminophen)    6) Please check with community pharmacist with all over the counter medications to ensure product does not contain NSAIDs.   7) Following these instructions and taking your medication as prescribed as well as exercising, WILL IMPROVE YOUR QUALITY OF LIFE and INCREASE your life span. It will also prevent progression of heart failure symptoms and decrease hospitalizations.          Follow up:  Dec      You may return sooner if any issues should arise.         Thank you  Bell               Dietitian Instructions:  -Check fasting blood sugars a few times per week  -Check your blood sugar when you feel low (shaky/sweaty/hangry)  -Bring me your numbers        Problem: Mobility Impaired (Adult and Pediatric)  Goal: *Acute Goals and Plan of Care (Insert Text)  Physical Therapy Goals  Initiated 9/17/2017 and to be accomplished within 7 day(s)  1. Patient will move from supine to sit and sit to supine , scoot up and down and roll side to side in bed with minimal assistance/contact guard assist.   2. Patient will transfer from bed to chair and chair to bed with minimal assistance/contact guard assist using the least restrictive device. 3. Patient will perform sit to stand with minimal assistance/contact guard assist.  4. Patient will ambulate with minimal assistance/contact guard assist for 25 feet with the least restrictive device. PHYSICAL THERAPY TREATMENT     Patient: Kirti Reed (89 y.o. male)  Date: 9/21/2017  Diagnosis: Stroke [facility]) <principal problem not specified>       Precautions: Fall, Contact, Skin  Chart, physical therapy assessment, plan of care and goals were reviewed. ASSESSMENT:  Pt presents with some irritation today 2/2 not knowing status of proposed discharge to rehab facility. Pt is willing to participate despite some need for encouragement. Increased ambulation proficiency noted as pt is able to ambulate to bedside chair utilizing HHA and trunk support for balance.  0 knee buckling noted throughout trial and adequate command following noted to maintain safety throughout. Progression toward goals:  [ ]      Improving appropriately and progressing toward goals  [X]      Improving slowly and progressing toward goals  [ ]      Not making progress toward goals and plan of care will be adjusted       PLAN:  Patient continues to benefit from skilled intervention to address the above impairments. Continue treatment per established plan of care. Discharge Recommendations:  Rehab and [facility]  Further Equipment Recommendations for Discharge:  N/A       G-CODES:      Mobility   Goal  CJ= 20-39%.   The severity rating is based on the Level of Assistance required for Functional Mobility and ADLs. Mobility [id number] D/C  CK= 40-59%. The severity rating is based on the Level of Assistance required for Functional Mobility and ADLs. SUBJECTIVE:   Patient stated I can fight man. I'm going to fight.   Pt indicates that he is ready to \"fight\" to regain function. OBJECTIVE DATA SUMMARY:   Critical Behavior:  Neurologic State: Alert  Orientation Level: Oriented X4  Cognition: Impaired decision making, Poor safety awareness  Safety/Judgement: Awareness of environment, Fall prevention, Insight into deficits  Functional Mobility Training:  Bed Mobility:  Rolling: Moderate assistance  Supine to Sit: Moderate assistance;Maximum assistance  Scooting: Maximum assistance; Moderate assistance  Transfers:  Sit to Stand: Moderate assistance  Stand to Sit: Moderate assistance (good control with descent)  Bed to Chair: Moderate assistance  Other: stand step with 0 AD  Balance:  Sitting: Impaired; With support  Sitting - Static: Fair (occasional) (corrects to midline with max verbal cues)  Sitting - Dynamic: Poor (constant support) (fair-)  Standing: Impaired; With support  Standing - Static: Poor;Constant support  Standing - Dynamic : Poor  Ambulation/Gait Training:  Distance (ft): 7 Feet (ft)  Assistive Device:  (0 AD)  Ambulation - Level of Assistance: Moderate assistance;Maximum assistance  Gait Abnormalities: Decreased step clearance;Shuffling gait; Path deviations (deviates to the R)  Base of Support: Narrowed  Stance: Right increased; Left increased  Speed/Lili: Shuffled  Step Length: Right shortened;Left shortened  Swing Pattern: Right asymmetrical;Left asymmetrical     Therapeutic Exercises:      Pain:  Pt reports 0/10 pain or discomfort prior to treatment. Pt reports 0/10 pain or discomfort post treatment. Activity Tolerance:   Fair/fair+  Please refer to the flowsheet for vital signs taken during this treatment.   After treatment:   [X] Patient left in no apparent distress sitting up in chair  [ ] Patient left in no apparent distress in bed  [X] Call bell left within reach  [X] Nursing notified  [ ] Caregiver present  [ ] Bed alarm activated      Holly Weaver PTA   Time Calculation: 24 mins

## 2021-03-30 ENCOUNTER — HOSPITAL ENCOUNTER (INPATIENT)
Age: 66
LOS: 7 days | Discharge: NURSING, REHAB OR LONG-TERM CARE | DRG: 640 | End: 2021-04-06
Attending: EMERGENCY MEDICINE | Admitting: INTERNAL MEDICINE
Payer: COMMERCIAL

## 2021-03-30 ENCOUNTER — APPOINTMENT (OUTPATIENT)
Dept: GENERAL RADIOLOGY | Age: 66
DRG: 640 | End: 2021-03-30
Attending: EMERGENCY MEDICINE
Payer: COMMERCIAL

## 2021-03-30 ENCOUNTER — APPOINTMENT (OUTPATIENT)
Dept: CT IMAGING | Age: 66
DRG: 640 | End: 2021-03-30
Attending: EMERGENCY MEDICINE
Payer: COMMERCIAL

## 2021-03-30 DIAGNOSIS — U07.1 COVID-19: ICD-10-CM

## 2021-03-30 DIAGNOSIS — E87.0 HYPERNATREMIA: Primary | ICD-10-CM

## 2021-03-30 DIAGNOSIS — E86.0 DEHYDRATION: ICD-10-CM

## 2021-03-30 DIAGNOSIS — G93.41 ACUTE METABOLIC ENCEPHALOPATHY: ICD-10-CM

## 2021-03-30 LAB
ANION GAP SERPL CALC-SCNC: 1 MMOL/L (ref 3–18)
APPEARANCE UR: ABNORMAL
APTT PPP: 31.3 SEC (ref 23–36.4)
ATRIAL RATE: 111 BPM
BACTERIA URNS QL MICRO: ABNORMAL /HPF
BASOPHILS # BLD: 0 K/UL (ref 0–0.1)
BASOPHILS NFR BLD: 0 % (ref 0–2)
BILIRUB UR QL: NEGATIVE
BUN SERPL-MCNC: 27 MG/DL (ref 7–18)
BUN/CREAT SERPL: 33 (ref 12–20)
CALCIUM SERPL-MCNC: 9.5 MG/DL (ref 8.5–10.1)
CALCULATED P AXIS, ECG09: 66 DEGREES
CALCULATED R AXIS, ECG10: 56 DEGREES
CALCULATED T AXIS, ECG11: 67 DEGREES
CHLORIDE SERPL-SCNC: 137 MMOL/L (ref 100–111)
CHLORIDE UR-SCNC: 30 MMOL/L (ref 55–125)
CK MB CFR SERPL CALC: ABNORMAL % (ref 0–4)
CK MB SERPL-MCNC: <1 NG/ML (ref 5–25)
CK SERPL-CCNC: 27 U/L (ref 39–308)
CO2 SERPL-SCNC: 30 MMOL/L (ref 21–32)
COLOR UR: YELLOW
COVID-19 RAPID TEST, COVR: DETECTED
CREAT SERPL-MCNC: 0.83 MG/DL (ref 0.6–1.3)
DIAGNOSIS, 93000: NORMAL
DIFFERENTIAL METHOD BLD: ABNORMAL
EOSINOPHIL # BLD: 0.2 K/UL (ref 0–0.4)
EOSINOPHIL NFR BLD: 2 % (ref 0–5)
EPITH CASTS URNS QL MICRO: ABNORMAL /LPF (ref 0–5)
ERYTHROCYTE [DISTWIDTH] IN BLOOD BY AUTOMATED COUNT: 14.6 % (ref 11.6–14.5)
GLUCOSE SERPL-MCNC: 108 MG/DL (ref 74–99)
GLUCOSE UR STRIP.AUTO-MCNC: NEGATIVE MG/DL
HCT VFR BLD AUTO: 45.8 % (ref 36–48)
HGB BLD-MCNC: 13.7 G/DL (ref 13–16)
HGB UR QL STRIP: ABNORMAL
HYALINE CASTS URNS QL MICRO: ABNORMAL /LPF (ref 0–2)
INR PPP: 1.2 (ref 0.8–1.2)
KETONES UR QL STRIP.AUTO: NEGATIVE MG/DL
LACTATE BLD-SCNC: 1.16 MMOL/L (ref 0.4–2)
LEUKOCYTE ESTERASE UR QL STRIP.AUTO: ABNORMAL
LYMPHOCYTES # BLD: 1.9 K/UL (ref 0.9–3.6)
LYMPHOCYTES NFR BLD: 22 % (ref 21–52)
MCH RBC QN AUTO: 27 PG (ref 24–34)
MCHC RBC AUTO-ENTMCNC: 29.9 G/DL (ref 31–37)
MCV RBC AUTO: 90.2 FL (ref 74–97)
MONOCYTES # BLD: 0.4 K/UL (ref 0.05–1.2)
MONOCYTES NFR BLD: 5 % (ref 3–10)
MUCOUS THREADS URNS QL MICRO: ABNORMAL /LPF
NEUTS SEG # BLD: 6.2 K/UL (ref 1.8–8)
NEUTS SEG NFR BLD: 71 % (ref 40–73)
NITRITE UR QL STRIP.AUTO: NEGATIVE
P-R INTERVAL, ECG05: 154 MS
PH UR STRIP: 5 [PH] (ref 5–8)
PLATELET # BLD AUTO: 173 K/UL (ref 135–420)
PMV BLD AUTO: 12.6 FL (ref 9.2–11.8)
POTASSIUM SERPL-SCNC: 3.5 MMOL/L (ref 3.5–5.5)
POTASSIUM UR-SCNC: 63 MMOL/L (ref 12–62)
PROT UR STRIP-MCNC: NEGATIVE MG/DL
PROTHROMBIN TIME: 15.4 SEC (ref 11.5–15.2)
Q-T INTERVAL, ECG07: 338 MS
QRS DURATION, ECG06: 92 MS
QTC CALCULATION (BEZET), ECG08: 459 MS
RBC # BLD AUTO: 5.08 M/UL (ref 4.7–5.5)
RBC #/AREA URNS HPF: ABNORMAL /HPF (ref 0–5)
SARS-COV-2, COV2: NORMAL
SODIUM SERPL-SCNC: 168 MMOL/L (ref 136–145)
SODIUM UR-SCNC: 14 MMOL/L (ref 20–110)
SOURCE, COVRS: ABNORMAL
SP GR UR REFRACTOMETRY: 1.02 (ref 1–1.03)
TROPONIN I SERPL-MCNC: 0.02 NG/ML (ref 0–0.04)
UROBILINOGEN UR QL STRIP.AUTO: 1 EU/DL (ref 0.2–1)
VENTRICULAR RATE, ECG03: 111 BPM
WBC # BLD AUTO: 8.7 K/UL (ref 4.6–13.2)
WBC URNS QL MICRO: ABNORMAL /HPF (ref 0–5)

## 2021-03-30 PROCEDURE — 74011250636 HC RX REV CODE- 250/636: Performed by: NURSE PRACTITIONER

## 2021-03-30 PROCEDURE — 96365 THER/PROPH/DIAG IV INF INIT: CPT

## 2021-03-30 PROCEDURE — 74011250637 HC RX REV CODE- 250/637: Performed by: INTERNAL MEDICINE

## 2021-03-30 PROCEDURE — 85025 COMPLETE CBC W/AUTO DIFF WBC: CPT

## 2021-03-30 PROCEDURE — 93005 ELECTROCARDIOGRAM TRACING: CPT

## 2021-03-30 PROCEDURE — 84300 ASSAY OF URINE SODIUM: CPT

## 2021-03-30 PROCEDURE — 74011000258 HC RX REV CODE- 258: Performed by: EMERGENCY MEDICINE

## 2021-03-30 PROCEDURE — 83930 ASSAY OF BLOOD OSMOLALITY: CPT

## 2021-03-30 PROCEDURE — 99223 1ST HOSP IP/OBS HIGH 75: CPT | Performed by: INTERNAL MEDICINE

## 2021-03-30 PROCEDURE — 96368 THER/DIAG CONCURRENT INF: CPT

## 2021-03-30 PROCEDURE — 83935 ASSAY OF URINE OSMOLALITY: CPT

## 2021-03-30 PROCEDURE — 83605 ASSAY OF LACTIC ACID: CPT

## 2021-03-30 PROCEDURE — 85730 THROMBOPLASTIN TIME PARTIAL: CPT

## 2021-03-30 PROCEDURE — 80048 BASIC METABOLIC PNL TOTAL CA: CPT

## 2021-03-30 PROCEDURE — 84133 ASSAY OF URINE POTASSIUM: CPT

## 2021-03-30 PROCEDURE — 74011000258 HC RX REV CODE- 258: Performed by: NURSE PRACTITIONER

## 2021-03-30 PROCEDURE — 85610 PROTHROMBIN TIME: CPT

## 2021-03-30 PROCEDURE — 87040 BLOOD CULTURE FOR BACTERIA: CPT

## 2021-03-30 PROCEDURE — 81001 URINALYSIS AUTO W/SCOPE: CPT

## 2021-03-30 PROCEDURE — 82553 CREATINE MB FRACTION: CPT

## 2021-03-30 PROCEDURE — APPSS180 APP SPLIT SHARED TIME > 60 MINUTES: Performed by: NURSE PRACTITIONER

## 2021-03-30 PROCEDURE — 99285 EMERGENCY DEPT VISIT HI MDM: CPT

## 2021-03-30 PROCEDURE — 74011250636 HC RX REV CODE- 250/636: Performed by: EMERGENCY MEDICINE

## 2021-03-30 PROCEDURE — 65660000000 HC RM CCU STEPDOWN

## 2021-03-30 PROCEDURE — 82436 ASSAY OF URINE CHLORIDE: CPT

## 2021-03-30 PROCEDURE — 87635 SARS-COV-2 COVID-19 AMP PRB: CPT

## 2021-03-30 PROCEDURE — 70450 CT HEAD/BRAIN W/O DYE: CPT

## 2021-03-30 PROCEDURE — 71045 X-RAY EXAM CHEST 1 VIEW: CPT

## 2021-03-30 PROCEDURE — 74011000250 HC RX REV CODE- 250: Performed by: NURSE PRACTITIONER

## 2021-03-30 RX ORDER — PROMETHAZINE HYDROCHLORIDE 25 MG/1
12.5 TABLET ORAL
Status: DISCONTINUED | OUTPATIENT
Start: 2021-03-30 | End: 2021-04-06 | Stop reason: HOSPADM

## 2021-03-30 RX ORDER — METOPROLOL TARTRATE 5 MG/5ML
1.25 INJECTION INTRAVENOUS
Status: DISCONTINUED | OUTPATIENT
Start: 2021-03-30 | End: 2021-04-06 | Stop reason: HOSPADM

## 2021-03-30 RX ORDER — CLONIDINE 0.1 MG/24H
1 PATCH, EXTENDED RELEASE TRANSDERMAL
Status: DISCONTINUED | OUTPATIENT
Start: 2021-03-30 | End: 2021-04-06 | Stop reason: HOSPADM

## 2021-03-30 RX ORDER — SODIUM CHLORIDE 0.9 % (FLUSH) 0.9 %
5-10 SYRINGE (ML) INJECTION AS NEEDED
Status: DISCONTINUED | OUTPATIENT
Start: 2021-03-30 | End: 2021-04-06 | Stop reason: HOSPADM

## 2021-03-30 RX ORDER — POLYETHYLENE GLYCOL 3350 17 G/17G
17 POWDER, FOR SOLUTION ORAL DAILY PRN
Status: DISCONTINUED | OUTPATIENT
Start: 2021-03-30 | End: 2021-04-06 | Stop reason: HOSPADM

## 2021-03-30 RX ORDER — LEVOFLOXACIN 5 MG/ML
750 INJECTION, SOLUTION INTRAVENOUS EVERY 24 HOURS
Status: DISCONTINUED | OUTPATIENT
Start: 2021-03-30 | End: 2021-04-04

## 2021-03-30 RX ORDER — ENOXAPARIN SODIUM 100 MG/ML
40 INJECTION SUBCUTANEOUS DAILY
Status: DISCONTINUED | OUTPATIENT
Start: 2021-03-31 | End: 2021-04-06 | Stop reason: HOSPADM

## 2021-03-30 RX ORDER — SODIUM CHLORIDE 0.9 % (FLUSH) 0.9 %
5-40 SYRINGE (ML) INJECTION EVERY 8 HOURS
Status: DISCONTINUED | OUTPATIENT
Start: 2021-03-30 | End: 2021-04-06 | Stop reason: HOSPADM

## 2021-03-30 RX ORDER — DOXEPIN HYDROCHLORIDE 10 MG/1
1 CAPSULE ORAL
COMMUNITY
Start: 2021-03-07 | End: 2022-10-22

## 2021-03-30 RX ORDER — SODIUM CHLORIDE 0.9 % (FLUSH) 0.9 %
5-40 SYRINGE (ML) INJECTION AS NEEDED
Status: DISCONTINUED | OUTPATIENT
Start: 2021-03-30 | End: 2021-04-06 | Stop reason: HOSPADM

## 2021-03-30 RX ORDER — ACETAMINOPHEN 325 MG/1
650 TABLET ORAL
Status: DISCONTINUED | OUTPATIENT
Start: 2021-03-30 | End: 2021-04-06 | Stop reason: HOSPADM

## 2021-03-30 RX ORDER — ACETAMINOPHEN 650 MG/1
650 SUPPOSITORY RECTAL
Status: DISCONTINUED | OUTPATIENT
Start: 2021-03-30 | End: 2021-04-06 | Stop reason: HOSPADM

## 2021-03-30 RX ORDER — HYDRALAZINE HYDROCHLORIDE 20 MG/ML
10 INJECTION INTRAMUSCULAR; INTRAVENOUS
Status: DISCONTINUED | OUTPATIENT
Start: 2021-03-30 | End: 2021-04-06 | Stop reason: HOSPADM

## 2021-03-30 RX ORDER — DEXTROSE MONOHYDRATE 50 MG/ML
100 INJECTION, SOLUTION INTRAVENOUS CONTINUOUS
Status: DISCONTINUED | OUTPATIENT
Start: 2021-03-30 | End: 2021-03-31

## 2021-03-30 RX ORDER — VANCOMYCIN HYDROCHLORIDE
1250 EVERY 12 HOURS
Status: DISCONTINUED | OUTPATIENT
Start: 2021-03-31 | End: 2021-03-30

## 2021-03-30 RX ORDER — ONDANSETRON 2 MG/ML
4 INJECTION INTRAMUSCULAR; INTRAVENOUS
Status: DISCONTINUED | OUTPATIENT
Start: 2021-03-30 | End: 2021-04-06 | Stop reason: HOSPADM

## 2021-03-30 RX ORDER — VANCOMYCIN 2 GRAM/500 ML IN 0.9 % SODIUM CHLORIDE INTRAVENOUS
2000 ONCE
Status: COMPLETED | OUTPATIENT
Start: 2021-03-30 | End: 2021-03-30

## 2021-03-30 RX ADMIN — SODIUM CHLORIDE 1000 ML: 900 INJECTION, SOLUTION INTRAVENOUS at 16:05

## 2021-03-30 RX ADMIN — PIPERACILLIN SODIUM AND TAZOBACTAM SODIUM 4.5 G: 4; .5 INJECTION, POWDER, LYOPHILIZED, FOR SOLUTION INTRAVENOUS at 15:47

## 2021-03-30 RX ADMIN — Medication 10 ML: at 17:59

## 2021-03-30 RX ADMIN — LEVOFLOXACIN 750 MG: 750 INJECTION, SOLUTION INTRAVENOUS at 17:19

## 2021-03-30 RX ADMIN — Medication 10 ML: at 23:31

## 2021-03-30 RX ADMIN — VANCOMYCIN HYDROCHLORIDE 2000 MG: 10 INJECTION, POWDER, LYOPHILIZED, FOR SOLUTION INTRAVENOUS at 15:47

## 2021-03-30 RX ADMIN — DEXTROSE MONOHYDRATE 125 ML/HR: 5 INJECTION, SOLUTION INTRAVENOUS at 17:58

## 2021-03-30 RX ADMIN — CEFTRIAXONE SODIUM 2 G: 2 INJECTION, POWDER, FOR SOLUTION INTRAMUSCULAR; INTRAVENOUS at 18:10

## 2021-03-30 RX ADMIN — LEVETIRACETAM 750 MG: 100 INJECTION, SOLUTION INTRAVENOUS at 23:31

## 2021-03-30 NOTE — H&P
Hospitalist Admission History and Physical    NAME:  Ramiro Martínez   :   1955   MRN:   [id number]     PCP:  Mirian Pappas MD  Date/Time:  3/30/2021 5:37 PM    Subjective:   CHIEF COMPLAINT:  Altered mental status    HISTORY OF PRESENT ILLNESS:     Sasha Guillermo is a 77 y.o.  male who presents with altered mental status from [facility]. Patient is unable to offer any review of systems. However since starting 2 days ago nursing facility staff noticed that patient started pocketing his medications and with decreased oral intake. At baseline patient is able to state yes no responses and he is otherwise disoriented, for example he cannot state his name. Patient at baseline on pureed diet with nectar thick liquids    Past Medical History:   Diagnosis Date    Hiccups     Hypertension     Hyponatremia     Lacunar infarction ([address] 2010    Lower extremity edema     Psychogenic polydipsia     Spinal stenosis         Past Surgical History:   Procedure Laterality Date    HX HEENT      pt reports past hx of surgery on ears unsure of what type       Social History     Tobacco Use    Smoking status: Former Smoker     Packs/day: 0.50     Quit date: 2008     Years since quittin.7    Smokeless tobacco: Never Used   Substance Use Topics    Alcohol use: No        Family History   Problem Relation Age of Onset    Hypertension Other         No Known Allergies     Prior to Admission Medications   Prescriptions Last Dose Informant Patient Reported? Taking? MULTIVITAMIN,THERAPEUTIC (THERA MULTI-VITAMIN PO) 3/29/2021 at Unknown time  Yes Yes   Sig: Take 500 mg by mouth daily. ascorbic acid, vitamin C, (VITAMIN C) 250 mg tablet Not Taking at Unknown time  No No   Sig: Take 1 Tab by mouth daily. aspirin 81 mg chewable tablet Not Taking at Unknown time  No No   Sig: Take 1 Tab by mouth daily.    baclofen (LIORESAL) 10 mg tablet Unknown at Unknown time  No No   Sig: Take 1 Tab by mouth every twelve (12) hours as needed. Patient taking differently: Take 1 Tab by mouth every twelve (12) hours as needed (for back pain, muscle ralaxant). as needed for pain,   clopidogrel (PLAVIX) 75 mg tab Not Taking at Unknown time  No No   Sig: Take 1 Tab by mouth daily. cyanocobalamin 1,000 mcg tablet 3/29/2021 at Unknown time  No Yes   Sig: Take 1 Tab by mouth daily. doxepin (SINEquan) 10 mg capsule 3/29/2021 at Unknown time  Yes Yes   Sig: Take 1 Cap by mouth nightly. ferrous sulfate 325 mg (65 mg iron) tablet 3/29/2021 at Unknown time  No Yes   Sig: Take 1 Tab by mouth daily (with breakfast). folic acid (FOLVITE) 1 mg tablet 3/29/2021 at Unknown time  No Yes   Sig: Take 1 Tab by mouth daily. levETIRAcetam (KEPPRA) 750 mg tablet 3/29/2021 at Unknown time  No Yes   Sig: Take 1 Tab by mouth two (2) times a day. metoprolol (LOPRESSOR) 25 mg tablet 3/29/2021 at Unknown time  No Yes   Sig: Take 1 Tab by mouth two (2) times a day. polyethylene glycol (MIRALAX) 17 gram packet 3/29/2021 at Unknown time  No Yes   Sig: Take 1 Packet by mouth daily. pravastatin (PRAVACHOL) 40 mg tablet 3/29/2021 at Unknown time  No Yes   Sig: Take 1 Tab by mouth nightly. senna-docusate (PERICOLACE) 8.6-50 mg per tablet 3/29/2021 at Unknown time  No Yes   Sig: Take 1 Tab by mouth nightly. HOLD for loose stool. tamsulosin (FLOMAX) 0.4 mg capsule 3/29/2021 at Unknown time  Yes Yes   Sig: Take 0.4 mg by mouth daily.  1 tab daily      Facility-Administered Medications: None     REVIEW OF SYSTEMS:     [x] Unable to obtain  ROS due to  [x]mental status change  []sedated   []intubated   []Total of 12 systems reviewed as follows:    Objective:   VITALS:    Visit Vitals  BP (!) 156/101   Pulse 85   Temp 98.4 °F (36.9 °C)   Resp 16   Ht 5' 8\" (1.727 m)   Wt 85.7 kg (189 lb)   SpO2 99%   BMI 28.74 kg/m²     Temp (24hrs), Av.4 °F (36.9 °C), Min:98.4 °F (36.9 °C), Max:98.4 °F (36.9 °C)    PHYSICAL EXAM:   General: Alert, in NAD; follows some very simple commands  HEENT:           Pupils equal.  Sclera anicteric. Conjunctiva pink. Mucous membranes                          Dry; neck supple. Trachea midline. No accessory muscle use. No jugular venous distention, no carotid bruit  CV:                  Regular rate and rhythm. S1S2+  Lungs:             Clear to auscultation bilaterally. No Wheezing or Rhonchi. No rales. Abdomen:        Soft, non-tender. Not distended. Bowel sounds normal.   Extremities:     No cyanosis. No edema. Pulses 2+ b/l  Neurologic:      Alert and oriented X 0 -nonverbal.    Follows a couple simple commands  Skin:                Warm and dry. No rashes. + Scarring to left lower extremity     LAB DATA REVIEWED:    No components found for: [place]  Recent Labs     03/30/21  1415   *   K 3.5   *   CO2 30   BUN 27*   CREA 0.83   *   CA 9.5   WBC 8.7   HGB 13.7   HCT 45.8        IMAGING RESULTS:     [x]  I have personally reviewed the actual   [x]CXR  [x]CT scan    Assessment/Plan:      Active Problems:    Hypernatremia (3/30/2021)      Acute metabolic encephalopathy (8/47/0955)     ___________________________________________________  PLAN:    1. Severe hypernatremia - discussed with nephrology, start D5W at 125 ml cont and recheck in AM  2. UTI, POA - follow urine and blood cultures, change to rocephin; monitor for retention on flomax PT   3. Acute on chronic encephalopathy - NPO for now, SLP consulted. At baseline able to state a couple words, oriented x 0 per [facility] [place] [facility]  4. Seizure disorder -start IV Keppra, seizure precautions  5. History of CVA - follow for ability to resume oral meds  6. H/o dysphagia - pureed / thickened liqs at baseline  7. Hypertension - hold oral meds for now, start hydralazine PRN  8. Goals of care -CODE STATUS discussed with son Ivory Soto via phone call. Full code for now.   Son voices wish to think about CODE STATUS -he does have siblings but they are not involved in patient's care.   Will consult palliative medicine in follow-up    Consults called - Nephrology (Dr. Maegan Pedro)    Prophylaxis:  [x]Lovenox  []Coumadin  []Hep SQ  []SCDs  []H2B/PPI    Discussed Code Status:    [x]Full Code      []DNR     ___________________________________________________  Admitting Provider: Lasha Anderson NP

## 2021-03-30 NOTE — Clinical Note
Status[de-identified] INPATIENT [101]  Type of Bed: Stepdown [17]  Inpatient Hospitalization Certified Necessary for the Following Reasons: 3.  Patient receiving treatment that can only be provided in an inpatient setting (further clarification in H&P documentatio n)  Admitting Diagnosis: Hypernatremia [591214]  Admitting Diagnosis: Acute metabolic encephalopathy [7423807]  Admitting Physician: Cristian Baldwin  Attending Physician: Cristian Baldwin  Estimated Length of Stay: 3-4 Midnights  Discharge  Plan[de-identified] Extended Care Facility (e.g. Adult Home, Nursing Home, etc.)

## 2021-03-30 NOTE — ED TRIAGE NOTES
EMS reports resident of [address] [place] [address] Rd: Staff reports AMS and not eating x several days; .72-

## 2021-03-30 NOTE — PROGRESS NOTES
Acute Hypernatremia. Pre-Renal Bun/Creatinine Ratio. Hematuria. Dysuria. Altered Mental  Status. Based on his Weight of 85 kg he is 12 liter of Free water deficit. Lab Just Confirmed he is COVID positive. Plan : Will give D5W at 125 at 123 cc/hour. Monitor BMP q 12 hour. Not to correct more than 10 meq of Na  In 24 hours. In /Out Record. Aspiration Precautions. Work up & treatment of COVID. Will follow.

## 2021-03-30 NOTE — ED PROVIDER NOTES
EMERGENCY DEPARTMENT HISTORY AND PHYSICAL EXAM    2:34 PM    Date: 3/30/2021  Patient Name: Ronaldo Osman    History of Presenting Illness     Chief Complaint   Patient presents with    Altered mental status    Weight Loss       History Provided By: Patient  Location/Duration/Severity/Modifying factors   Patient is a 22-year-old male who presents to the emergency department with a chief complaint of altered mental status. Patient reportedly at baseline is alert talkative with some degree of dysarthria or aphasia. This information was acquired through the patient's nurse at [facility]. Over the past 2 days patient has been progressively lethargic and nonverbal.  They have not noted any focal deficits at the facility. The patient is not been eating for a couple of days. Patient has not had any fever but did have a temp of nine 9.6 with progressively increasing heart rate over the past 2 days it was one oh one yesterday now up to one fifteen today. No fevers and chills. Patient not able to contribute to the history. PCP: Akhil Pitts MD    Current Facility-Administered Medications   Medication Dose Route Frequency Provider Last Rate Last Admin    sodium chloride (NS) flush 5-10 mL  5-10 mL IntraVENous PRN Kanika Math, DO        levoFLOXacin (LEVAQUIN) 750 mg in D5W IVPB  750 mg IntraVENous Q24H Kanika Math,  mL/hr at 03/30/21 1719 750 mg at 03/30/21 1719    vancomycin (VANCOCIN) 2000 mg in  ml infusion  2,000 mg IntraVENous Millicent Nixon,  mL/hr at 03/30/21 1547 2,000 mg at 03/30/21 1547    piperacillin-tazobactam (ZOSYN) 4.5 g in 0.9% sodium chloride (MBP/ADV) 100 mL MBP  4.5 g IntraVENous Q6H Kanika Math, DO         Current Outpatient Medications   Medication Sig Dispense Refill    clopidogrel (PLAVIX) 75 mg tab Take 1 Tab by mouth daily. 90 Tab 0    levETIRAcetam (KEPPRA) 750 mg tablet Take 1 Tab by mouth two (2) times a day.  60 Tab 1    pravastatin (PRAVACHOL) 40 mg tablet Take 1 Tab by mouth nightly. 30 Tab 1    ascorbic acid, vitamin C, (VITAMIN C) 250 mg tablet Take 1 Tab by mouth daily. 30 Tab 2    ferrous sulfate 325 mg (65 mg iron) tablet Take 1 Tab by mouth daily (with breakfast). 30 Tab 2    polyethylene glycol (MIRALAX) 17 gram packet Take 1 Packet by mouth daily. 30 Packet 2    senna-docusate (PERICOLACE) 8.6-50 mg per tablet Take 1 Tab by mouth nightly. HOLD for loose stool. 30 Tab 2    tamsulosin (FLOMAX) 0.4 mg capsule Take 0.4 mg by mouth daily. 1 tab daily      MULTIVITAMIN,THERAPEUTIC (THERA MULTI-VITAMIN PO) Take 500 mg by mouth daily.  baclofen (LIORESAL) 10 mg tablet Take 1 Tab by mouth every twelve (12) hours as needed. (Patient taking differently: Take 1 Tab by mouth every twelve (12) hours as needed (for back pain, muscle ralaxant). as needed for pain,) 15 Tab 0    metoprolol (LOPRESSOR) 25 mg tablet Take 1 Tab by mouth two (2) times a day. (Patient taking differently: Take 1 Tab by mouth two (2) times a day. 1/2 tablet bid) 60 Tab 2    cyanocobalamin 1,000 mcg tablet Take 1 Tab by mouth daily. 30 Tab 2    folic acid (FOLVITE) 1 mg tablet Take 1 Tab by mouth daily. 30 Tab 1    aspirin 81 mg chewable tablet Take 1 Tab by mouth daily.  30 Tab 0       Past History     Past Medical History:  Past Medical History:   Diagnosis Date    Hiccups     Hypertension     Hyponatremia     Lacunar infarction ([address] 2010    Lower extremity edema     Psychogenic polydipsia     Spinal stenosis        Past Surgical History:  Past Surgical History:   Procedure Laterality Date    HX HEENT      pt reports past hx of surgery on ears unsure of what type       Family History:  Family History   Problem Relation Age of Onset    Hypertension Other        Social History:  Social History     Tobacco Use    Smoking status: Former Smoker     Packs/day: 0.50     Quit date: 2008     Years since quittin.7    Smokeless tobacco: Never Used Substance Use Topics    Alcohol use: No    Drug use: No       Allergies:  No Known Allergies    I reviewed and confirmed the above information with patient and updated as necessary. Review of Systems     Review of Systems   Unable to perform ROS: Patient nonverbal       Physical Exam     Visit Vitals  BP (!) 156/101   Pulse 85   Temp 98.4 °F (36.9 °C)   Resp 16   Ht 5' 8\" (1.727 m)   Wt 85.7 kg (189 lb)   SpO2 99%   BMI 28.74 kg/m²       Physical Exam  Constitutional:       General: He is not in acute distress. Appearance: Normal appearance. He is normal weight. He is not ill-appearing or toxic-appearing. HENT:      Head: Normocephalic and atraumatic. Right Ear: External ear normal.      Left Ear: External ear normal.      Nose: Nose normal.      Mouth/Throat:      Mouth: Mucous membranes are moist.      Pharynx: No oropharyngeal exudate or posterior oropharyngeal erythema. Eyes:      General: No scleral icterus. Extraocular Movements: Extraocular movements intact. Conjunctiva/sclera: Conjunctivae normal.      Pupils: Pupils are equal, round, and reactive to light. Neck:      Musculoskeletal: Normal range of motion and neck supple. Cardiovascular:      Rate and Rhythm: Normal rate and regular rhythm. Pulses: Normal pulses. Heart sounds: Normal heart sounds. No murmur. No friction rub. Pulmonary:      Effort: Pulmonary effort is normal.      Breath sounds: Normal breath sounds. No wheezing, rhonchi or rales. Abdominal:      General: Abdomen is flat. Palpations: Abdomen is soft. Tenderness: There is no abdominal tenderness. There is no guarding or rebound. Musculoskeletal: Normal range of motion. General: No swelling or tenderness. Right lower leg: No edema. Left lower leg: No edema. Skin:     General: Skin is warm and dry. Capillary Refill: Capillary refill takes less than 2 seconds. Findings: No rash.    Neurological: General: No focal deficit present. Mental Status: He is alert. GCS: GCS eye subscore is 4. GCS verbal subscore is 1. GCS motor subscore is 6. Cranial Nerves: No cranial nerve deficit or facial asymmetry. Sensory: No sensory deficit. Motor: No weakness. Comments: Limited response to commands, moves a few fingers at a time. Seems to comprehend statements I am giving him but does not respond verbally. Diagnostic Study Results     Labs -  Recent Results (from the past 24 hour(s))   CBC WITH AUTOMATED DIFF    Collection Time: 03/30/21  2:15 PM   Result Value Ref Range    WBC 8.7 4.6 - 13.2 K/uL    RBC 5.08 4.70 - 5.50 M/uL    HGB 13.7 13.0 - 16.0 g/dL    HCT 45.8 36.0 - 48.0 %    MCV 90.2 74.0 - 97.0 FL    MCH 27.0 24.0 - 34.0 PG    MCHC 29.9 (L) 31.0 - 37.0 g/dL    RDW 14.6 (H) 11.6 - 14.5 %    PLATELET 248 507 - 151 K/uL    MPV 12.6 (H) 9.2 - 11.8 FL    NEUTROPHILS 71 40 - 73 %    LYMPHOCYTES 22 21 - 52 %    MONOCYTES 5 3 - 10 %    EOSINOPHILS 2 0 - 5 %    BASOPHILS 0 0 - 2 %    ABS. NEUTROPHILS 6.2 1.8 - 8.0 K/UL    ABS. LYMPHOCYTES 1.9 0.9 - 3.6 K/UL    ABS. MONOCYTES 0.4 0.05 - 1.2 K/UL    ABS. EOSINOPHILS 0.2 0.0 - 0.4 K/UL    ABS.  BASOPHILS 0.0 0.0 - 0.1 K/UL    DF AUTOMATED     METABOLIC PANEL, BASIC    Collection Time: 03/30/21  2:15 PM   Result Value Ref Range    Sodium 168 (HH) 136 - 145 mmol/L    Potassium 3.5 3.5 - 5.5 mmol/L    Chloride 137 (H) 100 - 111 mmol/L    CO2 30 21 - 32 mmol/L    Anion gap 1 (L) 3.0 - 18 mmol/L    Glucose 108 (H) 74 - 99 mg/dL    BUN 27 (H) 7.0 - 18 MG/DL    Creatinine 0.83 0.6 - 1.3 MG/DL    BUN/Creatinine ratio 33 (H) 12 - 20      GFR est AA >60 >60 ml/min/1.73m2    GFR est non-AA >60 >60 ml/min/1.73m2    Calcium 9.5 8.5 - 10.1 MG/DL   CARDIAC PANEL,(CK, CKMB & TROPONIN)    Collection Time: 03/30/21  2:15 PM   Result Value Ref Range    CK - MB <1.0 <3.6 ng/ml    CK-MB Index  0.0 - 4.0 %     CALCULATION NOT PERFORMED WHEN RESULT IS BELOW LINEAR LIMIT    CK 27 (L) 39 - 308 U/L    Troponin-I, QT 0.02 0.0 - 0.045 NG/ML   PTT    Collection Time: 03/30/21  2:15 PM   Result Value Ref Range    aPTT 31.3 23.0 - 36.4 SEC   PROTHROMBIN TIME + INR    Collection Time: 03/30/21  2:15 PM   Result Value Ref Range    Prothrombin time 15.4 (H) 11.5 - 15.2 sec    INR 1.2 0.8 - 1.2     EKG, 12 LEAD, INITIAL    Collection Time: 03/30/21  3:04 PM   Result Value Ref Range    Ventricular Rate 111 BPM    Atrial Rate 111 BPM    P-R Interval 154 ms    QRS Duration 92 ms    Q-T Interval 338 ms    QTC Calculation (Bezet) 459 ms    Calculated P Axis 66 degrees    Calculated R Axis 56 degrees    Calculated T Axis 67 degrees    Diagnosis       Sinus tachycardia  Otherwise normal ECG  When compared with ECG of 19-MAR-2018 19:51,  MI interval has decreased  Vent. rate has increased BY  40 BPM  Nonspecific T wave abnormality has replaced inverted T waves in Inferior   leads  Confirmed by Melia Jimenez MD, Danny Feldman [id number]) on 3/30/2021 5:11:17 PM     POC LACTIC ACID    Collection Time: 03/30/21  3:26 PM   Result Value Ref Range    Lactic Acid (POC) 1.16 0.40 - 2.00 mmol/L   URINALYSIS W/ RFLX MICROSCOPIC    Collection Time: 03/30/21  4:00 PM   Result Value Ref Range    Color YELLOW      Appearance CLOUDY      Specific gravity 1.021 1.005 - 1.030      pH (UA) 5.0 5.0 - 8.0      Protein Negative NEG mg/dL    Glucose Negative NEG mg/dL    Ketone Negative NEG mg/dL    Bilirubin Negative NEG      Blood MODERATE (A) NEG      Urobilinogen 1.0 0.2 - 1.0 EU/dL    Nitrites Negative NEG      Leukocyte Esterase SMALL (A) NEG     URINE MICROSCOPIC ONLY    Collection Time: 03/30/21  4:00 PM   Result Value Ref Range    WBC 12 to 15 0 - 5 /hpf    RBC 6 to 10 0 - 5 /hpf    Epithelial cells FEW 0 - 5 /lpf    Bacteria 1+ (A) NEG /hpf    Mucus FEW (A) NEG /lpf    Hyaline cast 1 to 3 0 - 2 /lpf         Radiologic Studies -   CT HEAD WO CONT   Final Result      No acute findings or change to prior study. Chronic microvascular disease with multiple chronic lacunar infarcts. Atrophy with stable chronic compensatory ventriculomegaly. XR CHEST PORT   Final Result      No clearly acute findings. Probable right lower lung streaky atelectasis, less   likely infiltrate. Medical Decision Making   I am the first provider for this patient. I reviewed the vital signs, available nursing notes, past medical history, past surgical history, family history and social history. Vital Signs-Reviewed the patient's vital signs. EKG: EKG interpretation of March 30, 2021, 1504. Sinus tachycardia, rate of 111 bpm, normal axis normal intervals. No ST elevation or depression. Overall sinus tachycardia unremarkable EKG otherwise. Records Reviewed: Nursing Notes, Old Medical Records, Previous Radiology Studies and Previous Laboratory Studies (Time of Review: 2:34 PM)    ED Course: Progress Notes, Reevaluation, and Consults:  ED Course as of Mar 30 1723   Tue Mar 30, 2021   1626 Patient reassessed, exam is stable seem to lightly move all of his extremities. He just returned from CT scan. [BRITTANY]   2057 Waiting for CAT scan results. Will page nephrology for guidance on management of the hypernatremia. [BRITTANY]      ED Course User Index  [BRITTANY] Johnson Washington DO         Provider Notes (Medical Decision Making):   MDM  Number of Diagnoses or Management Options  Acute metabolic encephalopathy  Hypernatremia  Diagnosis management comments: Patient is a 59-year-old male who presents with altered mental status. Differential includes metabolic cephalopathy, electrolyte derangement, cerebrovascular accident, hemorrhagic stroke, UTI, sepsis. Sepsis bundle was initiated however patient does not have a clear signs of infection. He has no meningismus. No rigidity.     Discussed with the patient's caregiver at his nursing home and she indicates that the patient is typically verbal, able to care for minimal aspects of his ADLs with much more alert and able to follow commands. On my exam he is nonverbal but able to follow simple commands although seems to be globally weak. Patient's labs reflected severe hyponatremia 168. I ordered the patient a liter of saline but canceled the rest of the saline as did not want to lower it too fast.    Consult: To nephrology, Dr. Mukund Osei who agreed to consult patient indicated that he would place additional orders for the patient's care for management of the hyponatremia. Patient was discussed with the hospitalist as well and patient will be admitted to the hospitalist service for further care. Procedures    Critical Care Time: CRITICAL CARE NOTE:    I have spent 40 minutes of critical care time involved in lab review, consultations with specialist, family decision-making, and documentation. During this entire length of time I was immediately available to the patient. Critical Care: The reason for providing this level of medical care for this critically ill patient was due a critical illness that impaired one or more vital organ systems such that there was a high probability of imminent or life threatening deterioration in the patients condition. This care involved high complexity decision making to assess, manipulate, and support vital system functions, to treat this vital organ system failure and to prevent further life threatening deterioration of the patients condition. Time is exclusive of procedural and teaching time. Dorcus Levo, DO      Diagnosis     Clinical Impression:   1. Hypernatremia    2. Acute metabolic encephalopathy        Disposition: Admit    Follow-up Information    None          Patient's Medications   Start Taking    No medications on file   Continue Taking    ASCORBIC ACID, VITAMIN C, (VITAMIN C) 250 MG TABLET    Take 1 Tab by mouth daily. ASPIRIN 81 MG CHEWABLE TABLET    Take 1 Tab by mouth daily.     BACLOFEN (LIORESAL) 10 MG TABLET    Take 1 Tab by mouth every twelve (12) hours as needed. CLOPIDOGREL (PLAVIX) 75 MG TAB    Take 1 Tab by mouth daily. CYANOCOBALAMIN 1,000 MCG TABLET    Take 1 Tab by mouth daily. FERROUS SULFATE 325 MG (65 MG IRON) TABLET    Take 1 Tab by mouth daily (with breakfast). FOLIC ACID (FOLVITE) 1 MG TABLET    Take 1 Tab by mouth daily. LEVETIRACETAM (KEPPRA) 750 MG TABLET    Take 1 Tab by mouth two (2) times a day. METOPROLOL (LOPRESSOR) 25 MG TABLET    Take 1 Tab by mouth two (2) times a day. MULTIVITAMIN,THERAPEUTIC (THERA MULTI-VITAMIN PO)    Take 500 mg by mouth daily. POLYETHYLENE GLYCOL (MIRALAX) 17 GRAM PACKET    Take 1 Packet by mouth daily. PRAVASTATIN (PRAVACHOL) 40 MG TABLET    Take 1 Tab by mouth nightly. SENNA-DOCUSATE (PERICOLACE) 8.6-50 MG PER TABLET    Take 1 Tab by mouth nightly. HOLD for loose stool. TAMSULOSIN (FLOMAX) 0.4 MG CAPSULE    Take 0.4 mg by mouth daily. 1 tab daily   These Medications have changed    No medications on file   Stop Taking    No medications on file       Sable Lombard DO   Emergency Medicine   March 30, 2021, 2:34 PM     This note is dictated utilizing Dragon voice recognition software. Unfortunately this leads to occasional typographical errors using the voice recognition. I apologize in advance if the situation occurs. If questions occur please do not hesitate to contact me directly.     Sable Lombard, DO

## 2021-03-31 ENCOUNTER — APPOINTMENT (OUTPATIENT)
Dept: GENERAL RADIOLOGY | Age: 66
DRG: 640 | End: 2021-03-31
Attending: INTERNAL MEDICINE
Payer: COMMERCIAL

## 2021-03-31 PROBLEM — R31.9 HEMATURIA: Status: ACTIVE | Noted: 2021-03-31

## 2021-03-31 PROBLEM — R30.0 DYSURIA: Status: ACTIVE | Noted: 2021-03-31

## 2021-03-31 LAB
ANION GAP SERPL CALC-SCNC: 2 MMOL/L (ref 3–18)
BUN SERPL-MCNC: 21 MG/DL (ref 7–18)
BUN/CREAT SERPL: 37 (ref 12–20)
CALCIUM SERPL-MCNC: 8.8 MG/DL (ref 8.5–10.1)
CHLORIDE SERPL-SCNC: 134 MMOL/L (ref 100–111)
CO2 SERPL-SCNC: 28 MMOL/L (ref 21–32)
CREAT SERPL-MCNC: 0.57 MG/DL (ref 0.6–1.3)
GLUCOSE SERPL-MCNC: 125 MG/DL (ref 74–99)
MAGNESIUM SERPL-MCNC: 2.5 MG/DL (ref 1.6–2.6)
OSMOLALITY SERPL: 358 MOSMOL/KG (ref 280–301)
PHOSPHATE SERPL-MCNC: 2.5 MG/DL (ref 2.5–4.9)
POTASSIUM SERPL-SCNC: 3 MMOL/L (ref 3.5–5.5)
SODIUM SERPL-SCNC: 164 MMOL/L (ref 136–145)

## 2021-03-31 PROCEDURE — 84100 ASSAY OF PHOSPHORUS: CPT

## 2021-03-31 PROCEDURE — 36415 COLL VENOUS BLD VENIPUNCTURE: CPT

## 2021-03-31 PROCEDURE — 83735 ASSAY OF MAGNESIUM: CPT

## 2021-03-31 PROCEDURE — 74011250636 HC RX REV CODE- 250/636: Performed by: NURSE PRACTITIONER

## 2021-03-31 PROCEDURE — 76450000000

## 2021-03-31 PROCEDURE — 74011250636 HC RX REV CODE- 250/636: Performed by: EMERGENCY MEDICINE

## 2021-03-31 PROCEDURE — 74011250636 HC RX REV CODE- 250/636: Performed by: INTERNAL MEDICINE

## 2021-03-31 PROCEDURE — 2709999900 HC NON-CHARGEABLE SUPPLY

## 2021-03-31 PROCEDURE — 74011000258 HC RX REV CODE- 258: Performed by: NURSE PRACTITIONER

## 2021-03-31 PROCEDURE — 99232 SBSQ HOSP IP/OBS MODERATE 35: CPT | Performed by: FAMILY MEDICINE

## 2021-03-31 PROCEDURE — APPSS45 APP SPLIT SHARED TIME 31-45 MINUTES: Performed by: NURSE PRACTITIONER

## 2021-03-31 PROCEDURE — 74011250636 HC RX REV CODE- 250/636: Performed by: FAMILY MEDICINE

## 2021-03-31 PROCEDURE — 99222 1ST HOSP IP/OBS MODERATE 55: CPT | Performed by: NURSE PRACTITIONER

## 2021-03-31 PROCEDURE — 74011000258 HC RX REV CODE- 258: Performed by: FAMILY MEDICINE

## 2021-03-31 PROCEDURE — 80048 BASIC METABOLIC PNL TOTAL CA: CPT

## 2021-03-31 PROCEDURE — 65660000000 HC RM CCU STEPDOWN

## 2021-03-31 RX ORDER — POTASSIUM CHLORIDE 20 MEQ/1
40 TABLET, EXTENDED RELEASE ORAL 2 TIMES DAILY
Status: DISCONTINUED | OUTPATIENT
Start: 2021-03-31 | End: 2021-03-31

## 2021-03-31 RX ORDER — LANOLIN ALCOHOL/MO/W.PET/CERES
100 CREAM (GRAM) TOPICAL DAILY
Status: DISCONTINUED | OUTPATIENT
Start: 2021-04-05 | End: 2021-04-06 | Stop reason: HOSPADM

## 2021-03-31 RX ORDER — POTASSIUM CHLORIDE 7.45 MG/ML
10 INJECTION INTRAVENOUS
Status: DISPENSED | OUTPATIENT
Start: 2021-03-31 | End: 2021-03-31

## 2021-03-31 RX ORDER — DEXTROSE AND POTASSIUM CHLORIDE 5; .15 G/100ML; G/100ML
SOLUTION INTRAVENOUS CONTINUOUS
Status: DISCONTINUED | OUTPATIENT
Start: 2021-03-31 | End: 2021-04-03

## 2021-03-31 RX ADMIN — LEVOFLOXACIN 750 MG: 750 INJECTION, SOLUTION INTRAVENOUS at 14:27

## 2021-03-31 RX ADMIN — POTASSIUM CHLORIDE AND DEXTROSE MONOHYDRATE: 150; 5 INJECTION, SOLUTION INTRAVENOUS at 15:57

## 2021-03-31 RX ADMIN — POTASSIUM CHLORIDE 10 MEQ: 7.46 INJECTION, SOLUTION INTRAVENOUS at 17:02

## 2021-03-31 RX ADMIN — LEVETIRACETAM 750 MG: 100 INJECTION, SOLUTION INTRAVENOUS at 10:30

## 2021-03-31 RX ADMIN — Medication 10 ML: at 22:14

## 2021-03-31 RX ADMIN — THIAMINE HYDROCHLORIDE 200 MG: 100 INJECTION, SOLUTION INTRAMUSCULAR; INTRAVENOUS at 17:05

## 2021-03-31 RX ADMIN — POTASSIUM CHLORIDE 10 MEQ: 7.46 INJECTION, SOLUTION INTRAVENOUS at 15:58

## 2021-03-31 RX ADMIN — ENOXAPARIN SODIUM 40 MG: 40 INJECTION SUBCUTANEOUS at 10:18

## 2021-03-31 RX ADMIN — POTASSIUM CHLORIDE 10 MEQ: 7.46 INJECTION, SOLUTION INTRAVENOUS at 14:27

## 2021-03-31 RX ADMIN — LEVETIRACETAM 750 MG: 100 INJECTION, SOLUTION INTRAVENOUS at 20:40

## 2021-03-31 NOTE — ED NOTES
Report received from outgoing nurse. Pt is resting in bed comfortably, able to answer yes or no questions. No signs of acute distress noted. Symmetrical rise and fall of chest. Additional comfort measures offered. Call light in reach, will continue to monitor.

## 2021-03-31 NOTE — PROGRESS NOTES
Speech Therapy:    Evaluation orders received. Upon completion of chart review and discussion with RN, pt not appropriate for SLP evaluation this date. Currently, patient is:    [] Tolerating current po diet (per RN report)  [] Tolerating current po diet; however, poor po intake (per RN report)   [] Receiving nutrition via tube feeding    [x] Lethargic, solomnent, or difficult to arouse for safe po trials     [] Unable to manage secretions  [] Receiving intervention for respiratory distress  [] < 6 hours s/p extubation   [] Other:     RN asked for SLP to see pt next day. Please contact SLP with questions/concerns.      Thank you for this referral.    Corby Layne M.S. CCC-SLP/L  Speech-Language Pathologist

## 2021-03-31 NOTE — PROGRESS NOTES
Progress Note    Loretta Lynch  77 y.o. Admit Date: 3/30/2021  Active Problems:    Hypernatremia (3/30/2021) POA: Unknown      Acute metabolic encephalopathy (4/67/6911) POA: Unknown      Dehydration (3/30/2021) POA: Unknown      COVID-19 (3/30/2021) POA: Unknown      Hematuria (3/31/2021) POA: Unknown      Dysuria (3/31/2021) POA: Unknown        Discussed with his caring Nurse, now in [facility] floor, on all anti-COVID  Measure. Subjective:     Patient remained confused , no Significant change in status since his admission, on IVF , maintaining Good Oxygen Saturation. A comprehensive review of systems was negative except for that written in the History of Present Illness.     Objective:     Visit Vitals  /79 (BP 1 Location: Right upper arm, BP Patient Position: At rest)   Pulse 77   Temp 97.4 °F (36.3 °C)   Resp 16   Ht 5' 8\" (1.727 m)   Wt 85.7 kg (189 lb)   SpO2 98%   BMI 28.74 kg/m²         Intake/Output Summary (Last 24 hours) at 3/31/2021 1204  Last data filed at 3/30/2021 1750  Gross per 24 hour   Intake 1600 ml   Output --   Net 1600 ml       Current Facility-Administered Medications   Medication Dose Route Frequency Provider Last Rate Last Admin    potassium chloride (K-DUR, KLOR-CON) SR tablet 40 mEq  40 mEq Oral BID Louise Coyle, NP        sodium chloride (NS) flush 5-10 mL  5-10 mL IntraVENous PRN Kizzy Natalie, DO        levoFLOXacin (LEVAQUIN) 750 mg in D5W IVPB  750 mg IntraVENous Q24H Eddy Cherry K,  mL/hr at 03/30/21 1719 750 mg at 03/30/21 1719    levETIRAcetam (KEPPRA) 750 mg in 0.9% sodium chloride 100 mL IVPB  750 mg IntraVENous Q12H Saúl Monsivais B, NP   750 mg at 03/31/21 1030    sodium chloride (NS) flush 5-40 mL  5-40 mL IntraVENous Q8H Saúl Monsivais B, NP   10 mL at 03/30/21 2331    sodium chloride (NS) flush 5-40 mL  5-40 mL IntraVENous PRN Brian Méndez NP        acetaminophen (TYLENOL) tablet 650 mg  650 mg Oral Q6H PRN Brett Auguste, Ekta Bean NP        Or    acetaminophen (TYLENOL) suppository 650 mg  650 mg Rectal Q6H PRN Yunior Reina, NP        polyethylene glycol (MIRALAX) packet 17 g  17 g Oral DAILY PRN Yunior Reina NP        promethazine (PHENERGAN) tablet 12.5 mg  12.5 mg Oral Q6H PRN Yunior Reina NP        Or    ondansetron [facility]) injection 4 mg  4 mg IntraVENous Q6H PRN Yunior Reina, NP        enoxaparin (LOVENOX) injection 40 mg  40 mg SubCUTAneous DAILY Delaney JONES NP   40 mg at 03/31/21 1018    dextrose 5% infusion  100 mL/hr IntraVENous CONTINUOUS Bella Joseph  mL/hr at 03/30/21 1834 100 mL/hr at 03/30/21 1834    cefTRIAXone (ROCEPHIN) 2 g in sterile water (preservative free) 20 mL IV syringe  2 g IntraVENous Q24H Delaney JONES NP   2 g at 03/30/21 1810    hydrALAZINE (APRESOLINE) 20 mg/mL injection 10 mg  10 mg IntraVENous Q6H PRN Yunior Reina NP        ipratropium-albuterol (COMBIVENT RESPIMAT) 20 mcg-100 mcg inhalation spray  1 Puff Inhalation Q4H PRN Bella Joseph MD        metoprolol (LOPRESSOR) injection 1.25 mg  1.25 mg IntraVENous Q6H PRN Bella Joseph MD        cloNIDine (CATAPRES) 0.1 mg/24 hr patch 1 Patch  1 Patch TransDERmal Q7D Bella Joseph MD   1 Patch at 03/30/21 2023              Data Review:    CBC w/Diff    Recent Labs     03/30/21  1415   WBC 8.7   RBC 5.08   HGB 13.7   HCT 45.8   MCV 90.2   MCH 27.0   MCHC 29.9*   RDW 14.6*    Recent Labs     03/30/21  1415   MONOS 5   EOS 2   BASOS 0   RDW 14.6*        Comprehensive Metabolic Profile    Recent Labs     03/31/21  0529 03/30/21  1415   * 168*   K 3.0* 3.5   * 137*   CO2 28 30   BUN 21* 27*   CREA 0.57* 0.83    Recent Labs     03/31/21  0529 03/30/21  1415   CA 8.8 9.5          Results for Alva Muller (MRN [id number]) as of 3/31/2021 12:07   Ref.  Range 3/19/2018 21:56 3/30/2021 16:00   Color Latest Units:   YELLOW YELLOW   Appearance Latest Units: CLOUDY CLOUDY   Specific gravity Latest Ref Range: 1.005 - 1.030   1.012 1.021   pH (UA) Latest Ref Range: 5.0 - 8.0   7.5 5.0   Protein Latest Ref Range: NEG mg/dL NEGATIVE Negative   Glucose Latest Ref Range: NEG mg/dL NEGATIVE Negative   Ketone Latest Ref Range: NEG mg/dL NEGATIVE Negative   Blood Latest Ref Range: NEG   NEGATIVE MODERATE (A)   Bilirubin Latest Ref Range: NEG   NEGATIVE Negative   Urobilinogen Latest Ref Range: 0.2 - 1.0 EU/dL 1.0 1.0   Nitrites Latest Ref Range: NEG   POSITIVE (A) Negative   Leukocyte Esterase Latest Ref Range: NEG   LARGE (A) SMALL (A)   Epithelial cells Latest Ref Range: 0 - 5 /lpf FEW FEW   Mucus Latest Ref Range: NEG /lpf  FEW (A)   WBC Latest Ref Range: 0 - 5 /hpf 15 to 20 12 to 15   RBC Latest Ref Range: 0 - 5 /hpf NONE 6 to 10   Bacteria Latest Ref Range: NEG /hpf 4+ (A) 1+ (A)   Hyaline cast Latest Ref Range: 0 - 2 /lpf  1 to 3     Results for Emely Terry (MRN [id number]) as of 3/31/2021 12:07   Ref. Range 3/30/2021 16:00   Sodium,urine random Latest Ref Range: 20 - 110 MMOL/L 14 (L)   Potassium urine, random Latest Ref Range: 12.0 - 62 MMOL/L 63 (H)   Chloride,urine random Latest Ref Range: 55 - 125 MMOL/L 30 (L)               Impression:       Active Hospital Problems    Diagnosis Date Noted    Hematuria 03/31/2021    Dysuria 03/31/2021    Hypernatremia 03/30/2021    Acute metabolic encephalopathy 57/13/8654    Dehydration 03/30/2021    COVID-19 03/30/2021            Plan:     Hypernatremia is due to Free water Deficit, now Hypokalemic,on D5W received Oral K , high dose,  Will add K in IVF, check Mag,. Wait Urine Osmolality, will need to check UA in next few days to see status of Hematuria & Dysuria  Will continue Lovenox, follow Urine Culture, continue antibiotics.     Torsten Lewis MD

## 2021-03-31 NOTE — PROGRESS NOTES
Discharge/Transition Planning    Noted Palliative trying to get a hold of friend listed on Advanced Directive. Called pt son and left voicemail. Appears pt has been long term care at [facility] since 2017 and this was verified with Admissions there.  Referral sent      Vidalmarcella Muñiz RN BSN  Outcomes Manager    Pager # [phone]

## 2021-03-31 NOTE — ED NOTES
TRANSFER - OUT REPORT:    Verbal report given to Marcial Castillo RN(name) on Georges Meckel  being transferred to [address] [place] [address](unit) for routine progression of care       Report consisted of patients Situation, Background, Assessment and   Recommendations(SBAR). Information from the following report(s) SBAR, ED Summary and Intake/Output was reviewed with the receiving nurse. Lines:   Peripheral IV 03/30/21 Left Wrist (Active)   Site Assessment Clean, dry, & intact 03/30/21 1429   Phlebitis Assessment 0 03/30/21 1429   Infiltration Assessment 0 03/30/21 1429   Dressing Status Clean, dry, & intact 03/30/21 1429   Hub Color/Line Status Pink 03/30/21 1429   Action Taken Blood drawn 03/30/21 1429       Peripheral IV 03/30/21 Right Hand (Active)   Site Assessment Clean, dry, & intact 03/30/21 1647   Phlebitis Assessment 0 03/30/21 1647   Infiltration Assessment 0 03/30/21 1647   Dressing Status Clean, dry, & intact 03/30/21 1647   Dressing Type Tape;Transparent 03/30/21 1647   Hub Color/Line Status Blue;Flushed;Patent 03/30/21 1647        Opportunity for questions and clarification was provided. Keystone Flap Text: The defect edges were debeveled with a #15 scalpel blade.  Given the location of the defect, shape of the defect a keystone flap was deemed most appropriate.  Using a sterile surgical marker, an appropriate keystone flap was drawn incorporating the defect, outlining the appropriate donor tissue and placing the expected incisions within the relaxed skin tension lines where possible. The area thus outlined was incised deep to adipose tissue with a #15 scalpel blade.  The skin margins were undermined to an appropriate distance in all directions around the primary defect and laterally outward around the flap utilizing iris scissors.

## 2021-03-31 NOTE — CONSULTS
[facility]: [phone]  [facility]: [phone]     Patient Name: Colonel Otero  YOB: 1955    Date of Initial Consult : 3/31/2021  Reason for Consult: Care Decisions  Requesting Provider: Dorinda Tucker NP   Primary Care Physician: Shayna Ventura MD      SUMMARY:   Colonel Otero is a 77 y.o. male with a past history per the attending team of hyponatremia, hypertension, hiccups, CVA and psychogenic polydipsia, who was admitted on 3/30/2021 from [facility] with a diagnosis of severe hypernatremia, dehydration and is positive for COVID-19. Current medical issues leading to Palliative Medicine involvement include: 77year old gentleman from a long term care facility who has not been eating or taking medications for several days and has altered mental status and is COVID-19 positive. Palliative Medicine is consulted for care decisions. CHIEF COMPLAINT: altered mental status    HPI/SUBJECTIVE:    Past history as above. Mr. Sarah Beth Craig is a resident of long term care who presented with altered mental status after several days of poor oral intake of food and medications for the last several days. He has a history of a CVA with left sided weakness and can normally respond yes and no at baseline. The patient is:   [] Verbal and participatory  [x] Non-participatory due to: Altered mental status, unable to communicate    GOALS OF CARE:  Patient/Health Care Proxy Stated Goals: Prolong life      TREATMENT PREFERENCES:   Code Status: Full Code         PALLIATIVE DIAGNOSES:   1. Goals of Care   2. Severe hypernatremia  3. COVID-19  4. Debility       PLAN:   1. Goals of Care - Seen at bedside in full PPE for the COVID-19 pandemic. Mr. Sarah Beth Craig is awake, alert, looking around and able to respond with yes and no at times to simple questions. He was unable to follow commands except for wiggling his right foot when asked. Denies pain or shortness of breath. AMD on file which names a friend, Nancy Steiner, as primary healthcare decision maker. Called patient's son, Chucho Mock, and provided a brief medical update. Magdalena Leslie shared with us that his father has 2 biological sons, Magdalena Leslie and another 21year old son who is not closely involved with his father's care but Magdalena Leslie does keep him updated as to his father's medical condition. Mr. Leodan Gupta is not . Explained to son, Magdalena Leslie, that his father executed an AMD in 2014 naming a friend, Nancy Steiner, as his MPOA. 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 Discussed that we would need to contact Nancy Steiner to determine if he still wishes to serve as MPOA for Mr. Leodan Gupta. Call placed to Nancy Steiner at [phone] and left a voicemail message asking for a return call. At this time, goals of care are full code, full aggressive measures. 2. Severe hypernatremia - Nephrology consulted,  IVF D5W with KCL    3. Dysphagia - SLP consulted, has had decreased oral intake for the last few days and normally consumes a pureed diet at the nursing home    4. Debility - PPS 40, resident of long term care, history of CVA and is mainly bed/chair bound at the nursing home with some aphasia    5. Initial consult note routed to primary continuity provider    6. Communicated plan of care with: Palliative IDT      Advance Care Planning:  [] The [facility] Interdisciplinary Team has updated the ACP Navigator with [address] and Patient Capacity    Primary Decision Maker ([address]):               As far as possible, the palliative care team has discussed with patient / health care proxy about goals of care / treatment preferences for patient.          HISTORY:     History obtained from:   Active Problems:    Hypernatremia (3/30/2021) Acute metabolic encephalopathy ()      Dehydration (3/30/2021)      COVID-19 (3/30/2021)      Hematuria (3/31/2021)      Dysuria (3/31/2021)      Past Medical History:   Diagnosis Date    Hiccups     Hypertension     Hyponatremia     Lacunar infarction ([address] 2010    Lower extremity edema     Psychogenic polydipsia     Spinal stenosis       Past Surgical History:   Procedure Laterality Date    HX HEENT      pt reports past hx of surgery on ears unsure of what type      Family History   Problem Relation Age of Onset    Hypertension Other      History reviewed, no pertinent family history.   Social History     Tobacco Use    Smoking status: Former Smoker     Packs/day: 0.50     Quit date: 2008     Years since quittin.7    Smokeless tobacco: Never Used   Substance Use Topics    Alcohol use: No     No Known Allergies   Current Facility-Administered Medications   Medication Dose Route Frequency    potassium chloride (K-DUR, KLOR-CON) SR tablet 40 mEq  40 mEq Oral BID    dextrose 5% with KCl 20 mEq/L infusion   IntraVENous CONTINUOUS    sodium chloride (NS) flush 5-10 mL  5-10 mL IntraVENous PRN    levoFLOXacin (LEVAQUIN) 750 mg in D5W IVPB  750 mg IntraVENous Q24H    levETIRAcetam (KEPPRA) 750 mg in 0.9% sodium chloride 100 mL IVPB  750 mg IntraVENous Q12H    sodium chloride (NS) flush 5-40 mL  5-40 mL IntraVENous Q8H    sodium chloride (NS) flush 5-40 mL  5-40 mL IntraVENous PRN    acetaminophen (TYLENOL) tablet 650 mg  650 mg Oral Q6H PRN    Or    acetaminophen (TYLENOL) suppository 650 mg  650 mg Rectal Q6H PRN    polyethylene glycol (MIRALAX) packet 17 g  17 g Oral DAILY PRN    promethazine (PHENERGAN) tablet 12.5 mg  12.5 mg Oral Q6H PRN    Or    ondansetron (ZOFRAN) injection 4 mg  4 mg IntraVENous Q6H PRN    enoxaparin (LOVENOX) injection 40 mg  40 mg SubCUTAneous DAILY    cefTRIAXone (ROCEPHIN) 2 g in sterile water (preservative free) 20 mL IV syringe  2 g IntraVENous Q24H    hydrALAZINE (APRESOLINE) 20 mg/mL injection 10 mg  10 mg IntraVENous Q6H PRN    ipratropium-albuterol (COMBIVENT RESPIMAT) 20 mcg-100 mcg inhalation spray  1 Puff Inhalation Q4H PRN    metoprolol (LOPRESSOR) injection 1.25 mg  1.25 mg IntraVENous Q6H PRN    cloNIDine (CATAPRES) 0.1 mg/24 hr patch 1 Patch  1 Patch TransDERmal Q7D          Clinical Pain Assessment (nonverbal scale for nonverbal patients): Clinical Pain Assessment  Severity: 0          Duration: for how long has pt been experiencing pain (e.g., 2 days, 1 month, years)  Frequency: how often pain is an issue (e.g., several times per day, once every few days, constant)     FUNCTIONAL ASSESSMENT:     Palliative Performance Scale (PPS):  PPS: 40    ECOG  ECOG Status : Completely disabled     PSYCHOSOCIAL/SPIRITUAL SCREENING:      Any spiritual / Mandaen concerns:  Unable to assess  [] Yes /  [] No    Caregiver Burnout:  [] Yes /  [] No /  [x] No Caregiver Present      Anticipatory grief assessment:  Unable to assess  [] Normal  / [] Maladaptive        REVIEW OF SYSTEMS:     Systems: constitutional, ears/nose/mouth/throat, respiratory, cardiovascular, gastrointestinal, genitourinary, musculoskeletal, integumentary, neurologic. Positive findings noted below. Modified ESAS Completed by: provider           Pain: 0           Dyspnea: 0               Positive and pertinent negative findings in ROS are noted above in HPI. The following systems were [] reviewed / [x] unable to be reviewed as noted in HPI  Other findings are noted below.      PHYSICAL EXAM:     Constitutional: Awake, alert, able to respond yes and no at times to a simple questions, otherwise nonverbal, unable to assess orientation  Eyes: pupils equal, anicteric  ENMT: no nasal discharge, lips dry  Cardiovascular: regular rhythm, distal pulses intact  Respiratory: breathing not labored  Gastrointestinal: abdomen soft non-tender  Genitourinary:  External catheter in place  Last bowel movement: none recorded  Musculoskeletal: no deformity, no tenderness to palpation  Skin: warm, dry  Neurologic: does not follow commands, moved right foot but not left side      Other: Wt Readings from Last 3 Encounters:   03/30/21 85.7 kg (189 lb)   03/19/18 85.7 kg (189 lb)   09/22/17 80.7 kg (178 lb)     Blood pressure 132/79, pulse 77, temperature 97.4 °F (36.3 °C), resp. rate 16, height 5' 8\" (1.727 m), weight 85.7 kg (189 lb), SpO2 98 %. Pain:  Pain Scale 1: Numeric (0 - 10)  Pain Intensity 1: 0                      LAB AND IMAGING FINDINGS:     Lab Results   Component Value Date/Time    WBC 8.7 03/30/2021 02:15 PM    HGB 13.7 03/30/2021 02:15 PM    PLATELET 658 60/77/4033 02:15 PM     Lab Results   Component Value Date/Time    Sodium 164 (HH) 03/31/2021 05:29 AM    Potassium 3.0 (L) 03/31/2021 05:29 AM    Chloride 134 (H) 03/31/2021 05:29 AM    CO2 28 03/31/2021 05:29 AM    BUN 21 (H) 03/31/2021 05:29 AM    Creatinine 0.57 (L) 03/31/2021 05:29 AM    Calcium 8.8 03/31/2021 05:29 AM    Magnesium 2.3 07/18/2017 02:57 AM    Phosphorus 2.5 08/09/2014 12:00 PM      Lab Results   Component Value Date/Time    Alk.  phosphatase 73 03/19/2018 08:15 PM    Protein, total 8.4 (H) 03/19/2018 08:15 PM    Albumin 3.1 (L) 03/19/2018 08:15 PM    Globulin 5.3 (H) 03/19/2018 08:15 PM     Lab Results   Component Value Date/Time    INR 1.2 03/30/2021 02:15 PM    Prothrombin time 15.4 (H) 03/30/2021 02:15 PM    aPTT 31.3 03/30/2021 02:15 PM      Lab Results   Component Value Date/Time    Iron 45 (L) 07/09/2017 02:24 AM    TIBC 233 (L) 07/09/2017 02:24 AM    Iron % saturation 19 07/09/2017 02:24 AM    Ferritin 39 07/09/2017 02:24 AM      No results found for: PH, PCO2, PO2  No components found for: [place]   Lab Results   Component Value Date/Time    CK 27 (L) 03/30/2021 02:15 PM    CK - MB <1.0 03/30/2021 02:15 PM              Total time: 50 minutes    > 50% counseling / coordination:  Time spent in direct consultation with the patient, medical team, and family     Prolonged service was provided for  []30 min   []75 min in face to face time in the presence of the patient, spent as noted above.   Time Start:   Time End:

## 2021-03-31 NOTE — PROGRESS NOTES
[facility] Hospitalist Group  Progress Note    Patient: Tanisha Johnson Age: 77 y.o. : 1955 MR#: [id number] SSN: [id number]  Date: 3/31/2021     Subjective:   Alert. Patient attempts to groan when asked questions. Inaudible responses. NAD. RA       Assessment/Plan:   1. Severe Hypernatremia secondary to free water deficit   2. Hypokalemia   3. UTI with hematuria. POA  4. Acute on chronic encephalopathy  5. COVID-19   6. Dehydration   7. History of CVA  8. History of dysphagia   9. HTN  10. Seizure disorder   11. History of hiccups     Plan  1. Nephrology recommendations IVF D5W with 20 meq potassium. Monitor metabolic panel and Mg. Follow urine osmolality, UA, and follow culture. Continue levofloxacin, discontinue ceftriaxone. 2. Monitor metabolic panel and renal function. Replete with IV potassium. 3. NPO. Speech was unable to complete speech and swallow eval due to mental status. Continue NPO status, IV medications and re evaluate in 24 hours. Baseline diet pureed diet with nectar thick liquids. 4. Seizure precautions and IV keppra   5. Monitor oxygen demand and signs of COVID-19. Currently RA and asymptomatic   6. Attempted to call son Robert Needle [phone].  No answer LM at 1310  Additional Notes:      Case discussed with:  [x]Patient  [x]Family  [x]Nursing  []Case Management  DVT Prophylaxis:  [x]Lovenox  []Hep SQ  []SCDs  []Coumadin   []On Heparin gtt    Objective:   VS:   Visit Vitals  /79 (BP 1 Location: Right upper arm, BP Patient Position: At rest)   Pulse 77   Temp 97.4 °F (36.3 °C)   Resp 16   Ht 5' 8\" (1.727 m)   Wt 85.7 kg (189 lb)   SpO2 98%   BMI 28.74 kg/m²      Tmax/24hrs: Temp (24hrs), Av.4 °F (36.3 °C), Min:96.5 °F (35.8 °C), Max:98.4 °F (36.9 °C)      Intake/Output Summary (Last 24 hours) at 3/31/2021 1249  Last data filed at 3/30/2021 1750  Gross per 24 hour   Intake 1600 ml   Output --   Net 1600 ml       General:  Lethargic, dry mucous membranes Cardiovascular:  RRR  Pulmonary:  Shallow breathing, diminished bilateral bases, bilateral upper lobes clear  GI:  +BS in all four quadrants, soft, non-tender  Extremities: BLE trace edema    Neuro: lethargic, groans while attempting to be verbal         Labs:    Recent Results (from the past 24 hour(s))   CBC WITH AUTOMATED DIFF    Collection Time: 03/30/21  2:15 PM   Result Value Ref Range    WBC 8.7 4.6 - 13.2 K/uL    RBC 5.08 4.70 - 5.50 M/uL    HGB 13.7 13.0 - 16.0 g/dL    HCT 45.8 36.0 - 48.0 %    MCV 90.2 74.0 - 97.0 FL    MCH 27.0 24.0 - 34.0 PG    MCHC 29.9 (L) 31.0 - 37.0 g/dL    RDW 14.6 (H) 11.6 - 14.5 %    PLATELET 431 464 - 802 K/uL    MPV 12.6 (H) 9.2 - 11.8 FL    NEUTROPHILS 71 40 - 73 %    LYMPHOCYTES 22 21 - 52 %    MONOCYTES 5 3 - 10 %    EOSINOPHILS 2 0 - 5 %    BASOPHILS 0 0 - 2 %    ABS. NEUTROPHILS 6.2 1.8 - 8.0 K/UL    ABS. LYMPHOCYTES 1.9 0.9 - 3.6 K/UL    ABS. MONOCYTES 0.4 0.05 - 1.2 K/UL    ABS. EOSINOPHILS 0.2 0.0 - 0.4 K/UL    ABS.  BASOPHILS 0.0 0.0 - 0.1 K/UL    DF AUTOMATED     METABOLIC PANEL, BASIC    Collection Time: 03/30/21  2:15 PM   Result Value Ref Range    Sodium 168 (HH) 136 - 145 mmol/L    Potassium 3.5 3.5 - 5.5 mmol/L    Chloride 137 (H) 100 - 111 mmol/L    CO2 30 21 - 32 mmol/L    Anion gap 1 (L) 3.0 - 18 mmol/L    Glucose 108 (H) 74 - 99 mg/dL    BUN 27 (H) 7.0 - 18 MG/DL    Creatinine 0.83 0.6 - 1.3 MG/DL    BUN/Creatinine ratio 33 (H) 12 - 20      GFR est AA >60 >60 ml/min/1.73m2    GFR est non-AA >60 >60 ml/min/1.73m2    Calcium 9.5 8.5 - 10.1 MG/DL   CARDIAC PANEL,(CK, CKMB & TROPONIN)    Collection Time: 03/30/21  2:15 PM   Result Value Ref Range    CK - MB <1.0 <3.6 ng/ml    CK-MB Index  0.0 - 4.0 %     CALCULATION NOT PERFORMED WHEN RESULT IS BELOW LINEAR LIMIT    CK 27 (L) 39 - 308 U/L    Troponin-I, QT 0.02 0.0 - 0.045 NG/ML   PTT    Collection Time: 03/30/21  2:15 PM   Result Value Ref Range    aPTT 31.3 23.0 - 36.4 SEC   CULTURE, BLOOD    Collection Time: 03/30/21  2:15 PM    Specimen: Blood   Result Value Ref Range    Special Requests: NO SPECIAL REQUESTS      Culture result: NO GROWTH AFTER 15 HOURS     PROTHROMBIN TIME + INR    Collection Time: 03/30/21  2:15 PM   Result Value Ref Range    Prothrombin time 15.4 (H) 11.5 - 15.2 sec    INR 1.2 0.8 - 1.2     OSMOLALITY, SERUM/PLASMA    Collection Time: 03/30/21  2:15 PM   Result Value Ref Range    Osmolality, Serum 358 (H) 280 - 301 mOsmol/kg   CULTURE, BLOOD    Collection Time: 03/30/21  2:30 PM    Specimen: Blood   Result Value Ref Range    Special Requests: NO SPECIAL REQUESTS      Culture result: NO GROWTH AFTER 15 HOURS     EKG, 12 LEAD, INITIAL    Collection Time: 03/30/21  3:04 PM   Result Value Ref Range    Ventricular Rate 111 BPM    Atrial Rate 111 BPM    P-R Interval 154 ms    QRS Duration 92 ms    Q-T Interval 338 ms    QTC Calculation (Bezet) 459 ms    Calculated P Axis 66 degrees    Calculated R Axis 56 degrees    Calculated T Axis 67 degrees    Diagnosis       Sinus tachycardia  Otherwise normal ECG  When compared with ECG of 19-MAR-2018 19:51,  [place] interval has decreased  Vent.  rate has increased BY  40 BPM  Nonspecific T wave abnormality has replaced inverted T waves in Inferior   leads  Confirmed by Thierry Choi MD, Lizette Sutton ([id number]) on 3/30/2021 5:11:17 PM     POC LACTIC ACID    Collection Time: 03/30/21  3:26 PM   Result Value Ref Range    Lactic Acid (POC) 1.16 0.40 - 2.00 mmol/L   URINALYSIS W/ RFLX MICROSCOPIC    Collection Time: 03/30/21  4:00 PM   Result Value Ref Range    Color YELLOW      Appearance CLOUDY      Specific gravity 1.021 1.005 - 1.030      pH (UA) 5.0 5.0 - 8.0      Protein Negative NEG mg/dL    Glucose Negative NEG mg/dL    Ketone Negative NEG mg/dL    Bilirubin Negative NEG      Blood MODERATE (A) NEG      Urobilinogen 1.0 0.2 - 1.0 EU/dL    Nitrites Negative NEG      Leukocyte Esterase SMALL (A) NEG     URINE MICROSCOPIC ONLY    Collection Time: 03/30/21  4:00 PM   Result Value Ref Range    WBC 12 to 15 0 - 5 /hpf    RBC 6 to 10 0 - 5 /hpf    Epithelial cells FEW 0 - 5 /lpf    Bacteria 1+ (A) NEG /hpf    Mucus FEW (A) NEG /lpf    Hyaline cast 1 to 3 0 - 2 /lpf   SODIUM, UR, RANDOM    Collection Time: 03/30/21  4:00 PM   Result Value Ref Range    Sodium,urine random 14 (L) 20 - 110 MMOL/L   POTASSIUM, UR, RANDOM    Collection Time: 03/30/21  4:00 PM   Result Value Ref Range    Potassium urine, random 63 (H) 12.0 - 62 MMOL/L   CHLORIDE, URINE RANDOM    Collection Time: 03/30/21  4:00 PM   Result Value Ref Range    Chloride,urine random 30 (L) 55 - 125 MMOL/L   SARS-COV-2    Collection Time: 03/30/21  5:55 PM   Result Value Ref Range    SARS-CoV-2 Please find results under separate order     COVID-19 RAPID TEST    Collection Time: 03/30/21  5:55 PM   Result Value Ref Range    Specimen source Nasopharyngeal      COVID-19 rapid test Detected (AA) NOTD     METABOLIC PANEL, BASIC    Collection Time: 03/31/21  5:29 AM   Result Value Ref Range    Sodium 164 (HH) 136 - 145 mmol/L    Potassium 3.0 (L) 3.5 - 5.5 mmol/L    Chloride 134 (H) 100 - 111 mmol/L    CO2 28 21 - 32 mmol/L    Anion gap 2 (L) 3.0 - 18 mmol/L    Glucose 125 (H) 74 - 99 mg/dL    BUN 21 (H) 7.0 - 18 MG/DL    Creatinine 0.57 (L) 0.6 - 1.3 MG/DL    BUN/Creatinine ratio 37 (H) 12 - 20      GFR est AA >60 >60 ml/min/1.73m2    GFR est non-AA >60 >60 ml/min/1.73m2    Calcium 8.8 8.5 - 10.1 MG/DL       Signed By: Vijaya Perera NP     March 31, 2021

## 2021-03-31 NOTE — CONSULTS
[address] [facility]    Name:  Angie Cifuentes  MR#:   [id number]  :  1955  ACCOUNT #:  [de-identified]  DATE OF SERVICE:  2021      RENAL CONSULTATION    Consult was requested by Hospitalist Service. REASON FOR CONSULTATION:  Hypernatremia. IMPRESSION:  1. Hypernatremia or high sodium. 2.  Prerenal blood, urea, and creatinine ratio. 3.  Hematuria, could be traumatic. 4.  Dysuria. 5.  Altered mental status. 6.  Now COVID positive in the hospital.    His hypernatremia or high sodium looked like from acute dehydration than anything else. Has free water deficit and the cause of dehydration is from poor p.o. intake over the last few days. With his weight of 85 kg as mentioned in the chart, based on that he has around 12 liters of free water deficit. The patient received at least 1 liter of fluid on arrival and guaranteed no more IV fluid was given. The patient is not in distress, but does not communicate well, just answers yes and no. PLAN:  Plan would be to start IV fluids with D5W at the rate of 125 mL/hr and then will be reevaluated sometime tomorrow. We will monitor his BMP every 12 hours. Goal of correction should not be more than 10 mEq of sodium correction in 24 hours. If it corrects rapidly, then we have to give normal saline also. There is no chance of any cerebral edema and again the CAT scan did not show any acute changes. The patient's in and out record should be done very carefully. Urine should be sent for the culture. Hemoglobin and hematocrit will be monitored. If hematuria persists on, we will do a renal ultrasound to evaluate the hematuria. Now the patient is positive for COVID. He is not communicating well, not sure how is his appetite. No fever. Maintaining very good oxygen saturation at room air at 98-99%. Breathing normally. No respiratory distress.   So whether to treat or do any further workup of this COVID or not, that we will leave to Hospitalist Service. All the anti-COVID measures should be implemented. The patient's code status is full code. In the meanwhile, I am going to send the urine study including urine sodium, urine osmolality, and serum osmolality being ordered. Also keep an eye on the other electrolytes including potassium. Further recommendations will be given based on the hospital course. HISTORY OF PRESENT ILLNESS:  This is a 75-year-old  male, a [facility] resident, who was sent to the emergency room after the patient informed that for the last two days, he was acting funny and confused and has altered mental status from his baseline mental status, has low-grade fever of 99.2 and also has tachycardia. No definitive history of vomiting, diarrhea, or any loss of smell or taste. Not sure how much he was able to drink. He has history of stroke in the past also. The patient at baseline on pureed diet with nectar-thick liquids. The patient does not communicate well and just says yes or no; nothing more than that. PAST MEDICAL HISTORY:  Includes history of hypertension, lacunar infarct, lower extremity edema, hypernatremia, psychogenic polydipsia, spinal stenosis. PAST SURGICAL HISTORY:  Has history of surgery on ears; unsure of what type of surgery. SOCIAL HISTORY:  Former smoker; half-pack per day; quit smoking in 06/2008. He has since quit smoking more than 12 years. Smokeless tobacco never used. No alcohol. No history of any drugs. FAMILY HISTORY:  Significant for hypertension. ALLERGIES:  NO KNOWN DRUG ALLERGIES.     MEDICATIONS:  List of medications prior to admission:  Multivitamin therapeutic plus folic acid and vitamin C 250 mg daily, aspirin 81 mg daily, baclofen 10 mg tablet one tablet by mouth every 12 hours, Plavix 75 mg p.o. daily, cyanocobalamin 1000 mcg tablet daily, doxepin (Sinequan) 10 mg capsule daily, ferrous sulfate one tablet daily,  Folvite once a day, Keppra 750 mg tablet one tablet twice daily, Lopressor 25 mg twice daily, MiraLax 17 g p.o. daily, Pravachol 40 mg tablet daily, Stephenie-Colace one tablet daily, Flomax 0.4 mg capsule daily. REVIEW OF SYSTEMS:  Unable to obtain review of systems given the patient's mental status change. PHYSICAL EXAMINATION:  VITAL SIGNS:  Temperature 98.4, heart rate running between 100-105, last heart rate is 85 per minute, blood pressure 156/101, respiration was 16. Height 5 feet 8 inches. Weight is 85.7 kg. BMI is 28.7. Temperature 98.4 in the hospital.  Maximum temperature is 98.4 so far. GENERAL:  The patient is alert, awake, but not oriented, does not communicate well. HEENT:  Pupils reacting to light. Oral mucosa dry. CARDIOVASCULAR:  S1 and S2, regular rate and rhythm. Could not hear any murmur. LUNGS:  Clear to auscultation. No wheezing. No rhonchi. No rales. No crackles. ABDOMEN:  Soft, nontender. No palpable mass. EXTREMITIES:  No edema. No cyanosis. More or less on the dry skin. Poor skin turgidity. NEUROLOGIC:  Alert and oriented x0. Nonverbal.  Follows a couple of simple commands. SKIN:  As I mentioned poor skin turgidity and no rash. LABORATORY DATA:  Labs that were done in the emergency room today, serum sodium of 168, potassium 3.5, chloride 137, CO2 of 30, blood urea nitrogen of 27, creatinine of 0.8, glucose 108. Calcium is 9.5. WBC of 8.7 with a hemoglobin of 13.7, hematocrit of 45.8, platelets of 860,380. Chest x-ray reviewed, but no acute infiltrate. Maybe a little atelectasis. CT of the head without contrast was done today, shows no acute findings. chronic microvascular disease with multiple chronic lacunar infarct, atrophy with stable chronic compensatory ventriculomegaly.   Urinalysis was done, shows specific gravity of 1.021, pH of 5.0, glucose negative, ketone negative, blood moderate, bilirubin negative, nitrite negative, leukocyte esterase small, epithelial cells few, wbc's 12-15, red blood cells 6-10, bacteria 1+, hyaline casts 1-3. EKG was done, shows normal sinus rhythm, sinus tachycardia. IMPRESSION AND PLAN:  As I explained on my initial part of the consultation. Further recommendations will be given based on the hospital course.       MD MUNIR Leon/[email or web address]  D:  03/30/2021 21:30  T:  03/31/2021 4:07  JOB #:  [id number]

## 2021-03-31 NOTE — ROUTINE PROCESS
Received verbal report from JOSEFA Kellogg, report included SBAR, MAR, and Kardex. Patient is non-verbal, unable to complete required documents at this time. Verbal report given to LUIS FERNANDO Bland, report included SBAR, MAR, and Kardex.

## 2021-03-31 NOTE — ACP (ADVANCE CARE PLANNING)
Palliative Medicine     Palliative Medicine consulted to determine goals of care and healthcare decision maker. Mr. Heather Birmingham is a 77 y.o.  male who was admitted via ED from [address] [facility] with altered mental status and change in oral intake over a few days. Mr. Bhavna Begum was seen at bedside by Palliative NP provider with proper PPE. Mr. Usha Toure is awake, alert, looking around and able to respond with yes and no at times to simple questions. He was unable to follow commands except for wiggling his right foot when asked. Denies pain or shortness of breath. Pt has an Advance Medical Directive on file naming a friend, Jodine Nyhan. This team called and left message on phone # noted on [phone]. This # is not Inservice. Phone record review of [employer] noted the phone # [phone]. Team called and left message requesting return call. Palliative team reached out to patients son, Sofy Ruiz, He was able to share that pt is not . He has 2 biological children, himself and a younger brother (21 years old). According to  Heber Mayor is a stepdaughter, Mr. Bhavna Begum was  to Ms. Valdes mother at one time. Mr. Regine Tan stated that during the time that the AMD was completed, he was deployed, and Mr. Keny Wood is a friend and was available to assist patient. Now that Mr. Regine Tan is retired from [employer], he is now the primary caretaker for his father and handles his affairs including going to [employer]. Explained the [place] of healthcare decision makers. Shared that we would try to contact Mr. Ghanshyam Corbett to determine his willingness to continue as MPOA for patient, since he is the legal decision maker for his father, currently. Mr. Regine Tan expressed understanding. Pt remains full code with full interventions until further goals of care conversation can be had with MPOA, Mr Jodine Nyhan.        Primary Decision Makers is (Health Care Agent): Tanja Guerra     Relationship to patient: friend   Phone number: [phone]   [x] Named in a scanned document   [] Legal Next of Kin  [] Guardian    ACP documents you currently have include:  [x] Advance Directive or Living Will  [] Durable Do Not Resuscitate  [] Physician Orders for Scope of Treatment (POST)  [] Medical Power of   [] Other - None        Pt remains FULL code with full aggressive medical management. Thank you for the Palliative Medicine consult and allowing us to participate in the care of Ms. Scotty Pendleton. Palliative team will [facility] to reach [facility] for further goals of care discussion.       Magaly DANIELLEN, RN, [facility]  Palliative Medicine Inpatient RN  Palliative [place] Line: [phone]

## 2021-03-31 NOTE — PROGRESS NOTES
conducted an initial consultation and Spiritual Assessment for Ronaldo Osman, who is a 77 y.o.,male. Patients Primary Language is: [place]. According to the patients EMR [employer] Affiliation is: [facility].     The reason the Patient came to the hospital is:   Patient Active Problem List    Diagnosis Date Noted    Hematuria 03/31/2021    Dysuria 03/31/2021    Hypernatremia 03/30/2021    Acute metabolic encephalopathy 32/11/1631    Dehydration 03/30/2021    COVID-19 03/30/2021    Stroke ([facility] [address] 09/16/2017    Pneumonia 08/22/2017    Right foot infection 07/06/2017    Bilateral leg and foot pain 07/06/2017    Cellulitis 12/20/2016    Hyponatremia 01/01/2015    Psychogenic polydipsia 05/22/2014    First degree AV block 09/01/2013    Former heavy tobacco smoker 09/01/2013    Intractable hiccups 09/02/2012    Essential hypertension, benign 09/02/2012        The  provided the following Interventions:  Initiated a relationship of care and support. Chart reviewed. The following outcomes where achieved:  Patient already has a scanned Advance Medical Directive in the medical chart. Plan:  Chaplains will continue to follow and will provide pastoral care on an as needed/requested basis.  recommends bedside caregivers page  on duty if patient shows signs of acute spiritual or emotional distress.     [address]  [phone])

## 2021-03-31 NOTE — CONSULTS
Comprehensive Nutrition Assessment    Type and Reason for Visit: Initial, Consult    Nutrition Recommendations/Plan:   - Once able to obtain enteral access, initiate tube feeding of Jevity 1.5 at 10 mL/hr and advance as tolerated by 10 mL q 12 hours to goal rate of 60 mL/hr with 200 mL q 4 hour water flushes (goal regimen to provide 2160 kcal, 92 gm protein, 1094 mL free water, 100% RDIs). - Monitor and replace electrolytes as needed due to concern for refeeding syndrome. Add thiamine and multivitamin.   - IVF per MD.       Nutrition Assessment:  NPO and SLP consulted- patient lethargic, somnolent and not appropriate for swallow evaluation today with plan for NGT placement to provide tube feeding. Will monitor labs for refeeding and initiate thiamine. Malnutrition Assessment:  Malnutrition Status:  Insufficient data      Nutrition History and Allergies: Past medical history of CVA, HTN, seizure disorder, oropharyngeal dysphagia on pureed diet with nectar thick liquids with poor intake, not eating any food or taking any medications for the past several days PTA. Admitted with acute metabolic encephalopathy due to dehydration, COVID-19. Poor meal intake x week PTA per nursing home staff and weight history: 160 lb (1/2021), 158 lb (2/2021), 157 lb (3/2021), 140 lb (3/30/21) with 20 lb, 12.5% weight loss x 3 months PTA. Estimated Daily Nutrient Needs:  Energy (kcal): 2942-0518; Weight Used for Energy Requirements: Admission  Protein (g): ; Weight Used for Protein Requirements: Admission(0.8-1.2)  Fluid (ml/day): 7542-7713; Method Used for Fluid Requirements: 1 ml/kcal    Nutrition Related Findings:  Last BM PTA. Hypernatremia noted.  Medications: D5 + 20 mEq/L KCl at 125 mL/hr (150 gm dextrose, 510 kcal, 60 mEq KCl per day), 40 mEq KCl      Wounds:  None       Current Nutrition Therapies:  DIET NPO    Anthropometric Measures:  · Height:  5' 8\" (172.7 cm)  · Current Body Wt:  85.7 kg (188 lb 15 oz) · Admission Body Wt:  188 lb 15 oz    · Usual Body Wt:  86.2 kg (190 lb)     · Ideal Body Wt:  154 lbs:  122.7 %   · BMI Category: Overweight (BMI 25.0-29. 9)       Nutrition Diagnosis:   · Inadequate oral intake related to cognitive or neurological impairment, swallowing difficulty as evidenced by NPO or clear liquid status due to medical condition      Nutrition Interventions:   Food and/or Nutrient Delivery: Continue NPO, Start tube feeding, IV fluid delivery, Vitamin supplement  Nutrition Education and Counseling: Education not indicated  Coordination of Nutrition Care: Continue to monitor while inpatient    Goals:  Nutritional needs will be met through adequate oral intake or nutrition support within the next 7 days       Nutrition Monitoring and Evaluation:   Behavioral-Environmental Outcomes: None identified  Food/Nutrient Intake Outcomes:    Physical Signs/Symptoms Outcomes: Biochemical data, Chewing or swallowing, GI status, Fluid status or edema, Nutrition focused physical findings    Discharge Planning:     Too soon to determine     Electronically signed by Pato Tom RD, [address]  on 3/31/2021 at 1:46 PM    Contact: [phone]

## 2021-04-01 LAB
ANION GAP SERPL CALC-SCNC: 2 MMOL/L (ref 3–18)
BASOPHILS # BLD: 0 K/UL (ref 0–0.1)
BASOPHILS NFR BLD: 0 % (ref 0–2)
BUN SERPL-MCNC: 15 MG/DL (ref 7–18)
BUN/CREAT SERPL: 32 (ref 12–20)
CALCIUM SERPL-MCNC: 8.2 MG/DL (ref 8.5–10.1)
CHLORIDE SERPL-SCNC: 131 MMOL/L (ref 100–111)
CO2 SERPL-SCNC: 26 MMOL/L (ref 21–32)
CREAT SERPL-MCNC: 0.47 MG/DL (ref 0.6–1.3)
DIFFERENTIAL METHOD BLD: ABNORMAL
EOSINOPHIL # BLD: 0.4 K/UL (ref 0–0.4)
EOSINOPHIL NFR BLD: 7 % (ref 0–5)
ERYTHROCYTE [DISTWIDTH] IN BLOOD BY AUTOMATED COUNT: 13.8 % (ref 11.6–14.5)
GLUCOSE BLD STRIP.AUTO-MCNC: 101 MG/DL (ref 70–110)
GLUCOSE BLD STRIP.AUTO-MCNC: 103 MG/DL (ref 70–110)
GLUCOSE BLD STRIP.AUTO-MCNC: 113 MG/DL (ref 70–110)
GLUCOSE BLD STRIP.AUTO-MCNC: 123 MG/DL (ref 70–110)
GLUCOSE BLD STRIP.AUTO-MCNC: 80 MG/DL (ref 70–110)
GLUCOSE SERPL-MCNC: 119 MG/DL (ref 74–99)
HCT VFR BLD AUTO: 32.2 % (ref 36–48)
HGB BLD-MCNC: 9.7 G/DL (ref 13–16)
LYMPHOCYTES # BLD: 1.2 K/UL (ref 0.9–3.6)
LYMPHOCYTES NFR BLD: 21 % (ref 21–52)
MAGNESIUM SERPL-MCNC: 2.3 MG/DL (ref 1.6–2.6)
MCH RBC QN AUTO: 26.7 PG (ref 24–34)
MCHC RBC AUTO-ENTMCNC: 30.1 G/DL (ref 31–37)
MCV RBC AUTO: 88.7 FL (ref 74–97)
MONOCYTES # BLD: 0.3 K/UL (ref 0.05–1.2)
MONOCYTES NFR BLD: 6 % (ref 3–10)
NEUTS SEG # BLD: 3.6 K/UL (ref 1.8–8)
NEUTS SEG NFR BLD: 66 % (ref 40–73)
OSMOLALITY UR: 621 MOSMOL/KG
PHOSPHATE SERPL-MCNC: 2.3 MG/DL (ref 2.5–4.9)
PLATELET # BLD AUTO: 113 K/UL (ref 135–420)
PMV BLD AUTO: 13.5 FL (ref 9.2–11.8)
POTASSIUM SERPL-SCNC: 3.3 MMOL/L (ref 3.5–5.5)
RBC # BLD AUTO: 3.63 M/UL (ref 4.7–5.5)
SODIUM SERPL-SCNC: 159 MMOL/L (ref 136–145)
WBC # BLD AUTO: 5.4 K/UL (ref 4.6–13.2)

## 2021-04-01 PROCEDURE — 74011250636 HC RX REV CODE- 250/636: Performed by: NURSE PRACTITIONER

## 2021-04-01 PROCEDURE — 74011000258 HC RX REV CODE- 258: Performed by: FAMILY MEDICINE

## 2021-04-01 PROCEDURE — 74011250636 HC RX REV CODE- 250/636: Performed by: EMERGENCY MEDICINE

## 2021-04-01 PROCEDURE — 92526 ORAL FUNCTION THERAPY: CPT

## 2021-04-01 PROCEDURE — 82962 GLUCOSE BLOOD TEST: CPT

## 2021-04-01 PROCEDURE — 85025 COMPLETE CBC W/AUTO DIFF WBC: CPT

## 2021-04-01 PROCEDURE — 65660000000 HC RM CCU STEPDOWN

## 2021-04-01 PROCEDURE — 74011250636 HC RX REV CODE- 250/636: Performed by: FAMILY MEDICINE

## 2021-04-01 PROCEDURE — 2709999900 HC NON-CHARGEABLE SUPPLY

## 2021-04-01 PROCEDURE — 99233 SBSQ HOSP IP/OBS HIGH 50: CPT | Performed by: NURSE PRACTITIONER

## 2021-04-01 PROCEDURE — 84100 ASSAY OF PHOSPHORUS: CPT

## 2021-04-01 PROCEDURE — 74011000258 HC RX REV CODE- 258: Performed by: NURSE PRACTITIONER

## 2021-04-01 PROCEDURE — 74011250636 HC RX REV CODE- 250/636: Performed by: INTERNAL MEDICINE

## 2021-04-01 PROCEDURE — 83735 ASSAY OF MAGNESIUM: CPT

## 2021-04-01 PROCEDURE — 80048 BASIC METABOLIC PNL TOTAL CA: CPT

## 2021-04-01 PROCEDURE — 92610 EVALUATE SWALLOWING FUNCTION: CPT

## 2021-04-01 PROCEDURE — 36415 COLL VENOUS BLD VENIPUNCTURE: CPT

## 2021-04-01 PROCEDURE — 74011250637 HC RX REV CODE- 250/637: Performed by: FAMILY MEDICINE

## 2021-04-01 PROCEDURE — 99232 SBSQ HOSP IP/OBS MODERATE 35: CPT | Performed by: FAMILY MEDICINE

## 2021-04-01 RX ADMIN — LEVOFLOXACIN 750 MG: 750 INJECTION, SOLUTION INTRAVENOUS at 15:08

## 2021-04-01 RX ADMIN — THIAMINE HYDROCHLORIDE 200 MG: 100 INJECTION, SOLUTION INTRAMUSCULAR; INTRAVENOUS at 08:49

## 2021-04-01 RX ADMIN — POTASSIUM CHLORIDE AND DEXTROSE MONOHYDRATE: 150; 5 INJECTION, SOLUTION INTRAVENOUS at 02:58

## 2021-04-01 RX ADMIN — Medication 10 ML: at 19:00

## 2021-04-01 RX ADMIN — LEVETIRACETAM 750 MG: 100 INJECTION, SOLUTION INTRAVENOUS at 20:56

## 2021-04-01 RX ADMIN — ENOXAPARIN SODIUM 40 MG: 40 INJECTION SUBCUTANEOUS at 08:47

## 2021-04-01 RX ADMIN — Medication 30 ML: at 08:46

## 2021-04-01 RX ADMIN — LEVETIRACETAM 750 MG: 100 INJECTION, SOLUTION INTRAVENOUS at 08:47

## 2021-04-01 RX ADMIN — Medication 10 ML: at 15:08

## 2021-04-01 RX ADMIN — Medication 10 ML: at 06:00

## 2021-04-01 RX ADMIN — POTASSIUM CHLORIDE AND DEXTROSE MONOHYDRATE: 150; 5 INJECTION, SOLUTION INTRAVENOUS at 20:56

## 2021-04-01 RX ADMIN — POTASSIUM CHLORIDE AND DEXTROSE MONOHYDRATE: 150; 5 INJECTION, SOLUTION INTRAVENOUS at 12:21

## 2021-04-01 NOTE — ROUTINE PROCESS
Bedside shift change report given to Mariam Tucker RN (oncoming nurse) by Leyda De León RN (offgoing nurse). Report included the following information SBAR, Kardex, Intake/Output, MAR and Recent Results.

## 2021-04-01 NOTE — ROUTINE PROCESS
1900: Verbal shift change report given to JOSEFA Alaniz (oncoming nurse) by Lawyer Sarah RN (offgoing nurse). Report included the following information SBAR, Kardex, ED Summary and Cardiac Rhythm NSR.    0700: Bedside and Verbal shift change report given to JOSEFA Patel (oncoming nurse) by Laci Giordano (offgoing nurse). Report included the following information SBAR, Kardex, MAR and Recent Results.     SITUATION:    Code Status: Full Code  Reason for Admission: Hypernatremia [G26.9]  Acute metabolic encephalopathy [B32.20]  Dehydration [E86.0]   COVID-19 [U07.1]    [facility] day: 2   Problem List:       Hospital Problems  Date Reviewed: 3/31/2021          Codes Class Noted POA    Hematuria ICD-10-CM: R31.9  ICD-9-CM: 599.70  3/31/2021 Unknown        Dysuria ICD-10-CM: R30.0  ICD-9-CM: 788.1  3/31/2021 Unknown        Goals of care, counseling/discussion ICD-10-CM: Z71.89  ICD-9-CM: V65.49  Unknown Unknown        Debility ICD-10-CM: R53.81  ICD-9-CM: 799.3  Unknown Unknown        Hypernatremia ICD-10-CM: E87.0  ICD-9-CM: 276.0  3/30/2021 Unknown        Acute metabolic encephalopathy OUW-34-SF: G93.41  ICD-9-CM: 348.31  3/30/2021 Unknown        Dehydration ICD-10-CM: E86.0  ICD-9-CM: 276.51  3/30/2021 Unknown        COVID-19 ICD-10-CM: U07.1  ICD-9-CM: 079.89  3/30/2021 Unknown        Severe protein-calorie malnutrition ([facility] [address] ICD-10-CM: E43  ICD-9-CM: 689  9/17/2013 Yes              BACKGROUND:    Past Medical History:   Past Medical History:   Diagnosis Date    Hiccups     Hypertension     Hyponatremia     Lacunar infarction ([facility] [address] 2010    Lower extremity edema     Psychogenic polydipsia     Spinal stenosis          Patient taking anticoagulants yes     ASSESSMENT:    Changes in Assessment Throughout Shift: no     Patient has Central Line: no Reasons if yes:    Patient has Reid Cath: no Reasons if yes:       Last Vitals:     Vitals:    03/31/21 1501 03/31/21 1924 04/01/21 0014 04/01/21 0431   BP: (!) 143/91 (!) 134/92 (!) 141/86 132/87   Pulse: 76 87 82 77   Resp: 18 18 16 16   Temp: 97.1 °F (36.2 °C) 97.2 °F (36.2 °C) 97.1 °F (36.2 °C) 97.5 °F (36.4 °C)   SpO2: 99% 97% 99% 99%   Weight:    71.4 kg (157 lb 4.8 oz)   Height:            IV and DRAINS (will only show if present)   Peripheral IV 03/30/21 Left Wrist-Site Assessment: Clean, dry, & intact  Peripheral IV 03/30/21 Right Hand-Site Assessment: Clean, dry, & intact     WOUND (if present)   Wound Type:  none   Dressing present Dressing Present : No   Wound Concerns/Notes:  none     PAIN    Pain Assessment    Pain Intensity 1: 0 (03/31/21 2000)              Patient Stated Pain Goal: Unable to verbalize/indicate pain  o Interventions for Pain:  none  o Intervention effective: n/a  o Time of last intervention: N/A   o Reassessment Completed: yes      Last 3 Weights:  Last 3 Recorded Weights in this Encounter    03/30/21 1443 04/01/21 0431   Weight: 85.7 kg (189 lb) 71.4 kg (157 lb 4.8 oz)     Weight change: -14.4 kg (-31 lb 11.2 oz)     INTAKE/OUPUT    Current Shift: 03/31 1901 - 04/01 0700  In: -   Out: 200 [Urine:200]    Last three shifts: 03/30 0701 - 03/31 1900  In: 1600 [I.V.:1600]  Out: -      LAB RESULTS     Recent Labs     04/01/21  0229 03/30/21  1415   WBC 5.4 8.7   HGB 9.7* 13.7   HCT 32.2* 45.8   * 173        Recent Labs     04/01/21 0229 03/31/21  0550 03/31/21  0529 03/30/21  1415   *  --  164* 168*   K 3.3*  --  3.0* 3.5   *  --  125* 108*   BUN 15  --  21* 27*   CREA 0.47*  --  0.57* 0.83   CA 8.2*  --  8.8 9.5   MG 2.3 2.5  --   --    INR  --   --   --  1.2       RECOMMENDATIONS AND DISCHARGE PLANNING     1. Pending tests/procedures/ Plan of Care or Other Needs: TBD    2. Discharge plan for patient and Needs/Barriers: no    Estimated Discharge Date: TBD Posted on Whiteboard in Patients Room: no  4. The patient's care plan was reviewed with the oncoming nurse.        \"HEALS\" SAFETY CHECK      Fall Risk    Total Score: 2    Safety Measures: Safety Measures: Bed/Chair alarm on, Bed/Chair-Wheels locked, Bed in low position, Call light within reach, Gripper socks    A safety check occurred in the patient's room between off going nurse and oncoming nurse listed above. The safety check included the below items  Area Items   H  High Alert Medications - Verify all high alert medication drips (heparin, PCA, etc.)   E  Equipment - Suction is set up for ALL patients (with erika)  - Red plugs utilized for all equipment (IV pumps, etc.)  - WOWs wiped down at end of shift.  - Room stocked with oxygen, suction, and other unit-specific supplies   A  Alarms - Bed alarm is set for fall risk patients  - Ensure chair alarm is in place and activated if patient is up in a chair   L  Lines - Check IV for any infiltration  - Reid bag is empty if patient has a Reid   - Tubing and IV bags are labeled   S  Safety   - Room is clean, patient is clean, and equipment is clean. - Hallways are clear from equipment besides carts. - Fall bracelet on for fall risk patients  - Ensure room is clear and free of clutter  - Suction is set up for ALL patients (with erika)  - Hallways are clear from equipment besides carts.    - Isolation precautions followed, supplies available outside room, sign posted     [facility]

## 2021-04-01 NOTE — ACP (ADVANCE CARE PLANNING)
Palliative Medicine    Advanced Steps Advance Care Planning Conversation  [facility] (Physician Orders for Scope of Treatment)       Date of conversation: 4/1/2021   Location: [facility]  Length (minutes): 20    Participants:    [x] Other Surrogate Decision Maker / Next of Harjeet 69    Name: Sheila Espinoza     Relationship to Patient: Son    Phone Number: [phone]      Advanced Steps® ACP Facilitator: Jluis Chiang RN      Conversation Topics   (If Patient does not have decision making capacity, Agent/Surrogate responds based on understanding of how patient would respond if capable)    Understanding of Medical Condition/s AND Potential Complications:    Healthcare Agent/Other Surrogate: Pt has an AMD on file from 2014 that names a friend, Bianca Aaron as his medical POA. However, no one has been able to get in contact with the appointed medical POA, so now it legally defaults to the pt's son Sheila Espinoza. Had a meeting with  Cedrick Adolph regarding the pt's medical condition. He has a good understanding of what is going on medically with the pt. Pt's son states that he has been at [address] [place] [address] for approximately three years now. States that he has been bed bound for approximately five years. Explained that pt had failed his swallow study by SLP. Discussed benefits and burdens of tube feeds vs comfort feeds with the support of hospice. Pt's son stated that he wanted to move forward with PEG tube placement. Also discussed benefits and burdens of CPR in someone with chronic underlying conditions and debility. Pt's son stated that he would not want his father subjected to CPR or intubation for any reason. Stefany John was agreeable to signing a POST form. POST form denotes DDNR status, limited medical interventions (NO intubation for any reason, ok with CPAP/BiPAP, avoid ICU if possible) and OK with feeding tube long term if indicated. Pt's son did not want any copies of the POST form.   Copy was placed on the chart for scanning into EMR. Needs to discuss with spiritual/Jew advisor: [] Yes  [x] No    Needs more information about illness and complications:  [x] Yes  [] No      Cardiopulmonary Resuscitation      Order Elected for CPR:  []  Attempt Resuscitation [x]  Do Not Attempt Resuscitation      When NOT in Cardiopulmonary Arrest, Order Elected:      [] Comfort Measures  [x] Limited Additional Interventions  [] Full Interventions    Artificially Administered Nutrition, Order Elected:    [] No Feeding Tube   [] Feeding Tube for a defined trial period  [x] Feeding Tube long-term if indicated    The following was provided (check all that apply):      Healthcare Decision Information Cards:   [] Help with Breathing Facts   [] Tube Feeding Facts   [] CPR Facts      [] Review of existing Advance Directive  [] Assistance with Completion of New Advance Directive   [x] Review of [place] POST Form       Meeting Outcomes:   [] ACP discussion completed   [x] [place] form completed  [x] [place] prepared for Provider review and signature   [x] Original placed on Chart, if in facility (form to be sent with patient at discharge)  [] Copy given to healthcare agent    [] Copy scanned to electronic medical record     Follow-up plan:     [] Schedule follow-up conversation to continue planning   [x] Referred individual to Provider for additional questions/concerns   [x] Advised patient/agent/surrogate to review completed POST form and update if needed with changes in condition, patient preferences or care setting     [] This note routed to one or more involved healthcare providers    Pt is now DNR/DNI. Ok with long term feeding tube placement. BSRN and Attending updated. Thank you for the Palliative Medicine consult and allowing us to participate in the care of Mr. Catracho Villatoro. Will continue to monitor and provide support.     Yoon Mcgee RN, BSN  Palliative Medicine Inpatient RN  [facility] Line: [phone] ([phone])

## 2021-04-01 NOTE — PROGRESS NOTES
Problem: Dysphagia (Adult)  Goal: *Acute Goals and Plan of Care (Insert Text)  Description: Patient will:  1. Tolerate PO trials with 0 s/s overt distress in 4/5 trials  2. Utilize compensatory swallow strategies/maneuvers (decrease bite/sip, size/rate, alt. liq/sol) with min cues in 4/5 trials  3. Perform oral-motor/laryngeal exercises to increase oropharyngeal swallow function with min cues    Recommend:   NPO   Strict aspiration precautions (HOB >30 degrees at all times, Oral care TID)? alternative means of nutrition or comfort feeds     Outcome: Progressing Towards Goal     SPEECH LANGUAGE PATHOLOGY BEDSIDE SWALLOW EVALUATION/TREATMENT    Patient: Mary Mendez (48 y.o. male)  Date: 4/1/2021  Primary Diagnosis: Hypernatremia [V70.6]  Acute metabolic encephalopathy [C15.22]  Dehydration [E86.0]  COVID-19 [U07.1]  Precautions: Aspiration      PLOF: per chart review, pt was on puree diet prior to admission     ASSESSMENT :  Based on the objective data described below, the patient presents with moderately severe and suspected severe pharyngeal dysphagia. Pt alert but unable to verbalize orientation information due to unintelligible speech (? Baseline). Pt with decreased command following. Oral mech exam revealed decreased orolingual strength/coordination. Pt demo delayed/discoordinated a-p transfer, swallow delay, decreased laryngeal elevation to palpation and overall poor endurance. Pt with immediate wet vocal quality/throat clear/weak cough on 5/5 presentations of pudding and ice chips. Unable to follow commands for use of compensatory strategies. Recommend continue NPO with consideration of alternative nutrition/hydration source vs comfort feeding. D/w pt and palliative care. TREATMENT :  Skilled therapy initiated; attempted controlled presentations of HTL via 1/2 tsp. Demo immediate weak cough on 2/2 presentations. Unable to participate in pharyngeal strengthening program or training and education. Will follow as indicated for further dysphagia management. Patient will benefit from skilled intervention to address the above impairments. Patient's rehabilitation potential is considered to be Guarded  Factors which may influence rehabilitation potential include:   []            None noted  [x]            Mental ability/status  [x]            Medical condition  []            Home/family situation and support systems  [x]            Safety awareness  []            Pain tolerance/management  []            Other:      PLAN :  Recommendations and Planned Interventions:  As above   Frequency/Duration: Patient will be followed by speech-language pathology 1-2 times per day/4-7 days per week to address goals. Discharge Recommendations: To Be Determined     SUBJECTIVE:   Patient with unintelligible speech    OBJECTIVE:     Past Medical History:   Diagnosis Date    Hiccups     Hypertension     Hyponatremia     Lacunar infarction ([facility] [address] 2010    Lower extremity edema     Psychogenic polydipsia     Spinal stenosis      Past Surgical History:   Procedure Laterality Date    HX HEENT      pt reports past hx of surgery on ears unsure of what type     Prior Level of Function/Home Situation: per chart review, nursing home resident  Diet prior to admission: per chart review, pt was on puree diet prior to admission   Current Diet:  NPO   Cognitive and Communication Status:  Neurologic State: Alert  Orientation Level: Unable to verbalize  Cognition: No command following  Oral Assessment:  Oral Assessment  Labial: No impairment  Dentition: Natural  Oral Hygiene: Poor  Lingual: Decreased rate;Decreased strength; Incoordinated  Velum: No impairment  Mandible: No impairment  P.O.  Trials:  Patient Position: [facility]  Vocal quality prior to P.O.: Low volume;Hoarse  Consistency Presented: Ice chips;Pudding  How Presented: SLP-fed/presented;Spoon  Bolus Acceptance: No impairment  Bolus Formation/Control: Impaired  Type of Impairment: Delayed; Posterior;Poor;Mastication  Propulsion: Delayed (# of seconds); Discoordination  Oral Residue: Less than 10% of bolus; Lingual  Initiation of Swallow: Delayed (# of seconds)  Laryngeal Elevation: Decreased;Weak  Aspiration Signs/Symptoms: Weak cough;Clear throat; Change vocal quality  Pharyngeal Phase Characteristics: Poor endurance; Suspected pharyngeal residue;Easily fatigued ; Altered vocal quality  Effective Modifications: None  Cues for Modifications: Maximal  Oral Phase Severity: Moderate-severe  Pharyngeal Phase Severity : Severe    PAIN:  Start of Eval: unable to report   End of Eval: unable to report      After treatment:   []            Patient left in no apparent distress sitting up in chair  [x]            Patient left in no apparent distress in bed  [x]            Call bell left within reach  [x]            Nursing notified  []            Family present  []            Caregiver present  []            Bed alarm activated    COMMUNICATION/EDUCATION:   [x]            Aspiration precautions; swallow safety; compensatory techniques. []            Patient/family have participated as able in goal setting and plan of care. []            Patient/family agree to work toward stated goals and plan of care. []            Patient understands intent and goals of therapy; neutral about participation. [x]            Patient unable to participate in goal setting/plan of care; educ ongoing with interdisciplinary staff  [x]         Posted safety precautions in patient's room.     Thank you for this referral,  Shari Eaton M.S., [id number] [facility]  Speech-Language Pathologist

## 2021-04-01 NOTE — CONSULTS
[facility]: [phone]  [facility]: [phone]     Patient Name: Dennie Rummage  YOB: 1955    Date of Progress Note : 4/1/2021  Reason for Consult: Care Decisions  Requesting Provider: Allen Serrano NP   Primary Care Physician: Rachele Souza MD      SUMMARY:   Dennie Rummage is a 77 y.o. male with a past history per the attending team of hyponatremia, hypertension, hiccups, CVA and psychogenic polydipsia, who was admitted on 3/30/2021 from [facility] with a diagnosis of severe hypernatremia, dehydration and is positive for COVID-19. Current medical issues leading to Palliative Medicine involvement include: 77year old gentleman from a long term care facility who has not been eating or taking medications for several days and has altered mental status and is COVID-19 positive. Palliative Medicine is consulted for care decisions. CHIEF COMPLAINT: altered mental status    HPI/SUBJECTIVE:    Past history as above. Mr. Kezia Brewer is a resident of long term care who presented with altered mental status after several days of poor oral intake of food and medications for the last several days. He has a history of a CVA with left sided weakness and can normally respond yes and no at baseline. 4/1/21:  Observed from hallway, awake, alert, speech was difficult to understand. Patient was able to verbalize yes or no to simple questions. The patient is:   [] Verbal and participatory  [x] Non-participatory due to: Altered mental status, limited ability to communicate    GOALS OF CARE: DNR/DNI, limited interventions, feeding tube long term  Patient/Health Care Proxy Stated Goals: Prolong life      TREATMENT PREFERENCES:   Code Status: DNR/DNI         PALLIATIVE DIAGNOSES:   1. Goals of Care   2. Severe hypernatremia  3. COVID-19  4. Debility       PLAN:   4/1/21:  Seen from doorway due to droplet plus isolation. Mr. Zoe Franz is awake, alert and resting in bed, in no acute distress. No call back received from [facility], who is named on the AMD on file. Mr. Zoe Franz does have 2 biological sons, Sheila Espinoza, and a 21year old son who is not involved in the patient's care. Stefany Lopez would be considered the patient's legal decision maker since we are unable to reach [facility]. Phone call placed to Stefany Lopez to discuss goals of care and feeding tubes. Explained benefits and burdens of feeding tubes given that Mr. Zoe Franz did not pass his swallow evaluation and Stefany Lopez wishes for his father to have a feeding tube placed for long term feeding. Also discussed goals of care including benefits and burdens of resuscitation in the context of his father's chronic illnesses and Stefany Lopez would not wish for his father to be resuscitated with CPR, chest compressions, shock or intubation for any reason. POST form completed with son. Copy placed on chart for scanning. Attending and BSRN notified. Goals of care are DNR/DNI, limited interventions, feeding tube long term. Please see below for previous conversations from the palliative team:    1. Goals of Care - Seen at bedside in full PPE for the COVID-19 pandemic. Mr. Zoe Franz is awake, alert, looking around and able to respond with yes and no at times to simple questions. He was unable to follow commands except for wiggling his right foot when asked. Denies pain or shortness of breath. AMD on file which names a friend, [facility], as primary healthcare decision maker. Called patient's son, Sheila Espinoza, and provided a brief medical update. Stefany Lopez shared with us that his father has 2 biological sons, Stefany Lopez and another 21year old son who is not closely involved with his father's care but Stefany Lopez does keep him updated as to his father's medical condition. Mr. Zoe Franz is not .   Explained to son, Stefany Lopez, that his father executed an AMD in 2014 naming a friend, [employer], as his MPOA. Moreno Garber shared that he was active duty  at the time and was overseas and that since he's retired, he is now the primary caretaker for his father and handles his affairs. Discussed that we would need to contact Simba Kuomontse to determine if he still wishes to serve as MPOA for Mr. Hattie Mary. Call placed to Simba Kuo at [phone] and left a voicemail message asking for a return call. At this time, goals of care are full code, full aggressive measures. 2. Severe hypernatremia - Nephrology consulted,  IVF D5W with KCL    3. Dysphagia - SLP consulted, has had decreased oral intake for the last few days and normally consumes a pureed diet at the nursing home    4. Debility - PPS 40, resident of long term care, history of CVA and is mainly bed/chair bound at the nursing home with some aphasia    5. Initial consult note routed to primary continuity provider    6. Communicated plan of care with: Palliative IDT      Advance Care Planning:  [] The [facility] Interdisciplinary Team has updated the ACP Navigator with [address] and Patient Capacity    Primary Decision [address] ([address]): Donnise Najjar, son - [phone]    Medical Interventions: Limited additional interventions   Artificially Administered Nutrition: Feeding tube long-term, if indicated    As far as possible, the palliative care team has discussed with patient / health care proxy about goals of care / treatment preferences for patient.          HISTORY:     History obtained from:   Active Problems:    Severe protein-calorie malnutrition ([facility] [address] (9/17/2013)      Hypernatremia (3/30/2021)      Acute metabolic encephalopathy (2/09/2996)      Dehydration (3/30/2021)      COVID-19 (3/30/2021)      Hematuria (3/31/2021)      Dysuria (3/31/2021)      Goals of care, counseling/discussion ()      Debility ()      Past Medical History:   Diagnosis Date    Hiccups     Hypertension     Hyponatremia     Lacunar infarction [facility]) 2010    Lower extremity edema     Psychogenic polydipsia     Spinal stenosis       Past Surgical History:   Procedure Laterality Date    HX HEENT      pt reports past hx of surgery on ears unsure of what type      Family History   Problem Relation Age of Onset    Hypertension Other      History reviewed, no pertinent family history.   Social History     Tobacco Use    Smoking status: Former Smoker     Packs/day: 0.50     Quit date: 2008     Years since quittin.7    Smokeless tobacco: Never Used   Substance Use Topics    Alcohol use: No     No Known Allergies   Current Facility-Administered Medications   Medication Dose Route Frequency    dextrose 5% with KCl 20 mEq/L infusion   IntraVENous CONTINUOUS    thiamine (B-1) 200 mg in 0.9% sodium chloride 50 mL IVPB  200 mg IntraVENous DAILY    multivit-folic acid-herbal 002 (WELLESSE PLUS) oral liquid 30 mL  30 mL Per NG tube DAILY    [START ON 2021] thiamine HCL (B-1) tablet 100 mg  100 mg Oral DAILY    sodium chloride (NS) flush 5-10 mL  5-10 mL IntraVENous PRN    levoFLOXacin (LEVAQUIN) 750 mg in D5W IVPB  750 mg IntraVENous Q24H    levETIRAcetam (KEPPRA) 750 mg in 0.9% sodium chloride 100 mL IVPB  750 mg IntraVENous Q12H    sodium chloride (NS) flush 5-40 mL  5-40 mL IntraVENous Q8H    sodium chloride (NS) flush 5-40 mL  5-40 mL IntraVENous PRN    acetaminophen (TYLENOL) tablet 650 mg  650 mg Oral Q6H PRN    Or    acetaminophen (TYLENOL) suppository 650 mg  650 mg Rectal Q6H PRN    polyethylene glycol (MIRALAX) packet 17 g  17 g Oral DAILY PRN    promethazine (PHENERGAN) tablet 12.5 mg  12.5 mg Oral Q6H PRN    Or    ondansetron (ZOFRAN) injection 4 mg  4 mg IntraVENous Q6H PRN    enoxaparin (LOVENOX) injection 40 mg  40 mg SubCUTAneous DAILY    hydrALAZINE (APRESOLINE) 20 mg/mL injection 10 mg  10 mg IntraVENous Q6H PRN    ipratropium-albuterol (COMBIVENT RESPIMAT) 20 mcg-100 mcg inhalation spray  1 Puff Inhalation Q4H PRN    metoprolol (LOPRESSOR) injection 1.25 mg  1.25 mg IntraVENous Q6H PRN    cloNIDine (CATAPRES) 0.1 mg/24 hr patch 1 Patch  1 Patch TransDERmal Q7D          Clinical Pain Assessment (nonverbal scale for nonverbal patients): Clinical Pain Assessment  Severity: 0     Activity (Movement): Lying quietly, normal position    Duration: for how long has pt been experiencing pain (e.g., 2 days, 1 month, years)  Frequency: how often pain is an issue (e.g., several times per day, once every few days, constant)     FUNCTIONAL ASSESSMENT:     Palliative Performance Scale (PPS):  PPS: 40    ECOG  ECOG Status : Completely disabled     PSYCHOSOCIAL/SPIRITUAL SCREENING:      Any spiritual / Pentecostal concerns:  Unable to assess  [] Yes /  [] No    Caregiver Burnout:  [] Yes /  [] No /  [x] No Caregiver Present      Anticipatory grief assessment:  Unable to assess  [] Normal  / [] Maladaptive        REVIEW OF SYSTEMS:     Systems: constitutional, ears/nose/mouth/throat, respiratory, cardiovascular, gastrointestinal, genitourinary, musculoskeletal, integumentary, neurologic. Positive findings noted below. Modified ESAS Completed by: provider           Pain: 0           Dyspnea: 0               Positive and pertinent negative findings in ROS are noted above in HPI. The following systems were [] reviewed / [x] unable to be reviewed as noted in HPI  Other findings are noted below. PHYSICAL EXAM:   Seen from hallway due to the COVID-19 pandemic    Constitutional: Awake, alert, able to respond yes and no at times to a simple questions, unable to assess orientation  Eyes: open  Respiratory: breathing not labored  Last bowel movement: none recorded  Skin: warm, dry  Neurologic: awake, alert, responds to simple questions with yes or no, does not follow commands      Other:   Wt Readings from Last 3 Encounters:   04/01/21 71.4 kg (157 lb 4.8 oz)   03/19/18 85.7 kg (189 lb)   09/22/17 80.7 kg (178 lb)     Blood pressure (!) 127/90, pulse 76, temperature 97.1 °F (36.2 °C), resp. rate 20, height 5' 8\" (1.727 m), weight 71.4 kg (157 lb 4.8 oz), SpO2 100 %. Pain:  Pain Scale 1: Adult Nonverbal Pain Scale  Pain Intensity 1: 0                      LAB AND IMAGING FINDINGS:     Lab Results   Component Value Date/Time    WBC 5.4 04/01/2021 02:29 AM    HGB 9.7 (L) 04/01/2021 02:29 AM    PLATELET 242 (L) 97/59/1980 02:29 AM     Lab Results   Component Value Date/Time    Sodium 159 (H) 04/01/2021 02:29 AM    Potassium 3.3 (L) 04/01/2021 02:29 AM    Chloride 131 (H) 04/01/2021 02:29 AM    CO2 26 04/01/2021 02:29 AM    BUN 15 04/01/2021 02:29 AM    Creatinine 0.47 (L) 04/01/2021 02:29 AM    Calcium 8.2 (L) 04/01/2021 02:29 AM    Magnesium 2.3 04/01/2021 02:29 AM    Phosphorus 2.3 (L) 04/01/2021 02:29 AM      Lab Results   Component Value Date/Time    Alk. phosphatase 73 03/19/2018 08:15 PM    Protein, total 8.4 (H) 03/19/2018 08:15 PM    Albumin 3.1 (L) 03/19/2018 08:15 PM    Globulin 5.3 (H) 03/19/2018 08:15 PM     Lab Results   Component Value Date/Time    INR 1.2 03/30/2021 02:15 PM    Prothrombin time 15.4 (H) 03/30/2021 02:15 PM    aPTT 31.3 03/30/2021 02:15 PM      Lab Results   Component Value Date/Time    Iron 45 (L) 07/09/2017 02:24 AM    TIBC 233 (L) 07/09/2017 02:24 AM    Iron % saturation 19 07/09/2017 02:24 AM    Ferritin 39 07/09/2017 02:24 AM      No results found for: PH, PCO2, PO2  No components found for: [place]   Lab Results   Component Value Date/Time    CK 27 (L) 03/30/2021 02:15 PM    CK - MB <1.0 03/30/2021 02:15 PM              Total time:  35 minutes    > 50% counseling / coordination:  Time spent in direct consultation with the patient, medical team, and family     Prolonged service was provided for  []30 min   []75 min in face to face time in the presence of the patient, spent as noted above.   Time Start:   Time End:

## 2021-04-01 NOTE — PROGRESS NOTES
Problem: Falls - Risk of  Goal: *Absence of Falls  Description: Document Mann Shove Fall Risk and appropriate interventions in the flowsheet. Outcome: Progressing Towards Goal  Note: Fall Risk Interventions:       Mentation Interventions: Adequate sleep, hydration, pain control, Bed/chair exit alarm, Increase mobility, More frequent rounding, Update white board         Elimination Interventions: Bed/chair exit alarm              Problem: Patient Education: Go to Patient Education Activity  Goal: Patient/Family Education  Outcome: Progressing Towards Goal     Problem: Pressure Injury - Risk of  Goal: *Prevention of pressure injury  Description: Document Brian Scale and appropriate interventions in the flowsheet.   Outcome: Progressing Towards Goal  Note: Pressure Injury Interventions:  Sensory Interventions: Float heels, Keep linens dry and wrinkle-free, Maintain/enhance activity level, Minimize linen layers    Moisture Interventions: Absorbent underpads    Activity Interventions: Assess need for specialty bed    Mobility Interventions: Assess need for specialty bed    Nutrition Interventions: Discuss nutritional consult with provider    Friction and Shear Interventions: Apply protective barrier, creams and emollients                Problem: Patient Education: Go to Patient Education Activity  Goal: Patient/Family Education  Outcome: Progressing Towards Goal     Problem: Nutrition Deficit  Goal: *Optimize nutritional status  Outcome: Progressing Towards Goal     Problem: Patient Education: Go to Patient Education Activity  Goal: Patient/Family Education  Outcome: Progressing Towards Goal

## 2021-04-01 NOTE — PROGRESS NOTES
Progress Note    Dennie Rummage  77 y.o. Admit Date: 3/30/2021  Active Problems:    Severe protein-calorie malnutrition ([address] (9/17/2013) POA: Yes      Hypernatremia (3/30/2021) POA: Unknown      Acute metabolic encephalopathy (9/08/5673) POA: Unknown      Dehydration (3/30/2021) POA: Unknown      COVID-19 (3/30/2021) POA: Unknown      Hematuria (3/31/2021) POA: Unknown      Dysuria (3/31/2021) POA: Unknown      Goals of care, counseling/discussion () POA: Unknown      Debility () POA: Unknown            Subjective:     Patient looks good, feels good, poor communication, On IVF , has good UOP through catheter. A comprehensive review of systems was negative except for that written in the History of Present Illness.     Objective:     Visit Vitals  BP (!) 127/90 (BP 1 Location: Right upper arm, BP Patient Position: At rest)   Pulse 76   Temp 97.1 °F (36.2 °C)   Resp 20   Ht 5' 8\" (1.727 m)   Wt 71.4 kg (157 lb 4.8 oz)   SpO2 100%   BMI 23.92 kg/m²         Intake/Output Summary (Last 24 hours) at 4/1/2021 1515  Last data filed at 4/1/2021 0014  Gross per 24 hour   Intake --   Output 200 ml   Net -200 ml       Current Facility-Administered Medications   Medication Dose Route Frequency Provider Last Rate Last Admin    dextrose 5% with KCl 20 mEq/L infusion   IntraVENous CONTINUOUS Esther Pérez  mL/hr at 04/01/21 1221 New Bag at 04/01/21 1221    thiamine (B-1) 200 mg in 0.9% sodium chloride 50 mL IVPB  200 mg IntraVENous DAILY Yuliet Foster MD   200 mg at 04/01/21 3081    multivit-folic acid-herbal 062 (WELLESSE PLUS) oral liquid 30 mL  30 mL Per NG tube DAILY Yuliet Foster MD   30 mL at 04/01/21 0846    [START ON 4/5/2021] thiamine HCL (B-1) tablet 100 mg  100 mg Oral DAILY Yuliet Foster MD        sodium chloride (NS) flush 5-10 mL  5-10 mL IntraVENous PRN Trenda Khan, DO        levoFLOXacin (LEVAQUIN) 750 mg in D5W IVPB  750 mg IntraVENous Q24H Maddison PEREA  mL/hr at 04/01/21 1508 750 mg at 04/01/21 1508    levETIRAcetam (KEPPRA) 750 mg in 0.9% sodium chloride 100 mL IVPB  750 mg IntraVENous Q12H Ant Vences B, NP   750 mg at 04/01/21 0847    sodium chloride (NS) flush 5-40 mL  5-40 mL IntraVENous Q8H Ant Vences B, NP   10 mL at 04/01/21 1508    sodium chloride (NS) flush 5-40 mL  5-40 mL IntraVENous PRN Jatinder Santiago, NP        acetaminophen (TYLENOL) tablet 650 mg  650 mg Oral Q6H PRN Jatinder Santiago, NP        Or    acetaminophen (TYLENOL) suppository 650 mg  650 mg Rectal Q6H PRN Jatinder Santiago, NP        polyethylene glycol (MIRALAX) packet 17 g  17 g Oral DAILY PRN Jatinder Santiago, NP        promethazine (PHENERGAN) tablet 12.5 mg  12.5 mg Oral Q6H PRN Jatinder Santiago, EVAN        Or    ondansetron (ZOFRAN) injection 4 mg  4 mg IntraVENous Q6H PRN Jatinder Santiago, NP        enoxaparin (LOVENOX) injection 40 mg  40 mg SubCUTAneous DAILY Ant JONES, NP   40 mg at 04/01/21 0847    hydrALAZINE (APRESOLINE) 20 mg/mL injection 10 mg  10 mg IntraVENous Q6H PRN Jatinder Santiago NP        ipratropium-albuterol (COMBIVENT RESPIMAT) 20 mcg-100 mcg inhalation spray  1 Puff Inhalation Q4H PRN Jordy Duggan MD        metoprolol (LOPRESSOR) injection 1.25 mg  1.25 mg IntraVENous Q6H PRN Jordy Duggan MD        cloNIDine (CATAPRES) 0.1 mg/24 hr patch 1 Patch  1 Patch TransDERmal Q7D Jordy Duggan MD   1 Patch at 03/30/21 2023        Physical Exam:     Physical Exam:   General:  Alert, no distress, appears stated age. Mouth/Throat: Lips, mucosa, and tongue normal. Teeth and gums normal.   Neck: Supple, symmetrical, trachea midline, no adenopathy, thyroid: no enlargement/tenderness/nodules, no carotid bruit and no JVD. Lungs:   Clear to auscultation bilaterally. Heart:  Regular rate and rhythm, S1, S2 normal, no murmur, click, rub or gallop. Abdomen:   Soft, non-tender.  Bowel sounds normal. No masses,  No organomegaly. Extremities: Extremities normal, atraumatic, no cyanosis or edema. Skin: Skin color, texture, turgor normal. No rashes or lesions         Data Review:    CBC w/Diff    Recent Labs     04/01/21 0229 03/30/21  1415   WBC 5.4 8.7   RBC 3.63* 5.08   HGB 9.7* 13.7   HCT 32.2* 45.8   MCV 88.7 90.2   MCH 26.7 27.0   MCHC 30.1* 29.9*   RDW 13.8 14.6*    Recent Labs     04/01/21 0229 03/30/21  1415   MONOS 6 5   EOS 7* 2   BASOS 0 0   RDW 13.8 14.6*        Comprehensive Metabolic Profile    Recent Labs     04/01/21 0229 03/31/21  0529 03/30/21  1415   * 164* 168*   K 3.3* 3.0* 3.5   * 134* 137*   CO2 26 28 30   BUN 15 21* 27*   CREA 0.47* 0.57* 0.83    Recent Labs     04/01/21 0229 03/31/21  0550 03/31/21  0529 03/30/21  1415   CA 8.2*  --  8.8 9.5   PHOS 2.3* 2.5  --   --                         Impression:       Active Hospital Problems    Diagnosis Date Noted    Hematuria 03/31/2021    Dysuria 03/31/2021    Goals of care, counseling/discussion     Debility     Hypernatremia 03/30/2021    Acute metabolic encephalopathy 61/70/2893    Dehydration 03/30/2021    COVID-19 03/30/2021    Severe protein-calorie malnutrition ([facility] [address] 09/17/2013      Renal Failure Resolved, Na  & K slowly improving      Plan:     Continue IVF, strict I/O record, replace K.       Marcela Matos MD

## 2021-04-01 NOTE — PROGRESS NOTES
[facility]  Progress Note    Patient: Nicholas Piper Age: 77 y.o. : 1955 MR#: [id number] SSN: [id number]  Date: 2021     Subjective/24-hour events:     Respiratory status stable, no new issues overnight. Mentation improved relative to yesterday. Assessment:   Severe hypernatremia   Acute metabolic encephalopathy  TCFBI-02 infection, asymptomatic  Dysphagia  HTN  Anemia   Seizure disorder  Cerebrovascular disease with hx of CVA    Plan:   Volume/sodium management per nephrology. Replace potassium as necessary. Continue levaquin for now. Noted recommendation to continue NPO status - patient DNR/DNI but family wishes to have patient evaluated for PEG placement if swallowing fails to improve. Continue supportive care o/w. Follow. Case discussed with:  [x]Patient  []Family  [x]Nursing  [x]Case Management  DVT Prophylaxis:  []Lovenox  []Hep SQ  []SCDs  []Coumadin   []On Heparin gtt    Objective:   VS:   Visit Vitals  BP (!) 141/92 (BP 1 Location: Right upper arm, BP Patient Position: At rest)   Pulse 73   Temp 98 °F (36.7 °C)   Resp 18   Ht 5' 8\" (1.727 m)   Wt 71.4 kg (157 lb 4.8 oz)   SpO2 98%   BMI 23.92 kg/m²      Tmax/24hrs: Temp (24hrs), Av.4 °F (36.3 °C), Min:97.1 °F (36.2 °C), Max:98 °F (36.7 °C)      Intake/Output Summary (Last 24 hours) at 2021 0910  Last data filed at 2021 0014  Gross per 24 hour   Intake --   Output 200 ml   Net -200 ml       General:  In NAD. Nontoxic-appearing. Cardiovascular:  RRR. Pulmonary:  Lungs clear bilaterally, no wheezes. No accessory muscle use. GI:  Abdomen soft, NTTP. Extremities:  Warm, no edema or ischemia. Neuro:  Awake and alert.     Labs:    Recent Results (from the past 24 hour(s))   GLUCOSE, POC    Collection Time: 21 12:00 AM   Result Value Ref Range    Glucose (POC) 123 (H) 70 - 620 mg/dL   METABOLIC PANEL, BASIC    Collection Time: 21  2:29 AM   Result Value Ref Range Sodium 159 (H) 136 - 145 mmol/L    Potassium 3.3 (L) 3.5 - 5.5 mmol/L    Chloride 131 (H) 100 - 111 mmol/L    CO2 26 21 - 32 mmol/L    Anion gap 2 (L) 3.0 - 18 mmol/L    Glucose 119 (H) 74 - 99 mg/dL    BUN 15 7.0 - 18 MG/DL    Creatinine 0.47 (L) 0.6 - 1.3 MG/DL    BUN/Creatinine ratio 32 (H) 12 - 20      GFR est AA >60 >60 ml/min/1.73m2    GFR est non-AA >60 >60 ml/min/1.73m2    Calcium 8.2 (L) 8.5 - 10.1 MG/DL   MAGNESIUM    Collection Time: 04/01/21  2:29 AM   Result Value Ref Range    Magnesium 2.3 1.6 - 2.6 mg/dL   CBC WITH AUTOMATED DIFF    Collection Time: 04/01/21  2:29 AM   Result Value Ref Range    WBC 5.4 4.6 - 13.2 K/uL    RBC 3.63 (L) 4.70 - 5.50 M/uL    HGB 9.7 (L) 13.0 - 16.0 g/dL    HCT 32.2 (L) 36.0 - 48.0 %    MCV 88.7 74.0 - 97.0 FL    MCH 26.7 24.0 - 34.0 PG    MCHC 30.1 (L) 31.0 - 37.0 g/dL    RDW 13.8 11.6 - 14.5 %    PLATELET 003 (L) 150 - 420 K/uL    MPV 13.5 (H) 9.2 - 11.8 FL    NEUTROPHILS 66 40 - 73 %    LYMPHOCYTES 21 21 - 52 %    MONOCYTES 6 3 - 10 %    EOSINOPHILS 7 (H) 0 - 5 %    BASOPHILS 0 0 - 2 %    ABS. NEUTROPHILS 3.6 1.8 - 8.0 K/UL    ABS. LYMPHOCYTES 1.2 0.9 - 3.6 K/UL    ABS. MONOCYTES 0.3 0.05 - 1.2 K/UL    ABS. EOSINOPHILS 0.4 0.0 - 0.4 K/UL    ABS.  BASOPHILS 0.0 0.0 - 0.1 K/UL    DF AUTOMATED     PHOSPHORUS    Collection Time: 04/01/21  2:29 AM   Result Value Ref Range    Phosphorus 2.3 (L) 2.5 - 4.9 MG/DL   GLUCOSE, POC    Collection Time: 04/01/21  5:21 AM   Result Value Ref Range    Glucose (POC) 113 (H) 70 - 110 mg/dL       Signed By: Lilliam Boston MD     April 1, 2021

## 2021-04-01 NOTE — PROGRESS NOTES
Nutrition Note    Patient remains NPO per SLP s/p follow up swallow evaluation today, plan of care remains full code and Palliative consulted. Hypernatremia improving, sodium down to 159 and receiving IVF (D5 + 20 mEq/L KCl at 125 mL/hr to provide 150 gm dextrose, 510 kcal, 60 mEq KCl per day), thiamine and multivitamin. Multiple unsuccessful attempt to place NGT yesterday and discussed consideration to consult IR for DHT placement with MD yesterday. Recommendations/Plan   - Once able to obtain enteral access, initiate tube feeding of Jevity 1.5 at 10 mL/hr and advance as tolerated by 10 mL q 12 hours to goal rate of 60 mL/hr with 200 mL q 4 hour water flushes (goal regimen to provide 2160 kcal, 92 gm protein, 1094 mL free water, 100% RDIs). - Monitor and replace electrolytes as needed due to concern for refeeding syndrome.  Provide thiamine and multivitamin.   - IVF per MD.       Electronically signed by Remigio Cheng [address], [address]  on 4/1/2021 at 2:22 PM    Contact: [phone]

## 2021-04-02 ENCOUNTER — HOSPITAL ENCOUNTER (INPATIENT)
Dept: INTERVENTIONAL RADIOLOGY/VASCULAR | Age: 66
Discharge: HOME | DRG: 640 | End: 2021-04-02
Attending: FAMILY MEDICINE
Payer: COMMERCIAL

## 2021-04-02 LAB
ANION GAP SERPL CALC-SCNC: 3 MMOL/L (ref 3–18)
BASOPHILS # BLD: 0 K/UL (ref 0–0.06)
BASOPHILS NFR BLD: 0 % (ref 0–3)
BUN SERPL-MCNC: 9 MG/DL (ref 7–18)
BUN/CREAT SERPL: 18 (ref 12–20)
CALCIUM SERPL-MCNC: 8.6 MG/DL (ref 8.5–10.1)
CHLORIDE SERPL-SCNC: 124 MMOL/L (ref 100–111)
CO2 SERPL-SCNC: 29 MMOL/L (ref 21–32)
CREAT SERPL-MCNC: 0.5 MG/DL (ref 0.6–1.3)
DIFFERENTIAL METHOD BLD: ABNORMAL
EOSINOPHIL # BLD: 0.6 K/UL (ref 0–0.4)
EOSINOPHIL NFR BLD: 10 % (ref 0–5)
ERYTHROCYTE [DISTWIDTH] IN BLOOD BY AUTOMATED COUNT: 13.3 % (ref 11.6–14.5)
GLUCOSE BLD STRIP.AUTO-MCNC: 107 MG/DL (ref 70–110)
GLUCOSE BLD STRIP.AUTO-MCNC: 74 MG/DL (ref 70–110)
GLUCOSE BLD STRIP.AUTO-MCNC: 89 MG/DL (ref 70–110)
GLUCOSE BLD STRIP.AUTO-MCNC: 91 MG/DL (ref 70–110)
GLUCOSE SERPL-MCNC: 76 MG/DL (ref 74–99)
HCT VFR BLD AUTO: 36 % (ref 36–48)
HGB BLD-MCNC: 11.1 G/DL (ref 13–16)
LYMPHOCYTES # BLD: 1.4 K/UL (ref 0.8–3.5)
LYMPHOCYTES NFR BLD: 23 % (ref 20–51)
MAGNESIUM SERPL-MCNC: 2.8 MG/DL (ref 1.6–2.6)
MCH RBC QN AUTO: 26.6 PG (ref 24–34)
MCHC RBC AUTO-ENTMCNC: 30.8 G/DL (ref 31–37)
MCV RBC AUTO: 86.1 FL (ref 74–97)
MONOCYTES # BLD: 0.1 K/UL (ref 0–1)
MONOCYTES NFR BLD: 1 % (ref 2–9)
NEUTS SEG # BLD: 3.9 K/UL (ref 1.8–8)
NEUTS SEG NFR BLD: 66 % (ref 42–75)
NRBC BLD-RTO: 2 PER 100 WBC
PHOSPHATE SERPL-MCNC: 2.3 MG/DL (ref 2.5–4.9)
PLATELET # BLD AUTO: 108 K/UL (ref 135–420)
PLATELET COMMENTS,PCOM: ABNORMAL
POTASSIUM SERPL-SCNC: 3.7 MMOL/L (ref 3.5–5.5)
RBC # BLD AUTO: 4.18 M/UL (ref 4.7–5.5)
RBC MORPH BLD: ABNORMAL
RBC MORPH BLD: ABNORMAL
SODIUM SERPL-SCNC: 156 MMOL/L (ref 136–145)
WBC # BLD AUTO: 6 K/UL (ref 4.6–13.2)

## 2021-04-02 PROCEDURE — 65660000000 HC RM CCU STEPDOWN

## 2021-04-02 PROCEDURE — 74011000258 HC RX REV CODE- 258: Performed by: NURSE PRACTITIONER

## 2021-04-02 PROCEDURE — 36415 COLL VENOUS BLD VENIPUNCTURE: CPT

## 2021-04-02 PROCEDURE — 74011250636 HC RX REV CODE- 250/636: Performed by: EMERGENCY MEDICINE

## 2021-04-02 PROCEDURE — 74011000258 HC RX REV CODE- 258: Performed by: FAMILY MEDICINE

## 2021-04-02 PROCEDURE — 74011250636 HC RX REV CODE- 250/636: Performed by: FAMILY MEDICINE

## 2021-04-02 PROCEDURE — 2709999900 HC NON-CHARGEABLE SUPPLY

## 2021-04-02 PROCEDURE — 99232 SBSQ HOSP IP/OBS MODERATE 35: CPT | Performed by: FAMILY MEDICINE

## 2021-04-02 PROCEDURE — 84100 ASSAY OF PHOSPHORUS: CPT

## 2021-04-02 PROCEDURE — 05H933Z INSERTION OF INFUSION DEVICE INTO RIGHT BRACHIAL VEIN, PERCUTANEOUS APPROACH: ICD-10-PCS | Performed by: RADIOLOGY

## 2021-04-02 PROCEDURE — 74011250636 HC RX REV CODE- 250/636: Performed by: NURSE PRACTITIONER

## 2021-04-02 PROCEDURE — 92526 ORAL FUNCTION THERAPY: CPT

## 2021-04-02 PROCEDURE — 85025 COMPLETE CBC W/AUTO DIFF WBC: CPT

## 2021-04-02 PROCEDURE — C1751 CATH, INF, PER/CENT/MIDLINE: HCPCS

## 2021-04-02 PROCEDURE — 74011250636 HC RX REV CODE- 250/636: Performed by: INTERNAL MEDICINE

## 2021-04-02 PROCEDURE — 82962 GLUCOSE BLOOD TEST: CPT

## 2021-04-02 PROCEDURE — 83735 ASSAY OF MAGNESIUM: CPT

## 2021-04-02 PROCEDURE — 80048 BASIC METABOLIC PNL TOTAL CA: CPT

## 2021-04-02 RX ADMIN — ENOXAPARIN SODIUM 40 MG: 40 INJECTION SUBCUTANEOUS at 09:46

## 2021-04-02 RX ADMIN — LEVOFLOXACIN 750 MG: 750 INJECTION, SOLUTION INTRAVENOUS at 15:58

## 2021-04-02 RX ADMIN — LEVETIRACETAM 750 MG: 100 INJECTION, SOLUTION INTRAVENOUS at 15:59

## 2021-04-02 RX ADMIN — POTASSIUM CHLORIDE AND DEXTROSE MONOHYDRATE: 150; 5 INJECTION, SOLUTION INTRAVENOUS at 05:25

## 2021-04-02 RX ADMIN — Medication 10 ML: at 22:38

## 2021-04-02 RX ADMIN — LEVETIRACETAM 750 MG: 100 INJECTION, SOLUTION INTRAVENOUS at 20:51

## 2021-04-02 RX ADMIN — Medication 10 ML: at 05:32

## 2021-04-02 RX ADMIN — THIAMINE HYDROCHLORIDE 200 MG: 100 INJECTION, SOLUTION INTRAMUSCULAR; INTRAVENOUS at 15:59

## 2021-04-02 NOTE — PROGRESS NOTES
Problem: Dysphagia (Adult)  Goal: *Acute Goals and Plan of Care (Insert Text)  Description: Patient will:  1. Tolerate PO trials with 0 s/s overt distress in 4/5 trials  2. Utilize compensatory swallow strategies/maneuvers (decrease bite/sip, size/rate, alt. liq/sol) with min cues in 4/5 trials  3. Perform oral-motor/laryngeal exercises to increase oropharyngeal swallow function with min cues    Recommend:   NPO   Strict aspiration precautions (HOB >30 degrees at all times, Oral care TID)? alternative means of nutrition or comfort feeds   May have 2-3 ice chips or 2-3 bites of applesauce following oral care for comfort/swallow stim when alert/awake      Outcome: Progressing Towards Goal     SPEECH LANGUAGE PATHOLOGY DYSPHAGIA TREATMENT    Patient: Keagan Curry (34 y.o. male)  Date: 4/2/2021  Diagnosis: Hypernatremia [H83.1]  Acute metabolic encephalopathy [O74.61]  Dehydration [E86.0]  COVID-19 [U07.1] <principal problem not specified>       Precautions: Aspiration    PLOF:Per H&P     ASSESSMENT:  Pt seen for ongoing dysphagia management. Pt with improved alertness, able to remain awake and interactive i'ly throughout session. Attempting to communicate in conversation, however, unintelligible. Pt given PO trials ice chips, thin and nectar thick liquids via cup/straw/tandem drinking, and puree textures. Pt with immediate cough following thin liquids. Ice chips, nectar thick, and puree textures taken without s/sx airway compromise. Delayed swallow initiation and audible swallow noted. Recommend continue NPO, consider alternate means nutrition/hydration. Pt may have 2-3 ice chips or 2-3 bites of applesauce after oral care under RN supervision for comfort and swallow stim. Pt would benefit from consistent PO trials next week for consideration of diet initiation pending sustained alertness.     Progression toward goals:  [x]         Improving appropriately and progressing toward goals  []         Improving slowly and progressing toward goals  []         Not making progress toward goals and plan of care will be adjusted     PLAN:  Recommendations and Planned Interventions:  See above. Patient continues to benefit from skilled intervention to address the above impairments. Continue treatment per established plan of care. Discharge Recommendations: To Be Determined     SUBJECTIVE:   Patient stated OK. OBJECTIVE:   Cognitive and Communication Status:  Neurologic State: Alert  Orientation Level: Oriented to person  Cognition: Decreased command following  Perception: Appears intact  Perseveration: No perseveration noted  Safety/Judgement: Fall prevention  Dysphagia Treatment:  Oral Assessment:  Oral Assessment  Labial: No impairment  Dentition: Natural  Oral Hygiene: Fair  Lingual: No impairment  Velum: No impairment  Mandible: No impairment  P.O. Trials:   Patient Position: HOB 65*   Vocal quality prior to P.O.: No impairment, Other (comment)(unintelligible)   Consistency Presented: Ice chips, Thin liquid, Nectar thick liquid, Puree   How Presented: SLP-fed/presented, Straw, Successive swallows, Spoon       Bolus Acceptance: No impairment   Bolus Formation/Control: No impairment   Type of Impairment: Delayed, Posterior, Poor, Mastication   Propulsion: No impairment   Oral Residue: None   Initiation of Swallow: Delayed (# of seconds)   Laryngeal Elevation: Decreased   Aspiration Signs/Symptoms: Strong cough   Pharyngeal Phase Characteristics: Audible swallow, Easily fatigued , Effortful swallow, Poor endurance, Suspected pharyngeal residue   Effective Modifications: None   Cues for Modifications:  Moderate         Oral Phase Severity: Mild   Pharyngeal Phase Severity : Moderate       PAIN:  Pain level pre-treatment: 0/10   Pain level post-treatment: 0/10     After treatment:   []              Patient left in no apparent distress sitting up in chair  [x]              Patient left in no apparent distress in bed  [x] Call bell left within reach  [x]              Nursing notified  []              Family present  []              Caregiver present  []              Bed alarm activated      COMMUNICATION/EDUCATION:   [x] Aspiration precautions; swallow safety; compensatory techniques  [x]        Patient unable to participate in education; education ongoing with staff  []  Posted safety precautions in patient's room.   [] Oral-motor/laryngeal strengthening exercises      Thank you for this referral.    Real Tellez M.S., CCC-SLP  Speech-Language Pathologist    Time Calculation: 15 mins

## 2021-04-02 NOTE — PROGRESS NOTES
1930- Received report from off going RN. Patient in bed with HOB elevated. Speech incomprehensible. 2100- Assessment and vital signs done. 0300- Resting. 0500- Accucheck 74. Patient resting in bed with eyes open  0640- Entered patient room. IV to right hand edematous. IV fluid stopped. Warm compress to hand and arm. Right hand elevated on pillow. HOB elevated. 0700- Assessed arms for possible IV access sites without success. RIght hand and forearm wrapped with warm compress and elevated on pillow. Siderails up with bed alarm in use. 0730- Bedside and Verbal shift change report given to JOSEFA Lowery (oncoming nurse) by JOSEFA waldron (offgoing nurse). Report included the following information SBAR, Kardex and MAR.

## 2021-04-02 NOTE — PROGRESS NOTES
Nutrition Note    Patient remains NPO per SLP s/p follow up swallow evaluation today. Palliative following to address goals of care, code status now DNR with limited interventions including long term feeding tube placement if indicated. Hypernatremia continues to improve on IVF (D5 + 20 mEq/L KCl at 125 mL/hr to provide 150 gm dextrose, 510 kcal, 60 mEq KCl per day)- currently held due to lack of access with plan for midline placement by IR today per chart and nursing report. Recommendations/Plan   - Once able to obtain enteral access, initiate tube feeding of Jevity 1.5 at 10 mL/hr and advance as tolerated by 10 mL q 12 hours to goal rate of 60 mL/hr with 200 mL q 4 hour water flushes (goal regimen to provide 2160 kcal, 92 gm protein, 1094 mL free water, 100% RDIs). - Monitor and replace electrolytes as needed due to concern for refeeding syndrome.  Provide thiamine and multivitamin.   - IVF per MD.       Electronically signed by Mindy Man RD, [address]  on 4/2/2021     Contact: [phone]

## 2021-04-02 NOTE — PROGRESS NOTES
[facility] Hospitalists  Progress Note    Patient: Kelsey Avila Age: 77 y.o. : 1955 MR#: [id number] SSN: [id number]  Date: 2021     Subjective/24-hour events:     Remains without any acute respiratory difficulty/complaints - oxygen sats also stable. Mental status has continued to improve. Lost IV access. earlier this afternoon - nursing unable to replace to this point. Assessment:   Severe hypernatremia   Acute metabolic encephalopathy  GILLO-19 infection, asymptomatic  Dysphagia  HTN  Anemia   Seizure disorder  Cerebrovascular disease with hx of CVA    Plan:   Midline catheter placement per IR - assistance appreciated. Resume fluids as soon as IV access re-established. Monitor electrolytes/replace further as necessary. Will likely place NGT tomorrow as it is late in day today. Initiate TF per RD recs. Have patient evaluated for PEG placement early next week if no improvement over weekend. Continue current medical management/supportive care as ordered otherwise. Follow. Case discussed with:  [x]Patient  []Family  [x]Nursing  [x]Case Management  DVT Prophylaxis:  []Lovenox  []Hep SQ  []SCDs  []Coumadin   []On Heparin gtt    Objective:   VS:   Visit Vitals  BP (!) 144/86   Pulse 79   Temp 98 °F (36.7 °C)   Resp 18   Ht 5' 8\" (1.727 m)   Wt 71.4 kg (157 lb 4.8 oz)   SpO2 100%   BMI 23.92 kg/m²      Tmax/24hrs: Temp (24hrs), Av.9 °F (36.6 °C), Min:97.5 °F (36.4 °C), Max:98.2 °F (36.8 °C)      Intake/Output Summary (Last 24 hours) at 2021 1616  Last data filed at 2021 0528  Gross per 24 hour   Intake 1600 ml   Output 1000 ml   Net 600 ml       General:  In NAD. Nontoxic-appearing. Cardiovascular:  RRR. Pulmonary:  Lungs clear bilaterally, no wheezes. No accessory muscle use. GI:  Abdomen soft, NTTP. Extremities:  Warm, no edema or ischemia. Neuro:  Awake and alert.     Labs:    Recent Results (from the past 24 hour(s))   GLUCOSE, POC    Collection Time: 04/01/21  6:00 PM   Result Value Ref Range    Glucose (POC) 103 70 - 110 mg/dL   GLUCOSE, POC    Collection Time: 04/01/21 11:24 PM   Result Value Ref Range    Glucose (POC) 80 70 - 110 mg/dL   GLUCOSE, POC    Collection Time: 04/02/21  4:59 AM   Result Value Ref Range    Glucose (POC) 74 70 - 110 mg/dL   CBC WITH AUTOMATED DIFF    Collection Time: 04/02/21 10:20 AM   Result Value Ref Range    WBC 6.0 4.6 - 13.2 K/uL    RBC 4.18 (L) 4.70 - 5.50 M/uL    HGB 11.1 (L) 13.0 - 16.0 g/dL    HCT 36.0 36.0 - 48.0 %    MCV 86.1 74.0 - 97.0 FL    MCH 26.6 24.0 - 34.0 PG    MCHC 30.8 (L) 31.0 - 37.0 g/dL    RDW 13.3 11.6 - 14.5 %    PLATELET 916 (L) 483 - 420 K/uL    NEUTROPHILS 66 42 - 75 %    LYMPHOCYTES 23 20 - 51 %    MONOCYTES 1 (L) 2 - 9 %    EOSINOPHILS 10 (H) 0 - 5 %    BASOPHILS 0 0 - 3 %    NRBC 2.0 (H) 0  WBC    ABS. NEUTROPHILS 3.9 1.8 - 8.0 K/UL    ABS. LYMPHOCYTES 1.4 0.8 - 3.5 K/UL    ABS. MONOCYTES 0.1 0 - 1.0 K/UL    ABS. EOSINOPHILS 0.6 (H) 0.0 - 0.4 K/UL    ABS.  BASOPHILS 0.0 0.0 - 0.06 K/UL    DF MANUAL      PLATELET COMMENTS ADEQUATE PLATELETS      RBC COMMENTS HYPOCHROMIA  1+        RBC COMMENTS OVALOCYTES  FEW       MAGNESIUM    Collection Time: 04/02/21 10:20 AM   Result Value Ref Range    Magnesium 2.8 (H) 1.6 - 2.6 mg/dL   PHOSPHORUS    Collection Time: 04/02/21 10:20 AM   Result Value Ref Range    Phosphorus 2.3 (L) 2.5 - 4.9 MG/DL   METABOLIC PANEL, BASIC    Collection Time: 04/02/21 10:20 AM   Result Value Ref Range    Sodium 156 (H) 136 - 145 mmol/L    Potassium 3.7 3.5 - 5.5 mmol/L    Chloride 124 (H) 100 - 111 mmol/L    CO2 29 21 - 32 mmol/L    Anion gap 3 3.0 - 18 mmol/L    Glucose 76 74 - 99 mg/dL    BUN 9 7.0 - 18 MG/DL    Creatinine 0.50 (L) 0.6 - 1.3 MG/DL    BUN/Creatinine ratio 18 12 - 20      GFR est AA >60 >60 ml/min/1.73m2    GFR est non-AA >60 >60 ml/min/1.73m2    Calcium 8.6 8.5 - 10.1 MG/DL   GLUCOSE, POC    Collection Time: 04/02/21  2:09 PM   Result Value Ref Range Glucose (POC) 91 70 - 110 mg/dL       Signed By: Larry Echevarria MD     April 2, 2021

## 2021-04-02 NOTE — PROGRESS NOTES
CM called patient's son Viviana Carmona [phone], and he is agreeable to patient returning to [address] [place] [address] for LTC when medically stable for discharge. CM called and spoke to [facility] at [address] [place] [address], and patient can return to LTC at their facility when medically clear for discharge. Patient will not need Covid testing for return to [address] [place] [address].         Sade Martel RN  Case Management [phone]

## 2021-04-02 NOTE — PROGRESS NOTES
Doing OK, NPO, not safe to have PO food, Speech Path is following. IVF is on on hold as no IV line, IR will insert Mid line today. Reviewed lab result, Na is continued to improve . Restart IVf once has IV line. [facility] will cover for me in the week end.

## 2021-04-03 ENCOUNTER — APPOINTMENT (OUTPATIENT)
Dept: GENERAL RADIOLOGY | Age: 66
DRG: 640 | End: 2021-04-03
Attending: FAMILY MEDICINE
Payer: COMMERCIAL

## 2021-04-03 LAB
ANION GAP SERPL CALC-SCNC: 4 MMOL/L (ref 3–18)
BASOPHILS # BLD: 0 K/UL (ref 0–0.1)
BASOPHILS NFR BLD: 0 % (ref 0–2)
BUN SERPL-MCNC: 6 MG/DL (ref 7–18)
BUN/CREAT SERPL: 12 (ref 12–20)
CALCIUM SERPL-MCNC: 8.6 MG/DL (ref 8.5–10.1)
CHLORIDE SERPL-SCNC: 124 MMOL/L (ref 100–111)
CO2 SERPL-SCNC: 27 MMOL/L (ref 21–32)
CREAT SERPL-MCNC: 0.52 MG/DL (ref 0.6–1.3)
DIFFERENTIAL METHOD BLD: ABNORMAL
EOSINOPHIL # BLD: 0.2 K/UL (ref 0–0.4)
EOSINOPHIL NFR BLD: 3 % (ref 0–5)
ERYTHROCYTE [DISTWIDTH] IN BLOOD BY AUTOMATED COUNT: 13.2 % (ref 11.6–14.5)
GLUCOSE BLD STRIP.AUTO-MCNC: 113 MG/DL (ref 70–110)
GLUCOSE BLD STRIP.AUTO-MCNC: 75 MG/DL (ref 70–110)
GLUCOSE BLD STRIP.AUTO-MCNC: 97 MG/DL (ref 70–110)
GLUCOSE SERPL-MCNC: 84 MG/DL (ref 74–99)
HCT VFR BLD AUTO: 35.5 % (ref 36–48)
HGB BLD-MCNC: 11.2 G/DL (ref 13–16)
LYMPHOCYTES # BLD: 1.2 K/UL (ref 0.9–3.6)
LYMPHOCYTES NFR BLD: 23 % (ref 21–52)
MAGNESIUM SERPL-MCNC: 2.2 MG/DL (ref 1.6–2.6)
MCH RBC QN AUTO: 26.9 PG (ref 24–34)
MCHC RBC AUTO-ENTMCNC: 31.5 G/DL (ref 31–37)
MCV RBC AUTO: 85.3 FL (ref 74–97)
MONOCYTES # BLD: 0.4 K/UL (ref 0.05–1.2)
MONOCYTES NFR BLD: 7 % (ref 3–10)
NEUTS SEG # BLD: 3.4 K/UL (ref 1.8–8)
NEUTS SEG NFR BLD: 67 % (ref 40–73)
PHOSPHATE SERPL-MCNC: 2.1 MG/DL (ref 2.5–4.9)
PLATELET # BLD AUTO: 93 K/UL (ref 135–420)
PLATELET COMMENTS,PCOM: ABNORMAL
POTASSIUM SERPL-SCNC: 3.6 MMOL/L (ref 3.5–5.5)
RBC # BLD AUTO: 4.16 M/UL (ref 4.7–5.5)
SODIUM SERPL-SCNC: 155 MMOL/L (ref 136–145)
WBC # BLD AUTO: 5.2 K/UL (ref 4.6–13.2)

## 2021-04-03 PROCEDURE — 84100 ASSAY OF PHOSPHORUS: CPT

## 2021-04-03 PROCEDURE — 74011250636 HC RX REV CODE- 250/636: Performed by: NURSE PRACTITIONER

## 2021-04-03 PROCEDURE — 74011250636 HC RX REV CODE- 250/636: Performed by: FAMILY MEDICINE

## 2021-04-03 PROCEDURE — 2709999900 HC NON-CHARGEABLE SUPPLY

## 2021-04-03 PROCEDURE — 74011000258 HC RX REV CODE- 258: Performed by: FAMILY MEDICINE

## 2021-04-03 PROCEDURE — 83735 ASSAY OF MAGNESIUM: CPT

## 2021-04-03 PROCEDURE — 74011250636 HC RX REV CODE- 250/636: Performed by: INTERNAL MEDICINE

## 2021-04-03 PROCEDURE — 99232 SBSQ HOSP IP/OBS MODERATE 35: CPT | Performed by: FAMILY MEDICINE

## 2021-04-03 PROCEDURE — 74011250636 HC RX REV CODE- 250/636: Performed by: EMERGENCY MEDICINE

## 2021-04-03 PROCEDURE — 77030038269 HC DRN EXT URIN PURWCK BARD -A

## 2021-04-03 PROCEDURE — 74011000250 HC RX REV CODE- 250: Performed by: FAMILY MEDICINE

## 2021-04-03 PROCEDURE — 36415 COLL VENOUS BLD VENIPUNCTURE: CPT

## 2021-04-03 PROCEDURE — 82962 GLUCOSE BLOOD TEST: CPT

## 2021-04-03 PROCEDURE — 74011000258 HC RX REV CODE- 258: Performed by: NURSE PRACTITIONER

## 2021-04-03 PROCEDURE — 65660000000 HC RM CCU STEPDOWN

## 2021-04-03 PROCEDURE — 80048 BASIC METABOLIC PNL TOTAL CA: CPT

## 2021-04-03 PROCEDURE — 85025 COMPLETE CBC W/AUTO DIFF WBC: CPT

## 2021-04-03 RX ADMIN — Medication 10 ML: at 22:00

## 2021-04-03 RX ADMIN — LEVETIRACETAM 750 MG: 100 INJECTION, SOLUTION INTRAVENOUS at 10:33

## 2021-04-03 RX ADMIN — LEVETIRACETAM 750 MG: 100 INJECTION, SOLUTION INTRAVENOUS at 21:28

## 2021-04-03 RX ADMIN — LEVOFLOXACIN 750 MG: 750 INJECTION, SOLUTION INTRAVENOUS at 15:07

## 2021-04-03 RX ADMIN — POTASSIUM CHLORIDE AND DEXTROSE MONOHYDRATE: 150; 5 INJECTION, SOLUTION INTRAVENOUS at 10:30

## 2021-04-03 RX ADMIN — ENOXAPARIN SODIUM 40 MG: 40 INJECTION SUBCUTANEOUS at 10:32

## 2021-04-03 RX ADMIN — POTASSIUM CHLORIDE AND DEXTROSE MONOHYDRATE: 150; 5 INJECTION, SOLUTION INTRAVENOUS at 01:19

## 2021-04-03 RX ADMIN — POTASSIUM CHLORIDE: 149 INJECTION, SOLUTION, CONCENTRATE INTRAVENOUS at 17:26

## 2021-04-03 RX ADMIN — Medication 10 ML: at 06:28

## 2021-04-03 RX ADMIN — THIAMINE HYDROCHLORIDE 200 MG: 100 INJECTION, SOLUTION INTRAMUSCULAR; INTRAVENOUS at 10:33

## 2021-04-03 NOTE — PROGRESS NOTES
Problem: Falls - Risk of  Goal: *Absence of Falls  Description: Document Lilton Broad Fall Risk and appropriate interventions in the flowsheet. Outcome: Resolved/Met     Problem: Patient Education: Go to Patient Education Activity  Goal: Patient/Family Education  Outcome: Resolved/Met     Problem: Pressure Injury - Risk of  Goal: *Prevention of pressure injury  Description: Document Brian Scale and appropriate interventions in the flowsheet. Outcome: Resolved/Met     Problem: Patient Education: Go to Patient Education Activity  Goal: Patient/Family Education  Outcome: Resolved/Met     Problem: Nutrition Deficit  Goal: *Optimize nutritional status  Outcome: Resolved/Met     Problem: Patient Education: Go to Patient Education Activity  Goal: Patient/Family Education  Outcome: Resolved/Met     Problem: Falls - Risk of  Goal: *Absence of Falls  Description: Document Abena Fall Risk and appropriate interventions in the flowsheet. Outcome: Resolved/Met     Problem: Patient Education: Go to Patient Education Activity  Goal: Patient/Family Education  Outcome: Resolved/Met     Problem: Pressure Injury - Risk of  Goal: *Prevention of pressure injury  Description: Document Brian Scale and appropriate interventions in the flowsheet.   Outcome: Resolved/Met     Problem: Patient Education: Go to Patient Education Activity  Goal: Patient/Family Education  Outcome: Resolved/Met     Problem: Nutrition Deficit  Goal: *Optimize nutritional status  Outcome: Resolved/Met     Problem: Patient Education: Go to Patient Education Activity  Goal: Patient/Family Education  Outcome: Resolved/Met

## 2021-04-03 NOTE — PROGRESS NOTES
[facility]  [facility]    Patient: Nicholas Piper Age: 77 y.o. : 1955 MR#: [id number] SSN: [id number]  Date: 4/3/2021     Subjective/24-hour events:     Nothing new/acute overnight. Assessment:   Severe hypernatremia   Acute metabolic encephalopathy  FQAMA-34 infection, asymptomatic  Dysphagia  HTN  Anemia   Seizure disorder  Cerebrovascular disease with hx of CVA    Plan:   Place NGT today and initiate TF per RD recommendations. Continue IVF as ordered in interim. Monitor electrolytes/replace further as necessary. Continue other meds and supportive care otherwise. Will need follow up SLP evaluation for in next couple of days - if no improvement in swallowing, will need to have patient evaluated for PEG placement. Case discussed with:  [x]Patient  []Family  [x]Nursing  []Case Management  DVT Prophylaxis:  [x]Lovenox  []Hep SQ  []SCDs  []Coumadin   []On Heparin gtt    Objective:   VS:   Visit Vitals  /86 (BP 1 Location: Left upper arm, BP Patient Position: At rest)   Pulse 77   Temp 97.5 °F (36.4 °C)   Resp 18   Ht 5' 8\" (1.727 m)   Wt 72.3 kg (159 lb 6.4 oz)   SpO2 97%   BMI 24.24 kg/m²      Tmax/24hrs: Temp (24hrs), Av.1 °F (36.7 °C), Min:97.5 °F (36.4 °C), Max:98.5 °F (36.9 °C)      Intake/Output Summary (Last 24 hours) at 4/3/2021 1115  Last data filed at 4/3/2021 0411  Gross per 24 hour   Intake 0 ml   Output 3400 ml   Net -3400 ml       General:  In NAD. Nontoxic-appearing. Cardiovascular:  RRR. Pulmonary:  Lungs clear bilaterally, no wheezes. GI:  Abdomen soft, NTTP. Extremities:  Warm, no edema or ischemia. Neuro:  Awake and alert. Moves extremities spontaneously.     Labs:    Recent Results (from the past 24 hour(s))   GLUCOSE, POC    Collection Time: 21  2:09 PM   Result Value Ref Range    Glucose (POC) 91 70 - 110 mg/dL   GLUCOSE, POC    Collection Time: 21  6:38 PM   Result Value Ref Range    Glucose (POC) 107 70 - 110 mg/dL   GLUCOSE, POC    Collection Time: 04/02/21 11:46 PM   Result Value Ref Range    Glucose (POC) 89 70 - 110 mg/dL   CBC WITH AUTOMATED DIFF    Collection Time: 04/03/21  1:09 AM   Result Value Ref Range    WBC 5.2 4.6 - 13.2 K/uL    RBC 4.16 (L) 4.70 - 5.50 M/uL    HGB 11.2 (L) 13.0 - 16.0 g/dL    HCT 35.5 (L) 36.0 - 48.0 %    MCV 85.3 74.0 - 97.0 FL    MCH 26.9 24.0 - 34.0 PG    MCHC 31.5 31.0 - 37.0 g/dL    RDW 13.2 11.6 - 14.5 %    PLATELET 93 (L) 448 - 420 K/uL    NEUTROPHILS 67 40 - 73 %    LYMPHOCYTES 23 21 - 52 %    MONOCYTES 7 3 - 10 %    EOSINOPHILS 3 0 - 5 %    BASOPHILS 0 0 - 2 %    ABS. NEUTROPHILS 3.4 1.8 - 8.0 K/UL    ABS. LYMPHOCYTES 1.2 0.9 - 3.6 K/UL    ABS. MONOCYTES 0.4 0.05 - 1.2 K/UL    ABS. EOSINOPHILS 0.2 0.0 - 0.4 K/UL    ABS.  BASOPHILS 0.0 0.0 - 0.1 K/UL    DF AUTOMATED      PLATELET COMMENTS DECREASED PLATELETS     MAGNESIUM    Collection Time: 04/03/21  1:09 AM   Result Value Ref Range    Magnesium 2.2 1.6 - 2.6 mg/dL   PHOSPHORUS    Collection Time: 04/03/21  1:09 AM   Result Value Ref Range    Phosphorus 2.1 (L) 2.5 - 4.9 MG/DL   METABOLIC PANEL, BASIC    Collection Time: 04/03/21  1:09 AM   Result Value Ref Range    Sodium 155 (H) 136 - 145 mmol/L    Potassium 3.6 3.5 - 5.5 mmol/L    Chloride 124 (H) 100 - 111 mmol/L    CO2 27 21 - 32 mmol/L    Anion gap 4 3.0 - 18 mmol/L    Glucose 84 74 - 99 mg/dL    BUN 6 (L) 7.0 - 18 MG/DL    Creatinine 0.52 (L) 0.6 - 1.3 MG/DL    BUN/Creatinine ratio 12 12 - 20      GFR est AA >60 >60 ml/min/1.73m2    GFR est non-AA >60 >60 ml/min/1.73m2    Calcium 8.6 8.5 - 10.1 MG/DL   GLUCOSE, POC    Collection Time: 04/03/21  6:02 AM   Result Value Ref Range    Glucose (POC) 113 (H) 70 - 110 mg/dL       Signed By: Anastasia Leyden, MD     April 3, 2021

## 2021-04-03 NOTE — PROGRESS NOTES
Problem: General Medical Care Plan  Goal: *Vital signs within specified parameters  Outcome: Progressing Towards Goal  Goal: *Labs within defined limits  Outcome: Progressing Towards Goal  Goal: *Absence of infection signs and symptoms  Outcome: Progressing Towards Goal  Goal: *Optimal pain control at patient's stated goal  Outcome: Progressing Towards Goal  Goal: *Skin integrity maintained  Outcome: Progressing Towards Goal  Goal: *Fluid volume balance  Outcome: Progressing Towards Goal  Goal: *Optimize nutritional status  Outcome: Progressing Towards Goal  Goal: *Anxiety reduced or absent  Outcome: Progressing Towards Goal  Goal: *Progressive mobility and function (eg: ADL's)  Outcome: Progressing Towards Goal     Problem: Patient Education: Go to Patient Education Activity  Goal: Patient/Family Education  Outcome: Progressing Towards Goal     Problem: Falls - Risk of  Goal: *Absence of Falls  Description: Document Abena Fall Risk and appropriate interventions in the flowsheet. Outcome: Progressing Towards Goal  Note: Fall Risk Interventions:       Mentation Interventions: More frequent rounding, Bed/chair exit alarm, Evaluate medications/consider consulting pharmacy    Medication Interventions: Bed/chair exit alarm    Elimination Interventions: Toileting schedule/hourly rounds              Problem: Patient Education: Go to Patient Education Activity  Goal: Patient/Family Education  Outcome: Progressing Towards Goal     Problem: Pressure Injury - Risk of  Goal: *Prevention of pressure injury  Description: Document Brian Scale and appropriate interventions in the flowsheet.   Outcome: Progressing Towards Goal  Note: Pressure Injury Interventions:  Sensory Interventions: Assess changes in LOC, Minimize linen layers    Moisture Interventions: Minimize layers    Activity Interventions: Pressure redistribution bed/mattress(bed type)    Mobility Interventions: Pressure redistribution bed/mattress (bed type)    Nutrition Interventions: Document food/fluid/supplement intake    Friction and Shear Interventions: Minimize layers                Problem: Patient Education: Go to Patient Education Activity  Goal: Patient/Family Education  Outcome: Progressing Towards Goal

## 2021-04-03 NOTE — ROUTINE PROCESS
Bedside and Verbal shift change report given to Humphrey (oncoming nurse) by Moisés Graham RN (offgoing nurse). Report included the following information SBAR, Kardex, MAR and Recent Results.     SITUATION:    Code Status: DNR   Reason for Admission: Hypernatremia [R56.6]   Acute metabolic encephalopathy [C54.68]   Dehydration [E86.0]   COVID-19 [U07.1]    [facility] day: 4   Problem List:       Hospital Problems  Date Reviewed: 3/31/2021          Codes Class Noted POA    Hematuria ICD-10-CM: R31.9  ICD-9-CM: 599.70  3/31/2021 Unknown        Dysuria ICD-10-CM: R30.0  ICD-9-CM: 788.1  3/31/2021 Unknown        Goals of care, counseling/discussion ICD-10-CM: Z71.89  ICD-9-CM: V65.49  Unknown Unknown        Debility ICD-10-CM: R53.81  ICD-9-CM: 799.3  Unknown Unknown        Hypernatremia ICD-10-CM: E87.0  ICD-9-CM: 276.0  3/30/2021 Unknown        Acute metabolic encephalopathy CYI-12-WS: G93.41  ICD-9-CM: 348.31  3/30/2021 Unknown        Dehydration ICD-10-CM: E86.0  ICD-9-CM: 276.51  3/30/2021 Unknown        COVID-19 ICD-10-CM: U07.1  ICD-9-CM: 079.89  3/30/2021 Unknown        Severe protein-calorie malnutrition ([facility] [address] ICD-10-CM: H95  ICD-9-CM: 637  9/17/2013 Yes              BACKGROUND:    Past Medical History:   Past Medical History:   Diagnosis Date    Hiccups     Hypertension     Hyponatremia     Lacunar infarction ([facility] [address] 2010    Lower extremity edema     Psychogenic polydipsia     Spinal stenosis          Patient taking anticoagulants yes     ASSESSMENT:    Changes in Assessment Throughout Shift: No     Patient has Central Line: yes Reasons if yes: Long term access needed   Patient has Reid Cath: no Reasons if yes: Condom Cath      Last Vitals:     Vitals:    04/02/21 1948 04/02/21 2348 04/03/21 0339 04/03/21 0724   BP: (!) 147/95 (!) 149/89 (!) 144/91 138/86   Pulse: (!) 106 85 97 77   Resp: 18 18 18 18   Temp: 98.5 °F (36.9 °C) 98 °F (36.7 °C) 98.1 °F (36.7 °C) 97.5 °F (36.4 °C)   SpO2: 96% 98% 98% 97%   Weight:  72.3 kg (159 lb 6.4 oz)     Height:            IV and DRAINS (will only show if present)   [REMOVED] Peripheral IV 03/30/21 Left Wrist-Site Assessment: Clean, dry, & intact  Midline Catheter 04/02/21 Right;Brachial-Site Assessment: Clean, dry, & intact  [REMOVED] Peripheral IV 03/30/21 Right Hand-Site Assessment: Clean, dry, & intact     WOUND (if present)   Wound Type:  none   Dressing present Dressing Present : No   Wound Concerns/Notes:  none     PAIN    Pain Assessment    Pain Intensity 1: 0 (04/02/21 2348)              Patient Stated Pain Goal: Unable to verbalize/indicate pain  o Interventions for Pain:  none  o Intervention effective: no  o Time of last intervention: N/A   o Reassessment Completed: no      Last 3 Weights:  Last 3 Recorded Weights in this Encounter    03/30/21 1443 04/01/21 0431 04/02/21 2348   Weight: 85.7 kg (189 lb) 71.4 kg (157 lb 4.8 oz) 72.3 kg (159 lb 6.4 oz)     Weight change:      INTAKE/OUPUT    Current Shift: No intake/output data recorded. Last three shifts: 04/01 1901 - 04/03 0700  In: 1600 [I.V.:1600]  Out: 4050 [Urine:4050]     LAB RESULTS     Recent Labs     04/03/21  0109 04/02/21  1020 04/01/21  0229   WBC 5.2 6.0 5.4   HGB 11.2* 11.1* 9.7*   HCT 35.5* 36.0 32.2*   PLT 93* 108* 113*        Recent Labs     04/03/21  0109 04/02/21  1020 04/01/21  0229   * 156* 159*   K 3.6 3.7 3.3*   GLU 84 76 119*   BUN 6* 9 15   CREA 0.52* 0.50* 0.47*   CA 8.6 8.6 8.2*   MG 2.2 2.8* 2.3       RECOMMENDATIONS AND DISCHARGE PLANNING     1. Pending tests/procedures/ Plan of Care or Other Needs: N/A     2. Discharge plan for patient and Needs/Barriers: AMS    3. Estimated Discharge Date: N/A Posted on Whiteboard in Patients Room: no      4. The patient's care plan was reviewed with the oncoming nurse.        \"HEALS\" SAFETY CHECK      Fall Risk    Total Score: 2    Safety Measures: Safety Measures: Bed/Chair alarm on, Bed in low position, Bed/Chair-Wheels locked, Call light within reach, Side rails X 3, Gripper socks    A safety check occurred in the patient's room between off going nurse and oncoming nurse listed above. The safety check included the below items  Area Items   H  High Alert Medications - Verify all high alert medication drips (heparin, PCA, etc.)   E  Equipment - Suction is set up for ALL patients (with erika)  - Red plugs utilized for all equipment (IV pumps, etc.)  - WOWs wiped down at end of shift.  - Room stocked with oxygen, suction, and other unit-specific supplies   A  Alarms - Bed alarm is set for fall risk patients  - Ensure chair alarm is in place and activated if patient is up in a chair   L  Lines - Check IV for any infiltration  - Reid bag is empty if patient has a Reid   - Tubing and IV bags are labeled   S  Safety   - Room is clean, patient is clean, and equipment is clean. - Hallways are clear from equipment besides carts. - Fall bracelet on for fall risk patients  - Ensure room is clear and free of clutter  - Suction is set up for ALL patients (with erika)  - Hallways are clear from equipment besides carts.    - Isolation precautions followed, supplies available outside room, sign posted     Italia Cole RN

## 2021-04-04 LAB
ANION GAP SERPL CALC-SCNC: 4 MMOL/L (ref 3–18)
BUN SERPL-MCNC: 4 MG/DL (ref 7–18)
BUN/CREAT SERPL: 9 (ref 12–20)
CALCIUM SERPL-MCNC: 8.8 MG/DL (ref 8.5–10.1)
CHLORIDE SERPL-SCNC: 118 MMOL/L (ref 100–111)
CO2 SERPL-SCNC: 27 MMOL/L (ref 21–32)
CREAT SERPL-MCNC: 0.44 MG/DL (ref 0.6–1.3)
GLUCOSE SERPL-MCNC: 97 MG/DL (ref 74–99)
MAGNESIUM SERPL-MCNC: 2.1 MG/DL (ref 1.6–2.6)
PHOSPHATE SERPL-MCNC: 2.3 MG/DL (ref 2.5–4.9)
POTASSIUM SERPL-SCNC: 4.1 MMOL/L (ref 3.5–5.5)
SODIUM SERPL-SCNC: 149 MMOL/L (ref 136–145)

## 2021-04-04 PROCEDURE — 84100 ASSAY OF PHOSPHORUS: CPT

## 2021-04-04 PROCEDURE — 80048 BASIC METABOLIC PNL TOTAL CA: CPT

## 2021-04-04 PROCEDURE — 74011000258 HC RX REV CODE- 258: Performed by: NURSE PRACTITIONER

## 2021-04-04 PROCEDURE — 65660000000 HC RM CCU STEPDOWN

## 2021-04-04 PROCEDURE — 74011000250 HC RX REV CODE- 250: Performed by: FAMILY MEDICINE

## 2021-04-04 PROCEDURE — 2709999900 HC NON-CHARGEABLE SUPPLY

## 2021-04-04 PROCEDURE — 74011000250 HC RX REV CODE- 250: Performed by: INTERNAL MEDICINE

## 2021-04-04 PROCEDURE — 74011250636 HC RX REV CODE- 250/636: Performed by: FAMILY MEDICINE

## 2021-04-04 PROCEDURE — 36415 COLL VENOUS BLD VENIPUNCTURE: CPT

## 2021-04-04 PROCEDURE — 74011000258 HC RX REV CODE- 258: Performed by: FAMILY MEDICINE

## 2021-04-04 PROCEDURE — 74011250636 HC RX REV CODE- 250/636: Performed by: NURSE PRACTITIONER

## 2021-04-04 PROCEDURE — 99232 SBSQ HOSP IP/OBS MODERATE 35: CPT | Performed by: FAMILY MEDICINE

## 2021-04-04 PROCEDURE — 83735 ASSAY OF MAGNESIUM: CPT

## 2021-04-04 RX ADMIN — POTASSIUM CHLORIDE: 149 INJECTION, SOLUTION, CONCENTRATE INTRAVENOUS at 20:04

## 2021-04-04 RX ADMIN — METOPROLOL TARTRATE 1.25 MG: 5 INJECTION, SOLUTION INTRAVENOUS at 10:47

## 2021-04-04 RX ADMIN — LEVETIRACETAM 750 MG: 100 INJECTION, SOLUTION INTRAVENOUS at 21:46

## 2021-04-04 RX ADMIN — THIAMINE HYDROCHLORIDE 200 MG: 100 INJECTION, SOLUTION INTRAMUSCULAR; INTRAVENOUS at 10:47

## 2021-04-04 RX ADMIN — Medication 10 ML: at 21:30

## 2021-04-04 RX ADMIN — Medication 10 ML: at 05:04

## 2021-04-04 RX ADMIN — Medication 10 ML: at 16:27

## 2021-04-04 RX ADMIN — LEVETIRACETAM 750 MG: 100 INJECTION, SOLUTION INTRAVENOUS at 09:42

## 2021-04-04 NOTE — PROGRESS NOTES
TRANSFER - IN REPORT:    Verbal report received from Tobi(name) on Scott Obregon  being received from 3N(unit) for routine progression of care      Report consisted of patients Situation, Background, Assessment and   Recommendations(SBAR). Information from the following report(s) SBAR, Kardex, ED Summary, OR Summary, Procedure Summary, Intake/Output, MAR, Recent Results and Cardiac Rhythm NSR/ST was reviewed with the receiving nurse. Opportunity for questions and clarification was provided. Assessment completed upon patients arrival to unit and care assumed.

## 2021-04-04 NOTE — PROGRESS NOTES
Bedside shift change report given to Ayesha (oncoming nurse) by Sandy Gooden (offgoing nurse). Report included the following information SBAR, Kardex, Procedure Summary, Intake/Output, MAR, Recent Results and Cardiac Rhythm NSR/ST.

## 2021-04-04 NOTE — ROUTINE PROCESS
During the shift change exam the staff nurse noticed that the patients Midline dressing was partially removed and the line was partially pulled out. Tape and gauze were collected and the midline was removed. The staff nurse attempted x1 to gain new access without success. MD notified. 2035: Attempted to call report to [place] [address] placed on hold; No report accepted. Report call to [address] [place] [address] at 2140, accepting Rn stated that the patients room will be changed to 216 and the room should be ready  after cleaning approximately 0000. Transferred Patient to [address] [place] [address] bed; gave a Junious Ace RN a bedside update on the patient. All patient belonging were transferred with patient. (white t-shirt and blanket)    TRANSFER - OUT REPORT:    Verbal report given to Stef Schwarz on Matt Hanna  being transferred to [place] [address] for routine progression of care       Report consisted of patients Situation, Background, Assessment and   Recommendations(SBAR). Information from the following report(s) SBAR, Kardex, Intake/Output, MAR and Med Rec Status was reviewed with the receiving nurse. Lines:   Peripheral IV 04/03/21 (Active)       Peripheral IV 04/03/21 Anterior; Left Forearm (Active)   Site Assessment Clean, dry, & intact 04/03/21 2350   Phlebitis Assessment 0 04/03/21 2350   Infiltration Assessment 0 04/03/21 2350   Dressing Status Clean, dry, & intact 04/03/21 2128   Dressing Type Transparent 04/03/21 2128   Hub Color/Line Status Infusing;Patent 04/03/21 2350   Action Taken Open ports on tubing capped 04/03/21 2128   Alcohol Cap Used Yes 04/03/21 2128        Opportunity for questions and clarification was provided.       Patient transported with:   Patient-specific medications from Pharmacy  Registered Nurse

## 2021-04-04 NOTE — PROGRESS NOTES
Called by RN that patient's midline came out. I advised RN to attempt to place a peripheral IV. I also called and discussed with the nursing supervisor to see if a peripheral IV could be placed.

## 2021-04-04 NOTE — PROGRESS NOTES
9114- Bedside and Verbal shift change report given to Eva Ansair RN (oncoming nurse) by Yehuda Tan RN (offgoing nurse). Report included the following information SBAR, Kardex, Procedure Summary, Intake/Output, MAR and Recent Results. 0900- Lovenox held, Pt platelets 93        5130- Bedside and Verbal shift change report given to Janie Enriquez (oncoming nurse) by Eva Ansari RN (offgoing nurse). Report included the following information SBAR, Kardex, ED Summary, Intake/Output, MAR and Recent Results.

## 2021-04-04 NOTE — PROGRESS NOTES
Problem: Falls - Risk of  Goal: *Absence of Falls  Description: Document Rebeca Nieves Fall Risk and appropriate interventions in the flowsheet. Outcome: Progressing Towards Goal  Note: Fall Risk Interventions:       Mentation Interventions: Adequate sleep, hydration, pain control, Bed/chair exit alarm, More frequent rounding, Reorient patient, Room close to nurse's station, Toileting rounds    Medication Interventions: Bed/chair exit alarm    Elimination Interventions: Bed/chair exit alarm, Call light in reach, Toileting schedule/hourly rounds              Problem: Patient Education: Go to Patient Education Activity  Goal: Patient/Family Education  Outcome: Progressing Towards Goal     Problem: Pressure Injury - Risk of  Goal: *Prevention of pressure injury  Description: Document Brian Scale and appropriate interventions in the flowsheet. Outcome: Progressing Towards Goal  Note: Pressure Injury Interventions:  Sensory Interventions: Assess changes in LOC, Check visual cues for pain, Discuss PT/OT consult with provider, Float heels, Keep linens dry and wrinkle-free, Minimize linen layers, Pressure redistribution bed/mattress (bed type), Turn and reposition approx.  every two hours (pillows and wedges if needed)    Moisture Interventions: Absorbent underpads, Apply protective barrier, creams and emollients, Check for incontinence Q2 hours and as needed, Internal/External urinary devices, Minimize layers    Activity Interventions: Pressure redistribution bed/mattress(bed type)    Mobility Interventions: Float heels, HOB 30 degrees or less, Pressure redistribution bed/mattress (bed type)    Nutrition Interventions: Discuss nutritional consult with provider    Friction and Shear Interventions: Apply protective barrier, creams and emollients, HOB 30 degrees or less, Lift sheet, Lift team/patient mobility team, Minimize layers                Problem: Patient Education: Go to Patient Education Activity  Goal: Patient/Family Education  Outcome: Progressing Towards Goal     Problem: General Medical Care Plan  Goal: *Vital signs within specified parameters  Outcome: Progressing Towards Goal  Goal: *Labs within defined limits  Outcome: Progressing Towards Goal  Goal: *Absence of infection signs and symptoms  Outcome: Progressing Towards Goal  Goal: *Optimal pain control at patient's stated goal  Outcome: Progressing Towards Goal  Goal: *Skin integrity maintained  Outcome: Progressing Towards Goal  Goal: *Fluid volume balance  Outcome: Progressing Towards Goal  Goal: *Optimize nutritional status  Outcome: Progressing Towards Goal  Goal: *Anxiety reduced or absent  Outcome: Progressing Towards Goal  Goal: *Progressive mobility and function (eg: ADL's)  Outcome: Progressing Towards Goal     Problem: Patient Education: Go to Patient Education Activity  Goal: Patient/Family Education  Outcome: Progressing Towards Goal

## 2021-04-04 NOTE — PROGRESS NOTES
[facility] Hospitalists  Progress Note    Patient: Colonel Otero Age: 77 y.o. : 1955 MR#: [id number] SSN: [id number]  Date: 2021     Subjective/24-hour events:     Downgraded to medical bed and transferred to  late yesterday evening. .  Midline catheter that had been placed 2 days ago has came out overnight but new PIV able to be placed. Staff unable to place NGT yesterday after multiple attempts. Assessment:   Severe hypernatremia   Acute metabolic encephalopathy  [id number] infection, asymptomatic  Dysphagia  HTN  Anemia   Seizure disorder  Cerebrovascular disease with hx of CVA    Plan:   Have SLP reassess in AM.  If still recommended to remain NPO, may need to have NGT placed by radiology. Will need to be evaluated for PEG if swallowing difficulty persists. This discussion has been had with family by palliative medicine and these wishes have already been documented. Continue IVF as ordered in interim and monitor sodium. Nephrology remains on board. Discontinue levaquin today and monitor off. Monitor BPs. Clonidine patch and PRN hydralazine with parameters to be continued. H/H has remained stable - trend as necessary. Supportive care o/w. Case discussed with:  []Patient  []Family  [x]Nursing  []Case Management  DVT Prophylaxis:  [x]Lovenox  []Hep SQ  []SCDs  []Coumadin   []On Heparin gtt    Objective:   VS:   Visit Vitals  BP (!) 151/96 (BP 1 Location: Left arm, BP Patient Position: At rest)   Pulse 81   Temp 97.4 °F (36.3 °C)   Resp 18   Ht 5' 8\" (1.727 m)   Wt 72.3 kg (159 lb 8 oz)   SpO2 99%   BMI 24.25 kg/m²      Tmax/24hrs: Temp (24hrs), Av.2 °F (36.2 °C), Min:96.7 °F (35.9 °C), Max:98.3 °F (36.8 °C)      Intake/Output Summary (Last 24 hours) at 2021 0906  Last data filed at 4/3/2021 2350  Gross per 24 hour   Intake 0 ml   Output 300 ml   Net -300 ml       General:  In NAD. Nontoxic-appearing. Cardiovascular:  RRR.     Pulmonary:  Lungs clear bilaterally, no wheezes. GI:  Abdomen soft, NTTP. Extremities:  Warm, no edema or ischemia. Neuro:  Awake and alert. Moves extremities spontaneously.     Labs:    Recent Results (from the past 24 hour(s))   GLUCOSE, POC    Collection Time: 04/03/21 11:25 AM   Result Value Ref Range    Glucose (POC) 75 70 - 110 mg/dL   GLUCOSE, POC    Collection Time: 04/03/21  5:26 PM   Result Value Ref Range    Glucose (POC) 97 70 - 110 mg/dL   MAGNESIUM    Collection Time: 04/04/21  1:33 AM   Result Value Ref Range    Magnesium 2.1 1.6 - 2.6 mg/dL   PHOSPHORUS    Collection Time: 04/04/21  1:33 AM   Result Value Ref Range    Phosphorus 2.3 (L) 2.5 - 4.9 MG/DL   METABOLIC PANEL, BASIC    Collection Time: 04/04/21  1:33 AM   Result Value Ref Range    Sodium 149 (H) 136 - 145 mmol/L    Potassium 4.1 3.5 - 5.5 mmol/L    Chloride 118 (H) 100 - 111 mmol/L    CO2 27 21 - 32 mmol/L    Anion gap 4 3.0 - 18 mmol/L    Glucose 97 74 - 99 mg/dL    BUN 4 (L) 7.0 - 18 MG/DL    Creatinine 0.44 (L) 0.6 - 1.3 MG/DL    BUN/Creatinine ratio 9 (L) 12 - 20      GFR est AA >60 >60 ml/min/1.73m2    GFR est non-AA >60 >60 ml/min/1.73m2    Calcium 8.8 8.5 - 10.1 MG/DL       Signed By: Noel Mckeon MD     April 4, 2021

## 2021-04-05 LAB
ANION GAP SERPL CALC-SCNC: 4 MMOL/L (ref 3–18)
BACTERIA SPEC CULT: NORMAL
BACTERIA SPEC CULT: NORMAL
BASOPHILS # BLD: 0 K/UL (ref 0–0.1)
BASOPHILS NFR BLD: 0 % (ref 0–2)
BUN SERPL-MCNC: 4 MG/DL (ref 7–18)
BUN/CREAT SERPL: 10 (ref 12–20)
CALCIUM SERPL-MCNC: 8.5 MG/DL (ref 8.5–10.1)
CHLORIDE SERPL-SCNC: 116 MMOL/L (ref 100–111)
CO2 SERPL-SCNC: 29 MMOL/L (ref 21–32)
CREAT SERPL-MCNC: 0.39 MG/DL (ref 0.6–1.3)
DIFFERENTIAL METHOD BLD: ABNORMAL
EOSINOPHIL # BLD: 0.2 K/UL (ref 0–0.4)
EOSINOPHIL NFR BLD: 5 % (ref 0–5)
ERYTHROCYTE [DISTWIDTH] IN BLOOD BY AUTOMATED COUNT: 14.1 % (ref 11.6–14.5)
GLUCOSE SERPL-MCNC: 116 MG/DL (ref 74–99)
HCT VFR BLD AUTO: 37.5 % (ref 36–48)
HGB BLD-MCNC: 12.1 G/DL (ref 13–16)
LYMPHOCYTES # BLD: 1.5 K/UL (ref 0.9–3.6)
LYMPHOCYTES NFR BLD: 29 % (ref 21–52)
MAGNESIUM SERPL-MCNC: 2 MG/DL (ref 1.6–2.6)
MCH RBC QN AUTO: 27.1 PG (ref 24–34)
MCHC RBC AUTO-ENTMCNC: 32.3 G/DL (ref 31–37)
MCV RBC AUTO: 83.9 FL (ref 74–97)
MONOCYTES # BLD: 0.3 K/UL (ref 0.05–1.2)
MONOCYTES NFR BLD: 5 % (ref 3–10)
NEUTS SEG # BLD: 3.2 K/UL (ref 1.8–8)
NEUTS SEG NFR BLD: 61 % (ref 40–73)
PHOSPHATE SERPL-MCNC: 1.9 MG/DL (ref 2.5–4.9)
PLATELET # BLD AUTO: 136 K/UL (ref 135–420)
POTASSIUM SERPL-SCNC: 3.7 MMOL/L (ref 3.5–5.5)
RBC # BLD AUTO: 4.47 M/UL (ref 4.7–5.5)
SERVICE CMNT-IMP: NORMAL
SERVICE CMNT-IMP: NORMAL
SODIUM SERPL-SCNC: 149 MMOL/L (ref 136–145)
WBC # BLD AUTO: 5.2 K/UL (ref 4.6–13.2)

## 2021-04-05 PROCEDURE — 74011250637 HC RX REV CODE- 250/637: Performed by: FAMILY MEDICINE

## 2021-04-05 PROCEDURE — 65660000000 HC RM CCU STEPDOWN

## 2021-04-05 PROCEDURE — 84100 ASSAY OF PHOSPHORUS: CPT

## 2021-04-05 PROCEDURE — 36415 COLL VENOUS BLD VENIPUNCTURE: CPT

## 2021-04-05 PROCEDURE — 74011000250 HC RX REV CODE- 250: Performed by: FAMILY MEDICINE

## 2021-04-05 PROCEDURE — 83735 ASSAY OF MAGNESIUM: CPT

## 2021-04-05 PROCEDURE — 99232 SBSQ HOSP IP/OBS MODERATE 35: CPT | Performed by: HOSPITALIST

## 2021-04-05 PROCEDURE — 92526 ORAL FUNCTION THERAPY: CPT

## 2021-04-05 PROCEDURE — 74011000250 HC RX REV CODE- 250: Performed by: HOSPITALIST

## 2021-04-05 PROCEDURE — 74011000258 HC RX REV CODE- 258: Performed by: HOSPITALIST

## 2021-04-05 PROCEDURE — 85025 COMPLETE CBC W/AUTO DIFF WBC: CPT

## 2021-04-05 PROCEDURE — 80048 BASIC METABOLIC PNL TOTAL CA: CPT

## 2021-04-05 PROCEDURE — 74011250636 HC RX REV CODE- 250/636: Performed by: NURSE PRACTITIONER

## 2021-04-05 PROCEDURE — 74011000258 HC RX REV CODE- 258: Performed by: NURSE PRACTITIONER

## 2021-04-05 PROCEDURE — 74011250636 HC RX REV CODE- 250/636: Performed by: FAMILY MEDICINE

## 2021-04-05 RX ADMIN — LEVETIRACETAM 750 MG: 100 INJECTION, SOLUTION INTRAVENOUS at 21:25

## 2021-04-05 RX ADMIN — ENOXAPARIN SODIUM 40 MG: 40 INJECTION SUBCUTANEOUS at 09:44

## 2021-04-05 RX ADMIN — Medication 10 ML: at 22:15

## 2021-04-05 RX ADMIN — POTASSIUM CHLORIDE: 149 INJECTION, SOLUTION, CONCENTRATE INTRAVENOUS at 05:14

## 2021-04-05 RX ADMIN — Medication 10 ML: at 05:15

## 2021-04-05 RX ADMIN — POTASSIUM PHOSPHATE, MONOBASIC POTASSIUM PHOSPHATE, DIBASIC: 224; 236 INJECTION, SOLUTION, CONCENTRATE INTRAVENOUS at 17:00

## 2021-04-05 RX ADMIN — Medication 100 MG: at 09:43

## 2021-04-05 RX ADMIN — Medication 30 ML: at 09:43

## 2021-04-05 RX ADMIN — LEVETIRACETAM 750 MG: 100 INJECTION, SOLUTION INTRAVENOUS at 09:45

## 2021-04-05 RX ADMIN — Medication 10 ML: at 15:09

## 2021-04-05 RX ADMIN — HYDRALAZINE HYDROCHLORIDE 10 MG: 20 INJECTION INTRAMUSCULAR; INTRAVENOUS at 05:26

## 2021-04-05 NOTE — PROGRESS NOTES
SLP NOTE -    945: Pt seen for FU dysphagia management. Recommend upgrade to [facility] soft diet with honey thick liquids, meds crushed in applesauce, FEEDING ASSISTANCE (strategies on whiteboard). Order placed. Full note to follow.     Thank you for this referral.    Morena López M.S., [address] [facility]  Speech-Language Pathologist

## 2021-04-05 NOTE — PROGRESS NOTES
Problem: Falls - Risk of  Goal: *Absence of Falls  Description: Document Sofy Ortiz Fall Risk and appropriate interventions in the flowsheet. Outcome: Progressing Towards Goal  Note: Fall Risk Interventions:       Mentation Interventions: Adequate sleep, hydration, pain control, Bed/chair exit alarm, More frequent rounding, Reorient patient, Room close to nurse's station, Toileting rounds    Medication Interventions: Bed/chair exit alarm    Elimination Interventions: Bed/chair exit alarm, Call light in reach, Toileting schedule/hourly rounds              Problem: Patient Education: Go to Patient Education Activity  Goal: Patient/Family Education  Outcome: Progressing Towards Goal     Problem: Pressure Injury - Risk of  Goal: *Prevention of pressure injury  Description: Document Brian Scale and appropriate interventions in the flowsheet. Outcome: Progressing Towards Goal  Note: Pressure Injury Interventions:  Sensory Interventions: Assess changes in LOC, Check visual cues for pain, Discuss PT/OT consult with provider, Float heels, Keep linens dry and wrinkle-free, Minimize linen layers, Pressure redistribution bed/mattress (bed type), Turn and reposition approx.  every two hours (pillows and wedges if needed)    Moisture Interventions: Absorbent underpads, Apply protective barrier, creams and emollients, Check for incontinence Q2 hours and as needed, Internal/External urinary devices, Minimize layers    Activity Interventions: Pressure redistribution bed/mattress(bed type)    Mobility Interventions: Float heels, HOB 30 degrees or less, Pressure redistribution bed/mattress (bed type)    Nutrition Interventions: Discuss nutritional consult with provider    Friction and Shear Interventions: Apply protective barrier, creams and emollients, HOB 30 degrees or less, Lift sheet, Lift team/patient mobility team, Minimize layers                Problem: Patient Education: Go to Patient Education Activity  Goal: Patient/Family Education  Outcome: Progressing Towards Goal     Problem: General Medical Care Plan  Goal: *Vital signs within specified parameters  Outcome: Progressing Towards Goal  Goal: *Labs within defined limits  Outcome: Progressing Towards Goal  Goal: *Absence of infection signs and symptoms  Outcome: Progressing Towards Goal  Goal: *Optimal pain control at patient's stated goal  Outcome: Progressing Towards Goal  Goal: *Skin integrity maintained  Outcome: Progressing Towards Goal  Goal: *Fluid volume balance  Outcome: Progressing Towards Goal  Goal: *Optimize nutritional status  Outcome: Progressing Towards Goal  Goal: *Anxiety reduced or absent  Outcome: Progressing Towards Goal  Goal: *Progressive mobility and function (eg: ADL's)  Outcome: Progressing Towards Goal     Problem: Patient Education: Go to Patient Education Activity  Goal: Patient/Family Education  Outcome: Progressing Towards Goal

## 2021-04-05 NOTE — PROGRESS NOTES
Palliative Medicine    Goals of care defined. POST form completed for DDNR status, limited medical interventions (NO intubation under any circumstances, ok with CPAP/BiPAP, avoid ICU if possible) and ok with feeding tube long term if indicated. Will sign off. Please reconsult team as needed/if appropriate. Pt remains DNR/DNI. Thank you for the Palliative Medicine consult and allowing us to participate in the care of Mr. Mahnaz Batista.       Link Emerson RN, BSN  Palliative Medicine Inpatient RN  DR. [facility]  Palliative COPE Line: [phone] ([phone])

## 2021-04-05 NOTE — PROGRESS NOTES
[facility] Hospitalists  Progress Note    Patient: Larry Hogan Age: 77 y.o. : 1955 MR#: [id number] SSN: [id number]  Date: 2021     Subjective/24-hour events:     Patient seen and evaluated, lying in bed, no acute distress. SLP eval completed and diet upgraded to mechanical soft with HTL    Assessment:   Severe hypernatremia   Acute metabolic encephalopathy  [id number] infection, asymptomatic  Dysphagia  HTN  Anemia   Seizure disorder  Cerebrovascular disease with hx of CVA    Plan:     SLP eval completed-diet advanced to mechanical soft with HTL  Continue IVF as ordered in interim and monitor sodium. Nephrology remains on board. Monitor BPs. Clonidine patch and PRN hydralazine with parameters to be continued. H/H has remained stable - trend as necessary. PT and OT eval  Patient is a long-term resident of [facility] and will be discharged back to the facility. Supportive care o/w. Case discussed with:  []Patient  []Family  [x]Nursing  []Case Management  DVT Prophylaxis:  [x]Lovenox  []Hep SQ  []SCDs  []Coumadin   []On Heparin gtt    Objective:   VS:   Visit Vitals  BP (!) 150/100 (BP 1 Location: Left upper arm, BP Patient Position: At rest)   Pulse 92   Temp 98.1 °F (36.7 °C)   Resp 18   Ht 5' 8\" (1.727 m)   Wt 71.9 kg (158 lb 9.6 oz)   SpO2 100%   BMI 24.12 kg/m²      Tmax/24hrs: Temp (24hrs), Av.7 °F (36.5 °C), Min:97 °F (36.1 °C), Max:98.5 °F (36.9 °C)      Intake/Output Summary (Last 24 hours) at 2021 1542  Last data filed at 2021 0529  Gross per 24 hour   Intake --   Output 1725 ml   Net -1725 ml       General:  In NAD. Nontoxic-appearing. Cardiovascular:  RRR. Pulmonary:  Lungs clear bilaterally, no wheezes. GI:  Abdomen soft, NTTP. Extremities:  Warm, no edema or ischemia. Neuro:  Awake and alert. Moves extremities spontaneously.     Labs:    Recent Results (from the past 24 hour(s))   CBC WITH AUTOMATED DIFF    Collection Time: 04/05/21  5:10 AM   Result Value Ref Range    WBC 5.2 4.6 - 13.2 K/uL    RBC 4.47 (L) 4.70 - 5.50 M/uL    HGB 12.1 (L) 13.0 - 16.0 g/dL    HCT 37.5 36.0 - 48.0 %    MCV 83.9 74.0 - 97.0 FL    MCH 27.1 24.0 - 34.0 PG    MCHC 32.3 31.0 - 37.0 g/dL    RDW 14.1 11.6 - 14.5 %    PLATELET 596 578 - 713 K/uL    NEUTROPHILS 61 40 - 73 %    LYMPHOCYTES 29 21 - 52 %    MONOCYTES 5 3 - 10 %    EOSINOPHILS 5 0 - 5 %    BASOPHILS 0 0 - 2 %    ABS. NEUTROPHILS 3.2 1.8 - 8.0 K/UL    ABS. LYMPHOCYTES 1.5 0.9 - 3.6 K/UL    ABS. MONOCYTES 0.3 0.05 - 1.2 K/UL    ABS. EOSINOPHILS 0.2 0.0 - 0.4 K/UL    ABS.  BASOPHILS 0.0 0.0 - 0.1 K/UL    DF AUTOMATED     METABOLIC PANEL, BASIC    Collection Time: 04/05/21 10:05 AM   Result Value Ref Range    Sodium 149 (H) 136 - 145 mmol/L    Potassium 3.7 3.5 - 5.5 mmol/L    Chloride 116 (H) 100 - 111 mmol/L    CO2 29 21 - 32 mmol/L    Anion gap 4 3.0 - 18 mmol/L    Glucose 116 (H) 74 - 99 mg/dL    BUN 4 (L) 7.0 - 18 MG/DL    Creatinine 0.39 (L) 0.6 - 1.3 MG/DL    BUN/Creatinine ratio 10 (L) 12 - 20      GFR est AA >60 >60 ml/min/1.73m2    GFR est non-AA >60 >60 ml/min/1.73m2    Calcium 8.5 8.5 - 10.1 MG/DL   MAGNESIUM    Collection Time: 04/05/21 10:05 AM   Result Value Ref Range    Magnesium 2.0 1.6 - 2.6 mg/dL   PHOSPHORUS    Collection Time: 04/05/21 10:05 AM   Result Value Ref Range    Phosphorus 1.9 (L) 2.5 - 4.9 MG/DL       Signed By: Ellie Sandoval MD     April 5, 2021

## 2021-04-05 NOTE — PROGRESS NOTES
Nutrition Assessment     Type and Reason for Visit: Reassess, Consult    Nutrition Recommendations/Plan:  - Continue mechanical soft diet with honey thickened liquids per SLP.  - Discontinue TF orders 2/2 pt without EN access & started on oral diet. - Add supplements: Magic Cup, BID.  - IVF per MD.  - Replace phosphorus. Nutrition Assessment:  NGT unable to be placed by nursing x multiple attempts & remained NPO on IVF. Pt then started on oral diet s/p SLP evaluation today & hypernatremia improving on D5. Malnutrition Assessment:  Malnutrition Status: Severe malnutrition     Estimated Daily Nutrient Needs:  Energy (kcal):  7584-1671  Protein (g):         Fluid (ml/day):  8442-6427    Nutrition Related Findings:  BM 3/30. Hypernatremia.  MVI & thiamine      Additional Caloric Sources: D5 with 20 mEq/L KCL at 125 mL/hr (150 gm dextrose, 510 kcal, 60 mEq KCl per day)    Current Nutrition Therapies:  DIET TUBE FEEDING  DIET DYSPHAGIA MECH ALTERED (NDD2) 3 Honey/3 Moderately Thick    Anthropometric Measures:  · Height:  5' 8\" (172.7 cm)  · Current Body Wt:  71.9 kg (158 lb 8.2 oz)  · BMI: 24.1    Nutrition Diagnosis:   · Predicted inadequate energy intake related to cognitive or neurological impairment, swallowing difficulty as evidenced by (recently started on oral diet per SLP 4/5, previously NPO)    Nutrition Intervention:  Food and/or Nutrient Delivery: Continue current diet, Start oral nutrition supplement, IV fluid delivery, Vitamin supplement, Discontinue tube feeding  Nutrition Education and Counseling: Education not indicated  Coordination of Nutrition Care: Continue to monitor while inpatient(telephone orders from Dr. Hal Valera)    Goals:  Nutritional needs will be met through adequate oral intake or nutrition support within the next 7 days       Nutrition Monitoring and Evaluation:   Behavioral-Environmental Outcomes: None identified  Food/Nutrient Intake Outcomes: Diet advancement/tolerance, Food and nutrient intake, Supplement intake, Vitamin/mineral intake, IVF intake  Physical Signs/Symptoms Outcomes: Biochemical data, Chewing or swallowing, GI status, Meal time behavior, Nutrition focused physical findings    Discharge Planning:     Too soon to determine     Electronically signed by Deacon Pollack RD on 4/5/2021 at 1:54 PM    Contact Number: [phone]

## 2021-04-05 NOTE — PROGRESS NOTES
Problem: Dysphagia (Adult)  Goal: *Acute Goals and Plan of Care (Insert Text)  Description: Patient will:  1. Tolerate PO trials with 0 s/s overt distress in 4/5 trials  2. Utilize compensatory swallow strategies/maneuvers (decrease bite/sip, size/rate, alt. liq/sol) with min cues in 4/5 trials  3. Perform oral-motor/laryngeal exercises to increase oropharyngeal swallow function with min cues    Recommend:   Mech soft diet, HONEY THICK liquids  Aspiration precautions (HOB >30 degrees at all times, Oral care TID)  MEDS crushed in applesauce  Feeding assistance  MBS once negative COVID test obtained. Outcome: Progressing Towards Goal     SPEECH LANGUAGE PATHOLOGY DYSPHAGIA TREATMENT    Patient: Darry Ahumada (78 y.o. male)  Date: 4/5/2021  Diagnosis: Hypernatremia [D25.9]  Acute metabolic encephalopathy [N09.61]  Dehydration [E86.0]  COVID-19 [U07.1] <principal problem not specified>       Precautions: Aspiration    PLOF: Per H&P     ASSESSMENT:  Pt seen for continued dysphagia management. Pt alert, agreeable to PO trials. Pt with slightly improved intelligibility in context this date. Given therapeutic PO trials thin, nectar thick, and honey thick liquids, puree, and regular texture diet. Pt with immediate strong cough following thin liquids. Delayed strong cough and subsequent repeat swallows noted following nectar thick liquids. Prolonged, disorganized mastication with regular texture. Mild lingual residue pudding, which cleared following liquid wash. Decreased laryngeal elevation noted. Honey thick and pudding taken without s/sx aspiration. Recommend upgrade to mech soft texture with honey thick liquids, meds crushed in applesauce, aspiration precautions. Pt will benefit  from feeding assistance with adherence to safe swallow strategies: slow rate of feeding, small bites/sips, alternate solids/liquids.  Pt will benefit from MBS to further assess swallow function once negative COVID test obtained. Progression toward goals:  [x]         Improving appropriately and progressing toward goals  []         Improving slowly and progressing toward goals  []         Not making progress toward goals and plan of care will be adjusted     PLAN:  Recommendations and Planned Interventions:  See above. Patient continues to benefit from skilled intervention to address the above impairments. Continue treatment per established plan of care. Discharge Recommendations:  [facility] and To Be Determined     SUBJECTIVE:   Patient stated [place] [facility] water. OBJECTIVE:   Cognitive and Communication Status:  Neurologic State: Alert, Eyes open to stimulus  Orientation Level: Oriented to person  Cognition: Follows commands  Perception: Appears intact  Perseveration: No perseveration noted  Safety/Judgement: Fall prevention  Dysphagia Treatment:  Oral Assessment:  Oral Assessment  Labial: No impairment  Dentition: Natural  Oral Hygiene: WFL  Lingual: Decreased rate, Decreased strength  Velum: No impairment  Mandible: No impairment  P.O. Trials:   Patient Position: HOB 60*   Vocal quality prior to P.O.: No impairment   Consistency Presented: Thin liquid, Puree, Solid   How Presented: SLP-fed/presented, Straw, Cup/sip, Successive swallows, Spoon       Bolus Acceptance: No impairment   Bolus Formation/Control: Impaired   Type of Impairment: Delayed, Mastication   Propulsion: No impairment   Oral Residue: Less than 10% of bolus, Lingual   Initiation of Swallow: Delayed (# of seconds)   Laryngeal Elevation: Decreased   Aspiration Signs/Symptoms: Strong cough   Pharyngeal Phase Characteristics: Audible swallow, Easily fatigued , Effortful swallow, Poor endurance, Suspected pharyngeal residue   Effective Modifications: Small sips and bites   Cues for Modifications:  Moderate         Oral Phase Severity: Mild-moderate   Pharyngeal Phase Severity : Moderate       PAIN:  Pain level pre-treatment: 0/10   Pain level post-treatment: 0/10     After treatment:   []              Patient left in no apparent distress sitting up in chair  [x]              Patient left in no apparent distress in bed  [x]              Call bell left within reach  [x]              Nursing notified  []              Family present  []              Caregiver present  []              Bed alarm activated      COMMUNICATION/EDUCATION:   [x] Aspiration precautions; swallow safety; compensatory techniques  []        Patient unable to participate in education; education ongoing with staff  []  Posted safety precautions in patient's room.   [] Oral-motor/laryngeal strengthening exercises      Thank you for this referral.    Darwin Royal M.S., CCC-SLP  Speech-Language Pathologist    Time Calculation: 30 mins

## 2021-04-05 NOTE — PROGRESS NOTES
Progress Note    William Daigle  77 y.o. Admit Date: 3/30/2021  Active Problems:    Severe protein-calorie malnutrition ([facility] [address] (9/17/2013) POA: Yes      Hypernatremia (3/30/2021) POA: Unknown      Acute metabolic encephalopathy (6/51/1264) POA: Unknown      Dehydration (3/30/2021) POA: Unknown      COVID-19 (3/30/2021) POA: Unknown      Hematuria (3/31/2021) POA: Unknown      Dysuria (3/31/2021) POA: Unknown      Goals of care, counseling/discussion () POA: Unknown      Debility () POA: Unknown      Seen with adequate Anti-Covid measures. Subjective:     Remaind poorly Non communicative, receiving IVF at 125 cc/hour. May end Up With PEG      A comprehensive review of systems was negative except for that written in the History of Present Illness.     Objective:     Visit Vitals  BP (!) 150/100 (BP 1 Location: Left upper arm, BP Patient Position: At rest)   Pulse 92   Temp 98.1 °F (36.7 °C)   Resp 18   Ht 5' 8\" (1.727 m)   Wt 71.9 kg (158 lb 9.6 oz)   SpO2 100%   BMI 24.12 kg/m²         Intake/Output Summary (Last 24 hours) at 4/5/2021 1233  Last data filed at 4/5/2021 2272  Gross per 24 hour   Intake --   Output 1725 ml   Net -1725 ml       Current Facility-Administered Medications   Medication Dose Route Frequency Provider Last Rate Last Admin    dextrose 5% 1,000 mL with potassium chloride 20 mEq   IntraVENous CONTINUOUS Marleny Sanders  mL/hr at 04/05/21 0514 New Bag at 04/05/21 7533    multivit-folic acid-herbal 633 (WELLESSE PLUS) oral liquid 30 mL  30 mL Per NG tube DAILY Marleny Sanders MD   30 mL at 04/05/21 0943    thiamine HCL (B-1) tablet 100 mg  100 mg Oral DAILY Marleny Sanders MD   100 mg at 04/05/21 0489    sodium chloride (NS) flush 5-10 mL  5-10 mL IntraVENous PRN Noelle Owen DO        levETIRAcetam (KEPPRA) 750 mg in 0.9% sodium chloride 100 mL IVPB  750 mg IntraVENous Q12H Mary Ann JONES NP   750 mg at 04/05/21 0945    sodium chloride (NS) flush 5-40 mL  5-40 mL IntraVENous Q8H Marisa Basil B, NP   10 mL at 04/05/21 0515    sodium chloride (NS) flush 5-40 mL  5-40 mL IntraVENous PRN Bigg Yan NP        acetaminophen (TYLENOL) tablet 650 mg  650 mg Oral Q6H PRN Bigg Yan NP        Or    acetaminophen (TYLENOL) suppository 650 mg  650 mg Rectal Q6H PRN Bigg Yan NP        polyethylene glycol (MIRALAX) packet 17 g  17 g Oral DAILY PRN Bigg Yan NP        promethazine (PHENERGAN) tablet 12.5 mg  12.5 mg Oral Q6H PRN Bigg Yan NP        Or    ondansetron [facility]) injection 4 mg  4 mg IntraVENous Q6H PRN Marisa Basil B, NP        enoxaparin (LOVENOX) injection 40 mg  40 mg SubCUTAneous DAILY Marisa Basil B, NP   40 mg at 04/05/21 0944    hydrALAZINE (APRESOLINE) 20 mg/mL injection 10 mg  10 mg IntraVENous Q6H PRN Marisa Basil B, NP   10 mg at 04/05/21 0526    ipratropium-albuterol (COMBIVENT RESPIMAT) 20 mcg-100 mcg inhalation spray  1 Puff Inhalation Q4H PRN Goldie Cornejo MD        metoprolol (LOPRESSOR) injection 1.25 mg  1.25 mg IntraVENous Q6H PRN Goldie Cornejo MD   1.25 mg at 04/04/21 1047    cloNIDine (CATAPRES) 0.1 mg/24 hr patch 1 Patch  1 Patch TransDERmal Q7D Goldie Cornejo MD   1 Patch at 03/30/21 2023        Physical Exam:     Physical Exam:   General:  Alert,  no distress. Mouth/Throat: Lips, mucosa, and tongue normal. Teeth and gums normal.   Neck: Supple, symmetrical, trachea midline, no adenopathy, thyroid: no enlargement/tenderness/nodules, no carotid bruit and no JVD. Lungs:   Clear to auscultation bilaterally. Heart:  Regular rate and rhythm, S1, S2 normal, no murmur, click, rub or gallop. Abdomen:   Soft, non-tender. Bowel sounds normal. No masses,  No organomegaly. Extremities: Extremities normal, atraumatic, no cyanosis or edema.            Data Review:    CBC w/Diff    Recent Labs     04/05/21  0510 04/03/21  0109   WBC 5.2 5.2   RBC 4.47* 4.16*   HGB 12.1* 11.2*   HCT 37.5 35.5*   MCV 83.9 85.3   MCH 27.1 26.9   MCHC 32.3 31.5   RDW 14.1 13.2    Recent Labs     04/05/21  0510 04/03/21  0109   MONOS 5 7   EOS 5 3   BASOS 0 0   RDW 14.1 13.2        Comprehensive Metabolic Profile    Recent Labs     04/05/21  1005 04/04/21  0133 04/03/21  0109   * 149* 155*   K 3.7 4.1 3.6   * 118* 124*   CO2 29 27 27   BUN 4* 4* 6*   CREA 0.39* 0.44* 0.52*    Recent Labs     04/05/21  1005 04/04/21  0133 04/03/21  0109   CA 8.5 8.8 8.6   PHOS 1.9* 2.3* 2.1*                        Impression:       Active Hospital Problems    Diagnosis Date Noted    Hematuria 03/31/2021    Dysuria 03/31/2021    Goals of care, counseling/discussion     Debility     Hypernatremia 03/30/2021    Acute metabolic encephalopathy 61/77/4221    Dehydration 03/30/2021    COVID-19 03/30/2021    Severe protein-calorie malnutrition (HCC) 09/17/2013            Plan:   Continue current IVF D5W at 125 cc/ hour.       Justin Parra MD

## 2021-04-05 NOTE — ROUTINE PROCESS
1925 Verbal shift change  Received from [place], RN (outgoing nurse), to DANA Garcia (oncoming)  Pt. Is AOX self. IV patent and infusing well, Pt. denies  pain at this time. Report included the following information SBAR, Kardex, Procedure Summary, Intake/Output, MAR, Recent Lab Results, and  Cardiac Rhythm @ [facility]. Will resume care and monitor Pt. Condition. Pt. Educated on call bell when in need of help and assistance. Pt. Nods, unable to verbalized understanding. Bed alarm on.    1955 Pt. Head to toe Assessment Done and documented. 2055  Pt. In bed, not in distress. 2215  Pt. Resting calmly in bed.    2300  Suctioned PRN. 0030  Pt. Resting with eyes closed, easily awaken. 0230  Pt. Made no complaints. 0430   Pt. Able to rest and sleep well throughout the shift.    0600  Pt. Not in distress. 0700 Pt. Made no complaints. Verbal and bedside Shift changed report given to JOSEFA Gooden (oncoming RN) on Pt. Condition. Report consisted of patients Situation, History, Activities, intake/output,  Background, Assessment and Recommendations(SBAR).       Information from the following report(s) Kardex, order Summary, Lab results and MAR was reviewed with the receiving nurse.     Opportunity for questions and clarification was provided.

## 2021-04-05 NOTE — PROGRESS NOTES
OT orders received and chart reviewed. Active bedrest orders. Please update activity orders as appropriate to maximize patient safety and participation in functional mobility.

## 2021-04-05 NOTE — PROGRESS NOTES
Discharge/Transition Planning    Case Management following and chart reviewed.  Pt is long term care resident at [facility] RN BSN  Outcomes Manager    Pager # [phone]

## 2021-04-06 VITALS
TEMPERATURE: 97.1 F | HEIGHT: 68 IN | OXYGEN SATURATION: 98 % | WEIGHT: 154.1 LBS | HEART RATE: 83 BPM | DIASTOLIC BLOOD PRESSURE: 83 MMHG | SYSTOLIC BLOOD PRESSURE: 131 MMHG | RESPIRATION RATE: 18 BRPM | BODY MASS INDEX: 23.36 KG/M2

## 2021-04-06 LAB
ANION GAP SERPL CALC-SCNC: 3 MMOL/L (ref 3–18)
BUN SERPL-MCNC: 4 MG/DL (ref 7–18)
BUN/CREAT SERPL: 12 (ref 12–20)
CALCIUM SERPL-MCNC: 8.4 MG/DL (ref 8.5–10.1)
CHLORIDE SERPL-SCNC: 115 MMOL/L (ref 100–111)
CO2 SERPL-SCNC: 27 MMOL/L (ref 21–32)
CREAT SERPL-MCNC: 0.34 MG/DL (ref 0.6–1.3)
GLUCOSE SERPL-MCNC: 82 MG/DL (ref 74–99)
MAGNESIUM SERPL-MCNC: 1.9 MG/DL (ref 1.6–2.6)
PHOSPHATE SERPL-MCNC: 2.2 MG/DL (ref 2.5–4.9)
POTASSIUM SERPL-SCNC: 3.8 MMOL/L (ref 3.5–5.5)
SODIUM SERPL-SCNC: 145 MMOL/L (ref 136–145)

## 2021-04-06 PROCEDURE — 74011250636 HC RX REV CODE- 250/636: Performed by: NURSE PRACTITIONER

## 2021-04-06 PROCEDURE — 80048 BASIC METABOLIC PNL TOTAL CA: CPT

## 2021-04-06 PROCEDURE — 2709999900 HC NON-CHARGEABLE SUPPLY

## 2021-04-06 PROCEDURE — 74011250636 HC RX REV CODE- 250/636: Performed by: HOSPITALIST

## 2021-04-06 PROCEDURE — 99239 HOSP IP/OBS DSCHRG MGMT >30: CPT | Performed by: HOSPITALIST

## 2021-04-06 PROCEDURE — 84100 ASSAY OF PHOSPHORUS: CPT

## 2021-04-06 PROCEDURE — 74011250637 HC RX REV CODE- 250/637: Performed by: FAMILY MEDICINE

## 2021-04-06 PROCEDURE — 83735 ASSAY OF MAGNESIUM: CPT

## 2021-04-06 PROCEDURE — 36415 COLL VENOUS BLD VENIPUNCTURE: CPT

## 2021-04-06 RX ORDER — DEXTROSE AND POTASSIUM CHLORIDE 5; .15 G/100ML; G/100ML
SOLUTION INTRAVENOUS CONTINUOUS
Status: DISCONTINUED | OUTPATIENT
Start: 2021-04-06 | End: 2021-04-06 | Stop reason: HOSPADM

## 2021-04-06 RX ADMIN — POTASSIUM CHLORIDE AND DEXTROSE MONOHYDRATE: 150; 5 INJECTION, SOLUTION INTRAVENOUS at 06:18

## 2021-04-06 RX ADMIN — ENOXAPARIN SODIUM 40 MG: 40 INJECTION SUBCUTANEOUS at 09:18

## 2021-04-06 RX ADMIN — Medication 30 ML: at 09:18

## 2021-04-06 RX ADMIN — Medication 10 ML: at 06:02

## 2021-04-06 RX ADMIN — Medication 100 MG: at 09:18

## 2021-04-06 NOTE — DISCHARGE SUMMARY
Discharge Summary    Patient: Ramiro Martínez MRN: [id number]  CSN: [id number]    YOB: 1955  Age: 77 y.o. Sex: male    DOA: 3/30/2021 LOS:  LOS: 7 days   Discharge Date:      Admission Diagnoses: Hypernatremia [P02.4]  Acute metabolic encephalopathy [P97.83]  Dehydration [E86.0]  IPTXL-68 [U07.1]    Discharge Diagnoses:        Discharge Condition: Stable    Consults: Nephrology    PHYSICAL EXAM  Visit Vitals  /83 (BP 1 Location: Right upper arm, BP Patient Position: At rest)   Pulse 83   Temp 97.1 °F (36.2 °C)   Resp 18   Ht 5' 8\" (1.727 m)   Wt 69.9 kg (154 lb 1.6 oz)   SpO2 98%   BMI 23.43 kg/m²       General: Alert, cooperative, no acute distress    HEENT: PERRLA, EOMI. Anicteric sclerae. Lungs:  CTA Bilaterally. No Wheezing/Rhonchi/Rales. Heart:  Regular rate and Rhythm. Abdomen: Soft, Non distended, Non tender. + Bowel sounds. Extremities: No edema/ cyanosis/ clubbing  Neurologic:  Alert, history of CVA                              HPI:  Sasha Guillermo is a 77 y.o.  male who presents with altered mental status from [facility]. Patient is unable to offer any review of systems. However since starting 2 days ago nursing facility staff noticed that patient started pocketing his medications and with decreased oral intake. At baseline patient is able to state yes no responses and he is otherwise disoriented, for example he cannot state his name. Patient at baseline on pureed diet with nectar thick liquids        Hospital Course:   Patient admitted to the hospital secondary to altered mental status likely from underlying urinary tract infection. Patient treated with IV antibiotics and his mental status has resolved and is currently at baseline. Patient was also noted to have severe hypernatremia and nephrology consulted. Patient placed on IVF and sodium has improved.   Patient was recently diagnosed with COVID-19 infection and his rapid Covid test was positive in the emergency room. Patient is asymptomatic and was not treated for the COVID-19 infection. Given his altered mental status patient was made n.p.o. and placed on tube feeding. When patient's mental status improved he underwent an SLP eval and was advanced to mechanical soft honey thick liquids. Patient will resume his chronic medications on discharge and is advised to closely follow-up with his PCP in 2 weeks. Patient is a resident of [facility] and is being discharged there. Imaging:  XR Results (most recent):  Results from Hospital Encounter encounter on 03/30/21   XR CHEST PORT    Narrative EXAMINATION: Chest single view    INDICATION: Altered mental status    COMPARISON: 3/19/2018    FINDINGS: Single frontal view the chest obtained. Mediastinal silhouette normal  in size. Pulmonary vasculature unremarkable. No confluent consolidation. Minimal  right lower lung streaky density. No definite pneumothorax. No obvious acute  osseous findings. Impression No clearly acute findings. Probable right lower lung streaky atelectasis, less  likely infiltrate. CT Results (most recent):  Results from Hospital Encounter encounter on 03/30/21   CT HEAD WO CONT    Narrative CT OF THE HEAD WITHOUT CONTRAST. CLINICAL HISTORY: Altered mental status. Not eating for several days. TECHNIQUE: Helical scan obtained of the head were obtained from the skull vertex  through the base of the skull without intravenous contrast.    All CT scans are  performed using dose optimization techniques as appropriate to the performed  exam including the following: Automated exposure control, adjustment of mA  and/or kV according to patient size, and use of iterative reconstructive  technique. COMPARISON: 3/19/2018. FINDINGS:     Chronic atrophy with stable compensatory ventriculomegaly of the lateral and  third ventricles.  Periventricular and deep white matter hypodensities in the  bilateral cerebrum without significant change. Multiple basal ganglia and  thalamic lacunar infarcts bilaterally appear chronic and stable. No intracranial  hemorrhage. No mass effect. The visualized paranasal sinuses are clear. The  mastoid air cells are clear. The visualized bony structures are unremarkable. Impression No acute findings or change to prior study. Chronic microvascular disease with multiple chronic lacunar infarcts. Atrophy with stable chronic compensatory ventriculomegaly. Procedures:   None         Discharge Medications:     Current Discharge Medication List      CONTINUE these medications which have NOT CHANGED    Details   doxepin (SINEquan) 10 mg capsule Take 1 Cap by mouth nightly. levETIRAcetam (KEPPRA) 750 mg tablet Take 1 Tab by mouth two (2) times a day. Qty: 60 Tab, Refills: 1      pravastatin (PRAVACHOL) 40 mg tablet Take 1 Tab by mouth nightly. Qty: 30 Tab, Refills: 1      ferrous sulfate 325 mg (65 mg iron) tablet Take 1 Tab by mouth daily (with breakfast). Qty: 30 Tab, Refills: 2      polyethylene glycol (MIRALAX) 17 gram packet Take 1 Packet by mouth daily. Qty: 30 Packet, Refills: 2      senna-docusate (PERICOLACE) 8.6-50 mg per tablet Take 1 Tab by mouth nightly. HOLD for loose stool. Qty: 30 Tab, Refills: 2      tamsulosin (FLOMAX) 0.4 mg capsule Take 0.4 mg by mouth daily. 1 tab daily      MULTIVITAMIN,THERAPEUTIC (THERA MULTI-VITAMIN PO) Take 500 mg by mouth daily. metoprolol (LOPRESSOR) 25 mg tablet Take 1 Tab by mouth two (2) times a day. Qty: 60 Tab, Refills: 2      cyanocobalamin 1,000 mcg tablet Take 1 Tab by mouth daily. Qty: 30 Tab, Refills: 2      folic acid (FOLVITE) 1 mg tablet Take 1 Tab by mouth daily. Qty: 30 Tab, Refills: 1      clopidogrel (PLAVIX) 75 mg tab Take 1 Tab by mouth daily. Qty: 90 Tab, Refills: 0      ascorbic acid, vitamin C, (VITAMIN C) 250 mg tablet Take 1 Tab by mouth daily.   Qty: 30 Tab, Refills: 2      baclofen (LIORESAL) 10 mg tablet Take 1 Tab by mouth every twelve (12) hours as needed. Qty: 15 Tab, Refills: 0      aspirin 81 mg chewable tablet Take 1 Tab by mouth daily.   Qty: 30 Tab, Refills: 0            Current Facility-Administered Medications:     dextrose 5% with KCl 20 mEq/L infusion, , IntraVENous, CONTINUOUS, Denise Rai MD, Last Rate: 125 mL/hr at 04/06/21 0618, New Bag at 04/06/21 0581    multivit-folic acid-herbal 376 (WELLESSE PLUS) oral liquid 30 mL, 30 mL, Per NG tube, DAILY, Fox Jones MD, 30 mL at 04/06/21 0918    thiamine HCL (B-1) tablet 100 mg, 100 mg, Oral, DAILY, Precilla Lanes, MD, 100 mg at 04/06/21 9608    sodium chloride (NS) flush 5-10 mL, 5-10 mL, IntraVENous, PRN, uJan Lang DO    levETIRAcetam (KEPPRA) 750 mg in 0.9% sodium chloride 100 mL IVPB, 750 mg, IntraVENous, Q12H, Elvera More B, NP, 750 mg at 04/05/21 2125    sodium chloride (NS) flush 5-40 mL, 5-40 mL, IntraVENous, Q8H, Elvera More B, NP, 10 mL at 04/06/21 0602    sodium chloride (NS) flush 5-40 mL, 5-40 mL, IntraVENous, PRN, Maeola Lips, NP    acetaminophen (TYLENOL) tablet 650 mg, 650 mg, Oral, Q6H PRN **OR** acetaminophen (TYLENOL) suppository 650 mg, 650 mg, Rectal, Q6H PRN, Maeola Lips, NP    polyethylene glycol (MIRALAX) packet 17 g, 17 g, Oral, DAILY PRN, Maeola Lips, NP    promethazine (PHENERGAN) tablet 12.5 mg, 12.5 mg, Oral, Q6H PRN **OR** ondansetron (ZOFRAN) injection 4 mg, 4 mg, IntraVENous, Q6H PRN, Elvera More B, NP    enoxaparin (LOVENOX) injection 40 mg, 40 mg, SubCUTAneous, DAILY, Elvera More B, NP, 40 mg at 04/06/21 9119    hydrALAZINE (APRESOLINE) 20 mg/mL injection 10 mg, 10 mg, IntraVENous, Q6H PRN, Boubacar JONES NP, 10 mg at 04/05/21 0526    ipratropium-albuterol (COMBIVENT RESPIMAT) 20 mcg-100 mcg inhalation spray, 1 Puff, Inhalation, Q4H PRN, Malachi Staton MD    metoprolol (LOPRESSOR) injection 1.25 mg, 1.25 mg, IntraVENous, Q6H PRN, Goldie Cornejo MD, 1.25 mg at 04/04/21 1047    cloNIDine (CATAPRES) 0.1 mg/24 hr patch 1 Patch, 1 Patch, TransDERmal, Q7D, Shanna Kimbrough MD, 1 Patch at 03/30/21 2023  · It is important that you take the medication exactly as they are prescribed. · Keep your medication in the bottles provided by the pharmacist and keep a list of the medication names, dosages, and times to be taken in your wallet. · Do not take other medications without consulting your doctor. Discharge To:  Home    Minutes spent on discharge: 42 minutes spent coordinating this discharge (review instructions/follow-up, prescriptions, preparing report for sign off)    Xochitl Lopez MD  4/6/2021 12:03 PM

## 2021-04-06 NOTE — PROGRESS NOTES
Transition of Care Plan to SNF/Rehab    SNF/Rehab Transition:  Patient has been accepted  to [facility] where he is LTC resident and meets criteria for admission. Patient will transported by [facility] and expected to leave at [facility] and ref # [id number]  Communication to Patient/Family:  Met with patient and called son (identified care giver) and they are agreeable to the transition plan. Communication to SNF/Rehab:  Bedside RN, , has been notified to update the transition plan to the facility and call report (phone number [phone]). Discharge information has been updated on the AVS.             Nursing Please include all hard scripts for controlled substances, med rec and dc summary, and AVS in packet. Reviewed and confirmed with facility, Joe Byrd at [facility], can manage the patient care needs for the following:     Laci Arriaga with (X) only those applicable:    Medication:  [x]  Medications will be available at the facility  []  IV Antibiotics   []  Controlled Substance - hard copy to be sent with patient   []  Weekly Labs   Documents:  [x] Hard RX  [x] MAR  [x] Kardex  [x] AVS  [x]Transfer Summary  [x]Discharge   Equipment:  []  CPAP/BiPAP  []  Wound Vacuum  []  Reid or Urinary Device  []  PICC/Central Line  []  Nebulizer  []  Ventilator   Treatment:  [x]Isolation (for MRSA, VRE, etc.)  []Surgical Drain Management  []Tracheostomy Care  []Dressing Changes  []Dialysis with transportation and chair time   []PEG Care  []Oxygen  []Daily Weights for Heart Failure   Dietary:  [x]Any diet limitations  []Tube Feedings   []Total Parenteral Management (TPN)   Eligible for Medicaid Long Term Services and Supports  Yes:  [] Eligible for medical assistance or will become eligible within 180 days and UAI completed. [] Provider/Patient and/or support system has requested screening.   [] UAI copy provided to patient or responsible party,   [] UAI unavailable at discharge will send once processed to SNF provider. [] UAI unavailable at discharged mailed to patient  No:   [] Private pay and is not financially eligible for Medicaid within the next 180 days. [] Reside out-of-state. [] A residents of a state owned/operated facility that is licensed  by [address] [employer] [facility] or [facility]  [] Enrollment in [facility] services  [] [address] [place] [address] [facility] [place]  [] Patient /Family declines to have screening completed or provide financial information for screening     Financial Resources:  Medicaid    [] Initiated and application pending   [x] Full coverage     Advanced Care Plan:  []Surrogate Decision Maker of Care  []POA  [x]Communicated Code Status DNR(DDNR\", \"Full\")    Other  Medicare pt has received, reviewed, and signed 2nd IM letter informing them of their right to appeal the discharge. Signed copy has been placed on pt bedside chart.

## 2021-04-06 NOTE — ROUTINE PROCESS
Bedside and Verbal shift change report given to [address] (oncoming nurse) by Paddy Johnson (offgoing nurse). Report included the following information SBAR, Kardex, MAR, Recent Results and Cardiac Rhythm SR. Patient quietly resting, bed alarm on and Droplet Isolation maintain.

## 2021-04-06 NOTE — DISCHARGE INSTRUCTIONS
Admit Date   3/30/21    Discharge date:   4/4/21    Admission Diagnoses:  Hypernatremia  Acute metabolic encephalopathy  Dehydration  COVID-19      CONTINUE these medications which have NOT CHANGED     Details   doxepin (SINEquan) 10 mg capsule Take 1 Cap by mouth nightly.       levETIRAcetam (KEPPRA) 750 mg tablet Take 1 Tab by mouth two (2) times a day. Qty: 60 Tab, Refills: 1       pravastatin (PRAVACHOL) 40 mg tablet Take 1 Tab by mouth nightly. Qty: 30 Tab, Refills: 1       ferrous sulfate 325 mg (65 mg iron) tablet Take 1 Tab by mouth daily (with breakfast). Qty: 30 Tab, Refills: 2       polyethylene glycol (MIRALAX) 17 gram packet Take 1 Packet by mouth daily. Qty: 30 Packet, Refills: 2       senna-docusate (PERICOLACE) 8.6-50 mg per tablet Take 1 Tab by mouth nightly. HOLD for loose stool. Qty: 30 Tab, Refills: 2       tamsulosin (FLOMAX) 0.4 mg capsule Take 0.4 mg by mouth daily. 1 tab daily       MULTIVITAMIN,THERAPEUTIC (THERA MULTI-VITAMIN PO) Take 500 mg by mouth daily.       metoprolol (LOPRESSOR) 25 mg tablet Take 1 Tab by mouth two (2) times a day. Qty: 60 Tab, Refills: 2       cyanocobalamin 1,000 mcg tablet Take 1 Tab by mouth daily. Qty: 30 Tab, Refills: 2       folic acid (FOLVITE) 1 mg tablet Take 1 Tab by mouth daily. Qty: 30 Tab, Refills: 1       clopidogrel (PLAVIX) 75 mg tab Take 1 Tab by mouth daily. Qty: 90 Tab, Refills: 0       ascorbic acid, vitamin C, (VITAMIN C) 250 mg tablet Take 1 Tab by mouth daily. Qty: 30 Tab, Refills: 2       baclofen (LIORESAL) 10 mg tablet Take 1 Tab by mouth every twelve (12) hours as needed. Qty: 15 Tab, Refills: 0       aspirin 81 mg chewable tablet Take 1 Tab by mouth daily.   Qty: 30 Tab, Refills: 0           DISCHARGE SUMMARY from Nurse PATIENT INSTRUCTIONS:    After general anesthesia or intravenous sedation, for 24 hours or while taking prescription Narcotics:  · Limit your activities  · Do not drive and operate hazardous machinery  · Do not make important personal or business decisions  · Do  not drink alcoholic beverages  · If you have not urinated within 8 hours after discharge, please contact your surgeon on call. Report the following to your surgeon:  · Excessive pain, swelling, redness or odor of or around the surgical area  · Temperature over 100.5  · Nausea and vomiting lasting longer than 4 hours or if unable to take medications  · Any signs of decreased circulation or nerve impairment to extremity: change in color, persistent  numbness, tingling, coldness or increase pain  · Any questions    What to do at Home:  Recommended activity: Activity as tolerated and Bedrest,     If you experience any of the following symptoms worsen symtoms, shortness of breath chest pain, please follow up with primary care physcian. *  Please give a list of your current medications to your Primary Care Provider. *  Please update this list whenever your medications are discontinued, doses are      changed, or new medications (including over-the-counter products) are added. *  Please carry medication information at all times in case of emergency situations. These are general instructions for a healthy lifestyle:    No smoking/ No tobacco products/ Avoid exposure to second hand smoke  Surgeon General's Warning:  Quitting smoking now greatly reduces serious risk to your health. Obesity, smoking, and sedentary lifestyle greatly increases your risk for illness    A healthy diet, regular physical exercise & weight monitoring are important for maintaining a healthy lifestyle    You may be retaining fluid if you have a history of heart failure or if you experience any of the following symptoms:  Weight gain of 3 pounds or more overnight or 5 pounds in a week, increased swelling in our hands or feet or shortness of breath while lying flat in bed.   Please call your doctor as soon as you notice any of these symptoms; do not wait until your next office visit.        The discharge information has been reviewed with the patient. The patient verbalized understanding. Discharge medications reviewed with the patient and appropriate educational materials and side effects teaching were provided. ___________________________________________________________________________________________________________________________________      Patient Education        Learning About the COVID-19 Vaccine  Overview     The COVID-19 vaccine can help you avoid getting COVID-19, a disease caused by a new type of coronavirus. COVID-19 can cause pneumonia and even death. You may need two doses of the vaccine. And you might need \"booster\" doses later on to help you stay protected. The vaccine prevents most cases of COVID-19. But if you do still catch COVID-19, your symptoms will probably be less severe than if you hadn't gotten the vaccine. You can't get COVID-19 from the vaccine. The risk of serious problems from the vaccine is very low. And you might not have any side effects from the vaccine at all. If you do, they will probably be a lot like the common side effects of other vaccines. They include things like a slight fever, muscle aches, and soreness. These side effects don't last too long, and they can be treated if they bother you. Why is it a good idea to get the COVID-19 vaccine? The COVID-19 vaccine is one of the best ways to help stop the pandemic. Getting vaccinated as soon as you can will help protect you from the virus. It will also help you protect people around you from the virus--people who could really be hurt. The COVID-19 vaccine is safe and effective. In fact, the risk of serious problems from COVID-19 is much higher than the risk of serious problems from the vaccine. So it's safer to get the vaccine than it is to catch COVID-19. Who should get the COVID-19 vaccine? Everyone who is able to get the vaccine should get it as soon as possible.  The more people who get vaccinated, the better we'll be able to stop the spread of the virus. The vaccine is extra important for people who are at high risk. This includes people who may be exposed to COVID-19 more often because of their jobs. It also includes people who are at high risk for complications from [id number]-38 if they catch it. Some examples of people at high risk include those who:  · Work in health care. · Are considered essential workers. · Have certain health conditions. · Are older than age 72. If you've already had COVID-19, you may still be able to catch it again. Getting the vaccine may provide extra protection. Where can you learn more? Go to http://www.gray.com/  Enter C124 in the search box to learn more about \"Learning About the COVID-19 Vaccine. \"  Current as of: December 18, 2020               Content Version: 12.8  © 9013-1209 Healthwise, [facility]. Care instructions adapted under license by [email or web address] (which disclaims liability or warranty for this information). If you have questions about a medical condition or this instruction, always ask your healthcare professional. [address] any warranty or liability for your use of this information.

## 2021-04-06 NOTE — PROGRESS NOTES
PT evaluation attempted. Pt discharged from hospital back to LTC.  Thank you for this referral.   Heather Khan PT DPT

## 2021-04-06 NOTE — PROGRESS NOTES
His General condition hoang improved  Na has improved to a safe Level & he is going to be transferred to Nursing home,agree  With the plan.

## 2021-04-07 ENCOUNTER — PATIENT OUTREACH (OUTPATIENT)
Dept: CASE MANAGEMENT | Age: 66
End: 2021-04-07

## 2021-04-07 NOTE — PROGRESS NOTES
Noted Per chart,  Patient is discharged  to [facility] where Pt. is LTC resident. No transition of care outreach indicated due to patient discharge to a [address] [place] [address]. This episode is closed and resolved.

## 2021-04-23 RX ORDER — LEVETIRACETAM 100 MG/ML
7.5 SOLUTION ORAL 2 TIMES DAILY
Status: ON HOLD | COMMUNITY
Start: 2021-04-06 | End: 2022-10-22 | Stop reason: SDUPTHER

## 2021-04-28 ENCOUNTER — ANESTHESIA EVENT (OUTPATIENT)
Dept: ENDOSCOPY | Age: 66
End: 2021-04-28
Payer: COMMERCIAL

## 2021-04-29 ENCOUNTER — HOSPITAL ENCOUNTER (OUTPATIENT)
Age: 66
Setting detail: OUTPATIENT SURGERY
Discharge: HOME | End: 2021-04-29
Attending: INTERNAL MEDICINE | Admitting: INTERNAL MEDICINE
Payer: COMMERCIAL

## 2021-04-29 ENCOUNTER — ANESTHESIA (OUTPATIENT)
Dept: ENDOSCOPY | Age: 66
End: 2021-04-29
Payer: COMMERCIAL

## 2021-04-29 VITALS
DIASTOLIC BLOOD PRESSURE: 69 MMHG | BODY MASS INDEX: 20.47 KG/M2 | OXYGEN SATURATION: 99 % | RESPIRATION RATE: 12 BRPM | WEIGHT: 143 LBS | HEIGHT: 70 IN | SYSTOLIC BLOOD PRESSURE: 153 MMHG | TEMPERATURE: 98 F | HEART RATE: 64 BPM

## 2021-04-29 PROCEDURE — 2709999900 HC NON-CHARGEABLE SUPPLY: Performed by: INTERNAL MEDICINE

## 2021-04-29 PROCEDURE — 77030019988 HC FCPS ENDOSC DISP BSC -B: Performed by: INTERNAL MEDICINE

## 2021-04-29 PROCEDURE — 88305 TISSUE EXAM BY PATHOLOGIST: CPT

## 2021-04-29 PROCEDURE — 77030021593 HC FCPS BIOP ENDOSC BSC -A: Performed by: INTERNAL MEDICINE

## 2021-04-29 PROCEDURE — 74011250637 HC RX REV CODE- 250/637: Performed by: NURSE ANESTHETIST, CERTIFIED REGISTERED

## 2021-04-29 PROCEDURE — 00813 ANES UPR LWR GI NDSC PX: CPT | Performed by: NURSE ANESTHETIST, CERTIFIED REGISTERED

## 2021-04-29 PROCEDURE — 74011250636 HC RX REV CODE- 250/636: Performed by: NURSE ANESTHETIST, CERTIFIED REGISTERED

## 2021-04-29 PROCEDURE — 77030008565 HC TBNG SUC IRR ERBE -B: Performed by: INTERNAL MEDICINE

## 2021-04-29 PROCEDURE — 76060000032 HC ANESTHESIA 0.5 TO 1 HR: Performed by: INTERNAL MEDICINE

## 2021-04-29 PROCEDURE — 00813 ANES UPR LWR GI NDSC PX: CPT | Performed by: ANESTHESIOLOGY

## 2021-04-29 PROCEDURE — 77030013992 HC SNR POLYP ENDOSC BSC -B: Performed by: INTERNAL MEDICINE

## 2021-04-29 PROCEDURE — 88342 IMHCHEM/IMCYTCHM 1ST ANTB: CPT

## 2021-04-29 PROCEDURE — 76040000007: Performed by: INTERNAL MEDICINE

## 2021-04-29 RX ORDER — SODIUM CHLORIDE, SODIUM LACTATE, POTASSIUM CHLORIDE, CALCIUM CHLORIDE 600; 310; 30; 20 MG/100ML; MG/100ML; MG/100ML; MG/100ML
25 INJECTION, SOLUTION INTRAVENOUS CONTINUOUS
Status: DISCONTINUED | OUTPATIENT
Start: 2021-04-29 | End: 2021-04-29 | Stop reason: HOSPADM

## 2021-04-29 RX ORDER — SODIUM CHLORIDE 0.9 % (FLUSH) 0.9 %
5-40 SYRINGE (ML) INJECTION AS NEEDED
Status: DISCONTINUED | OUTPATIENT
Start: 2021-04-29 | End: 2021-04-29 | Stop reason: HOSPADM

## 2021-04-29 RX ORDER — LIDOCAINE HYDROCHLORIDE 10 MG/ML
0.1 INJECTION, SOLUTION EPIDURAL; INFILTRATION; INTRACAUDAL; PERINEURAL AS NEEDED
Status: DISCONTINUED | OUTPATIENT
Start: 2021-04-29 | End: 2021-04-29 | Stop reason: HOSPADM

## 2021-04-29 RX ORDER — FAMOTIDINE 20 MG/1
20 TABLET, FILM COATED ORAL ONCE
Status: COMPLETED | OUTPATIENT
Start: 2021-04-29 | End: 2021-04-29

## 2021-04-29 RX ORDER — SODIUM CHLORIDE 0.9 % (FLUSH) 0.9 %
5-40 SYRINGE (ML) INJECTION EVERY 8 HOURS
Status: DISCONTINUED | OUTPATIENT
Start: 2021-04-29 | End: 2021-04-29 | Stop reason: HOSPADM

## 2021-04-29 RX ORDER — PROPOFOL 10 MG/ML
INJECTION, EMULSION INTRAVENOUS AS NEEDED
Status: DISCONTINUED | OUTPATIENT
Start: 2021-04-29 | End: 2021-04-29 | Stop reason: HOSPADM

## 2021-04-29 RX ADMIN — FAMOTIDINE 20 MG: 20 TABLET ORAL at 15:10

## 2021-04-29 RX ADMIN — SODIUM CHLORIDE, SODIUM LACTATE, POTASSIUM CHLORIDE, AND CALCIUM CHLORIDE 25 ML/HR: 600; 310; 30; 20 INJECTION, SOLUTION INTRAVENOUS at 15:11

## 2021-04-29 RX ADMIN — PROPOFOL 20 MG: 10 INJECTION, EMULSION INTRAVENOUS at 15:42

## 2021-04-29 RX ADMIN — PROPOFOL 20 MG: 10 INJECTION, EMULSION INTRAVENOUS at 15:40

## 2021-04-29 RX ADMIN — PROPOFOL 20 MG: 10 INJECTION, EMULSION INTRAVENOUS at 15:35

## 2021-04-29 RX ADMIN — PROPOFOL 20 MG: 10 INJECTION, EMULSION INTRAVENOUS at 15:51

## 2021-04-29 RX ADMIN — PROPOFOL 20 MG: 10 INJECTION, EMULSION INTRAVENOUS at 15:44

## 2021-04-29 RX ADMIN — PROPOFOL 20 MG: 10 INJECTION, EMULSION INTRAVENOUS at 15:37

## 2021-04-29 RX ADMIN — PROPOFOL 20 MG: 10 INJECTION, EMULSION INTRAVENOUS at 15:39

## 2021-04-29 RX ADMIN — PROPOFOL 20 MG: 10 INJECTION, EMULSION INTRAVENOUS at 15:31

## 2021-04-29 RX ADMIN — PROPOFOL 10 MG: 10 INJECTION, EMULSION INTRAVENOUS at 15:57

## 2021-04-29 RX ADMIN — PROPOFOL 10 MG: 10 INJECTION, EMULSION INTRAVENOUS at 15:59

## 2021-04-29 RX ADMIN — PROPOFOL 20 MG: 10 INJECTION, EMULSION INTRAVENOUS at 15:49

## 2021-04-29 RX ADMIN — PROPOFOL 10 MG: 10 INJECTION, EMULSION INTRAVENOUS at 15:54

## 2021-04-29 RX ADMIN — PROPOFOL 20 MG: 10 INJECTION, EMULSION INTRAVENOUS at 15:46

## 2021-04-29 RX ADMIN — PROPOFOL 50 MG: 10 INJECTION, EMULSION INTRAVENOUS at 15:28

## 2021-04-29 RX ADMIN — PROPOFOL 20 MG: 10 INJECTION, EMULSION INTRAVENOUS at 15:33

## 2021-04-29 NOTE — DISCHARGE INSTRUCTIONS
Patient Education        Colonoscopy: What to Expect at [address] [place] [address]  After a colonoscopy, you'll stay at the clinic for 1 to 2 hours until the medicines wear off. Then you can go home. But you'll need to arrange for a ride. Your doctor will tell you when you can eat and do your other usual activities. Your doctor will talk to you about when you'll need your next colonoscopy. Your doctor can help you decide how often you need to be checked. This will depend on the results of your test and your risk for colorectal cancer. After the test, you may be bloated or have gas pains. You may need to pass gas. If a biopsy was done or a polyp was removed, you may have streaks of blood in your stool (feces) for a few days. Problems such as heavy rectal bleeding may not occur until several weeks after the test. This isn't common. But it can happen after polyps are removed. This care sheet gives you a general idea about how long it will take for you to recover. But each person recovers at a different pace. Follow the steps below to get better as quickly as possible. How can you care for yourself at home? Activity    · Rest when you feel tired.     · You can do your normal activities when it feels okay to do so. Diet    · Follow your doctor's directions for eating.     · Unless your doctor has told you not to, drink plenty of fluids. This helps to replace the fluids that were lost during the colon prep.     · Do not drink alcohol. Medicines    · Your doctor will tell you if and when you can restart your medicines. He or she will also give you instructions about taking any new medicines.     · If you take aspirin or some other blood thinner, ask your doctor if and when to start taking it again.  Make sure that you understand exactly what your doctor wants you to do.     · If polyps were removed or a biopsy was done during the test, your doctor may tell you not to take aspirin or other anti-inflammatory medicines for a few days. These include ibuprofen (Advil, Motrin) and naproxen (Aleve). Other instructions    · For your safety, do not drive or operate machinery until the medicine wears off and you can think clearly. Your doctor may tell you not to drive or operate machinery until the day after your test.     · Do not sign legal documents or make major decisions until the medicine wears off and you can think clearly. The anesthesia can make it hard for you to fully understand what you are agreeing to. Follow-up care is a key part of your treatment and safety. Be sure to make and go to all appointments, and call your doctor if you are having problems. It's also a good idea to know your test results and keep a list of the medicines you take. When should you call for help? Call 911 anytime you think you may need emergency care. For example, call if:    · You passed out (lost consciousness).     · You pass maroon or bloody stools.     · You have trouble breathing. Call your doctor now or seek immediate medical care if:    · You have pain that does not get better after you take pain medicine.     · You are sick to your stomach or cannot drink fluids.     · You have new or worse belly pain.     · You have blood in your stools.     · You have a fever.     · You cannot pass stools or gas. Watch closely for changes in your health, and be sure to contact your doctor if you have any problems. Where can you learn more? Go to http://www.gray.com/  Enter E264 in the search box to learn more about \"Colonoscopy: What to Expect at Home. \"  Current as of: December 17, 2020               Content Version: 12.8  © 2225-2837 [employer]. Care instructions adapted under license by [employer] (which disclaims liability or warranty for this information).  If you have questions about a medical condition or this instruction, always ask your healthcare professional. [employer] disclaims any warranty or liability for your use of this information. DISCHARGE SUMMARY from Nurse    PATIENT INSTRUCTIONS:    After general anesthesia or intravenous sedation, for 24 hours or while taking prescription Narcotics:  · Limit your activities  · Do not drive and operate hazardous machinery  · Do not make important personal or business decisions  · Do  not drink alcoholic beverages  · If you have not urinated within 8 hours after discharge, please contact your surgeon on call. Report the following to your surgeon:  · Excessive pain, swelling, redness or odor of or around the surgical area  · Temperature over 100.5  · Nausea and vomiting lasting longer than 4 hours or if unable to take medications  · Any signs of decreased circulation or nerve impairment to extremity: change in color, persistent  numbness, tingling, coldness or increase pain  · Any questions    What to do at Home:  Recommended activity: Activity as tolerated,     If you experience any of the following symptoms uncontrolled bleeding, chest pain or shortness of breath, please follow up with ER. *  Please give a list of your current medications to your Primary Care Provider. *  Please update this list whenever your medications are discontinued, doses are      changed, or new medications (including over-the-counter products) are added. *  Please carry medication information at all times in case of emergency situations. These are general instructions for a healthy lifestyle:    No smoking/ No tobacco products/ Avoid exposure to second hand smoke  Surgeon General's Warning:  Quitting smoking now greatly reduces serious risk to your health.     Obesity, smoking, and sedentary lifestyle greatly increases your risk for illness    A healthy diet, regular physical exercise & weight monitoring are important for maintaining a healthy lifestyle    You may be retaining fluid if you have a history of heart failure or if you experience any of the following symptoms:  Weight gain of 3 pounds or more overnight or 5 pounds in a week, increased swelling in our hands or feet or shortness of breath while lying flat in bed. Please call your doctor as soon as you notice any of these symptoms; do not wait until your next office visit. The discharge information has been reviewed with the Meri Backbone at [address] [place] [address]. Abhay at [address] [place] [address] verbalized understanding. Discharge medications reviewed with the caregiver at [address] [place] [address] and appropriate educational materials and side effects teaching were provided.   ___________________________________________________________________________________________________________________________________

## 2021-04-29 NOTE — ANESTHESIA PREPROCEDURE EVALUATION
Relevant Problems   No relevant active problems       Anesthetic History   No history of anesthetic complications            Review of Systems / Medical History  Patient summary reviewed, nursing notes reviewed and pertinent labs reviewed    Pulmonary  Within defined limits                 Neuro/Psych     seizures  CVA       Cardiovascular    Hypertension          Hyperlipidemia         GI/Hepatic/Renal     GERD           Endo/Other        Arthritis     Other Findings            Physical Exam    Airway  Mallampati: III  TM Distance: 4 - 6 cm  Neck ROM: normal range of motion   Mouth opening: Normal     Cardiovascular    Rhythm: regular           Dental    Dentition: Poor dentition     Pulmonary  Breath sounds clear to auscultation               Abdominal  GI exam deferred       Other Findings            Anesthetic Plan    ASA: 3  Anesthesia type: MAC            Anesthetic plan and risks discussed with: Patient and Son / Daughter

## 2021-04-29 NOTE — PROGRESS NOTES
[email or web address]  [place], [address]      Brief Procedure Note    Mary Mendez  1955  [id number]    Date of Procedure: 4/29/2021    Preoperative diagnosis: Unexplained weight loss:  783.21 - R63.4  Occult blood in stools: R19.5  Oropharyngeal dysphagia:   787.22 - R13.12  Anemia:  285.9 - D64.9    Postoperative diagnosis: EGD: hiatal hernia, duodenal bx's, gastric bx's,  colo: diverticulosis, poor prep, hemorrhoids    Type of Anesthesia: MAC (Monitored anesthesia care)    Description of findings: same as post op dx    Procedure: Procedure(s):  UPPER ENDOSCOPY with bx's  COLONOSCOPY    :  Dr. Brice Laws MD    Assistant(s): Endoscopy Technician-1: Ya Hidalgo  Endoscopy RN-1: Tra Wesley RN  Float Staff: Aleyda Lester RN    Devices/implants/grafts/tissues/prosthesis: None    EBL:None    Specimens:   ID Type Source Tests Collected by Time Destination   1 : duodenal bx's Preservative Duodenum  Elmira Diego MD 4/29/2021 1533 Pathology   2 : body and antrum bx's Preservative Gastric  Elmira Diego MD 4/29/2021 1535 Pathology       Findings: See printed and scanned procedure note    Complications: None    Dr. Brice Laws MD  4/29/2021  4:07 PM

## 2021-04-29 NOTE — H&P
[email or web address]  [phone]    GASTROENTEROLOGY Pre-Procedure H and P      Impression/Plan:   1. This patient is consented for an EGD and colonoscopy for anemia, unexplained weight loss. Chief Complaint: anemia, unexplained weight loss. HPI:  Regina Holley is a 77 y.o. male who is being is having an EGD and colonoscopy anemia, unexplained weight loss.         PMH:   Past Medical History:   Diagnosis Date    Alcohol dependence with alcohol-induced persisting dementia ([address]     per [facility] 4/23/21    Anemia     Aphasia     Benign prostatic hyperplasia     Cerebral vascular disease     COVID-19 04/06/2021    Disturbances of salivary secretion     Dysphagia     GERD (gastroesophageal reflux disease)     Hemiparesis ([facility])     left side    Hemiplegia (HCC)     left side     Hiccups     Hyperlipidemia     Hypernatremia 03/2021    Hypertension     Hyponatremia     Insomnia     Lacunar infarction ([address] 2010    weakness both arms, unable to walk    Lower extremity edema     Moderate protein-calorie malnutrition (HCC)     MRSA (methicillin resistant Staphylococcus aureus)     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)     Pneumonia     Psychogenic polydipsia     PVD (peripheral vascular disease) (HCC)     Seizures ([address]     Spinal stenosis     Stroke ([address] 2017    Unspecified convulsions ([address]     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       PSH:   Past Surgical History:   Procedure Laterality Date    HX HEENT      pt reports past hx of surgery on ears unsure of what type    HX OTHER SURGICAL Bilateral     debridement of lower extremities       Social HX:   Social History     Socioeconomic History    Marital status:      Spouse name: Not on file    Number of children: Not on file    Years of education: Not on file    Highest education level: Not on file   Occupational History    Not on file   Social Needs    Financial resource strain: Not on file    Food insecurity     Worry: Not on file     Inability: Not on file    Transportation needs     Medical: Not on file     Non-medical: Not on file   Tobacco Use    Smoking status: Former Smoker     Packs/day: 0.50     Quit date: 2008     Years since quittin.8    Smokeless tobacco: Never Used   Substance and Sexual Activity    Alcohol use: No    Drug use: No    Sexual activity: Not Currently   Lifestyle    Physical activity     Days per week: Not on file     Minutes per session: Not on file    Stress: Not on file   Relationships    Social connections     Talks on phone: Not on file     Gets together: Not on file     Attends Nondenominational service: Not on file     Active member of club or organization: Not on file     Attends meetings of clubs or organizations: Not on file     Relationship status: Not on file    Intimate partner violence     Fear of current or ex partner: Not on file     Emotionally abused: Not on file     Physically abused: Not on file     Forced sexual activity: Not on file   Other Topics Concern    Not on file   Social History Narrative    Not on file       FHX:   Family History   Problem Relation Age of Onset    Hypertension Other        Allergy:   No Known Allergies    Home Medications:     Medications Prior to Admission   Medication Sig    levETIRAcetam (KEPPRA) 100 mg/mL solution Take 7.5 mL by mouth two (2) times a day.  doxepin (SINEquan) 10 mg capsule Take 1 Cap by mouth nightly.  clopidogrel (PLAVIX) 75 mg tab Take 1 Tab by mouth daily.  pravastatin (PRAVACHOL) 40 mg tablet Take 1 Tab by mouth nightly.  ascorbic acid, vitamin C, (VITAMIN C) 250 mg tablet Take 1 Tab by mouth daily.  ferrous sulfate 325 mg (65 mg iron) tablet Take 1 Tab by mouth daily (with breakfast).  polyethylene glycol (MIRALAX) 17 gram packet Take 1 Packet by mouth daily.  senna-docusate (PERICOLACE) 8.6-50 mg per tablet Take 1 Tab by mouth nightly.  HOLD for loose stool.    tamsulosin (FLOMAX) 0.4 mg capsule Take 0.4 mg by mouth daily. 1 tab daily    MULTIVITAMIN,THERAPEUTIC (THERA MULTI-VITAMIN PO) Take 500 mg by mouth daily.  baclofen (LIORESAL) 10 mg tablet Take 1 Tab by mouth every twelve (12) hours as needed. (Patient taking differently: Take 1 Tab by mouth every twelve (12) hours as needed (for back pain, muscle ralaxant). as needed for pain,)    metoprolol (LOPRESSOR) 25 mg tablet Take 1 Tab by mouth two (2) times a day.  cyanocobalamin 1,000 mcg tablet Take 1 Tab by mouth daily.  folic acid (FOLVITE) 1 mg tablet Take 1 Tab by mouth daily.  aspirin 81 mg chewable tablet Take 1 Tab by mouth daily. Review of Systems:     Constitutional: No fevers, chills, weight loss, fatigue. Skin: No rashes, pruritis, jaundice, ulcerations, erythema. HENT: No headaches, nosebleeds, sinus pressure, rhinorrhea, sore throat. Eyes: No visual changes, blurred vision, eye pain, photophobia, jaundice. Cardiovascular: No chest pain, heart palpitations. Respiratory: No cough, SOB, wheezing, chest discomfort, orthopnea. Gastrointestinal:    Genitourinary: No dysuria, bleeding, discharge, pyuria. Musculoskeletal: No weakness, arthralgias, wasting. Endo: No sweats. Heme: No bruising, easy bleeding. Allergies: As noted. Neurological: Cranial nerves intact. Alert and oriented. Gait not assessed. Psychiatric:  No anxiety, depression, hallucinations. Visit Vitals  Ht 5' 10\" (1.778 m)   Wt 65.8 kg (145 lb)   BMI 20.81 kg/m²       Physical Assessment:     constitutional: appearance: well developed, well nourished, normal habitus, no deformities, in no acute distress. skin: inspection: no rashes, ulcers, icterus or other lesions; no clubbing or telangiectasias. palpation: no induration or subcutaneos nodules. eyes: inspection: normal conjunctivae and lids; no jaundice pupils: normal  ENMT: mouth: normal oral mucosa,lips and gums; good dentition. oropharynx: normal tongue, hard and soft palate; posterior pharynx without erithema, exudate or lesions. neck: thyroid: normal size, consistency and position; no masses or tenderness. respiratory: effort: normal chest excursion; no intercostal retraction or accessory muscle use. cardiovascular: abdominal aorta: normal size and position; no bruits. palpation: PMI of normal size and position; normal rhythm; no thrill or murmurs. abdominal: abdomen: normal consistency; no tenderness or masses. hernias: no hernias appreciated. liver: normal size and consistency. spleen: not palpable. rectal: hemoccult/guaiac: not performed. musculoskeletal: digits and nails: no clubbing, cyanosis, petechiae or other inflammatory conditions. gait: normal gait and station head and neck: normal range of motion; no pain, crepitation or contracture. spine/ribs/pelvis: normal range of motion; no pain, deformity or contracture. neurologic: cranial nerves: II-XII normal.   psychiatric: judgement/insight: within normal limits. memory: within normal limits for recent and remote events. mood and affect: no evidence of depression, anxiety or agitation. orientation: oriented to time, space and person. Basic Metabolic Profile   No results for input(s): NA, K, CL, CO2, BUN, GLU, CA, MG, PHOS in the last 72 hours. No lab exists for component: CREAT      CBC w/Diff    No results for input(s): WBC, RBC, HGB, HCT, MCV, MCH, MCHC, RDW, PLT, HGBEXT, HCTEXT, PLTEXT in the last 72 hours. No lab exists for component: MPV No results for input(s): GRANS, LYMPH, EOS, PRO, MYELO, METAS, BLAST in the last 72 hours. No lab exists for component: MONO, BASO     Hepatic Function   No results for input(s): ALB, TP, TBILI, AP, AML, LPSE in the last 72 hours. No lab exists for component: DBILI, GPT, SGOT     Coags   No results for input(s): PTP, INR, APTT, INREXT in the last 72 hours.         Elyse Brown MD  [facility], [address] [place] [address]  Cell: [phone]  Direct pager: [phone]  [email or web address]  [email or web address]

## 2021-04-29 NOTE — PERIOP NOTES
Assumed care of pt from ENDO via stretcher. Attached to monitor. VSS. ENDO and anesthesia report appreciated. Will monitor. [facility] called [address] and spoke with Tarik. Discharge instructions along with procedure given. Carolyn Mark with [facility] called and informed that Mr. Alannah Candelaria was ready for pickup. Stated she was coming from [facility]. Instructed to head this way for pickup. Telephone number for PACU given.

## 2021-04-29 NOTE — ANESTHESIA POSTPROCEDURE EVALUATION
Procedure(s):  UPPER ENDOSCOPY with bx's  COLONOSCOPY. MAC    Anesthesia Post Evaluation      Multimodal analgesia: multimodal analgesia used between 6 hours prior to anesthesia start to PACU discharge  Patient location during evaluation: PACU  Patient participation: complete - patient participated  Level of consciousness: awake and alert  Pain management: adequate  Airway patency: patent  Anesthetic complications: no  Cardiovascular status: acceptable and hemodynamically stable  Respiratory status: acceptable  Hydration status: acceptable  Post anesthesia nausea and vomiting:  controlled      INITIAL Post-op Vital signs:   Vitals Value Taken Time   /69 04/29/21 1648   Temp 36.7 °C (98 °F) 04/29/21 1611   Pulse 69 04/29/21 1654   Resp 13 04/29/21 1654   SpO2 99 % 04/29/21 1654   Vitals shown include unvalidated device data.
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
## 2022-03-16 ENCOUNTER — OFFICE VISIT (OUTPATIENT)
Dept: VASCULAR SURGERY | Age: 67
End: 2022-03-16

## 2022-03-16 VITALS
OXYGEN SATURATION: 96 % | DIASTOLIC BLOOD PRESSURE: 80 MMHG | SYSTOLIC BLOOD PRESSURE: 134 MMHG | HEART RATE: 84 BPM | RESPIRATION RATE: 20 BRPM

## 2022-03-16 DIAGNOSIS — L97.329 VENOUS ULCER OF ANKLE, LEFT (HCC): Primary | ICD-10-CM

## 2022-03-16 DIAGNOSIS — I83.023 VENOUS ULCER OF ANKLE, LEFT (HCC): Primary | ICD-10-CM

## 2022-03-16 PROCEDURE — 99203 OFFICE O/P NEW LOW 30 MIN: CPT | Performed by: NURSE PRACTITIONER

## 2022-03-16 NOTE — PROGRESS NOTES
Chief Complaint   Patient presents with    New Patient         Impression and Plan:  79 y.o. male with a left lower extremity wound. He does not have any hyperpigmentation or leg edema. The wound is not in the gaiter area leg. It does not appear as though it is venous in nature however since he does have a history of a previous venous ulceration we akbar with a reflux to rule out any perforators. We will also move forward with an ELÍAS. If these are normal or require no vascular intervention he can continue to follow-up with the wound care clinic at [facility]. Dry dressing applied to wound with SoloSite. Patient son advised to have patient follow-up with podiatry as he had concern patient's dry feet and nail fungus. History and Physical    Carmen Mike is a 79y.o. year old male with a history of schizophrenia, venous insufficiency and type 2 diabetes here for a slow healing left lateral leg. He was last seen by this office for a right leg wound in 2017 which presented with maggots. Last arterial study was normal.  He will not to commands however it does not verbalize. He is nonambulatory since 2014 and spends many days with his legs dependent sitting in his wheelchair. He lives in a SNF and was referred by [facility] wound care.        Past Medical History:   Diagnosis Date    Alcohol dependence with alcohol-induced persisting dementia ([address]     per [facility] 4/23/21    Anemia     Aphasia     Benign prostatic hyperplasia     Cerebral vascular disease     COVID-19 04/06/2021    Disturbances of salivary secretion     Dysphagia     GERD (gastroesophageal reflux disease)     Hemiparesis (HCC)     left side    Hemiplegia (HCC)     left side     Hiccups     Hyperlipidemia     Hypernatremia 03/2021    Hypertension     Hyponatremia     Insomnia     Lacunar infarction ([address] 2010    weakness both arms, unable to walk    Lower extremity edema     Moderate protein-calorie malnutrition ([address]     MRSA (methicillin resistant Staphylococcus aureus)     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 ([facility])     Pneumonia     Psychogenic polydipsia     PVD (peripheral vascular disease) ([facility])     Seizures [facility]     Spinal stenosis     Stroke ([facility] 2017    Unspecified convulsions ([facility]     4/23/2021 [facility] Care nurse     Patient Active Problem List   Diagnosis Code    Intractable hiccups R06.6    Essential hypertension, benign I10    First degree AV block I44.0    Former heavy tobacco smoker Z87.891    Severe protein-calorie malnutrition ([facility] E43    Psychogenic polydipsia R63.1, F54    Hyponatremia E87.1    Cellulitis L03.90    Right foot infection L08.9    Bilateral leg and foot pain M79.604, M79.605, M79.671, M79.672    Pneumonia J18.9    Stroke ([facility]) I63.9    Hypernatremia E87.0    Acute metabolic encephalopathy R25.63    Dehydration E86.0    COVID-19 U07.1    Hematuria R31.9    Dysuria R30.0    Goals of care, counseling/discussion Z71.89    Debility R53.81     Past Surgical History:   Procedure Laterality Date    COLONOSCOPY N/A 4/29/2021    COLONOSCOPY performed by Gustavo Vital MD at [facility] ENDOSCOPY    HX HEENT      pt reports past hx of surgery on ears unsure of what type    HX OTHER SURGICAL Bilateral     debridement of lower extremities     Current Outpatient Medications   Medication Sig Dispense Refill    levETIRAcetam (KEPPRA) 100 mg/mL solution Take 7.5 mL by mouth two (2) times a day.  doxepin (SINEquan) 10 mg capsule Take 1 Cap by mouth nightly.  clopidogrel (PLAVIX) 75 mg tab Take 1 Tab by mouth daily. 90 Tab 0    pravastatin (PRAVACHOL) 40 mg tablet Take 1 Tab by mouth nightly. 30 Tab 1    ascorbic acid, vitamin C, (VITAMIN C) 250 mg tablet Take 1 Tab by mouth daily. 30 Tab 2    ferrous sulfate 325 mg (65 mg iron) tablet Take 1 Tab by mouth daily (with breakfast).  30 Tab 2    polyethylene glycol (MIRALAX) 17 gram packet Take 1 Packet by mouth daily. 30 Packet 2    senna-docusate (PERICOLACE) 8.6-50 mg per tablet Take 1 Tab by mouth nightly. HOLD for loose stool. 30 Tab 2    tamsulosin (FLOMAX) 0.4 mg capsule Take 0.4 mg by mouth daily. 1 tab daily      MULTIVITAMIN,THERAPEUTIC (THERA MULTI-VITAMIN PO) Take 500 mg by mouth daily.  baclofen (LIORESAL) 10 mg tablet Take 1 Tab by mouth every twelve (12) hours as needed. (Patient taking differently: Take 1 Tab by mouth every twelve (12) hours as needed (for back pain, muscle ralaxant). as needed for pain,) 15 Tab 0    metoprolol (LOPRESSOR) 25 mg tablet Take 1 Tab by mouth two (2) times a day. 60 Tab 2    cyanocobalamin 1,000 mcg tablet Take 1 Tab by mouth daily. 30 Tab 2    folic acid (FOLVITE) 1 mg tablet Take 1 Tab by mouth daily. 30 Tab 1    aspirin 81 mg chewable tablet Take 1 Tab by mouth daily. 30 Tab 0     No Known Allergies  Social History     Socioeconomic History    Marital status:      Spouse name: Not on file    Number of children: Not on file    Years of education: Not on file    Highest education level: Not on file   Occupational History    Not on file   Tobacco Use    Smoking status: Former Smoker     Packs/day: 0.50     Quit date: 2008     Years since quittin.7    Smokeless tobacco: Never Used   Substance and Sexual Activity    Alcohol use: No    Drug use: No    Sexual activity: Not Currently   Other Topics Concern    Not on file   Social History Narrative    Not on file     Social Determinants of Health     Financial Resource Strain:     Difficulty of Paying Living Expenses: Not on file   Food Insecurity:     Worried About Running Out of Food in the Last Year: Not on file    Victor Manuel of Food in the Last Year: Not on file   Transportation Needs:     Lack of Transportation (Medical): Not on file    Lack of Transportation (Non-Medical):  Not on file   Physical Activity:     Days of Exercise per Week: Not on file    Minutes of Exercise per Session: Not on file   Stress:     Feeling of Stress : Not on file   Social Connections:     Frequency of Communication with Friends and Family: Not on file    Frequency of Social Gatherings with Friends and Family: Not on file    Attends Christianity Services: Not on file    Active Member of Clubs or Organizations: Not on file    Attends Club or Organization Meetings: Not on file    Marital Status: Not on file   Intimate Partner Violence:     Fear of Current or Ex-Partner: Not on file    Emotionally Abused: Not on file    Physically Abused: Not on file    Sexually Abused: Not on file   Housing Stability:     Unable to Pay for Housing in the Last Year: Not on file    Number of [place] in the Last Year: Not on file    Unstable Housing in the Last Year: Not on file      Family History   Problem Relation Age of Onset    Hypertension Other        Review of Systems    General: negative for fever   Eyes: negative for vision loss   HENT: negative for cold symptoms   Respiratory negative for shortness of breath   Cardiac: negative for chest pain   Vascular negative for foot pain at night    Gastrointestinal: negative for abdominal pain   Genitourinary: negative for dysuria    Endocrine: negative for excessive thirst   Skin: negative for rash   Neurological: negative for paralysis   Psychiatric: negative for depression          Physical Exam:    Visit Vitals  /80 (BP 1 Location: Left lower arm, BP Patient Position: Sitting, BP Cuff Size: Adult)   Pulse 84   Resp 20   SpO2 96%      Constitutional:  Patient is well developed, well nourished, and not distressed. HEENT: atraumatic, normocephalic, wearing a mask. Eyes:   Cunjunctivae clear, no scleral icterus  Neck:   No JVD present. Cardiovascular:  Normal rate, regular rhythm, normal heart sounds. No murmur heard. Pulmonary/Chest: Effort normal .  Extremities: Normal range of motion. Neurological:  he  is alert and oriented x3 . Gait normal. Motor & sensory grossly intact in all 4 limbs. Psych: Appropriate mood and affect. Skin:  Skin is warm and dry. No ulcerations  Pulses:Palpable           The treatment plan was reviewed with the patient in detail. The patient voiced understanding of this plan and all questions and concerns were addressed. The patient agrees with this plan. We discussed the signs and symptoms that would require earlier attention or intervention. I appreciate the opportunity to participate in the care of your patient. I will be sure to keep you informed of any subsequent changes in the treatment plan. If you have any questions or concerns, please feel free to contact me.       Luis Shaw [facility]  Vascular Nurse [address]  [phone]

## 2022-03-16 NOTE — PROGRESS NOTES
1. Have you been to an emergency room or urgent care clinic since your last visit? Patient presents in office today as a new patient. Hospitalized since your last visit? If yes, where, when, and reason for visit? Patient presents in office today as a new patient. 2. Have you seen or consulted any other health care providers outside of the [facility] since your last visit including any procedures, health maintenance items. If yes, where, when and reason for visit? Patient presents in office today as a new patient.

## 2022-03-30 DIAGNOSIS — I83.023 VENOUS ULCER OF ANKLE, LEFT (HCC): ICD-10-CM

## 2022-03-30 DIAGNOSIS — L97.329 VENOUS ULCER OF ANKLE, LEFT (HCC): ICD-10-CM

## 2022-04-05 ENCOUNTER — OFFICE VISIT (OUTPATIENT)
Dept: VASCULAR SURGERY | Age: 67
End: 2022-04-05

## 2022-04-05 ENCOUNTER — DOCUMENTATION ONLY (OUTPATIENT)
Dept: VASCULAR SURGERY | Age: 67
End: 2022-04-05

## 2022-04-05 VITALS
HEIGHT: 70 IN | BODY MASS INDEX: 20.48 KG/M2 | SYSTOLIC BLOOD PRESSURE: 134 MMHG | DIASTOLIC BLOOD PRESSURE: 84 MMHG | HEART RATE: 84 BPM | WEIGHT: 143.08 LBS

## 2022-04-05 DIAGNOSIS — L97.329 VENOUS ULCER OF ANKLE, LEFT (HCC): Primary | ICD-10-CM

## 2022-04-05 DIAGNOSIS — I83.023 VENOUS ULCER OF ANKLE, LEFT (HCC): Primary | ICD-10-CM

## 2022-04-05 PROCEDURE — 99214 OFFICE O/P EST MOD 30 MIN: CPT | Performed by: PHYSICIAN ASSISTANT

## 2022-04-05 RX ORDER — MOXIFLOXACIN 5 MG/ML
SOLUTION/ DROPS OPHTHALMIC
COMMUNITY
Start: 2022-03-30 | End: 2022-10-22

## 2022-04-05 NOTE — PATIENT INSTRUCTIONS
Peripheral Arterial Disease (PAD): Care Instructions  Overview  Peripheral arterial disease (PAD) occurs when the blood vessels (arteries) that supply blood to the legs, belly, pelvis, arms, or neck get narrow or blocked. This reduces blood flow to that area. The legs are affected most often. PAD is often caused by fatty buildup (plaque) in the arteries. This buildup is also called \"hardening\" of the arteries. Your risk of PAD increases if you smoke or have high cholesterol, high blood pressure, diabetes, or a family history of PAD. Many people don't have symptoms. If you do have symptoms, you may have weak or tired legs, difficulty walking or balancing, or pain. If you have pain, you might feel a tight, aching, or squeezing pain in the calf, foot, thigh, or buttock that occurs during exercise. The pain usually gets worse during exercise and goes away when you rest. If PAD gets worse, you may have symptoms of poor blood flow, such as leg pain when you rest.  Medicines and lifestyle changes may help your symptoms and lower your risk of heart attack and stroke. In some cases, surgery or other treatment is needed. It is important that you follow up with your doctor. Follow-up care is a key part of your treatment and safety. Be sure to make and go to all appointments, and call your doctor if you are having problems. It's also a good idea to know your test results and keep a list of the medicines you take. How can you care for yourself at home? · Do not smoke. Smoking can make PAD worse. If you need help quitting, talk to your doctor about stop-smoking programs and medicines. These can increase your chances of quitting for good. · Take your medicines exactly as prescribed. Call your doctor if you think you are having a problem with your medicine. · If you take a blood thinner, such as aspirin, be sure to get instructions about how to take your medicine safely.  Blood thinners can cause serious bleeding problems. · Ask your doctor if a cardiac rehab program is right for you. Cardiac rehab can help you make lifestyle changes. In cardiac rehab, a team of health professionals provides education and support to help you make new, healthy habits. · Eat heart-healthy foods such as fruits, vegetables, whole grains, fish, lean meats, and low-fat or nonfat dairy foods. Limit sodium, sugar, and alcohol. · If your doctor recommends it, get more exercise. Walking is a good choice. Bit by bit, increase the amount you walk every day. Try for at least 30 minutes on most days of the week. If you have symptoms when you exercise, ask your doctor about a special exercise program that may help relieve your symptoms. · Stay at a healthy weight. Lose weight if you need to. · Take good care of your feet. ? Treat cuts and scrapes on your legs right away. Poor blood flow prevents (or slows) quick healing of even small cuts or scrapes. This is even more important if you have diabetes. ? Avoid shoes that are too tight or that rub your feet. Shoes should be comfortable and fit well. ? Avoid socks or stockings that are tight enough to leave elastic-band marks on your legs. Tight socks can make circulation problems worse. ? Keep your feet clean and moisturized to prevent drying and cracking. Place cotton or lamb's wool between your toes to prevent rubbing and to absorb moisture. ? If you have a sore on your leg or foot, keep it dry and cover it with a nonstick bandage until you see your doctor. When should you call for help? Call 911 anytime you think you may need emergency care. For example, call if:    · You have symptoms of a heart attack. These may include:  ? Chest pain or pressure, or a strange feeling in the chest.  ? Sweating. ? Shortness of breath. ? Nausea or vomiting. ? Pain, pressure, or a strange feeling in the back, neck, jaw, or upper belly or in one or both shoulders or arms.   ? Lightheadedness or sudden weakness. ? A fast or irregular heartbeat. After you call 911, the  may tell you to chew 1 adult-strength or 2 to 4 low-dose aspirin. Wait for an ambulance. Do not try to drive yourself.    · You have sudden, severe leg pain, and your leg is cool and pale.     · You have symptoms of a stroke. These may include:  ? Sudden numbness, tingling, weakness, or loss of movement in your face, arm, or leg, especially on only one side of your body. ? Sudden vision changes. ? Sudden trouble speaking. ? Sudden confusion or trouble understanding simple statements. ? Sudden problems with walking or balance. ? A sudden, severe headache that is different from past headaches. Call your doctor now or seek immediate medical care if:    · You have leg pain that does not go away even if you rest.     · Your leg pain changes or gets worse. For example, if you have more pain with normal activity or the same pain with decreased activity, you should call.     · You have cold or numb feet or toes.     · You have leg or foot sores that are slow to heal.     · The skin on your legs or feet changes color.     · You have an open sore on your leg or foot that is infected. Signs of infection include:  ? Increased pain, swelling, warmth, or redness. ? Red streaks leading from the sore. ? Pus draining from the sore. ? A fever. Watch closely for changes in your health, and be sure to contact your doctor if you have any problems. Where can you learn more? Go to http://www.gray.com/  Enter H735 in the search box to learn more about \"Peripheral Arterial Disease (PAD): Care Instructions. \"  Current as of: January 10, 2022               Content Version: 13.2  © 2229-7413 [employer]. Care instructions adapted under license by [employer] (which disclaims liability or warranty for this information).  If you have questions about a medical condition or this instruction, always ask your healthcare professional. [address] any warranty or liability for your use of this information.

## 2022-04-05 NOTE — PROGRESS NOTES
1. Have you been to an emergency room or urgent care clinic since your last visit? No    Hospitalized since your last visit? If yes, where, when, and reason for visit? No    2. Have you seen or consulted any other health care providers outside of the [facility] since your last visit including any procedures, health maintenance items. If yes, where, when and reason for visit?  No

## 2022-04-06 LAB
LEFT FV RFX: 1 S
LEFT GSV AT KNEE DIAM: 0.13 CM
LEFT GSV JUNC DIAM: 0.82 CM
LEFT GSV THIGH DIST DIAM: 0.19 CM
LEFT GSV THIGH MID DIAM: 0.5 CM
LEFT GSV THIGH PROX DIAM: 0.56 CM
LEFT SSV PROX DIAM: 0.22 CM

## 2022-04-06 NOTE — PROGRESS NOTES
Chief Complaint   Patient presents with    Wound Check         Impression and Plan:  79 y.o. male with a history of ulceration and left lower extremity wound. He does not have any hyperpigmentation or leg edema on this visit. Old ulcerated areas noted, well-healed. Previsit imaging reviewed, results discussed with patient. Procedure Technique    Duplex images were obtained using 2-D gray scale, color flow, and spectral Doppler analysis. Vitals    Weight Height BSA (calculated - sq m) BP Pulse (Heart Rate)   143 lb 1.3 oz (64.9 kg) 5' 10\" (1.778 m) 1.79 sq meters 134/84      Interpretation Summary    · Limited exam. Technically difficulty secondary to non verbal patient and limited positioning. · No evidence of acute left lower extremity deep venous thrombosis. · Chronic non-occlusive thrombus present in the CFV, femoral vein and left popliteal vein. · No evidence of superficial venous reflux in the GSV with proximal and distal augmentation. · Small saphenous vein not interrogated due to limited mobility and patient positioning. · Deep venous reflux in the left femoral vein. · Multiphasic doppler signals at rest bilaterally in the PTA and Peronal arteries. Lower Extremity Venous Findings      Right Lower Venous    For comparison purposes, the right common femoral vein was briefly interrogated. The vein demonstrates normal color filling and compressibility. Doppler flow was phasic and spontaneous. Left Lower Venous    Technically difficult exam due to: marked edema and inability to externally rotate leg. Chronic non-occlusive thrombus present in the left common femoral vein. Chronic non-occlusive thrombus present in the left proximal femoral vein. Chronic non-occlusive thrombus present in the left popliteal vein. The posterior tibial and peroneal vein(s) are grossly patent but cannot exclude non-occlusive thrombus. The peroneal vein(s) were not well visualized.       The left posterior tibial artery has triphasic waveforms. Reflux study patient position: supine. Deep venous insufficiency noted in the left femoral vein. (Reflux of >0.5 seconds.) Left great saphenous vein is competent. Limited exam due to positioning limitations and patient is non verbal. Proximal and distal augment performed for reflux. SSV visualized at the Pop fossa in gray scale trv. No doppler signal obtainable due to patient uncooperative to hold or turn leg to evaluate. Significant edema visualized in the lateral calf to the ulcerative site. Left Lower Extremity Venous Measurements     Vein Diam Reflux Time   GSV Junction 0.82 cm             GSV Thigh Prox 0.56 cm             GSV Thigh Mid 0.5 cm             GSV Thigh Dist 0.19 cm             GSV At Knee 0.13 cm             SSV Prox 0.22 cm             FV  1 s                                   Procedure Staff    Technologist/Clinician: Favian Chapa  Supporting Staff: None  Performing Physician/Midlevel: None        Patient instructed to given above findings we will continue with observation and surveillance. It does show some mild disease but nothing that needs to be intervened. We will continue with monitoring and trying to avoid pressure areas for repeat ulcerations. Being that the patient has no open wounds at this time we will continue to follow as needed. Both patient and son instructed on the importance of compression elevation in the face of venous insufficiency. Patient instructed that he would benefit from the use of compression stockings. Being that he has no ulcers it would be easier to be compliant with their usage. Patient instructed to wear compression stockings 20 to 30 mmHg. Patient also instructed on elevation, proper elevation, elevation above the level of his heart. Instructed he must lay flat with 2-3 pillows under his legs to get his legs 20 to 30 inches above the leg site for proper elevation.   Patient states he understands, son states that he is pleased that there is no intervention, and they are willing to proceed with continued observation surveillance. Follow-up as needed or patient instructed to call sooner if he develops any symptoms or other areas of concern. We will discuss details with my attending. History and Physical    Erendira Burroughs is a 79y.o. year old male with a history of schizophrenia, venous insufficiency and type 2 diabetes here for a slow healing left lateral leg. He was last seen by this office for a right leg wound in 2017 which presented with maggots. Last arterial study were without significant disease. Space the patient's speech difficulties were able to converse on this visit without any complications. Patient states that he is fully understanding of what we are saying, and states that he is feeling much better because he has no further ulcerations on his legs. Denies any leg pain at rest.  He is nonambulatory since 2014 and spends many days with his legs dependent sitting in his wheelchair. He lives in a SNF and was referred by [facility] wound care. As stated above presently patient has no wounds and wounds which were previously present have healed.        Past Medical History:   Diagnosis Date    Alcohol dependence with alcohol-induced persisting dementia ([address]     per [facility] 4/23/21    Anemia     Aphasia     Benign prostatic hyperplasia     Cerebral vascular disease     COVID-19 04/06/2021    Disturbances of salivary secretion     Dysphagia     GERD (gastroesophageal reflux disease)     Hemiparesis (HCC)     left side    Hemiplegia (HCC)     left side     Hiccups     Hyperlipidemia     Hypernatremia 03/2021    Hypertension     Hyponatremia     Insomnia     Lacunar infarction ([facility] 2010    weakness both arms, unable to walk    Lower extremity edema     Moderate protein-calorie malnutrition (HCC)     MRSA (methicillin resistant Staphylococcus aureus)     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)     Pneumonia     Psychogenic polydipsia     PVD (peripheral vascular disease) ([facility]     Seizures ([facility]     Spinal stenosis     Stroke ([facility] 2017    Unspecified convulsions ([facility]     4/23/2021 [facility] Care nurse     Patient Active Problem List   Diagnosis Code    Intractable hiccups R06.6    Essential hypertension, benign I10    First degree AV block I44.0    Former heavy tobacco smoker Z87.891    Severe protein-calorie malnutrition ([facility] E43    Psychogenic polydipsia R63.1, F54    Hyponatremia E87.1    Cellulitis L03.90    Right foot infection L08.9    Bilateral leg and foot pain M79.604, M79.605, M79.671, M79.672    Pneumonia J18.9    Stroke ([facility]) I63.9    Hypernatremia E87.0    Acute metabolic encephalopathy D91.08    Dehydration E86.0    COVID-19 U07.1    Hematuria R31.9    Dysuria R30.0    Goals of care, counseling/discussion Z71.89    Debility R53.81     Past Surgical History:   Procedure Laterality Date    COLONOSCOPY N/A 4/29/2021    COLONOSCOPY performed by Chani Novoa MD at [facility]COPY    HX HEENT      pt reports past hx of surgery on ears unsure of what type    HX OTHER SURGICAL Bilateral     debridement of lower extremities     Current Outpatient Medications   Medication Sig Dispense Refill    moxifloxacin (VIGAMOX) 0.5 % ophthalmic solution       levETIRAcetam (KEPPRA) 100 mg/mL solution Take 7.5 mL by mouth two (2) times a day.  clopidogrel (PLAVIX) 75 mg tab Take 1 Tab by mouth daily. 90 Tab 0    pravastatin (PRAVACHOL) 40 mg tablet Take 1 Tab by mouth nightly. 30 Tab 1    ascorbic acid, vitamin C, (VITAMIN C) 250 mg tablet Take 1 Tab by mouth daily. 30 Tab 2    ferrous sulfate 325 mg (65 mg iron) tablet Take 1 Tab by mouth daily (with breakfast). 30 Tab 2    polyethylene glycol (MIRALAX) 17 gram packet Take 1 Packet by mouth daily.  30 Packet 2    senna-docusate (PERICOLACE) 8.6-50 mg per tablet Take 1 Tab by mouth nightly. HOLD for loose stool. 30 Tab 2    tamsulosin (FLOMAX) 0.4 mg capsule Take 0.4 mg by mouth daily. 1 tab daily      MULTIVITAMIN,THERAPEUTIC (THERA MULTI-VITAMIN PO) Take 500 mg by mouth daily.  baclofen (LIORESAL) 10 mg tablet Take 1 Tab by mouth every twelve (12) hours as needed. (Patient taking differently: Take 1 Tab by mouth every twelve (12) hours as needed (for back pain, muscle ralaxant). as needed for pain,) 15 Tab 0    metoprolol (LOPRESSOR) 25 mg tablet Take 1 Tab by mouth two (2) times a day. 60 Tab 2    cyanocobalamin 1,000 mcg tablet Take 1 Tab by mouth daily. 30 Tab 2    folic acid (FOLVITE) 1 mg tablet Take 1 Tab by mouth daily. 30 Tab 1    aspirin 81 mg chewable tablet Take 1 Tab by mouth daily. 30 Tab 0    doxepin (SINEquan) 10 mg capsule Take 1 Cap by mouth nightly. (Patient not taking: Reported on 3/16/2022)       No Known Allergies  Social History     Socioeconomic History    Marital status:      Spouse name: Not on file    Number of children: Not on file    Years of education: Not on file    Highest education level: Not on file   Occupational History    Not on file   Tobacco Use    Smoking status: Former Smoker     Packs/day: 0.50     Quit date: 2008     Years since quittin.7    Smokeless tobacco: Never Used   Substance and Sexual Activity    Alcohol use: No    Drug use: No    Sexual activity: Not Currently   Other Topics Concern    Not on file   Social History Narrative    Not on file     Social Determinants of Health     Financial Resource Strain:     Difficulty of Paying Living Expenses: Not on file   Food Insecurity:     Worried About Running Out of Food in the Last Year: Not on file    Victor Manuel of Food in the Last Year: Not on file   Transportation Needs:     Lack of Transportation (Medical): Not on file    Lack of Transportation (Non-Medical):  Not on file   Physical Activity:     Days of Exercise per Week: Not on file    Minutes of Exercise per Session: Not on file   Stress:     Feeling of Stress : Not on file   Social Connections:     Frequency of Communication with Friends and Family: Not on file    Frequency of Social Gatherings with Friends and Family: Not on file    Attends Worship Services: Not on file    Active Member of [address] [place] [address] [employer] or Organizations: Not on file    Attends Club or Organization Meetings: Not on file    Marital Status: Not on file   Intimate Partner Violence:     Fear of Current or Ex-Partner: Not on file    Emotionally Abused: Not on file    Physically Abused: Not on file    Sexually Abused: Not on file   Housing Stability:     Unable to Pay for Housing in the Last Year: Not on file    Number of [place] in the Last Year: Not on file    Unstable Housing in the Last Year: Not on file      Family History   Problem Relation Age of Onset    Hypertension Other        Review of Systems    General: negative for fever/chills. Patient states that he is a total lift and does not ambulate. Denies any further ulceration skin rashes or skin lesions on his lower extremities. Denies any leg pain at rest.   Eyes: negative for vision loss   HENT: negative for cold symptoms   Respiratory negative for shortness of breath   Cardiac: negative for chest pain   Vascular negative for foot pain at night    Gastrointestinal: negative for abdominal pain   Genitourinary: negative for dysuria    Endocrine: negative for excessive thirst   Skin: negative for rash   Neurological: negative for paralysis   Psychiatric: negative for depression          Physical Exam:    Visit Vitals  /84 (BP 1 Location: Left upper arm, BP Patient Position: Sitting)   Pulse 84   Ht 5' 10\" (1.778 m)   Wt 143 lb 1.3 oz (64.9 kg)   BMI 20.53 kg/m²      Constitutional:  Patient is awake alert and answers questions appropriately.   Moves extremities to command with the exception of the lower extremities which she says are very weak. He is able to wiggle his toes to command but not any more meaningful movement of his lower extremities. Patient states that he has not ambulated in over 2 years. He is a total Daniel lift at the nursing home. Presents today in no acute distress. Brought into the room via stretcher. Patient does have some verbal difficulties but I was able to understand him fully and he was speaking very clearly and answering all questions appropriately. Son was available via phone to help translate if needs be but patient was able to express himself appropriately. Patient states that his lower extremity ulcers have healed completely and no new wounds are present. HEENT: atraumatic, normocephalic, wearing a mask. Eyes:   Cunjunctivae clear, no scleral icterus  Neck:   No JVD present. Cardiovascular:  Normal rate, regular rhythm, normal heart sounds. No murmur heard. Pulmonary/Chest: Effort normal .  Extremities: Both legs are warm to touch. Patient is unable to move both extremities to command, but states that he can feel me touching him but he does not have much strength. He has well-healed ulcerated areas noted and no new lesions or rashes. Easily obtain Doppler signals bilaterally. No edema on this visit. No calf tenderness to palpation bilaterally. Patient states that he was recently seen by podiatry and had his toenails clipped. Neurological:  he  is alert and oriented x3 . Gait normal. Motor & sensory grossly intact in all 4 limbs. Psych: Appropriate mood and affect. Difficult with speech but able to express his views. Skin:  Skin is warm and dry. No ulcerations  Pulses:Palpable DP pulses bilaterally, easily obtain Doppler signals in both PT's. Palpable popliteal pulses noted bilaterally               Palpable femoral pulses noted bilaterally. The treatment plan was reviewed with the patient in detail.   The patient voiced understanding of this plan and all questions and concerns were addressed. The patient agrees with this plan. We discussed the signs and symptoms that would require earlier attention or intervention. I appreciate the opportunity to participate in the care of your patient. I will be sure to keep you informed of any subsequent changes in the treatment plan. If you have any questions or concerns, please feel free to contact me.       Ana Adams PA-C

## 2022-10-09 ENCOUNTER — APPOINTMENT (OUTPATIENT)
Dept: CT IMAGING | Age: 67
DRG: 871 | End: 2022-10-09
Attending: EMERGENCY MEDICINE
Payer: COMMERCIAL

## 2022-10-09 ENCOUNTER — APPOINTMENT (OUTPATIENT)
Dept: GENERAL RADIOLOGY | Age: 67
DRG: 871 | End: 2022-10-09
Attending: EMERGENCY MEDICINE
Payer: COMMERCIAL

## 2022-10-09 ENCOUNTER — HOSPITAL ENCOUNTER (INPATIENT)
Age: 67
LOS: 13 days | Discharge: NURSING, REHAB OR LONG-TERM CARE | DRG: 871 | End: 2022-10-22
Attending: EMERGENCY MEDICINE | Admitting: INTERNAL MEDICINE
Payer: COMMERCIAL

## 2022-10-09 DIAGNOSIS — B34.1 ENTEROVIRUS INFECTION: ICD-10-CM

## 2022-10-09 DIAGNOSIS — F10.27 DEMENTIA ASSOCIATED WITH ALCOHOLISM, UNSPECIFIED DEMENTIA SEVERITY, UNSPECIFIED WHETHER BEHAVIORAL, PSYCHOTIC, OR MOOD DISTURBANCE OR ANXIETY (HCC): ICD-10-CM

## 2022-10-09 DIAGNOSIS — J69.0 ASPIRATION PNEUMONIA OF BOTH LOWER LOBES DUE TO REGURGITATED FOOD (HCC): ICD-10-CM

## 2022-10-09 DIAGNOSIS — E87.0 HYPERNATREMIA: ICD-10-CM

## 2022-10-09 DIAGNOSIS — A41.9 SEPSIS, DUE TO UNSPECIFIED ORGANISM, UNSPECIFIED WHETHER ACUTE ORGAN DYSFUNCTION PRESENT (HCC): ICD-10-CM

## 2022-10-09 DIAGNOSIS — B34.8 RHINOVIRUS INFECTION: ICD-10-CM

## 2022-10-09 DIAGNOSIS — R13.10 DYSPHAGIA, UNSPECIFIED TYPE: ICD-10-CM

## 2022-10-09 DIAGNOSIS — E87.3 RESPIRATORY ALKALOSIS: ICD-10-CM

## 2022-10-09 DIAGNOSIS — E86.1 HYPOVOLEMIA: ICD-10-CM

## 2022-10-09 DIAGNOSIS — R77.8 ELEVATED TROPONIN: ICD-10-CM

## 2022-10-09 DIAGNOSIS — J96.01 ACUTE RESPIRATORY FAILURE WITH HYPOXIA (HCC): ICD-10-CM

## 2022-10-09 DIAGNOSIS — G93.40 ACUTE ENCEPHALOPATHY: ICD-10-CM

## 2022-10-09 DIAGNOSIS — N30.01 ACUTE CYSTITIS WITH HEMATURIA: ICD-10-CM

## 2022-10-09 DIAGNOSIS — J18.9 MULTIFOCAL PNEUMONIA: ICD-10-CM

## 2022-10-09 DIAGNOSIS — U07.1 ASYMPTOMATIC COVID-19 VIRUS INFECTION: ICD-10-CM

## 2022-10-09 DIAGNOSIS — U07.1 COVID-19: ICD-10-CM

## 2022-10-09 DIAGNOSIS — E86.0 DEHYDRATION: ICD-10-CM

## 2022-10-09 DIAGNOSIS — E43 SEVERE PROTEIN-CALORIE MALNUTRITION (HCC): ICD-10-CM

## 2022-10-09 DIAGNOSIS — T17.908A ASPIRATION INTO AIRWAY, INITIAL ENCOUNTER: ICD-10-CM

## 2022-10-09 DIAGNOSIS — G93.40 ENCEPHALOPATHY: ICD-10-CM

## 2022-10-09 DIAGNOSIS — R53.81 DEBILITY: ICD-10-CM

## 2022-10-09 DIAGNOSIS — F10.97 SEVERE DEMENTIA ASSOCIATED WITH ALCOHOLISM, WITHOUT BEHAVIORAL DISTURBANCE, PSYCHOTIC DISTURBANCE, MOOD DISTURBANCE, OR ANXIETY (HCC): ICD-10-CM

## 2022-10-09 DIAGNOSIS — R06.03 RESPIRATORY DISTRESS: Primary | ICD-10-CM

## 2022-10-09 DIAGNOSIS — N17.9 AKI (ACUTE KIDNEY INJURY) (HCC): ICD-10-CM

## 2022-10-09 DIAGNOSIS — G40.909 SEIZURE DISORDER (HCC): ICD-10-CM

## 2022-10-09 DIAGNOSIS — I87.2 VENOUS INSUFFICIENCY: ICD-10-CM

## 2022-10-09 DIAGNOSIS — Z71.89 GOALS OF CARE, COUNSELING/DISCUSSION: ICD-10-CM

## 2022-10-09 PROBLEM — J96.90 RESPIRATORY FAILURE (HCC): Status: ACTIVE | Noted: 2022-10-09

## 2022-10-09 LAB
ALBUMIN SERPL-MCNC: 2.7 G/DL (ref 3.4–5)
ALBUMIN SERPL-MCNC: 2.8 G/DL (ref 3.4–5)
ALBUMIN/GLOB SERPL: 0.5 {RATIO} (ref 0.8–1.7)
ALBUMIN/GLOB SERPL: 0.5 {RATIO} (ref 0.8–1.7)
ALP SERPL-CCNC: 93 U/L (ref 45–117)
ALP SERPL-CCNC: 97 U/L (ref 45–117)
ALT SERPL-CCNC: 17 U/L (ref 16–61)
ALT SERPL-CCNC: 18 U/L (ref 16–61)
ANION GAP SERPL CALC-SCNC: 8 MMOL/L (ref 3–18)
APPEARANCE UR: ABNORMAL
ARTERIAL PATENCY WRIST A: ABNORMAL
ARTERIAL PATENCY WRIST A: POSITIVE
AST SERPL-CCNC: 15 U/L (ref 10–38)
AST SERPL-CCNC: 22 U/L (ref 10–38)
BACTERIA URNS QL MICRO: ABNORMAL /HPF
BASE DEFICIT BLD-SCNC: 2.4 MMOL/L
BASE EXCESS BLD CALC-SCNC: 1 MMOL/L
BASOPHILS # BLD: 0 K/UL (ref 0–0.1)
BASOPHILS NFR BLD: 0 % (ref 0–2)
BDY SITE: ABNORMAL
BDY SITE: ABNORMAL
BILIRUB DIRECT SERPL-MCNC: 0.3 MG/DL (ref 0–0.2)
BILIRUB SERPL-MCNC: 0.6 MG/DL (ref 0.2–1)
BILIRUB SERPL-MCNC: 0.6 MG/DL (ref 0.2–1)
BILIRUB UR QL: NEGATIVE
BNP SERPL-MCNC: 434 PG/ML (ref 0–900)
BUN SERPL-MCNC: 19 MG/DL (ref 7–18)
BUN/CREAT SERPL: 20 (ref 12–20)
CALCIUM SERPL-MCNC: 9.1 MG/DL (ref 8.5–10.1)
CHLORIDE SERPL-SCNC: 128 MMOL/L (ref 100–111)
CO2 SERPL-SCNC: 24 MMOL/L (ref 21–32)
COLOR UR: ABNORMAL
CREAT SERPL-MCNC: 0.94 MG/DL (ref 0.6–1.3)
DIFFERENTIAL METHOD BLD: ABNORMAL
EOSINOPHIL # BLD: 0 K/UL (ref 0–0.4)
EOSINOPHIL NFR BLD: 0 % (ref 0–5)
EPITH CASTS URNS QL MICRO: ABNORMAL /LPF (ref 0–5)
ERYTHROCYTE [DISTWIDTH] IN BLOOD BY AUTOMATED COUNT: 15.9 % (ref 11.6–14.5)
GAS FLOW.O2 O2 DELIVERY SYS: ABNORMAL L/MIN
GAS FLOW.O2 O2 DELIVERY SYS: ABNORMAL L/MIN
GAS FLOW.O2 SETTING OXYMISER: 8 BPM
GLOBULIN SER CALC-MCNC: 5.7 G/DL (ref 2–4)
GLOBULIN SER CALC-MCNC: 5.7 G/DL (ref 2–4)
GLUCOSE BLD STRIP.AUTO-MCNC: 135 MG/DL (ref 74–106)
GLUCOSE SERPL-MCNC: 134 MG/DL (ref 74–99)
GLUCOSE UR STRIP.AUTO-MCNC: NEGATIVE MG/DL
HCO3 BLD-SCNC: 21.1 MMOL/L (ref 22–26)
HCO3 BLD-SCNC: 22 MMOL/L (ref 22–26)
HCT VFR BLD AUTO: 45.6 % (ref 36–48)
HGB BLD-MCNC: 13.8 G/DL (ref 13–16)
HGB UR QL STRIP: ABNORMAL
IMM GRANULOCYTES # BLD AUTO: 0 K/UL
IMM GRANULOCYTES NFR BLD AUTO: 0 %
KETONES UR QL STRIP.AUTO: 15 MG/DL
LEUKOCYTE ESTERASE UR QL STRIP.AUTO: ABNORMAL
LIPASE SERPL-CCNC: 78 U/L (ref 73–393)
LYMPHOCYTES # BLD: 0.5 K/UL (ref 0.9–3.6)
LYMPHOCYTES NFR BLD: 4 % (ref 21–52)
MAGNESIUM SERPL-MCNC: 2.6 MG/DL (ref 1.6–2.6)
MCH RBC QN AUTO: 25.2 PG (ref 24–34)
MCHC RBC AUTO-ENTMCNC: 30.3 G/DL (ref 31–37)
MCV RBC AUTO: 83.4 FL (ref 78–100)
MONOCYTES # BLD: 0.3 K/UL (ref 0.05–1.2)
MONOCYTES NFR BLD: 3 % (ref 3–10)
NEUTS SEG # BLD: 10.5 K/UL (ref 1.8–8)
NEUTS SEG NFR BLD: 93 % (ref 40–73)
NITRITE UR QL STRIP.AUTO: NEGATIVE
NRBC # BLD: 0 K/UL (ref 0–0.01)
NRBC BLD-RTO: 0 PER 100 WBC
O2/TOTAL GAS SETTING VFR VENT: 60 %
PCO2 BLD: 25.8 MMHG (ref 35–45)
PCO2 BLD: 31.8 MMHG (ref 35–45)
PEEP RESPIRATORY: 8 CMH2O
PH BLD: 7.43 [PH] (ref 7.35–7.45)
PH BLD: 7.54 [PH] (ref 7.35–7.45)
PH UR STRIP: 5.5 [PH] (ref 5–8)
PIP ISTAT,IPIP: 15
PLATELET # BLD AUTO: 287 K/UL (ref 135–420)
PLATELET COMMENTS,PCOM: ABNORMAL
PMV BLD AUTO: 11.7 FL (ref 9.2–11.8)
PO2 BLD: 162 MMHG (ref 80–100)
PO2 BLD: 221 MMHG (ref 80–100)
POTASSIUM SERPL-SCNC: 3.9 MMOL/L (ref 3.5–5.5)
PRESSURE SUPPORT SETTING VENT: 7 CMH2O
PROT SERPL-MCNC: 8.4 G/DL (ref 6.4–8.2)
PROT SERPL-MCNC: 8.5 G/DL (ref 6.4–8.2)
PROT UR STRIP-MCNC: 100 MG/DL
RBC # BLD AUTO: 5.47 M/UL (ref 4.35–5.65)
RBC #/AREA URNS HPF: ABNORMAL /HPF (ref 0–5)
RBC MORPH BLD: ABNORMAL
SAO2 % BLD: 99.5 % (ref 92–97)
SAO2 % BLD: 99.9 % (ref 92–97)
SERVICE CMNT-IMP: ABNORMAL
SODIUM SERPL-SCNC: 160 MMOL/L (ref 136–145)
SP GR UR REFRACTOMETRY: 1.02 (ref 1–1.03)
SPECIMEN TYPE: ABNORMAL
SPECIMEN TYPE: ABNORMAL
TOTAL RESP. RATE, ITRR: 29
TROPONIN-HIGH SENSITIVITY: 173 NG/L (ref 0–78)
TSH SERPL DL<=0.05 MIU/L-ACNC: 0.81 UIU/ML (ref 0.36–3.74)
UROBILINOGEN UR QL STRIP.AUTO: 1 EU/DL (ref 0.2–1)
VENTILATION MODE VENT: ABNORMAL
VENTILATION MODE VENT: ABNORMAL
WBC # BLD AUTO: 11.3 K/UL (ref 4.6–13.2)
WBC URNS QL MICRO: ABNORMAL /HPF (ref 0–4)

## 2022-10-09 PROCEDURE — 99291 CRITICAL CARE FIRST HOUR: CPT | Performed by: NURSE PRACTITIONER

## 2022-10-09 PROCEDURE — 96375 TX/PRO/DX INJ NEW DRUG ADDON: CPT

## 2022-10-09 PROCEDURE — 99285 EMERGENCY DEPT VISIT HI MDM: CPT

## 2022-10-09 PROCEDURE — 74011250637 HC RX REV CODE- 250/637: Performed by: EMERGENCY MEDICINE

## 2022-10-09 PROCEDURE — 80053 COMPREHEN METABOLIC PANEL: CPT

## 2022-10-09 PROCEDURE — 74011000250 HC RX REV CODE- 250: Performed by: EMERGENCY MEDICINE

## 2022-10-09 PROCEDURE — 83880 ASSAY OF NATRIURETIC PEPTIDE: CPT

## 2022-10-09 PROCEDURE — 65270000029 HC RM PRIVATE

## 2022-10-09 PROCEDURE — 82803 BLOOD GASES ANY COMBINATION: CPT

## 2022-10-09 PROCEDURE — 80076 HEPATIC FUNCTION PANEL: CPT

## 2022-10-09 PROCEDURE — 84484 ASSAY OF TROPONIN QUANT: CPT

## 2022-10-09 PROCEDURE — 87086 URINE CULTURE/COLONY COUNT: CPT

## 2022-10-09 PROCEDURE — 74011250636 HC RX REV CODE- 250/636: Performed by: EMERGENCY MEDICINE

## 2022-10-09 PROCEDURE — 74011250636 HC RX REV CODE- 250/636: Performed by: NURSE PRACTITIONER

## 2022-10-09 PROCEDURE — 83735 ASSAY OF MAGNESIUM: CPT

## 2022-10-09 PROCEDURE — 94660 CPAP INITIATION&MGMT: CPT

## 2022-10-09 PROCEDURE — 87186 SC STD MICRODIL/AGAR DIL: CPT

## 2022-10-09 PROCEDURE — 51702 INSERT TEMP BLADDER CATH: CPT

## 2022-10-09 PROCEDURE — 36600 WITHDRAWAL OF ARTERIAL BLOOD: CPT

## 2022-10-09 PROCEDURE — 85025 COMPLETE CBC W/AUTO DIFF WBC: CPT

## 2022-10-09 PROCEDURE — 87040 BLOOD CULTURE FOR BACTERIA: CPT

## 2022-10-09 PROCEDURE — 81001 URINALYSIS AUTO W/SCOPE: CPT

## 2022-10-09 PROCEDURE — 87150 DNA/RNA AMPLIFIED PROBE: CPT

## 2022-10-09 PROCEDURE — 5A09457 ASSISTANCE WITH RESPIRATORY VENTILATION, 24-96 CONSECUTIVE HOURS, CONTINUOUS POSITIVE AIRWAY PRESSURE: ICD-10-PCS | Performed by: EMERGENCY MEDICINE

## 2022-10-09 PROCEDURE — 71045 X-RAY EXAM CHEST 1 VIEW: CPT

## 2022-10-09 PROCEDURE — 74011000258 HC RX REV CODE- 258: Performed by: EMERGENCY MEDICINE

## 2022-10-09 PROCEDURE — 74011000250 HC RX REV CODE- 250: Performed by: NURSE PRACTITIONER

## 2022-10-09 PROCEDURE — 94640 AIRWAY INHALATION TREATMENT: CPT

## 2022-10-09 PROCEDURE — 93005 ELECTROCARDIOGRAM TRACING: CPT

## 2022-10-09 PROCEDURE — 96365 THER/PROPH/DIAG IV INF INIT: CPT

## 2022-10-09 PROCEDURE — 87077 CULTURE AEROBIC IDENTIFY: CPT

## 2022-10-09 PROCEDURE — 83690 ASSAY OF LIPASE: CPT

## 2022-10-09 PROCEDURE — 96366 THER/PROPH/DIAG IV INF ADDON: CPT

## 2022-10-09 PROCEDURE — 84443 ASSAY THYROID STIM HORMONE: CPT

## 2022-10-09 PROCEDURE — 82962 GLUCOSE BLOOD TEST: CPT

## 2022-10-09 PROCEDURE — 71250 CT THORAX DX C-: CPT

## 2022-10-09 RX ORDER — ASPIRIN 300 MG/1
300 SUPPOSITORY RECTAL
Status: DISCONTINUED | OUTPATIENT
Start: 2022-10-09 | End: 2022-10-09

## 2022-10-09 RX ORDER — VANCOMYCIN HYDROCHLORIDE
1250
Status: COMPLETED | OUTPATIENT
Start: 2022-10-09 | End: 2022-10-10

## 2022-10-09 RX ORDER — POLYETHYLENE GLYCOL 3350 17 G/17G
17 POWDER, FOR SOLUTION ORAL DAILY PRN
Status: DISCONTINUED | OUTPATIENT
Start: 2022-10-09 | End: 2022-10-22 | Stop reason: HOSPADM

## 2022-10-09 RX ORDER — ASPIRIN 300 MG/1
300 SUPPOSITORY RECTAL DAILY
Status: DISCONTINUED | OUTPATIENT
Start: 2022-10-10 | End: 2022-10-09

## 2022-10-09 RX ORDER — ONDANSETRON 2 MG/ML
4 INJECTION INTRAMUSCULAR; INTRAVENOUS
Status: DISCONTINUED | OUTPATIENT
Start: 2022-10-09 | End: 2022-10-22 | Stop reason: HOSPADM

## 2022-10-09 RX ORDER — SODIUM CHLORIDE, SODIUM LACTATE, POTASSIUM CHLORIDE, CALCIUM CHLORIDE 600; 310; 30; 20 MG/100ML; MG/100ML; MG/100ML; MG/100ML
75 INJECTION, SOLUTION INTRAVENOUS CONTINUOUS
Status: DISCONTINUED | OUTPATIENT
Start: 2022-10-09 | End: 2022-10-10

## 2022-10-09 RX ORDER — HEPARIN SODIUM 5000 [USP'U]/ML
5000 INJECTION, SOLUTION INTRAVENOUS; SUBCUTANEOUS EVERY 8 HOURS
Status: DISCONTINUED | OUTPATIENT
Start: 2022-10-09 | End: 2022-10-22 | Stop reason: HOSPADM

## 2022-10-09 RX ORDER — ONDANSETRON 4 MG/1
4 TABLET, ORALLY DISINTEGRATING ORAL
Status: DISCONTINUED | OUTPATIENT
Start: 2022-10-09 | End: 2022-10-22 | Stop reason: HOSPADM

## 2022-10-09 RX ORDER — ACETAMINOPHEN 325 MG/1
650 TABLET ORAL
Status: DISCONTINUED | OUTPATIENT
Start: 2022-10-09 | End: 2022-10-22 | Stop reason: HOSPADM

## 2022-10-09 RX ORDER — ONDANSETRON 2 MG/ML
4 INJECTION INTRAMUSCULAR; INTRAVENOUS
Status: COMPLETED | OUTPATIENT
Start: 2022-10-09 | End: 2022-10-09

## 2022-10-09 RX ORDER — IPRATROPIUM BROMIDE AND ALBUTEROL SULFATE 2.5; .5 MG/3ML; MG/3ML
3 SOLUTION RESPIRATORY (INHALATION)
Status: COMPLETED | OUTPATIENT
Start: 2022-10-09 | End: 2022-10-09

## 2022-10-09 RX ORDER — ACETAMINOPHEN 650 MG/1
650 SUPPOSITORY RECTAL
Status: DISCONTINUED | OUTPATIENT
Start: 2022-10-09 | End: 2022-10-22 | Stop reason: HOSPADM

## 2022-10-09 RX ADMIN — ASPIRIN 300 MG: 300 SUPPOSITORY RECTAL at 21:05

## 2022-10-09 RX ADMIN — SODIUM CHLORIDE, PRESERVATIVE FREE 20 MG: 5 INJECTION INTRAVENOUS at 22:37

## 2022-10-09 RX ADMIN — ACETAMINOPHEN 650 MG: 325 SUPPOSITORY RECTAL at 19:14

## 2022-10-09 RX ADMIN — PIPERACILLIN AND TAZOBACTAM 4.5 G: 4; .5 INJECTION, POWDER, FOR SOLUTION INTRAVENOUS at 19:14

## 2022-10-09 RX ADMIN — SODIUM CHLORIDE 1000 ML: 9 INJECTION, SOLUTION INTRAVENOUS at 21:09

## 2022-10-09 RX ADMIN — IPRATROPIUM BROMIDE AND ALBUTEROL SULFATE 3 ML: .5; 3 SOLUTION RESPIRATORY (INHALATION) at 18:47

## 2022-10-09 RX ADMIN — METHYLPREDNISOLONE SODIUM SUCCINATE 125 MG: 125 INJECTION, POWDER, FOR SOLUTION INTRAMUSCULAR; INTRAVENOUS at 18:48

## 2022-10-09 RX ADMIN — ONDANSETRON 4 MG: 2 INJECTION INTRAMUSCULAR; INTRAVENOUS at 18:47

## 2022-10-09 NOTE — ED PROVIDER NOTES
EMERGENCY DEPARTMENT HISTORY AND PHYSICAL EXAM      Date: 10/9/2022  Patient Name: Veronika Sorto      History of Presenting Illness     Chief Complaint   Patient presents with    Respiratory Distress       Location/Duration/Severity/Modifying factors   Chief Complaint   Patient presents with    Respiratory Distress       HPI:  Veronika Sorto is a 79 y.o. male with history as listed presents with report from EMS with report that he possibly has aspiration pneumonia. The EMS team reported they did suction his airway and had copious material some that resemble food products. The patient resides at at [facility] and was found by his primary nurse to be in respiratory distress. He had a room air saturation of 80%. He was placed on a nonrebreather mask with his oxygen saturation in 90s. EMS arrived and put the patient on CPAP with oxygen saturation of 94%. The patient has alcohol induced dementia. He is a f full code resuscitation status. Associated Symptoms:see ROS      There are no other complaints, changes, or physical findings at this time.     PCP: Amna Lorenz MD    Current Facility-Administered Medications   Medication Dose Route Frequency Provider Last Rate Last Admin    [START ON 10/10/2022] piperacillin-tazobactam (ZOSYN) 3.375 g in 0.9% sodium chloride (MBP/ADV) 100 mL MBP  3.375 g IntraVENous Q8H Amberly Oreilly MD        [START ON 10/10/2022] aspirin (ASA) suppository 300 mg  300 mg Rectal DAILY Amberly Oreilly MD        aspirin (ASA) suppository 300 mg  300 mg Rectal Q6H PRN Amberly Oreilly MD   300 mg at 10/09/22 2105    sodium chloride 0.9 % bolus infusion 1,000 mL  1,000 mL IntraVENous ONCE Amberly Oreilly MD 1,000 mL/hr at 10/09/22 2109 1,000 mL at 10/09/22 2109    lactated Ringers infusion  75 mL/hr IntraVENous CONTINUOUS Amberly Oreilly MD        vancomycin (VANCOCIN) 1,000 mg in 0.9% sodium chloride 250 mL (VIAL-MATE)  1,000 mg IntraVENous Jacob Acuña MD Current Outpatient Medications   Medication Sig Dispense Refill    moxifloxacin (VIGAMOX) 0.5 % ophthalmic solution       levETIRAcetam (KEPPRA) 100 mg/mL solution Take 7.5 mL by mouth two (2) times a day. doxepin (SINEquan) 10 mg capsule Take 1 Cap by mouth nightly. (Patient not taking: Reported on 3/16/2022)      clopidogrel (PLAVIX) 75 mg tab Take 1 Tab by mouth daily. 90 Tab 0    pravastatin (PRAVACHOL) 40 mg tablet Take 1 Tab by mouth nightly. 30 Tab 1    ascorbic acid, vitamin C, (VITAMIN C) 250 mg tablet Take 1 Tab by mouth daily. 30 Tab 2    ferrous sulfate 325 mg (65 mg iron) tablet Take 1 Tab by mouth daily (with breakfast). 30 Tab 2    polyethylene glycol (MIRALAX) 17 gram packet Take 1 Packet by mouth daily. 30 Packet 2    senna-docusate (PERICOLACE) 8.6-50 mg per tablet Take 1 Tab by mouth nightly. HOLD for loose stool. 30 Tab 2    tamsulosin (FLOMAX) 0.4 mg capsule Take 0.4 mg by mouth daily. 1 tab daily      MULTIVITAMIN,THERAPEUTIC (THERA MULTI-VITAMIN PO) Take 500 mg by mouth daily. baclofen (LIORESAL) 10 mg tablet Take 1 Tab by mouth every twelve (12) hours as needed. (Patient taking differently: Take 1 Tab by mouth every twelve (12) hours as needed (for back pain, muscle ralaxant). as needed for pain,) 15 Tab 0    metoprolol (LOPRESSOR) 25 mg tablet Take 1 Tab by mouth two (2) times a day. 60 Tab 2    cyanocobalamin 1,000 mcg tablet Take 1 Tab by mouth daily. 30 Tab 2    folic acid (FOLVITE) 1 mg tablet Take 1 Tab by mouth daily. 30 Tab 1    aspirin 81 mg chewable tablet Take 1 Tab by mouth daily.  30 Tab 0       Past History     Past Medical History:  Past Medical History:   Diagnosis Date    Alcohol dependence with alcohol-induced persisting dementia ([facility] [address]     per [facility] 4/23/21    Anemia     Aphasia     Benign prostatic hyperplasia     Cerebral vascular disease     COVID-19 04/06/2021    Disturbances of salivary secretion     Dysphagia     GERD (gastroesophageal reflux disease)     Hemiparesis ([facility] [address]     left side    Hemiplegia (HCC)     left side     Hiccups     Hyperlipidemia     Hypernatremia 2021    Hypertension     Hyponatremia     Insomnia     Lacunar infarction ([facility] [address] 2010    weakness both arms, unable to walk    Lower extremity edema     Moderate protein-calorie malnutrition (HCC)     MRSA (methicillin resistant Staphylococcus aureus)     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)     Pneumonia     Psychogenic polydipsia     PVD (peripheral vascular disease) (HCC)     Seizures ([facility] [address]     Spinal stenosis     Stroke ([facility] [address] 2017    Unspecified convulsions ([facility] [address]     2021 [facility] Care nurse       Past Surgical History:  Past Surgical History:   Procedure Laterality Date    COLONOSCOPY N/A 2021    COLONOSCOPY performed by Rakesh Roberts MD at [facility]COPY    HX HEENT      pt reports past hx of surgery on ears unsure of what type    HX OTHER SURGICAL Bilateral     debridement of lower extremities       Family History:  Family History   Problem Relation Age of Onset    Hypertension Other        Social History:  Social History     Tobacco Use    Smoking status: Former     Packs/day: 0.50     Types: Cigarettes     Quit date: 2008     Years since quittin.3    Smokeless tobacco: Never   Substance Use Topics    Alcohol use: No    Drug use: No       Allergies:  No Known Allergies      Review of Systems     Review of Systems   Unable to perform ROS: Acuity of condition   All other systems reviewed and are negative. Physical Exam     Physical Exam  Vitals and nursing note reviewed. Exam conducted with a chaperone present. Constitutional:       General: He is in acute distress. Appearance: Normal appearance. He is well-developed and well-groomed. He is ill-appearing. He is not diaphoretic. Comments: CPAP in place. HENT:      Head: Normocephalic and atraumatic.       Right Ear: External ear normal.      Left Ear: External ear normal.      Nose: Nose normal.      Mouth/Throat:      Mouth: Mucous membranes are moist.      Pharynx: Oropharynx is clear. No oropharyngeal exudate or posterior oropharyngeal erythema. Eyes:      General: Lids are normal. No scleral icterus. Right eye: No discharge. Left eye: No discharge. Conjunctiva/sclera: Conjunctivae normal.      Pupils: Pupils are equal, round, and reactive to light. Neck:      Vascular: No carotid bruit. Trachea: Trachea normal. No tracheal deviation. Cardiovascular:      Rate and Rhythm: Regular rhythm. Tachycardia present. Pulses: Normal pulses. Heart sounds: Normal heart sounds, S1 normal and S2 normal. No murmur heard. No gallop. Pulmonary:      Effort: Tachypnea and respiratory distress present. No accessory muscle usage. Breath sounds: Examination of the right-middle field reveals rhonchi. Examination of the left-middle field reveals rhonchi. Examination of the right-lower field reveals decreased breath sounds and rhonchi. Examination of the left-lower field reveals decreased breath sounds and rhonchi. Decreased breath sounds and rhonchi present. No wheezing or rales. Abdominal:      General: Bowel sounds are decreased. There is no distension or abdominal bruit. Palpations: Abdomen is soft. There is no pulsatile mass. Tenderness: There is no abdominal tenderness. There is no guarding. Genitourinary:     Pubic Area: No rash. Penis: Normal and uncircumcised. Musculoskeletal:         General: No swelling, tenderness, deformity or signs of injury. Normal range of motion. Right shoulder: No swelling, deformity or tenderness. Left shoulder: No swelling or tenderness. Right upper arm: No swelling, edema or tenderness. Left upper arm: No swelling, edema or tenderness. Right elbow: No swelling or deformity. No tenderness. Left elbow: No swelling or deformity. No tenderness.       Right forearm: No swelling, edema or tenderness. Left forearm: No swelling, edema or tenderness. Right wrist: No swelling, deformity or tenderness. Left wrist: No swelling, deformity or tenderness. Right hand: No swelling, deformity or tenderness. Left hand: No swelling, deformity or tenderness. Cervical back: Full passive range of motion without pain, normal range of motion and neck supple. No rigidity or tenderness. Right hip: No deformity or tenderness. Left hip: No deformity or tenderness. Right upper leg: No swelling, edema or tenderness. Left upper leg: No swelling, edema or tenderness. Right lower leg: No swelling. No edema. Left lower leg: No swelling. No edema. Right ankle: No swelling or deformity. Left ankle: No swelling or deformity. Right foot: Normal capillary refill. No swelling or deformity. Normal pulse. Left foot: Normal capillary refill. No swelling or deformity. Normal pulse. Skin:     General: Skin is warm and dry. Capillary Refill: Capillary refill takes less than 2 seconds. Coloration: Skin is not ashen, cyanotic, jaundiced, mottled, pale or sallow. Findings: No abrasion, bruising, ecchymosis, erythema or rash. Neurological:      General: No focal deficit present. Mental Status: He is lethargic and disoriented. Cranial Nerves: No cranial nerve deficit or facial asymmetry. Motor: Weakness present. No tremor or seizure activity. Psychiatric:         Speech: He is noncommunicative. Behavior: Behavior is not agitated. Behavior is cooperative. Cognition and Memory: Cognition is impaired. Memory is impaired. He exhibits impaired recent memory and impaired remote memory.        Lab and Diagnostic Study Results     Labs -  Recent Results (from the past 24 hour(s))   CBC WITH AUTOMATED DIFF    Collection Time: 10/09/22  6:20 PM   Result Value Ref Range    WBC 11.3 4.6 - 13.2 K/uL RBC 5.47 4.35 - 5.65 M/uL    HGB 13.8 13.0 - 16.0 g/dL    HCT 45.6 36.0 - 48.0 %    MCV 83.4 78.0 - 100.0 FL    MCH 25.2 24.0 - 34.0 PG    MCHC 30.3 (L) 31.0 - 37.0 g/dL    RDW 15.9 (H) 11.6 - 14.5 %    PLATELET 114 532 - 481 K/uL    MPV 11.7 9.2 - 11.8 FL    NRBC 0.0 0  WBC    ABSOLUTE NRBC 0.00 0.00 - 0.01 K/uL    NEUTROPHILS 93 (H) 40 - 73 %    LYMPHOCYTES 4 (L) 21 - 52 %    MONOCYTES 3 3 - 10 %    EOSINOPHILS 0 0 - 5 %    BASOPHILS 0 0 - 2 %    IMMATURE GRANULOCYTES 0 %    ABS. NEUTROPHILS 10.5 (H) 1.8 - 8.0 K/UL    ABS. LYMPHOCYTES 0.5 (L) 0.9 - 3.6 K/UL    ABS. MONOCYTES 0.3 0.05 - 1.2 K/UL    ABS. EOSINOPHILS 0.0 0.0 - 0.4 K/UL    ABS. BASOPHILS 0.0 0.0 - 0.1 K/UL    ABS. IMM. GRANS. 0.0 K/UL    DF MANUAL      PLATELET COMMENTS ADEQUATE PLATELETS      RBC COMMENTS NORMOCYTIC, NORMOCHROMIC     METABOLIC PANEL, COMPREHENSIVE    Collection Time: 10/09/22  6:20 PM   Result Value Ref Range    Sodium 160 (H) 136 - 145 mmol/L    Potassium 3.9 3.5 - 5.5 mmol/L    Chloride 128 (H) 100 - 111 mmol/L    CO2 24 21 - 32 mmol/L    Anion gap 8 3.0 - 18 mmol/L    Glucose 134 (H) 74 - 99 mg/dL    BUN 19 (H) 7.0 - 18 MG/DL    Creatinine 0.94 0.6 - 1.3 MG/DL    BUN/Creatinine ratio 20 12 - 20      eGFR >60 >60 ml/min/1.73m2    Calcium 9.1 8.5 - 10.1 MG/DL    Bilirubin, total 0.6 0.2 - 1.0 MG/DL    ALT (SGPT) 17 16 - 61 U/L    AST (SGOT) 22 10 - 38 U/L    Alk.  phosphatase 97 45 - 117 U/L    Protein, total 8.4 (H) 6.4 - 8.2 g/dL    Albumin 2.7 (L) 3.4 - 5.0 g/dL    Globulin 5.7 (H) 2.0 - 4.0 g/dL    A-G Ratio 0.5 (L) 0.8 - 1.7     TROPONIN-HIGH SENSITIVITY    Collection Time: 10/09/22  6:20 PM   Result Value Ref Range    Troponin-High Sensitivity 173 (HH) 0 - 78 ng/L   NT-PRO BNP    Collection Time: 10/09/22  6:20 PM   Result Value Ref Range    NT pro- 0 - 900 PG/ML   MAGNESIUM    Collection Time: 10/09/22  6:20 PM   Result Value Ref Range    Magnesium 2.6 1.6 - 2.6 mg/dL   EKG, 12 LEAD, INITIAL    Collection Time: 10/09/22  6:21 PM   Result Value Ref Range    Ventricular Rate 147 BPM    Atrial Rate 147 BPM    P-R Interval 104 ms    QRS Duration 84 ms    Q-T Interval 340 ms    QTC Calculation (Bezet) 532 ms    Calculated R Axis 71 degrees    Calculated T Axis 62 degrees    Diagnosis       ** Critical Test Result: High HR  Sinus tachycardia with short OH  ST & T wave abnormality, consider inferior ischemia  Abnormal ECG  When compared with ECG of 30-MAR-2021 15:04,  T wave inversion now evident in Inferior leads     BLOOD GAS, ARTERIAL POC    Collection Time: 10/09/22  6:40 PM   Result Value Ref Range    Device: BIPAP MASK      pH (POC) 7.54 (H) 7.35 - 7.45      pCO2 (POC) 25.8 (L) 35.0 - 45.0 MMHG    pO2 (POC) 221 (H) 80 - 100 MMHG    HCO3 (POC) 22.0 22 - 26 MMOL/L    sO2 (POC) 99.9 (H) 92 - 97 %    Base excess (POC) 1.0 mmol/L    Mode BILEVEL      Allens test (POC) NOT APPLICABLE      Site LEFT RADIAL      Specimen type (POC) ARTERIAL      Performed by Riddhi Mancini    GLUCOSE, POC    Collection Time: 10/09/22  6:47 PM   Result Value Ref Range    Glucose,  (H) 74 - 106 MG/DL    Performed by Sherrill Chi ED    URINALYSIS W/ RFLX MICROSCOPIC    Collection Time: 10/09/22  6:50 PM   Result Value Ref Range    Color DARK YELLOW      Appearance CLOUDY      Specific gravity 1.019 1.005 - 1.030      pH (UA) 5.5 5.0 - 8.0      Protein 100 (A) NEG mg/dL    Glucose Negative NEG mg/dL    Ketone 15 (A) NEG mg/dL    Bilirubin Negative NEG      Blood TRACE (A) NEG      Urobilinogen 1.0 0.2 - 1.0 EU/dL    Nitrites Negative NEG      Leukocyte Esterase TRACE (A) NEG     URINE MICROSCOPIC ONLY    Collection Time: 10/09/22  6:50 PM   Result Value Ref Range    WBC 1 to 4 0 - 4 /hpf    RBC 0 to 2 0 - 5 /hpf    Epithelial cells 1+ 0 - 5 /lpf    Bacteria FEW (A) NEG /hpf         Radiologic Studies -   XR CHEST PORT   Final Result   Mild streaky retrocardiac opacity, possibly atelectasis or infiltrate.       CT CHEST WO CONT    (Results Pending)         Procedures and Critical Care       Performed by: Keyla Freedman MD    Procedures     EKG: unchanged from previous tracings, sinus tachycardia, ST depression in leads II, III, aVF, V5, V4, and V6.,  Minimal ST segment elevation in aVR. Nonspecific ST segment T wave changes. 147 bpm normal axis. Misha Manning CRITICAL CARE:  7:18 PM  I have spent 40 minutes of critical care time involved in lab review, consultations with specialist, family decision-making, and documentation. During this entire length of time I was immediately available to the patient. Critical Care: The reason for providing this level of medical care for this critically ill patient was due a critical illness that impaired one or more vital organ systems such that there was a high probability of imminent or life threatening deterioration in the patients condition. This care involved high complexity decision making to assess, manipulate, and support vital system functions, to treat this degreee vital organ system failure and to prevent further life threatening deterioration of the patients condition. Keyla Freedman MD    Medical Decision Making and ED Course   - I am the first and primary provider for this patient AND AM THE PRIMARY PROVIDER OF RECORD. - I reviewed the vital signs, available nursing notes, past medical history, past surgical history, family history and social history. - Initial assessment performed. The patients presenting problems have been discussed, and the staff are in agreement with the care plan formulated and outlined with them. I have encouraged them to ask questions as they arise throughout their visit. Vital Signs-Reviewed the patient's vital signs.     Patient Vitals for the past 12 hrs:   Temp Pulse Resp BP SpO2   10/09/22 1907 (!) 105 °F (40.6 °C) -- -- -- --   10/09/22 1850 -- -- -- -- 100 %   10/09/22 1819 -- (!) 148 (!) 34 (!) 150/106 98 %   10/09/22 1810 -- -- -- -- 94 %         Provider Notes (Medical Decision Making):     MDM  Number of Diagnoses or Management Options  Aspiration into airway, initial encounter  Dehydration  Elevated troponin  Hypernatremia  Respiratory alkalosis  Respiratory distress  Sepsis, due to unspecified organism, unspecified whether acute organ dysfunction present ([facility] [address]  Severe dementia associated with alcoholism, without behavioral disturbance, psychotic disturbance, mood disturbance, or anxiety ([facility] [address]  Diagnosis management comments: The patient is brought in by EMS with report that that he had possible aspiration. The assigned nurse at the [facility] found the patient to have saturations in the 80s on room air. He was placed on nonrebreather at the nursing home and transition to CPAP. He has advanced dementia by report and is nonverbal.  He is a full CODE STATUS. Patient was placed on BiPAP in the emergency department. However he was switched to high flow after speaking with Dr. Ymailex Man, the intensivist.  The patient's laboratory studies revealed a normal white blood cell count. Respiratory alkalosis on review of the initial blood gas. Chest x-ray did not demonstrate focal consolidation. The patient was found to be hyponatremic. He will receive initial 30 mL/kg of normal saline treating clinical sepsis. The lactic acid is is pending. The patient also has ketones in the urine concerning for dehydration. The patient was given rectal acetaminophen and aspirin. He is noted to have elevated troponin. The patient will have a CT without contrast along with the additional lipase and TSH. He also will have a additional robotic vancomycin. He received Zosyn upon arrival.  The patient has been tachycardic with improvement of the heart rate from the 140s toward normal with IV fluid hydration. He was accepted to the intensive care service by Dr. Yamilex Man.   The patient status is critical.  However, his baseline health is poor by report and review of his nursing home records. ED Course:       ED Course as of 10/09/22 2157   Lanny Mills Oct 09, 2022   2108 The patient's reported that the patient has not received the initial ordered by fluids. The patient will receive these fluids now. The patient continues to be on BiPAP and will have a repeat blood gas. He will be discussed with the intensivist for admissions. He is a full CODE STATUS. [CA]   2142 The patient was discussed with the intensivist, Dr. Durga Acevedo, who accepted patient to his service. He recommended the patient be switched over to high flow O2 since he likely has aspiration pneumonia. He also is to have a dry CT scan of the chest.  The patient will receive LR at 75 mL/h after the initial fluid boluses are completed. The patient also has had vancomycin to be dosed by pharmacy. He will have lipase and TSH added to his laboratory studies. The LFTs are part of the CMP panel. The patient has a Reid catheter ordered with the initial order set. Dr. Durga Acevedo will see the patient in the emergency department. [facility]      ED Course User Mariam Cuba MD     ------------------------------------------------------------------------------------------------------------        Consultations:       Consultations: Intensivist      Disposition         Admitted      Diagnosis     Clinical Impression:   1. Respiratory distress    2. Aspiration into airway, initial encounter    3. Sepsis, due to unspecified organism, unspecified whether acute organ dysfunction present ([facility] [address]    4. Respiratory alkalosis    5. Severe dementia associated with alcoholism, without behavioral disturbance, psychotic disturbance, mood disturbance, or anxiety ([facility])    6. Elevated troponin    7. Dehydration    8.  Hypernatremia        Attestations:    Julieta Duncan MD

## 2022-10-09 NOTE — ED TRIAGE NOTES
Pt arrives via EMS from [facility] c/o resp distress, poss aspiration. Pt was 80% RA. CPAP O2 94%. Given breathing tx en route.

## 2022-10-10 ENCOUNTER — APPOINTMENT (OUTPATIENT)
Dept: GENERAL RADIOLOGY | Age: 67
DRG: 871 | End: 2022-10-10
Attending: INTERNAL MEDICINE
Payer: COMMERCIAL

## 2022-10-10 ENCOUNTER — APPOINTMENT (OUTPATIENT)
Dept: GENERAL RADIOLOGY | Age: 67
DRG: 871 | End: 2022-10-10
Attending: NURSE PRACTITIONER
Payer: COMMERCIAL

## 2022-10-10 PROBLEM — N17.9 ARF (ACUTE RENAL FAILURE) (HCC): Status: ACTIVE | Noted: 2022-10-10

## 2022-10-10 LAB
ANION GAP SERPL CALC-SCNC: 6 MMOL/L (ref 3–18)
ANION GAP SERPL CALC-SCNC: 7 MMOL/L (ref 3–18)
ANION GAP SERPL CALC-SCNC: 8 MMOL/L (ref 3–18)
ATRIAL RATE: 147 BPM
B PERT DNA SPEC QL NAA+PROBE: NOT DETECTED
BASOPHILS # BLD: 0 K/UL (ref 0–0.1)
BASOPHILS NFR BLD: 0 % (ref 0–2)
BORDETELLA PARAPERTUSSIS PCR, BORPAR: NOT DETECTED
BUN SERPL-MCNC: 26 MG/DL (ref 7–18)
BUN SERPL-MCNC: 27 MG/DL (ref 7–18)
BUN SERPL-MCNC: 34 MG/DL (ref 7–18)
BUN/CREAT SERPL: 25 (ref 12–20)
BUN/CREAT SERPL: 25 (ref 12–20)
BUN/CREAT SERPL: 31 (ref 12–20)
C PNEUM DNA SPEC QL NAA+PROBE: NOT DETECTED
CALCIUM SERPL-MCNC: 8.3 MG/DL (ref 8.5–10.1)
CALCIUM SERPL-MCNC: 8.5 MG/DL (ref 8.5–10.1)
CALCIUM SERPL-MCNC: 8.8 MG/DL (ref 8.5–10.1)
CALCULATED R AXIS, ECG10: 71 DEGREES
CALCULATED T AXIS, ECG11: 62 DEGREES
CHLORIDE SERPL-SCNC: 130 MMOL/L (ref 100–111)
CHLORIDE SERPL-SCNC: 131 MMOL/L (ref 100–111)
CHLORIDE SERPL-SCNC: 132 MMOL/L (ref 100–111)
CHLORIDE UR-SCNC: <10 MMOL/L (ref 55–125)
CK SERPL-CCNC: 88 U/L (ref 39–308)
CO2 SERPL-SCNC: 23 MMOL/L (ref 21–32)
CO2 SERPL-SCNC: 24 MMOL/L (ref 21–32)
CO2 SERPL-SCNC: 26 MMOL/L (ref 21–32)
CREAT SERPL-MCNC: 1.03 MG/DL (ref 0.6–1.3)
CREAT SERPL-MCNC: 1.1 MG/DL (ref 0.6–1.3)
CREAT SERPL-MCNC: 1.1 MG/DL (ref 0.6–1.3)
DIAGNOSIS, 93000: NORMAL
DIFFERENTIAL METHOD BLD: ABNORMAL
EOSINOPHIL # BLD: 0 K/UL (ref 0–0.4)
EOSINOPHIL NFR BLD: 0 % (ref 0–5)
ERYTHROCYTE [DISTWIDTH] IN BLOOD BY AUTOMATED COUNT: 15.9 % (ref 11.6–14.5)
EST. AVERAGE GLUCOSE BLD GHB EST-MCNC: 111 MG/DL
FLUAV H1 2009 PAND RNA SPEC QL NAA+PROBE: NOT DETECTED
FLUAV H1 RNA SPEC QL NAA+PROBE: NOT DETECTED
FLUAV H3 RNA SPEC QL NAA+PROBE: NOT DETECTED
FLUAV SUBTYP SPEC NAA+PROBE: NOT DETECTED
FLUBV RNA SPEC QL NAA+PROBE: NOT DETECTED
GLUCOSE BLD STRIP.AUTO-MCNC: 141 MG/DL (ref 70–110)
GLUCOSE BLD STRIP.AUTO-MCNC: 159 MG/DL (ref 70–110)
GLUCOSE BLD STRIP.AUTO-MCNC: 171 MG/DL (ref 70–110)
GLUCOSE BLD STRIP.AUTO-MCNC: 175 MG/DL (ref 70–110)
GLUCOSE BLD STRIP.AUTO-MCNC: 214 MG/DL (ref 70–110)
GLUCOSE SERPL-MCNC: 158 MG/DL (ref 74–99)
GLUCOSE SERPL-MCNC: 165 MG/DL (ref 74–99)
GLUCOSE SERPL-MCNC: 168 MG/DL (ref 74–99)
HADV DNA SPEC QL NAA+PROBE: NOT DETECTED
HBA1C MFR BLD: 5.5 % (ref 4.2–5.6)
HCOV 229E RNA SPEC QL NAA+PROBE: NOT DETECTED
HCOV HKU1 RNA SPEC QL NAA+PROBE: NOT DETECTED
HCOV NL63 RNA SPEC QL NAA+PROBE: NOT DETECTED
HCOV OC43 RNA SPEC QL NAA+PROBE: NOT DETECTED
HCT VFR BLD AUTO: 39 % (ref 36–48)
HGB BLD-MCNC: 11.6 G/DL (ref 13–16)
HMPV RNA SPEC QL NAA+PROBE: NOT DETECTED
HPIV1 RNA SPEC QL NAA+PROBE: NOT DETECTED
HPIV2 RNA SPEC QL NAA+PROBE: NOT DETECTED
HPIV3 RNA SPEC QL NAA+PROBE: NOT DETECTED
HPIV4 RNA SPEC QL NAA+PROBE: NOT DETECTED
IMM GRANULOCYTES # BLD AUTO: 0 K/UL
IMM GRANULOCYTES NFR BLD AUTO: 0 %
INR PPP: 1.5 (ref 0.8–1.2)
LACTATE SERPL-SCNC: 1.3 MMOL/L (ref 0.4–2)
LACTATE SERPL-SCNC: 2.4 MMOL/L (ref 0.4–2)
LYMPHOCYTES # BLD: 1.1 K/UL (ref 0.9–3.6)
LYMPHOCYTES NFR BLD: 10 % (ref 21–52)
M PNEUMO DNA SPEC QL NAA+PROBE: NOT DETECTED
MAGNESIUM SERPL-MCNC: 2.5 MG/DL (ref 1.6–2.6)
MAGNESIUM SERPL-MCNC: 2.6 MG/DL (ref 1.6–2.6)
MAGNESIUM SERPL-MCNC: 2.7 MG/DL (ref 1.6–2.6)
MCH RBC QN AUTO: 25 PG (ref 24–34)
MCHC RBC AUTO-ENTMCNC: 29.7 G/DL (ref 31–37)
MCV RBC AUTO: 84.1 FL (ref 78–100)
MONOCYTES # BLD: 0.4 K/UL (ref 0.05–1.2)
MONOCYTES NFR BLD: 4 % (ref 3–10)
NEUTS SEG # BLD: 9.1 K/UL (ref 1.8–8)
NEUTS SEG NFR BLD: 86 % (ref 40–73)
NRBC # BLD: 0 K/UL (ref 0–0.01)
NRBC BLD-RTO: 0 PER 100 WBC
P-R INTERVAL, ECG05: 104 MS
PHOSPHATE SERPL-MCNC: 3 MG/DL (ref 2.5–4.9)
PHOSPHATE SERPL-MCNC: 3.8 MG/DL (ref 2.5–4.9)
PHOSPHATE SERPL-MCNC: 3.8 MG/DL (ref 2.5–4.9)
PLATELET # BLD AUTO: 195 K/UL (ref 135–420)
PLATELET COMMENTS,PCOM: ABNORMAL
PMV BLD AUTO: 11.3 FL (ref 9.2–11.8)
POTASSIUM SERPL-SCNC: 3.1 MMOL/L (ref 3.5–5.5)
POTASSIUM SERPL-SCNC: 4 MMOL/L (ref 3.5–5.5)
POTASSIUM SERPL-SCNC: 4 MMOL/L (ref 3.5–5.5)
POTASSIUM UR-SCNC: 93 MMOL/L (ref 12–62)
PROCALCITONIN SERPL-MCNC: 21.82 NG/ML
PROTHROMBIN TIME: 18.6 SEC (ref 11.5–15.2)
Q-T INTERVAL, ECG07: 340 MS
QRS DURATION, ECG06: 84 MS
QTC CALCULATION (BEZET), ECG08: 532 MS
RBC # BLD AUTO: 4.64 M/UL (ref 4.35–5.65)
RBC MORPH BLD: ABNORMAL
RSV RNA SPEC QL NAA+PROBE: NOT DETECTED
RV+EV RNA SPEC QL NAA+PROBE: DETECTED
SARS-COV-2 PCR, COVPCR: DETECTED
SODIUM SERPL-SCNC: 155 MMOL/L (ref 136–145)
SODIUM SERPL-SCNC: 162 MMOL/L (ref 136–145)
SODIUM SERPL-SCNC: 163 MMOL/L (ref 136–145)
SODIUM SERPL-SCNC: 163 MMOL/L (ref 136–145)
SODIUM UR-SCNC: 13 MMOL/L (ref 20–110)
TROPONIN-HIGH SENSITIVITY: 251 NG/L (ref 0–78)
TROPONIN-HIGH SENSITIVITY: 278 NG/L (ref 0–78)
VENTRICULAR RATE, ECG03: 147 BPM
WBC # BLD AUTO: 10.6 K/UL (ref 4.6–13.2)

## 2022-10-10 PROCEDURE — 74011250636 HC RX REV CODE- 250/636: Performed by: INTERNAL MEDICINE

## 2022-10-10 PROCEDURE — 83036 HEMOGLOBIN GLYCOSYLATED A1C: CPT

## 2022-10-10 PROCEDURE — 77030040392 HC DRSG OPTIFOAM MDII -A

## 2022-10-10 PROCEDURE — 74011250636 HC RX REV CODE- 250/636

## 2022-10-10 PROCEDURE — 77010033678 HC OXYGEN DAILY

## 2022-10-10 PROCEDURE — 82550 ASSAY OF CK (CPK): CPT

## 2022-10-10 PROCEDURE — 74011000250 HC RX REV CODE- 250: Performed by: NURSE PRACTITIONER

## 2022-10-10 PROCEDURE — 83735 ASSAY OF MAGNESIUM: CPT

## 2022-10-10 PROCEDURE — 84100 ASSAY OF PHOSPHORUS: CPT

## 2022-10-10 PROCEDURE — 84295 ASSAY OF SERUM SODIUM: CPT

## 2022-10-10 PROCEDURE — 99221 1ST HOSP IP/OBS SF/LOW 40: CPT | Performed by: NURSE PRACTITIONER

## 2022-10-10 PROCEDURE — 74011636637 HC RX REV CODE- 636/637: Performed by: NURSE PRACTITIONER

## 2022-10-10 PROCEDURE — 94762 N-INVAS EAR/PLS OXIMTRY CONT: CPT

## 2022-10-10 PROCEDURE — 80048 BASIC METABOLIC PNL TOTAL CA: CPT

## 2022-10-10 PROCEDURE — 84145 PROCALCITONIN (PCT): CPT

## 2022-10-10 PROCEDURE — 82436 ASSAY OF URINE CHLORIDE: CPT

## 2022-10-10 PROCEDURE — APPSS15 APP SPLIT SHARED TIME 0-15 MINUTES: Performed by: NURSE PRACTITIONER

## 2022-10-10 PROCEDURE — 74011000250 HC RX REV CODE- 250: Performed by: PHYSICIAN ASSISTANT

## 2022-10-10 PROCEDURE — 83935 ASSAY OF URINE OSMOLALITY: CPT

## 2022-10-10 PROCEDURE — 74011000258 HC RX REV CODE- 258: Performed by: NURSE PRACTITIONER

## 2022-10-10 PROCEDURE — 99291 CRITICAL CARE FIRST HOUR: CPT | Performed by: INTERNAL MEDICINE

## 2022-10-10 PROCEDURE — 85610 PROTHROMBIN TIME: CPT

## 2022-10-10 PROCEDURE — 74011250636 HC RX REV CODE- 250/636: Performed by: NURSE PRACTITIONER

## 2022-10-10 PROCEDURE — 74011250636 HC RX REV CODE- 250/636: Performed by: EMERGENCY MEDICINE

## 2022-10-10 PROCEDURE — 84133 ASSAY OF URINE POTASSIUM: CPT

## 2022-10-10 PROCEDURE — 36415 COLL VENOUS BLD VENIPUNCTURE: CPT

## 2022-10-10 PROCEDURE — 74018 RADEX ABDOMEN 1 VIEW: CPT

## 2022-10-10 PROCEDURE — 94660 CPAP INITIATION&MGMT: CPT

## 2022-10-10 PROCEDURE — 83930 ASSAY OF BLOOD OSMOLALITY: CPT

## 2022-10-10 PROCEDURE — 84300 ASSAY OF URINE SODIUM: CPT

## 2022-10-10 PROCEDURE — 99292 CRITICAL CARE ADDL 30 MIN: CPT | Performed by: INTERNAL MEDICINE

## 2022-10-10 PROCEDURE — 85025 COMPLETE CBC W/AUTO DIFF WBC: CPT

## 2022-10-10 PROCEDURE — 82962 GLUCOSE BLOOD TEST: CPT

## 2022-10-10 PROCEDURE — 83605 ASSAY OF LACTIC ACID: CPT

## 2022-10-10 PROCEDURE — 92610 EVALUATE SWALLOWING FUNCTION: CPT

## 2022-10-10 PROCEDURE — 2709999900 HC NON-CHARGEABLE SUPPLY

## 2022-10-10 PROCEDURE — 65610000006 HC RM INTENSIVE CARE

## 2022-10-10 PROCEDURE — 71045 X-RAY EXAM CHEST 1 VIEW: CPT

## 2022-10-10 PROCEDURE — 84484 ASSAY OF TROPONIN QUANT: CPT

## 2022-10-10 PROCEDURE — 74011000258 HC RX REV CODE- 258: Performed by: EMERGENCY MEDICINE

## 2022-10-10 PROCEDURE — 74011000258 HC RX REV CODE- 258: Performed by: INTERNAL MEDICINE

## 2022-10-10 PROCEDURE — 0202U NFCT DS 22 TRGT SARS-COV-2: CPT

## 2022-10-10 RX ORDER — MAGNESIUM SULFATE 100 %
4 CRYSTALS MISCELLANEOUS AS NEEDED
Status: DISCONTINUED | OUTPATIENT
Start: 2022-10-10 | End: 2022-10-22 | Stop reason: HOSPADM

## 2022-10-10 RX ORDER — DEXTROSE MONOHYDRATE 50 MG/ML
100 INJECTION, SOLUTION INTRAVENOUS CONTINUOUS
Status: DISCONTINUED | OUTPATIENT
Start: 2022-10-10 | End: 2022-10-10

## 2022-10-10 RX ORDER — INSULIN LISPRO 100 [IU]/ML
INJECTION, SOLUTION INTRAVENOUS; SUBCUTANEOUS EVERY 6 HOURS
Status: DISCONTINUED | OUTPATIENT
Start: 2022-10-10 | End: 2022-10-22 | Stop reason: HOSPADM

## 2022-10-10 RX ORDER — POTASSIUM CHLORIDE 7.45 MG/ML
10 INJECTION INTRAVENOUS
Status: COMPLETED | OUTPATIENT
Start: 2022-10-10 | End: 2022-10-10

## 2022-10-10 RX ORDER — DEXTROSE MONOHYDRATE 100 MG/ML
0-250 INJECTION, SOLUTION INTRAVENOUS AS NEEDED
Status: DISCONTINUED | OUTPATIENT
Start: 2022-10-10 | End: 2022-10-22 | Stop reason: HOSPADM

## 2022-10-10 RX ORDER — HYDRALAZINE HYDROCHLORIDE 20 MG/ML
10 INJECTION INTRAMUSCULAR; INTRAVENOUS
Status: DISCONTINUED | OUTPATIENT
Start: 2022-10-10 | End: 2022-10-22 | Stop reason: HOSPADM

## 2022-10-10 RX ORDER — IPRATROPIUM BROMIDE AND ALBUTEROL SULFATE 2.5; .5 MG/3ML; MG/3ML
3 SOLUTION RESPIRATORY (INHALATION)
Status: DISCONTINUED | OUTPATIENT
Start: 2022-10-10 | End: 2022-10-22 | Stop reason: HOSPADM

## 2022-10-10 RX ADMIN — SODIUM CHLORIDE, SODIUM GLUCONATE, SODIUM ACETATE, POTASSIUM CHLORIDE AND MAGNESIUM CHLORIDE 1000 ML: 526; 502; 368; 37; 30 INJECTION, SOLUTION INTRAVENOUS at 10:27

## 2022-10-10 RX ADMIN — Medication 2 UNITS: at 06:24

## 2022-10-10 RX ADMIN — HEPARIN SODIUM 5000 UNITS: 5000 INJECTION INTRAVENOUS; SUBCUTANEOUS at 02:03

## 2022-10-10 RX ADMIN — POTASSIUM CHLORIDE 10 MEQ: 7.46 INJECTION, SOLUTION INTRAVENOUS at 16:00

## 2022-10-10 RX ADMIN — SODIUM CHLORIDE 750 MG: 900 INJECTION, SOLUTION INTRAVENOUS at 10:28

## 2022-10-10 RX ADMIN — VANCOMYCIN HYDROCHLORIDE 750 MG: 750 INJECTION, POWDER, LYOPHILIZED, FOR SOLUTION INTRAVENOUS at 17:47

## 2022-10-10 RX ADMIN — VANCOMYCIN HYDROCHLORIDE 1250 MG: 10 INJECTION, POWDER, LYOPHILIZED, FOR SOLUTION INTRAVENOUS at 03:27

## 2022-10-10 RX ADMIN — SODIUM CHLORIDE, SODIUM GLUCONATE, SODIUM ACETATE, POTASSIUM CHLORIDE AND MAGNESIUM CHLORIDE 75 ML/HR: 526; 502; 368; 37; 30 INJECTION, SOLUTION INTRAVENOUS at 02:03

## 2022-10-10 RX ADMIN — PIPERACILLIN AND TAZOBACTAM 3.38 G: 3; .375 INJECTION, POWDER, FOR SOLUTION INTRAVENOUS at 01:50

## 2022-10-10 RX ADMIN — THIAMINE HYDROCHLORIDE 100 MG: 100 INJECTION, SOLUTION INTRAMUSCULAR; INTRAVENOUS at 08:41

## 2022-10-10 RX ADMIN — HEPARIN SODIUM 5000 UNITS: 5000 INJECTION INTRAVENOUS; SUBCUTANEOUS at 21:29

## 2022-10-10 RX ADMIN — PIPERACILLIN AND TAZOBACTAM 4.5 G: 4; .5 INJECTION, POWDER, LYOPHILIZED, FOR SOLUTION INTRAVENOUS at 11:45

## 2022-10-10 RX ADMIN — POTASSIUM CHLORIDE 10 MEQ: 7.46 INJECTION, SOLUTION INTRAVENOUS at 17:13

## 2022-10-10 RX ADMIN — PIPERACILLIN AND TAZOBACTAM 4.5 G: 4; .5 INJECTION, POWDER, LYOPHILIZED, FOR SOLUTION INTRAVENOUS at 20:00

## 2022-10-10 RX ADMIN — Medication 4 UNITS: at 17:35

## 2022-10-10 RX ADMIN — SODIUM CHLORIDE, PRESERVATIVE FREE 20 MG: 5 INJECTION INTRAVENOUS at 08:40

## 2022-10-10 RX ADMIN — POTASSIUM CHLORIDE 10 MEQ: 7.46 INJECTION, SOLUTION INTRAVENOUS at 14:18

## 2022-10-10 RX ADMIN — FOLIC ACID: 5 INJECTION, SOLUTION INTRAMUSCULAR; INTRAVENOUS; SUBCUTANEOUS at 10:26

## 2022-10-10 RX ADMIN — POTASSIUM CHLORIDE 10 MEQ: 7.46 INJECTION, SOLUTION INTRAVENOUS at 17:36

## 2022-10-10 RX ADMIN — SODIUM CHLORIDE 750 MG: 900 INJECTION, SOLUTION INTRAVENOUS at 22:12

## 2022-10-10 RX ADMIN — DEXTROSE MONOHYDRATE 100 ML/HR: 50 INJECTION, SOLUTION INTRAVENOUS at 21:26

## 2022-10-10 RX ADMIN — DEXTROSE MONOHYDRATE 100 ML/HR: 50 INJECTION, SOLUTION INTRAVENOUS at 11:42

## 2022-10-10 RX ADMIN — Medication 2 UNITS: at 23:09

## 2022-10-10 RX ADMIN — SODIUM CHLORIDE, PRESERVATIVE FREE 20 MG: 5 INJECTION INTRAVENOUS at 21:26

## 2022-10-10 RX ADMIN — Medication 2 UNITS: at 01:16

## 2022-10-10 RX ADMIN — HEPARIN SODIUM 5000 UNITS: 5000 INJECTION INTRAVENOUS; SUBCUTANEOUS at 11:54

## 2022-10-10 RX ADMIN — SODIUM CHLORIDE 750 MG: 900 INJECTION, SOLUTION INTRAVENOUS at 03:21

## 2022-10-10 RX ADMIN — POTASSIUM CHLORIDE 10 MEQ: 7.46 INJECTION, SOLUTION INTRAVENOUS at 14:21

## 2022-10-10 NOTE — PROGRESS NOTES
Problem: Dysphagia (Adult)  Goal: *Acute Goals and Plan of Care (Insert Text)  Description: Patient will:  1. Tolerate PO trials with 0 s/s overt distress in 4/5 trials  2. Utilize compensatory swallow strategies/maneuvers (decrease bite/sip, size/rate, alt. liq/sol) with min cues in 4/5 trials  3. Perform oral-motor/laryngeal exercises to increase oropharyngeal swallow function with min cues  4. Complete an objective swallow study (i.e., MBSS) to assess swallow integrity, r/o aspiration, and determine of safest LRD, min A as indicated/ordered by MD     Recommend:   NPO   Consider comfort feeds vs alternative nutrition/hydration source  (If comfort, recommend puree/HTL)   Strict aspiration precautions (HOB >30 degrees at all times, Oral care TID)    Outcome: Not Progressing Towards Goal     SPEECH LANGUAGE PATHOLOGY BEDSIDE SWALLOW EVALUATION    Patient: Leticia Clemons (89 y.o. male)  Date: 10/10/2022  Primary Diagnosis: Respiratory failure (HCC) [J96.90]  Dementia, alcoholic (HCC) [I19.93]  Aspiration into airway [T17.908A]  Precautions: Aspiration      PLOF: As per H&P    ASSESSMENT :  Based on the objective data described below, the patient presents with severe oral and suspected severe pharyngeal dysphagia. Pt alert, nonverbal and unable to follow commands. Oral mech exam revealed decreased orolingual strength/coordination with reduced labial seal.  Pt demo poor bolus acceptance with minimal bolus manipulation or propulsion with HTL presentations. Demo partial bolus loss on L side suspect related to positioning (leaned to L) that was not resolved with repositioning. Pt with severely delayed swallow initiation with wet/weak cough on tsp presentations of HTL. Utilized Yankauer to remove residuals from anterior portion of tongue and no further po presentations given due to high aspiration risk. Recommend continue NPO status; consider comfort feeds vs alternative nutrition/hydration source.   If comfort, recommend puree/HTL. Will follow for further dysphagia management as appropriate. Patient will benefit from skilled intervention to address the above impairments. Patient's rehabilitation potential is considered to be Guarded  Factors which may influence rehabilitation potential include:   []            None noted  [x]            Mental ability/status  [x]            Medical condition  []            Home/family situation and support systems  [x]            Safety awareness  []            Pain tolerance/management  []            Other:      PLAN :  Recommendations and Planned Interventions:  As above   Frequency/Duration: Patient will be followed by speech-language pathology 1-2 times per day/2-7 days per week to address goals.      SUBJECTIVE:   Patient nonverbal     OBJECTIVE:     Past Medical History:   Diagnosis Date    Alcohol dependence with alcohol-induced persisting dementia ([address]     per [facility] 4/23/21    Anemia     Aphasia     Benign prostatic hyperplasia     Cerebral vascular disease     COVID-19 04/06/2021    Disturbances of salivary secretion     Dysphagia     GERD (gastroesophageal reflux disease)     Hemiparesis (HCC)     left side    Hemiplegia (HCC)     left side     Hiccups     Hyperlipidemia     Hypernatremia 03/2021    Hypertension     Hyponatremia     Insomnia     Lacunar infarction ([address] 2010    weakness both arms, unable to walk    Lower extremity edema     Moderate protein-calorie malnutrition (HCC)     MRSA (methicillin resistant Staphylococcus aureus)     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)     Pneumonia     Psychogenic polydipsia     PVD (peripheral vascular disease) ([address]     Seizures ([address]     Spinal stenosis     Stroke ([address] 2017    Unspecified convulsions ([address]     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     Past Surgical History:   Procedure Laterality Date    COLONOSCOPY N/A 4/29/2021    COLONOSCOPY performed by Ijeoma Tan MD at [facility]    HX HEENT      pt reports past hx of surgery on ears unsure of what type    HX OTHER SURGICAL Bilateral     debridement of lower extremities     Prior Level of Function/Home Situation:  Home Situation  Home Environment: [address] [place] [address] Name: [address] [place] [address],[address] Floor  # Steps to Enter: 0  One/Two Story Residence: One story  Living Alone: No  Support Systems: [place]  [facility]  Current DME Used/Available at Home: None  Diet prior to admission: per documentation from LTC facility, puree/NTL   Current Diet:  NPO   Cognitive and Communication Status:  Neurologic State: Alert, Other (Comment) (aphasic flat affect)  Orientation Level: Unable to verbalize  Cognition: Unable to assess (comment)  Oral Assessment:  Oral Assessment  Labial: Decreased seal;Impaired coordination  Dentition: Natural  Oral Hygiene: Poor  Lingual: Decreased strength;Decreased rate; Incoordinated  Velum: Unable to visualize  Mandible: No impairment  P.O. Trials:  Patient Position: [facility] at [facility]  Vocal quality prior to P.O.: Aphonic  Consistency Presented: Honey thick liquid  How Presented: SLP-fed/presented;Spoon  Bolus Acceptance: Impaired  Bolus Formation/Control: Impaired  Type of Impairment: Delayed; Posterior;Poor;Lip closure;Spillage;Piecemeal  Propulsion: Discoordination;Absent  Oral Residue: Greater than 50% of bolus; Anterior  Initiation of Swallow: Delayed (# of seconds)  Laryngeal Elevation: Decreased;Weak  Aspiration Signs/Symptoms: Weak cough; Change vocal quality;Clear throat  Pharyngeal Phase Characteristics: Altered vocal quality; Suspected pharyngeal residue  Effective Modifications: None  Cues for Modifications: Maximal  Oral Phase Severity: Severe  Pharyngeal Phase Severity : Severe    PAIN:  Start of Eval: unable to report   End of Eval: unable to report      After treatment:   []            Patient left in no apparent distress sitting up in chair  [x] Patient left in no apparent distress in bed  [x]            Call bell left within reach  [x]            Nursing notified  []            Family present  []            Caregiver present  []            Bed alarm activated    COMMUNICATION/EDUCATION:   [x]            Aspiration precautions; swallow safety; compensatory techniques. []            Patient/family have participated as able in goal setting and plan of care. []            Patient/family agree to work toward stated goals and plan of care. []            Patient understands intent and goals of therapy; neutral about participation. [x]            Patient unable to participate in goal setting/plan of care; educ ongoing with interdisciplinary staff  [x]         Posted safety precautions in patient's room.     Thank you for this referral,  Esther Nye M.S., [address] [facility]  Speech-Language Pathologist

## 2022-10-10 NOTE — ACP (ADVANCE CARE PLANNING)
Advance Care Planning     General Advance Care Planning (ACP) Conversation      Date of Conversation: 10/10/2022    Conducted with: Patient's friend/POA Ben Osei Brothers), Luzma Mckeon NP (Palliative) and this writer. Healthcare Decision Maker:     Patient has an advance medical directive (AMD), on file. The AMD lists patient's friend Elsy Mendez, ZX#[phone]) as his primary decision maker. The Palliative Care team then phoned [facility]. [facility] stated that he is no longer in contact with patient and [facility] is stepping down from the roll of healthcare decision maker. [facility] stated that patient's son Jael Yeh, WZ#[phone]) should be his healthcare decision maker. Pricilla Cockayne is also patient's legal next-of-kin and default healthcare decision maker. The current AMD has been revoked and placed on the chart. Content/Action Overview: This writer, along with Luzma Mckeon NP, with the Palliative Care team; visited with patient today to offer support. Patient was resting and on High Flow O2. There was no family at the bedside or at the hospital. Patient is a long term resident at [address] [place] [address]. The Palliative Care team conducted a chart review and found that patient has an AMD and also has a POST form. The AMD has now been revoked, due to the fact that [facility] no longer wants to be the healthcare decision maker. Patient's son Eliza Patricio is patient's healthcare decision maker. Patient has a POST form, that was completed on 4/1/2021. The ICU team verified the accuracy of the POST form, with Pricilla Cockayne. The Palliative Care team attempted to reach Kanwal Cockayne and got his voicemail. A message was left for a return call. At this time, patient will remain a DNR/DNI with Limited Additional Interventions and okay with a feeding tube long-term if indicated. Addendum: Patient's son Eliza Kidd) phoned back and verified the POST form as accurate.  He also verified that he is okay with patient receiving a feeding tube long-term, if indicated. Length of Voluntary ACP Conversation in minutes:  20 minutes        Nasim Pierson, MSW  Palliative Care   #[phone]

## 2022-10-10 NOTE — DIABETES MGMT
Glycemic Control:  Pt with no history of Diabetes. Pt's blood glucose readings are in the target range. He has  required 4 units of corrective lispro. Recent Glucose Results:   Lab Results   Component Value Date/Time     (H) 10/10/2022 02:05 AM     (H) 10/10/2022 12:52 AM     (H) 10/09/2022 06:20 PM    GLUCPOC 171 (H) 10/10/2022 06:18 AM    GLUCPOC 159 (H) 10/10/2022 01:49 AM    GLUCPOC 135 (H) 10/09/2022 06:47 PM     Lab Results   Component Value Date/Time    Hemoglobin A1c 5.5 10/10/2022 12:52 AM     Continue to monitor for glycemic control.   Kavya Petty RD BC-ADM

## 2022-10-10 NOTE — INTERDISCIPLINARY ROUNDS
[facility]  Pulmonary, Critical Care, and Sleep Medicine  Interdisciplinary and Ventilator Weaning Rounds    Patient discussed in morning walking rounds and interdisciplinary rounds. ICU day: 10/9    Overnight events:   Admitted overnight  Persistent hypernatremia  On HFNC 25L, 50%    Assessments and best practice:  Ventilator  N/A  Sedation  N/A  Other pertinent drips  Plasmalyte   Lines noted  Bradley Catheter  Critical labs assessed  Yes  Antibiotics  Zosyn and Vancomycin  Medications reviewed  Yes  Pending imaging  none  Pending send out labs  No  Pending Procedures  NGT  Glycemic control  Stress ulcer prophylaxis. Pepcid  DVT prophylaxis. SQH  Need for Lines, bradley assessed.   Yes   Restraint Reevaluation   - Restraints not needed    Family contact/MPOA: Frederic Chapa [phone]  Family updated per ICU staff    Palliative consult within 3 days of admission to ICU-  Ethics Guidance: 21 days      Daily Plans:  Continue fluid replacement   BMP, Sodium levels Q4h  Aspiration precautions - high risk  Palliative consult     BARRETT time 15 minutes        James Alaniz PA-C  10/10/22  Pulmonary, Critical Care Medicine  [facility]

## 2022-10-10 NOTE — ED NOTES
Report to ICU nurse. All questions answered. Patient to MICU via stretcher with RN, respiratory and transporter.   Patient transported to MICU and care transferred to ICU staff without incident

## 2022-10-10 NOTE — PROGRESS NOTES
PCCM Update: Attempted to contact patient's son, Barbara Steiner, in regards to patient's admission and current clinical status. No answer. Message left to return call to [facility] ICU.      RASHEEDA Royal  10/10/22  Pulmonary, Critical Care Medicine  [facility]

## 2022-10-10 NOTE — PROGRESS NOTES
[address] [place] [address] [facility]. Pulmonary, Critical Care, and Sleep Medicine    Name: Jacoby Ojeda MRN: [id number]   : 1955 Hospital: [address] [place] [address] Dr   Date: 10/10/2022  Admission Date: 10/9/2022     Chart and notes reviewed. Data reviewed. I have evaluated all findings. [x]I have reviewed the flowsheet and previous days notes. []The patient is unable to give any meaningful history or review of systems because the patient is:  []Intubated []Sedated   []Unresponsive      []The patient is critically ill on      []Mechanical ventilation []Pressors   []BiPAP []         Interval HPI:  Patient is a 79 y.o. male with a significant PMHx of CVA with left-sided hemiparesis and aphasia, HTN, HLD, dysphagia, seizure disorder, schizophrenia, alcohol-induced dementia, severe protein calorie malnutrition and debility who presents from [facility] in acute hypoxic respiratory failure secondary to SIRS vs. Sepsis due aspiration pneumonia. He was also found to have severe hypernatremia with a sodium level of 160 due to severe hypovolemia from dehydration/poor PO intake. He was also found to have questionable UTI, although less likely. Further lab work was significant for a +RVP panel. Patient is COVID + and rhinovirus/enterovirus +. Patient is asymptomatic and respiratory failure is due to his aspiration. He is currently requiring Vapotherm, low settings. Subjective 10/10/22  Hospital Day:1  Vent Day: N/A   Overnight events: Admitted to ICU overnight. See H&P for details . Later on found to be COVID and rhinovirus/enterovirus +. Mentation/Activity: Alert, non-verbal, does not follow commands; has dementia   Respiratory/ Secretions: On Vapotherm, 25L/50%  Hemodynamics: Stable, hypertensive   Urine output, bowel: ~300 ml urine output overnight   Diet: NPO  Need for procedures: None               ROS:Review of systems not obtained due to patient factors.     Events and notes from last 24 hours reviewed. Patient Active Problem List   Diagnosis Code    Intractable hiccups R06.6    Essential hypertension, benign I10    First degree AV block I44.0    Former heavy tobacco smoker Z87.891    Severe protein-calorie malnutrition ([facility] E43    Psychogenic polydipsia R63.1, F54    Hyponatremia E87.1    Cellulitis L03.90    Right foot infection L08.9    Bilateral leg and foot pain M79.604, M79.605, M79.671, M79.672    Pneumonia J18.9    Stroke ([facility] I63.9    Hypernatremia X63.5    Acute metabolic encephalopathy I66.80    Dehydration E86.0    COVID-19 U07.1    Hematuria R31.9    Dysuria R30.0    Goals of care, counseling/discussion Z71.89    Debility R53.81    Respiratory failure (HCC) J96.90    Dementia, alcoholic (HCC) C87.36    Aspiration into airway T17.908A       Vital Signs:  Visit Vitals  BP (!) 131/91 (BP 1 Location: Left arm, BP Patient Position: At rest)   Pulse 97   Temp 100 °F (37.8 °C)   Resp 25   Wt 68.1 kg (150 lb 2.1 oz)   SpO2 100%   BMI 21.54 kg/m²       O2 Device: Hi flow nasal cannula   O2 Flow Rate (L/min): 25 l/min   Temp (24hrs), Av.5 °F (39.2 °C), Min:100 °F (37.8 °C), Max:105 °F (40.6 °C)       Intake/Output:   Last shift:      10/09 1901 - 10/10 0700  In: 1275 [I.V.:1275]  Out: -   Last 3 shifts: No intake/output data recorded.     Intake/Output Summary (Last 24 hours) at 10/10/2022 0519  Last data filed at 10/10/2022 0303  Gross per 24 hour   Intake 1275 ml   Output --   Net 1275 ml          Current Facility-Administered Medications   Medication Dose Route Frequency    insulin lispro (HUMALOG) injection   SubCUTAneous Q6H    electrolyte-A infusion  75 mL/hr IntraVENous CONTINUOUS    vancomycin (VANCOCIN) 750 mg in 0.9% sodium chloride 250 mL (VIAL-MATE)  750 mg IntraVENous Q12H    piperacillin-tazobactam (ZOSYN) 3.375 g in 0.9% sodium chloride (MBP/ADV) 100 mL MBP  3.375 g IntraVENous Q8H    heparin (porcine) injection 5,000 Units  5,000 Units SubCUTAneous Q8H    levETIRAcetam (KEPPRA) 750 mg in 0.9% sodium chloride 100 mL IVPB  750 mg IntraVENous U57O    folic acid (FOLVITE) 1 mg in 0.9% sodium chloride 50 mL ivpb   IntraVENous DAILY    thiamine (B-1) 100 mg in 0.9% sodium chloride 50 mL IVPB  100 mg IntraVENous DAILY    famotidine (PF) (PEPCID) 20 mg in 0.9% sodium chloride 10 mL injection  20 mg IntraVENous Q12H         Telemetry: [x]Sinus []A-flutter []Paced    []A-fib []Multiple PVCs                  Physical Exam:      General:  Alert, no distress, frail, ill and malnourished appearing, non-verbal    Head:  Normocephalic, without obvious abnormality, atraumatic. Eyes:  Conjunctivae/corneas clear. PERRL,   Nose: Nares normal. Septum midline. Mucosa dry. No drainage or sinus tenderness. Throat: Lips, mucosa, and tongue normal. Teeth and gums of poor dentition, lips dried and cracked/peeling    Neck: Supple, symmetrical, trachea midline, no adenopathy, thyroid: no enlargment/tenderness/nodules, no carotid bruit and no JVD. Back:   Symmetric, no curvature. Lungs:   Coarse to auscultation bilaterally. Chest wall:  No tenderness or deformity. Heart:  Tachyardia, S1, S2 normal, no murmur, click, rub or gallop. Abdomen:   Abd flat, Soft, non-tender. Bowel sounds normal. No masses,  No organomegaly. Extremities: Extremities normal, atraumatic, no cyanosis or edema. Pulses: 2+ and symmetric all extremities. Skin: BLE with multiple stages of healing from venous stasis ulcers    Lymph nodes:  Cervical, supraclavicular, and axillary nodes normal.   Neurologic: Grossly non-focal; non-verbal, no command following, but alert      Devices:   Reid, PIV       DATA:  MAR reviewed and pertinent medications noted or modified as needed    Labs:  Recent Labs     10/10/22  0205 10/09/22  1820   WBC 10.6 11.3   HGB 11.6* 13.8   HCT 39.0 45.6    287     Recent Labs     10/10/22  0205 10/10/22  0052 10/09/22  1820   * 162* 160*   K 4.0 4.0 3.9   * 132* 128*   CO2 24 23 24   * 165* 134*   BUN 27* 26* 19*   CREA 1.10 1.03 0.94   CA 8.8 8.3* 9.1   MG 2.6 2.5 2.6   PHOS 3.8 3.8  --    ALB  --   --  2.8*  2.7*   ALT  --   --  18  17   INR 1.5*  --   --      No results for input(s): PH, PCO2, PO2, HCO3, FIO2 in the last 72 hours. Recent Labs     10/09/22  2201 10/09/22  1840   FIO2I 60  --    HCO3I 21.1* 22.0   PCO2I 31.8* 25.8*   PHI 7.43 7.54*   PO2I 162* 221*       Imaging:  [x]   I have personally reviewed the patients radiographs and reports  XR Results (most recent):  XR Results (most recent):  Results from Hospital Encounter encounter on 10/09/22    XR CHEST PORT    Narrative  ONE VIEW CHEST RADIOGRAPH    INDICATION: SOB. Respiratory distress, concern for aspiration. COMPARISON: Chest radiograph 3/30/2021. TECHNIQUE: AP view of the chest    FINDINGS:    LINES/TUBES: None. MEDIASTINUM: Cardiac silhouette is within normal limits. LUNGS: Mild streaky retrocardiac opacity. Otherwise, no consolidation, pleural  effusion, or pneumothorax. BONES/OTHER: No acute osseous abnormality. Impression  Mild streaky retrocardiac opacity, possibly atelectasis or infiltrate. CT Results (most recent):  Results from Hospital Encounter encounter on 10/09/22    CT CHEST WO CONT    Narrative  EXAM: CT CHEST WITHOUT CONTRAST. CLINICAL HISTORY/INDICATION:  Apiration    COMPARISON: Chest x-ray 10/9/2022, CT chest 9/3/2013. TECHNIQUE: Standard helical images were obtained from the thoracic inlet through  the adrenals at 5 mm thick sections without intravenous contrast.  Coronal and sagittal reformations obtained. Images were reviewed on both soft  tissue, lung, and bone window settings.     All CT scans at this facility are performed using dose optimization technique as  appropriate to a performed exam, to include automated exposure control,  adjustment of the mA and/or kV according to patient's size (including  appropriate matching for site-specific examinations), or use of iterative  reconstruction technique. FINDINGS:    Minimal left basal posterior dependently consolidated lung. Vague tree-in-bud faint nodularity within the posterior segment of the right  upper lobe. Vague tree-in-bud clustered perivascular nodules in the posterior superior right  middle lobe. Multifocal vague patchy nodules in the superior left upper lobe with dense  consolidation. Likely secretions or soft tissue density within the proximal bronchus  intermedius. Mild elevation of the right hemidiaphragm. .    There is no evidence of mediastinal, hilar, nor axillary adenopathy. Evaluation of the mediastinum and jimmy is limited by the lack of IV contrast.  The great vessels and thoracic aorta are unremarkable. There are no pleural effusions. The included portion of the of the liver is unremarkable. Probable small  gallstones. The adrenal glands are normal.    The chest wall soft tissues are unremarkable. The bony structures are unremarkable. Impression  Dense consolidation within the right lower lobe with soft tissue density within  the bronchus intermedius proximal left lower lobe main bronchus. Likely  secretions. Associated with mild elevation of the hemidiaphragm with at least  some component of atelectatic volume loss. Smaller region of similar consolidation within the left lower lobe. Aspiration pneumonia not excluded. Tree-in-bud day fuzzy nodules in the perivascular spaces in the right upper  lobe, right middle lobe and superior right lower lobe of acute  infection/pneumonia. IMPRESSION:   SIRS vs. Sepsis- secondary to Aspiration PNA/? Acute Cystitis with hematuria (trace), less likely; febrile Tmax 105, tachypnea, tachycardia  Severe Hypernatremia- Sodium level of 160 on arrival   Aspiration PNA-airway suctioned with copious material and food products on arrival. Known hx of dysphagia.  CT chest 10/9 with dense consolidations RLL and LLL  Acute Hypoxic Respiratory Failure- in the setting of aspiration requiring Vapotherm   Hypovolemia- in the setting of dehydration/dementia, likely with insufficient PO intake, 10.2 free water deficit    PARAMJIT- likely prerenal in nature secondary to above  Acute on chronic encephalopathy- likely metabolic in nature superimposed on dementia  +COVID-19 infection, not PNA, asymptomatic, RVP 10/10.  Respiratory failure likely due to aspiration    Enterovirus/Rhinovirus +- RVP 10/10   Dysphagia   Seizure disorder- on Keppra OP  Severe Protein Malnutrition  Debility-non-ambulatory since 2014/wheelchair dependent   PVD/Venous Insufficiency- left lateral leg with slow healing, followed by Vascular   Hx x of COVID-19, asymptomatic (3/30/21)   DM II   Schizophrenia   Hx Etoh abuse with alcohol-induced persisting dementia  Hx CVA with left-sided hemiparesis and aphasia, HTN, HLD     Patient Active Problem List   Diagnosis Code    Intractable hiccups R06.6    Essential hypertension, benign I10    First degree AV block I44.0    Former heavy tobacco smoker Z87.891    Severe protein-calorie malnutrition ([facility] E43    Psychogenic polydipsia R63.1, F54    Hyponatremia E87.1    Cellulitis L03.90    Right foot infection L08.9    Bilateral leg and foot pain M79.604, M79.605, M79.671, M79.672    Pneumonia J18.9    Stroke ([facility] I63.9    Hypernatremia E62.4    Acute metabolic encephalopathy A37.86    Dehydration E86.0    COVID-19 U07.1    Hematuria R31.9    Dysuria R30.0    Goals of care, counseling/discussion Z71.89    Debility R53.81    Respiratory failure (HCC) J96.90    Dementia, alcoholic (HCC) N51.09    Aspiration into airway T17.908A        RECOMMENDATIONS:   Neuro: Dementia at baseline, observe for worsening mentation deviated from baseline, cont Keppra 750 mg BID, Q4h neuro checks, cont thiamine and folate   Pulm: Remove patient from CPAP and place on HFNC (patient with aspiration event, therefore NIPPV contraindicated), Supplemental O2 via NC, titrate flow for goal SPO2> 90% Bronchodilators, steroids, pulmonary hygiene care, Aspiration precautions, Keep HOB >30 degrees  CVS : Monitor hemodynamics, Aim MAP >65mmHg, trend cardiac panel, ECHO results. Fluids: Normosol @ 75 ml/hr, ER staff did not initiate IVF ordered. Patient did not start receiving maintenance fluids in ER as ordered until in ICU, thus increase in sodium. May need to switch to D5W if no improvement   GI: SUP, Trend LFTs, Zofran PRN for N/V, Diet/NPO-->obtain SLP, may need insertion of NGT to for free water to assist with lowering Na   Renal:  Trend Renal indices, Strict Is/Os, Place Bradley, Nephrology consult   Hem/Onc: Monitor for s/o active bleeding. Heparin subq for DVT prophylaxis   I/D:Sepsis bundle per hospital protocol, Blood, Sputum, and Urine cultures drawn and will be followed. Lactic acid ordered- initial and repeat Q4hrs till normalized. Antibiotics:Vanco/Zosyn. Trend WBCs and temperature curve. RVP + COVID, Enterovirus/Rhinovirus. Procal 21.82   Endocrine: Q6 glucoses, SSI. TSH level 3.83   Metabolic:  Daily BMP, mag, phos. Trend lytes, replace as needed. Q4h Na checks   Musc/Skin:  wound care BLE healing venous ulcers   Full Code  Palliative care consult       Full Code  Discussed in interdisciplinary rounds     Best practice :    Glycemic control  IHI ICU bundles: Bradley Bundle Followed    [facility] Vent patients-    N/A   Stress ulcer prophylaxis. Pepcid  DVT prophylaxis. SQH  Need for Lines, bradley assessed. Palliative care evaluation. Restraints need. Attending Non-violent Restraint Reevaluation   No restraints required        BARRETT TIME: 15 min   Maikol Granda NP   10/10/22  Pulmonary, [address] [facility]           Attending Note:  I saw and evaluated the patient, performing all elements of service personally.   I discussed the findings, assessment and plan with the NP and agree with the NP's findings and plan as documented in the NP's note above. All edits and changes made above or are mentioned in my attending note which was independently assessed as well as written. Total of 112 min critical care time spent at bedside (personally) during the course of care providing evaluation,management and care decisions and ordering appropriate treatment related to critical care problems exclusive of procedures. The reason for providing this level of medical care for this critically ill patient was due a critical illness that impaired one or more vital organ systems such that there was a high probability of imminent or life threatening deterioration in the patients condition. This care involved high complexity decision making to assess, manipulate, and support vital system functions, to treat this degree vital organ system failure and to prevent further life threatening deterioration of the patients condition. This time was independent of other practitioners. 70-year-old male with past medical history of CVA with left-sided hemiparesis, aphasia, vascular and alcohol dementia, hypertension, hyperlipidemia, dysphagia, seizure disorder, schizophrenia, severe protein calorie malnutrition, presented to [facility] sent in from his nursing home [facility] for hypoxia. Patient was placed on nonrebreather while at [facility], patient then was placed on CPAP by EMS, placed on BiPAP while in the ER. They noted that aspirated food was in the back of his throat. Due to worsening of aspiration, upon transfer to the ICU patient was placed on high flow nasal cannula. Patient also questionable UTI, patient started on broad-spectrum antibiotics. Patient has pneumonia, respiratory virus panel is positive for multiple pathogens including COVID-19, rhino/enterovirus.   Although patient is nonverbal, patient was reported to be with worsening mental status--patient found to be severely hyponatremic with a sodium of 162--along with PARAMJIT--with tripling of patient's creatinine. Patient was started on fluids, nephrology was consulted, patient eventually switched to D5W, will place NG tube and start enteric feeds given goal clarification by palliative care, appreciate input.   Check serial sodiums to avoid overcorrection    Long-term prognosis is very poor      Cindy Reynoso MD/MPH     Pulmonary, Critical Care Medicine  [facility]

## 2022-10-10 NOTE — H&P
[facility]  Pulmonary, Critical Care, and Sleep Medicine    Name: Marsha Lambert MRN: [id number]   : 1955 Hospital: [address] [place] [address]   Date: 10/9/2022        Critical Care History and Physical      IMPRESSION:   SIRS vs. Sepsis- secondary to Aspiration PNA/? Acute Cystitis with hematuria (trace), less likely; febrile Tmax 105, tachypnea, tachycardia  Severe Hypernatremia- Sodium level of 160 on arrival   Aspiration PNA-airway suctioned with copious material and food products on arrival. Known hx of dysphagia.  CT chest 10/9 with dense consolidations RLL and LLL  Acute Hypoxic Respiratory Failure- in the setting of aspiration requiring Vapotherm   Hypovolemia- in the setting of dehydration/dementia, likely with insufficient PO intake, 10.2 free water deficit    PARAMJIT- likely prerenal in nature secondary to dehydration/poor PO intake   Acute on chronic encephalopathy- likely metabolic in nature superimposed on dementia  Dysphagia   Seizure disorder- on Keppra OP  Severe Protein Malnutrition  Debility-non-ambulatory since /wheelchair dependent   PVD/Venous Insufficiency- left lateral leg with slow healing, followed by Vascular   Hx x of COVID-19, asymptomatic (3/30/21)   DM II   Schizophrenia   Hx Etoh abuse with alcohol-induced persisting dementia  Hx CVA with left-sided hemiparesis and aphasia, HTN, HLD     Patient Active Problem List   Diagnosis Code    Intractable hiccups R06.6    Essential hypertension, benign I10    First degree AV block I44.0    Former heavy tobacco smoker Z87.891    Severe protein-calorie malnutrition ([facility] E43    Psychogenic polydipsia R63.1, F54    Hyponatremia E87.1    Cellulitis L03.90    Right foot infection L08.9    Bilateral leg and foot pain M79.604, M79.605, M79.671, M79.672    Pneumonia J18.9    Stroke ([address] I63.9    Hypernatremia T49.9    Acute metabolic encephalopathy C35.93    Dehydration E86.0    COVID-19 U07.1    Hematuria R31.9    Dysuria R30.0    Goals of care, counseling/discussion Z71.89    Debility R53.81    Respiratory failure (HCC) J96.90    Dementia, alcoholic (HCC) I76.86    Aspiration into airway T17.908A        RECOMMENDATIONS:   Neuro: Dementia at baseline, observe for worsening mentation deviated from baseline, cont Keppra 750 mg BID, Q4h neuro checks   Pulm: Remove patient from CPAP and place on HFNC (patient with aspiration event, therefore NIPPV contraindicated), Supplemental O2 via NC, titrate flow for goal SPO2> 90% Bronchodilators, steroids, pulmonary hygiene care, Aspiration precautions, Keep HOB >30 degrees  CVS : Monitor hemodynamics, Aim MAP >65mmHg, Check cardiac panel, ECHO results. Fluids: LR @ 75 ml/hr  GI: SUP, Trend LFTs, Zofran PRN for N/V, Diet/NPO-->obtain SLP   Renal:  Trend Renal indices, Strict Is/Os, Place Bradley, Nephrology consult   Hem/Onc: Monitor for s/o active bleeding. Heparin subq for DVT prophylaxis   I/D:Sepsis bundle per hospital protocol, Blood, Sputum, and Urine cultures drawn and will be followed. Lactic acid ordered- initial and repeat Q4hrs till normalized. Antibiotics:Vanco/Zosyn. Trend WBCs and temperature curve. Obtain procal and RVP   Endocrine: Q6 glucoses, SSI. TSH level 8.38   Metabolic:  Daily BMP, mag, phos. Trend lytes, replace as needed. Q4h Na checks   Musc/Skin:  wound care BLE healing venous ulcers   Full Code  Palliative care consult      Best practice : APPLICABLE TO PATIENT    Glycemic control  IHI ICU bundles: Bradley Bundle Followed    [facility] patients-    N/A   Sress ulcer prophylaxis. Pepcid  DVT prophylaxis. Subq Heparin   Need for Lines, bradley assessed. Palliative care evaluation. Restraints need. Restraint evaluation   Patient does not require restraints           Subjective/History:      This patient has been seen and evaluated at the request of Dr. Jennifer Mendieta for Sepsis and Acute Hypoxic Respiratory Failure in the setting of aspiration pneumonia/acute cystitis without hematuria . 10/09/22    Patient is a 79 y.o. male with a significant PMHx of CVA with left-sided hemiparesis and aphasia, HTN, HLD, dysphagia, seizure disorder, schizophrenia, alcohol-induced dementia, severe protein calorie malnutrition and debility who presents from [facility] in respiratory distress due to aspiration of food products. Apparently while at the nursing home, the patient was found to be in respiratory distress by his primary nurse. He had a room air saturation of 80%. He was placed on a nonrebreather mask with his oxygen saturation in 90s. The EMS team reported they did suction his airway and had copious material some that resemble food products. The patient was then placed on CPAP. On arrival to the ER, he was noted to be febrile with Tmax of 105 rectally, tachycardic with -150's, and tachypnea. Hypertensive with no vasopressor requirement. Chest x-ray performed demonstrating atelectasis and infiltrates. CT Chest of with findings of RLL/LLL dense consolidations and atelectasis. Further lab work demonstrated a severe hypernatremia with a sodium level of 160 and an PARAMJIT likely in the setting of hypovolemia/dehydration. Sepsis work-up initiated, given IVF resuscitation, started on broad spectrum abx, and cultures obtained. The patient is critically ill and can not provide additional history due to Unable to comprehend.        Past Medical History:   Diagnosis Date    Alcohol dependence with alcohol-induced persisting dementia ([address]     per [facility] 4/23/21    Anemia     Aphasia     Benign prostatic hyperplasia     Cerebral vascular disease     COVID-19 04/06/2021    Disturbances of salivary secretion     Dysphagia     GERD (gastroesophageal reflux disease)     Hemiparesis (HCC)     left side    Hemiplegia (HCC)     left side     Hiccups     Hyperlipidemia     Hypernatremia 03/2021    Hypertension     Hyponatremia     Insomnia     Lacunar infarction ([address] 2010 weakness both arms, unable to walk    Lower extremity edema     Moderate protein-calorie malnutrition (HCC)     MRSA (methicillin resistant Staphylococcus aureus)     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)     Pneumonia     Psychogenic polydipsia     PVD (peripheral vascular disease) ([facility]     Seizures ([facility]     Spinal stenosis     Stroke ([facility] 2017    Unspecified convulsions ([facility]     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 Authorizing Provider   moxifloxacin (VIGAMOX) 0.5 % ophthalmic solution  3/30/22   Provider, Historical   levETIRAcetam (KEPPRA) 100 mg/mL solution Take 7.5 mL by mouth two (2) times a day. 4/6/21   Provider, Historical   doxepin (SINEquan) 10 mg capsule Take 1 Cap by mouth nightly. Patient not taking: Reported on 3/16/2022 3/7/21   Provider, Historical   clopidogrel (PLAVIX) 75 mg tab Take 1 Tab by mouth daily. 9/20/17   Geovanni Blackwell MD   pravastatin (PRAVACHOL) 40 mg tablet Take 1 Tab by mouth nightly. 9/20/17   Geovanni Blackwell MD   ascorbic acid, vitamin C, (VITAMIN C) 250 mg tablet Take 1 Tab by mouth daily. 7/13/17   Clara Rosas MD   ferrous sulfate 325 mg (65 mg iron) tablet Take 1 Tab by mouth daily (with breakfast). 7/13/17   Clara Rosas MD   polyethylene glycol (MIRALAX) 17 gram packet Take 1 Packet by mouth daily. 7/13/17   Clara Rosas MD   senna-docusate (PERICOLACE) 8.6-50 mg per tablet Take 1 Tab by mouth nightly. HOLD for loose stool. 7/13/17   Clara Rosas MD   tamsulosin (FLOMAX) 0.4 mg capsule Take 0.4 mg by mouth daily.  1 tab daily    Provider, Historical MULTIVITAMIN,THERAPEUTIC (THERA MULTI-VITAMIN PO) Take 500 mg by mouth daily. Provider, Historical   baclofen (LIORESAL) 10 mg tablet Take 1 Tab by mouth every twelve (12) hours as needed. Patient taking differently: Take 1 Tab by mouth every twelve (12) hours as needed (for back pain, muscle ralaxant). as needed for pain, 16   Kirk Saldana PA   metoprolol (LOPRESSOR) 25 mg tablet Take 1 Tab by mouth two (2) times a day. 14   Abhay Moon MD   cyanocobalamin 1,000 mcg tablet Take 1 Tab by mouth daily. 14   Abhay Moon MD   folic acid (FOLVITE) 1 mg tablet Take 1 Tab by mouth daily. 14   Abhay Moon MD   aspirin 81 mg chewable tablet Take 1 Tab by mouth daily. 12   Abhay Moon MD       Current Facility-Administered Medications   Medication Dose Route Frequency    [START ON 10/10/2022] piperacillin-tazobactam (ZOSYN) 3.375 g in 0.9% sodium chloride (MBP/ADV) 100 mL MBP  3.375 g IntraVENous Q8H    lactated Ringers infusion  75 mL/hr IntraVENous CONTINUOUS    vancomycin (VANCOCIN) 1250 mg in  ml infusion  1,250 mg IntraVENous NOW    heparin (porcine) injection 5,000 Units  5,000 Units SubCUTAneous Q8H    levETIRAcetam (KEPPRA) 750 mg in 0.9% sodium chloride 100 mL IVPB  750 mg IntraVENous Q12H    [START ON ] folic acid (FOLVITE) 1 mg in 0.9% sodium chloride 50 mL ivpb   IntraVENous DAILY    [START ON 10/10/2022] thiamine (B-1) 100 mg in 0.9% sodium chloride 50 mL IVPB  100 mg IntraVENous DAILY       No Known Allergies     Social History     Tobacco Use    Smoking status: Former     Packs/day: 0.50     Types: Cigarettes     Quit date: 2008     Years since quittin.3    Smokeless tobacco: Never   Substance Use Topics    Alcohol use: No        Family History   Problem Relation Age of Onset    Hypertension Other           Review of Systems:  Review of systems not obtained due to patient factors.     Objective:   Vital Signs:    Visit Vitals  BP (!) 150/106   Pulse (!) 148   Temp (!) 105 °F (40.6 °C)   Resp (!) 34   SpO2 100%       O2 Device: BIPAP       Temp (24hrs), Av °F (40.6 °C), Min:105 °F (40.6 °C), Max:105 °F (40.6 °C)       Intake/Output:   Last shift:      No intake/output data recorded. Last 3 shifts: No intake/output data recorded. No intake or output data in the 24 hours ending 10/09/22 2230        Physical Exam:     General:  Alert, no distress, frail, ill and malnourished appearing, non-verbal    Head:  Normocephalic, without obvious abnormality, atraumatic. Eyes:  Conjunctivae/corneas clear. PERRL,   Nose: Nares normal. Septum midline. Mucosa dry. No drainage or sinus tenderness. Throat: Lips, mucosa, and tongue normal. Teeth and gums of poor dentition, lips dried and cracked/peeling    Neck: Supple, symmetrical, trachea midline, no adenopathy, thyroid: no enlargment/tenderness/nodules, no carotid bruit and no JVD. Back:   Symmetric, no curvature. Lungs:   Coarse to auscultation bilaterally. Chest wall:  No tenderness or deformity. Heart:  Tachyardia, S1, S2 normal, no murmur, click, rub or gallop. Abdomen:   Abd flat, Soft, non-tender. Bowel sounds normal. No masses,  No organomegaly. Extremities: Extremities normal, atraumatic, no cyanosis or edema. Pulses: 2+ and symmetric all extremities.    Skin: BLE with multiple stages of healing from venous stasis ulcers    Lymph nodes:  Cervical, supraclavicular, and axillary nodes normal.   Neurologic: Grossly non-focal; non-verbal, no command following, but alert      Devices:  PIV   Needs Reid cath     Data:     Recent Results (from the past 24 hour(s))   CBC WITH AUTOMATED DIFF    Collection Time: 10/09/22  6:20 PM   Result Value Ref Range    WBC 11.3 4.6 - 13.2 K/uL    RBC 5.47 4.35 - 5.65 M/uL    HGB 13.8 13.0 - 16.0 g/dL    HCT 45.6 36.0 - 48.0 %    MCV 83.4 78.0 - 100.0 FL    MCH 25.2 24.0 - 34.0 PG    MCHC 30.3 (L) 31.0 - 37.0 g/dL    RDW 15.9 (H) 11.6 - 14.5 % PLATELET 591 424 - 267 K/uL    MPV 11.7 9.2 - 11.8 FL    NRBC 0.0 0  WBC    ABSOLUTE NRBC 0.00 0.00 - 0.01 K/uL    NEUTROPHILS 93 (H) 40 - 73 %    LYMPHOCYTES 4 (L) 21 - 52 %    MONOCYTES 3 3 - 10 %    EOSINOPHILS 0 0 - 5 %    BASOPHILS 0 0 - 2 %    IMMATURE GRANULOCYTES 0 %    ABS. NEUTROPHILS 10.5 (H) 1.8 - 8.0 K/UL    ABS. LYMPHOCYTES 0.5 (L) 0.9 - 3.6 K/UL    ABS. MONOCYTES 0.3 0.05 - 1.2 K/UL    ABS. EOSINOPHILS 0.0 0.0 - 0.4 K/UL    ABS. BASOPHILS 0.0 0.0 - 0.1 K/UL    ABS. IMM. GRANS. 0.0 K/UL    DF MANUAL      PLATELET COMMENTS ADEQUATE PLATELETS      RBC COMMENTS NORMOCYTIC, NORMOCHROMIC     METABOLIC PANEL, COMPREHENSIVE    Collection Time: 10/09/22  6:20 PM   Result Value Ref Range    Sodium 160 (H) 136 - 145 mmol/L    Potassium 3.9 3.5 - 5.5 mmol/L    Chloride 128 (H) 100 - 111 mmol/L    CO2 24 21 - 32 mmol/L    Anion gap 8 3.0 - 18 mmol/L    Glucose 134 (H) 74 - 99 mg/dL    BUN 19 (H) 7.0 - 18 MG/DL    Creatinine 0.94 0.6 - 1.3 MG/DL    BUN/Creatinine ratio 20 12 - 20      eGFR >60 >60 ml/min/1.73m2    Calcium 9.1 8.5 - 10.1 MG/DL    Bilirubin, total 0.6 0.2 - 1.0 MG/DL    ALT (SGPT) 17 16 - 61 U/L    AST (SGOT) 22 10 - 38 U/L    Alk.  phosphatase 97 45 - 117 U/L    Protein, total 8.4 (H) 6.4 - 8.2 g/dL    Albumin 2.7 (L) 3.4 - 5.0 g/dL    Globulin 5.7 (H) 2.0 - 4.0 g/dL    A-G Ratio 0.5 (L) 0.8 - 1.7     TROPONIN-HIGH SENSITIVITY    Collection Time: 10/09/22  6:20 PM   Result Value Ref Range    Troponin-High Sensitivity 173 (HH) 0 - 78 ng/L   NT-PRO BNP    Collection Time: 10/09/22  6:20 PM   Result Value Ref Range    NT pro- 0 - 900 PG/ML   MAGNESIUM    Collection Time: 10/09/22  6:20 PM   Result Value Ref Range    Magnesium 2.6 1.6 - 2.6 mg/dL   LIPASE    Collection Time: 10/09/22  6:20 PM   Result Value Ref Range    Lipase 78 73 - 393 U/L   TSH 3RD GENERATION    Collection Time: 10/09/22  6:20 PM   Result Value Ref Range    TSH 0.81 0.36 - 3.74 uIU/mL   HEPATIC FUNCTION PANEL Collection Time: 10/09/22  6:20 PM   Result Value Ref Range    Protein, total 8.5 (H) 6.4 - 8.2 g/dL    Albumin 2.8 (L) 3.4 - 5.0 g/dL    Globulin 5.7 (H) 2.0 - 4.0 g/dL    A-G Ratio 0.5 (L) 0.8 - 1.7      Bilirubin, total 0.6 0.2 - 1.0 MG/DL    Bilirubin, direct 0.3 (H) 0.0 - 0.2 MG/DL    Alk. phosphatase 93 45 - 117 U/L    AST (SGOT) 15 10 - 38 U/L    ALT (SGPT) 18 16 - 61 U/L   EKG, 12 LEAD, INITIAL    Collection Time: 10/09/22  6:21 PM   Result Value Ref Range    Ventricular Rate 147 BPM    Atrial Rate 147 BPM    P-R Interval 104 ms    QRS Duration 84 ms    Q-T Interval 340 ms    QTC Calculation (Bezet) 532 ms    Calculated R Axis 71 degrees    Calculated T Axis 62 degrees    Diagnosis             Recent Labs     10/09/22  2201 10/09/22  1840   FIO2I 60  --    HCO3I 21.1* 22.0   PCO2I 31.8* 25.8*   PHI 7.43 7.54*   PO2I 162* 221*       Telemetry: Sinus Tachycardia    Imaging:  I have personally reviewed the patients radiographs and have reviewed the reports:    XR Results (most recent):  Results from Hospital Encounter encounter on 10/09/22    XR CHEST PORT    Narrative  ONE VIEW CHEST RADIOGRAPH    INDICATION: SOB. Respiratory distress, concern for aspiration. COMPARISON: Chest radiograph 3/30/2021. TECHNIQUE: AP view of the chest    FINDINGS:    LINES/TUBES: None. MEDIASTINUM: Cardiac silhouette is within normal limits. LUNGS: Mild streaky retrocardiac opacity. Otherwise, no consolidation, pleural  effusion, or pneumothorax. BONES/OTHER: No acute osseous abnormality. Impression  Mild streaky retrocardiac opacity, possibly atelectasis or infiltrate. CT Results (most recent):  Results from Hospital Encounter encounter on 03/30/21    CT HEAD WO CONT    Narrative  CT OF THE HEAD WITHOUT CONTRAST. CLINICAL HISTORY: Altered mental status. Not eating for several days.     TECHNIQUE: Helical scan obtained of the head were obtained from the skull vertex  through the base of the skull without intravenous contrast.    All CT scans are  performed using dose optimization techniques as appropriate to the performed  exam including the following: Automated exposure control, adjustment of mA  and/or kV according to patient size, and use of iterative reconstructive  technique. COMPARISON: 3/19/2018. FINDINGS:    Chronic atrophy with stable compensatory ventriculomegaly of the lateral and  third ventricles. Periventricular and deep white matter hypodensities in the  bilateral cerebrum without significant change. Multiple basal ganglia and  thalamic lacunar infarcts bilaterally appear chronic and stable. No intracranial  hemorrhage. No mass effect. The visualized paranasal sinuses are clear. The  mastoid air cells are clear. The visualized bony structures are unremarkable. Impression  No acute findings or change to prior study. Chronic microvascular disease with multiple chronic lacunar infarcts. Atrophy with stable chronic compensatory ventriculomegaly. Total of   60  min critical care time spent at bedside during the course of care providing evaluation,management and care decisions and ordering appropriate treatment related to critical care problems exclusive of procedures. The reason for providing this level of medical care for this critically ill patient was due a critical illness that impaired one or more vital organ systems such that there was a high probability of imminent or life threatening deterioration in the patients condition. This care involved high complexity decision making to assess, manipulate, and support vital system functions, to treat this degree vital organ system failure and to prevent further life threatening deterioration of the patients condition.         Theodore Smith NP  10/09/22  Pulmonary, Critical Care Medicine  [address] [place] [address] [facility]

## 2022-10-10 NOTE — PROGRESS NOTES
Pharmacy Pharmacokinetic Monitoring Service - Vancomycin     Fatimah Bond is a 79 y.o. male starting on vancomycin therapy for Aspiration Pneumonia. Pharmacy consulted by Dr. Hayden Jeffrey MD for monitoring and adjustment. Target Concentration: Goal AUC/ALFREDITO 400-600 mg*hr/L    Additional Antimicrobials: Piperacillin/Tazobactam    Pertinent Laboratory Values:   Temp: 100 °F (37.8 °C)  Weight: 68.1 kg (150 lb 2.1 oz)  Recent Labs     10/10/22  0205 10/10/22  0052 10/09/22  1820   CREA 1.10 1.03 0.94   BUN 27* 26* 19*   WBC 10.6  --  11.3   PCT  --  21.82  --      No results for input(s): VANCP, VANCT, VANCR, VANRA in the last 72 hours. Estimated Creatinine Clearance: 62.8 mL/min (based on SCr of 1.1 mg/dL). Pertinent Cultures:  Date/Time Order Name Specimen Source Lab Status   10/10/22 0042 RESPIRATORY VIRUS PANEL W/COVID-19, PCR Nasopharyngeal Detected   10/9/22 2217 CULTURE, RESPIRATORY/SPUTUM/BRONCH W GRAM STAIN Sputum --   10/9/22 1850 CULTURE, URINE Cath Urine In process   10/9/22 1837 CULTURE, BLOOD Blood In process   10/9/22 1820 CULTURE, BLOOD Blood In process   MRSA Nasal Swab: Showed MRSA positive result on 8/23/2017    Plan:  Dosing recommendations based on Bayesian software  Start vancomycin 1250 mg IV x1, then 750 mg IV q12h  Anticipated AUC of 540 mg/L.hr and trough concentration of 18.1 mg/L at steady state  Renal labs as indicated   Vancomycin concentration to be ordered per Pharmacy Practice.   Pharmacy will continue to monitor patient and adjust therapy as indicated    Thank you for the consult,  Ghazala Corbett, [facility]  10/10/2022

## 2022-10-10 NOTE — PROGRESS NOTES
conducted an initial consultation and Spiritual Assessment for Lashawn Gupta, who is a 79 y.o.,male. Patients Primary Language is: [place]. According to the patients EMR Confucianism Affiliation is: Ismael Gonzalez.     The reason the Patient came to the hospital is:   Patient Active Problem List    Diagnosis Date Noted    Respiratory failure ([facility] 10/09/2022    Dementia, alcoholic ([facility] 90/64/4516    Aspiration into airway 10/09/2022    Hematuria 03/31/2021    Dysuria 03/31/2021    Goals of care, counseling/discussion     Debility     Hypernatremia 02/62/7717    Acute metabolic encephalopathy 11/66/1556    Dehydration 03/30/2021    COVID-19 03/30/2021    Stroke ([facility] 09/16/2017    Pneumonia 08/22/2017    Right foot infection 07/06/2017    Bilateral leg and foot pain 07/06/2017    Cellulitis 12/20/2016    Hyponatremia 01/01/2015    Psychogenic polydipsia 05/22/2014    Severe protein-calorie malnutrition ([facility] 09/17/2013    First degree AV block 09/01/2013    Former heavy tobacco smoker 09/01/2013    Intractable hiccups 09/02/2012    Essential hypertension, benign 09/02/2012        The  provided the following Interventions:  Initiated a relationship of care and support. Offered prayer and assurance of continued prayers on patient's behalf. Chart reviewed. Assessment:  Patient does not have any Taoism/cultural needs that will affect patients preferences in health care. Plan:  Chaplains will continue to follow and will provide pastoral care on an as needed/requested basis.  recommends bedside caregivers page  on duty if patient shows signs of acute spiritual or emotional distress.     [address]  [phone])

## 2022-10-10 NOTE — PROGRESS NOTES
0020 report received from [facility]. Pt will transport to the ICU unit soon.   12 arrived to the ICU        Admitted on 10/10/22 sepsis r/t aspiration on arrival large amount of food products suctioned, febrile 105 rectally,  tachypnea, tachycardia, acute hypoxic respiratory failure, hypovolemia r/t dehydration and dementia, seizure disorder, severe protein malnutrition, non-ambulatory r/t dementia 2014  Severe hypernatremia sodium of 160    History: Schizophrenia ETOH abuse with alcohol induced dementia, covid 19 (03/20/22) diabetes, CVA w/ left sided hemiparesis, aphasia,  PVD left lateral leg with slow healing

## 2022-10-10 NOTE — PROGRESS NOTES
[facility]:    ~09:00 - Case discussed with patient's son, Mr. Ricky Vargas, updated him of patient's admission to the ICU. Son confirmed that patient is DNR/DNI. POST on file. Code status updated.     Yajaira Castillo PA-C  10/10/22  Pulmonary, Critical Care Medicine  [address] [place] [address] [facility]ists

## 2022-10-10 NOTE — CONSULTS
[facility]: [phone])  [facility]: [phone]     Patient Name: Jacinto Hernandez  YOB: 1955    Date of consult : 10/10/22  Reason for Consult: establish goals of care  Requesting Provider: Kelvin Vega MD    Primary Care Physician: Lucila Boykin MD      SUMMARY:   Jacinto Hernandez is a 79 y.o. male with a past history of Schizophrenia, CVA, Seizure disorder, DM2, debility who was admitted on 10/9/2022 from [address] [place] [address],[address] with a diagnosis of COVID-19 infection and acute respiratory failure. Current medical issues leading to Palliative Medicine involvement include: Pt with a long term life limiting chronic disease process that warrants discussions about his goals of care. .    CHIEF COMPLAINT: respiratory distress     HPI/SUBJECTIVE:    Pt is a 79year old AAM that is quite debilitated and frail from prior chronic health conditions. Pt came in through the ED with elevated temperatures and dyspnea. Pt cound to have COVID-19 pneumonia with dehydration and severe protein calorie nutrition. The patient is:   [] Verbal and participatory  [x] Non-participatory due to:     GOALS OF CARE:  Patient/Health Care Proxy Stated Goals: Prolong life      TREATMENT PREFERENCES:   Code Status: DNR         PALLIATIVE DIAGNOSES:   Goals of care/ACP  COVID-19 Pneumonia  Acute respiratory failure  Debility       PLAN:   Goals of care/ACP  This NP along with Estephania Jones MSW evaluated the patient through glass due to strict quarantine protocol. Pt now on high flow 02 at 25L 50% fi02. Have reached out to the named MPOA Mr Lexie Dailey signed by the patient in 2014. He did not know that he was the MPOA and has decided to not accept that responsibility. He is turning this over to the patient's NOK his son Mindy Dumont. Our team has reached out to Mr Frankie Tamayo to update and talk about next steps.  His POST was reconfirmed by the ICU team this morning. We have left a message for him to return our call. Goals of care: DNR/DNI ok with long term feeding tube per the POST  2.   COVID-19   Postive COVID-19 SARS CO-V-2 PCR test done on admission. CT scan shows dense consolidation within the right lower lobe with soft tissue density within the bronchus intermedius proximal left lower lobe. Pt on HF 02 at this time  3. Acute respiratory failure  Pt with 100% 02 saturation on 25 L 50% Fi02. Likely related to #2  4. Debility  Pt has a baseline PPS or around 40-50% with bed to chair existence. He has severe protein calorie malnutrition with Albumin of 2.7 and dehydration with elevated Na+ levels 163 and elevated BUN 27  5. Initial consult note routed to primary continuity provider  6. Communicated plan of care with: Palliative IDT      Advance Care Planning:  [] [facility] Interdisciplinary Team has updated the ACP Navigator with [address] and Patient Capacity    Primary Decision [facility] ([address]):   Primary Decision Maker: Ny Pardo - [phone]    Medical Interventions: Limited additional interventions   Other Instructions:         As far as possible, the palliative care team has discussed with patient / health care proxy about goals of care / treatment preferences for patient.          HISTORY:     History obtained from: Chart review   Active Problems:    Respiratory failure ([address] (10/9/2022)      Dementia, alcoholic ([address] (42/8/8036)      Aspiration into airway (10/9/2022)    Past Medical History:   Diagnosis Date    Alcohol dependence with alcohol-induced persisting dementia ([address]     per [facility] 4/23/21    Anemia     Aphasia     Benign prostatic hyperplasia     Cerebral vascular disease     COVID-19 04/06/2021    Disturbances of salivary secretion     Dysphagia     GERD (gastroesophageal reflux disease)     Hemiparesis (HCC)     left side    Hemiplegia (HCC)     left side     Hiccups     Hyperlipidemia Hypernatremia 2021    Hypertension     Hyponatremia     Insomnia     Lacunar infarction ([facility] 2010    weakness both arms, unable to walk    Lower extremity edema     Moderate protein-calorie malnutrition (HCC)     MRSA (methicillin resistant Staphylococcus aureus)     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)     Pneumonia     Psychogenic polydipsia     PVD (peripheral vascular disease) ([facility]     Seizures ([facility]     Spinal stenosis     Stroke ([facility] 2017    Unspecified convulsions ([facility]     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      Past Surgical History:   Procedure Laterality Date    COLONOSCOPY N/A 2021    COLONOSCOPY performed by Casey Yousif MD at [facility]COPY    HX HEENT      pt reports past hx of surgery on ears unsure of what type    HX OTHER SURGICAL Bilateral     debridement of lower extremities      Family History   Problem Relation Age of Onset    Hypertension Other      History reviewed, no pertinent family history.   Social History     Tobacco Use    Smoking status: Former     Packs/day: 0.50     Types: Cigarettes     Quit date: 2008     Years since quittin.3    Smokeless tobacco: Never   Substance Use Topics    Alcohol use: No     No Known Allergies   Current Facility-Administered Medications   Medication Dose Route Frequency    albuterol-ipratropium (DUO-NEB) 2.5 MG-0.5 MG/3 ML  3 mL Nebulization Q4H PRN    insulin lispro (HUMALOG) injection   SubCUTAneous Q6H    glucose chewable tablet 16 g  4 Tablet Oral PRN    glucagon (GLUCAGEN) injection 1 mg  1 mg IntraMUSCular PRN    dextrose 10% infusion 0-250 mL  0-250 mL IntraVENous PRN    hydrALAZINE (APRESOLINE) 20 mg/mL injection 10 mg  10 mg IntraVENous Q6H PRN    vancomycin (VANCOCIN) 750 mg in dextrose 5% IVPB  750 mg IntraVENous Q12H    piperacillin-tazobactam (ZOSYN) 4.5 g in dextrose 5% 100 mL IVPB  4.5 g IntraVENous Q8H    dextrose 5% infusion  100 mL/hr IntraVENous CONTINUOUS    acetaminophen (TYLENOL) tablet 650 mg  650 mg Oral Q6H PRN    Or    acetaminophen (TYLENOL) suppository 650 mg  650 mg Rectal Q6H PRN    polyethylene glycol (MIRALAX) packet 17 g  17 g Oral DAILY PRN    ondansetron (ZOFRAN ODT) tablet 4 mg  4 mg Oral Q8H PRN    Or    ondansetron (ZOFRAN) injection 4 mg  4 mg IntraVENous Q6H PRN    heparin (porcine) injection 5,000 Units  5,000 Units SubCUTAneous Q8H    levETIRAcetam (KEPPRA) 750 mg in 0.9% sodium chloride 100 mL IVPB  750 mg IntraVENous N82L    folic acid (FOLVITE) 1 mg in 0.9% sodium chloride 50 mL ivpb   IntraVENous DAILY    thiamine (B-1) 100 mg in 0.9% sodium chloride 50 mL IVPB  100 mg IntraVENous DAILY    famotidine (PF) (PEPCID) 20 mg in 0.9% sodium chloride 10 mL injection  20 mg IntraVENous Q12H          Clinical Pain Assessment (nonverbal scale for nonverbal patients): Activity (Movement): Lying quietly, normal position    Duration: for how long has pt been experiencing pain (e.g., 2 days, 1 month, years)  Frequency: how often pain is an issue (e.g., several times per day, once every few days, constant)     FUNCTIONAL ASSESSMENT:     Palliative Performance Scale (PPS):  PPS: 40    ECOG  ECOG Status : Completely disabled     PSYCHOSOCIAL/SPIRITUAL SCREENING:      Any spiritual / Confucianist concerns:  [] Yes /  [x] No    Caregiver Burnout:  [] Yes /  [] No /  [x] No Caregiver Present      Anticipatory grief assessment:   [x] Normal  / [] Maladaptive        REVIEW OF SYSTEMS:     Systems: constitutional, ears/nose/mouth/throat, respiratory, gastrointestinal, genitourinary, musculoskeletal, integumentary, neurologic, psychiatric, endocrine. Positive findings noted below. Modified ESAS Completed by: provider                       Dyspnea: 3               Positive and pertinent negative findings in ROS are noted above in HPI. The following systems were [x] reviewed / [] unable to be reviewed as noted in HPI  Other findings are noted below.      PHYSICAL EXAM:     Constitutional: in isolation for COVID-19 infection. Minimally interactive   Eyes: pupils equal, anicteric  ENMT: no nasal discharge, moist mucous membranes  Cardiovascular: regular rhythm, distal pulses intact  Respiratory: breathing not labored, symmetric  Gastrointestinal: soft non-tender, +bowel sounds  Musculoskeletal: no deformity, no tenderness to palpation  Skin: warm, dry  Neurologic: minimally interactive, following commands, moving all extremities  Psychiatric: full affect, no hallucinations    Other: Wt Readings from Last 3 Encounters:   10/10/22 68.1 kg (150 lb 2.1 oz)   04/05/22 64.9 kg (143 lb 1.3 oz)   04/29/21 64.9 kg (143 lb)     Blood pressure 127/86, pulse 79, temperature 97.8 °F (36.6 °C), resp. rate 15, height 5' 10\" (1.778 m), weight 68.1 kg (150 lb 2.1 oz), SpO2 100 %. Pain:  Pain Scale 1: Adult Nonverbal Pain Scale                         LAB AND IMAGING FINDINGS:     Lab Results   Component Value Date/Time    WBC 10.6 10/10/2022 02:05 AM    HGB 11.6 (L) 10/10/2022 02:05 AM    PLATELET 922 00/45/7519 02:05 AM     Lab Results   Component Value Date/Time    Sodium 163 (HH) 10/10/2022 07:01 AM    Potassium 4.0 10/10/2022 02:05 AM    Chloride 131 (H) 10/10/2022 02:05 AM    CO2 24 10/10/2022 02:05 AM    BUN 27 (H) 10/10/2022 02:05 AM    Creatinine 1.10 10/10/2022 02:05 AM    Calcium 8.8 10/10/2022 02:05 AM    Magnesium 2.6 10/10/2022 02:05 AM    Phosphorus 3.8 10/10/2022 02:05 AM      Lab Results   Component Value Date/Time    Alk. phosphatase 97 10/09/2022 06:20 PM    Alk.  phosphatase 93 10/09/2022 06:20 PM    Protein, total 8.4 (H) 10/09/2022 06:20 PM    Protein, total 8.5 (H) 10/09/2022 06:20 PM    Albumin 2.7 (L) 10/09/2022 06:20 PM    Albumin 2.8 (L) 10/09/2022 06:20 PM    Globulin 5.7 (H) 10/09/2022 06:20 PM    Globulin 5.7 (H) 10/09/2022 06:20 PM     Lab Results   Component Value Date/Time    INR 1.5 (H) 10/10/2022 02:05 AM    Prothrombin time 18.6 (H) 10/10/2022 02:05 AM    aPTT 31.3 03/30/2021 02:15 PM      Lab Results   Component Value Date/Time    Iron 45 (L) 07/09/2017 02:24 AM    TIBC 233 (L) 07/09/2017 02:24 AM    Iron % saturation 19 07/09/2017 02:24 AM    Ferritin 39 07/09/2017 02:24 AM      No results found for: PH, PCO2, PO2  No components found for: [place]   Lab Results   Component Value Date/Time    CK 88 10/10/2022 12:52 AM    CK - MB <1.0 03/30/2021 02:15 PM              Total time: 30 minutes   Counseling / coordination time, spent as noted above:   > 50% counseling / coordination:  Time spent in direct consultation with the patient, medical team, and family     Prolonged service was provided for  []30 min   []75 min in face to face time in the presence of the patient, spent as noted above. Time Start:   Time End:     Disclaimer: Sections of this note are dictated using utilizing voice recognition software, which may have resulted in some phonetic based errors in grammar and contents. Even though attempts were made to correct all the mistakes, some may have been missed, and remained in the body of the document. If questions arise, please contact our department.

## 2022-10-10 NOTE — PROGRESS NOTES
Assumed care of Patient on [address] in ER    Patient transported to ICU from ER via stretcher on NRB. Placed back on [address] upon arrival to ICU room.

## 2022-10-10 NOTE — PROGRESS NOTES
Comprehensive Nutrition Assessment    Type and Reason for Visit: Initial, NPO/clear liquid    Nutrition Recommendations/Plan:   Noted SLP rec'd NPO. Per Palliative Care, ok w/ long term feeding tube. Rec obtain enteral access as able. When/if medically feasible, rec Jevity 1.5, starting @ 20 ml/hr and advance as tolerated by 10 ml q12h to goal rate of 60 ml/hr +  ml q4h for hydration or per MD (provides 2160 kcal, 92g protein, 2294 ml free water, 100% RDIs)   Continue thiamine supplementation, add multivitamin daily once access is obtained. Malnutrition Assessment:  Malnutrition Status: At risk for malnutrition (specify) (dysphagia at baseline, BMI, COVID) (10/10/22 1114)    Context:  Acute illness       Nutrition History and Allergies: PMHx: CVA with left-sided hemiparesis and aphasia, HTN, HLD, dysphagia, seizure disorder, schizophrenia, alcohol-induced dementia, severe protein calorie malnutrition and debility. Wt hx: 143-154 lb (04/2021), 143 lb (4/5/22). Per NH papers, pt weighed 154 lb, on puree/nectar thick liquids. NKFA. Nutrition Assessment:    Admitted with sepsis and acute hypoxic resp failure in the setting of aspiration pneumonia/acute cystitis without hematuria. Pt COVID+. Discussed care during interdisciplinary rounds. Plan to continue fluid replacement, SLP to eval and palliative care consult. Addendum: Per SLP: rec'd NPO. Per Palliative Care: DNR/DNI, ok w/ long term feeding tube.      Nutrition Related Findings:    Pertinent Meds: folic acid, humalog, keppra, thiamine, zoysn, vanco, D5 @ 100 ml/hr (provides 120g dex, 408 kcal)   Pertinent Labs: Na 163 H, K 4 wnl, Phos 3.8 wnl Wound Type: None    Current Nutrition Intake & Therapies:  Average Meal Intake: NPO  Average Supplement Intake: None ordered  DIET NPO    Anthropometric Measures:  Height: 5' 10\" (177.8 cm)  Ideal Body Weight (IBW): 166 lbs (75 kg)  Admission Body Weight: 150 lb 2.1 oz (68.1 kg)  Current Body Wt:  68.1 kg (150 lb 2.1 oz), 90.4 % IBW. Current BMI (kg/m2): 21.5    Estimated Daily Nutrient Needs:  Energy Requirements Based On: Formula  Weight Used for Energy Requirements: Admission  Energy (kcal/day): 9565-5262 (MSJ 1.2-1.4)  Weight Used for Protein Requirements: Current  Protein (g/day):  (1.2-1.5 g/day)  Method Used for Fluid Requirements: 1 ml/kcal  Fluid (ml/day): 9518-5022    Nutrition Diagnosis:   Swallowing difficulty related to  (dsyphagia) as evidenced by  (PTA previously on puree, mildly thick liquids, SLP rec'd NPO)  Increased nutrient needs related to catabolic illness as evidenced by  (COVID+)    Nutrition Interventions:   Food and/or Nutrient Delivery: Continue NPO     Coordination of Nutrition Care: Interdisciplinary rounds  Plan of Care discussed with: interdisciplinary team    Goals:     Goals: Meet at least 75% of estimated needs, by next RD assessment       Nutrition Monitoring and Evaluation:         Physical Signs/Symptoms Outcomes: Biochemical data, GI status, Chewing or swallowing, Hemodynamic status, Weight    Discharge Planning:     Too soon to determine    Laretta Bumpers, [address], [address]   Contact: [phone]

## 2022-10-10 NOTE — PROGRESS NOTES
Consult Note    Patient: Patti Salvador               Sex: male          DOA: 10/9/2022         YOB: 1955      Age:  79 y.o.        LOS:  LOS: 1 day              HPI:     Patti Salvador is a 79 y.o. male who has been seen on consultation in ICU for acute hypernatremia-sodium. Patient is known to me from previous consultation last year. Patient is a nursing home resident and cannot provide any history and does not communicate. By reviewing the chart patient was found to be in acute respiratory distress in the nursing home and oxygen saturation was 80% and after suctioning and  nonrebreather oxygen saturation improved to 90% and was transported to the emergency room. In the emergency room he was found to have temperature of 105 tachycardic hypotensive and clinically looked dry. Further evaluation did reveal patient has aspiration pneumonia and that was treated with antibiotics and panculture. Subsequently the patient was transferred to ICU. Initially at that time his CODE STATUS was full code subsequently by reviewing the chart and talking with the son his CODE STATUS is being changed to DNR and DNI. During evaluation in the ER patient was found to have sodium of 160 and until the patient was transferred to ICU he did not receive any IV fluid. After arriving to the ICU patient started on Normosol at 75 cc/h and still continues to Normosol. Further evaluation did not reveal patient has COVID-positive. And is asymptomatic from COVID-positive. It is worth mentioning that in 2021 when I saw him was also COVID-positive and is not clear whether he got any vaccine or not. That time also he had similar situation with a serum sodium around 168.   Patient is a 79 y.o. male with a significant PMHx of CVA with left-sided hemiparesis and aphasia, HTN, HLD, dysphagia, seizure disorder, schizophrenia, alcohol-induced dementia, severe protein calorie malnutrition and debility who presents from [address] [place] [address] in respiratory distress due to aspiration of food products. Apparently while at the nursing home, the patient was found to be in respiratory distress by his primary nurse. He had a room air saturation of 80%. He was placed on a nonrebreather mask with his oxygen saturation in 90s. The EMS team reported they did suction his airway and had copious material some that resemble food products. The patient was then placed on CPAP. On arrival to the ER, he was noted to be febrile with Tmax of 105 rectally, tachycardic with -150's, and tachypnea. Hypertensive with no vasopressor requirement. Chest x-ray performed demonstrating atelectasis and infiltrates. CT Chest of with findings of RLL/LLL dense consolidations and atelectasis. Further lab work demonstrated a severe hypernatremia with a sodium level of 160 and an PARAMJIT likely in the setting of hypovolemia/dehydration. Sepsis work-up initiated, given IVF resuscitation, started on broad spectrum abx, and cultures obtained. The patient is critically ill and can not provide additional history due to Unable to comprehend.         Past Medical History:   Diagnosis Date    Alcohol dependence with alcohol-induced persisting dementia ([facility]     per [facility] 4/23/21    Anemia     Aphasia     Benign prostatic hyperplasia     Cerebral vascular disease     COVID-19 04/06/2021    Disturbances of salivary secretion     Dysphagia     GERD (gastroesophageal reflux disease)     Hemiparesis (HCC)     left side    Hemiplegia (HCC)     left side     Hiccups     Hyperlipidemia     Hypernatremia 03/2021    Hypertension     Hyponatremia     Insomnia     Lacunar infarction ([facility] 2010    weakness both arms, unable to walk    Lower extremity edema     Moderate protein-calorie malnutrition (HCC)     MRSA (methicillin resistant Staphylococcus aureus)     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)     Pneumonia     Psychogenic polydipsia     PVD (peripheral vascular disease) ([facility] [address]     Seizures ([facility] [facility]     Spinal stenosis     Stroke ([facility] [address] 2017    Unspecified convulsions ([facility] [address]     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       Past Surgical History:   Procedure Laterality Date    COLONOSCOPY N/A 2021    COLONOSCOPY performed by João Sheppard MD at [facility]    HX HEENT      pt reports past hx of surgery on ears unsure of what type    HX OTHER SURGICAL Bilateral     debridement of lower extremities       Family History   Problem Relation Age of Onset    Hypertension Other        Social History     Socioeconomic History    Marital status:    Tobacco Use    Smoking status: Former     Packs/day: 0.50     Types: Cigarettes     Quit date: 2008     Years since quittin.3    Smokeless tobacco: Never   Substance and Sexual Activity    Alcohol use: No    Drug use: No    Sexual activity: Not Currently       Prior to Admission medications    Medication Sig Start Date End Date Taking? Authorizing Provider   moxifloxacin (VIGAMOX) 0.5 % ophthalmic solution  3/30/22   Provider, Historical   levETIRAcetam (KEPPRA) 100 mg/mL solution Take 7.5 mL by mouth two (2) times a day. 21   Provider, Historical   doxepin (SINEquan) 10 mg capsule Take 1 Cap by mouth nightly. Patient not taking: Reported on 3/16/2022 3/7/21   Provider, Historical   clopidogrel (PLAVIX) 75 mg tab Take 1 Tab by mouth daily. 17   Vera Owusu MD   pravastatin (PRAVACHOL) 40 mg tablet Take 1 Tab by mouth nightly. 17   Vera Owusu MD   ascorbic acid, vitamin C, (VITAMIN C) 250 mg tablet Take 1 Tab by mouth daily. 17   Lobito Sanchez MD   ferrous sulfate 325 mg (65 mg iron) tablet Take 1 Tab by mouth daily (with breakfast). 17   Lobito Sanchez MD   polyethylene glycol (MIRALAX) 17 gram packet Take 1 Packet by mouth daily. 17   Lobito Sanchez MD   senna-docusate (PERICOLACE) 8.6-50 mg per tablet Take 1 Tab by mouth nightly.  HOLD for loose stool. 7/13/17   Dennis Rehman MD   tamsulosin (FLOMAX) 0.4 mg capsule Take 0.4 mg by mouth daily. 1 tab daily    Provider, Historical   MULTIVITAMIN,THERAPEUTIC (THERA MULTI-VITAMIN PO) Take 500 mg by mouth daily. Provider, Historical   baclofen (LIORESAL) 10 mg tablet Take 1 Tab by mouth every twelve (12) hours as needed. Patient taking differently: Take 1 Tab by mouth every twelve (12) hours as needed (for back pain, muscle ralaxant). as needed for pain, 1/25/16   Kirk Saldana PA   metoprolol (LOPRESSOR) 25 mg tablet Take 1 Tab by mouth two (2) times a day. 8/18/14   Dennis Rehman MD   cyanocobalamin 1,000 mcg tablet Take 1 Tab by mouth daily. 8/18/14   Dennis Rehman MD   folic acid (FOLVITE) 1 mg tablet Take 1 Tab by mouth daily. 8/18/14   Dennis Rehman MD   aspirin 81 mg chewable tablet Take 1 Tab by mouth daily. 9/18/12   Dennis Rehman MD       No Known Allergies    Review of Systems  A comprehensive review of systems was negative except for that written in the History of Present Illness. Physical Exam:      Visit Vitals  BP (!) 130/95   Pulse 79   Temp 97.8 °F (36.6 °C)   Resp 14   Ht 5' 10\" (1.778 m)   Wt 68.1 kg (150 lb 2.1 oz)   SpO2 100%   BMI 21.54 kg/m²       Physical Exam:  Physical Exam:   General:  And noncommunicative with signs of dehydration severely emaciated mucosa of the oral cavity is quite dry   Eyes:  Conjunctivae/corneas clear. PERRL, EOMs intact. Fundi benign   Ears:  Normal TMs and external ear canals both ears. Nose: Nares normal. Septum midline. Mucosa normal. No drainage or sinus tenderness. Mouth/Throat: Lips, mucosa, and tongue normal. Teeth and gums normal.   Neck: Supple, symmetrical, trachea midline, no adenopathy, thyroid: no enlargement/tenderness/nodules, no carotid bruit and no JVD. Back:   Symmetric, no curvature. ROM normal. No CVA tenderness.    Lungs:   Decreased breath sounds   Heart:  Regular rate and rhythm, S1, S2 normal, no murmur, click, rub or gallop. Abdomen:   Soft, non-tender. Bowel sounds normal. No masses,  No organomegaly. Extremities: Extremities normal, atraumatic, no cyanosis or edema.   Has weakness on the right side of the body due to previous stroke                       Labs Reviewed:  BMP:   Lab Results   Component Value Date/Time     (HH) 10/10/2022 07:01 AM    K 4.0 10/10/2022 02:05 AM     (H) 10/10/2022 02:05 AM    CO2 24 10/10/2022 02:05 AM    AGAP 8 10/10/2022 02:05 AM     (H) 10/10/2022 02:05 AM    BUN 27 (H) 10/10/2022 02:05 AM    CREA 1.10 10/10/2022 02:05 AM     CMP:   Lab Results   Component Value Date/Time     (HH) 10/10/2022 07:01 AM    K 4.0 10/10/2022 02:05 AM     (H) 10/10/2022 02:05 AM    CO2 24 10/10/2022 02:05 AM    AGAP 8 10/10/2022 02:05 AM     (H) 10/10/2022 02:05 AM    BUN 27 (H) 10/10/2022 02:05 AM    CREA 1.10 10/10/2022 02:05 AM    CA 8.8 10/10/2022 02:05 AM    MG 2.6 10/10/2022 02:05 AM    PHOS 3.8 10/10/2022 02:05 AM    ALB 2.7 (L) 10/09/2022 06:20 PM    ALB 2.8 (L) 10/09/2022 06:20 PM    TP 8.4 (H) 10/09/2022 06:20 PM    TP 8.5 (H) 10/09/2022 06:20 PM    GLOB 5.7 (H) 10/09/2022 06:20 PM    GLOB 5.7 (H) 10/09/2022 06:20 PM    AGRAT 0.5 (L) 10/09/2022 06:20 PM    AGRAT 0.5 (L) 10/09/2022 06:20 PM    ALT 17 10/09/2022 06:20 PM    ALT 18 10/09/2022 06:20 PM     CBC:   Lab Results   Component Value Date/Time    WBC 10.6 10/10/2022 02:05 AM    HGB 11.6 (L) 10/10/2022 02:05 AM    HCT 39.0 10/10/2022 02:05 AM     10/10/2022 02:05 AM   Contains critical data RESPIRATORY VIRUS PANEL W/COVID-19, PCR  Order: [id number]  Collected 10/10/2022 00:42    Status: Final result    Specimen Information: Nasopharyngeal   0 Result Notes  Component Ref Range & Units 10/10/22 0042    Adenovirus NOTD   Not detected    Coronavirus 229E NOTD   Not detected    Coronavirus HKU1 NOTD   Not detected    Coronavirus CVNL63 NOTD   Not detected    Coronavirus OC43 NOTD   Not detected SARS-CoV-2, PCR NOTD   Detected Panic     Comment: Critical value verified. Called to and read back by:   [facility], AT [address] [place] [address] Road, [place] [id number], BY BETSY    Metapneumovirus NOTD   Not detected    Rhinovirus and Enterovirus NOTD   Detected Abnormal     Influenza A NOTD   Not detected    Influenza A, subtype H1 NOTD   Not detected    Influenza A, subtype H3 NOTD   Not detected    INFLUENZA A H1N1 PCR NOTD   Not detected    Influenza B NOTD   Not detected    Parainfluenza 1 NOTD   Not detected    Parainfluenza 2 NOTD   Not detected    Parainfluenza 3 NOTD   Not detected    Parainfluenza virus 4 NOTD   Not detected    RSV by PCR NOTD   Not detected    B. parapertussis, PCR NOTD   Not detected         Narrative & Impression   EXAM:  XR CHEST PORT     INDICATION:   aspiration pna/shortness of breath     COMPARISON: 10/9/2022 and priors. FINDINGS:  Stable cardiomediastinal silhouette. Increased right infrahilar hazy opacities. Streaky left retrocardiac opacities. No pneumothorax or pleural effusion. Intact  osseous structures. IMPRESSION     Persistent basilar opacities right greater than left as seen on the recent CT   Study Result    Narrative & Impression   EXAM: CT CHEST WITHOUT CONTRAST. CLINICAL HISTORY/INDICATION:  Apiration     COMPARISON: Chest x-ray 10/9/2022, CT chest 9/3/2013. TECHNIQUE: Standard helical images were obtained from the thoracic inlet through  the adrenals at 5 mm thick sections without intravenous contrast.    Coronal and sagittal reformations obtained. Images were reviewed on both soft  tissue, lung, and bone window settings. All CT scans at this facility are performed using dose optimization technique as  appropriate to a performed exam, to include automated exposure control,  adjustment of the mA and/or kV according to patient's size (including  appropriate matching for site-specific examinations), or use of iterative  reconstruction technique.      FINDINGS:     Minimal left basal posterior dependently consolidated lung. Vague tree-in-bud faint nodularity within the posterior segment of the right  upper lobe. Vague tree-in-bud clustered perivascular nodules in the posterior superior right  middle lobe. Multifocal vague patchy nodules in the superior left upper lobe with dense  consolidation. Likely secretions or soft tissue density within the proximal bronchus  intermedius. Mild elevation of the right hemidiaphragm. .     There is no evidence of mediastinal, hilar, nor axillary adenopathy. Evaluation of the mediastinum and jimmy is limited by the lack of IV contrast.   The great vessels and thoracic aorta are unremarkable. There are no pleural effusions. The included portion of the of the liver is unremarkable. Probable small  gallstones. The adrenal glands are normal.     The chest wall soft tissues are unremarkable. The bony structures are unremarkable. IMPRESSION     Dense consolidation within the right lower lobe with soft tissue density within  the bronchus intermedius proximal left lower lobe main bronchus. Likely  secretions. Associated with mild elevation of the hemidiaphragm with at least  some component of atelectatic volume loss. Smaller region of similar consolidation within the left lower lobe. Aspiration pneumonia not excluded. Tree-in-bud day fuzzy nodules in the perivascular spaces in the right upper  lobe, right middle lobe and superior right lower lobe of acute  infection/pneumonia.      Result Notes  Component Ref Range & Units 10/9/22 1850 3/30/21 1600 3/19/18 2156 9/16/17 1150 5/11/17 1350 6/10/15 1340 1/13/15 1635   Color   DARK YELLOW  YELLOW  YELLOW  YELLOW  YELLOW  STRAW  STRAW    Appearance   CLOUDY  CLOUDY  CLOUDY  CLEAR  CLEAR  CLEAR  CLEAR    Specific gravity 1.005 - 1.030   1.019  1.021  1.012  1.013  1.010  <1.005 R  <1.005 R    pH (UA) 5.0 - 8.0   5.5  5.0  7.5  >8.5 High  R  8.0  6.5  7.0    Protein NEG mg/dL 100 Abnormal Negative  NEGATIVE  NEGATIVE  NEGATIVE  NEGATIVE  NEGATIVE    Glucose NEG mg/dL Negative  Negative  NEGATIVE  NEGATIVE  NEGATIVE  NEGATIVE  NEGATIVE    Ketone NEG mg/dL 15 Abnormal   Negative  NEGATIVE  NEGATIVE  NEGATIVE  NEGATIVE  NEGATIVE    Bilirubin NEG   Negative  Negative  NEGATIVE  NEGATIVE  NEGATIVE  NEGATIVE  NEGATIVE    Blood NEG   TRACE Abnormal   MODERATE Abnormal   NEGATIVE  NEGATIVE  NEGATIVE  NEGATIVE  NEGATIVE    Urobilinogen 0.2 - 1.0 EU/dL 1.0  1.0  1.0  0.2  0.2  0.2  0.2    Nitrites NEG   Negative  Negative  POSITIVE Abnormal   NEGATIVE  POSITIV              Assessment/Plan   Hyper Osmolar Hypernatremic Dehydration. .  Acute Renal Failure  ,Pre-Renal.  By his current weight he is 10  liters Free water Deficit. .  Did not receive any Fluid Until Patient Moved to ICU & currently getting Normosol. Aspiration Pneumonia. Covid  Positive. Acute on Chronic Encephalopathy. Plan : send Serum & Urine Osmolality,Urine Electrolytes. Change IVF to D5W & run at 100 cc/hour. Check BMP q 12 hours, not to correct Na  > 10  meq /24 hours. I/O record. Berto Hess aspiration Precautions. Continue DNR/ DNI. Agree with Palliative care consult. Avoid aggressive care. .            Maintain Anti Covid measure             Discussed with ICU Attending & PA & caring Nurse.

## 2022-10-11 ENCOUNTER — APPOINTMENT (OUTPATIENT)
Dept: NON INVASIVE DIAGNOSTICS | Age: 67
DRG: 871 | End: 2022-10-11
Attending: NURSE PRACTITIONER
Payer: COMMERCIAL

## 2022-10-11 LAB
ACC. NO. FROM MICRO ORDER, ACCP: ABNORMAL
ACINETOBACTER CALCOACETICUS-BAUMANII COMPLEX, ACBCX: NOT DETECTED
ANION GAP SERPL CALC-SCNC: 6 MMOL/L (ref 3–18)
ANION GAP SERPL CALC-SCNC: 7 MMOL/L (ref 3–18)
ANION GAP SERPL CALC-SCNC: 7 MMOL/L (ref 3–18)
BACTERIA SPEC CULT: ABNORMAL
BACTERIA SPEC CULT: NORMAL
BACTERIA SPEC CULT: NORMAL
BACTEROIDES FRAGILIS, BFRA: NOT DETECTED
BASOPHILS # BLD: 0 K/UL (ref 0–0.1)
BASOPHILS NFR BLD: 0 % (ref 0–2)
BIOFIRE COMMENT, BCIDPF: ABNORMAL
BUN SERPL-MCNC: 34 MG/DL (ref 7–18)
BUN SERPL-MCNC: 41 MG/DL (ref 7–18)
BUN SERPL-MCNC: 42 MG/DL (ref 7–18)
BUN/CREAT SERPL: 40 (ref 12–20)
BUN/CREAT SERPL: 41 (ref 12–20)
BUN/CREAT SERPL: 44 (ref 12–20)
C GLABRATA DNA VAG QL NAA+PROBE: NOT DETECTED
CALCIUM SERPL-MCNC: 8.2 MG/DL (ref 8.5–10.1)
CALCIUM SERPL-MCNC: 8.3 MG/DL (ref 8.5–10.1)
CALCIUM SERPL-MCNC: 8.5 MG/DL (ref 8.5–10.1)
CANDIDA ALBICANS: NOT DETECTED
CANDIDA AURIS, CAAU: NOT DETECTED
CANDIDA KRUSEI, CKRP: NOT DETECTED
CANDIDA PARAPSILOSIS, CPAUP: NOT DETECTED
CANDIDA TROPICALIS, CTROP: NOT DETECTED
CHLORIDE SERPL-SCNC: 120 MMOL/L (ref 100–111)
CHLORIDE SERPL-SCNC: 128 MMOL/L (ref 100–111)
CHLORIDE SERPL-SCNC: 129 MMOL/L (ref 100–111)
CO2 SERPL-SCNC: 22 MMOL/L (ref 21–32)
CO2 SERPL-SCNC: 23 MMOL/L (ref 21–32)
CO2 SERPL-SCNC: 26 MMOL/L (ref 21–32)
CREAT SERPL-MCNC: 0.85 MG/DL (ref 0.6–1.3)
CREAT SERPL-MCNC: 0.95 MG/DL (ref 0.6–1.3)
CREAT SERPL-MCNC: 0.99 MG/DL (ref 0.6–1.3)
CRYPTO NEOFORMANS/GATTII, CRYNEG: NOT DETECTED
DIFFERENTIAL METHOD BLD: ABNORMAL
ENTEROBACTER CLOACAE COMPLEX, ECCP: NOT DETECTED
ENTEROBACTERALES SP. , ENBLS: NOT DETECTED
ENTEROCOCCUS FAECALIS, ENFA: NOT DETECTED
ENTEROCOCCUS FAECIUM, ENFAM: NOT DETECTED
EOSINOPHIL # BLD: 0 K/UL (ref 0–0.4)
EOSINOPHIL NFR BLD: 0 % (ref 0–5)
ERYTHROCYTE [DISTWIDTH] IN BLOOD BY AUTOMATED COUNT: 15.6 % (ref 11.6–14.5)
ESCHERICHIA COLI: NOT DETECTED
GLUCOSE BLD STRIP.AUTO-MCNC: 113 MG/DL (ref 70–110)
GLUCOSE BLD STRIP.AUTO-MCNC: 136 MG/DL (ref 70–110)
GLUCOSE BLD STRIP.AUTO-MCNC: 88 MG/DL (ref 70–110)
GLUCOSE BLD STRIP.AUTO-MCNC: 96 MG/DL (ref 70–110)
GLUCOSE SERPL-MCNC: 132 MG/DL (ref 74–99)
GLUCOSE SERPL-MCNC: 142 MG/DL (ref 74–99)
GLUCOSE SERPL-MCNC: 165 MG/DL (ref 74–99)
GRAM STN SPEC: ABNORMAL
GRAM STN SPEC: ABNORMAL
HAEMOPHILUS INFLUENZAE, HMI: NOT DETECTED
HCT VFR BLD AUTO: 37.1 % (ref 36–48)
HGB BLD-MCNC: 11.1 G/DL (ref 13–16)
IMM GRANULOCYTES # BLD AUTO: 0 K/UL
IMM GRANULOCYTES NFR BLD AUTO: 0 %
KLEBSIELLA AEROGENES, KLAE: NOT DETECTED
KLEBSIELLA OXYTOCA: NOT DETECTED
KLEBSIELLA PNEUMONIAE GROUP, KPPG: NOT DETECTED
LISTERIA MONOCYTOGENES, LMONP: NOT DETECTED
LYMPHOCYTES # BLD: 1.6 K/UL (ref 0.9–3.6)
LYMPHOCYTES NFR BLD: 11 % (ref 21–52)
MAGNESIUM SERPL-MCNC: 2.6 MG/DL (ref 1.6–2.6)
MCH RBC QN AUTO: 25.1 PG (ref 24–34)
MCHC RBC AUTO-ENTMCNC: 29.9 G/DL (ref 31–37)
MCV RBC AUTO: 83.9 FL (ref 78–100)
MONOCYTES # BLD: 0.3 K/UL (ref 0.05–1.2)
MONOCYTES NFR BLD: 2 % (ref 3–10)
NEISSERIA MENINGITIDIS, NMNI: NOT DETECTED
NEUTS BAND NFR BLD MANUAL: 6 % (ref 0–5)
NEUTS SEG # BLD: 12.7 K/UL (ref 1.8–8)
NEUTS SEG NFR BLD: 81 % (ref 40–73)
NRBC # BLD: 0 K/UL (ref 0–0.01)
NRBC BLD-RTO: 0 PER 100 WBC
OSMOLALITY SERPL: 346 MOSMOL/KG (ref 280–301)
OSMOLALITY UR: 491 MOSMOL/KG
PHOSPHATE SERPL-MCNC: 2.7 MG/DL (ref 2.5–4.9)
PLATELET # BLD AUTO: 196 K/UL (ref 135–420)
PLATELET COMMENTS,PCOM: ABNORMAL
PMV BLD AUTO: 13.2 FL (ref 9.2–11.8)
POTASSIUM SERPL-SCNC: 3.3 MMOL/L (ref 3.5–5.5)
POTASSIUM SERPL-SCNC: 3.7 MMOL/L (ref 3.5–5.5)
POTASSIUM SERPL-SCNC: 3.9 MMOL/L (ref 3.5–5.5)
PROTEUS, PRP: NOT DETECTED
PSEUDOMONAS AERUGINOSA: NOT DETECTED
RBC # BLD AUTO: 4.42 M/UL (ref 4.35–5.65)
RBC MORPH BLD: ABNORMAL
RESISTANT GENE SPACE, REGENE: ABNORMAL
SALMONELLA, SALMO: NOT DETECTED
SERRATIA MARCESCENS: NOT DETECTED
SERVICE CMNT-IMP: ABNORMAL
SERVICE CMNT-IMP: NORMAL
SODIUM SERPL-SCNC: 153 MMOL/L (ref 136–145)
SODIUM SERPL-SCNC: 155 MMOL/L (ref 136–145)
SODIUM SERPL-SCNC: 157 MMOL/L (ref 136–145)
SODIUM SERPL-SCNC: 158 MMOL/L (ref 136–145)
STAPH EPIDERMIDIS, STEP: NOT DETECTED
STAPH LUGDUNENSIS, STALUG: NOT DETECTED
STAPHYLOCOCCUS AUREUS: NOT DETECTED
STAPHYLOCOCCUS, STAPP: DETECTED
STENO MALTOPHILIA, STMA: NOT DETECTED
STREPTOCOCCUS , STPSP: NOT DETECTED
STREPTOCOCCUS AGALACTIAE (GROUP B): NOT DETECTED
STREPTOCOCCUS PNEUMONIAE , SPNP: NOT DETECTED
STREPTOCOCCUS PYOGENES (GROUP A), SPYOP: NOT DETECTED
VANCOMYCIN SERPL-MCNC: 18.7 UG/ML (ref 5–40)
WBC # BLD AUTO: 14.6 K/UL (ref 4.6–13.2)

## 2022-10-11 PROCEDURE — 94762 N-INVAS EAR/PLS OXIMTRY CONT: CPT

## 2022-10-11 PROCEDURE — 99291 CRITICAL CARE FIRST HOUR: CPT | Performed by: INTERNAL MEDICINE

## 2022-10-11 PROCEDURE — 80202 ASSAY OF VANCOMYCIN: CPT

## 2022-10-11 PROCEDURE — 84295 ASSAY OF SERUM SODIUM: CPT

## 2022-10-11 PROCEDURE — 74011250637 HC RX REV CODE- 250/637: Performed by: REGISTERED NURSE

## 2022-10-11 PROCEDURE — 80048 BASIC METABOLIC PNL TOTAL CA: CPT

## 2022-10-11 PROCEDURE — 83735 ASSAY OF MAGNESIUM: CPT

## 2022-10-11 PROCEDURE — 36410 VNPNXR 3YR/> PHY/QHP DX/THER: CPT

## 2022-10-11 PROCEDURE — 93321 DOPPLER ECHO F-UP/LMTD STD: CPT

## 2022-10-11 PROCEDURE — 77010033678 HC OXYGEN DAILY

## 2022-10-11 PROCEDURE — 74011000258 HC RX REV CODE- 258: Performed by: NURSE PRACTITIONER

## 2022-10-11 PROCEDURE — 84100 ASSAY OF PHOSPHORUS: CPT

## 2022-10-11 PROCEDURE — 74011000250 HC RX REV CODE- 250: Performed by: NURSE PRACTITIONER

## 2022-10-11 PROCEDURE — 36415 COLL VENOUS BLD VENIPUNCTURE: CPT

## 2022-10-11 PROCEDURE — 82962 GLUCOSE BLOOD TEST: CPT

## 2022-10-11 PROCEDURE — 85025 COMPLETE CBC W/AUTO DIFF WBC: CPT

## 2022-10-11 PROCEDURE — 74011250636 HC RX REV CODE- 250/636: Performed by: NURSE PRACTITIONER

## 2022-10-11 PROCEDURE — 74011000258 HC RX REV CODE- 258: Performed by: INTERNAL MEDICINE

## 2022-10-11 PROCEDURE — 74011000250 HC RX REV CODE- 250: Performed by: INTERNAL MEDICINE

## 2022-10-11 PROCEDURE — 93306 TTE W/DOPPLER COMPLETE: CPT

## 2022-10-11 PROCEDURE — 65610000006 HC RM INTENSIVE CARE

## 2022-10-11 PROCEDURE — 74011250636 HC RX REV CODE- 250/636: Performed by: INTERNAL MEDICINE

## 2022-10-11 RX ORDER — LIDOCAINE HYDROCHLORIDE 10 MG/ML
5 INJECTION, SOLUTION EPIDURAL; INFILTRATION; INTRACAUDAL; PERINEURAL ONCE
Status: ACTIVE | OUTPATIENT
Start: 2022-10-11 | End: 2022-10-12

## 2022-10-11 RX ORDER — SODIUM CHLORIDE 0.9 % (FLUSH) 0.9 %
5-40 SYRINGE (ML) INJECTION AS NEEDED
Status: DISCONTINUED | OUTPATIENT
Start: 2022-10-11 | End: 2022-10-22 | Stop reason: HOSPADM

## 2022-10-11 RX ORDER — POTASSIUM CHLORIDE 7.45 MG/ML
10 INJECTION INTRAVENOUS
Status: COMPLETED | OUTPATIENT
Start: 2022-10-11 | End: 2022-10-11

## 2022-10-11 RX ORDER — POTASSIUM CHLORIDE, DEXTROSE MONOHYDRATE 150; 5 MG/100ML; G/100ML
INJECTION, SOLUTION INTRAVENOUS CONTINUOUS
Status: DISCONTINUED | OUTPATIENT
Start: 2022-10-11 | End: 2022-10-22

## 2022-10-11 RX ORDER — SODIUM CHLORIDE 0.9 % (FLUSH) 0.9 %
5-40 SYRINGE (ML) INJECTION EVERY 8 HOURS
Status: DISCONTINUED | OUTPATIENT
Start: 2022-10-11 | End: 2022-10-22 | Stop reason: HOSPADM

## 2022-10-11 RX ADMIN — SODIUM CHLORIDE 750 MG: 900 INJECTION, SOLUTION INTRAVENOUS at 11:41

## 2022-10-11 RX ADMIN — SODIUM CHLORIDE, PRESERVATIVE FREE 20 MG: 5 INJECTION INTRAVENOUS at 22:03

## 2022-10-11 RX ADMIN — SODIUM CHLORIDE, PRESERVATIVE FREE 10 ML: 5 INJECTION INTRAVENOUS at 22:04

## 2022-10-11 RX ADMIN — SODIUM CHLORIDE 750 MG: 900 INJECTION, SOLUTION INTRAVENOUS at 22:05

## 2022-10-11 RX ADMIN — HEPARIN SODIUM 5000 UNITS: 5000 INJECTION INTRAVENOUS; SUBCUTANEOUS at 06:17

## 2022-10-11 RX ADMIN — SODIUM CHLORIDE, PRESERVATIVE FREE 10 ML: 5 INJECTION INTRAVENOUS at 16:01

## 2022-10-11 RX ADMIN — HEPARIN SODIUM 5000 UNITS: 5000 INJECTION INTRAVENOUS; SUBCUTANEOUS at 16:16

## 2022-10-11 RX ADMIN — POTASSIUM BICARBONATE 40 MEQ: 782 TABLET, EFFERVESCENT ORAL at 08:57

## 2022-10-11 RX ADMIN — VANCOMYCIN HYDROCHLORIDE 750 MG: 750 INJECTION, POWDER, LYOPHILIZED, FOR SOLUTION INTRAVENOUS at 09:04

## 2022-10-11 RX ADMIN — HEPARIN SODIUM 5000 UNITS: 5000 INJECTION INTRAVENOUS; SUBCUTANEOUS at 22:04

## 2022-10-11 RX ADMIN — POTASSIUM CHLORIDE 10 MEQ: 7.46 INJECTION, SOLUTION INTRAVENOUS at 12:54

## 2022-10-11 RX ADMIN — PIPERACILLIN AND TAZOBACTAM 4.5 G: 4; .5 INJECTION, POWDER, LYOPHILIZED, FOR SOLUTION INTRAVENOUS at 03:00

## 2022-10-11 RX ADMIN — PIPERACILLIN AND TAZOBACTAM 4.5 G: 4; .5 INJECTION, POWDER, LYOPHILIZED, FOR SOLUTION INTRAVENOUS at 11:42

## 2022-10-11 RX ADMIN — POTASSIUM CHLORIDE AND DEXTROSE MONOHYDRATE: 150; 5 INJECTION, SOLUTION INTRAVENOUS at 13:15

## 2022-10-11 RX ADMIN — PIPERACILLIN AND TAZOBACTAM 4.5 G: 4; .5 INJECTION, POWDER, LYOPHILIZED, FOR SOLUTION INTRAVENOUS at 19:36

## 2022-10-11 RX ADMIN — SODIUM CHLORIDE, PRESERVATIVE FREE 20 MG: 5 INJECTION INTRAVENOUS at 08:57

## 2022-10-11 RX ADMIN — POTASSIUM CHLORIDE 10 MEQ: 7.46 INJECTION, SOLUTION INTRAVENOUS at 12:24

## 2022-10-11 RX ADMIN — POTASSIUM CHLORIDE 10 MEQ: 7.46 INJECTION, SOLUTION INTRAVENOUS at 14:00

## 2022-10-11 RX ADMIN — FOLIC ACID: 5 INJECTION, SOLUTION INTRAMUSCULAR; INTRAVENOUS; SUBCUTANEOUS at 08:58

## 2022-10-11 RX ADMIN — THIAMINE HYDROCHLORIDE 100 MG: 100 INJECTION, SOLUTION INTRAMUSCULAR; INTRAVENOUS at 09:00

## 2022-10-11 NOTE — PROGRESS NOTES
[address] [place] [address] [facility]. Pulmonary, Critical Care, and Sleep Medicine    Name: Dilan Malone MRN: [id number]   : 1955 Hospital: [facility]   Date: 10/11/2022  Admission Date: 10/9/2022     Chart and notes reviewed. Data reviewed. I have evaluated all findings. [x]I have reviewed the flowsheet and previous days notes. []The patient is unable to give any meaningful history or review of systems because the patient is:  []Intubated []Sedated   []Unresponsive      []The patient is critically ill on      []Mechanical ventilation []Pressors   []BiPAP []         Interval HPI:Patient is a 79 y.o. male with a significant PMHx of CVA with left-sided hemiparesis and aphasia, HTN, HLD, dysphagia, seizure disorder, schizophrenia, alcohol-induced dementia, severe protein calorie malnutrition and debility who presents from [facility] in acute hypoxic respiratory failure secondary to SIRS vs. Sepsis due aspiration pneumonia. He was also found to have severe hypernatremia with a sodium level of 160 due to severe hypovolemia from dehydration/poor PO intake. He was also found to have questionable UTI, although less likely. Further lab work was significant for a +RVP panel. Patient is COVID + and rhinovirus/enterovirus +. Patient is asymptomatic and respiratory failure is due to his aspiration. He was weaned off HFNC. Pt is DNR/DNI with a post on file. Subjective 10/11/22  Hospital Day: 2  Vent Day: N/A  Overnight events: No significant events noted overnight  Mentation/Activity: awake, non-verbal, does not follow commands; has dementia  Respiratory/ Secretions: stable on RA  Hemodynamics: stable  Urine output, bowel: adequate  Diet: start TF today  Need for procedures: midline    - NGT in place with 50cc/hr water flushes  - Monitor Na Q4h              ROS:Review of systems not obtained due to patient factors. Events and notes from last 24 hours reviewed.      Patient Active Problem List   Diagnosis Code    Intractable hiccups R06.6    Essential hypertension, benign I10    First degree AV block I44.0    Former heavy tobacco smoker Z87.891    Severe protein-calorie malnutrition ([facility] E43    Psychogenic polydipsia R63.1, F54    Hyponatremia E87.1    Cellulitis L03.90    Right foot infection L08.9    Bilateral leg and foot pain M79.604, M79.605, M79.671, M79.672    Pneumonia J18.9    Stroke ([facility] I63.9    Hyperosmolality and hypernatremia O30.4    Acute metabolic encephalopathy O34.77    Dehydration E86.0    COVID-19 U07.1    Hematuria R31.9    Dysuria R30.0    Goals of care, counseling/discussion Z71.89    Debility R53.81    Respiratory failure (HCC) J96.90    Dementia, alcoholic ([facility]) C08.35    Aspiration into airway T17.908A    ARF (acute renal failure) ([facility]) N17.9       Vital Signs:  Visit Vitals  BP (!) 137/90   Pulse 84   Temp 97.8 °F (36.6 °C)   Resp 11   Ht 5' 10\" (1.778 m)   Wt 68.1 kg (150 lb 2.1 oz)   SpO2 100%   BMI 21.54 kg/m²       O2 Device: Nasal cannula   O2 Flow Rate (L/min): 2 l/min   Temp (24hrs), Av.7 °F (36.5 °C), Min:97.3 °F (36.3 °C), Max:98 °F (36.7 °C)       Intake/Output:   Last shift:      No intake/output data recorded.   Last 3 shifts: 10/09 1901 - 10/11 0700  In: 4899.2 [I.V.:4499.2]  Out: 640 [Urine:640]    Intake/Output Summary (Last 24 hours) at 10/11/2022 1055  Last data filed at 10/11/2022 0300  Gross per 24 hour   Intake 3139.16 ml   Output 240 ml   Net 2899.16 ml          Current Facility-Administered Medications   Medication Dose Route Frequency    [START ON 10/12/2022] vancomycin (VANCOCIN) 1,000 mg in dextrose 5% 250 mL IVPB  1,000 mg IntraVENous Q18H    insulin lispro (HUMALOG) injection   SubCUTAneous Q6H    piperacillin-tazobactam (ZOSYN) 4.5 g in dextrose 5% 100 mL IVPB  4.5 g IntraVENous Q8H    heparin (porcine) injection 5,000 Units  5,000 Units SubCUTAneous Q8H    levETIRAcetam (KEPPRA) 750 mg in 0.9% sodium chloride 100 mL IVPB  750 mg IntraVENous A61Y    folic acid (FOLVITE) 1 mg in 0.9% sodium chloride 50 mL ivpb   IntraVENous DAILY    thiamine (B-1) 100 mg in 0.9% sodium chloride 50 mL IVPB  100 mg IntraVENous DAILY    famotidine (PF) (PEPCID) 20 mg in 0.9% sodium chloride 10 mL injection  20 mg IntraVENous Q12H         Telemetry: [x]Sinus []A-flutter []Paced    []A-fib []Multiple PVCs                  Physical Exam:      General:  Alert, no distress, frail, ill and malnourished appearing, non-verbal    Head:  Normocephalic, without obvious abnormality, atraumatic. Eyes:  Conjunctivae/corneas clear. PERRL,   Nose: Nares normal. Septum midline. Mucosa dry. No drainage or sinus tenderness. Throat: Lips, mucosa, and tongue normal. Teeth and gums of poor dentition, lips dried and cracked/peeling    Neck: Supple, symmetrical, trachea midline, no adenopathy, thyroid: no enlargment/tenderness/nodules, no carotid bruit and no JVD. Back:   Symmetric, no curvature. Lungs:   Coarse to auscultation bilaterally. Chest wall:  No tenderness or deformity. Heart:  Tachyardia, S1, S2 normal, no murmur, click, rub or gallop. Abdomen:   Abd flat, Soft, non-tender. Bowel sounds normal. No masses,  No organomegaly. Extremities: Extremities normal, atraumatic, no cyanosis or edema. Pulses: 2+ and symmetric all extremities. Skin: BLE with multiple stages of healing from venous stasis ulcers    Lymph nodes:  Cervical, supraclavicular, and axillary nodes normal.   Neurologic: Grossly non-focal; non-verbal, no command following, but alert       Devices:   Reid, PIV, NGT       DATA:  MAR reviewed and pertinent medications noted or modified as needed    Labs:  Recent Labs     10/11/22  0610 10/10/22  0205 10/09/22  1820   WBC 14.6* 10.6 11.3   HGB 11.1* 11.6* 13.8   HCT 37.1 39.0 45.6    195 287     Recent Labs     10/11/22  0610 10/10/22  2337 10/10/22  2033 10/10/22  1420 10/10/22  1145 10/10/22  0701 10/10/22  0205 10/10/22  0052 10/09/22  1820   * 158* 155*   < > 162*   < > 163*   < > 160*   K 3.3* 3.7  --   --  3.1*  --  4.0   < > 3.9   * 129*  --   --  130*  --  131*   < > 128*   CO2 22 23  --   --  26  --  24   < > 24   * 165*  --   --  158*  --  168*   < > 134*   BUN 42* 41*  --   --  34*  --  27*   < > 19*   CREA 0.95 0.99  --   --  1.10  --  1.10   < > 0.94   CA 8.3* 8.2*  --   --  8.5  --  8.8   < > 9.1   MG 2.6  --   --   --  2.7*  --  2.6   < > 2.6   PHOS 2.7  --   --   --  3.0  --  3.8   < >  --    ALB  --   --   --   --   --   --   --   --  2.8*  2.7*   ALT  --   --   --   --   --   --   --   --  18  17   INR  --   --   --   --   --   --  1.5*  --   --     < > = values in this interval not displayed. No results for input(s): PH, PCO2, PO2, HCO3, FIO2 in the last 72 hours. Recent Labs     10/09/22  2201 10/09/22  1840   FIO2I 60  --    HCO3I 21.1* 22.0   PCO2I 31.8* 25.8*   PHI 7.43 7.54*   PO2I 162* 221*       Imaging:  [x]   I have personally reviewed the patients radiographs and reports  XR Results (most recent):  XR Results (most recent):  Results from Hospital Encounter encounter on 10/09/22    XR ABD (KUB)    Narrative  EXAM : ABDOMEN PORTABLE  2014 HOURS    CLINICAL HISTORY/INDICATION:  bedside xray, please verify NGT. thank you    COMPARISON: Abdomen 9/16/2017. TECHNIQUE: AP portable abdomen 1 view    FINDINGS:    The tip of the esophagogastric tube ends in the proximal stomach. There is gas  and stool within the colon. There is gas within small bowel No bowel dilatation  is seen. No organomegaly is noted. Impression  The esophagogastric tube ends at the proximal stomach. CT Results (most recent):  Results from Hospital Encounter encounter on 10/09/22    CT CHEST WO CONT    Narrative  EXAM: CT CHEST WITHOUT CONTRAST. CLINICAL HISTORY/INDICATION:  Apiration    COMPARISON: Chest x-ray 10/9/2022, CT chest 9/3/2013.     TECHNIQUE: Standard helical images were obtained from the thoracic inlet through  the adrenals at 5 mm thick sections without intravenous contrast.  Coronal and sagittal reformations obtained. Images were reviewed on both soft  tissue, lung, and bone window settings. All CT scans at this facility are performed using dose optimization technique as  appropriate to a performed exam, to include automated exposure control,  adjustment of the mA and/or kV according to patient's size (including  appropriate matching for site-specific examinations), or use of iterative  reconstruction technique. FINDINGS:    Minimal left basal posterior dependently consolidated lung. Vague tree-in-bud faint nodularity within the posterior segment of the right  upper lobe. Vague tree-in-bud clustered perivascular nodules in the posterior superior right  middle lobe. Multifocal vague patchy nodules in the superior left upper lobe with dense  consolidation. Likely secretions or soft tissue density within the proximal bronchus  intermedius. Mild elevation of the right hemidiaphragm. .    There is no evidence of mediastinal, hilar, nor axillary adenopathy. Evaluation of the mediastinum and jimmy is limited by the lack of IV contrast.  The great vessels and thoracic aorta are unremarkable. There are no pleural effusions. The included portion of the of the liver is unremarkable. Probable small  gallstones. The adrenal glands are normal.    The chest wall soft tissues are unremarkable. The bony structures are unremarkable. Impression  Dense consolidation within the right lower lobe with soft tissue density within  the bronchus intermedius proximal left lower lobe main bronchus. Likely  secretions. Associated with mild elevation of the hemidiaphragm with at least  some component of atelectatic volume loss. Smaller region of similar consolidation within the left lower lobe. Aspiration pneumonia not excluded.   Tree-in-bud day fuzzy nodules in the perivascular spaces in the right upper  lobe, right middle lobe and superior right lower lobe of acute  infection/pneumonia. IMPRESSION:   SIRS vs. Sepsis- secondary to Aspiration PNA/? Acute Cystitis with hematuria (trace), less likely; febrile Tmax 105, tachypnea, tachycardia  Severe Hypernatremia- Sodium level of 160 on arrival   Aspiration PNA-airway suctioned with copious material and food products on arrival. Known hx of dysphagia. CT chest 10/9 with dense consolidations RLL and LLL  Acute Hypoxic Respiratory Failure- in the setting of aspiration requiring Vapotherm (now weaned off)  Hypovolemia- in the setting of dehydration/dementia, likely with insufficient PO intake, 10.2 free water deficit    PARAMJIT- likely prerenal in nature secondary to above  Acute on chronic encephalopathy- likely metabolic in nature superimposed on dementia  +COVID-19 infection, not PNA, asymptomatic, RVP 10/10.  Respiratory failure likely due to aspiration    Enterovirus/Rhinovirus +- RVP 10/10   Dysphagia   Seizure disorder- on Keppra OP  Severe Protein Malnutrition  Debility-non-ambulatory since 2014/wheelchair dependent   PVD/Venous Insufficiency- left lateral leg with slow healing, followed by Vascular   Hx x of COVID-19, asymptomatic (3/30/21)   DM II   Schizophrenia   Hx Etoh abuse with alcohol-induced persisting dementia  Hx CVA with left-sided hemiparesis and aphasia, HTN, HLD     Patient Active Problem List   Diagnosis Code    Intractable hiccups R06.6    Essential hypertension, benign I10    First degree AV block I44.0    Former heavy tobacco smoker Z87.891    Severe protein-calorie malnutrition ([address] E43    Psychogenic polydipsia R63.1, F54    Hyponatremia E87.1    Cellulitis L03.90    Right foot infection L08.9    Bilateral leg and foot pain M79.604, M79.605, M79.671, M79.672    Pneumonia J18.9    Stroke ([facility] I63.9    Hyperosmolality and hypernatremia R03.6    Acute metabolic encephalopathy L28.37    Dehydration E86.0    COVID-19 U07.1 Hematuria R31.9    Dysuria R30.0    Goals of care, counseling/discussion Z71.89    Debility R53.81    Respiratory failure ([facility]) J96.90    Dementia, alcoholic ([facility]) M42.92    Aspiration into airway T17.908A    ARF (acute renal failure) ([facility]) N17.9        RECOMMENDATIONS:   Neuro: Dementia at baseline, observe for worsening mentation deviated from baseline, cont Keppra 750 mg BID, Q4h neuro checks, cont thiamine and folate   Pulm: Supplemental O2 via NC PRN, titrate flow for goal SPO2> 90% Bronchodilators, steroids, pulmonary hygiene care, Aspiration precautions, Keep HOB >30 degrees  CVS : Monitor hemodynamics, Aim MAP >65mmHg, trend cardiac panel, ECHO results. GI: SUP, Trend LFTs, Zofran PRN for N/V, NGT in place  Renal:  Trend Renal indices, Strict Is/Os, Place Bradley, Nephrology following. Hem/Onc: Monitor for s/o active bleeding. Heparin subq for DVT prophylaxis   I/D:Sepsis bundle per hospital protocol, Blood, Sputum, and Urine cultures drawn and will be followed. Lactic acid ordered- initial and repeat Q4hrs till normalized. Antibiotics:Vanco/Zosyn. Trend WBCs and temperature curve. RVP + COVID, Enterovirus/Rhinovirus. Procal 21.82   Endocrine: Q6 glucoses, SSI. TSH level 5.48   Metabolic:  Daily BMP, mag, phos. Trend lytes, replace as needed. Q4h Na checks   Musc/Skin:  wound care BLE healing venous ulcers   Full Code  Palliative care consult       Full Code  Discussed in interdisciplinary rounds     Best practice :    Glycemic control  IHI ICU bundles: Bradley Bundle Followed    Stress ulcer prophylaxis. Pepcid  DVT prophylaxis. SQH  Need for Lines, bradley assessed. Palliative care evaluation. Restraints not needed. BARRETT time 20 minutes  Ladonna Zhao PA-C   10/11/22  Pulmonary, Critical Care Medicine  [facility]           Attending Note:  I saw and evaluated the patient, performing all elements of service personally.   I discussed the findings, assessment and plan with the PA and agree with the PA's findings and plan as documented in the PA's note. All edits and changes made above or are mentioned in my attending note which was independently assessed as well as written. Total of 42 min critical care time spent at bedside (personally) during the course of care providing evaluation,management and care decisions and ordering appropriate treatment related to critical care problems exclusive of procedures. I performed the substantive portion of this split shared encounter (greater than 50% of time)  The reason for providing this level of medical care for this critically ill patient was due a critical illness that impaired one or more vital organ systems such that there was a high probability of imminent or life threatening deterioration in the patients condition. This care involved high complexity decision making to assess, manipulate, and support vital system functions, to treat this degree vital organ system failure and to prevent further life threatening deterioration of the patients condition. This time was independent of other practitioners. 75-year-old male with past medical history of CVA with left-sided hemiparesis, aphasia, vascular and alcohol dementia, hypertension, hyperlipidemia, dysphagia, seizure disorder, schizophrenia, severe protein calorie malnutrition, presented to [facility] sent in from his nursing home [facility] for hypoxia. Patient was placed on nonrebreather while at [facility], patient then was placed on CPAP by EMS, placed on BiPAP while in the ER. They noted that aspirated food was in the back of his throat. Due to worsening of aspiration, upon transfer to the ICU patient was placed on high flow nasal cannula. Patient also questionable UTI, patient started on broad-spectrum antibiotics. Patient has pneumonia, respiratory virus panel is positive for multiple pathogens including COVID-19, rhino/enterovirus.   Although patient is nonverbal, patient was reported to be with worsening mental status--patient found to be severely hyponatremic with a sodium of 162--along with PARAMJIT--with tripling of patient's creatinine. Patient was started on fluids, nephrology was consulted, patient eventually switched to D5W, NG tube placed, started on free water flushes. Check serial sodiums to avoid overcorrection--sodium down to 155 (decreased by about 7 over the last 24 hours).      Long-term prognosis is very poor       Nehemiah Smith MD/MPH     Pulmonary, Critical Care Medicine  [facility]

## 2022-10-11 NOTE — CONSULTS
Comprehensive Nutrition Assessment    Type and Reason for Visit: Consult, Reassess, NPO/clear liquid, Positive nutrition screen    Nutrition Recommendations/Plan:   Plan to start tube feeds of Jevity 1.5 at 10 mL/hr, advancing as pt tolerates by 10  mL q 24 hours to goal rate of 60 mL/hr with water flushes of 50 mL q 1 hour.             - goal EN provides:  2160 kcal, 92 gm protein, 1094 ml free water, 100% RDIs    Continue all other nutrition interventions. Monitor tolerance of tube feeds. Malnutrition Assessment:  Malnutrition Status: At risk for malnutrition (specify) (dysphagia at baseline, BMI, COVID) (10/10/22 1114)    Context:  Acute illness       Nutrition Assessment:    Pt remains NPO. Okay to start tube feeds, with slow advancement towards goal rate per MD; pt with refeeding risk. PT has been receiving water flushes of 50 mL q 1 hour; Na was 162- 163 mmol/L; currently 157 mmol/L, slowly improving. Nutrition Related Findings:    BM 10/9. Pertinent meds: pepcid, folic acid, antibiotic, thiamine, bowel regimen. Wound Type: None      Current Nutrition Intake & Therapies:  Average Meal Intake: NPO  Average Supplement Intake: None ordered  DIET NPO    Anthropometric Measures:  Height: 5' 10\" (177.8 cm)  Ideal Body Weight (IBW): 166 lbs (75 kg)  Admission Body Weight: 150 lb 2.1 oz (68.1 kg)  Current Body Wt:  68.1 kg (150 lb 2.1 oz), 90.4 % IBW.     Current BMI (kg/m2): 21.5  BMI Category: Underweight (BMI less than 22) age over 72    Estimated Daily Nutrient Needs:  Energy Requirements Based On: Formula  Weight Used for Energy Requirements: Admission  Energy (kcal/day): 3356-1783 (MSJ 1.2-1.4)  Weight Used for Protein Requirements: Current  Protein (g/day):  (1.2-1.5 g/day)  Method Used for Fluid Requirements: 1 ml/kcal  Fluid (ml/day): 4590-1943    Nutrition Diagnosis:   Swallowing difficulty related to  (dsyphagia) as evidenced by  (PTA previously on puree, mildly thick liquids, SLP rec'd NPO)  Increased nutrient needs related to catabolic illness as evidenced by  (COVID+)  Inadequate oral intake related to cognitive or neurological impairment, swallowing difficulty as evidenced by NPO or clear liquid status due to medical condition    Nutrition Interventions:   Food and/or Nutrient Delivery: Continue NPO, Vitamin supplement, Start tube feeding     Coordination of Nutrition Care: Continue to monitor while inpatient, Interdisciplinary rounds  Plan of Care discussed with: interdisciplinary team    Goals:  Previous Goal Met: Progressing toward goal(s)  Goals: Meet at least 75% of estimated needs, Tolerate nutrition support at goal rate, by next RD assessment       Nutrition Monitoring and Evaluation:   Behavioral-Environmental Outcomes: None identified  Food/Nutrient Intake Outcomes: Enteral nutrition intake/tolerance, Vitamin/mineral intake  Physical Signs/Symptoms Outcomes: Biochemical data, Hemodynamic status, GI status, Weight    Discharge Planning:     Too soon to determine    Johny Speaker, [address]  Contact: [phone]

## 2022-10-11 NOTE — INTERDISCIPLINARY ROUNDS
[facility]  Pulmonary, Critical Care, and Sleep Medicine  Interdisciplinary and Ventilator Weaning Rounds    Patient discussed in morning walking rounds and interdisciplinary rounds. ICU day: 10/9    Overnight events:   Persistent hypernatremia, improving      Assessments and best practice:  Ventilator  N/A  Sedation  N/A  Other pertinent drips  Plasmalyte   Lines noted  Bradley Catheter, NGT  Critical labs assessed  Yes  Antibiotics  Zosyn and Vancomycin  Medications reviewed  Yes  Pending imaging  none  Pending send out labs  No  Pending Procedures  none  Glycemic control  Stress ulcer prophylaxis. Pepcid  DVT prophylaxis. SQH  Need for Lines, bradley assessed.   Yes   Restraint Reevaluation   - Restraints not needed    Family contact/MPOA: Pedrito Barragan [phone]  Family updated per ICU staff    Palliative consult within 3 days of admission to ICU-  Ethics Guidance: 21 days      Daily Plans:  Continue fluid replacement   BMP, Sodium levels Q4h  Aspiration precautions - high risk  Cont free water flushes via NGT  Eval for midline placement   Start TF    BARRETT time 15 minutes        Kallie Cook PA-C  10/11/22  Pulmonary, [address] [facility],[address] [facility]

## 2022-10-11 NOTE — PROGRESS NOTES
[address] [facility] Pharmacokinetic Monitoring Service - Vancomycin    Indication: aspiration PNA  Goal AUC/ALFREDITO: 400-600 mg*hr/L  Day of Therapy: 2  Additional Antimicrobials: pip-tazo    Pertinent Laboratory Values: Wt Readings from Last 1 Encounters:   10/10/22 68.1 kg (150 lb 2.1 oz)     Temp Readings from Last 1 Encounters:   10/11/22 97.8 °F (36.6 °C)     No components found for: PROCAL  Estimated Creatinine Clearance: 72.7 mL/min (based on SCr of 0.95 mg/dL).   Recent Labs     10/11/22  0610 10/10/22  0205   WBC 14.6* 10.6     Pertinent Cultures:  Culture Date Source Results   10/9 blood GPC in 1/2   10/9 urine pending   MRSA Nasal Swab: awaiting results    Assessment:  Date/Time Current Dose Concentration (mg/L) Timing of Concentration (h) AUC   10/10 1,250 mg x1  750 mg q12h - - -   10/11 750 mg q12h  1,000 mg q18h 18.7 12 068  572   Note: Serum concentrations collected for AUC dosing may appear elevated if collected in close proximity to the dose administered, this is not necessarily an indication of toxicity    Plan:  Reduce dose from 750 mg q12h to 1,000 mg q18h  No level ordered at this time  Pharmacy will continue to monitor patient and adjust therapy as indicated    Thank you for the consult,  NATACHA Romeo  10/11/2022

## 2022-10-11 NOTE — PROGRESS NOTES
Progress Note    Lashawn Mercury  79 y.o. Admit Date: 10/9/2022  Active Problems:    Hypokalemia (6/22/2013) POA: Unknown      Hyperosmolality and hypernatremia (3/30/2021) POA: Unknown      Respiratory failure ([facility] (10/9/2022) POA: Unknown      Dementia, alcoholic ([facility] (26/3/2226) POA: Unknown      Aspiration into airway (10/9/2022) POA: Unknown      ARF (acute renal failure) ([facility] (10/10/2022) POA: Unknown          Subjective:     Patient remained in ICU, DNR, Now has  NG tube  & started Jevity  & free water at 50 cc/hour,Of IVF, has now IV line on Right forearm. . Does not communicate but moans. On Contact Islation with Covid 19 & MRSA. A comprehensive review of systems was negative except for that written in the History of Present Illness.     Objective:     Visit Vitals  BP (!) 117/101   Pulse 84   Temp 97.7 °F (36.5 °C)   Resp 16   Ht 5' 10\" (1.778 m)   Wt 68.1 kg (150 lb 2.1 oz)   SpO2 100%   BMI 21.54 kg/m²         Intake/Output Summary (Last 24 hours) at 10/11/2022 1303  Last data filed at 10/11/2022 0300  Gross per 24 hour   Intake 3139.16 ml   Output 240 ml   Net 2899.16 ml       Current Facility-Administered Medications   Medication Dose Route Frequency Provider Last Rate Last Admin    [START ON 10/12/2022] vancomycin (VANCOCIN) 1,000 mg in dextrose 5% 250 mL IVPB  1,000 mg IntraVENous Q18H Franl MD Socorro        dextrose 5% with KCl 20 mEq/L infusion   IntraVENous CONTINUOUS Franl MD Socorro        potassium chloride 10 mEq in 100 ml IVPB  10 mEq IntraVENous Q1H Franl MD Socorro 100 mL/hr at 10/11/22 1254 10 mEq at 10/11/22 1254    albuterol-ipratropium (DUO-NEB) 2.5 MG-0.5 MG/3 ML  3 mL Nebulization Q4H PRN Michael MURILLO NP        insulin lispro (HUMALOG) injection   SubCUTAneous Q6H Michael MURILLO NP   2 Units at 10/10/22 2302    glucose chewable tablet 16 g  4 Tablet Oral PRN Juve Diamond NP        glucagon (GLUCAGEN) injection 1 mg  1 mg IntraMUSCular PRN Michael Osman C, NP        dextrose 10% infusion 0-250 mL  0-250 mL IntraVENous PRN Laura Basilio NP        hydrALAZINE (APRESOLINE) 20 mg/mL injection 10 mg  10 mg IntraVENous Q6H PRN Laura Basilio NP        piperacillin-tazobactam (ZOSYN) 4.5 g in dextrose 5% 100 mL IVPB  4.5 g IntraVENous Q8H Myriam Quintanilla MD 25 mL/hr at 10/11/22 1142 4.5 g at 10/11/22 1142    acetaminophen (TYLENOL) tablet 650 mg  650 mg Oral Q6H PRN Juve Diamond NP        Or    acetaminophen (TYLENOL) suppository 650 mg  650 mg Rectal Q6H PRN Juve Diamond NP        polyethylene glycol (MIRALAX) packet 17 g  17 g Oral DAILY PRN Michael MURILLO NP        ondansetron (ZOFRAN ODT) tablet 4 mg  4 mg Oral Q8H PRN Michael MURILLO NP        Or    ondansetron (ZOFRAN) injection 4 mg  4 mg IntraVENous Q6H PRN Michael MURILLO NP        heparin (porcine) injection 5,000 Units  5,000 Units SubCUTAneous Q8H Laura Owens NP   5,000 Units at 10/11/22 0617    levETIRAcetam (KEPPRA) 750 mg in 0.9% sodium chloride 100 mL IVPB  750 mg IntraVENous Q12H Laura Owens  mL/hr at 10/11/22 1141 750 mg at 67/18/44 0205    folic acid (FOLVITE) 1 mg in 0.9% sodium chloride 50 mL ivpb   IntraVENous DAILY Michael MURILLO  mL/hr at 10/11/22 0858 New Bag at 10/11/22 0858    thiamine (B-1) 100 mg in 0.9% sodium chloride 50 mL IVPB  100 mg IntraVENous DAILY Michael MURILLO  mL/hr at 10/11/22 0900 100 mg at 10/11/22 0900    famotidine (PF) (PEPCID) 20 mg in 0.9% sodium chloride 10 mL injection  20 mg IntraVENous Q12H Michael MURILLO NP   20 mg at 10/11/22 9742        Physical Exam:     Physical Exam:   General:  Alert, non communicating, appears stated age. Has [place] tube. Neck: Supple, symmetrical, trachea midline, no adenopathy, thyroid: no enlargement/tenderness/nodules, no carotid bruit and no JVD. Lungs:   Clear to auscultation bilaterally.    Heart:  Regular rate and rhythm, S1, S2 normal, no murmur, click, rub or gallop. Abdomen:   Soft, non-tender. Bowel sounds normal. No masses,  No organomegaly. Extremities: Extremities normal, atraumatic, no cyanosis or edema   Skin: Skin color, texture, turgor normal. No rashes or lesions                 Data Review:    CBC w/Diff    Recent Labs     10/11/22  0610 10/10/22  0205 10/09/22  1820   WBC 14.6* 10.6 11.3   RBC 4.42 4.64 5.47   HGB 11.1* 11.6* 13.8   HCT 37.1 39.0 45.6   MCV 83.9 84.1 83.4   MCH 25.1 25.0 25.2   MCHC 29.9* 29.7* 30.3*   RDW 15.6* 15.9* 15.9*    Recent Labs     10/11/22  0610 10/10/22  0205 10/09/22  1820   BANDS 6*  --   --    MONOS 2* 4 3   EOS 0 0 0   BASOS 0 0 0   RDW 15.6* 15.9* 15.9*        Comprehensive Metabolic Profile    Recent Labs     10/11/22  0610 10/10/22  2337 10/10/22  2033 10/10/22  1420 10/10/22  1145   * 158* 155*   < > 162*   K 3.3* 3.7  --   --  3.1*   * 129*  --   --  130*   CO2 22 23  --   --  26   BUN 42* 41*  --   --  34*   CREA 0.95 0.99  --   --  1.10    < > = values in this interval not displayed. Recent Labs     10/11/22  0610 10/10/22  2337 10/10/22  1145 10/10/22  0205 10/10/22  0052 10/09/22  1820   CA 8.3* 8.2* 8.5 8.8   < > 9.1   PHOS 2.7  --  3.0 3.8   < >  --    ALB  --   --   --   --   --  2.8*  2.7*   TP  --   --   --   --   --  8.5*  8.4*   TBILI  --   --   --   --   --  0.6  0.6    < > = values in this interval not displayed. Component Ref Range & Units 10/10/22 1420 3/30/21 1415   Osmolality, Serum 280 - 301 mOsmol/kg 346 High   358 High         Osmolality, Urine mOsmol/kg 491  621    Impression:  The esophagogastric tube ends at the proximal stomach        EXAM:  XR CHEST PORT     INDICATION:   aspiration pna/shortness of breath     COMPARISON: 10/9/2022 and priors. FINDINGS:  Stable cardiomediastinal silhouette. Increased right infrahilar hazy opacities. Streaky left retrocardiac opacities. No pneumothorax or pleural effusion. Intact  osseous structures.      IMPRESSION     Persistent basilar opacities right greater than left as seen on the recent CT. Impression:       Active Hospital Problems    Diagnosis Date Noted    ARF (acute renal failure) ([facility] 10/10/2022    Respiratory failure ([facility] 10/09/2022    Dementia, alcoholic ([facility] 38/97/9996    Aspiration into airway 10/09/2022    Hyperosmolality and hypernatremia 03/30/2021    Hypokalemia 06/22/2013      Hyperosmolar Hypernatremia with Hypokalemia  with Pre-Renal Bun & Creatinine ratio. Plan:    Continue Jevity  & Free water through  NG tube   at current rate & ad D5W  wit KCL 20 meq/liter  & run at 50 cc/hour, watch I/O record,continue  antibiotics & aspiration precautions, with Zehra Marus will go higher with increase in Bun/ Creatinine ratio.       Anu Higgins MD

## 2022-10-11 NOTE — PROGRESS NOTES
Difficult IV access and iv blood draws as multiple attempts failed. Dr aware of difficult access to obtain serial sodium/chem levels.  called on the vascular access team to place midline for pt iv meds:antibiotics, ivfs, &electrolyte repletion

## 2022-10-12 PROBLEM — J69.0 ASPIRATION PNEUMONIA (HCC): Status: ACTIVE | Noted: 2017-08-22

## 2022-10-12 PROBLEM — G40.909 SEIZURE DISORDER (HCC): Status: ACTIVE | Noted: 2022-10-12

## 2022-10-12 LAB
ANION GAP SERPL CALC-SCNC: 7 MMOL/L (ref 3–18)
ANION GAP SERPL CALC-SCNC: 8 MMOL/L (ref 3–18)
BACTERIA SPEC CULT: ABNORMAL
BASOPHILS # BLD: 0 K/UL (ref 0–0.1)
BASOPHILS NFR BLD: 0 % (ref 0–2)
BUN SERPL-MCNC: 16 MG/DL (ref 7–18)
BUN SERPL-MCNC: 24 MG/DL (ref 7–18)
BUN/CREAT SERPL: 28 (ref 12–20)
BUN/CREAT SERPL: 41 (ref 12–20)
CALCIUM SERPL-MCNC: 6.9 MG/DL (ref 8.5–10.1)
CALCIUM SERPL-MCNC: 8.8 MG/DL (ref 8.5–10.1)
CC UR VC: ABNORMAL
CHLORIDE SERPL-SCNC: 116 MMOL/L (ref 100–111)
CHLORIDE SERPL-SCNC: 122 MMOL/L (ref 100–111)
CO2 SERPL-SCNC: 20 MMOL/L (ref 21–32)
CO2 SERPL-SCNC: 22 MMOL/L (ref 21–32)
CREAT SERPL-MCNC: 0.58 MG/DL (ref 0.6–1.3)
CREAT SERPL-MCNC: 0.58 MG/DL (ref 0.6–1.3)
DIFFERENTIAL METHOD BLD: ABNORMAL
EOSINOPHIL # BLD: 0 K/UL (ref 0–0.4)
EOSINOPHIL NFR BLD: 0 % (ref 0–5)
ERYTHROCYTE [DISTWIDTH] IN BLOOD BY AUTOMATED COUNT: 15.3 % (ref 11.6–14.5)
GLUCOSE BLD STRIP.AUTO-MCNC: 121 MG/DL (ref 70–110)
GLUCOSE BLD STRIP.AUTO-MCNC: 94 MG/DL (ref 70–110)
GLUCOSE BLD STRIP.AUTO-MCNC: 98 MG/DL (ref 70–110)
GLUCOSE SERPL-MCNC: 123 MG/DL (ref 74–99)
GLUCOSE SERPL-MCNC: 171 MG/DL (ref 74–99)
HCT VFR BLD AUTO: 34.3 % (ref 36–48)
HGB BLD-MCNC: 10.3 G/DL (ref 13–16)
IMM GRANULOCYTES # BLD AUTO: 0.1 K/UL (ref 0–0.04)
IMM GRANULOCYTES NFR BLD AUTO: 1 % (ref 0–0.5)
LYMPHOCYTES # BLD: 0.5 K/UL (ref 0.9–3.6)
LYMPHOCYTES NFR BLD: 4 % (ref 21–52)
MAGNESIUM SERPL-MCNC: 2.2 MG/DL (ref 1.6–2.6)
MCH RBC QN AUTO: 24.7 PG (ref 24–34)
MCHC RBC AUTO-ENTMCNC: 30 G/DL (ref 31–37)
MCV RBC AUTO: 82.3 FL (ref 78–100)
MONOCYTES # BLD: 0.2 K/UL (ref 0.05–1.2)
MONOCYTES NFR BLD: 2 % (ref 3–10)
NEUTS SEG # BLD: 11.2 K/UL (ref 1.8–8)
NEUTS SEG NFR BLD: 93 % (ref 40–73)
NRBC # BLD: 0 K/UL (ref 0–0.01)
NRBC BLD-RTO: 0 PER 100 WBC
PHOSPHATE SERPL-MCNC: 1.8 MG/DL (ref 2.5–4.9)
PHOSPHATE SERPL-MCNC: 2.6 MG/DL (ref 2.5–4.9)
PLATELET # BLD AUTO: 150 K/UL (ref 135–420)
POTASSIUM SERPL-SCNC: 3.2 MMOL/L (ref 3.5–5.5)
POTASSIUM SERPL-SCNC: 4.3 MMOL/L (ref 3.5–5.5)
RBC # BLD AUTO: 4.17 M/UL (ref 4.35–5.65)
SERVICE CMNT-IMP: ABNORMAL
SODIUM SERPL-SCNC: 146 MMOL/L (ref 136–145)
SODIUM SERPL-SCNC: 149 MMOL/L (ref 136–145)
SODIUM SERPL-SCNC: 151 MMOL/L (ref 136–145)
WBC # BLD AUTO: 12 K/UL (ref 4.6–13.2)

## 2022-10-12 PROCEDURE — 65660000004 HC RM CVT STEPDOWN

## 2022-10-12 PROCEDURE — 74011000258 HC RX REV CODE- 258: Performed by: NURSE PRACTITIONER

## 2022-10-12 PROCEDURE — 99233 SBSQ HOSP IP/OBS HIGH 50: CPT

## 2022-10-12 PROCEDURE — 74011000250 HC RX REV CODE- 250: Performed by: INTERNAL MEDICINE

## 2022-10-12 PROCEDURE — 87506 IADNA-DNA/RNA PROBE TQ 6-11: CPT

## 2022-10-12 PROCEDURE — 74011000258 HC RX REV CODE- 258: Performed by: INTERNAL MEDICINE

## 2022-10-12 PROCEDURE — 84100 ASSAY OF PHOSPHORUS: CPT

## 2022-10-12 PROCEDURE — 82962 GLUCOSE BLOOD TEST: CPT

## 2022-10-12 PROCEDURE — 2709999900 HC NON-CHARGEABLE SUPPLY

## 2022-10-12 PROCEDURE — 74011000250 HC RX REV CODE- 250: Performed by: REGISTERED NURSE

## 2022-10-12 PROCEDURE — 74011250636 HC RX REV CODE- 250/636: Performed by: INTERNAL MEDICINE

## 2022-10-12 PROCEDURE — 80048 BASIC METABOLIC PNL TOTAL CA: CPT

## 2022-10-12 PROCEDURE — 74011000250 HC RX REV CODE- 250: Performed by: NURSE PRACTITIONER

## 2022-10-12 PROCEDURE — 99291 CRITICAL CARE FIRST HOUR: CPT | Performed by: INTERNAL MEDICINE

## 2022-10-12 PROCEDURE — APPSS15 APP SPLIT SHARED TIME 0-15 MINUTES: Performed by: PHYSICIAN ASSISTANT

## 2022-10-12 PROCEDURE — 74011250636 HC RX REV CODE- 250/636: Performed by: NURSE PRACTITIONER

## 2022-10-12 PROCEDURE — 74011250637 HC RX REV CODE- 250/637

## 2022-10-12 PROCEDURE — 74011250636 HC RX REV CODE- 250/636: Performed by: REGISTERED NURSE

## 2022-10-12 PROCEDURE — 85025 COMPLETE CBC W/AUTO DIFF WBC: CPT

## 2022-10-12 PROCEDURE — 74011250637 HC RX REV CODE- 250/637: Performed by: NURSE PRACTITIONER

## 2022-10-12 PROCEDURE — 83735 ASSAY OF MAGNESIUM: CPT

## 2022-10-12 PROCEDURE — 94762 N-INVAS EAR/PLS OXIMTRY CONT: CPT

## 2022-10-12 PROCEDURE — 36415 COLL VENOUS BLD VENIPUNCTURE: CPT

## 2022-10-12 PROCEDURE — 87040 BLOOD CULTURE FOR BACTERIA: CPT

## 2022-10-12 RX ORDER — METOPROLOL TARTRATE 25 MG/1
25 TABLET, FILM COATED ORAL 2 TIMES DAILY
Status: DISCONTINUED | OUTPATIENT
Start: 2022-10-12 | End: 2022-10-12

## 2022-10-12 RX ORDER — METOPROLOL TARTRATE 25 MG/1
25 TABLET, FILM COATED ORAL 2 TIMES DAILY
Status: DISCONTINUED | OUTPATIENT
Start: 2022-10-12 | End: 2022-10-22 | Stop reason: HOSPADM

## 2022-10-12 RX ORDER — CALCIUM GLUCONATE 20 MG/ML
1 INJECTION, SOLUTION INTRAVENOUS
Status: COMPLETED | OUTPATIENT
Start: 2022-10-12 | End: 2022-10-12

## 2022-10-12 RX ADMIN — ACETAMINOPHEN 650 MG: 325 TABLET ORAL at 21:38

## 2022-10-12 RX ADMIN — CALCIUM GLUCONATE 1000 MG: 20 INJECTION, SOLUTION INTRAVENOUS at 09:12

## 2022-10-12 RX ADMIN — CALCIUM GLUCONATE 1000 MG: 20 INJECTION, SOLUTION INTRAVENOUS at 10:12

## 2022-10-12 RX ADMIN — METOPROLOL TARTRATE 25 MG: 25 TABLET, FILM COATED ORAL at 17:42

## 2022-10-12 RX ADMIN — PIPERACILLIN AND TAZOBACTAM 4.5 G: 4; .5 INJECTION, POWDER, LYOPHILIZED, FOR SOLUTION INTRAVENOUS at 04:33

## 2022-10-12 RX ADMIN — HEPARIN SODIUM 5000 UNITS: 5000 INJECTION INTRAVENOUS; SUBCUTANEOUS at 06:02

## 2022-10-12 RX ADMIN — SODIUM CHLORIDE, PRESERVATIVE FREE 20 MG: 5 INJECTION INTRAVENOUS at 09:11

## 2022-10-12 RX ADMIN — PIPERACILLIN AND TAZOBACTAM 4.5 G: 4; .5 INJECTION, POWDER, LYOPHILIZED, FOR SOLUTION INTRAVENOUS at 12:57

## 2022-10-12 RX ADMIN — SODIUM CHLORIDE, PRESERVATIVE FREE 10 ML: 5 INJECTION INTRAVENOUS at 15:30

## 2022-10-12 RX ADMIN — PIPERACILLIN AND TAZOBACTAM 4.5 G: 4; .5 INJECTION, POWDER, LYOPHILIZED, FOR SOLUTION INTRAVENOUS at 21:19

## 2022-10-12 RX ADMIN — VANCOMYCIN HYDROCHLORIDE 1000 MG: 1 INJECTION, POWDER, LYOPHILIZED, FOR SOLUTION INTRAVENOUS at 17:44

## 2022-10-12 RX ADMIN — SODIUM CHLORIDE, PRESERVATIVE FREE 20 MG: 5 INJECTION INTRAVENOUS at 21:05

## 2022-10-12 RX ADMIN — VANCOMYCIN HYDROCHLORIDE 1000 MG: 1 INJECTION, POWDER, LYOPHILIZED, FOR SOLUTION INTRAVENOUS at 00:37

## 2022-10-12 RX ADMIN — SODIUM CHLORIDE, PRESERVATIVE FREE 10 ML: 5 INJECTION INTRAVENOUS at 21:05

## 2022-10-12 RX ADMIN — SODIUM CHLORIDE, PRESERVATIVE FREE 10 ML: 5 INJECTION INTRAVENOUS at 05:11

## 2022-10-12 RX ADMIN — HEPARIN SODIUM 5000 UNITS: 5000 INJECTION INTRAVENOUS; SUBCUTANEOUS at 15:30

## 2022-10-12 RX ADMIN — THIAMINE HYDROCHLORIDE 100 MG: 100 INJECTION, SOLUTION INTRAMUSCULAR; INTRAVENOUS at 09:04

## 2022-10-12 RX ADMIN — HEPARIN SODIUM 5000 UNITS: 5000 INJECTION INTRAVENOUS; SUBCUTANEOUS at 21:19

## 2022-10-12 RX ADMIN — POTASSIUM PHOSPHATE, MONOBASIC POTASSIUM PHOSPHATE, DIBASIC: 224; 236 INJECTION, SOLUTION, CONCENTRATE INTRAVENOUS at 08:57

## 2022-10-12 RX ADMIN — SODIUM CHLORIDE 750 MG: 900 INJECTION, SOLUTION INTRAVENOUS at 12:54

## 2022-10-12 RX ADMIN — FOLIC ACID: 5 INJECTION, SOLUTION INTRAMUSCULAR; INTRAVENOUS; SUBCUTANEOUS at 09:26

## 2022-10-12 NOTE — PROGRESS NOTES
Problem: Pressure Injury - Risk of  Goal: *Prevention of pressure injury  Description: Document Brian Scale and appropriate interventions in the flowsheet. Outcome: Progressing Towards Goal  Note: Pressure Injury Interventions:  Sensory Interventions: Assess changes in LOC, Avoid rigorous massage over bony prominences, Check visual cues for pain, Keep linens dry and wrinkle-free, Maintain/enhance activity level    Moisture Interventions: Absorbent underpads, Apply protective barrier, creams and emollients, Check for incontinence Q2 hours and as needed, Internal/External urinary devices    Activity Interventions: Pressure redistribution bed/mattress(bed type)    Mobility Interventions: HOB 30 degrees or less, Pressure redistribution bed/mattress (bed type)    Nutrition Interventions: Document food/fluid/supplement intake    Friction and Shear Interventions: Foam dressings/transparent film/skin sealants, HOB 30 degrees or less, Transferring/repositioning devices                Problem: Patient Education: Go to Patient Education Activity  Goal: Patient/Family Education  Outcome: Progressing Towards Goal     Problem: Risk for Spread of Infection  Goal: Prevent transmission of infectious organism to others  Description: Prevent the transmission of infectious organisms to other patients, staff members, and visitors. Outcome: Progressing Towards Goal     Problem: Patient Education:  Go to Education Activity  Goal: Patient/Family Education  Outcome: Progressing Towards Goal     Problem: Patient Education: Go to Patient Education Activity  Goal: Patient/Family Education  Outcome: Progressing Towards Goal     Problem: Nutrition Deficit  Goal: *Optimize nutritional status  Outcome: Progressing Towards Goal     Problem: Falls - Risk of  Goal: *Absence of Falls  Description: Document Luigi Haja Fall Risk and appropriate interventions in the flowsheet.   Outcome: Progressing Towards Goal  Note: Fall Risk Interventions: Mentation Interventions: Bed/chair exit alarm, Adequate sleep, hydration, pain control, Evaluate medications/consider consulting pharmacy    Medication Interventions: Bed/chair exit alarm, Evaluate medications/consider consulting pharmacy    Elimination Interventions: Bed/chair exit alarm, Call light in reach              Problem: Patient Education: Go to Patient Education Activity  Goal: Patient/Family Education  Outcome: Progressing Towards Goal     Problem: Pain  Goal: *Control of Pain  Outcome: Progressing Towards Goal  Goal: *PALLIATIVE CARE:  Alleviation of Pain  Outcome: Progressing Towards Goal     Problem: Patient Education: Go to Patient Education Activity  Goal: Patient/Family Education  Outcome: Progressing Towards Goal

## 2022-10-12 NOTE — PROGRESS NOTES
[facility] Hospitalist Group  Progress Note    Patient: Laura Mednia Age: 79 y.o. : 1955 MR#: [id number] SSN: [id number]  Date: 10/12/2022        Assessment/Plan:     Hospital Problems  Date Reviewed: 10/11/2022            Codes Class Noted POA    Seizure disorder ([facility] ICD-10-CM: G40.909  ICD-9-CM: 345.90  10/12/2022 Yes        Acute kidney injury [facility]) ICD-10-CM: N17.9  ICD-9-CM: 584.9  10/10/2022 Unknown        Respiratory failure ([facility] ICD-10-CM: J96.90  ICD-9-CM: 518.81  10/9/2022 Unknown        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[facility] DJU-84-CO: F10.27  ICD-9-CM: 291.2  10/9/2022 Unknown        Aspiration into airway ICD-10-CM: T17.908A  ICD-9-CM: 934.9  10/9/2022 Unknown        Hyperosmolality and hypernatremia ICD-10-CM: E87.0  ICD-9-CM: 276.0  3/30/2021 Unknown        Dehydration ICD-10-CM: E86.0  ICD-9-CM: 276.51  3/30/2021 Yes        COVID-19 ICD-10-CM: U07.1  ICD-9-CM: 079.89  3/30/2021 Yes        Aspiration pneumonia ([facility] ICD-10-CM: J69.0  ICD-9-CM: 507.0  2017 Unknown        Hypokalemia ICD-10-CM: E87.6  ICD-9-CM: 276.8  2013 Unknown        Hypertension ICD-10-CM: I10  ICD-9-CM: 401.9  2012 Unknown         Acute Respiratory Failure- resolved     Sepsis- secondary to Aspiration PNA/UTI  - IV Abx per ID   - Aspiration Precaution  - NGT with Jevity- Q6h Glucose check with SSI  - Cardiac Monitoring   - Urine Cx from 10/9 +Citrobacter Koseri    - 1/2 Blood culture positive from 10/9   - Repeat blood cultures   - ID on board- appreciate assistance     COVID + and Enterovirus/Rhinovirus +- currently asymptomatic from these infections   - Droplet + isolation - discontinue on 10/20.  - Completed steroids and bronchodilators     Hypernatremia- secondary to hypovolemia/dehydration.  Sodium and Kidney functions improving  - con't free water replacement at 50ml/hr    PARAMJIT- likely prerenal d/t dehydration  - Monitor BMP  - Nephro on board- appreciate assistance  - Strict I&O    Hypokalemia- K improved to 4.3 from 3.1  - Con't D5 with 20K at 50ml/hr per nephro     Alcoholic Dementia-   - Dementia at baseline, observe for worsening mentation  - Neuro checks   - Con't thiamine, folic acid    Seizure Disorder-  - Con't Keppra 750mg IV q12h    Hypertension-   - Restart home metoprolol 25mg BID   - PRN Hydralazine for SBP >170      DVT Prophylaxis:  []Lovenox  [x]Hep SQ  []SCDs  []Coumadin   []On Heparin gtt []PO anticoagulant    Anticipated discharge: 2 days    Time spent reviewing records, independently interpreting results, obtaining history from patient or caregiver, performing physical exam, ordering tests and medications, communicating with specialists, documenting in the chart, and coordinating overall care is  >30 minutes     Subjective:     Pt s/e @ bedside. No major events overnight. Pt in awake, non verbal. Does not follow commands. Has dementia. PICC line/NGT and Reid in place. Hospital Course:     Patient is a 79 y.o. male with a significant PMHx of CVA with left-sided hemiparesis and aphasia, HTN, HLD, dysphagia, seizure disorder, schizophrenia, alcohol-induced dementia, severe protein calorie malnutrition and debility who presents from [facility] in acute hypoxic respiratory failure secondary to SIRS vs. Sepsis due aspiration pneumonia. Patient was also found to be aspirating food- copious amounts of mucus/food was suctioned upon admission, NGT was inserted to assist with lowering Na. He was also found to have severe hypernatremia with a sodium level of 160 due to severe hypovolemia from dehydration/poor PO intake. He was also found to have questionable UTI, although less likely. Further lab work was significant for a +RVP panel. Patient is COVID + and rhinovirus/enterovirus +. Patient is asymptomatic and respiratory failure is due to his aspiration requiring Vapotherm which has been weaned off. He was also weaned off HFNC.  Pt is DNR- Palliative Care consulted.        Objective:   VS: Visit Vitals  BP (!) 152/98   Pulse 94   Temp 98.5 °F (36.9 °C)   Resp 21   Ht 5' 10\" (1.778 m)   Wt 67.9 kg (149 lb 11.1 oz)   SpO2 99%   BMI 21.48 kg/m²      Tmax/24hrs: Temp (24hrs), Av.1 °F (36.7 °C), Min:97.4 °F (36.3 °C), Max:98.9 °F (37.2 °C)    Intake/Output Summary (Last 24 hours) at 10/12/2022 1532  Last data filed at 10/12/2022 1400  Gross per 24 hour   Intake 3510.83 ml   Output 1000 ml   Net 2510.83 ml       General Appearance: NAD, non verbal   HENT: normocephalic/atraumatic, moist mucus membranes  Neck: No JVD, supple  Lungs: CTA with normal respiratory effort  CV: RRR, no m/r/g  Abdomen: soft, non-tender, normal bowel sounds  Extremities: no cyanosis, no peripheral edema  Neuro: awake, non verbal, unable to follow commands   Skin: Normal color, intact    Current Facility-Administered Medications   Medication Dose Route Frequency    vancomycin (VANCOCIN) 1,000 mg in dextrose 5% 250 mL IVPB  1,000 mg IntraVENous Q18H    dextrose 5% with KCl 20 mEq/L infusion   IntraVENous CONTINUOUS    sodium chloride (NS) flush 5-40 mL  5-40 mL IntraVENous Q8H    sodium chloride (NS) flush 5-40 mL  5-40 mL IntraVENous PRN    albuterol-ipratropium (DUO-NEB) 2.5 MG-0.5 MG/3 ML  3 mL Nebulization Q4H PRN    insulin lispro (HUMALOG) injection   SubCUTAneous Q6H    glucose chewable tablet 16 g  4 Tablet Oral PRN    glucagon (GLUCAGEN) injection 1 mg  1 mg IntraMUSCular PRN    dextrose 10% infusion 0-250 mL  0-250 mL IntraVENous PRN    hydrALAZINE (APRESOLINE) 20 mg/mL injection 10 mg  10 mg IntraVENous Q6H PRN    piperacillin-tazobactam (ZOSYN) 4.5 g in dextrose 5% 100 mL IVPB  4.5 g IntraVENous Q8H    acetaminophen (TYLENOL) tablet 650 mg  650 mg Oral Q6H PRN    Or    acetaminophen (TYLENOL) suppository 650 mg  650 mg Rectal Q6H PRN    polyethylene glycol (MIRALAX) packet 17 g  17 g Oral DAILY PRN    ondansetron (ZOFRAN ODT) tablet 4 mg  4 mg Oral Q8H PRN    Or    ondansetron (ZOFRAN) injection 4 mg  4 mg IntraVENous Q6H PRN    heparin (porcine) injection 5,000 Units  5,000 Units SubCUTAneous Q8H    levETIRAcetam (KEPPRA) 750 mg in 0.9% sodium chloride 100 mL IVPB  750 mg IntraVENous O40B    folic acid (FOLVITE) 1 mg in 0.9% sodium chloride 50 mL ivpb   IntraVENous DAILY    thiamine (B-1) 100 mg in 0.9% sodium chloride 50 mL IVPB  100 mg IntraVENous DAILY    famotidine (PF) (PEPCID) 20 mg in 0.9% sodium chloride 10 mL injection  20 mg IntraVENous Q12H        Labs:    Recent Results (from the past 24 hour(s))   GLUCOSE, POC    Collection Time: 10/11/22  5:59 PM   Result Value Ref Range    Glucose (POC) 88 70 - 053 mg/dL   METABOLIC PANEL, BASIC    Collection Time: 10/11/22  7:10 PM   Result Value Ref Range    Sodium 153 (H) 136 - 145 mmol/L    Potassium 3.9 3.5 - 5.5 mmol/L    Chloride 120 (H) 100 - 111 mmol/L    CO2 26 21 - 32 mmol/L    Anion gap 7 3.0 - 18 mmol/L    Glucose 142 (H) 74 - 99 mg/dL    BUN 34 (H) 7.0 - 18 MG/DL    Creatinine 0.85 0.6 - 1.3 MG/DL    BUN/Creatinine ratio 40 (H) 12 - 20      eGFR >60 >60 ml/min/1.73m2    Calcium 8.5 8.5 - 10.1 MG/DL   SODIUM    Collection Time: 10/11/22 10:30 PM   Result Value Ref Range    Sodium 151 (H) 136 - 145 mmol/L   GLUCOSE, POC    Collection Time: 10/11/22 11:33 PM   Result Value Ref Range    Glucose (POC) 96 70 - 110 mg/dL   GLUCOSE, POC    Collection Time: 10/12/22  5:09 AM   Result Value Ref Range    Glucose (POC) 121 (H) 70 - 051 mg/dL   METABOLIC PANEL, BASIC    Collection Time: 10/12/22  5:22 AM   Result Value Ref Range    Sodium 149 (H) 136 - 145 mmol/L    Potassium 4.3 3.5 - 5.5 mmol/L    Chloride 122 (H) 100 - 111 mmol/L    CO2 20 (L) 21 - 32 mmol/L    Anion gap 7 3.0 - 18 mmol/L    Glucose 171 (H) 74 - 99 mg/dL    BUN 24 (H) 7.0 - 18 MG/DL    Creatinine 0.58 (L) 0.6 - 1.3 MG/DL    BUN/Creatinine ratio 41 (H) 12 - 20      eGFR >60 >60 ml/min/1.73m2    Calcium 6.9 (L) 8.5 - 10.1 MG/DL   MAGNESIUM    Collection Time: 10/12/22  5:22 AM Result Value Ref Range    Magnesium 2.2 1.6 - 2.6 mg/dL   PHOSPHORUS    Collection Time: 10/12/22  5:22 AM   Result Value Ref Range    Phosphorus 1.8 (L) 2.5 - 4.9 MG/DL   GLUCOSE, POC    Collection Time: 10/12/22 12:16 PM   Result Value Ref Range    Glucose (POC) 98 70 - 110 mg/dL       Imaging:  ECHO ADULT FOLLOW-UP OR LIMITED    Result Date: 10/11/2022    Left Ventricle: Normal left ventricular systolic function. Left ventricle size is normal. Normal wall thickness. Normal wall motion.         Signed By: ANITA Dietrich DR. [facility]  H[facility] Division  Office:  [phone]           October 12, 2022 3:32 PM

## 2022-10-12 NOTE — PALLIATIVE CARE
[facility]  Good Help to Those in Need  [phone]    Consult Note  Patient Name: Hiral Antonio  YOB: 1955  Age: 79 y.o. Date of Visit: 10/12/22  Facility of Care: [facility]  Patient Room: 308/01     Attending: Celine Bolton MD   Diagnosis: Respiratory failure [facility]) [J96.90]  Dementia, alcoholic ([facility] [N49.68]  Aspiration into airway [T17.908A]    Have confirmed with the patient's son Adebayo Villaseñor that he is ok with long term feeding tube if that is required. Also confirmed the POST originally done 4/2021. Thank you for the opportunity to assist in the care of this patient and their family. Please contact me at [phone] if you have any further questions.      DARLENE Rubi-C  Palliative Medicine   [place], [place]

## 2022-10-12 NOTE — CONSULTS
Infectious Disease Consultation Note        Reason:sepsis, aspiration pneumonia, covid-19 pneumonia    Current abx Prior abx   Zosyn, vancomycin since 10/9/22      Lines: Reid since 10/10/2022    Assessment :  79 y.o. male with a significant PMHx of CVA with left-sided hemiparesis and aphasia, HTN, HLD, dysphagia, seizure disorder, schizophrenia, alcohol-induced dementia, severe protein calorie malnutrition and debility who presented to [facility] from [facility] on 10/10/2022 with hypoxia. Hospitalization [facility] July 2017 for right foot cellulitis, infected right foot ulcer secondary to Proteus, citrobacter, e.coli, mssa, e.fecalis, e.avium, strep viridans. Surgical wound cultures 7/11 : proteus,citrobacter   S/p I&D of right foot ulcer on 7/11- intra op findings noted - no evidence of bone infection. Clinical presentation consistent with sepsis, acute hypoxic respiratory failure-present on admission due to aspiration pneumonia superimposed on recent COVID-19 infection    1 of 2 sets of blood culture positive for coagulase-negative Staphylococcus on 10/10-likely contaminant. No definitive evidence of skin/soft tissue infection as a source of bloodstream infection    70,000 colonies of Citrobacter in urine culture 10/9-likely colonizer. No significant pyuria to suggest cystitis    Positive respiratory viral panel PCR 10/10/2022 for enterovirus, rhinovirus, COVID-19-currently asymptomatic from these infections    Acute kidney injury-likely prerenal     Recommendations:     1. Continue piperacillin/tazobactam.  Discontinue vancomycin  2. Repeat blood cultures  3. Discontinue Reid. Give voiding trial when ok with nephrology                                                                                                                                                         4.  Aspiration precautions  5. Monitor CBC, temperature, oxygenation, clinically  6.   Discontinue droplet plus isolation on 10/20/2022       Thank you for consultation request. Above plan was discussed in details with ICU team. Please call me if any further questions or concerns. Will continue to participate in the care of this patient. HPI:    79 y.o. male with a significant PMHx of CVA with left-sided hemiparesis and aphasia, HTN, HLD, dysphagia, seizure disorder, schizophrenia, alcohol-induced dementia, severe protein calorie malnutrition and debility who presented to [facility] from [address] [place] [address] on 10/10/2022 with hypoxia. while at the nursing home, the patient was found to be in respiratory distress by his primary nurse. He had a room air saturation of 80%. He was placed on a nonrebreather mask with his oxygen saturation in 90s. The EMS team reported they did suction his airway and had copious material some that resemble food products. The patient was then placed on CPAP. On arrival to the ER, he was noted to be febrile with Tmax of 105 rectally, tachycardic with -150's, and tachypnea. Hypertensive with no vasopressor requirement. Chest x-ray performed demonstrating atelectasis and infiltrates. CT Chest of with findings of RLL/LLL dense consolidations and atelectasis. Further lab work demonstrated a severe hypernatremia with a sodium level of 160 and an PARAMJIT likely in the setting of hypovolemia/dehydration. Sepsis work-up initiated, given IVF resuscitation, started on broad spectrum abx, and cultures obtained. Patient was admitted to the ICU with a diagnosis of  acute hypoxic respiratory failure secondary to SIRS vs. Sepsis due aspiration pneumonia. He was also found to have severe hypernatremia with a sodium level of 160 due to severe hypovolemia from dehydration/poor PO intake. . Further lab work was significant for a +RVP panel. Patient is COVID + and rhinovirus/enterovirus +. He was weaned off HFNC. Urine culture revealed Citrobacter.   1 of 2 sets of blood cultures on admission positive for coagulase-negative Staphylococcus. I been consulted for further recommendations. Patient is confused and unable to communicate effectively. Detailed review system not feasible      Past Medical History:   Diagnosis Date    Alcohol dependence with alcohol-induced persisting dementia ([address]     per [facility] 4/23/21    Anemia     Aphasia     Benign prostatic hyperplasia     Cerebral vascular disease     COVID-19 04/06/2021    Disturbances of salivary secretion     Dysphagia     GERD (gastroesophageal reflux disease)     Hemiparesis (HCC)     left side    Hemiplegia (HCC)     left side     Hiccups     Hyperlipidemia     Hypernatremia 03/2021    Hypertension     Hyponatremia     Insomnia     Lacunar infarction ([address] 2010    weakness both arms, unable to walk    Lower extremity edema     Moderate protein-calorie malnutrition (HCC)     MRSA (methicillin resistant Staphylococcus aureus)     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)     Pneumonia     Psychogenic polydipsia     PVD (peripheral vascular disease) ([address]     Seizures ([address]     Spinal stenosis     Stroke ([address] 2017    Unspecified convulsions ([address]     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/2021 [facility]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 doxepin (SINEquan) 10 mg capsule Take 1 Cap by mouth nightly. clopidogrel (PLAVIX) 75 mg tab Take 1 Tab by mouth daily.   Qty: 90 Tab, Refills: 0      pravastatin (PRAVACHOL) 40 mg tablet Take 1 Tab by mouth nightly. Qty: 30 Tab, Refills: 1      ascorbic acid, vitamin C, (VITAMIN C) 250 mg tablet Take 1 Tab by mouth daily. Qty: 30 Tab, Refills: 2      ferrous sulfate 325 mg (65 mg iron) tablet Take 1 Tab by mouth daily (with breakfast). Qty: 30 Tab, Refills: 2      polyethylene glycol (MIRALAX) 17 gram packet Take 1 Packet by mouth daily. Qty: 30 Packet, Refills: 2      senna-docusate (PERICOLACE) 8.6-50 mg per tablet Take 1 Tab by mouth nightly. HOLD for loose stool. Qty: 30 Tab, Refills: 2      tamsulosin (FLOMAX) 0.4 mg capsule Take 0.4 mg by mouth daily. 1 tab daily      MULTIVITAMIN,THERAPEUTIC (THERA MULTI-VITAMIN PO) Take 500 mg by mouth daily. baclofen (LIORESAL) 10 mg tablet Take 1 Tab by mouth every twelve (12) hours as needed. Qty: 15 Tab, Refills: 0      metoprolol (LOPRESSOR) 25 mg tablet Take 1 Tab by mouth two (2) times a day. Qty: 60 Tab, Refills: 2      cyanocobalamin 1,000 mcg tablet Take 1 Tab by mouth daily. Qty: 30 Tab, Refills: 2      folic acid (FOLVITE) 1 mg tablet Take 1 Tab by mouth daily. Qty: 30 Tab, Refills: 1      aspirin 81 mg chewable tablet Take 1 Tab by mouth daily.   Qty: 30 Tab, Refills: 0             Current Facility-Administered Medications   Medication Dose Route Frequency    potassium phosphate 30 mmol in 0.9% sodium chloride 250 mL infusion   IntraVENous ONCE    vancomycin (VANCOCIN) 1,000 mg in dextrose 5% 250 mL IVPB  1,000 mg IntraVENous Q18H    dextrose 5% with KCl 20 mEq/L infusion   IntraVENous CONTINUOUS    sodium chloride (NS) flush 5-40 mL  5-40 mL IntraVENous Q8H    sodium chloride (NS) flush 5-40 mL  5-40 mL IntraVENous PRN    albuterol-ipratropium (DUO-NEB) 2.5 MG-0.5 MG/3 ML  3 mL Nebulization Q4H PRN    insulin lispro (HUMALOG) injection   SubCUTAneous Q6H    glucose chewable tablet 16 g  4 Tablet Oral PRN    glucagon (GLUCAGEN) injection 1 mg  1 mg IntraMUSCular PRN    dextrose 10% infusion 0-250 mL  0-250 mL IntraVENous PRN    hydrALAZINE (APRESOLINE) 20 mg/mL injection 10 mg  10 mg IntraVENous Q6H PRN    piperacillin-tazobactam (ZOSYN) 4.5 g in dextrose 5% 100 mL IVPB  4.5 g IntraVENous Q8H    acetaminophen (TYLENOL) tablet 650 mg  650 mg Oral Q6H PRN    Or    acetaminophen (TYLENOL) suppository 650 mg  650 mg Rectal Q6H PRN    polyethylene glycol (MIRALAX) packet 17 g  17 g Oral DAILY PRN    ondansetron (ZOFRAN ODT) tablet 4 mg  4 mg Oral Q8H PRN    Or    ondansetron (ZOFRAN) injection 4 mg  4 mg IntraVENous Q6H PRN    heparin (porcine) injection 5,000 Units  5,000 Units SubCUTAneous Q8H    levETIRAcetam (KEPPRA) 750 mg in 0.9% sodium chloride 100 mL IVPB  750 mg IntraVENous A69W    folic acid (FOLVITE) 1 mg in 0.9% sodium chloride 50 mL ivpb   IntraVENous DAILY    thiamine (B-1) 100 mg in 0.9% sodium chloride 50 mL IVPB  100 mg IntraVENous DAILY    famotidine (PF) (PEPCID) 20 mg in 0.9% sodium chloride 10 mL injection  20 mg IntraVENous Q12H       Allergies: Patient has no known allergies.     Family History   Problem Relation Age of Onset    Hypertension Other      Social History     Socioeconomic History    Marital status:      Spouse name: Not on file    Number of children: Not on file    Years of education: Not on file    Highest education level: Not on file   Occupational History    Not on file   Tobacco Use    Smoking status: Former     Packs/day: 0.50     Types: Cigarettes     Quit date: 2008     Years since quittin.3    Smokeless tobacco: Never   Substance and Sexual Activity    Alcohol use: No    Drug use: No    Sexual activity: Not Currently   Other Topics Concern    Not on file   Social History Narrative    Not on file     Social Determinants of Health     Financial Resource Strain: Not on file   Food Insecurity: Not on file   Transportation Needs: Not on file   Physical Activity: Not on file   Stress: Not on file   Social Connections: Not on file   Intimate Partner Violence: Not on file   Housing Stability: Not on file     Social History     Tobacco Use   Smoking Status Former    Packs/day: 0.50    Types: Cigarettes    Quit date: 2008    Years since quittin.3   Smokeless Tobacco Never        Temp (24hrs), Av °F (36.7 °C), Min:97.4 °F (36.3 °C), Max:98.9 °F (37.2 °C)    Visit Vitals  BP (!) 150/92   Pulse 94   Temp 98.9 °F (37.2 °C)   Resp 23   Ht 5' 10\" (1.778 m)   Wt 67.9 kg (149 lb 11.1 oz)   SpO2 100%   BMI 21.48 kg/m²       ROS: Unable to obtain due to patient factors    Physical Exam:    General:  Sitting on bed, drowsy, non-verbal    Head:  Normocephalic, without obvious abnormality, atraumatic. Eyes:  Conjunctivae/corneas clear.,   Nose: Nares normal.  NG tube in place. No drainage or sinus tenderness. Throat: Lips, mucosa, and tongue normal. Teeth and gums of poor dentition, lips dried and cracked/peeling    Neck: Supple, symmetrical, trachea midline, no adenopathy, thyroid: no enlargment/tenderness/nodules, no carotid bruit and no JVD. Back:   Symmetric, no curvature. Lungs:   Coarse to auscultation bilaterally. Chest wall:  No tenderness or deformity. Heart:  Tachyardia, S1, S2 normal, no  click, rub or gallop. Abdomen:   Abd flat, Soft, non-tender. Bowel sounds normal. No masses,  No organomegaly. Healed surgical scar in upper abdomen   Extremities: Extremities normal, atraumatic, no cyanosis or edema. Pulses: 2+ and symmetric all extremities.    Skin: BLE with multiple stages of healing from venous stasis ulcers    Lymph nodes:  Not examined   Neurologic: Grossly non-focal; non-verbal, does not follow commands        Labs: Results:   Chemistry Recent Labs     10/12/22  0522 10/11/22  2230 10/11/22  1910 10/11/22  1510 10/11/22  0610 10/10/22  0052 10/09/22  1820   *  --  142*  --  132*   < > 134*   * 151* 153*   < > 157*   < > 160*   K 4.3  --  3.9  --  3.3*   < > 3.9   *  --  120*  --  128*   < > 128*   CO2 20*  --  26  --  22   < > 24   BUN 24*  --  34*  --  42*   < > 19* CREA 0.58*  --  0.85  --  0.95   < > 0.94   CA 6.9*  --  8.5  --  8.3*   < > 9.1   AGAP 7  --  7  --  7   < > 8   BUCR 41*  --  40*  --  44*   < > 20   AP  --   --   --   --   --   --  93  97   TP  --   --   --   --   --   --  8.5*  8.4*   ALB  --   --   --   --   --   --  2.8*  2.7*   GLOB  --   --   --   --   --   --  5.7*  5.7*   AGRAT  --   --   --   --   --   --  0.5*  0.5*    < > = values in this interval not displayed. CBC w/Diff Recent Labs     10/11/22  0610 10/10/22  0205 10/09/22  1820   WBC 14.6* 10.6 11.3   RBC 4.42 4.64 5.47   HGB 11.1* 11.6* 13.8   HCT 37.1 39.0 45.6    195 287   GRANS 81* 86* 93*   LYMPH 11* 10* 4*   EOS 0 0 0      Microbiology Recent Labs     10/10/22  0634 10/09/22  1850 10/09/22  1837 10/09/22  1820   CULT MRSA NOT PRESENT  Screening of patient nares for MRSA is for surveillance purposes and, if positive, to facilitate isolation considerations in high risk settings. It is not intended for automatic decolonization interventions per se as regimens are not sufficiently effective to warrant routine use. GRAM NEGATIVE RODS* NO GROWTH 3 DAYS STAPHYLOCOCCUS SPECIES, COAGULASE NEGATIVE GROWING IN 1 OF 2 BOTTLES DRAWN SITE=RAC*          RADIOLOGY:    All available imaging studies/reports in [facility] for this admission were reviewed      Disclaimer: Sections of this note are dictated utilizing voice recognition software, which may have resulted in some phonetic based errors in grammar and contents. Even though attempts were made to correct all the mistakes, some may have been missed, and remained in the body of the document. If questions arise, please contact our department.     Dr. Carmelo Stark, Infectious Disease Specialist  [phone]  October 12, 2022  10:28 AM

## 2022-10-12 NOTE — PROGRESS NOTES
Patient received to unit via bed with Cori Bernard, RN at bedside. Patient awake with eyes open, no signs of distress. NGT in place to right nare @65  with Nasal canula in place on 3 liters. Dual skin assessed with this Director and Cori Bernard RN from ICU upon transfer. Healing wound on sacrum. Appears to be possible DTI to right great toe. 1710 - Incontinent care provided, patient turned to right side with wedges, prevalon boots in place bilaterally and mepilex to sacrum.

## 2022-10-12 NOTE — PROGRESS NOTES
[address] [place] [address] [facility]. Pulmonary, Critical Care, and Sleep Medicine    Name: Jai Dhillon MRN: [id number]   : 1955 Hospital: [address] [place] [address] Dr   Date: 10/12/2022  Admission Date: 10/9/2022     Chart and notes reviewed. Data reviewed. I have evaluated all findings. [x]I have reviewed the flowsheet and previous days notes. []The patient is unable to give any meaningful history or review of systems because the patient is:  []Intubated []Sedated   []Unresponsive      []The patient is critically ill on      []Mechanical ventilation []Pressors   []BiPAP []         Interval HPI:Patient is a 79 y.o. male with a significant PMHx of CVA with left-sided hemiparesis and aphasia, HTN, HLD, dysphagia, seizure disorder, schizophrenia, alcohol-induced dementia, severe protein calorie malnutrition and debility who presents from [facility] in acute hypoxic respiratory failure secondary to SIRS vs. Sepsis due aspiration pneumonia. He was also found to have severe hypernatremia with a sodium level of 160 due to severe hypovolemia from dehydration/poor PO intake. He was also found to have questionable UTI, although less likely. Further lab work was significant for a +RVP panel. Patient is COVID + and rhinovirus/enterovirus +. Patient is asymptomatic and respiratory failure is due to his aspiration. He was weaned off HFNC. Pt is DNR/DNI with a post on file. Subjective 10/12/22  Hospital Day: 3  Vent Day: N/A  Overnight events: No significant events noted overnight  Mentation/Activity: awake, non-verbal, does not follow commands; has dementia  Respiratory/ Secretions: stable on RA  Hemodynamics: stable  Urine output, bowel: adequate  Diet: start TF today  Need for procedures: n/a                ROS:Review of systems not obtained due to patient factors. Events and notes from last 24 hours reviewed.      Patient Active Problem List   Diagnosis Code    Intractable hiccups R06.6 Essential hypertension, benign I10    Hypokalemia E87.6    First degree AV block I44.0    Former heavy tobacco smoker Z87.891    Severe protein-calorie malnutrition (HCC) E43    Psychogenic polydipsia R63.1, F54    Hyponatremia E87.1    Cellulitis L03.90    Right foot infection L08.9    Bilateral leg and foot pain M79.604, M79.605, M79.671, M79.672    Pneumonia J18.9    Stroke ([address] I63.9    Hyperosmolality and hypernatremia T43.5    Acute metabolic encephalopathy V05.00    Dehydration E86.0    COVID-19 U07.1    Hematuria R31.9    Dysuria R30.0    Goals of care, counseling/discussion Z71.89    Debility R53.81    Respiratory failure ([facility]) J96.90    Dementia, alcoholic ([facility]) M23.38    Aspiration into airway T17.908A    ARF (acute renal failure) ([facility]) N17.9       Vital Signs:  Visit Vitals  BP (!) 150/92   Pulse 94   Temp 97.4 °F (36.3 °C)   Resp 23   Ht 5' 10\" (1.778 m)   Wt 67.9 kg (149 lb 11.1 oz)   SpO2 100%   BMI 21.48 kg/m²       O2 Device: None (Room air)   O2 Flow Rate (L/min): 2 l/min   Temp (24hrs), Av.9 °F (36.6 °C), Min:97.4 °F (36.3 °C), Max:98.4 °F (36.9 °C)       Intake/Output:   Last shift:      No intake/output data recorded.   Last 3 shifts: 10/10 1901 - 10/12 0700  In: 4678.3 [I.V.:2218.3]  Out: 650 [Urine:650]    Intake/Output Summary (Last 24 hours) at 10/12/2022 0839  Last data filed at 10/12/2022 0413  Gross per 24 hour   Intake 3358.33 ml   Output 550 ml   Net 2808.33 ml          Current Facility-Administered Medications   Medication Dose Route Frequency    potassium phosphate 30 mmol in 0.9% sodium chloride 250 mL infusion   IntraVENous ONCE    vancomycin (VANCOCIN) 1,000 mg in dextrose 5% 250 mL IVPB  1,000 mg IntraVENous Q18H    dextrose 5% with KCl 20 mEq/L infusion   IntraVENous CONTINUOUS    sodium chloride (NS) flush 5-40 mL  5-40 mL IntraVENous Q8H    insulin lispro (HUMALOG) injection   SubCUTAneous Q6H    piperacillin-tazobactam (ZOSYN) 4.5 g in dextrose 5% 100 mL IVPB  4.5 g IntraVENous Q8H    heparin (porcine) injection 5,000 Units  5,000 Units SubCUTAneous Q8H    levETIRAcetam (KEPPRA) 750 mg in 0.9% sodium chloride 100 mL IVPB  750 mg IntraVENous D90X    folic acid (FOLVITE) 1 mg in 0.9% sodium chloride 50 mL ivpb   IntraVENous DAILY    thiamine (B-1) 100 mg in 0.9% sodium chloride 50 mL IVPB  100 mg IntraVENous DAILY    famotidine (PF) (PEPCID) 20 mg in 0.9% sodium chloride 10 mL injection  20 mg IntraVENous Q12H         Telemetry: [x]Sinus []A-flutter []Paced    []A-fib []Multiple PVCs                  Physical Exam:      General:  Alert, no distress, frail, ill and malnourished appearing, non-verbal    Head:  Normocephalic, without obvious abnormality, atraumatic. Eyes:  Conjunctivae/corneas clear. PERRL   Nose: Nares normal. Septum midline. Mucosa dry. No drainage or sinus tenderness. Throat: Lips, mucosa, and tongue normal. Teeth and gums of poor dentition, lips dried and cracked/peeling    Neck: Supple, symmetrical, trachea midline, no adenopathy, thyroid: no enlargment/tenderness/nodules, no carotid bruit and no JVD. Back:   Symmetric, no curvature. Lungs:   Coarse to auscultation bilaterally. Chest wall:  No tenderness or deformity. Heart:  Tachyardia, S1, S2 normal, no murmur, click, rub or gallop. Abdomen:   Abd flat, Soft, non-tender. Bowel sounds normal. No masses,  No organomegaly. Extremities: Extremities normal, atraumatic, no cyanosis or edema. Pulses: 2+ and symmetric all extremities. Skin: BLE with multiple stages of healing from venous stasis ulcers    Lymph nodes:  Cervical, supraclavicular, and axillary nodes normal.   Neurologic: Grossly non-focal; non-verbal, no command following, but alert       Devices:   Reid, PIV, NGT       DATA:  MAR reviewed and pertinent medications noted or modified as needed    Labs:  Recent Labs     10/11/22  0610 10/10/22  0205 10/09/22  1820   WBC 14.6* 10.6 11.3   HGB 11.1* 11.6* 13.8   HCT 37.1 39.0 45.6  195 287     Recent Labs     10/12/22  0522 10/11/22  2230 10/11/22  1910 10/11/22  1510 10/11/22  0610 10/10/22  1420 10/10/22  1145 10/10/22  0701 10/10/22  0205 10/10/22  0052 10/09/22  1820   * 151* 153*   < > 157*   < > 162*   < > 163*   < > 160*   K 4.3  --  3.9  --  3.3*   < > 3.1*  --  4.0   < > 3.9   *  --  120*  --  128*   < > 130*  --  131*   < > 128*   CO2 20*  --  26  --  22   < > 26  --  24   < > 24   *  --  142*  --  132*   < > 158*  --  168*   < > 134*   BUN 24*  --  34*  --  42*   < > 34*  --  27*   < > 19*   CREA 0.58*  --  0.85  --  0.95   < > 1.10  --  1.10   < > 0.94   CA 6.9*  --  8.5  --  8.3*   < > 8.5  --  8.8   < > 9.1   MG 2.2  --   --   --  2.6  --  2.7*  --  2.6   < > 2.6   PHOS 1.8*  --   --   --  2.7  --  3.0  --  3.8   < >  --    ALB  --   --   --   --   --   --   --   --   --   --  2.8*  2.7*   ALT  --   --   --   --   --   --   --   --   --   --  18  17   INR  --   --   --   --   --   --   --   --  1.5*  --   --     < > = values in this interval not displayed. No results for input(s): PH, PCO2, PO2, HCO3, FIO2 in the last 72 hours. Recent Labs     10/09/22  2201 10/09/22  1840   FIO2I 60  --    HCO3I 21.1* 22.0   PCO2I 31.8* 25.8*   PHI 7.43 7.54*   PO2I 162* 221*       Imaging:  [x]   I have personally reviewed the patients radiographs and reports  XR Results (most recent):  XR Results (most recent):  Results from Hospital Encounter encounter on 10/09/22    XR ABD (KUB)    Narrative  EXAM : ABDOMEN PORTABLE  2014 HOURS    CLINICAL HISTORY/INDICATION:  bedside xray, please verify NGT. thank you    COMPARISON: Abdomen 9/16/2017. TECHNIQUE: AP portable abdomen 1 view    FINDINGS:    The tip of the esophagogastric tube ends in the proximal stomach. There is gas  and stool within the colon. There is gas within small bowel No bowel dilatation  is seen. No organomegaly is noted.     Impression  The esophagogastric tube ends at the proximal stomach. CT Results (most recent):  Results from Hospital Encounter encounter on 10/09/22    CT CHEST WO CONT    Narrative  EXAM: CT CHEST WITHOUT CONTRAST. CLINICAL HISTORY/INDICATION:  Apiration    COMPARISON: Chest x-ray 10/9/2022, CT chest 9/3/2013. TECHNIQUE: Standard helical images were obtained from the thoracic inlet through  the adrenals at 5 mm thick sections without intravenous contrast.  Coronal and sagittal reformations obtained. Images were reviewed on both soft  tissue, lung, and bone window settings. All CT scans at this facility are performed using dose optimization technique as  appropriate to a performed exam, to include automated exposure control,  adjustment of the mA and/or kV according to patient's size (including  appropriate matching for site-specific examinations), or use of iterative  reconstruction technique. FINDINGS:    Minimal left basal posterior dependently consolidated lung. Vague tree-in-bud faint nodularity within the posterior segment of the right  upper lobe. Vague tree-in-bud clustered perivascular nodules in the posterior superior right  middle lobe. Multifocal vague patchy nodules in the superior left upper lobe with dense  consolidation. Likely secretions or soft tissue density within the proximal bronchus  intermedius. Mild elevation of the right hemidiaphragm. .    There is no evidence of mediastinal, hilar, nor axillary adenopathy. Evaluation of the mediastinum and jimmy is limited by the lack of IV contrast.  The great vessels and thoracic aorta are unremarkable. There are no pleural effusions. The included portion of the of the liver is unremarkable. Probable small  gallstones. The adrenal glands are normal.    The chest wall soft tissues are unremarkable. The bony structures are unremarkable.     Impression  Dense consolidation within the right lower lobe with soft tissue density within  the bronchus intermedius proximal left lower lobe main bronchus. Likely  secretions. Associated with mild elevation of the hemidiaphragm with at least  some component of atelectatic volume loss. Smaller region of similar consolidation within the left lower lobe. Aspiration pneumonia not excluded. Tree-in-bud day fuzzy nodules in the perivascular spaces in the right upper  lobe, right middle lobe and superior right lower lobe of acute  infection/pneumonia. IMPRESSION:   SIRS vs. Sepsis- secondary to UTI +/- Aspiration PNA  Severe Hypernatremia- Sodium level of 160 on arrival   Aspiration PNA-airway suctioned with copious material and food products on arrival. Known hx of dysphagia. CT chest 10/9 with dense consolidations RLL and LLL  Acute Hypoxic Respiratory Failure- in the setting of aspiration requiring Vapotherm (now weaned off)  Hypovolemia- in the setting of dehydration/dementia, likely with insufficient PO intake, 10.2 free water deficit    PARAMJIT- likely prerenal in nature secondary to above  Acute on chronic encephalopathy- likely metabolic in nature superimposed on dementia  +COVID-19 infection, not PNA, asymptomatic, RVP 10/10.  Respiratory failure likely due to aspiration    Enterovirus/Rhinovirus +- RVP 10/10   Dysphagia   Seizure disorder- on Keppra OP  Severe Protein Malnutrition  Debility-non-ambulatory since 2014/wheelchair dependent   PVD/Venous Insufficiency- left lateral leg with slow healing, followed by Vascular   Hx x of COVID-19, asymptomatic (3/30/21)   DM II   Schizophrenia   Hx Etoh abuse with alcohol-induced persisting dementia  Hx CVA with left-sided hemiparesis and aphasia, HTN, HLD     Patient Active Problem List   Diagnosis Code    Intractable hiccups R06.6    Essential hypertension, benign I10    Hypokalemia E87.6    First degree AV block I44.0    Former heavy tobacco smoker Z87.891    Severe protein-calorie malnutrition ([facility] E43    Psychogenic polydipsia R63.1, F54    Hyponatremia E87.1    Cellulitis L03.90    Right foot infection L08.9    Bilateral leg and foot pain M79.604, M79.605, M79.671, M79.672    Pneumonia J18.9    Stroke ([facility] I63.9    Hyperosmolality and hypernatremia F59.6    Acute metabolic encephalopathy R45.33    Dehydration E86.0    COVID-19 U07.1    Hematuria R31.9    Dysuria R30.0    Goals of care, counseling/discussion Z71.89    Debility R53.81    Respiratory failure ([facility]) J96.90    Dementia, alcoholic ([facility]) I84.85    Aspiration into airway T17.908A    ARF (acute renal failure) ([facility]) N17.9        RECOMMENDATIONS:   Neuro: Dementia at baseline, observe for worsening mentation deviated from baseline, cont Keppra 750 mg BID, Q4h neuro checks, cont thiamine and folate   Pulm: Supplemental O2 via NC PRN, titrate flow for goal SPO2> 90% Bronchodilators, steroids, pulmonary hygiene care, Aspiration precautions, Keep HOB >30 degrees  CVS : Monitor hemodynamics, Aim MAP >65mmHg, trend cardiac panel, ECHO results. GI: SUP, Trend LFTs, Zofran PRN for N/V, NGT in place  Renal:  Trend Renal indices, Strict Is/Os, Place Bradley, Nephrology following. Hem/Onc: Monitor for s/o active bleeding. Heparin subq for DVT prophylaxis   I/D:Sepsis bundle per hospital protocol, Blood Cx 1/2 + coag negative staph, UCx + GNR. Lactic acid ordered- initial and repeat Q4hrs till normalized. Antibiotics:Vanco/Zosyn. Trend WBCs and temperature curve. RVP + COVID, Enterovirus/Rhinovirus. ID consulted  Endocrine: Q6 glucoses, SSI. Metabolic:  Daily BMP, mag, phos. Trend lytes, replace as needed. Q4h Na checks   Musc/Skin:  wound care BLE healing venous ulcers   Full Code  Palliative care consult         Best practice :    Glycemic control  IHI ICU bundles: Bradley Bundle Followed    Stress ulcer prophylaxis. Pepcid  DVT prophylaxis. SQH  Need for Lines, bradley assessed. Palliative care evaluation. Restraints not needed.         BARRETT time 15 minutes  Mary Ricci   10/12/22  Pulmonary, Critical Care Medicine  [facility]         Late entry for DOS 10/12/22  Attending Note:  I saw and evaluated the patient, performing all elements of service personally. I discussed the findings, assessment and plan with the PA and agree with the PA's findings and plan as documented in the PA's note. All edits and changes made above or are mentioned in my attending note which was independently assessed as well as written. Total of 30 min critical care time spent at bedside (personally) during the course of care providing evaluation,management and care decisions and ordering appropriate treatment related to critical care problems exclusive of procedures. I performed the substantive portion of this split shared encounter (greater than 50% of time)  The reason for providing this level of medical care for this critically ill patient was due a critical illness that impaired one or more vital organ systems such that there was a high probability of imminent or life threatening deterioration in the patients condition. This care involved high complexity decision making to assess, manipulate, and support vital system functions, to treat this degree vital organ system failure and to prevent further life threatening deterioration of the patients condition. This time was independent of other practitioners. 66-year-old male with past medical history of CVA with left-sided hemiparesis, aphasia, vascular and alcohol dementia, hypertension, hyperlipidemia, dysphagia, seizure disorder, schizophrenia, severe protein calorie malnutrition, presented to [facility] sent in from his nursing home [facility] for hypoxia. Patient was placed on nonrebreather while at [facility], patient then was placed on CPAP by EMS, placed on BiPAP while in the ER. They noted that aspirated food was in the back of his throat. Due to worsening of aspiration, upon transfer to the ICU patient was placed on high flow nasal cannula.   Patient also questionable UTI, patient started on broad-spectrum antibiotics. Patient has pneumonia, respiratory virus panel is positive for multiple pathogens including COVID-19, rhino/enterovirus. Although patient is nonverbal, patient was reported to be with worsening mental status--patient found to be severely hyponatremic with a sodium of 162--along with PARAMJIT--with tripling of patient's creatinine. Patient was started on fluids, nephrology was consulted, patient eventually switched to D5W, NG tube placed, started on free water flushes. Check serial sodiums to avoid overcorrection--sodium down to 149 (decreased by about 6 over the last 24 hours).   Pt failed SLP --- will need feeding tube (will not decrease aspiration risk)     Long-term prognosis is very poor     Iveth Dickey MD/MPH     Pulmonary, Critical Care Medicine  [facility]

## 2022-10-12 NOTE — PROGRESS NOTES
attended the interdisciplinary rounds for Lucy Bowers, who is a 79 y.o.,male. Patients Primary Language is: [place]. According to the patients EMR Shinto Affiliation is: Kirsten Chin.     The reason the Patient came to the hospital is:   Patient Active Problem List    Diagnosis Date Noted    ARF (acute renal failure) ([facility] 10/10/2022    Respiratory failure ([address] 10/09/2022    Dementia, alcoholic ([address] 16/09/8665    Aspiration into airway 10/09/2022    Hematuria 03/31/2021    Dysuria 03/31/2021    Goals of care, counseling/discussion     Debility     Hyperosmolality and hypernatremia 37/27/9656    Acute metabolic encephalopathy 63/69/3347    Dehydration 03/30/2021    COVID-19 03/30/2021    Stroke ([facility] 09/16/2017    Pneumonia 08/22/2017    Right foot infection 07/06/2017    Bilateral leg and foot pain 07/06/2017    Cellulitis 12/20/2016    Hyponatremia 01/01/2015    Psychogenic polydipsia 05/22/2014    Severe protein-calorie malnutrition ([facility] 09/17/2013    First degree AV block 09/01/2013    Former heavy tobacco smoker 09/01/2013    Hypokalemia 06/22/2013    Intractable hiccups 09/02/2012    Essential hypertension, benign 09/02/2012          Plan:  Chaplains will continue to follow and will provide pastoral care on an as needed/requested basis.  recommends bedside caregivers page  on duty if patient shows signs of acute spiritual or emotional distress.     [address]  Board Certified [address] [facility]   [phone]

## 2022-10-12 NOTE — PROGRESS NOTES
SLP Note:    Pt currently with NG in place. Pt with poor secretion management and not appropriate for further po trials at this time. Recommend continue NPO status with comfort feeds vs long term alternative nutrition/hydration source (PEG). D/w RN and MD.  Available for re-consult as indicated.       Asim Guan M.S., [address] [facility]  Speech-Language Pathologist

## 2022-10-12 NOTE — PROGRESS NOTES
Problem: Pressure Injury - Risk of  Goal: *Prevention of pressure injury  Description: Document Brian Scale and appropriate interventions in the flowsheet. Outcome: Progressing Towards Goal  Note: Pressure Injury Interventions:  Sensory Interventions: Keep linens dry and wrinkle-free, Pressure redistribution bed/mattress (bed type), Turn and reposition approx. every two hours (pillows and wedges if needed)    Moisture Interventions: Internal/External urinary devices    Activity Interventions: Pressure redistribution bed/mattress(bed type)    Mobility Interventions: Pressure redistribution bed/mattress (bed type), Turn and reposition approx. every two hours(pillow and wedges), HOB 30 degrees or less    Nutrition Interventions: Discuss nutritional consult with provider    Friction and Shear Interventions: Feet elevated on foot rest, Foam dressings/transparent film/skin sealants, HOB 30 degrees or less, Transferring/repositioning devices                Problem: Risk for Spread of Infection  Goal: Prevent transmission of infectious organism to others  Description: Prevent the transmission of infectious organisms to other patients, staff members, and visitors. Outcome: Progressing Towards Goal     Problem: Patient Education:  Go to Education Activity  Goal: Patient/Family Education  Outcome: Progressing Towards Goal     Problem: Nutrition Deficit  Goal: *Optimize nutritional status  Outcome: Progressing Towards Goal     Problem: Patient Education: Go to Patient Education Activity  Goal: Patient/Family Education  Outcome: Progressing Towards Goal     Problem: Falls - Risk of  Goal: *Absence of Falls  Description: Document Vernia Colder Fall Risk and appropriate interventions in the flowsheet.   Outcome: Progressing Towards Goal  Note: Fall Risk Interventions:       Mentation Interventions: Bed/chair exit alarm, More frequent rounding, Room close to nurse's station, Reorient patient    Medication Interventions: Bed/chair exit alarm    Elimination Interventions: Bed/chair exit alarm              Problem: Nutrition Deficit  Goal: *Optimize nutritional status  Outcome: Progressing Towards Goal

## 2022-10-12 NOTE — PROGRESS NOTES
Progress Note    Lashawn Mercury  79 y.o. Admit Date: 10/9/2022  Active Problems:    Hypokalemia (6/22/2013) POA: Unknown      Hyperosmolality and hypernatremia (3/30/2021) POA: Unknown      Respiratory failure ([facility] (10/9/2022) POA: Unknown      Dementia, alcoholic ([facility] (88/2/0719) POA: Unknown      Aspiration into airway (10/9/2022) POA: Unknown      ARF (acute renal failure) ([facility] (10/10/2022) POA: Unknown            Subjective:     Patient apparently is not in any distress. Tolerating IV fluid and also NG feeding. No fever no shortness of breath continues to make urine through the Reid catheter. In and out record reviewed. Received potassium phosphate in addition to the current IV fluid. A comprehensive review of systems was negative except for that written in the History of Present Illness.     Objective:     Visit Vitals  BP (!) 150/92   Pulse 94   Temp 98.9 °F (37.2 °C)   Resp 23   Ht 5' 10\" (1.778 m)   Wt 67.9 kg (149 lb 11.1 oz)   SpO2 100%   BMI 21.48 kg/m²         Intake/Output Summary (Last 24 hours) at 10/12/2022 1137  Last data filed at 10/12/2022 0413  Gross per 24 hour   Intake 3208.33 ml   Output 550 ml   Net 2658.33 ml       Current Facility-Administered Medications   Medication Dose Route Frequency Provider Last Rate Last Admin    potassium phosphate 30 mmol in 0.9% sodium chloride 250 mL infusion   IntraVENous ONCE CapilitanGiuliana NP 43.3 mL/hr at 10/12/22 0857 New Bag at 10/12/22 0857    vancomycin (VANCOCIN) 1,000 mg in dextrose 5% 250 mL IVPB  1,000 mg IntraVENous Q18H Myriam Quintanilla  mL/hr at 10/12/22 0037 1,000 mg at 10/12/22 0037    dextrose 5% with KCl 20 mEq/L infusion   IntraVENous CONTINUOUS Myriam Quintanilla MD 50 mL/hr at 10/11/22 1315 New Bag at 10/11/22 1315    sodium chloride (NS) flush 5-40 mL  5-40 mL IntraVENous Q8H Myriam Quintanilla MD   10 mL at 10/12/22 0511    sodium chloride (NS) flush 5-40 mL  5-40 mL IntraVENous PRN Myriam Quintanilla MD albuterol-ipratropium (DUO-NEB) 2.5 MG-0.5 MG/3 ML  3 mL Nebulization Q4H PRN Lalo MURILLO NP        insulin lispro (HUMALOG) injection   SubCUTAneous Q6H Lalo MURILLO NP   2 Units at 10/10/22 2309    glucose chewable tablet 16 g  4 Tablet Oral PRN Benjamin Castillo NP        glucagon (GLUCAGEN) injection 1 mg  1 mg IntraMUSCular PRN Lalo MURILLO NP        dextrose 10% infusion 0-250 mL  0-250 mL IntraVENous PRN Lalo MURILLO NP        hydrALAZINE (APRESOLINE) 20 mg/mL injection 10 mg  10 mg IntraVENous Q6H PRN Laura Boggs NP        piperacillin-tazobactam (ZOSYN) 4.5 g in dextrose 5% 100 mL IVPB  4.5 g IntraVENous Q8H Tristen Kelley MD 25 mL/hr at 10/12/22 0433 4.5 g at 10/12/22 0433    acetaminophen (TYLENOL) tablet 650 mg  650 mg Oral Q6H PRN Benjamin Castillo NP        Or    acetaminophen (TYLENOL) suppository 650 mg  650 mg Rectal Q6H PRN Benjamin Castillo NP        polyethylene glycol (MIRALAX) packet 17 g  17 g Oral DAILY PRN Lalo MURILLO NP        ondansetron (ZOFRAN ODT) tablet 4 mg  4 mg Oral Q8H PRN Lalo MURILLO NP        Or    ondansetron (ZOFRAN) injection 4 mg  4 mg IntraVENous Q6H PRN Lalo MURILLO NP        heparin (porcine) injection 5,000 Units  5,000 Units SubCUTAneous Q8H Laura Owens NP   5,000 Units at 10/12/22 0602    levETIRAcetam (KEPPRA) 750 mg in 0.9% sodium chloride 100 mL IVPB  750 mg IntraVENous Q12H Laura Owens  mL/hr at 10/11/22 2205 750 mg at 29/63/97 9436    folic acid (FOLVITE) 1 mg in 0.9% sodium chloride 50 mL ivpb   IntraVENous DAILY Lalo MURILLO  mL/hr at 10/12/22 0926 New Bag at 10/12/22 0926    thiamine (B-1) 100 mg in 0.9% sodium chloride 50 mL IVPB  100 mg IntraVENous DAILY Helayne Mad C,  mL/hr at 10/12/22 0904 100 mg at 10/12/22 0904    famotidine (PF) (PEPCID) 20 mg in 0.9% sodium chloride 10 mL injection  20 mg IntraVENous Q12H Helayne Mad C, NP   20 mg at 10/12/22 5998 Physical Exam:     Physical Exam:   General:  Alert,no distress, appears stated age. Not communicating well   Mouth/Throat: Lips, mucosa, and tongue normal. Teeth and gums normal.   Neck: Supple, symmetrical, trachea midline, no adenopathy, thyroid: no enlargement/tenderness/nodules, no carotid bruit and no JVD. Lungs:   Clear to auscultation bilaterally. Heart:  Regular rate and rhythm, S1, S2 normal, no murmur, click, rub or gallop. Abdomen:   Soft, non-tender. Bowel sounds normal. No masses,  No organomegaly. Extremities: Extremities normal, atraumatic, no cyanosis or edema. Data Review:    CBC w/Diff    Recent Labs     10/11/22  0610 10/10/22  0205 10/09/22  1820   WBC 14.6* 10.6 11.3   RBC 4.42 4.64 5.47   HGB 11.1* 11.6* 13.8   HCT 37.1 39.0 45.6   MCV 83.9 84.1 83.4   MCH 25.1 25.0 25.2   MCHC 29.9* 29.7* 30.3*   RDW 15.6* 15.9* 15.9*    Recent Labs     10/11/22  0610 10/10/22  0205 10/09/22  1820   BANDS 6*  --   --    MONOS 2* 4 3   EOS 0 0 0   BASOS 0 0 0   RDW 15.6* 15.9* 15.9*        Comprehensive Metabolic Profile    Recent Labs     10/12/22  0522 10/11/22  2230 10/11/22  1910 10/11/22  1510 10/11/22  0610   * 151* 153*   < > 157*   K 4.3  --  3.9  --  3.3*   *  --  120*  --  128*   CO2 20*  --  26  --  22   BUN 24*  --  34*  --  42*   CREA 0.58*  --  0.85  --  0.95    < > = values in this interval not displayed. Recent Labs     10/12/22  0522 10/11/22  1910 10/11/22  0610 10/10/22  2337 10/10/22  1145 10/10/22  0052 10/09/22  1820   CA 6.9* 8.5 8.3*   < > 8.5   < > 9.1   PHOS 1.8*  --  2.7  --  3.0   < >  --    ALB  --   --   --   --   --   --  2.8*  2.7*   TP  --   --   --   --   --   --  8.5*  8.4*   TBILI  --   --   --   --   --   --  0.6  0.6    < > = values in this interval not displayed.                         Impression:       Active Hospital Problems    Diagnosis Date Noted    ARF (acute renal failure) ([facility] 10/10/2022    Respiratory failure ([facility] 10/09/2022    D[facility], alcoholic ([facility] 39/86/6727    Aspiration into airway 10/09/2022    Hyperosmolality and hypernatremia 03/30/2021    Hypokalemia 06/22/2013      Hyponatremia and hypokalemia slowly improving. Neurological sequelae patient remained at his baseline mental status      Plan: Will continue current IV fluids with D5W along with potassium at 50 cc/h. Continue current free water replacement at 50 cc/h. In addition continue NG feeding Jevity has been doing. Renal function is stable will BUN/creatinine ratio is still mildly. Renal.    Continue to maintain adequate COVID precautions. Patient can be out out of ICU to stepdown bed. Discussed with the ICU attending Dr. Michaela Myers.       Steven Benoit MD

## 2022-10-12 NOTE — INTERDISCIPLINARY ROUNDS
[facility]  Pulmonary, Critical Care, and Sleep Medicine  Interdisciplinary and Ventilator Weaning Rounds    Patient discussed in morning walking rounds and interdisciplinary rounds. ICU day: 10/9    Overnight events:   Persistent hypernatremia, improving 149 this morning  Phos being replaced  No acute events  600 UOP      Assessments and best practice:  Ventilator  N/A  Sedation  N/A  Other pertinent drips  Plasmalyte   Lines noted  Bradley Catheter, NGT  Critical labs assessed  Yes  Antibiotics  Zosyn and Vancomycin  Medications reviewed  Yes  Pending imaging  none  Pending send out labs  No  Pending Procedures  none  Glycemic control  Stress ulcer prophylaxis. Pepcid  DVT prophylaxis. SQH  Need for Lines, bradley assessed.   Yes   Restraint Reevaluation   - Restraints not needed    Family contact/MPOA: Frederic Chapa [phone]  Family updated per ICU staff    Palliative consult within 3 days of admission to ICU-  Ethics Guidance: 21 days      Daily Plans:  BMP, phos Q12H  Strict aspiration precautions - high risk  Cont free water flushes via NGT  ID consult  Transfer to stepdown      BARRETT time 15 minutes        James Alaniz PA-C  10/12/22  Pulmonary, [facility] [address] [facility]

## 2022-10-12 NOTE — PROGRESS NOTES
[facility] Pharmacokinetic Monitoring Service - Vancomycin    Indication: aspiration PNA  Goal AUC/ALFREDITO: 400-600 mg*hr/L  Day of Therapy: 3  Additional Antimicrobials: pip-tazo    Pertinent Laboratory Values: Wt Readings from Last 1 Encounters:   10/11/22 67.9 kg (149 lb 11.1 oz)     Temp Readings from Last 1 Encounters:   10/12/22 98.9 °F (37.2 °C)     No components found for: PROCAL  Estimated Creatinine Clearance: 98.3 mL/min (A) (by C-G formula based on SCr of 0.58 mg/dL (L)).   Recent Labs     10/11/22  0610 10/10/22  0205   WBC 14.6* 10.6     Pertinent Cultures:  Culture Date Source Results   10/9 blood CoNS in 1/2   10/9 urine 50,000 GNR   MRSA Nasal Swab: awaiting results    Assessment:  Date/Time Current Dose Concentration (mg/L) Timing of Concentration (h) AUC   10/10 1,250 mg x1  750 mg q12h - - -   10/11 750 mg q12h  1,000 mg q18h 18.7 12 638  572   10/12 1,000 mg q18h - - -   Note: Serum concentrations collected for AUC dosing may appear elevated if collected in close proximity to the dose administered, this is not necessarily an indication of toxicity    Plan:  Continue current dose of 1,000 mg q18h  Will continue using D5W as a base fluid d/t hypernatremia  Ordered a level for 10/13 with AM labs  Pharmacy will continue to monitor patient and adjust therapy as indicated    Thank you for the consult,  NATACHA Martinez  10/12/2022

## 2022-10-12 NOTE — DIABETES MGMT
Glycemic Control:  Pt with no history of Diabetes. Pt's blood glucose readings are in the target range. Recent Glucose Results:   Lab Results   Component Value Date/Time     (H) 10/12/2022 05:22 AM     (H) 10/11/2022 07:10 PM    GLUCPOC 121 (H) 10/12/2022 05:09 AM    GLUCPOC 96 10/11/2022 11:33 PM    GLUCPOC 88 10/11/2022 05:59 PM     Lab Results   Component Value Date/Time    Hemoglobin A1c 5.5 10/10/2022 12:52 AM     Continue to monitor for glycemic control.   Moe Hayden RD BC-ADM

## 2022-10-13 LAB
ANION GAP SERPL CALC-SCNC: 5 MMOL/L (ref 3–18)
ANION GAP SERPL CALC-SCNC: 6 MMOL/L (ref 3–18)
BASOPHILS # BLD: 0 K/UL (ref 0–0.1)
BASOPHILS NFR BLD: 0 % (ref 0–2)
BUN SERPL-MCNC: 14 MG/DL (ref 7–18)
BUN SERPL-MCNC: 9 MG/DL (ref 7–18)
BUN/CREAT SERPL: 19 (ref 12–20)
BUN/CREAT SERPL: 24 (ref 12–20)
CALCIUM SERPL-MCNC: 8.1 MG/DL (ref 8.5–10.1)
CALCIUM SERPL-MCNC: 8.3 MG/DL (ref 8.5–10.1)
CAMPYLOBACTER SPECIES, DNA: NEGATIVE
CHLORIDE SERPL-SCNC: 118 MMOL/L (ref 100–111)
CHLORIDE SERPL-SCNC: 121 MMOL/L (ref 100–111)
CO2 SERPL-SCNC: 21 MMOL/L (ref 21–32)
CO2 SERPL-SCNC: 24 MMOL/L (ref 21–32)
CREAT SERPL-MCNC: 0.47 MG/DL (ref 0.6–1.3)
CREAT SERPL-MCNC: 0.58 MG/DL (ref 0.6–1.3)
DIFFERENTIAL METHOD BLD: ABNORMAL
ENTEROTOXIGEN E COLI, DNA: NEGATIVE
EOSINOPHIL # BLD: 0 K/UL (ref 0–0.4)
EOSINOPHIL NFR BLD: 0 % (ref 0–5)
ERYTHROCYTE [DISTWIDTH] IN BLOOD BY AUTOMATED COUNT: 15.1 % (ref 11.6–14.5)
GLUCOSE BLD STRIP.AUTO-MCNC: 106 MG/DL (ref 70–110)
GLUCOSE BLD STRIP.AUTO-MCNC: 107 MG/DL (ref 70–110)
GLUCOSE BLD STRIP.AUTO-MCNC: 112 MG/DL (ref 70–110)
GLUCOSE BLD STRIP.AUTO-MCNC: 117 MG/DL (ref 70–110)
GLUCOSE SERPL-MCNC: 117 MG/DL (ref 74–99)
GLUCOSE SERPL-MCNC: 136 MG/DL (ref 74–99)
HCT VFR BLD AUTO: 31.6 % (ref 36–48)
HGB BLD-MCNC: 9.5 G/DL (ref 13–16)
IMM GRANULOCYTES # BLD AUTO: 0.1 K/UL (ref 0–0.04)
IMM GRANULOCYTES NFR BLD AUTO: 1 % (ref 0–0.5)
LYMPHOCYTES # BLD: 0.5 K/UL (ref 0.9–3.6)
LYMPHOCYTES NFR BLD: 4 % (ref 21–52)
MAGNESIUM SERPL-MCNC: 2.2 MG/DL (ref 1.6–2.6)
MCH RBC QN AUTO: 25 PG (ref 24–34)
MCHC RBC AUTO-ENTMCNC: 30.1 G/DL (ref 31–37)
MCV RBC AUTO: 83.2 FL (ref 78–100)
MONOCYTES # BLD: 0.2 K/UL (ref 0.05–1.2)
MONOCYTES NFR BLD: 2 % (ref 3–10)
NEUTS SEG # BLD: 10.6 K/UL (ref 1.8–8)
NEUTS SEG NFR BLD: 93 % (ref 40–73)
NRBC # BLD: 0 K/UL (ref 0–0.01)
NRBC BLD-RTO: 0 PER 100 WBC
P SHIGELLOIDES DNA STL QL NAA+PROBE: NEGATIVE
PHOSPHATE SERPL-MCNC: 2.2 MG/DL (ref 2.5–4.9)
PHOSPHATE SERPL-MCNC: 2.2 MG/DL (ref 2.5–4.9)
PLATELET # BLD AUTO: 144 K/UL (ref 135–420)
PMV BLD AUTO: 12.6 FL (ref 9.2–11.8)
POTASSIUM SERPL-SCNC: 2.6 MMOL/L (ref 3.5–5.5)
POTASSIUM SERPL-SCNC: 3.2 MMOL/L (ref 3.5–5.5)
POTASSIUM SERPL-SCNC: 3.7 MMOL/L (ref 3.5–5.5)
RBC # BLD AUTO: 3.8 M/UL (ref 4.35–5.65)
SALMONELLA SPECIES, DNA: NEGATIVE
SHIGA TOXIN PRODUCING, DNA: NEGATIVE
SHIGELLA SP+EIEC IPAH STL QL NAA+PROBE: NEGATIVE
SODIUM SERPL-SCNC: 147 MMOL/L (ref 136–145)
SODIUM SERPL-SCNC: 147 MMOL/L (ref 136–145)
SODIUM SERPL-SCNC: 148 MMOL/L (ref 136–145)
SODIUM SERPL-SCNC: 148 MMOL/L (ref 136–145)
SODIUM SERPL-SCNC: 149 MMOL/L (ref 136–145)
VANCOMYCIN SERPL-MCNC: 21.7 UG/ML (ref 5–40)
VIBRIO SPECIES, DNA: NEGATIVE
WBC # BLD AUTO: 11.4 K/UL (ref 4.6–13.2)
Y. ENTEROCOLITICA, DNA: NEGATIVE

## 2022-10-13 PROCEDURE — 80048 BASIC METABOLIC PNL TOTAL CA: CPT

## 2022-10-13 PROCEDURE — 74011250636 HC RX REV CODE- 250/636: Performed by: INTERNAL MEDICINE

## 2022-10-13 PROCEDURE — 85025 COMPLETE CBC W/AUTO DIFF WBC: CPT

## 2022-10-13 PROCEDURE — 84295 ASSAY OF SERUM SODIUM: CPT

## 2022-10-13 PROCEDURE — 84132 ASSAY OF SERUM POTASSIUM: CPT

## 2022-10-13 PROCEDURE — 74011250636 HC RX REV CODE- 250/636: Performed by: NURSE PRACTITIONER

## 2022-10-13 PROCEDURE — 84100 ASSAY OF PHOSPHORUS: CPT

## 2022-10-13 PROCEDURE — 74011250637 HC RX REV CODE- 250/637

## 2022-10-13 PROCEDURE — 99232 SBSQ HOSP IP/OBS MODERATE 35: CPT | Performed by: FAMILY MEDICINE

## 2022-10-13 PROCEDURE — 36415 COLL VENOUS BLD VENIPUNCTURE: CPT

## 2022-10-13 PROCEDURE — 65660000004 HC RM CVT STEPDOWN

## 2022-10-13 PROCEDURE — 83735 ASSAY OF MAGNESIUM: CPT

## 2022-10-13 PROCEDURE — 74011000258 HC RX REV CODE- 258: Performed by: NURSE PRACTITIONER

## 2022-10-13 PROCEDURE — 74011000258 HC RX REV CODE- 258: Performed by: INTERNAL MEDICINE

## 2022-10-13 PROCEDURE — 82962 GLUCOSE BLOOD TEST: CPT

## 2022-10-13 PROCEDURE — 74011000250 HC RX REV CODE- 250: Performed by: INTERNAL MEDICINE

## 2022-10-13 PROCEDURE — 80202 ASSAY OF VANCOMYCIN: CPT

## 2022-10-13 PROCEDURE — 74011000250 HC RX REV CODE- 250: Performed by: NURSE PRACTITIONER

## 2022-10-13 RX ORDER — POTASSIUM CHLORIDE 7.45 MG/ML
10 INJECTION INTRAVENOUS
Status: COMPLETED | OUTPATIENT
Start: 2022-10-13 | End: 2022-10-13

## 2022-10-13 RX ADMIN — PIPERACILLIN AND TAZOBACTAM 4.5 G: 4; .5 INJECTION, POWDER, LYOPHILIZED, FOR SOLUTION INTRAVENOUS at 23:44

## 2022-10-13 RX ADMIN — POTASSIUM CHLORIDE AND DEXTROSE MONOHYDRATE: 150; 5 INJECTION, SOLUTION INTRAVENOUS at 04:28

## 2022-10-13 RX ADMIN — POTASSIUM CHLORIDE 10 MEQ: 7.46 INJECTION, SOLUTION INTRAVENOUS at 02:33

## 2022-10-13 RX ADMIN — SODIUM CHLORIDE 750 MG: 900 INJECTION, SOLUTION INTRAVENOUS at 23:44

## 2022-10-13 RX ADMIN — SODIUM CHLORIDE 750 MG: 900 INJECTION, SOLUTION INTRAVENOUS at 13:10

## 2022-10-13 RX ADMIN — SODIUM CHLORIDE, PRESERVATIVE FREE 20 MG: 5 INJECTION INTRAVENOUS at 23:46

## 2022-10-13 RX ADMIN — PIPERACILLIN AND TAZOBACTAM 4.5 G: 4; .5 INJECTION, POWDER, LYOPHILIZED, FOR SOLUTION INTRAVENOUS at 05:10

## 2022-10-13 RX ADMIN — HEPARIN SODIUM 5000 UNITS: 5000 INJECTION INTRAVENOUS; SUBCUTANEOUS at 13:18

## 2022-10-13 RX ADMIN — THIAMINE HYDROCHLORIDE 100 MG: 100 INJECTION, SOLUTION INTRAMUSCULAR; INTRAVENOUS at 10:33

## 2022-10-13 RX ADMIN — SODIUM CHLORIDE, PRESERVATIVE FREE 10 ML: 5 INJECTION INTRAVENOUS at 13:11

## 2022-10-13 RX ADMIN — PIPERACILLIN AND TAZOBACTAM 4.5 G: 4; .5 INJECTION, POWDER, LYOPHILIZED, FOR SOLUTION INTRAVENOUS at 13:10

## 2022-10-13 RX ADMIN — HEPARIN SODIUM 5000 UNITS: 5000 INJECTION INTRAVENOUS; SUBCUTANEOUS at 06:10

## 2022-10-13 RX ADMIN — POTASSIUM CHLORIDE 10 MEQ: 7.46 INJECTION, SOLUTION INTRAVENOUS at 05:09

## 2022-10-13 RX ADMIN — SODIUM CHLORIDE 750 MG: 900 INJECTION, SOLUTION INTRAVENOUS at 01:38

## 2022-10-13 RX ADMIN — SODIUM CHLORIDE, PRESERVATIVE FREE 10 ML: 5 INJECTION INTRAVENOUS at 06:11

## 2022-10-13 RX ADMIN — SODIUM CHLORIDE, PRESERVATIVE FREE 10 ML: 5 INJECTION INTRAVENOUS at 22:00

## 2022-10-13 RX ADMIN — POTASSIUM CHLORIDE 10 MEQ: 7.46 INJECTION, SOLUTION INTRAVENOUS at 03:56

## 2022-10-13 RX ADMIN — METOPROLOL TARTRATE 25 MG: 25 TABLET, FILM COATED ORAL at 17:27

## 2022-10-13 RX ADMIN — METOPROLOL TARTRATE 25 MG: 25 TABLET, FILM COATED ORAL at 10:32

## 2022-10-13 RX ADMIN — POTASSIUM CHLORIDE 10 MEQ: 7.46 INJECTION, SOLUTION INTRAVENOUS at 06:08

## 2022-10-13 RX ADMIN — HEPARIN SODIUM 5000 UNITS: 5000 INJECTION INTRAVENOUS; SUBCUTANEOUS at 23:45

## 2022-10-13 RX ADMIN — SODIUM CHLORIDE, PRESERVATIVE FREE 20 MG: 5 INJECTION INTRAVENOUS at 10:33

## 2022-10-13 RX ADMIN — FOLIC ACID: 5 INJECTION, SOLUTION INTRAMUSCULAR; INTRAVENOUS; SUBCUTANEOUS at 10:33

## 2022-10-13 NOTE — PROGRESS NOTES
1900hrs:  Bedside and Verbal shift change report given to JOSEFA Negrete (oncoming nurse) by Radha Koch RN (offgoing nurse). Report included the following information SBAR, Kardex, ED Summary, Procedure Summary, Intake/Output, MAR, Recent Results, and Cardiac Rhythm NSR . Wound Prevention Checklist    Patient: Isabella Birch (24 y.o. male)  Date: 10/13/2022  Diagnosis: Respiratory failure ([facility] [J96.90]  Dementia, alcoholic ([facility] [C09.39]  Aspiration into airway [T17.908A] <principal problem not specified>    Precautions:         [x]  Heel prevention boots placed on patient    [x]  Patient turned q2h during shift    []  Lift team ordered    []  Patient on [facility] bed/Specialty bed    [x]  Each Wound is documented during shift (Stage, Color, drainage, odor, measurements, and dressings)    [x]  Dual skin check done with Tomi Cespedes RN      [id number]:  Bedside and Verbal shift change report given to Radha Koch RN (oncoming nurse) by Miryam Baxter RN (offgoing nurse). Report included the following information SBAR, Kardex, Procedure Summary, Intake/Output, MAR, Recent Results, and Cardiac Rhythm NSR .

## 2022-10-13 NOTE — PROGRESS NOTES
[facility]  Progress Note    Patient: Laura Medina Age: 79 y.o. : 1955 MR#: [id number] SSN: [id number]  Date: 10/13/2022     Subjective/24-hour events:     Essentially unchanged overnight. Assessment:   Aspiration pneumonia  Sepsis, POA, secondary to above  PARAMJIT  Dysphagia  Hypokalemia  Hypertension  Seizure disorder  COVID-19 infection  Hyponatremia  Dementia  Alcohol abuse history    Plan:   Continue antibiotic therapy per ID. Remains on Zosyn currently. Antiepileptic therapy as ordered. On IV Keppra. Not improving significantly with regard to swallowing. Will evaluate for possible feeding tube placement. Continue tube feeds in interim. Wound care per [id number] services. Therapy services: SLP. Equipment: TBD. Anticipated disposition: TBD. Case discussed with:  []Patient  []Family  [x]Nursing  [x]Case Management  DVT Prophylaxis:  []Lovenox  [x]Hep SQ  []SCDs  []Coumadin   []On Heparin gtt    Objective:   VS: Visit Vitals  BP (!) 140/81 (BP 1 Location: Left upper arm, BP Patient Position: At rest)   Pulse 88   Temp 98.8 °F (37.1 °C)   Resp 20   Ht 5' 10\" (1.778 m)   Wt 67.9 kg (149 lb 11.1 oz)   SpO2 97%   BMI 21.48 kg/m²      Tmax/24hrs: Temp (24hrs), Av.4 °F (36.9 °C), Min:97.7 °F (36.5 °C), Max:98.9 °F (37.2 °C)    Intake/Output Summary (Last 24 hours) at 10/13/2022 1615  Last data filed at 10/13/2022 1231  Gross per 24 hour   Intake 390 ml   Output 1725 ml   Net -1335 ml       General:  In NAD. Nontoxic-appearing. NGT in place. Cardiovascular:  RRR. Pulmonary: Effort nonlabored. GI:  Abdomen soft. Extremities:  Warm, no edema or ischemia.   Neuro: Awake, nonverbal.    Labs:    Recent Results (from the past 24 hour(s))   GLUCOSE, POC    Collection Time: 10/12/22  5:46 PM   Result Value Ref Range    Glucose (POC) 94 70 - 110 mg/dL   CBC WITH AUTOMATED DIFF    Collection Time: 10/12/22  8:36 PM   Result Value Ref Range    WBC 12.0 4.6 - 13.2 K/uL    RBC 4.17 (L) 4.35 - 5.65 M/uL    HGB 10.3 (L) 13.0 - 16.0 g/dL    HCT 34.3 (L) 36.0 - 48.0 %    MCV 82.3 78.0 - 100.0 FL    MCH 24.7 24.0 - 34.0 PG    MCHC 30.0 (L) 31.0 - 37.0 g/dL    RDW 15.3 (H) 11.6 - 14.5 %    PLATELET 608 000 - 413 K/uL    NRBC 0.0 0  WBC    ABSOLUTE NRBC 0.00 0.00 - 0.01 K/uL    NEUTROPHILS 93 (H) 40 - 73 %    LYMPHOCYTES 4 (L) 21 - 52 %    MONOCYTES 2 (L) 3 - 10 %    EOSINOPHILS 0 0 - 5 %    BASOPHILS 0 0 - 2 %    IMMATURE GRANULOCYTES 1 (H) 0.0 - 0.5 %    ABS. NEUTROPHILS 11.2 (H) 1.8 - 8.0 K/UL    ABS. LYMPHOCYTES 0.5 (L) 0.9 - 3.6 K/UL    ABS. MONOCYTES 0.2 0.05 - 1.2 K/UL    ABS. EOSINOPHILS 0.0 0.0 - 0.4 K/UL    ABS. BASOPHILS 0.0 0.0 - 0.1 K/UL    ABS. IMM.  GRANS. 0.1 (H) 0.00 - 0.04 K/UL    DF AUTOMATED     METABOLIC PANEL, BASIC    Collection Time: 10/12/22  8:36 PM   Result Value Ref Range    Sodium 146 (H) 136 - 145 mmol/L    Potassium 3.2 (L) 3.5 - 5.5 mmol/L    Chloride 116 (H) 100 - 111 mmol/L    CO2 22 21 - 32 mmol/L    Anion gap 8 3.0 - 18 mmol/L    Glucose 123 (H) 74 - 99 mg/dL    BUN 16 7.0 - 18 MG/DL    Creatinine 0.58 (L) 0.6 - 1.3 MG/DL    BUN/Creatinine ratio 28 (H) 12 - 20      eGFR >60 >60 ml/min/1.73m2    Calcium 8.8 8.5 - 10.1 MG/DL   PHOSPHORUS    Collection Time: 10/12/22  8:36 PM   Result Value Ref Range    Phosphorus 2.6 2.5 - 4.9 MG/DL   CULTURE, BLOOD    Collection Time: 10/12/22  8:36 PM    Specimen: Blood   Result Value Ref Range    Special Requests: NO SPECIAL REQUESTS      Culture result: NO GROWTH AFTER 11 HOURS     GLUCOSE, POC    Collection Time: 10/13/22 12:28 AM   Result Value Ref Range    Glucose (POC) 112 (H) 70 - 110 mg/dL   CBC WITH AUTOMATED DIFF    Collection Time: 10/13/22 12:37 AM   Result Value Ref Range    WBC 11.4 4.6 - 13.2 K/uL    RBC 3.80 (L) 4.35 - 5.65 M/uL    HGB 9.5 (L) 13.0 - 16.0 g/dL    HCT 31.6 (L) 36.0 - 48.0 %    MCV 83.2 78.0 - 100.0 FL    MCH 25.0 24.0 - 34.0 PG    MCHC 30.1 (L) 31.0 - 37.0 g/dL    RDW 15.1 (H) 11.6 - 14.5 %    PLATELET 503 495 - 915 K/uL    MPV 12.6 (H) 9.2 - 11.8 FL    NRBC 0.0 0  WBC    ABSOLUTE NRBC 0.00 0.00 - 0.01 K/uL    NEUTROPHILS 93 (H) 40 - 73 %    LYMPHOCYTES 4 (L) 21 - 52 %    MONOCYTES 2 (L) 3 - 10 %    EOSINOPHILS 0 0 - 5 %    BASOPHILS 0 0 - 2 %    IMMATURE GRANULOCYTES 1 (H) 0.0 - 0.5 %    ABS. NEUTROPHILS 10.6 (H) 1.8 - 8.0 K/UL    ABS. LYMPHOCYTES 0.5 (L) 0.9 - 3.6 K/UL    ABS. MONOCYTES 0.2 0.05 - 1.2 K/UL    ABS. EOSINOPHILS 0.0 0.0 - 0.4 K/UL    ABS. BASOPHILS 0.0 0.0 - 0.1 K/UL    ABS. IMM.  GRANS. 0.1 (H) 0.00 - 0.04 K/UL    DF AUTOMATED     MAGNESIUM    Collection Time: 10/13/22 12:37 AM   Result Value Ref Range    Magnesium 2.2 1.6 - 2.6 mg/dL   METABOLIC PANEL, BASIC    Collection Time: 10/13/22 12:37 AM   Result Value Ref Range    Sodium 148 (H) 136 - 145 mmol/L    Potassium 2.6 (LL) 3.5 - 5.5 mmol/L    Chloride 118 (H) 100 - 111 mmol/L    CO2 24 21 - 32 mmol/L    Anion gap 6 3.0 - 18 mmol/L    Glucose 136 (H) 74 - 99 mg/dL    BUN 14 7.0 - 18 MG/DL    Creatinine 0.58 (L) 0.6 - 1.3 MG/DL    BUN/Creatinine ratio 24 (H) 12 - 20      eGFR >60 >60 ml/min/1.73m2    Calcium 8.3 (L) 8.5 - 10.1 MG/DL   PHOSPHORUS    Collection Time: 10/13/22 12:37 AM   Result Value Ref Range    Phosphorus 2.2 (L) 2.5 - 4.9 MG/DL   VANCOMYCIN, RANDOM    Collection Time: 10/13/22  1:25 AM   Result Value Ref Range    Vancomycin, random 21.7 5.0 - 40.0 UG/ML   SODIUM    Collection Time: 10/13/22  4:08 AM   Result Value Ref Range    Sodium 149 (H) 136 - 145 mmol/L   GLUCOSE, POC    Collection Time: 10/13/22  5:36 AM   Result Value Ref Range    Glucose (POC) 107 70 - 110 mg/dL   POTASSIUM    Collection Time: 10/13/22  9:43 AM   Result Value Ref Range    Potassium 3.2 (L) 3.5 - 5.5 mmol/L   SODIUM    Collection Time: 10/13/22  9:43 AM   Result Value Ref Range    Sodium 148 (H) 136 - 145 mmol/L   GLUCOSE, POC    Collection Time: 10/13/22 12:20 PM   Result Value Ref Range    Glucose (POC) 117 (H) 70 - 110 mg/dL       Signed By: Wei Hermosillo MD     October 13, 2022

## 2022-10-13 NOTE — PROGRESS NOTES
Infectious Disease progress Note        Reason:sepsis, aspiration pneumonia, covid-19 pneumonia    Current abx Prior abx   Zosyn, vancomycin since 10/9/22      Lines: Reid since 10/10/2022    Assessment :  79 y.o. male with a significant PMHx of CVA with left-sided hemiparesis and aphasia, HTN, HLD, dysphagia, seizure disorder, schizophrenia, alcohol-induced dementia, severe protein calorie malnutrition and debility who presented to [facility] on 10/10/2022 with hypoxia. Hospitalization [facility] July 2017 for right foot cellulitis, infected right foot ulcer secondary to Proteus, citrobacter, e.coli, mssa, e.fecalis, e.avium, strep viridans. Surgical wound cultures 7/11 : proteus,citrobacter   S/p I&D of right foot ulcer on 7/11- intra op findings noted - no evidence of bone infection. Clinical presentation consistent with sepsis, acute hypoxic respiratory failure-present on admission due to aspiration pneumonia superimposed on recent COVID-19 infection    1 of 2 sets of blood culture positive for coagulase-negative Staphylococcus on 10/10-likely contaminant. No definitive evidence of skin/soft tissue infection as a source of bloodstream infection. Negative blood culture 10/12    70,000 colonies of Citrobacter in urine culture 10/9-likely colonizer. No significant pyuria to suggest cystitis    Positive respiratory viral panel PCR 10/10/2022 for enterovirus, rhinovirus, COVID-19-currently asymptomatic from these infections    Acute kidney injury-likely prerenal    Clinically improving. Afebrile. Oxygenation maintained     Recommendations:     1. Continue piperacillin/tazobactam.  Discontinue vancomycin  2. Follow-up repeat blood cultures  3. Discontinue Reid. 4.  Aspiration precautions  5. Monitor CBC, temperature, oxygenation, clinically  6. Discontinue droplet plus isolation on 10/20/2022       Above plan was discussed in details with primary team. Please call me if any further questions or concerns. Will continue to participate in the care of this patient. HPI:      Patient is confused and unable to communicate effectively. Detailed review system not feasible      Past Medical History:   Diagnosis Date    Alcohol dependence with alcohol-induced persisting dementia ([facility]     per [facility] 4/23/21    Anemia     Aphasia     Benign prostatic hyperplasia     Cerebral vascular disease     COVID-19 04/06/2021    Disturbances of salivary secretion     Dysphagia     GERD (gastroesophageal reflux disease)     Hemiparesis (HCC)     left side    Hemiplegia (HCC)     left side     Hiccups     Hyperlipidemia     Hypernatremia 03/2021    Hypertension     Hyponatremia     Insomnia     Lacunar infarction ([facility] 2010    weakness both arms, unable to walk    Lower extremity edema     Moderate protein-calorie malnutrition (HCC)     MRSA (methicillin resistant Staphylococcus aureus)     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)     Pneumonia     Psychogenic polydipsia     PVD (peripheral vascular disease) ([facility]     Seizures ([facility]     Spinal stenosis     Stroke ([facility] 2017    Unspecified convulsions ([facility]     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 doxepin (SINEquan) 10 mg capsule Take 1 Cap by mouth nightly. clopidogrel (PLAVIX) 75 mg tab Take 1 Tab by mouth daily. Qty: 90 Tab, Refills: 0      pravastatin (PRAVACHOL) 40 mg tablet Take 1 Tab by mouth nightly. Qty: 30 Tab, Refills: 1      ascorbic acid, vitamin C, (VITAMIN C) 250 mg tablet Take 1 Tab by mouth daily. Qty: 30 Tab, Refills: 2      ferrous sulfate 325 mg (65 mg iron) tablet Take 1 Tab by mouth daily (with breakfast). Qty: 30 Tab, Refills: 2      polyethylene glycol (MIRALAX) 17 gram packet Take 1 Packet by mouth daily. Qty: 30 Packet, Refills: 2      senna-docusate (PERICOLACE) 8.6-50 mg per tablet Take 1 Tab by mouth nightly. HOLD for loose stool. Qty: 30 Tab, Refills: 2      tamsulosin (FLOMAX) 0.4 mg capsule Take 0.4 mg by mouth daily. 1 tab daily      MULTIVITAMIN,THERAPEUTIC (THERA MULTI-VITAMIN PO) Take 500 mg by mouth daily. baclofen (LIORESAL) 10 mg tablet Take 1 Tab by mouth every twelve (12) hours as needed. Qty: 15 Tab, Refills: 0      metoprolol (LOPRESSOR) 25 mg tablet Take 1 Tab by mouth two (2) times a day. Qty: 60 Tab, Refills: 2      cyanocobalamin 1,000 mcg tablet Take 1 Tab by mouth daily. Qty: 30 Tab, Refills: 2      folic acid (FOLVITE) 1 mg tablet Take 1 Tab by mouth daily. Qty: 30 Tab, Refills: 1      aspirin 81 mg chewable tablet Take 1 Tab by mouth daily.   Qty: 30 Tab, Refills: 0             Current Facility-Administered Medications   Medication Dose Route Frequency    vancomycin (VANCOCIN) 750 mg in dextrose 5% 250 mL IVPB  750 mg IntraVENous Q12H    metoprolol tartrate (LOPRESSOR) tablet 25 mg  25 mg Oral BID    dextrose 5% with KCl 20 mEq/L infusion   IntraVENous CONTINUOUS    sodium chloride (NS) flush 5-40 mL  5-40 mL IntraVENous Q8H    sodium chloride (NS) flush 5-40 mL  5-40 mL IntraVENous PRN    albuterol-ipratropium (DUO-NEB) 2.5 MG-0.5 MG/3 ML  3 mL Nebulization Q4H PRN    insulin lispro (HUMALOG) injection   SubCUTAneous Q6H    glucose chewable tablet 16 g  4 Tablet Oral PRN    glucagon (GLUCAGEN) injection 1 mg  1 mg IntraMUSCular PRN    dextrose 10% infusion 0-250 mL  0-250 mL IntraVENous PRN    hydrALAZINE (APRESOLINE) 20 mg/mL injection 10 mg  10 mg IntraVENous Q6H PRN    piperacillin-tazobactam (ZOSYN) 4.5 g in dextrose 5% 100 mL IVPB  4.5 g IntraVENous Q8H    acetaminophen (TYLENOL) tablet 650 mg  650 mg Oral Q6H PRN    Or    acetaminophen (TYLENOL) suppository 650 mg  650 mg Rectal Q6H PRN    polyethylene glycol (MIRALAX) packet 17 g  17 g Oral DAILY PRN    ondansetron (ZOFRAN ODT) tablet 4 mg  4 mg Oral Q8H PRN    Or    ondansetron (ZOFRAN) injection 4 mg  4 mg IntraVENous Q6H PRN    heparin (porcine) injection 5,000 Units  5,000 Units SubCUTAneous Q8H    levETIRAcetam (KEPPRA) 750 mg in 0.9% sodium chloride 100 mL IVPB  750 mg IntraVENous G09Z    folic acid (FOLVITE) 1 mg in 0.9% sodium chloride 50 mL ivpb   IntraVENous DAILY    thiamine (B-1) 100 mg in 0.9% sodium chloride 50 mL IVPB  100 mg IntraVENous DAILY    famotidine (PF) (PEPCID) 20 mg in 0.9% sodium chloride 10 mL injection  20 mg IntraVENous Q12H       Allergies: Patient has no known allergies.     Family History   Problem Relation Age of Onset    Hypertension Other      Social History     Socioeconomic History    Marital status:      Spouse name: Not on file    Number of children: Not on file    Years of education: Not on file    Highest education level: Not on file   Occupational History    Not on file   Tobacco Use    Smoking status: Former     Packs/day: 0.50     Types: Cigarettes     Quit date: 2008     Years since quittin.3    Smokeless tobacco: Never   Substance and Sexual Activity    Alcohol use: No    Drug use: No    Sexual activity: Not Currently   Other Topics Concern    Not on file   Social History Narrative    Not on file     Social Determinants of Health     Financial Resource Strain: Not on file   Food Insecurity: Not on file   Transportation Needs: Not on file   Physical Activity: Not on file   Stress: Not on file Social Connections: Not on file   Intimate Partner Violence: Not on file   Housing Stability: Not on file     Social History     Tobacco Use   Smoking Status Former    Packs/day: 0.50    Types: Cigarettes    Quit date: 2008    Years since quittin.3   Smokeless Tobacco Never        Temp (24hrs), Av.3 °F (36.8 °C), Min:97.7 °F (36.5 °C), Max:98.9 °F (37.2 °C)    Visit Vitals  BP (!) 145/78 (BP 1 Location: Left upper arm, BP Patient Position: Lying;Supine)   Pulse 87   Temp 97.9 °F (36.6 °C)   Resp 21   Ht 5' 10\" (1.778 m)   Wt 67.9 kg (149 lb 11.1 oz)   SpO2 100%   BMI 21.48 kg/m²       ROS: Unable to obtain due to patient factors    Physical Exam:    General:  Sitting on bed, eyes open, able to communicate effectively   Head:  Normocephalic, without obvious abnormality, atraumatic. Eyes:  Conjunctivae/corneas clear.,   Nose: Nares normal.  NG tube in place. No drainage or sinus tenderness. Throat: Lips, mucosa, and tongue normal. Teeth and gums of poor dentition, lips dried and cracked/peeling    Neck: Supple, symmetrical, trachea midline, no adenopathy, thyroid: no enlargment/tenderness/nodules, no carotid bruit and no JVD. Back:   Symmetric, no curvature. Lungs:   Coarse to auscultation bilaterally. Chest wall:  No tenderness or deformity. Heart:  Tachyardia, S1, S2 normal, no  click, rub or gallop. Abdomen:   Abd flat, Soft, non-tender. Bowel sounds normal. No masses,  No organomegaly. Healed surgical scar in upper abdomen   Extremities: Extremities normal, atraumatic, no cyanosis or edema. Pulses: 2+ and symmetric all extremities.    Skin: BLE with multiple stages of healing from venous stasis ulcers    Lymph nodes:  Not examined   Neurologic: Grossly non-focal; non-verbal, does not follow commands        Labs: Results:   Chemistry Recent Labs     10/13/22  0408 10/13/22  0037 10/12/22  2036 10/12/22  0522   GLU  --  136* 123* 171*   * 148* 146* 149*   K  --  2.6* 3.2* 4.3 CL  --  118* 116* 122*   CO2  --  24 22 20*   BUN  --  14 16 24*   CREA  --  0.58* 0.58* 0.58*   CA  --  8.3* 8.8 6.9*   AGAP  --  6 8 7   BUCR  --  24* 28* 41*        CBC w/Diff Recent Labs     10/13/22  0037 10/12/22  2036 10/11/22  0610   WBC 11.4 12.0 14.6*   RBC 3.80* 4.17* 4.42   HGB 9.5* 10.3* 11.1*   HCT 31.6* 34.3* 37.1    150 196   GRANS 93* 93* 81*   LYMPH 4* 4* 11*   EOS 0 0 0        Microbiology Recent Labs     10/12/22  2036   CULT NO GROWTH AFTER 11 HOURS            RADIOLOGY:    All available imaging studies/reports in [facility] for this admission were reviewed    Total time spent >35 minutes. High complexity decision making was performed during the evaluation of this patient at high risk for decompensation with multiple organ involvement     Above mentioned total time spent on reviewing the case/medical record/data/notes/EMR/patient examination/documentation/coordinating care with nurse/consultants, exclusive of procedures with complex decision making performed and > 50% time spent in face to face evaluation. Disclaimer: Sections of this note are dictated utilizing voice recognition software, which may have resulted in some phonetic based errors in grammar and contents. Even though attempts were made to correct all the mistakes, some may have been missed, and remained in the body of the document. If questions arise, please contact our department.     Dr. Beth Eli, Infectious Disease Specialist  [phone]  October 13, 2022  10:28 AM

## 2022-10-13 NOTE — WOUND CARE
Physical Exam  Room 366: focused wound assess    Coccyx, hypopigmentation noted, hyperpigmentation to distal section, POA. Silicone heart dressing in use & is appropriate. Hyperpigmentation noted to dorsum feet, POA. Both heels are intact & heel boots are in use, POA. Pt on sravan bed. Pt on lift team schedule. Pt has edema both arms & skin splits in antecubital areas, POA. Will turn over care to nursing staff at this time.   Eligio DANIELLEN, RN, [facility], [address]

## 2022-10-13 NOTE — PROGRESS NOTES
Problem: Pressure Injury - Risk of  Goal: *Prevention of pressure injury  Description: Document Brian Scale and appropriate interventions in the flowsheet. Outcome: Progressing Towards Goal  Note: Pressure Injury Interventions:  Sensory Interventions: Assess changes in LOC, Avoid rigorous massage over bony prominences, Check visual cues for pain, Discuss PT/OT consult with provider, Keep linens dry and wrinkle-free    Moisture Interventions: Absorbent underpads, Apply protective barrier, creams and emollients, Check for incontinence Q2 hours and as needed    Activity Interventions: Pressure redistribution bed/mattress(bed type)    Mobility Interventions: HOB 30 degrees or less, Pressure redistribution bed/mattress (bed type)    Nutrition Interventions: Document food/fluid/supplement intake    Friction and Shear Interventions: HOB 30 degrees or less, Transferring/repositioning devices                Problem: Patient Education: Go to Patient Education Activity  Goal: Patient/Family Education  Outcome: Progressing Towards Goal     Problem: Risk for Spread of Infection  Goal: Prevent transmission of infectious organism to others  Description: Prevent the transmission of infectious organisms to other patients, staff members, and visitors. Outcome: Progressing Towards Goal     Problem: Patient Education:  Go to Education Activity  Goal: Patient/Family Education  Outcome: Progressing Towards Goal     Problem: Patient Education: Go to Patient Education Activity  Goal: Patient/Family Education  Outcome: Progressing Towards Goal     Problem: Nutrition Deficit  Goal: *Optimize nutritional status  Outcome: Progressing Towards Goal     Problem: Falls - Risk of  Goal: *Absence of Falls  Description: Document Kay Pagan Fall Risk and appropriate interventions in the flowsheet.   Outcome: Progressing Towards Goal  Note: Fall Risk Interventions:  Mobility Interventions: Bed/chair exit alarm, Communicate number of staff needed for ambulation/transfer    Mentation Interventions: Adequate sleep, hydration, pain control, Bed/chair exit alarm    Medication Interventions: Bed/chair exit alarm, Evaluate medications/consider consulting pharmacy    Elimination Interventions: Bed/chair exit alarm, Call light in reach, Patient to call for help with toileting needs              Problem: Patient Education: Go to Patient Education Activity  Goal: Patient/Family Education  Outcome: Progressing Towards Goal     Problem: Pain  Goal: *Control of Pain  Outcome: Progressing Towards Goal  Goal: *PALLIATIVE CARE:  Alleviation of Pain  Outcome: Progressing Towards Goal     Problem: Patient Education: Go to Patient Education Activity  Goal: Patient/Family Education  Outcome: Progressing Towards Goal

## 2022-10-13 NOTE — PROGRESS NOTES
INTERIM UPDATE - [id number] EST on 10/13/2022    Nursing Staff calls to report that AM labs have returned with abnormal findings of K+ 2.6 mmol/L and Serum Magnesium 2.2 mg/dL. Per Nursing Staff, Patient is not able to tolerate PO. Plan:  IV Potassium chloride 10 mEq q1hr x4 doses starting at 0200 EST. Recheck Serum Potassium at 0800 EST.

## 2022-10-13 NOTE — ROUTINE PROCESS
Bedside and Verbal shift change report given to [address] [place] [address] (oncoming nurse) by Fransico Martinez RN (offgoing nurse). Report included the following information SBAR, Kardex, Intake/Output, MAR, and Recent Results.      Wound Prevention Checklist    Patient: Tiffanie Stokes (52 y.o. male)  Date: 10/13/2022  Diagnosis: Respiratory failure ([facility] [J96.90]  Dementia, alcoholic ([facility] [U09.68]  Aspiration into airway [T17.908A] <principal problem not specified>    Precautions:         []  Heel prevention boots placed on patient    [x]  Patient turned q2h during shift    []  Lift team ordered    []  Patient on [place] bed/Specialty bed    [x]  Each Wound is documented during shift (Stage, Color, drainage, odor, measurements, and dressings)    [x]  Dual skin check done with Penelope Bar

## 2022-10-13 NOTE — PROGRESS NOTES
Palliative Medicine    Palliative Medicine team Airam Chavez NP, Courtney Felty LMSW and Kevin Sprague RN observed pt from [facility] d/t COVID 19 precautions. Pt lying in bed asleep. Mouth open and NGT in place. Pt did not appear to be in any distress. Son is ok with long term feeding tube for pt if needed. Pt remains DNR/DNI. POST on file. Thank you for the Palliative Medicine consult and allowing us to participate in the care of Mr. Theodora Manuel. Will continue to monitor and provide support.     Kevin Sprague RN, BSN  Palliative Medicine Inpatient RN  [facility]. [facility]  Palliative COPE Line: [phone])

## 2022-10-13 NOTE — PROGRESS NOTES
[facility] Pharmacokinetic Monitoring Service - Vancomycin    Consulting Provider: Julio C Dimas MD  Indication:  Aspiration Pneumonia  Target Concentration: Goal AUC/ALFREDITO 400-600 mg*hr/L  Day of Therapy:  4   Additional Antimicrobials: Piperacillin/Tazobactam    Pertinent Laboratory Values: Wt Readings from Last 1 Encounters:   10/11/22 67.9 kg (149 lb 11.1 oz)     Temp Readings from Last 1 Encounters:   10/13/22 97.9 °F (36.6 °C)     Recent Labs     10/13/22  0037 10/12/22  2036 10/12/22  0522 10/11/22  1910   CREA 0.58* 0.58* 0.58* 0.85   BUN 14 16 24* 34*     No components found for: PROCAL  Estimated Creatinine Clearance: 98.3 mL/min (A) (by C-G formula based on SCr of 0.58 mg/dL (L)). Recent Labs     10/13/22  0037 10/12/22  2036   WBC 11.4 12.0       Pertinent Cultures:  Culture Date Source Results   10/9 blood STAPHYLOCOCCUS SPECIES, COAGULASE NEGATIVE GROWING IN 1 OF 2 BOTTLES DRAWN SITE=[facility]   10/9 urine CITROBACTER KOSERI 00020   COLONIES/mL   10/12 Blood Pending   MRSA Nasal Swab: was negative on 10/11, ordered for respiratory indication, pharmacy to contact provider about discontinuing vancomycin.       Assessment:  Date/Time Current Dose Concentration (mg/L) Timing of Concentration (h) AUC   10/10 1,250 mg x1  750 mg q12h - - -   10/11 750 mg q12h  1,000 mg q18h 18.7 12 638  572   10/12  1,000 mg q18h 21.7 8 418          Note: Serum concentrations collected for AUC dosing may appear elevated if collected in close proximity to the dose administered, this is not necessarily an indication of toxicity    Plan:  Current dosing regimen is therapeutic  Pharmacy will continue to monitor patient and adjust therapy as indicated    Thank you for the consult,  NATACHA Gonsalez  10/13/2022 5:41 AM

## 2022-10-13 NOTE — PROGRESS NOTES
Progress Note    Marsha Lambert  79 y.o. Admit Date: 10/9/2022  Active Problems:    Hypertension (9/2/2012) POA: Unknown      Hypokalemia (6/22/2013) POA: Unknown      Aspiration pneumonia ([facility] (8/22/2017) POA: Unknown      Hyperosmolality and hypernatremia (3/30/2021) POA: Unknown      Dehydration (3/30/2021) POA: Yes      COVID-19 (3/30/2021) POA: Yes      Respiratory failure ([facility] (10/9/2022) POA: Unknown      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[facility] (54/6/6154) POA: Unknown      Aspiration into airway (10/9/2022) POA: Unknown      Acute kidney injury ([facility] (10/10/2022) POA: Unknown      Seizure disorder ([facility] (10/12/2022) POA: Yes            Subjective:     Patient is transferred out to stepdown bed with full precautions for COVID. Patient remained at his baseline mental status. Noncommunicating with his mouth open. Apparently no shortness of breath tolerating IV fluid for hyponatremia also receiving free water through the NG tube along with  Jevity. Blood pressure is on the high side and is started on metoprolol . Potassium being replaced for hypokalemia      A comprehensive review of systems was negative except for that written in the History of Present Illness.     Objective:     Visit Vitals  /73 (BP 1 Location: Left upper arm, BP Patient Position: At rest)   Pulse 81   Temp 98.7 °F (37.1 °C)   Resp 24   Ht 5' 10\" (1.778 m)   Wt 67.9 kg (149 lb 11.1 oz)   SpO2 96%   BMI 21.48 kg/m²         Intake/Output Summary (Last 24 hours) at 10/13/2022 1518  Last data filed at 10/13/2022 1231  Gross per 24 hour   Intake 390 ml   Output 1725 ml   Net -1335 ml       Current Facility-Administered Medications   Medication Dose Route Frequency Provider Last Rate Last Admin    metoprolol tartrate (LOPRESSOR) tablet 25 mg  25 mg Oral BID Benedict Nunez NP   25 mg at 10/13/22 1032    dextrose 5% with KCl 20 mEq/L infusion   IntraVENous CONTINUOUS Neil Yu MD 50 mL/hr at 10/13/22 0428 New Bag at 10/13/22 0428    sodium chloride (NS) flush 5-40 mL  5-40 mL IntraVENous Q8H Taj Gutierres MD   10 mL at 10/13/22 1311    sodium chloride (NS) flush 5-40 mL  5-40 mL IntraVENous PRN Taj Gutierres MD        albuterol-ipratropium (DUO-NEB) 2.5 MG-0.5 MG/3 ML  3 mL Nebulization Q4H PRN Alphonse Litten, NP        insulin lispro (HUMALOG) injection   SubCUTAneous Q6H Shraddha MURILLO NP   2 Units at 10/10/22 2309    glucose chewable tablet 16 g  4 Tablet Oral PRN Alphonse Litten, NP        glucagon (GLUCAGEN) injection 1 mg  1 mg IntraMUSCular PRN Shraddha MURILLO NP        dextrose 10% infusion 0-250 mL  0-250 mL IntraVENous PRN Shraddha MURILLO NP        hydrALAZINE (APRESOLINE) 20 mg/mL injection 10 mg  10 mg IntraVENous Q6H PRN Laura Huang NP        piperacillin-tazobactam (ZOSYN) 4.5 g in dextrose 5% 100 mL IVPB  4.5 g IntraVENous Q8H Taj Gutierres MD 25 mL/hr at 10/13/22 1310 4.5 g at 10/13/22 1310    acetaminophen (TYLENOL) tablet 650 mg  650 mg Oral Q6H PRN Alphonse Litten, NP   650 mg at 10/12/22 2138    Or    acetaminophen (TYLENOL) suppository 650 mg  650 mg Rectal Q6H PRN Alphonse Litten, NP        polyethylene glycol (MIRALAX) packet 17 g  17 g Oral DAILY PRN Shraddha MURILLO NP        ondansetron (ZOFRAN ODT) tablet 4 mg  4 mg Oral Q8H PRN Shraddha MURILLO NP        Or    ondansetron (ZOFRAN) injection 4 mg  4 mg IntraVENous Q6H PRN Shraddha MURILLO NP        heparin (porcine) injection 5,000 Units  5,000 Units SubCUTAneous Q8H Laura Owens NP   5,000 Units at 10/13/22 1318    levETIRAcetam (KEPPRA) 750 mg in 0.9% sodium chloride 100 mL IVPB  750 mg IntraVENous Q12H Laura Owens  mL/hr at 10/13/22 1310 750 mg at 70/12/03 8694    folic acid (FOLVITE) 1 mg in 0.9% sodium chloride 50 mL ivpb   IntraVENous DAILY Shraddha MURILLO  mL/hr at 10/13/22 1033 New Bag at 10/13/22 1033    thiamine (B-1) 100 mg in 0.9% sodium chloride 50 mL IVPB  100 mg IntraVENous DAILY Alphonse Litten,  mL/hr at 10/13/22 1033 100 mg at 10/13/22 1033    famotidine (PF) (PEPCID) 20 mg in 0.9% sodium chloride 10 mL injection  20 mg IntraVENous Q12H Kobi MURILLO, NP   20 mg at 10/13/22 1033        Physical Exam:     Physical Exam:   General:  Alert, noncommunicating and remained at his baseline mental status   Neck: Supple, symmetrical, trachea midline, no adenopathy, thyroid: no enlargement/tenderness/nodules, no carotid bruit and no JVD. Lungs:   Clear to auscultation bilaterally. Heart:  Regular rate and rhythm, S1, S2 normal, no murmur, click, rub or gallop. Abdomen:   Soft, non-tender. Bowel sounds normal. No masses,  No organomegaly. Extremities: Extremities normal, atraumatic, no cyanosis or edema.                          Data Review:    CBC w/Diff    Recent Labs     10/13/22  0037 10/12/22  2036 10/11/22  0610   WBC 11.4 12.0 14.6*   RBC 3.80* 4.17* 4.42   HGB 9.5* 10.3* 11.1*   HCT 31.6* 34.3* 37.1   MCV 83.2 82.3 83.9   MCH 25.0 24.7 25.1   MCHC 30.1* 30.0* 29.9*   RDW 15.1* 15.3* 15.6*    Recent Labs     10/13/22  0037 10/12/22  2036 10/11/22  0610   BANDS  --   --  6*   MONOS 2* 2* 2*   EOS 0 0 0   BASOS 0 0 0   RDW 15.1* 15.3* 15.6*        Comprehensive Metabolic Profile    Recent Labs     10/13/22  0943 10/13/22  0408 10/13/22  0037 10/12/22  2036 10/12/22  0522   * 149* 148* 146* 149*   K 3.2*  --  2.6* 3.2* 4.3   CL  --   --  118* 116* 122*   CO2  --   --  24 22 20*   BUN  --   --  14 16 24*   CREA  --   --  0.58* 0.58* 0.58*    Recent Labs     10/13/22  0037 10/12/22  2036 10/12/22  0522   CA 8.3* 8.8 6.9*   PHOS 2.2* 2.6 1.8*                        Impression:       Active Hospital Problems    Diagnosis Date Noted    Seizure disorder ([facility] [address] 10/12/2022    Acute kidney injury ([facility] [address] 10/10/2022    Respiratory failure ([facility] [address] 65/24/1370    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[facility] 07/89/4386    Aspiration into airway 10/09/2022    Hyperosmolality and hypernatremia 03/30/2021    COVID-19 03/30/2021 Dehydration 03/30/2021    Aspiration pneumonia ([facility] [address] 08/22/2017    Hypokalemia 06/22/2013    Hypertension 09/02/2012      Hyponatremia slowly but steadily improving. Potassium still on the lower side requiring replacement. Phosphorus also normalized. Metabolically he looks much better. Has baseline encephalopathy very difficult to determine whether any change in his encephalopathic status or not      Plan: Will continue to give IV fluids with D5W along with the potassium chloride. Continue to have free water replacement along with Jevity and monitor labs. Weekly. Continue antibiotics as per the iron for infectious disease consultants aspiration precautions should be implemented. Also seizure precautions should be carefully monitored and continue his antiseizure medication including Keppra.   Continue to monitor serum sodium and electrolytes every 4 hours otherwise every 12 hour BMP should be done      Pantera Guerra MD

## 2022-10-14 ENCOUNTER — APPOINTMENT (OUTPATIENT)
Dept: GENERAL RADIOLOGY | Age: 67
DRG: 871 | End: 2022-10-14
Attending: FAMILY MEDICINE
Payer: COMMERCIAL

## 2022-10-14 LAB
ANION GAP SERPL CALC-SCNC: 4 MMOL/L (ref 3–18)
ANION GAP SERPL CALC-SCNC: 4 MMOL/L (ref 3–18)
BASOPHILS # BLD: 0 K/UL (ref 0–0.1)
BASOPHILS NFR BLD: 0 % (ref 0–2)
BUN SERPL-MCNC: 7 MG/DL (ref 7–18)
BUN SERPL-MCNC: 7 MG/DL (ref 7–18)
BUN/CREAT SERPL: 14 (ref 12–20)
BUN/CREAT SERPL: 18 (ref 12–20)
CALCIUM SERPL-MCNC: 7.6 MG/DL (ref 8.5–10.1)
CALCIUM SERPL-MCNC: 8.3 MG/DL (ref 8.5–10.1)
CHLORIDE SERPL-SCNC: 118 MMOL/L (ref 100–111)
CHLORIDE SERPL-SCNC: 120 MMOL/L (ref 100–111)
CO2 SERPL-SCNC: 26 MMOL/L (ref 21–32)
CO2 SERPL-SCNC: 27 MMOL/L (ref 21–32)
CREAT SERPL-MCNC: 0.38 MG/DL (ref 0.6–1.3)
CREAT SERPL-MCNC: 0.51 MG/DL (ref 0.6–1.3)
DIFFERENTIAL METHOD BLD: ABNORMAL
EOSINOPHIL # BLD: 0.2 K/UL (ref 0–0.4)
EOSINOPHIL NFR BLD: 2 % (ref 0–5)
ERYTHROCYTE [DISTWIDTH] IN BLOOD BY AUTOMATED COUNT: 15.2 % (ref 11.6–14.5)
GLUCOSE BLD STRIP.AUTO-MCNC: 101 MG/DL (ref 70–110)
GLUCOSE BLD STRIP.AUTO-MCNC: 105 MG/DL (ref 70–110)
GLUCOSE BLD STRIP.AUTO-MCNC: 106 MG/DL (ref 70–110)
GLUCOSE BLD STRIP.AUTO-MCNC: 89 MG/DL (ref 70–110)
GLUCOSE BLD STRIP.AUTO-MCNC: 94 MG/DL (ref 70–110)
GLUCOSE SERPL-MCNC: 103 MG/DL (ref 74–99)
GLUCOSE SERPL-MCNC: 109 MG/DL (ref 74–99)
HCT VFR BLD AUTO: 27 % (ref 36–48)
HGB BLD-MCNC: 8.2 G/DL (ref 13–16)
IMM GRANULOCYTES # BLD AUTO: 0.1 K/UL (ref 0–0.04)
IMM GRANULOCYTES NFR BLD AUTO: 1 % (ref 0–0.5)
LYMPHOCYTES # BLD: 0.7 K/UL (ref 0.9–3.6)
LYMPHOCYTES NFR BLD: 7 % (ref 21–52)
MAGNESIUM SERPL-MCNC: 2.1 MG/DL (ref 1.6–2.6)
MCH RBC QN AUTO: 24.8 PG (ref 24–34)
MCHC RBC AUTO-ENTMCNC: 30.4 G/DL (ref 31–37)
MCV RBC AUTO: 81.6 FL (ref 78–100)
MONOCYTES # BLD: 0.5 K/UL (ref 0.05–1.2)
MONOCYTES NFR BLD: 5 % (ref 3–10)
NEUTS SEG # BLD: 8.1 K/UL (ref 1.8–8)
NEUTS SEG NFR BLD: 85 % (ref 40–73)
NRBC # BLD: 0 K/UL (ref 0–0.01)
NRBC BLD-RTO: 0 PER 100 WBC
PHOSPHATE SERPL-MCNC: 1.8 MG/DL (ref 2.5–4.9)
PHOSPHATE SERPL-MCNC: 1.9 MG/DL (ref 2.5–4.9)
PLATELET # BLD AUTO: 145 K/UL (ref 135–420)
PMV BLD AUTO: 12.6 FL (ref 9.2–11.8)
POTASSIUM SERPL-SCNC: 2.7 MMOL/L (ref 3.5–5.5)
POTASSIUM SERPL-SCNC: 3 MMOL/L (ref 3.5–5.5)
RBC # BLD AUTO: 3.31 M/UL (ref 4.35–5.65)
SODIUM SERPL-SCNC: 146 MMOL/L (ref 136–145)
SODIUM SERPL-SCNC: 148 MMOL/L (ref 136–145)
SODIUM SERPL-SCNC: 151 MMOL/L (ref 136–145)
WBC # BLD AUTO: 9.6 K/UL (ref 4.6–13.2)

## 2022-10-14 PROCEDURE — 74011000250 HC RX REV CODE- 250: Performed by: INTERNAL MEDICINE

## 2022-10-14 PROCEDURE — 84100 ASSAY OF PHOSPHORUS: CPT

## 2022-10-14 PROCEDURE — 65660000004 HC RM CVT STEPDOWN

## 2022-10-14 PROCEDURE — 77030040831 HC BAG URINE DRNG MDII -A

## 2022-10-14 PROCEDURE — 74011250636 HC RX REV CODE- 250/636: Performed by: INTERNAL MEDICINE

## 2022-10-14 PROCEDURE — 77030018798 HC PMP KT ENTRL FED COVD -A

## 2022-10-14 PROCEDURE — 82962 GLUCOSE BLOOD TEST: CPT

## 2022-10-14 PROCEDURE — 74011000258 HC RX REV CODE- 258: Performed by: NURSE PRACTITIONER

## 2022-10-14 PROCEDURE — 74011250637 HC RX REV CODE- 250/637

## 2022-10-14 PROCEDURE — 74011250636 HC RX REV CODE- 250/636: Performed by: NURSE PRACTITIONER

## 2022-10-14 PROCEDURE — 94762 N-INVAS EAR/PLS OXIMTRY CONT: CPT

## 2022-10-14 PROCEDURE — 80048 BASIC METABOLIC PNL TOTAL CA: CPT

## 2022-10-14 PROCEDURE — 74011000258 HC RX REV CODE- 258: Performed by: INTERNAL MEDICINE

## 2022-10-14 PROCEDURE — 84295 ASSAY OF SERUM SODIUM: CPT

## 2022-10-14 PROCEDURE — 85025 COMPLETE CBC W/AUTO DIFF WBC: CPT

## 2022-10-14 PROCEDURE — 77030019905 HC CATH URETH INTMIT MDII -A

## 2022-10-14 PROCEDURE — 74011250636 HC RX REV CODE- 250/636: Performed by: FAMILY MEDICINE

## 2022-10-14 PROCEDURE — 99232 SBSQ HOSP IP/OBS MODERATE 35: CPT | Performed by: FAMILY MEDICINE

## 2022-10-14 PROCEDURE — 74011000250 HC RX REV CODE- 250: Performed by: NURSE PRACTITIONER

## 2022-10-14 PROCEDURE — 36415 COLL VENOUS BLD VENIPUNCTURE: CPT

## 2022-10-14 PROCEDURE — 74018 RADEX ABDOMEN 1 VIEW: CPT

## 2022-10-14 PROCEDURE — 2709999900 HC NON-CHARGEABLE SUPPLY

## 2022-10-14 PROCEDURE — 51798 US URINE CAPACITY MEASURE: CPT

## 2022-10-14 PROCEDURE — 71045 X-RAY EXAM CHEST 1 VIEW: CPT

## 2022-10-14 PROCEDURE — 83735 ASSAY OF MAGNESIUM: CPT

## 2022-10-14 RX ORDER — POTASSIUM CHLORIDE 7.45 MG/ML
10 INJECTION INTRAVENOUS
Status: COMPLETED | OUTPATIENT
Start: 2022-10-14 | End: 2022-10-15

## 2022-10-14 RX ADMIN — POTASSIUM CHLORIDE AND DEXTROSE MONOHYDRATE: 150; 5 INJECTION, SOLUTION INTRAVENOUS at 01:45

## 2022-10-14 RX ADMIN — POTASSIUM CHLORIDE 10 MEQ: 7.46 INJECTION, SOLUTION INTRAVENOUS at 18:48

## 2022-10-14 RX ADMIN — PIPERACILLIN AND TAZOBACTAM 4.5 G: 4; .5 INJECTION, POWDER, FOR SOLUTION INTRAVENOUS at 22:00

## 2022-10-14 RX ADMIN — SODIUM CHLORIDE, PRESERVATIVE FREE 10 ML: 5 INJECTION INTRAVENOUS at 23:06

## 2022-10-14 RX ADMIN — HEPARIN SODIUM 5000 UNITS: 5000 INJECTION INTRAVENOUS; SUBCUTANEOUS at 23:05

## 2022-10-14 RX ADMIN — SODIUM CHLORIDE, PRESERVATIVE FREE 20 MG: 5 INJECTION INTRAVENOUS at 23:05

## 2022-10-14 RX ADMIN — POTASSIUM CHLORIDE 10 MEQ: 7.46 INJECTION, SOLUTION INTRAVENOUS at 17:16

## 2022-10-14 RX ADMIN — HEPARIN SODIUM 5000 UNITS: 5000 INJECTION INTRAVENOUS; SUBCUTANEOUS at 06:12

## 2022-10-14 RX ADMIN — POTASSIUM CHLORIDE 10 MEQ: 7.46 INJECTION, SOLUTION INTRAVENOUS at 19:54

## 2022-10-14 RX ADMIN — POTASSIUM CHLORIDE 10 MEQ: 7.46 INJECTION, SOLUTION INTRAVENOUS at 14:58

## 2022-10-14 RX ADMIN — PIPERACILLIN AND TAZOBACTAM 4.5 G: 4; .5 INJECTION, POWDER, LYOPHILIZED, FOR SOLUTION INTRAVENOUS at 06:43

## 2022-10-14 RX ADMIN — SODIUM CHLORIDE, PRESERVATIVE FREE 10 ML: 5 INJECTION INTRAVENOUS at 16:04

## 2022-10-14 RX ADMIN — POTASSIUM CHLORIDE 10 MEQ: 7.46 INJECTION, SOLUTION INTRAVENOUS at 16:04

## 2022-10-14 RX ADMIN — METOPROLOL TARTRATE 25 MG: 25 TABLET, FILM COATED ORAL at 08:22

## 2022-10-14 RX ADMIN — SODIUM CHLORIDE, PRESERVATIVE FREE 20 MG: 5 INJECTION INTRAVENOUS at 08:22

## 2022-10-14 RX ADMIN — THIAMINE HYDROCHLORIDE 100 MG: 100 INJECTION, SOLUTION INTRAMUSCULAR; INTRAVENOUS at 08:28

## 2022-10-14 RX ADMIN — METOPROLOL TARTRATE 25 MG: 25 TABLET, FILM COATED ORAL at 17:17

## 2022-10-14 RX ADMIN — POTASSIUM CHLORIDE AND DEXTROSE MONOHYDRATE: 150; 5 INJECTION, SOLUTION INTRAVENOUS at 23:11

## 2022-10-14 RX ADMIN — SODIUM CHLORIDE 750 MG: 900 INJECTION, SOLUTION INTRAVENOUS at 12:18

## 2022-10-14 RX ADMIN — SODIUM CHLORIDE 750 MG: 900 INJECTION, SOLUTION INTRAVENOUS at 23:17

## 2022-10-14 RX ADMIN — SODIUM CHLORIDE, PRESERVATIVE FREE 10 ML: 5 INJECTION INTRAVENOUS at 06:44

## 2022-10-14 RX ADMIN — PIPERACILLIN AND TAZOBACTAM 4.5 G: 4; .5 INJECTION, POWDER, LYOPHILIZED, FOR SOLUTION INTRAVENOUS at 12:18

## 2022-10-14 RX ADMIN — HEPARIN SODIUM 5000 UNITS: 5000 INJECTION INTRAVENOUS; SUBCUTANEOUS at 14:41

## 2022-10-14 RX ADMIN — FOLIC ACID: 5 INJECTION, SOLUTION INTRAMUSCULAR; INTRAVENOUS; SUBCUTANEOUS at 08:28

## 2022-10-14 NOTE — CONSULTS
[email or web address]  [phone]    GASTROENTEROLOGY CONSULT      Impression:   1. Aspiration pneumonia  2. Oropharyngeal dysphagia - receiving NGT feeds  3. COVID Positive (10/10), also positive for enterovirus and rhinovirus  4. Dementia  5. Hx EtOH abuse  6. Seizure disorder  7. Hx prior PEG 2013 in setting of CVA  8. Hx CVA  9. EGD/colonoscopy 2021 - small sliding hiatal hernia, otherwise normal; diverticulosis, poor prep, hemorrhoids      Plan:     1. Please reconsult GI on 10/20 after droplet plus contact precautions d/c, consider elective PEG at that time, pending clinical course. Anatomically patient seems to be a reasonable candidate for PEG- EGD last year w/ only small HH. Did not have opportunity to discuss w/ family regarding risks/benefits/alternatives. 2. If pt ready for d/c prior to 10/20, consider consulting IR to see what their policy is regarding PEG placement in COVID positive patient. Thank you for this consultation and the opportunity to participate in the care of this patient. Please do not hesitate to call with any questions or concerns, or should event occur that may necessitate additional GI evaluation. Chief Complaint: Aspiration pneumonia, dysphagia, need for PEG      HPI:  Anu Cheng is a 79 y.o. male who I am being asked to see in consultation for an opinion regarding aspiration pneumonia, oropharyngeal dysphagia, need for PEG. He is currently admitted for aspiration pneumonia, oropharyngeal dysphagia, COVID + on 10/10. Seen by SLP, receiving NGT feeds. Not making adequate progress w/ dyshagia. Palliative care has been consulted, pt has AMD, DNR/DNI, limited interventions, ok w/ long term feeding tube. GI consulted to see if patient candidate for PEG. Pt previously seen as outpatient in 2021 for evaluation of unintended weight loss. EGD w/ small sliding hiatal hernia, Colonoscopy w/ diverticulosis, poor prep, hemorrhoids.      Chart reviewed, d/w Dr. Wilian Belcher, patient not seen in person.     PMH:   Past Medical History:   Diagnosis Date    Alcohol dependence with alcohol-induced persisting dementia ([facility] [address]     per [facility]    Anemia     Aphasia     Benign prostatic hyperplasia     Cerebral vascular disease     COVID-19 2021    Disturbances of salivary secretion     Dysphagia     GERD (gastroesophageal reflux disease)     Hemiparesis (HCC)     left side    Hemiplegia (HCC)     left side     Hiccups     Hyperlipidemia     Hypernatremia 2021    Hypertension     Hyponatremia     Insomnia     Lacunar infarction ([address] 2010    weakness both arms, unable to walk    Lower extremity edema     Moderate protein-calorie malnutrition (HCC)     MRSA (methicillin resistant Staphylococcus aureus)     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)     Pneumonia     Psychogenic polydipsia     PVD (peripheral vascular disease) (HCC)     Seizures ([address]     Spinal stenosis     Stroke ([facility] [address] 2017    Unspecified convulsions ([facility] [address]     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       PSH:   Past Surgical History:   Procedure Laterality Date    COLONOSCOPY N/A 2021    COLONOSCOPY performed by Lawson Presley MD at [facility]    HX HEENT      pt reports past hx of surgery on ears unsure of what type    HX OTHER SURGICAL Bilateral     debridement of lower extremities       Social HX:   Social History     Socioeconomic History    Marital status:      Spouse name: Not on file    Number of children: Not on file    Years of education: Not on file    Highest education level: Not on file   Occupational History    Not on file   Tobacco Use    Smoking status: Former     Packs/day: 0.50     Types: Cigarettes     Quit date: 2008     Years since quittin.3    Smokeless tobacco: Never   Substance and Sexual Activity    Alcohol use: No    Drug use: No    Sexual activity: Not Currently   Other Topics Concern    Not on file   Social History Narrative    Not on file Social Determinants of Health     Financial Resource Strain: Not on file   Food Insecurity: Not on file   Transportation Needs: Not on file   Physical Activity: Not on file   Stress: Not on file   Social Connections: Not on file   Intimate Partner Violence: Not on file   Housing Stability: Not on file       FHX:   Family History   Problem Relation Age of Onset    Hypertension Other        Allergy:   No Known Allergies    Patient Active Problem List   Diagnosis Code    Intractable hiccups R06.6    Hypertension I10    Hypokalemia E87.6    First degree AV block I44.0    Former heavy tobacco smoker Z87.891    Severe protein-calorie malnutrition ([facility] [address] E43    Psychogenic polydipsia R63.1, F54    Hyponatremia E87.1    Cellulitis L03.90    Right foot infection L08.9    Bilateral leg and foot pain M79.604, M79.605, M79.671, M79.672    Aspiration pneumonia ([facility]) J69.0    Stroke ([facility] I63.9    Hyperosmolality and hypernatremia P38.4    Acute metabolic encephalopathy D44.08    Dehydration E86.0    COVID-19 U07.1    Hematuria R31.9    Dysuria R30.0    Goals of care, counseling/discussion Z71.89    Debility R53.81    Respiratory failure ([facility]) V02.80    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[facility]) Z55.49    Aspiration into airway T17.908A    Acute kidney injury ([facility] N17.9    Seizure disorder ([facility]) G40.909       Home Medications:     Medications Prior to Admission   Medication Sig    moxifloxacin (VIGAMOX) 0.5 % ophthalmic solution     levETIRAcetam (KEPPRA) 100 mg/mL solution Take 7.5 mL by mouth two (2) times a day. doxepin (SINEquan) 10 mg capsule Take 1 Cap by mouth nightly. (Patient not taking: Reported on 3/16/2022)    clopidogrel (PLAVIX) 75 mg tab Take 1 Tab by mouth daily. pravastatin (PRAVACHOL) 40 mg tablet Take 1 Tab by mouth nightly. ascorbic acid, vitamin C, (VITAMIN C) 250 mg tablet Take 1 Tab by mouth daily. ferrous sulfate 325 mg (65 mg iron) tablet Take 1 Tab by mouth daily (with breakfast).     polyethylene glycol (MIRALAX) 17 gram packet Take 1 Packet by mouth daily. senna-docusate (PERICOLACE) 8.6-50 mg per tablet Take 1 Tab by mouth nightly. HOLD for loose stool. tamsulosin (FLOMAX) 0.4 mg capsule Take 0.4 mg by mouth daily. 1 tab daily    MULTIVITAMIN,THERAPEUTIC (THERA MULTI-VITAMIN PO) Take 500 mg by mouth daily. baclofen (LIORESAL) 10 mg tablet Take 1 Tab by mouth every twelve (12) hours as needed. (Patient taking differently: Take 1 Tab by mouth every twelve (12) hours as needed (for back pain, muscle ralaxant). as needed for pain,)    metoprolol (LOPRESSOR) 25 mg tablet Take 1 Tab by mouth two (2) times a day. cyanocobalamin 1,000 mcg tablet Take 1 Tab by mouth daily. folic acid (FOLVITE) 1 mg tablet Take 1 Tab by mouth daily. aspirin 81 mg chewable tablet Take 1 Tab by mouth daily. ARSEN Murillo   10/14/2022, 11:55 AM  [facility]  Office: [phone]  Cell: [phone]  [email or web address]

## 2022-10-14 NOTE — PROGRESS NOTES
[facility] Hospitalists  Progress Note    Patient: Sherrine Meckel Age: 79 y.o. : 1955 MR#: [id number] SSN: [id number]  Date: 10/14/2022     Subjective/24-hour events:     Remains afebrile. No acute respiratory issues. Assessment:   Aspiration pneumonia  Sepsis, POA, secondary to above  PARAMJIT  Dysphagia  Hypokalemia  Hypertension  Seizure disorder  COVID-19 infection  Hyponatremia  Dementia  Alcohol abuse history    Plan:   Antibiotics as ordered, management per ID. GI recommendations regarding PEG tube placement appreciated. Earliest ability to place tube would be 10/20 per their COVID protocols. Patient likely to remain hospitalized for the next several days as disposition is also an issue. We will reconsult GI to place tube on 1020 unless disposition can be arranged earlier. Continue antiepileptic therapy. Continue wound care. Other management primarily supportive. Care management to continue to work on disposition. Unless significant improvement noted, anticipate that long-term care placement will be necessary. Therapy services: SLP. Equipment: TBD. Anticipated disposition: TBD. Case discussed with:  []Patient  []Family  [x]Nursing  [x]Case Management  DVT Prophylaxis:  []Lovenox  [x]Hep SQ  []SCDs  []Coumadin   []On Heparin gtt    Objective:   VS: Visit Vitals  /80 (BP 1 Location: Left upper arm, BP Patient Position: At rest)   Pulse 84   Temp 99.6 °F (37.6 °C)   Resp 17   Ht 5' 10\" (1.778 m)   Wt 67.9 kg (149 lb 11.1 oz)   SpO2 99%   BMI 21.48 kg/m²      Tmax/24hrs: Temp (24hrs), Av.4 °F (37.4 °C), Min:98.8 °F (37.1 °C), Max:100.1 °F (37.8 °C)    Intake/Output Summary (Last 24 hours) at 10/14/2022 1315  Last data filed at 10/14/2022 0828  Gross per 24 hour   Intake 200 ml   Output 1575 ml   Net -1375 ml       General:  In NAD. Nontoxic-appearing. NGT in place. Cardiovascular:  RRR. Pulmonary: Effort nonlabored.   GI:  Abdomen soft.  Extremities:  Warm, no edema or ischemia.   Neuro: Awake, nonverbal.    Labs:    Recent Results (from the past 24 hour(s))   METABOLIC PANEL, BASIC    Collection Time: 10/13/22  5:00 PM   Result Value Ref Range    Sodium 147 (H) 136 - 145 mmol/L    Potassium 3.7 3.5 - 5.5 mmol/L    Chloride 121 (H) 100 - 111 mmol/L    CO2 21 21 - 32 mmol/L    Anion gap 5 3.0 - 18 mmol/L    Glucose 117 (H) 74 - 99 mg/dL    BUN 9 7.0 - 18 MG/DL    Creatinine 0.47 (L) 0.6 - 1.3 MG/DL    BUN/Creatinine ratio 19 12 - 20      eGFR >60 >60 ml/min/1.73m2    Calcium 8.1 (L) 8.5 - 10.1 MG/DL   PHOSPHORUS    Collection Time: 10/13/22  5:00 PM   Result Value Ref Range    Phosphorus 2.2 (L) 2.5 - 4.9 MG/DL   GLUCOSE, POC    Collection Time: 10/13/22  5:28 PM   Result Value Ref Range    Glucose (POC) 106 70 - 110 mg/dL   SODIUM    Collection Time: 10/13/22  9:36 PM   Result Value Ref Range    Sodium 147 (H) 136 - 145 mmol/L   GLUCOSE, POC    Collection Time: 10/14/22 12:05 AM   Result Value Ref Range    Glucose (POC) 106 70 - 110 mg/dL   GLUCOSE, POC    Collection Time: 10/14/22  6:42 AM   Result Value Ref Range    Glucose (POC) 101 70 - 110 mg/dL   GLUCOSE, POC    Collection Time: 10/14/22 11:46 AM   Result Value Ref Range    Glucose (POC) 105 70 - 110 mg/dL       Signed By: Jose Antonio Acosta MD     October 14, 2022

## 2022-10-14 NOTE — PROGRESS NOTES
Called son Duane Paci to complete assessment. Received VM. Left message requesting return call. Called Fab at [facility]. The patient is a LTC resident at [facility] and can return.  will continue to monitor and assist with transition of care needs.     KATHRIN Prather, RN  Care Management

## 2022-10-14 NOTE — PROGRESS NOTES
Comprehensive Nutrition Assessment    Type and Reason for Visit: Reassess, Consult, NPO/clear liquid    Nutrition Recommendations/Plan:   Resume tube feeding:   Formula: Jevity 1.5  Initial Rate: 10 mL/hr  Advancement: 10 q 24 hrs as tolerated  Goal Rate: 60 mL/hr   Water Flushes: 50 mL q 1 hours (D5 also infusing)   Goal Regimen Provides: 2160 kcal, 119 g of protein, 1093 mL of free water, 2293 mL of TOTAL WATER, 100% RDIs. Continue IVF per MD   Initiate aspiration precautions   Monitor tolerance of EN feeding, readiness to advance to goal rate, weight, labs, and plan of care during admission. Malnutrition Assessment:  Malnutrition Status: At risk for malnutrition (specify) (dysphagia at baseline, BMI, COVID) (10/10/22 1114)    Context:  Acute illness       Nutrition Assessment:    Pt remains NPO and non verbal. Per chart review, TF were on hold this morning d/t possible dislodge-KUB was ordered. Followed up with JOSEFA Xie, RN reported TF were initiated again at 10mL/hour at noon today with advancement slowly since TF were off for more than 24 hours. Pt continues to receive D5 at 50mL/hour to provide (60 gm dextrose, 204 kcal per day). PEG placement with pending date of 10/20 d/t COVID-19 protocol per MD note. Current lab shows elevated BUN (151), low Potassium (2.7), Calcium (7.6) and Phosphorus (1.9) at risk for refeeding. Will continue to closely monitor while inpatient. Nutrition Related Findings:    Last BM 10/14. Output: 0mL (urine-not documented). Pertinent Medications:  potassium chloride, metoprolol, D5 at 50mL/hour, humalog, miralax, zofran, keppra, folic acid, thiamine, pepcid.  Wound Type: None     Current Nutrition Intake & Therapies:  Average Meal Intake: NPO  Average Supplement Intake: NPO  DIET NPO  ADULT TUBE FEEDING Nasogastric; Standard with Fiber; Delivery Method: Continuous; Continuous Initial Rate (mL/hr): 10; Continuous Advance Tube Feeding: Yes; Advancement Volume (mL/hr): 10; Advancement Frequency: Q 24 hours; Continuous Goal Rate (m. .. Anthropometric Measures:  Height: 5' 10\" (177.8 cm)  Ideal Body Weight (IBW): 166 lbs (75 kg)  Admission Body Weight: 150 lb 2.1 oz (68.1 kg)  Current Body Wt:  67.9 kg (149 lb 11.1 oz), 90.2 % IBW. Bed scale  Current BMI (kg/m2): 21.5  BMI Category: Underweight (BMI less than 22) age over 72    Estimated Daily Nutrient Needs:  Energy Requirements Based On: Formula  Weight Used for Energy Requirements: Admission  Energy (kcal/day): 0248-0673 (MSJ 1.2-1.4)  Weight Used for Protein Requirements: Current  Protein (g/day):  (1.2-1.5 g/day)  Method Used for Fluid Requirements: 1 ml/kcal  Fluid (ml/day): 9306-8547    Nutrition Diagnosis:   Swallowing difficulty related to  (dsyphagia) as evidenced by  (PTA previously on puree, mildly thick liquids, SLP rec'd NPO)  Increased nutrient needs related to catabolic illness as evidenced by  (COVID+)  Inadequate oral intake related to cognitive or neurological impairment, swallowing difficulty as evidenced by NPO or clear liquid status due to medical condition    Nutrition Interventions:   Food and/or Nutrient Delivery: Continue NPO, Continue tube feeding, IV fluid delivery  Nutrition Education/Counseling: Education not indicated, No recommendations at this time  Coordination of Nutrition Care: Continue to monitor while inpatient  Plan of Care discussed with: JOSEFA Bell    Goals:  Previous Goal Met: Progressing toward goal(s)  Goals: Meet at least 75% of estimated needs, Tolerate nutrition support at goal rate, by next RD assessment, within 7 days       Nutrition Monitoring and Evaluation:   Behavioral-Environmental Outcomes: None identified  Food/Nutrient Intake Outcomes: Enteral nutrition intake/tolerance, Vitamin/mineral intake, IVF intake  Physical Signs/Symptoms Outcomes: Biochemical data, GI status, Weight, Nutrition focused physical findings, Meal time behavior    Discharge Planning:     Too soon to determine    Gearldo James MA, RDN, LD   Contact: [phone]

## 2022-10-14 NOTE — PROGRESS NOTES
1918hrs:  Bedside and Verbal shift change report given to JOSEFA Negrete (oncoming nurse) by Harleen Thomas RN (offgoing nurse). Report included the following information SBAR, Kardex, Intake/Output, MAR, Recent Results, and Cardiac Rhythm NSR . Wound Prevention Checklist    Patient: Vane Jay (59 y.o. male)  Date: 10/14/2022  Diagnosis: Respiratory failure ([facility] [J96.90]  Dementia, alcoholic ([facility] [address] [facility] [N44.98]  Aspiration into airway [T17.908A] <principal problem not specified>    Precautions:         [x]  Heel prevention boots placed on patient    [x]  Patient turned q2h during shift    []  Lift team ordered    []  Patient on [facility] bed/Specialty bed    [x]  Each Wound is documented during shift (Stage, Color, drainage, odor, measurements, and dressings)    [x]  Dual skin check done with Unique Cobb RN     0025hrs:  Pt's NGT at 60cm, may have been dislodged. Attempted to advised to initial measurement of 65cm, but pt coughed continually. NGT kept at 60cm. Spoke with Dr. Fady Hoyos, agreed to order STAT KUB for verification. NGT feed on hold. 0256hrs:  Paged MD to inquire about KUB results. 0315hrs:  Re-Paged MD.    [id number]:  Radiology called to advise that pt's portable CXR results are available. 0319hrs:  Per Dr. Fady Hoyos, continue to hold NGT feeding until morning. Nurse Misc. Order updated. 0500hrs:  Pt's Zosyn 4.5g in D5% due for 0400hrs is not located in the xis. Will check with Pharmacy and await delivery/availability. 0540hrs:  Pt with large BM. Pt provided with incontinent care, bathe with CHG & bath pack; linen, gown and bed pad changed. 0730hrs:  Bedside and Verbal shift change report given to Christen Reyes RN (oncoming nurse) by Qian Cast RN (offgoing nurse). Report included the following information SBAR, Kardex, ED Summary, Procedure Summary, Intake/Output, MAR, Recent Results, and Cardiac Rhythm NSR .

## 2022-10-14 NOTE — PROGRESS NOTES
Bedside shift change report given to Nirmal Alford RN (oncoming nurse) by Denia Paredes RN (offgoing nurse). Report included the following information SBAR, Kardex, ED Summary, Intake/Output, MAR, Recent Results, and Cardiac Rhythm NSR . Wound Prevention Checklist    Patient: Tabatha Valles (11 y.o. male)  Date: 10/14/2022  Diagnosis: Respiratory failure ([facility] [address] [J96.90]  Dementia, alcoholic ([address] [E50.59]  Aspiration into airway [T17.908A] <principal problem not specified>    Precautions:         []  Heel prevention boots placed on patient    [x]  Patient turned q2h during shift    []  Lift team ordered    [x]  Patient on [facility] bed/Specialty bed    [x]  Each Wound is documented during shift (Stage, Color, drainage, odor, measurements, and dressings)    [x]  Dual skin check done with Mihir Richardson RN Pt Reid catheter removed per order. Patient tolerated procedure well. Condom catheter applied for accurate I&O's.     8796 Spoke with Dr. Eloise Muñoz in regards to patient NGT and his feeding being held. Reviewed CXR results with MD and will restart tube feeding. 1447 Bladder scan shows 249ml of urine within bladder    1610 Bladder scan shows 312ml of urine within bladder. Pt states he will attempt to urinate. Will check back in 15 minutes. 0 Pt still has not voided. Spoke with Eloise Muñoz MD and received the okay to straight cath patient and check residual in 8 hours post straight cath. 1630 Went to perform straight cath and patient has voided 300 ml of clear yellow urine into condom catheter bag.

## 2022-10-14 NOTE — PROGRESS NOTES
Progress Note    Irlanda Stewart  79 y.o. Admit Date: 10/9/2022  Active Problems:    Hypertension (9/2/2012) POA: Unknown      Hypokalemia (6/22/2013) POA: Unknown      Aspiration pneumonia ([facility] [address] (8/22/2017) POA: Unknown      Hyperosmolality and hypernatremia (3/30/2021) POA: Unknown      Dehydration (3/30/2021) POA: Yes      COVID-19 (3/30/2021) POA: Yes      Respiratory failure ([address] (10/9/2022) POA: Unknown      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[facility] [address] (10/3/0877) POA: Unknown      Aspiration into airway (10/9/2022) POA: Unknown      Acute kidney injury ([facility] [address] (10/10/2022) POA: Unknown      Seizure disorder ([facility] [address] (10/12/2022) POA: Yes            Subjective:     Patient  remained in his baseline mental status but more alert and awake. Strict anticoagulant measures implemented. NG feeding was on hold now he stated after readjusting that NG tube. Reid sticking out. Also receiving D5W with 20 of potassium milliequivalent per liter at 50 cc/h. No labs are being done today unfortunately. Last sodium remains 147 definitely improved significantly from the time of admission last few days back. A comprehensive review of systems was negative except for that written in the History of Present Illness.     Objective:     Visit Vitals  /80 (BP 1 Location: Left upper arm, BP Patient Position: At rest)   Pulse 84   Temp 99.6 °F (37.6 °C)   Resp 17   Ht 5' 10\" (1.778 m)   Wt 67.9 kg (149 lb 11.1 oz)   SpO2 99%   BMI 21.48 kg/m²         Intake/Output Summary (Last 24 hours) at 10/14/2022 1334  Last data filed at 10/14/2022 6394  Gross per 24 hour   Intake 200 ml   Output 1575 ml   Net -1375 ml       Current Facility-Administered Medications   Medication Dose Route Frequency Provider Last Rate Last Admin    piperacillin-tazobactam (ZOSYN) 4.5 g in dextrose 5% 100 mL IVPB  4.5 g IntraVENous Q8H Merced Kimbrough MD        metoprolol tartrate (LOPRESSOR) tablet 25 mg  25 mg Oral BID Anusha Orr NP   25 mg at 10/14/22 8308 dextrose 5% with KCl 20 mEq/L infusion   IntraVENous CONTINUOUS Leandro Uribe MD 50 mL/hr at 10/14/22 0145 New Bag at 10/14/22 0145    sodium chloride (NS) flush 5-40 mL  5-40 mL IntraVENous Q8H Leandro Uribe MD   10 mL at 10/14/22 0644    sodium chloride (NS) flush 5-40 mL  5-40 mL IntraVENous PRN Leandro Uribe MD        albuterol-ipratropium (DUO-NEB) 2.5 MG-0.5 MG/3 ML  3 mL Nebulization Q4H PRN [facility] , EVAN        insulin lispro (HUMALOG) injection   SubCUTAneous Q6H Prince Molly MURILLO, NP   2 Units at 10/10/22 2309    glucose chewable tablet 16 g  4 Tablet Oral PRN [facility] , EVAN        glucagon (GLUCAGEN) injection 1 mg  1 mg IntraMUSCular PRN Prince Molly MURILLO, NP        dextrose 10% infusion 0-250 mL  0-250 mL IntraVENous PRN Prince Molly MURILLO, NP        hydrALAZINE (APRESOLINE) 20 mg/mL injection 10 mg  10 mg IntraVENous Q6H PRN Prince Molly MURILLO, NP        acetaminophen (TYLENOL) tablet 650 mg  650 mg Oral Q6H PRN [facility] Navy NP   650 mg at 10/12/22 2138    Or    acetaminophen (TYLENOL) suppository 650 mg  650 mg Rectal Q6H PRN [facility] , EVAN        polyethylene glycol (MIRALAX) packet 17 g  17 g Oral DAILY PRN Prince Molly MURILLO, NP        ondansetron (ZOFRAN ODT) tablet 4 mg  4 mg Oral Q8H PRN Prince Molly MURILLO, NP        Or    ondansetron (ZOFRAN) injection 4 mg  4 mg IntraVENous Q6H PRN Prince Molly MURILLO, NP        heparin (porcine) injection 5,000 Units  5,000 Units SubCUTAneous Q8H Laura Owens NP   5,000 Units at 10/14/22 0612    levETIRAcetam (KEPPRA) 750 mg in 0.9% sodium chloride 100 mL IVPB  750 mg IntraVENous Q12H Laura Owens  mL/hr at 10/14/22 1218 750 mg at 03/91/75 4384    folic acid (FOLVITE) 1 mg in 0.9% sodium chloride 50 mL ivpb   IntraVENous DAILY Prince Molly MURILLO,  mL/hr at 10/14/22 0828 New Bag at 10/14/22 0828    thiamine (B-1) 100 mg in 0.9% sodium chloride 50 mL IVPB  100 mg IntraVENous DAILY Aleks Adkins,  mL/hr at 10/14/22 0828 100 mg at 10/14/22 3397    famotidine (PF) (PEPCID) 20 mg in 0.9% sodium chloride 10 mL injection  20 mg IntraVENous Q12H Lavella Burkitt C, EVAN   20 mg at 10/14/22 4410        Physical Exam:     Physical Exam:   General:  Alert, cooperative, no distress, appears stated age. Mouth/Throat: Lips, mucosa, and tongue normal. Teeth and gums normal.   Neck: Supple, symmetrical, trachea midline, no adenopathy, thyroid: no enlargement/tenderness/nodules, no carotid bruit and no JVD. Lungs:   Clear to auscultation bilaterally. Heart:  Regular rate and rhythm, S1, S2 normal, no murmur, click, rub or gallop. Abdomen:   Soft, non-tender. Bowel sounds normal. No masses,  No organomegaly. Extremities: Extremities normal, atraumatic, no cyanosis or edema                         Data Review:    CBC w/Diff    Recent Labs     10/14/22  1300 10/13/22  0037 10/12/22  2036   WBC 9.6 11.4 12.0   RBC 3.31* 3.80* 4.17*   HGB 8.2* 9.5* 10.3*   HCT 27.0* 31.6* 34.3*   MCV 81.6 83.2 82.3   MCH 24.8 25.0 24.7   MCHC 30.4* 30.1* 30.0*   RDW 15.2* 15.1* 15.3*    Recent Labs     10/14/22  1300 10/13/22  0037 10/12/22  2036   MONOS 5 2* 2*   EOS 2 0 0   BASOS 0 0 0   RDW 15.2* 15.1* 15.3*        Comprehensive Metabolic Profile    Recent Labs     10/13/22  2136 10/13/22  1700 10/13/22  0943 10/13/22  0408 10/13/22  0037 10/12/22  2036   * 147* 148*   < > 148* 146*   K  --  3.7 3.2*  --  2.6* 3.2*   CL  --  121*  --   --  118* 116*   CO2  --  21  --   --  24 22   BUN  --  9  --   --  14 16   CREA  --  0.47*  --   --  0.58* 0.58*    < > = values in this interval not displayed.     Recent Labs     10/13/22  1700 10/13/22  0037 10/12/22  2036   CA 8.1* 8.3* 8.8   PHOS 2.2* 2.2* 2.6                        Impression:       Active Hospital Problems    Diagnosis Date Noted    Seizure disorder ([facility] [address] 10/12/2022    Acute kidney injury ([facility] [address] 10/10/2022    Respiratory failure ([facility] [address] 33/67/3606    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[facility] [address] 10/09/2022    Aspiration into airway 10/09/2022    Hyperosmolality and hypernatremia 03/30/2021    COVID-19 03/30/2021    Dehydration 03/30/2021    Aspiration pneumonia ([facility] [address] 08/22/2017    Hypokalemia 06/22/2013    Hypertension 09/02/2012            Plan:     Continue current IV fluid along with free water through the NG tube and NG feeding with Jevity. Need to check electrolytes every 12 hours and serum sodium every 4 hours until the sodium is more stable. Continue DNR. Continue other supportive care.       Moy Ashley MD

## 2022-10-14 NOTE — PROGRESS NOTES
Reason for Admission:  Respiratory failure ([facility] [address] [J96.90]  Dementia, alcoholic ([facility] [address] [Y92.23]  Aspiration into airway [T17.908A]                 RUR Score:    15%            Plan for utilizing home health:    no, LTC                      Likelihood of Readmission:   Moderate                         Do you (patient/family) have any concerns for transition/discharge?  no    Transition of Care Plan:       Initial assessment completed with nursing home. Cognitive status of patient: unable to assess    Face sheet information confirmed:  yes. The patient designates son will participate in his discharge plan and to receive any needed information. This patient lives in [address] [place] [address]. Patient is not able to navigate steps as needed. Prior to hospitalization, patient was considered to be independent with ADLs/IADLS : no . If not independent,  patient needs assist with : bathing, food preparation, and cooking    Patient has a current ACP document on file: no      The patient will need BLS  transport patient home upon discharge. The patient already has none reported,  medical equipment available in the home. Patient is not currently active with home health. Patient has stayed in a skilled nursing facility or rehab. Was  stay within last 60 days : yes. LTC    This patient is on dialysis :no    Currently, the discharge plan is LTC. The patient states that he can obtain his medications from the pharmacy, and take his medications as directed. Patient's current insurance is [employer] and Medicaid      Care Management Interventions  PCP Verified by CM:  Yes  Mode of Transport at Discharge: BLS  Transition of Care Consult (CM Consult): Long Term Care  Physical Therapy Consult: No  Occupational Therapy Consult: No  Speech Therapy Consult: Yes  Support Systems: Child(flako)  Confirm Follow Up Transport: Other (see comment)  Discharge Location  Patient Expects to be Discharged to[de-identified] [facility],  will continue to monitor and assist with transition of care needs.     LOLIS ChapmanN, RN  Care Management

## 2022-10-14 NOTE — PROGRESS NOTES
Infectious Disease progress Note        Reason:sepsis, aspiration pneumonia, covid-19 pneumonia    Current abx Prior abx   Zosyn since 10/9/22 Vancomycin 10/9-10/13     Lines: Bradley since 10/10/2022    Assessment :  79 y.o. male with a significant PMHx of CVA with left-sided hemiparesis and aphasia, HTN, HLD, dysphagia, seizure disorder, schizophrenia, alcohol-induced dementia, severe protein calorie malnutrition and debility who presented to [facility] from [facility] on 10/10/2022 with hypoxia. Hospitalization [facility] July 2017 for right foot cellulitis, infected right foot ulcer secondary to Proteus, citrobacter, e.coli, mssa, e.fecalis, e.avium, strep viridans. Surgical wound cultures 7/11 : proteus,citrobacter   S/p I&D of right foot ulcer on 7/11- intra op findings noted - no evidence of bone infection. Clinical presentation consistent with sepsis, acute hypoxic respiratory failure-present on admission due to aspiration pneumonia superimposed on recent COVID-19 infection    1 of 2 sets of blood culture positive for coagulase-negative Staphylococcus on 10/10-likely contaminant. No definitive evidence of skin/soft tissue infection as a source of bloodstream infection. Negative blood culture 10/12    70,000 colonies of Citrobacter in urine culture 10/9-likely colonizer. No significant pyuria to suggest cystitis    Positive respiratory viral panel PCR 10/10/2022 for enterovirus, rhinovirus, COVID-19-currently asymptomatic from these infections    Acute kidney injury-likely prerenal    Clinically improving. Afebrile. Oxygenation maintained     Recommendations:     1. Continue piperacillin/tazobactam till 10/16/22.    2.   Discontinue Bradley. D/w nephrologist. Margarita Clubs to d/c bradley                                                                                                                      4.  Aspiration precautions  5. Monitor CBC, temperature, oxygenation, clinically  6.   Discontinue droplet plus isolation on 10/20/2022       Above plan was discussed in details with RN, dr. Sherwin Walden. Please call me if any further questions or concerns. Will continue to participate in the care of this patient. HPI:      Patient is confused and unable to communicate effectively. Detailed review system not feasible      Past Medical History:   Diagnosis Date    Alcohol dependence with alcohol-induced persisting dementia ([address]     per [facility] 4/23/21    Anemia     Aphasia     Benign prostatic hyperplasia     Cerebral vascular disease     COVID-19 04/06/2021    Disturbances of salivary secretion     Dysphagia     GERD (gastroesophageal reflux disease)     Hemiparesis (HCC)     left side    Hemiplegia (HCC)     left side     Hiccups     Hyperlipidemia     Hypernatremia 03/2021    Hypertension     Hyponatremia     Insomnia     Lacunar infarction ([address] 2010    weakness both arms, unable to walk    Lower extremity edema     Moderate protein-calorie malnutrition (HCC)     MRSA (methicillin resistant Staphylococcus aureus)     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)     Pneumonia     Psychogenic polydipsia     PVD (peripheral vascular disease) ([address]     Seizures ([address]     Spinal stenosis     Stroke ([address] 2017    Unspecified convulsions ([address]     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 doxepin (SINEquan) 10 mg capsule Take 1 Cap by mouth nightly. clopidogrel (PLAVIX) 75 mg tab Take 1 Tab by mouth daily. Qty: 90 Tab, Refills: 0      pravastatin (PRAVACHOL) 40 mg tablet Take 1 Tab by mouth nightly. Qty: 30 Tab, Refills: 1      ascorbic acid, vitamin C, (VITAMIN C) 250 mg tablet Take 1 Tab by mouth daily. Qty: 30 Tab, Refills: 2      ferrous sulfate 325 mg (65 mg iron) tablet Take 1 Tab by mouth daily (with breakfast). Qty: 30 Tab, Refills: 2      polyethylene glycol (MIRALAX) 17 gram packet Take 1 Packet by mouth daily. Qty: 30 Packet, Refills: 2      senna-docusate (PERICOLACE) 8.6-50 mg per tablet Take 1 Tab by mouth nightly. HOLD for loose stool. Qty: 30 Tab, Refills: 2      tamsulosin (FLOMAX) 0.4 mg capsule Take 0.4 mg by mouth daily. 1 tab daily      MULTIVITAMIN,THERAPEUTIC (THERA MULTI-VITAMIN PO) Take 500 mg by mouth daily. baclofen (LIORESAL) 10 mg tablet Take 1 Tab by mouth every twelve (12) hours as needed. Qty: 15 Tab, Refills: 0      metoprolol (LOPRESSOR) 25 mg tablet Take 1 Tab by mouth two (2) times a day. Qty: 60 Tab, Refills: 2      cyanocobalamin 1,000 mcg tablet Take 1 Tab by mouth daily. Qty: 30 Tab, Refills: 2      folic acid (FOLVITE) 1 mg tablet Take 1 Tab by mouth daily. Qty: 30 Tab, Refills: 1      aspirin 81 mg chewable tablet Take 1 Tab by mouth daily.   Qty: 30 Tab, Refills: 0             Current Facility-Administered Medications   Medication Dose Route Frequency    metoprolol tartrate (LOPRESSOR) tablet 25 mg  25 mg Oral BID    dextrose 5% with KCl 20 mEq/L infusion   IntraVENous CONTINUOUS    sodium chloride (NS) flush 5-40 mL  5-40 mL IntraVENous Q8H    sodium chloride (NS) flush 5-40 mL  5-40 mL IntraVENous PRN    albuterol-ipratropium (DUO-NEB) 2.5 MG-0.5 MG/3 ML  3 mL Nebulization Q4H PRN    insulin lispro (HUMALOG) injection   SubCUTAneous Q6H    glucose chewable tablet 16 g  4 Tablet Oral PRN    glucagon (GLUCAGEN) injection 1 mg  1 mg IntraMUSCular PRN    dextrose 10% infusion 0-250 mL  0-250 mL IntraVENous PRN    hydrALAZINE (APRESOLINE) 20 mg/mL injection 10 mg  10 mg IntraVENous Q6H PRN    piperacillin-tazobactam (ZOSYN) 4.5 g in dextrose 5% 100 mL IVPB  4.5 g IntraVENous Q8H    acetaminophen (TYLENOL) tablet 650 mg  650 mg Oral Q6H PRN    Or    acetaminophen (TYLENOL) suppository 650 mg  650 mg Rectal Q6H PRN    polyethylene glycol (MIRALAX) packet 17 g  17 g Oral DAILY PRN    ondansetron (ZOFRAN ODT) tablet 4 mg  4 mg Oral Q8H PRN    Or    ondansetron (ZOFRAN) injection 4 mg  4 mg IntraVENous Q6H PRN    heparin (porcine) injection 5,000 Units  5,000 Units SubCUTAneous Q8H    levETIRAcetam (KEPPRA) 750 mg in 0.9% sodium chloride 100 mL IVPB  750 mg IntraVENous R40X    folic acid (FOLVITE) 1 mg in 0.9% sodium chloride 50 mL ivpb   IntraVENous DAILY    thiamine (B-1) 100 mg in 0.9% sodium chloride 50 mL IVPB  100 mg IntraVENous DAILY    famotidine (PF) (PEPCID) 20 mg in 0.9% sodium chloride 10 mL injection  20 mg IntraVENous Q12H       Allergies: Patient has no known allergies.     Family History   Problem Relation Age of Onset    Hypertension Other      Social History     Socioeconomic History    Marital status:      Spouse name: Not on file    Number of children: Not on file    Years of education: Not on file    Highest education level: Not on file   Occupational History    Not on file   Tobacco Use    Smoking status: Former     Packs/day: 0.50     Types: Cigarettes     Quit date: 2008     Years since quittin.3    Smokeless tobacco: Never   Substance and Sexual Activity    Alcohol use: No    Drug use: No    Sexual activity: Not Currently   Other Topics Concern    Not on file   Social History Narrative    Not on file     Social Determinants of Health     Financial Resource Strain: Not on file   Food Insecurity: Not on file   Transportation Needs: Not on file   Physical Activity: Not on file   Stress: Not on file   Social Connections: Not on file   Intimate Partner Violence: Not on file Housing Stability: Not on file     Social History     Tobacco Use   Smoking Status Former    Packs/day: 0.50    Types: Cigarettes    Quit date: 2008    Years since quittin.3   Smokeless Tobacco Never        Temp (24hrs), Av.2 °F (37.3 °C), Min:98.7 °F (37.1 °C), Max:100.1 °F (37.8 °C)    Visit Vitals  BP (!) 141/41 (BP 1 Location: Left upper arm, BP Patient Position: At rest)   Pulse 93   Temp 100.1 °F (37.8 °C)   Resp 18   Ht 5' 10\" (1.778 m)   Wt 67.9 kg (149 lb 11.1 oz)   SpO2 97%   BMI 21.48 kg/m²       ROS: Unable to obtain due to patient factors    Physical Exam:    General:  Sitting on bed, eyes open, able to communicate effectively   Head:  Normocephalic, without obvious abnormality, atraumatic. Eyes:  Conjunctivae/corneas clear.,   Nose: Nares normal.  NG tube in place. No drainage or sinus tenderness. Throat: Lips, mucosa, and tongue normal. Teeth and gums of poor dentition, lips dried and cracked/peeling    Neck: Supple, symmetrical, trachea midline, no adenopathy, thyroid: no enlargment/tenderness/nodules, no carotid bruit and no JVD. Back:   Symmetric, no curvature. Lungs:   Coarse to auscultation bilaterally. Chest wall:  No tenderness or deformity. Heart:  Tachyardia, S1, S2 normal, no  click, rub or gallop. Abdomen:   Abd flat, Soft, non-tender. Bowel sounds normal. No masses,  No organomegaly. Healed surgical scar in upper abdomen   Extremities: Extremities normal, atraumatic, no cyanosis or edema. Pulses: 2+ and symmetric all extremities.    Skin: BLE with multiple stages of healing from venous stasis ulcers    Lymph nodes:  Not examined   Neurologic: Grossly non-focal; non-verbal, does not follow commands        Labs: Results:   Chemistry Recent Labs     10/13/22  2136 10/13/22  1700 10/13/22  0943 10/13/22  0408 10/13/22  0037 10/12/22  2036   GLU  --  117*  --   --  136* 123*   * 147* 148*   < > 148* 146*   K  --  3.7 3.2*  --  2.6* 3.2*   CL  --  121* --   --  118* 116*   CO2  --  21  --   --  24 22   BUN  --  9  --   --  14 16   CREA  --  0.47*  --   --  0.58* 0.58*   CA  --  8.1*  --   --  8.3* 8.8   AGAP  --  5  --   --  6 8   BUCR  --  19  --   --  24* 28*    < > = values in this interval not displayed. CBC w/Diff Recent Labs     10/13/22  0037 10/12/22  2036   WBC 11.4 12.0   RBC 3.80* 4.17*   HGB 9.5* 10.3*   HCT 31.6* 34.3*    150   GRANS 93* 93*   LYMPH 4* 4*   EOS 0 0        Microbiology Recent Labs     10/12/22  2036   CULT NO GROWTH AFTER 11 HOURS            RADIOLOGY:    All available imaging studies/reports in [facility] care for this admission were reviewed          Disclaimer: Sections of this note are dictated utilizing voice recognition software, which may have resulted in some phonetic based errors in grammar and contents. Even though attempts were made to correct all the mistakes, some may have been missed, and remained in the body of the document. If questions arise, please contact our department.     Dr. Zachary Montanez, Infectious Disease Specialist  [phone]  October 14, 2022  10:28 AM

## 2022-10-14 NOTE — PROGRESS NOTES
Palliative Medicine    Goals of care defined. Patient has an AMD and POST on file. Will sign off. Please reconsult team as needed/if appropriate. Thank you for the Palliative Medicine consult and allowing us to participate in the care of Mr. Kai Hart.       CODE STATUS: DNR/DNI, LIMITED INTERVENTIONS, FEEDING TUBE LONG TERM    Mango Traore RN  Palliative Medicine Inpatient RN  [address]: [phone] ([phone])

## 2022-10-15 LAB
BACTERIA SPEC CULT: NORMAL
GLUCOSE BLD STRIP.AUTO-MCNC: 83 MG/DL (ref 70–110)
GLUCOSE BLD STRIP.AUTO-MCNC: 93 MG/DL (ref 70–110)
GLUCOSE BLD STRIP.AUTO-MCNC: 95 MG/DL (ref 70–110)
SERVICE CMNT-IMP: NORMAL

## 2022-10-15 PROCEDURE — 94762 N-INVAS EAR/PLS OXIMTRY CONT: CPT

## 2022-10-15 PROCEDURE — 74011000258 HC RX REV CODE- 258: Performed by: NURSE PRACTITIONER

## 2022-10-15 PROCEDURE — 74011000250 HC RX REV CODE- 250: Performed by: NURSE PRACTITIONER

## 2022-10-15 PROCEDURE — 65660000004 HC RM CVT STEPDOWN

## 2022-10-15 PROCEDURE — 74011250636 HC RX REV CODE- 250/636: Performed by: INTERNAL MEDICINE

## 2022-10-15 PROCEDURE — 82962 GLUCOSE BLOOD TEST: CPT

## 2022-10-15 PROCEDURE — 74011000250 HC RX REV CODE- 250: Performed by: INTERNAL MEDICINE

## 2022-10-15 PROCEDURE — 99232 SBSQ HOSP IP/OBS MODERATE 35: CPT | Performed by: FAMILY MEDICINE

## 2022-10-15 PROCEDURE — 74011250637 HC RX REV CODE- 250/637

## 2022-10-15 PROCEDURE — 74011250636 HC RX REV CODE- 250/636: Performed by: NURSE PRACTITIONER

## 2022-10-15 PROCEDURE — 74011000258 HC RX REV CODE- 258: Performed by: INTERNAL MEDICINE

## 2022-10-15 RX ADMIN — HEPARIN SODIUM 5000 UNITS: 5000 INJECTION INTRAVENOUS; SUBCUTANEOUS at 22:59

## 2022-10-15 RX ADMIN — SODIUM CHLORIDE, PRESERVATIVE FREE 10 ML: 5 INJECTION INTRAVENOUS at 15:22

## 2022-10-15 RX ADMIN — METOPROLOL TARTRATE 25 MG: 25 TABLET, FILM COATED ORAL at 18:59

## 2022-10-15 RX ADMIN — HEPARIN SODIUM 5000 UNITS: 5000 INJECTION INTRAVENOUS; SUBCUTANEOUS at 15:21

## 2022-10-15 RX ADMIN — METOPROLOL TARTRATE 25 MG: 25 TABLET, FILM COATED ORAL at 11:33

## 2022-10-15 RX ADMIN — THIAMINE HYDROCHLORIDE 100 MG: 100 INJECTION, SOLUTION INTRAMUSCULAR; INTRAVENOUS at 11:34

## 2022-10-15 RX ADMIN — SODIUM CHLORIDE, PRESERVATIVE FREE 10 ML: 5 INJECTION INTRAVENOUS at 05:21

## 2022-10-15 RX ADMIN — SODIUM CHLORIDE, PRESERVATIVE FREE 20 MG: 5 INJECTION INTRAVENOUS at 11:33

## 2022-10-15 RX ADMIN — SODIUM CHLORIDE, PRESERVATIVE FREE 20 MG: 5 INJECTION INTRAVENOUS at 22:59

## 2022-10-15 RX ADMIN — SODIUM CHLORIDE 750 MG: 900 INJECTION, SOLUTION INTRAVENOUS at 23:00

## 2022-10-15 RX ADMIN — SODIUM CHLORIDE 750 MG: 900 INJECTION, SOLUTION INTRAVENOUS at 11:43

## 2022-10-15 RX ADMIN — PIPERACILLIN AND TAZOBACTAM 4.5 G: 4; .5 INJECTION, POWDER, FOR SOLUTION INTRAVENOUS at 23:00

## 2022-10-15 RX ADMIN — SODIUM CHLORIDE, PRESERVATIVE FREE 10 ML: 5 INJECTION INTRAVENOUS at 23:05

## 2022-10-15 RX ADMIN — PIPERACILLIN AND TAZOBACTAM 4.5 G: 4; .5 INJECTION, POWDER, FOR SOLUTION INTRAVENOUS at 04:50

## 2022-10-15 RX ADMIN — PIPERACILLIN AND TAZOBACTAM 4.5 G: 4; .5 INJECTION, POWDER, FOR SOLUTION INTRAVENOUS at 15:20

## 2022-10-15 RX ADMIN — HEPARIN SODIUM 5000 UNITS: 5000 INJECTION INTRAVENOUS; SUBCUTANEOUS at 05:33

## 2022-10-15 RX ADMIN — POTASSIUM CHLORIDE AND DEXTROSE MONOHYDRATE 50 ML/HR: 150; 5 INJECTION, SOLUTION INTRAVENOUS at 11:33

## 2022-10-15 RX ADMIN — FOLIC ACID: 5 INJECTION, SOLUTION INTRAMUSCULAR; INTRAVENOUS; SUBCUTANEOUS at 11:53

## 2022-10-15 NOTE — PROGRESS NOTES
Progress Note    Leticia Clemons  79 y.o. Admit Date: 10/9/2022  Active Problems:    Hypertension (9/2/2012) POA: Unknown      Hypokalemia (6/22/2013) POA: Unknown      Aspiration pneumonia ([facility] (8/22/2017) POA: Unknown      Hyperosmolality and hypernatremia (3/30/2021) POA: Unknown      Dehydration (3/30/2021) POA: Yes      COVID-19 (3/30/2021) POA: Yes      Respiratory failure ([address] (10/9/2022) POA: Unknown      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[facility] (43/1/4545) POA: Unknown      Aspiration into airway (10/9/2022) POA: Unknown      Acute kidney injury ([facility] (10/10/2022) POA: Unknown      Seizure disorder ([facility] (10/12/2022) POA: Yes            Subjective:     Patient looks comfortable and not in acute distress. Seems  not received IV fluid as well as free water and NG feeding for a while but now restarted. Also phlebotomist having difficult time drawing blood. Seems blood have to drawn from the midline. A comprehensive review of systems was negative except for that written in the History of Present Illness.     Objective:     Visit Vitals  BP (!) 163/85 (BP Patient Position: At rest)   Pulse 100   Temp 98.9 °F (37.2 °C)   Resp 20   Ht 5' 10\" (1.778 m)   Wt 67.9 kg (149 lb 11.1 oz)   SpO2 93%   BMI 21.48 kg/m²         Intake/Output Summary (Last 24 hours) at 10/15/2022 1138  Last data filed at 10/14/2022 2328  Gross per 24 hour   Intake 3312.5 ml   Output 300 ml   Net 3012.5 ml       Current Facility-Administered Medications   Medication Dose Route Frequency Provider Last Rate Last Admin    piperacillin-tazobactam (ZOSYN) 4.5 g in dextrose 5% 100 mL IVPB  4.5 g IntraVENous Q8H Ovidio GOULD MD 25 mL/hr at 10/15/22 0450 4.5 g at 10/15/22 0450    metoprolol tartrate (LOPRESSOR) tablet 25 mg  25 mg Oral BID Vasquez Bangura NP   25 mg at 10/15/22 1133    dextrose 5% with KCl 20 mEq/L infusion   IntraVENous CONTINUOUS Irlanda Gaston MD 50 mL/hr at 10/15/22 1133 50 mL/hr at 10/15/22 1133    sodium chloride (NS) flush 5-40 mL  5-40 mL IntraVENous Q8H Nicholas Montoya MD   10 mL at 10/15/22 0521    sodium chloride (NS) flush 5-40 mL  5-40 mL IntraVENous PRN Nicholas Montoya MD        albuterol-ipratropium (DUO-NEB) 2.5 MG-0.5 MG/3 ML  3 mL Nebulization Q4H PRN Sherryle child, NP        insulin lispro (HUMALOG) injection   SubCUTAneous Q6H Elie Boy C, NP   2 Units at 10/10/22 2309    glucose chewable tablet 16 g  4 Tablet Oral PRN Sherryle Grandchild, NP        glucagon (GLUCAGEN) injection 1 mg  1 mg IntraMUSCular PRN Elie Boy C, NP        dextrose 10% infusion 0-250 mL  0-250 mL IntraVENous PRN Elie Boy C, NP        hydrALAZINE (APRESOLINE) 20 mg/mL injection 10 mg  10 mg IntraVENous Q6H PRN Elie Boy C, NP        acetaminophen (TYLENOL) tablet 650 mg  650 mg Oral Q6H PRN Sherryle Grandchild, NP   650 mg at 10/12/22 2138    Or    acetaminophen (TYLENOL) suppository 650 mg  650 mg Rectal Q6H PRN Sherryle Grandchild, NP        polyethylene glycol (MIRALAX) packet 17 g  17 g Oral DAILY PRN [facility], NP        ondansetron (ZOFRAN ODT) tablet 4 mg  4 mg Oral Q8H PRN Elie Boy C, NP        Or    ondansetron (ZOFRAN) injection 4 mg  4 mg IntraVENous Q6H PRN Elie Boy C, NP        heparin (porcine) injection 5,000 Units  5,000 Units SubCUTAneous Q8H Laura Owens, NP   5,000 Units at 10/15/22 0533    levETIRAcetam (KEPPRA) 750 mg in 0.9% sodium chloride 100 mL IVPB  750 mg IntraVENous Q12H Laura Owens,  mL/hr at 10/14/22 2317 750 mg at 97/23/74 0606    folic acid (FOLVITE) 1 mg in 0.9% sodium chloride 50 mL ivpb   IntraVENous DAILY Elie Boy C,  mL/hr at 10/14/22 0828 New Bag at 10/14/22 0828    thiamine (B-1) 100 mg in 0.9% sodium chloride 50 mL IVPB  100 mg IntraVENous DAILY Elie MURILLO,  mL/hr at 10/15/22 1134 100 mg at 10/15/22 1134    famotidine (PF) (PEPCID) 20 mg in 0.9% sodium chloride 10 mL injection  20 mg IntraVENous Q12H Sherryle Grandchild, NP 20 mg at 10/15/22 1133                Data Review:    CBC w/Diff    Recent Labs     10/14/22  1300 10/13/22  0037 10/12/22  2036   WBC 9.6 11.4 12.0   RBC 3.31* 3.80* 4.17*   HGB 8.2* 9.5* 10.3*   HCT 27.0* 31.6* 34.3*   MCV 81.6 83.2 82.3   MCH 24.8 25.0 24.7   MCHC 30.4* 30.1* 30.0*   RDW 15.2* 15.1* 15.3*    Recent Labs     10/14/22  1300 10/13/22  0037 10/12/22  2036   MONOS 5 2* 2*   EOS 2 0 0   BASOS 0 0 0   RDW 15.2* 15.1* 15.3*        Comprehensive Metabolic Profile    Recent Labs     10/14/22  2111 10/14/22  1710 10/14/22  1300 10/13/22  2136 10/13/22  1700   * 148* 151*   < > 147*   K  --  3.0* 2.7*  --  3.7   CL  --  118* 120*  --  121*   CO2  --  26 27  --  21   BUN  --  7 7  --  9   CREA  --  0.51* 0.38*  --  0.47*    < > = values in this interval not displayed. Recent Labs     10/14/22  1710 10/14/22  1300 10/13/22  1700   CA 8.3* 7.6* 8.1*   PHOS 1.8* 1.9* 2.2*                        Impression:       Active Hospital Problems    Diagnosis Date Noted    Seizure disorder ([address] 10/12/2022    Acute kidney injury ([address] 10/10/2022    Respiratory failure ([address] 06/72/8056    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[address] 82/86/8171    Aspiration into airway 10/09/2022    Hyperosmolality and hypernatremia 03/30/2021    COVID-19 03/30/2021    Dehydration 03/30/2021    Aspiration pneumonia ([address] 08/22/2017    Hypokalemia 06/22/2013    Hypertension 09/02/2012            Plan:     Continue current IV fluids with D5W and 20 of potassium per liter and run at 75 cc per. Continue free water through the NG tube and NG feeding. As the sodium has improved to 146 will decrease the frequency of lab draw and that will be done only once in the morning.       Yissel Patton MD

## 2022-10-15 NOTE — PROGRESS NOTES
Bedside shift change report given to Fercho Nielson RN (oncoming nurse) by Belem Goldberg (offgoing nurse). Report included the following information SBAR, Kardex, Intake/Output, MAR, and Cardiac Rhythm NSR .

## 2022-10-15 NOTE — PROGRESS NOTES
[facility] Hospitalists  Progress Note    Patient: Lashawn Gupta Age: 79 y.o. : 1955 MR#: [id number] SSN: [id number]  Date: 10/15/2022     Subjective/24-hour events:     Much more awake today. Speech a little difficult to understand but no pain or shortness of breath reported. Nursing staff reports difficulty with IV access. Assessment:   Aspiration pneumonia  Sepsis, POA, secondary to above  PARAMJIT  Dysphagia  Hypokalemia  Hypertension  Seizure disorder  COVID-19 infection  Hyponatremia  Dementia  Alcohol abuse history    Plan:   Zosyn continued, management per ID. PEG tube placement this coming week. GI versus IR. Volume management per nephrology monitor electrolytes and renal indices. Wound care per [id number] services orders. Antiepileptic therapy as previously prescribed. Remains on Keppra. May need to replace midline for vascular access as current catheter unable to return blood. Other care primarily supportive. Disposition to be determined. Follow. Therapy services: SLP. Equipment: TBD. Anticipated disposition: TBD. Case discussed with:  []Patient  []Family  [x]Nursing  [x]Case Management  DVT Prophylaxis:  []Lovenox  [x]Hep SQ  []SCDs  []Coumadin   []On Heparin gtt    Objective:   VS: Visit Vitals  BP (!) 163/85 (BP Patient Position: At rest)   Pulse 100   Temp 98.9 °F (37.2 °C)   Resp 20   Ht 5' 10\" (1.778 m)   Wt 67.9 kg (149 lb 11.1 oz)   SpO2 93%   BMI 21.48 kg/m²      Tmax/24hrs: Temp (24hrs), Av °F (37.2 °C), Min:97.6 °F (36.4 °C), Max:100 °F (37.8 °C)    Intake/Output Summary (Last 24 hours) at 10/15/2022 0940  Last data filed at 10/14/2022 2328  Gross per 24 hour   Intake 3312.5 ml   Output 300 ml   Net 3012.5 ml       General:  In NAD. Nontoxic-appearing. NGT in place. Cardiovascular:  RRR. Pulmonary: Effort nonlabored. GI:  Abdomen soft. Extremities:  Warm, no edema or ischemia.   Neuro: Awake, nonverbal.    Labs:    Recent Results (from the past 24 hour(s))   GLUCOSE, POC    Collection Time: 10/14/22 11:46 AM   Result Value Ref Range    Glucose (POC) 105 70 - 110 mg/dL   CBC WITH AUTOMATED DIFF    Collection Time: 10/14/22  1:00 PM   Result Value Ref Range    WBC 9.6 4.6 - 13.2 K/uL    RBC 3.31 (L) 4.35 - 5.65 M/uL    HGB 8.2 (L) 13.0 - 16.0 g/dL    HCT 27.0 (L) 36.0 - 48.0 %    MCV 81.6 78.0 - 100.0 FL    MCH 24.8 24.0 - 34.0 PG    MCHC 30.4 (L) 31.0 - 37.0 g/dL    RDW 15.2 (H) 11.6 - 14.5 %    PLATELET 403 579 - 890 K/uL    MPV 12.6 (H) 9.2 - 11.8 FL    NRBC 0.0 0  WBC    ABSOLUTE NRBC 0.00 0.00 - 0.01 K/uL    NEUTROPHILS 85 (H) 40 - 73 %    LYMPHOCYTES 7 (L) 21 - 52 %    MONOCYTES 5 3 - 10 %    EOSINOPHILS 2 0 - 5 %    BASOPHILS 0 0 - 2 %    IMMATURE GRANULOCYTES 1 (H) 0.0 - 0.5 %    ABS. NEUTROPHILS 8.1 (H) 1.8 - 8.0 K/UL    ABS. LYMPHOCYTES 0.7 (L) 0.9 - 3.6 K/UL    ABS. MONOCYTES 0.5 0.05 - 1.2 K/UL    ABS. EOSINOPHILS 0.2 0.0 - 0.4 K/UL    ABS. BASOPHILS 0.0 0.0 - 0.1 K/UL    ABS. IMM.  GRANS. 0.1 (H) 0.00 - 0.04 K/UL    DF AUTOMATED     MAGNESIUM    Collection Time: 10/14/22  1:00 PM   Result Value Ref Range    Magnesium 2.1 1.6 - 2.6 mg/dL   METABOLIC PANEL, BASIC    Collection Time: 10/14/22  1:00 PM   Result Value Ref Range    Sodium 151 (H) 136 - 145 mmol/L    Potassium 2.7 (LL) 3.5 - 5.5 mmol/L    Chloride 120 (H) 100 - 111 mmol/L    CO2 27 21 - 32 mmol/L    Anion gap 4 3.0 - 18 mmol/L    Glucose 109 (H) 74 - 99 mg/dL    BUN 7 7.0 - 18 MG/DL    Creatinine 0.38 (L) 0.6 - 1.3 MG/DL    BUN/Creatinine ratio 18 12 - 20      eGFR >60 >60 ml/min/1.73m2    Calcium 7.6 (L) 8.5 - 10.1 MG/DL   PHOSPHORUS    Collection Time: 10/14/22  1:00 PM   Result Value Ref Range    Phosphorus 1.9 (L) 2.5 - 4.9 MG/DL   METABOLIC PANEL, BASIC    Collection Time: 10/14/22  5:10 PM   Result Value Ref Range    Sodium 148 (H) 136 - 145 mmol/L    Potassium 3.0 (L) 3.5 - 5.5 mmol/L    Chloride 118 (H) 100 - 111 mmol/L    CO2 26 21 - 32 mmol/L    Anion gap 4 3.0 - 18 mmol/L    Glucose 103 (H) 74 - 99 mg/dL    BUN 7 7.0 - 18 MG/DL    Creatinine 0.51 (L) 0.6 - 1.3 MG/DL    BUN/Creatinine ratio 14 12 - 20      eGFR >60 >60 ml/min/1.73m2    Calcium 8.3 (L) 8.5 - 10.1 MG/DL   PHOSPHORUS    Collection Time: 10/14/22  5:10 PM   Result Value Ref Range    Phosphorus 1.8 (L) 2.5 - 4.9 MG/DL   GLUCOSE, POC    Collection Time: 10/14/22  5:16 PM   Result Value Ref Range    Glucose (POC) 89 70 - 110 mg/dL   SODIUM    Collection Time: 10/14/22  9:11 PM   Result Value Ref Range    Sodium 146 (H) 136 - 145 mmol/L   GLUCOSE, POC    Collection Time: 10/14/22 11:27 PM   Result Value Ref Range    Glucose (POC) 94 70 - 110 mg/dL   GLUCOSE, POC    Collection Time: 10/15/22  5:34 AM   Result Value Ref Range    Glucose (POC) 83 70 - 110 mg/dL       Signed By: Scott Rhodes MD     October 15, 2022

## 2022-10-16 LAB
ANION GAP SERPL CALC-SCNC: 4 MMOL/L (ref 3–18)
BASOPHILS # BLD: 0 K/UL (ref 0–0.1)
BASOPHILS NFR BLD: 0 % (ref 0–2)
BUN SERPL-MCNC: 3 MG/DL (ref 7–18)
BUN/CREAT SERPL: 6 (ref 12–20)
CALCIUM SERPL-MCNC: 8.1 MG/DL (ref 8.5–10.1)
CHLORIDE SERPL-SCNC: 115 MMOL/L (ref 100–111)
CO2 SERPL-SCNC: 27 MMOL/L (ref 21–32)
CREAT SERPL-MCNC: 0.48 MG/DL (ref 0.6–1.3)
DIFFERENTIAL METHOD BLD: ABNORMAL
EOSINOPHIL # BLD: 0.2 K/UL (ref 0–0.4)
EOSINOPHIL NFR BLD: 2 % (ref 0–5)
ERYTHROCYTE [DISTWIDTH] IN BLOOD BY AUTOMATED COUNT: 15.1 % (ref 11.6–14.5)
GLUCOSE BLD STRIP.AUTO-MCNC: 105 MG/DL (ref 70–110)
GLUCOSE BLD STRIP.AUTO-MCNC: 125 MG/DL (ref 70–110)
GLUCOSE BLD STRIP.AUTO-MCNC: 127 MG/DL (ref 70–110)
GLUCOSE BLD STRIP.AUTO-MCNC: 97 MG/DL (ref 70–110)
GLUCOSE SERPL-MCNC: 122 MG/DL (ref 74–99)
HCT VFR BLD AUTO: 27.7 % (ref 36–48)
HGB BLD-MCNC: 8.6 G/DL (ref 13–16)
IMM GRANULOCYTES # BLD AUTO: 0 K/UL
IMM GRANULOCYTES NFR BLD AUTO: 0 %
LYMPHOCYTES # BLD: 0.8 K/UL (ref 0.9–3.6)
LYMPHOCYTES NFR BLD: 9 % (ref 21–52)
MAGNESIUM SERPL-MCNC: 1.9 MG/DL (ref 1.6–2.6)
MCH RBC QN AUTO: 25 PG (ref 24–34)
MCHC RBC AUTO-ENTMCNC: 31 G/DL (ref 31–37)
MCV RBC AUTO: 80.5 FL (ref 78–100)
MONOCYTES # BLD: 0.5 K/UL (ref 0.05–1.2)
MONOCYTES NFR BLD: 6 % (ref 3–10)
NEUTS SEG # BLD: 7.6 K/UL (ref 1.8–8)
NEUTS SEG NFR BLD: 83 % (ref 40–73)
NRBC # BLD: 0 K/UL (ref 0–0.01)
NRBC BLD-RTO: 0 PER 100 WBC
PLATELET # BLD AUTO: 255 K/UL (ref 135–420)
PLATELET COMMENTS,PCOM: ABNORMAL
PMV BLD AUTO: 11.7 FL (ref 9.2–11.8)
POTASSIUM SERPL-SCNC: 2.9 MMOL/L (ref 3.5–5.5)
RBC # BLD AUTO: 3.44 M/UL (ref 4.35–5.65)
RBC MORPH BLD: ABNORMAL
SODIUM SERPL-SCNC: 146 MMOL/L (ref 136–145)
WBC # BLD AUTO: 9.1 K/UL (ref 4.6–13.2)

## 2022-10-16 PROCEDURE — 74011000250 HC RX REV CODE- 250: Performed by: FAMILY MEDICINE

## 2022-10-16 PROCEDURE — 74011250637 HC RX REV CODE- 250/637

## 2022-10-16 PROCEDURE — 74011000250 HC RX REV CODE- 250: Performed by: INTERNAL MEDICINE

## 2022-10-16 PROCEDURE — 74011250636 HC RX REV CODE- 250/636: Performed by: NURSE PRACTITIONER

## 2022-10-16 PROCEDURE — 74011250636 HC RX REV CODE- 250/636: Performed by: INTERNAL MEDICINE

## 2022-10-16 PROCEDURE — 74011000258 HC RX REV CODE- 258: Performed by: INTERNAL MEDICINE

## 2022-10-16 PROCEDURE — 85025 COMPLETE CBC W/AUTO DIFF WBC: CPT

## 2022-10-16 PROCEDURE — 74011000258 HC RX REV CODE- 258: Performed by: NURSE PRACTITIONER

## 2022-10-16 PROCEDURE — 65660000004 HC RM CVT STEPDOWN

## 2022-10-16 PROCEDURE — 99232 SBSQ HOSP IP/OBS MODERATE 35: CPT | Performed by: FAMILY MEDICINE

## 2022-10-16 PROCEDURE — 80048 BASIC METABOLIC PNL TOTAL CA: CPT

## 2022-10-16 PROCEDURE — 83735 ASSAY OF MAGNESIUM: CPT

## 2022-10-16 PROCEDURE — 2709999900 HC NON-CHARGEABLE SUPPLY

## 2022-10-16 PROCEDURE — 82962 GLUCOSE BLOOD TEST: CPT

## 2022-10-16 PROCEDURE — 74011250636 HC RX REV CODE- 250/636: Performed by: FAMILY MEDICINE

## 2022-10-16 PROCEDURE — 74011000250 HC RX REV CODE- 250: Performed by: NURSE PRACTITIONER

## 2022-10-16 RX ORDER — SODIUM CHLORIDE 0.9 % (FLUSH) 0.9 %
5-40 SYRINGE (ML) INJECTION EVERY 8 HOURS
Status: DISCONTINUED | OUTPATIENT
Start: 2022-10-16 | End: 2022-10-19 | Stop reason: SDUPTHER

## 2022-10-16 RX ORDER — LIDOCAINE HYDROCHLORIDE 10 MG/ML
5 INJECTION, SOLUTION EPIDURAL; INFILTRATION; INTRACAUDAL; PERINEURAL ONCE
Status: ACTIVE | OUTPATIENT
Start: 2022-10-16 | End: 2022-10-17

## 2022-10-16 RX ORDER — SODIUM CHLORIDE 0.9 % (FLUSH) 0.9 %
5-40 SYRINGE (ML) INJECTION AS NEEDED
Status: DISCONTINUED | OUTPATIENT
Start: 2022-10-16 | End: 2022-10-22 | Stop reason: HOSPADM

## 2022-10-16 RX ORDER — POTASSIUM CHLORIDE 7.45 MG/ML
10 INJECTION INTRAVENOUS
Status: COMPLETED | OUTPATIENT
Start: 2022-10-16 | End: 2022-10-16

## 2022-10-16 RX ADMIN — SODIUM CHLORIDE, PRESERVATIVE FREE 20 MG: 5 INJECTION INTRAVENOUS at 09:38

## 2022-10-16 RX ADMIN — SODIUM CHLORIDE, PRESERVATIVE FREE 20 MG: 5 INJECTION INTRAVENOUS at 23:15

## 2022-10-16 RX ADMIN — METOPROLOL TARTRATE 25 MG: 25 TABLET, FILM COATED ORAL at 09:38

## 2022-10-16 RX ADMIN — POTASSIUM CHLORIDE 10 MEQ: 7.46 INJECTION, SOLUTION INTRAVENOUS at 14:45

## 2022-10-16 RX ADMIN — POTASSIUM CHLORIDE 10 MEQ: 7.46 INJECTION, SOLUTION INTRAVENOUS at 15:54

## 2022-10-16 RX ADMIN — PIPERACILLIN AND TAZOBACTAM 4.5 G: 4; .5 INJECTION, POWDER, FOR SOLUTION INTRAVENOUS at 12:32

## 2022-10-16 RX ADMIN — POTASSIUM CHLORIDE 10 MEQ: 7.46 INJECTION, SOLUTION INTRAVENOUS at 18:53

## 2022-10-16 RX ADMIN — HEPARIN SODIUM 5000 UNITS: 5000 INJECTION INTRAVENOUS; SUBCUTANEOUS at 23:15

## 2022-10-16 RX ADMIN — SODIUM CHLORIDE, PRESERVATIVE FREE 10 ML: 5 INJECTION INTRAVENOUS at 06:10

## 2022-10-16 RX ADMIN — SODIUM CHLORIDE 750 MG: 900 INJECTION, SOLUTION INTRAVENOUS at 23:00

## 2022-10-16 RX ADMIN — FOLIC ACID: 5 INJECTION, SOLUTION INTRAMUSCULAR; INTRAVENOUS; SUBCUTANEOUS at 09:50

## 2022-10-16 RX ADMIN — THIAMINE HYDROCHLORIDE 100 MG: 100 INJECTION, SOLUTION INTRAMUSCULAR; INTRAVENOUS at 09:50

## 2022-10-16 RX ADMIN — HEPARIN SODIUM 5000 UNITS: 5000 INJECTION INTRAVENOUS; SUBCUTANEOUS at 14:45

## 2022-10-16 RX ADMIN — METOPROLOL TARTRATE 25 MG: 25 TABLET, FILM COATED ORAL at 17:44

## 2022-10-16 RX ADMIN — POTASSIUM CHLORIDE AND DEXTROSE MONOHYDRATE: 150; 5 INJECTION, SOLUTION INTRAVENOUS at 18:53

## 2022-10-16 RX ADMIN — PIPERACILLIN AND TAZOBACTAM 4.5 G: 4; .5 INJECTION, POWDER, FOR SOLUTION INTRAVENOUS at 20:00

## 2022-10-16 RX ADMIN — SODIUM CHLORIDE 750 MG: 900 INJECTION, SOLUTION INTRAVENOUS at 12:29

## 2022-10-16 RX ADMIN — HEPARIN SODIUM 5000 UNITS: 5000 INJECTION INTRAVENOUS; SUBCUTANEOUS at 06:10

## 2022-10-16 RX ADMIN — POTASSIUM CHLORIDE 10 MEQ: 7.46 INJECTION, SOLUTION INTRAVENOUS at 13:30

## 2022-10-16 RX ADMIN — PIPERACILLIN AND TAZOBACTAM 4.5 G: 4; .5 INJECTION, POWDER, FOR SOLUTION INTRAVENOUS at 06:09

## 2022-10-16 RX ADMIN — POTASSIUM CHLORIDE 10 MEQ: 7.46 INJECTION, SOLUTION INTRAVENOUS at 16:54

## 2022-10-16 RX ADMIN — POTASSIUM CHLORIDE 10 MEQ: 7.46 INJECTION, SOLUTION INTRAVENOUS at 17:44

## 2022-10-16 RX ADMIN — SODIUM CHLORIDE, PRESERVATIVE FREE 10 ML: 5 INJECTION INTRAVENOUS at 16:55

## 2022-10-16 NOTE — PROGRESS NOTES
[facility] Hospitalists  Progress Note    Patient: Leticia Clemons Age: 79 y.o. : 1955 MR#: [id number] SSN: [id number]  Date: 10/16/2022     Subjective/24-hour events:     Nothing new/acute overnight. Assessment:   Aspiration pneumonia  Sepsis, POA, secondary to above  PARAMJIT  Dysphagia  Hypokalemia  Hypertension  Seizure disorder  COVID-19 infection  Hyponatremia  Dementia  Alcohol abuse history    Plan:   Antibiotic course completed. Monitor off. Continue NG tube feeds as ordered. Will reconsult gastroenterology for PEG tube placement mid-week. Consider IR eval if necessary to see if tube can be done sooner. Volume management per nephrology. Wound care per current orders. PT/OT evaluations now that patient is more awake. Disposition to be determined. Therapy services: PT/OT SLP. Equipment: TBD. Anticipated disposition: TBD. Case discussed with:  []Patient  []Family  [x]Nursing  [x]Case Management  DVT Prophylaxis:  []Lovenox  [x]Hep SQ  []SCDs  []Coumadin   []On Heparin gtt    Objective:   VS: Visit Vitals  BP (!) 153/83 (BP 1 Location: Left upper arm, BP Patient Position: At rest)   Pulse 82   Temp 98.3 °F (36.8 °C)   Resp 20   Ht 5' 10\" (1.778 m)   Wt 67.9 kg (149 lb 11.1 oz)   SpO2 99%   BMI 21.48 kg/m²      Tmax/24hrs: Temp (24hrs), Av.4 °F (36.9 °C), Min:98.1 °F (36.7 °C), Max:98.9 °F (37.2 °C)    Intake/Output Summary (Last 24 hours) at 10/16/2022 0801  Last data filed at 10/15/2022 1930  Gross per 24 hour   Intake 1981.25 ml   Output 1400 ml   Net 581.25 ml       General:  In NAD. Nontoxic-appearing. NGT in place. Cardiovascular:  RRR. Pulmonary: Effort nonlabored. GI:  Abdomen soft. Extremities:  Warm, no edema or ischemia.   Neuro: Awake, nonverbal.    Labs:    Recent Results (from the past 24 hour(s))   GLUCOSE, POC    Collection Time: 10/15/22 12:01 PM   Result Value Ref Range    Glucose (POC) 93 70 - 110 mg/dL   GLUCOSE, POC    Collection Time: 10/15/22  6:54 PM   Result Value Ref Range    Glucose (POC) 95 70 - 110 mg/dL   GLUCOSE, POC    Collection Time: 10/16/22  1:43 AM   Result Value Ref Range    Glucose (POC) 97 70 - 110 mg/dL       Signed By: Maria Del Carmen Vital MD     October 16, 2022

## 2022-10-16 NOTE — PROGRESS NOTES
Progress Note    Isabella Birch  79 y.o. Admit Date: 10/9/2022  Active Problems:    Hypertension (9/2/2012) POA: Unknown      Hypokalemia (6/22/2013) POA: Unknown      Aspiration pneumonia ([facility] (8/22/2017) POA: Unknown      Hyperosmolality and hypernatremia (3/30/2021) POA: Unknown      Dehydration (3/30/2021) POA: Yes      COVID-19 (3/30/2021) POA: Yes      Respiratory failure ([address] (10/9/2022) POA: Unknown      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[facility] (12/2/7777) POA: Unknown      Aspiration into airway (10/9/2022) POA: Unknown      Acute kidney injury ([facility] (10/10/2022) POA: Unknown      Seizure disorder ([facility] (10/12/2022) POA: Yes            Subjective:     Patient in to maintain COVID isolation. No change in mental status. Does not talk very poor communication as a condom catheter very slow urine output. Finally getting IV fluid and the lab is drawn. Currently he is getting IV fluid to D5W with potassium chloride at 50 cc/h also getting free water through the NG tube 50 cc/h restarted Jevity oral nutrition at 25 cc/h  Lab from this morning shows serum sodium of 146 and the potassium is 2.9 and magnesium of 1.9.  6 boluses of potassium replacement being ordered by hospitalist      A comprehensive review of systems was negative except for that written in the History of Present Illness.     Objective:     Visit Vitals  /83 (BP 1 Location: Left upper arm, BP Patient Position: At rest)   Pulse 76   Temp 98.4 °F (36.9 °C)   Resp 19   Ht 5' 10\" (1.778 m)   Wt 67.9 kg (149 lb 11.1 oz)   SpO2 99%   BMI 21.48 kg/m²         Intake/Output Summary (Last 24 hours) at 10/16/2022 1342  Last data filed at 10/16/2022 0800  Gross per 24 hour   Intake 2512.5 ml   Output 200 ml   Net 2312.5 ml       Current Facility-Administered Medications   Medication Dose Route Frequency Provider Last Rate Last Admin    lidocaine (PF) (XYLOCAINE) 10 mg/mL (1 %) injection 5 mL  5 mL IntraDERMal ONCE Darrin Wills MD        sodium chloride (NS) flush 5-40 mL  5-40 mL IntraVENous Q8H Khoa Wills MD        sodium chloride (NS) flush 5-40 mL  5-40 mL IntraVENous PRN Marie Solitario MD        potassium chloride 10 mEq in 100 ml IVPB  10 mEq IntraVENous Q1H Marie Solitario  mL/hr at 10/16/22 1330 10 mEq at 10/16/22 1330    piperacillin-tazobactam (ZOSYN) 4.5 g in dextrose 5% 100 mL IVPB  4.5 g IntraVENous Q8H Latha GOULD MD 25 mL/hr at 10/16/22 1232 4.5 g at 10/16/22 1232    metoprolol tartrate (LOPRESSOR) tablet 25 mg  25 mg Oral BID Tosin Zamora NP   25 mg at 10/16/22 0938    dextrose 5% with KCl 20 mEq/L infusion   IntraVENous CONTINUOUS Leticia Waldrop MD 75 mL/hr at 10/15/22 1145 75 mL/hr at 10/15/22 1145    sodium chloride (NS) flush 5-40 mL  5-40 mL IntraVENous Q8H Kwame Arambula MD   10 mL at 10/16/22 0610    sodium chloride (NS) flush 5-40 mL  5-40 mL IntraVENous PRN Kwame Arambula MD        albuterol-ipratropium (DUO-NEB) 2.5 MG-0.5 MG/3 ML  3 mL Nebulization Q4H PRN Jem Smith NP        insulin lispro (HUMALOG) injection   SubCUTAneous Q6H Radha MURILLO NP   2 Units at 10/10/22 2309    glucose chewable tablet 16 g  4 Tablet Oral PRN Jem Smith NP        glucagon (GLUCAGEN) injection 1 mg  1 mg IntraMUSCular PRN Radha MURILLO NP        dextrose 10% infusion 0-250 mL  0-250 mL IntraVENous PRN Radha MURILLO NP        hydrALAZINE (APRESOLINE) 20 mg/mL injection 10 mg  10 mg IntraVENous Q6H PRN Radha MURILLO NP        acetaminophen (TYLENOL) tablet 650 mg  650 mg Oral Q6H PRN Radha MURILLO NP   650 mg at 10/12/22 2138    Or    acetaminophen (TYLENOL) suppository 650 mg  650 mg Rectal Q6H PRN Colan Khan C, NP        polyethylene glycol (MIRALAX) packet 17 g  17 g Oral DAILY PRN Radha MURILLO, EVAN        ondansetron (ZOFRAN ODT) tablet 4 mg  4 mg Oral Q8H PRN Radha MURILLO NP        Or    ondansetron (ZOFRAN) injection 4 mg  4 mg IntraVENous Q6H PRN Jem Smith, EVAN heparin (porcine) injection 5,000 Units  5,000 Units SubCUTAneous Q8H Guthrie Hug C, NP   5,000 Units at 10/16/22 0610    levETIRAcetam (KEPPRA) 750 mg in 0.9% sodium chloride 100 mL IVPB  750 mg IntraVENous Q12H Guthrie Hug C,  mL/hr at 10/16/22 1229 750 mg at 47/83/24 7412    folic acid (FOLVITE) 1 mg in 0.9% sodium chloride 50 mL ivpb   IntraVENous DAILY Guthrie Hug C,  mL/hr at 10/16/22 0950 New Bag at 10/16/22 0950    thiamine (B-1) 100 mg in 0.9% sodium chloride 50 mL IVPB  100 mg IntraVENous DAILY Guthrie Hug C,  mL/hr at 10/16/22 0950 100 mg at 10/16/22 0950    famotidine (PF) (PEPCID) 20 mg in 0.9% sodium chloride 10 mL injection  20 mg IntraVENous Q12H Guthrie Hug C, NP   20 mg at 10/16/22 0938                Data Review:    CBC w/Diff    Recent Labs     10/16/22  1000 10/14/22  1300   WBC 9.1 9.6   RBC 3.44* 3.31*   HGB 8.6* 8.2*   HCT 27.7* 27.0*   MCV 80.5 81.6   MCH 25.0 24.8   MCHC 31.0 30.4*   RDW 15.1* 15.2*    Recent Labs     10/16/22  1000 10/14/22  1300   MONOS 6 5   EOS 2 2   BASOS 0 0   RDW 15.1* 15.2*        Comprehensive Metabolic Profile    Recent Labs     10/16/22  1000 10/14/22  2111 10/14/22  1710 10/14/22  1300   * 146* 148* 151*   K 2.9*  --  3.0* 2.7*   *  --  118* 120*   CO2 27  --  26 27   BUN 3*  --  7 7   CREA 0.48*  --  0.51* 0.38*    Recent Labs     10/16/22  1000 10/14/22  1710 10/14/22  1300 10/13/22  1700   CA 8.1* 8.3* 7.6* 8.1*   PHOS  --  1.8* 1.9* 2.2*                        Impression:       Active Hospital Problems    Diagnosis Date Noted    Seizure disorder ([facility] 10/12/2022    Acute kidney injury ([facility] 10/10/2022    Respiratory failure ([facility] 56/57/2530    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[facility] 41/28/9810    Aspiration into airway 10/09/2022    Hyperosmolality and hypernatremia 03/30/2021    COVID-19 03/30/2021    Dehydration 03/30/2021    Aspiration pneumonia ([facility] [facility] 08/22/2017    Hypokalemia 06/22/2013    Hypertension 09/02/2012 He is a hyponatremia and significantly improved gradually since the time of admission and is very close to sodium which expect will be improved in the next few days with the replacement of fluid and giving free water. Remained hypokalemic and requiring replacement. Plan:     Continue current IV fluid, free water through NG tube and NG feeding and check labs once per day. Renal function has improved significantly with hydration ,continue DNR.       Mary Jo Gilman MD

## 2022-10-16 NOTE — PROGRESS NOTES
Problem: Pressure Injury - Risk of  Goal: *Prevention of pressure injury  Description: Document Brian Scale and appropriate interventions in the flowsheet. Outcome: Progressing Towards Goal  Note: Pressure Injury Interventions:  Sensory Interventions: Assess changes in LOC, Assess need for specialty bed, Avoid rigorous massage over bony prominences, Check visual cues for pain, Discuss PT/OT consult with provider, Float heels, Keep linens dry and wrinkle-free, Minimize linen layers, Monitor skin under medical devices, Pad between skin to skin, Pressure redistribution bed/mattress (bed type), Turn and reposition approx. every two hours (pillows and wedges if needed)    Moisture Interventions: Absorbent underpads, Apply protective barrier, creams and emollients, Assess need for specialty bed, Check for incontinence Q2 hours and as needed, Contain wound drainage, Internal/External urinary devices, Minimize layers, Moisture barrier    Activity Interventions: Assess need for specialty bed, Increase time out of bed, Pressure redistribution bed/mattress(bed type), PT/OT evaluation    Mobility Interventions: Assess need for specialty bed, Float heels, HOB 30 degrees or less, Pressure redistribution bed/mattress (bed type), Turn and reposition approx.  every two hours(pillow and wedges)    Nutrition Interventions: Document food/fluid/supplement intake, Discuss nutritional consult with provider    Friction and Shear Interventions: Apply protective barrier, creams and emollients, Foam dressings/transparent film/skin sealants, HOB 30 degrees or less, Lift sheet, Lift team/patient mobility team, Minimize layers                Problem: Patient Education: Go to Patient Education Activity  Goal: Patient/Family Education  Outcome: Progressing Towards Goal     Problem: Risk for Spread of Infection  Goal: Prevent transmission of infectious organism to others  Description: Prevent the transmission of infectious organisms to other patients, staff members, and visitors.   Outcome: Progressing Towards Goal     Problem: Patient Education:  Go to Education Activity  Goal: Patient/Family Education  Outcome: Progressing Towards Goal     Problem: Patient Education: Go to Patient Education Activity  Goal: Patient/Family Education  Outcome: Progressing Towards Goal     Problem: Nutrition Deficit  Goal: *Optimize nutritional status  Outcome: Progressing Towards Goal

## 2022-10-17 LAB
GLUCOSE BLD STRIP.AUTO-MCNC: 109 MG/DL (ref 70–110)
GLUCOSE BLD STRIP.AUTO-MCNC: 118 MG/DL (ref 70–110)
GLUCOSE BLD STRIP.AUTO-MCNC: 121 MG/DL (ref 70–110)
GLUCOSE BLD STRIP.AUTO-MCNC: 99 MG/DL (ref 70–110)

## 2022-10-17 PROCEDURE — 36573 INSJ PICC RS&I 5 YR+: CPT | Performed by: FAMILY MEDICINE

## 2022-10-17 PROCEDURE — 82962 GLUCOSE BLOOD TEST: CPT

## 2022-10-17 PROCEDURE — 94762 N-INVAS EAR/PLS OXIMTRY CONT: CPT

## 2022-10-17 PROCEDURE — 74011000250 HC RX REV CODE- 250: Performed by: NURSE PRACTITIONER

## 2022-10-17 PROCEDURE — 74011000258 HC RX REV CODE- 258: Performed by: NURSE PRACTITIONER

## 2022-10-17 PROCEDURE — 77030042042 HC CATH EXT MALE -B

## 2022-10-17 PROCEDURE — 02HV33Z INSERTION OF INFUSION DEVICE INTO SUPERIOR VENA CAVA, PERCUTANEOUS APPROACH: ICD-10-PCS | Performed by: FAMILY MEDICINE

## 2022-10-17 PROCEDURE — 74011250636 HC RX REV CODE- 250/636: Performed by: NURSE PRACTITIONER

## 2022-10-17 PROCEDURE — 2709999900 HC NON-CHARGEABLE SUPPLY

## 2022-10-17 PROCEDURE — 74011250637 HC RX REV CODE- 250/637

## 2022-10-17 PROCEDURE — 97162 PT EVAL MOD COMPLEX 30 MIN: CPT

## 2022-10-17 PROCEDURE — 65660000004 HC RM CVT STEPDOWN

## 2022-10-17 PROCEDURE — 74011000250 HC RX REV CODE- 250: Performed by: FAMILY MEDICINE

## 2022-10-17 PROCEDURE — 99232 SBSQ HOSP IP/OBS MODERATE 35: CPT | Performed by: FAMILY MEDICINE

## 2022-10-17 PROCEDURE — 74011000250 HC RX REV CODE- 250: Performed by: INTERNAL MEDICINE

## 2022-10-17 PROCEDURE — 74011250636 HC RX REV CODE- 250/636: Performed by: INTERNAL MEDICINE

## 2022-10-17 RX ADMIN — SODIUM CHLORIDE, PRESERVATIVE FREE 10 ML: 5 INJECTION INTRAVENOUS at 14:13

## 2022-10-17 RX ADMIN — SODIUM CHLORIDE, PRESERVATIVE FREE 20 MG: 5 INJECTION INTRAVENOUS at 09:01

## 2022-10-17 RX ADMIN — HEPARIN SODIUM 5000 UNITS: 5000 INJECTION INTRAVENOUS; SUBCUTANEOUS at 05:44

## 2022-10-17 RX ADMIN — THIAMINE HYDROCHLORIDE 100 MG: 100 INJECTION, SOLUTION INTRAMUSCULAR; INTRAVENOUS at 09:01

## 2022-10-17 RX ADMIN — SODIUM CHLORIDE, PRESERVATIVE FREE 10 ML: 5 INJECTION INTRAVENOUS at 01:20

## 2022-10-17 RX ADMIN — POTASSIUM CHLORIDE AND DEXTROSE MONOHYDRATE: 150; 5 INJECTION, SOLUTION INTRAVENOUS at 12:15

## 2022-10-17 RX ADMIN — METOPROLOL TARTRATE 25 MG: 25 TABLET, FILM COATED ORAL at 09:01

## 2022-10-17 RX ADMIN — HEPARIN SODIUM 5000 UNITS: 5000 INJECTION INTRAVENOUS; SUBCUTANEOUS at 14:12

## 2022-10-17 RX ADMIN — SODIUM CHLORIDE 750 MG: 900 INJECTION, SOLUTION INTRAVENOUS at 12:15

## 2022-10-17 RX ADMIN — METOPROLOL TARTRATE 25 MG: 25 TABLET, FILM COATED ORAL at 18:39

## 2022-10-17 RX ADMIN — FOLIC ACID: 5 INJECTION, SOLUTION INTRAMUSCULAR; INTRAVENOUS; SUBCUTANEOUS at 09:02

## 2022-10-17 RX ADMIN — SODIUM CHLORIDE, PRESERVATIVE FREE 10 ML: 5 INJECTION INTRAVENOUS at 05:46

## 2022-10-17 NOTE — PROGRESS NOTES
[facility]  Progress Note    Patient: Tiffanie Stokes Age: 79 y.o. : 1955 MR#: [id number] SSN: [id number]  Date: 10/17/2022     Subjective/24-hour events:     Unchanged clinically. Respiratory status stable, no evidence for any new infection issues. Remains afebrile. Assessment:   Aspiration pneumonia  Sepsis, POA, secondary to above  PARAMJIT  Dysphagia  Hypokalemia  Hypertension  Seizure disorder  COVID-19 infection  Hyponatremia  Dementia  Alcohol abuse history    Plan:   Continue to monitor off of antibiotics. Continue NG tube feeds as ordered. Gastroenterology originally consulted for PEG tube placement. Procedure would not be able to be done until 10/20 per their COVID protocols. Have asked interventional radiology to see if procedure can be done sooner through their department. Assistance appreciated in advance. Wound care per current orders. Continue volume management per nephrology. Monitor electrolytes and replace as necessary. Continue NG tube feeds as ordered. Will reconsult gastroenterology for PEG tube placement mid-week. Consider IR eval if necessary to see if tube can be done sooner. Volume management per nephrology. Wound care per current orders. Therapy services: No new therapy recommendations. Equipment: None. Anticipated disposition: Return to long-term care.     Case discussed with:  []Patient  []Family  [x]Nursing  [x]Case Management  DVT Prophylaxis:  []Lovenox  [x]Hep SQ  []SCDs  []Coumadin   []On Heparin gtt    Objective:   VS: Visit Vitals  /75   Pulse 89   Temp 99.8 °F (37.7 °C)   Resp 19   Ht 5' 10\" (1.778 m)   Wt 68.1 kg (150 lb 2.1 oz)   SpO2 99%   BMI 21.54 kg/m²      Tmax/24hrs: Temp (24hrs), Av.4 °F (36.9 °C), Min:97.8 °F (36.6 °C), Max:99.8 °F (37.7 °C)    Intake/Output Summary (Last 24 hours) at 10/17/2022 1211  Last data filed at 10/16/2022 1925  Gross per 24 hour   Intake 1070 ml   Output 600 ml   Net 470 ml General:  In NAD. Nontoxic-appearing. NGT in place. Cardiovascular:  RRR. Pulmonary: Effort nonlabored. GI:  Abdomen soft. Extremities:  Warm, no edema or ischemia. Neuro: Awake and alert, moves extremities spontaneously.     Labs:    Recent Results (from the past 24 hour(s))   GLUCOSE, POC    Collection Time: 10/16/22 12:31 PM   Result Value Ref Range    Glucose (POC) 125 (H) 70 - 110 mg/dL   GLUCOSE, POC    Collection Time: 10/16/22  6:03 PM   Result Value Ref Range    Glucose (POC) 105 70 - 110 mg/dL   GLUCOSE, POC    Collection Time: 10/17/22  1:39 AM   Result Value Ref Range    Glucose (POC) 118 (H) 70 - 110 mg/dL   GLUCOSE, POC    Collection Time: 10/17/22  5:48 AM   Result Value Ref Range    Glucose (POC) 121 (H) 70 - 110 mg/dL   GLUCOSE, POC    Collection Time: 10/17/22 12:05 PM   Result Value Ref Range    Glucose (POC) 109 70 - 110 mg/dL       Signed By: María Prescott MD     October 17, 2022

## 2022-10-17 NOTE — PROGRESS NOTES
PICC line order for lab draws. Patient edematous and lab is unable to get access for blood draws. Patient has current midline on right arm that does not draw blood. Attempted right and unable to advance catheter more than 20 centimeters. Removed and dressing placed. Over wire successful on the right side but catheter will not advance past 25 centimeters. New midline placed to right side and currently draws blood. Dr Sam Ponce paged at [phone]. Awaiting for call back. Marcus Malloy RN notified and aware.

## 2022-10-17 NOTE — PROGRESS NOTES
Problem: Mobility Impaired (Adult and Pediatric)  Goal: *Acute Goals and Plan of Care (Insert Text)  Outcome: Resolved/Met   PHYSICAL THERAPY EVALUATION AND DISCHARGE    Patient: Dennis Estrada (44 y.o. male)  Date: 10/17/2022  Primary Diagnosis: Respiratory failure ([facility] [address] [J96.90]  Dementia, alcoholic ([facility] [address] [S84.17]  Aspiration into airway [T17.908A]  Precautions: Fall, Skin, Seizure, Aspiration  PLOF: Long term care  ASSESSMENT :  Droplet plus isolation. Admitted from long term care. Unable to obtain PLOF; however skilled nursing assistance at baseline. Aphasia limits communication. No command following for BLE AROM. Prevalon boots donned to prevent skin breakdown. BLE PROM limited; 50% of full. Patient grimacing and crying out with PROM. Seated in bed at end of session. Call bell in reach. Skilled physical therapy is not indicated at this time. Unable to participate in skilled PT treatment. PLAN :  Recommendations and Planned Interventions:   No formal PT needs identified at this time. Further Equipment Recommendations for Discharge: hospital bed, mechanical lift, and wheelchair     [facility] Basic Mobility Inpatient Short Form:  6/24   This [facility] score should be considered in conjunction with interdisciplinary team recommendations to determine the most appropriate discharge setting. Patient's social support, diagnosis, medical stability, and prior level of function should also be taken into consideration. At this time and based on an AM-PAC score of 6/24, no further PT is recommended upon discharge. Unable to participate in skilled PT treatment. Long term care resident.       SUBJECTIVE:   Aphasic, IV, NG tube, Cardiac monitor, External urinary catheter    OBJECTIVE DATA SUMMARY:     Past Medical History:   Diagnosis Date    Alcohol dependence with alcohol-induced persisting dementia ([facility] [address]     per [facility] 4/23/21    Anemia     Aphasia     Benign prostatic hyperplasia     Cerebral vascular disease COVID-19 04/06/2021    Disturbances of salivary secretion     Dysphagia     GERD (gastroesophageal reflux disease)     Hemiparesis (HCC)     left side    Hemiplegia (HCC)     left side     Hiccups     Hyperlipidemia     Hypernatremia 03/2021    Hypertension     Hyponatremia     Insomnia     Lacunar infarction ([facility] [address] 2010    weakness both arms, unable to walk    Lower extremity edema     Moderate protein-calorie malnutrition (HCC)     MRSA (methicillin resistant Staphylococcus aureus)     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)     Pneumonia     Psychogenic polydipsia     PVD (peripheral vascular disease) ([facility] [address]     Seizures ([address]     Spinal stenosis     Stroke ([facility] [address] 2017    Unspecified convulsions ([facility] [address]     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     Past Surgical History:   Procedure Laterality Date    COLONOSCOPY N/A 4/29/2021    COLONOSCOPY performed by Vasquez Varner MD at [address]      pt reports past hx of surgery on ears unsure of what type    HX OTHER SURGICAL Bilateral     debridement of lower extremities     Barriers to Learning/Limitations: yes;  sensory deficits-vision/hearing/speech  Compensate with: Visual Cues, Verbal Cues, Tactile Cues and Kinesthetic Cues  Home Situation:   Home Situation  Home Environment: [address] [place] [address] Name: [address] [place] [address],[address] Floor  # Steps to Enter: 0  One/Two Story Residence: One story  Living Alone: No  Support Systems: Child(flako)  Patient Expects to be Discharged to[de-identified] [address] [facility]  Current DME Used/Available at Home: None  Critical Behavior:  Neurologic State: Alert  Orientation Level: Unable to verbalize  Cognition: Decreased command following     Psychosocial  Patient Behaviors: Calm    Strength:    Unable to assess d/t no AROM BLE  Range Of Motion:  BLE PROM 50% of full    Pain:  Pain level pre-treatment: 0/10   Pain level post-treatment: 0/10    Activity Tolerance:   Fair    After treatment:   []         Patient left in no apparent distress sitting up in chair  [x]         Patient left in no apparent distress in bed  [x]         Call bell left within reach  [x]         Nursing notified  []         Caregiver present  []         Bed alarm activated  []         SCDs applied    COMMUNICATION/EDUCATION:   [x]         Role of physical therapy in the acute care setting. []         Fall prevention education was provided and the patient/caregiver indicated understanding. []         Patient/family have participated as able in goal setting and plan of care. []         Patient/family agree to work toward stated goals and plan of care. []         Patient understands intent and goals of therapy, but is neutral about his/her participation. [x]         Patient is unable to participate in goal setting/plan of care: ongoing with therapy staff. Thank you for this referral.  Conrado Nicholas, PT   Time Calculation: 10 mins    Eval Complexity: History: MEDIUM  Complexity : 1-2 comorbidities / personal factors will impact the outcome/ POC Exam:MEDIUM Complexity : 3 Standardized tests and measures addressing body structure, function, activity limitation and / or participation in recreation  Presentation: MEDIUM Complexity : Evolving with changing characteristics  Clinical Decision Making:Medium Complexity   Clinical judgement; ROM, MMT, functional mobility Overall Complexity:MEDIUM    [facility] [address] [address] AM-PAC® Basic Mobility Inpatient Short Form (6-Clicks) Version 2    How much HELP from another person does the patient currently need    (If the patient hasn't done an activity recently, how much help from another person do you think he/she would need if he/she tried?)   Total (Total A or Dep)   A Lot  (Mod to Max A)   A Little (Sup or Min A)   None (Mod I to I)   Turning from your back to your side while in a flat bed without using bedrails? [x] 1 [] 2 [] 3 [] 4   2.  Moving from lying on your back to sitting on the side of a flat bed without using bedrails? [x] 1 [] 2 [] 3 [] 4   3. Moving to and from a bed to a chair (including a wheelchair)? [x] 1 [] 2 [] 3 [] 4   4. Standing up from a chair using your arms (e.g., wheelchair, or bedside chair)? [x] 1 [] 2 [] 3 [] 4   5. Walking in hospital room? [x] 1 [] 2 [] 3 [] 4   6. Climbing 3-5 steps with a railing?+   [x] 1 [] 2 [] 3 [] 4   +If stair climbing cannot be assessed, skip item #6. Sum responses from items 1-5.

## 2022-10-17 NOTE — PROGRESS NOTES
Progress Note    Tiffanie Stokes  79 y.o. Admit Date: 10/9/2022  Active Problems:    Hypertension (9/2/2012) POA: Unknown      Hypokalemia (6/22/2013) POA: Unknown      Aspiration pneumonia ([address] (8/22/2017) POA: Unknown      Hyperosmolality and hypernatremia (3/30/2021) POA: Unknown      Dehydration (3/30/2021) POA: Yes      COVID-19 (3/30/2021) POA: Yes      Respiratory failure ([address] (10/9/2022) POA: Unknown      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[facility] [address] (23/5/8669) POA: Unknown      Aspiration into airway (10/9/2022) POA: Unknown      Acute kidney injury ([address] (10/10/2022) POA: Unknown      Seizure disorder ([facility] [address] (10/12/2022) POA: Yes            Subjective:     Patient is remained at his baseline status. Unfortunately no one can draw any blood from him even from the midline. Nursing staff asking for PICC line. Currently getting IV fluid with D5W with 20 of potassium per liter running at 50 cc/h also on free water at 50 cc/h through the NG tube and also getting NG feeding.   Remains in COVID isolation          Objective:     Visit Vitals  /75   Pulse 89   Temp 99.8 °F (37.7 °C)   Resp 19   Ht 5' 10\" (1.778 m)   Wt 68.1 kg (150 lb 2.1 oz)   SpO2 99%   BMI 21.54 kg/m²         Intake/Output Summary (Last 24 hours) at 10/17/2022 1414  Last data filed at 10/16/2022 1925  Gross per 24 hour   Intake 930 ml   Output 600 ml   Net 330 ml       Current Facility-Administered Medications   Medication Dose Route Frequency Provider Last Rate Last Admin    sodium chloride (NS) flush 5-40 mL  5-40 mL IntraVENous Q8H Laureano Mcdonald MD   10 mL at 10/17/22 1413    sodium chloride (NS) flush 5-40 mL  5-40 mL IntraVENous PRN Laureano Mcdonald MD        metoprolol tartrate (LOPRESSOR) tablet 25 mg  25 mg Oral BID Skipper Kid, NP   25 mg at 10/17/22 0901    dextrose 5% with KCl 20 mEq/L infusion   IntraVENous CONTINUOUS Mary Driver MD 75 mL/hr at 10/17/22 1215 New Bag at 10/17/22 1215    sodium chloride (NS) flush 5-40 mL  5-40 mL IntraVENous Q8H Neil Yu MD   10 mL at 10/17/22 1413    sodium chloride (NS) flush 5-40 mL  5-40 mL IntraVENous PRN Neil Yu MD        albuterol-ipratropium (DUO-NEB) 2.5 MG-0.5 MG/3 ML  3 mL Nebulization Q4H PRN Eloisa Nunes NP        insulin lispro (HUMALOG) injection   SubCUTAneous Q6H Tati MURILLO NP   2 Units at 10/10/22 2309    glucose chewable tablet 16 g  4 Tablet Oral PRN Eloisa Nunes, EVAN        glucagon (GLUCAGEN) injection 1 mg  1 mg IntraMUSCular PRN Tati MURILLO NP        dextrose 10% infusion 0-250 mL  0-250 mL IntraVENous PRN Tati MURILLO NP        hydrALAZINE (APRESOLINE) 20 mg/mL injection 10 mg  10 mg IntraVENous Q6H PRN Tati MURILLO NP        acetaminophen (TYLENOL) tablet 650 mg  650 mg Oral Q6H PRN Eloisa Nunes NP   650 mg at 10/12/22 2138    Or    acetaminophen (TYLENOL) suppository 650 mg  650 mg Rectal Q6H PRN Eloisa Nunes NP        polyethylene glycol (MIRALAX) packet 17 g  17 g Oral DAILY PRN Tati MURILLO NP        ondansetron (ZOFRAN ODT) tablet 4 mg  4 mg Oral Q8H PRN Tati MURILLO NP        Or    ondansetron (ZOFRAN) injection 4 mg  4 mg IntraVENous Q6H PRN Tati MURILLO NP        heparin (porcine) injection 5,000 Units  5,000 Units SubCUTAneous Q8H Laura Owens NP   5,000 Units at 10/17/22 1412    levETIRAcetam (KEPPRA) 750 mg in 0.9% sodium chloride 100 mL IVPB  750 mg IntraVENous Q12H Laura Owens  mL/hr at 10/17/22 1215 750 mg at 01/73/09 7152    folic acid (FOLVITE) 1 mg in 0.9% sodium chloride 50 mL ivpb   IntraVENous DAILY Tati MURILLO  mL/hr at 10/17/22 0902 New Bag at 10/17/22 0902    thiamine (B-1) 100 mg in 0.9% sodium chloride 50 mL IVPB  100 mg IntraVENous DAILY Tati MURILLO,  mL/hr at 10/17/22 0901 100 mg at 10/17/22 0901    famotidine (PF) (PEPCID) 20 mg in 0.9% sodium chloride 10 mL injection  20 mg IntraVENous Q12H Tati MURILLO, NP   20 mg at 10/17/22 0901                Data Review:    CBC w/Diff    Recent Labs     10/16/22  1000   WBC 9.1   RBC 3.44*   HGB 8.6*   HCT 27.7*   MCV 80.5   MCH 25.0   MCHC 31.0   RDW 15.1*    Recent Labs     10/16/22  1000   MONOS 6   EOS 2   BASOS 0   RDW 15.1*        Comprehensive Metabolic Profile    Recent Labs     10/16/22  1000 10/14/22  2111 10/14/22  1710   * 146* 148*   K 2.9*  --  3.0*   *  --  118*   CO2 27  --  26   BUN 3*  --  7   CREA 0.48*  --  0.51*    Recent Labs     10/16/22  1000 10/14/22  1710   CA 8.1* 8.3*   PHOS  --  1.8*                        Impression:       Active Hospital Problems    Diagnosis Date Noted    Seizure disorder ([facility] [address] 10/12/2022    Acute kidney injury ([facility] [address] 10/10/2022    Respiratory failure ([address] 80/27/4997    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[address] 73/45/9990    Aspiration into airway 10/09/2022    Hyperosmolality and hypernatremia 03/30/2021    COVID-19 03/30/2021    Dehydration 03/30/2021    Aspiration pneumonia ([facility] [address] 08/22/2017    Hypokalemia 06/22/2013    Hypertension 09/02/2012            Plan:     If no one can draw lab from him indefinitely we have requested a PICC line because he still has several electrolyte problem that needs to be corrected.       Miguel Angel Valentine MD

## 2022-10-17 NOTE — PROGRESS NOTES
OT orders received and medical chart review completed. Patient is a LTC patient at [facility] and dep with ADLs and non-ambulatory at baseline. Formal evaluation not indicated at this time. OT to sign off.

## 2022-10-17 NOTE — PROGRESS NOTES
5398-  Bedside and Verbal shift change report given to Clarisa De La Paz (oncoming nurse) by Florencia Dillard (offgoing nurse). Report included the following information SBAR, Kardex, Intake/Output, MAR, and Recent Results.          Wound Prevention Checklist    Patient: Jacoby Ojeda (89 y.o. male)  Date: 10/17/2022  Diagnosis: Respiratory failure ([facility] [address] [J96.90]  Dementia, alcoholic ([facility] [address] [J11.69]  Aspiration into airway [T17.908A] <principal problem not specified>    Precautions:         []  Heel prevention boots placed on patient    [x]  Patient turned q2h during shift    [x]  Lift team ordered    [x]  Patient on [facility] bed/Specialty bed    [x]  Each Wound is documented during shift (Stage, Color, drainage, odor, measurements, and dressings)    [x]  Dual skin check done with Stephen Avila RN

## 2022-10-17 NOTE — PROGRESS NOTES
Infectious Disease progress Note        Reason:sepsis, aspiration pneumonia, covid-19 pneumonia    Current abx Prior abx   Zosyn since 10/9/22-10/16/2022 Vancomycin 10/9-10/13     Lines: Reid since 10/10/2022    Assessment :  79 y.o. male with a significant PMHx of CVA with left-sided hemiparesis and aphasia, HTN, HLD, dysphagia, seizure disorder, schizophrenia, alcohol-induced dementia, severe protein calorie malnutrition and debility who presented to [facility] from [address] on 10/10/2022 with hypoxia. Hospitalization [facility] July 2017 for right foot cellulitis, infected right foot ulcer secondary to Proteus, citrobacter, e.coli, mssa, e.fecalis, e.avium, strep viridans. Surgical wound cultures 7/11 : proteus,citrobacter   S/p I&D of right foot ulcer on 7/11- intra op findings noted - no evidence of bone infection. Clinical presentation consistent with sepsis, acute hypoxic respiratory failure-present on admission due to aspiration pneumonia superimposed on recent COVID-19 infection    1 of 2 sets of blood culture positive for coagulase-negative Staphylococcus on 10/10-likely contaminant. No definitive evidence of skin/soft tissue infection as a source of bloodstream infection. Negative blood culture 10/12    70,000 colonies of Citrobacter in urine culture 10/9-likely colonizer. No significant pyuria to suggest cystitis    Positive respiratory viral panel PCR 10/10/2022 for enterovirus, rhinovirus, COVID-19-currently asymptomatic from these infections    Acute kidney injury-likely prerenal    Clinically improving. Afebrile. Oxygenation maintained     Recommendations:     1. Hold further antibiotics                                                                                                2.  Aspiration precautions    3. Discontinue droplet plus isolation on 10/20/2022    Will sign off. Please call if any new questions or concerns.   Thanks       Above plan was discussed in details with primary team. Please call me if any further questions or concerns. Will continue to participate in the care of this patient. HPI:      Patient is confused and unable to communicate effectively. Detailed review system not feasible      Past Medical History:   Diagnosis Date    Alcohol dependence with alcohol-induced persisting dementia ([address]     per [facility] 4/23/21    Anemia     Aphasia     Benign prostatic hyperplasia     Cerebral vascular disease     COVID-19 04/06/2021    Disturbances of salivary secretion     Dysphagia     GERD (gastroesophageal reflux disease)     Hemiparesis (HCC)     left side    Hemiplegia (HCC)     left side     Hiccups     Hyperlipidemia     Hypernatremia 03/2021    Hypertension     Hyponatremia     Insomnia     Lacunar infarction ([address] 2010    weakness both arms, unable to walk    Lower extremity edema     Moderate protein-calorie malnutrition (HCC)     MRSA (methicillin resistant Staphylococcus aureus)     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)     Pneumonia     Psychogenic polydipsia     PVD (peripheral vascular disease) ([address]     Seizures ([address]     Spinal stenosis     Stroke ([address] 2017    Unspecified convulsions ([address]     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 doxepin (SINEquan) 10 mg capsule Take 1 Cap by mouth nightly. clopidogrel (PLAVIX) 75 mg tab Take 1 Tab by mouth daily.   Qty: 90 Tab, Refills: 0 pravastatin (PRAVACHOL) 40 mg tablet Take 1 Tab by mouth nightly. Qty: 30 Tab, Refills: 1      ascorbic acid, vitamin C, (VITAMIN C) 250 mg tablet Take 1 Tab by mouth daily. Qty: 30 Tab, Refills: 2      ferrous sulfate 325 mg (65 mg iron) tablet Take 1 Tab by mouth daily (with breakfast). Qty: 30 Tab, Refills: 2      polyethylene glycol (MIRALAX) 17 gram packet Take 1 Packet by mouth daily. Qty: 30 Packet, Refills: 2      senna-docusate (PERICOLACE) 8.6-50 mg per tablet Take 1 Tab by mouth nightly. HOLD for loose stool. Qty: 30 Tab, Refills: 2      tamsulosin (FLOMAX) 0.4 mg capsule Take 0.4 mg by mouth daily. 1 tab daily      MULTIVITAMIN,THERAPEUTIC (THERA MULTI-VITAMIN PO) Take 500 mg by mouth daily. baclofen (LIORESAL) 10 mg tablet Take 1 Tab by mouth every twelve (12) hours as needed. Qty: 15 Tab, Refills: 0      metoprolol (LOPRESSOR) 25 mg tablet Take 1 Tab by mouth two (2) times a day. Qty: 60 Tab, Refills: 2      cyanocobalamin 1,000 mcg tablet Take 1 Tab by mouth daily. Qty: 30 Tab, Refills: 2      folic acid (FOLVITE) 1 mg tablet Take 1 Tab by mouth daily. Qty: 30 Tab, Refills: 1      aspirin 81 mg chewable tablet Take 1 Tab by mouth daily.   Qty: 30 Tab, Refills: 0             Current Facility-Administered Medications   Medication Dose Route Frequency    sodium chloride (NS) flush 5-40 mL  5-40 mL IntraVENous Q8H    sodium chloride (NS) flush 5-40 mL  5-40 mL IntraVENous PRN    metoprolol tartrate (LOPRESSOR) tablet 25 mg  25 mg Oral BID    dextrose 5% with KCl 20 mEq/L infusion   IntraVENous CONTINUOUS    sodium chloride (NS) flush 5-40 mL  5-40 mL IntraVENous Q8H    sodium chloride (NS) flush 5-40 mL  5-40 mL IntraVENous PRN    albuterol-ipratropium (DUO-NEB) 2.5 MG-0.5 MG/3 ML  3 mL Nebulization Q4H PRN    insulin lispro (HUMALOG) injection   SubCUTAneous Q6H    glucose chewable tablet 16 g  4 Tablet Oral PRN    glucagon (GLUCAGEN) injection 1 mg  1 mg IntraMUSCular PRN    dextrose 10% infusion 0-250 mL  0-250 mL IntraVENous PRN    hydrALAZINE (APRESOLINE) 20 mg/mL injection 10 mg  10 mg IntraVENous Q6H PRN    acetaminophen (TYLENOL) tablet 650 mg  650 mg Oral Q6H PRN    Or    acetaminophen (TYLENOL) suppository 650 mg  650 mg Rectal Q6H PRN    polyethylene glycol (MIRALAX) packet 17 g  17 g Oral DAILY PRN    ondansetron (ZOFRAN ODT) tablet 4 mg  4 mg Oral Q8H PRN    Or    ondansetron (ZOFRAN) injection 4 mg  4 mg IntraVENous Q6H PRN    heparin (porcine) injection 5,000 Units  5,000 Units SubCUTAneous Q8H    levETIRAcetam (KEPPRA) 750 mg in 0.9% sodium chloride 100 mL IVPB  750 mg IntraVENous M04O    folic acid (FOLVITE) 1 mg in 0.9% sodium chloride 50 mL ivpb   IntraVENous DAILY    thiamine (B-1) 100 mg in 0.9% sodium chloride 50 mL IVPB  100 mg IntraVENous DAILY    famotidine (PF) (PEPCID) 20 mg in 0.9% sodium chloride 10 mL injection  20 mg IntraVENous Q12H       Allergies: Patient has no known allergies.     Family History   Problem Relation Age of Onset    Hypertension Other      Social History     Socioeconomic History    Marital status:      Spouse name: Not on file    Number of children: Not on file    Years of education: Not on file    Highest education level: Not on file   Occupational History    Not on file   Tobacco Use    Smoking status: Former     Packs/day: 0.50     Types: Cigarettes     Quit date: 2008     Years since quittin.3    Smokeless tobacco: Never   Substance and Sexual Activity    Alcohol use: No    Drug use: No    Sexual activity: Not Currently   Other Topics Concern    Not on file   Social History Narrative    Not on file     Social Determinants of Health     Financial Resource Strain: Not on file   Food Insecurity: Not on file   Transportation Needs: Not on file   Physical Activity: Not on file   Stress: Not on file   Social Connections: Not on file   Intimate Partner Violence: Not on file   Housing Stability: Not on file     Social History     Tobacco Use   Smoking Status Former    Packs/day: 0.50    Types: Cigarettes    Quit date: 2008    Years since quittin.3   Smokeless Tobacco Never        Temp (24hrs), Av.1 °F (36.7 °C), Min:97.8 °F (36.6 °C), Max:98.6 °F (37 °C)    Visit Vitals  BP (!) 154/89   Pulse 90   Temp 97.8 °F (36.6 °C)   Resp 20   Ht 5' 10\" (1.778 m)   Wt 68.1 kg (150 lb 2.1 oz)   SpO2 100%   BMI 21.54 kg/m²       ROS: Unable to obtain due to patient factors    Physical Exam:    General:  Sitting on bed, eyes open, able to communicate effectively   Head:  Normocephalic, without obvious abnormality, atraumatic. Eyes:  Conjunctivae/corneas clear.,   Nose: Nares normal.  NG tube in place. No drainage or sinus tenderness. Throat: Lips, mucosa, and tongue normal. Teeth and gums of poor dentition, lips dried and cracked/peeling    Neck: Supple, symmetrical, trachea midline, no adenopathy, thyroid: no enlargment/tenderness/nodules, no carotid bruit and no JVD. Back:   Symmetric, no curvature. Lungs:   Coarse to auscultation bilaterally. Chest wall:  No tenderness or deformity. Heart:  Tachyardia, S1, S2 normal, no  click, rub or gallop. Abdomen:   Abd flat, Soft, non-tender. Bowel sounds normal. No masses,  No organomegaly. Healed surgical scar in upper abdomen   Extremities: Extremities normal, atraumatic, no cyanosis or edema. Pulses: 2+ and symmetric all extremities.    Skin: BLE with multiple stages of healing from venous stasis ulcers    Lymph nodes:  Not examined   Neurologic: Grossly non-focal; non-verbal, does not follow commands        Labs: Results:   Chemistry Recent Labs     10/16/22  1000 10/14/22  2111 10/14/22  1710 10/14/22  1300   *  --  103* 109*   * 146* 148* 151*   K 2.9*  --  3.0* 2.7*   *  --  118* 120*   CO2 27  --  26 27   BUN 3*  --  7 7   CREA 0.48*  --  0.51* 0.38*   CA 8.1*  --  8.3* 7.6*   AGAP 4  --  4 4   BUCR 6*  --  14 18        CBC w/Diff Recent Labs     10/16/22  1000 10/14/22  1300   WBC 9.1 9.6   RBC 3.44* 3.31*   HGB 8.6* 8.2*   HCT 27.7* 27.0*    145   GRANS 83* 85*   LYMPH 9* 7*   EOS 2 2        Microbiology No results for input(s): CULT in the last 72 hours. RADIOLOGY:    All available imaging studies/reports in [facility] for this admission were reviewed          Disclaimer: Sections of this note are dictated utilizing voice recognition software, which may have resulted in some phonetic based errors in grammar and contents. Even though attempts were made to correct all the mistakes, some may have been missed, and remained in the body of the document. If questions arise, please contact our department.     Dr. Daniela Dumont, Infectious Disease Specialist  [phone]  October 17, 2022  10:28 AM

## 2022-10-17 NOTE — PROGRESS NOTES
Problem: Pressure Injury - Risk of  Goal: *Prevention of pressure injury  Description: Document Brian Scale and appropriate interventions in the flowsheet. Outcome: Progressing Towards Goal  Note: Pressure Injury Interventions:  Sensory Interventions: Check visual cues for pain    Moisture Interventions: Absorbent underpads, Check for incontinence Q2 hours and as needed    Activity Interventions: Pressure redistribution bed/mattress(bed type)    Mobility Interventions: Pressure redistribution bed/mattress (bed type)    Nutrition Interventions: Document food/fluid/supplement intake    Friction and Shear Interventions: Lift sheet                Problem: Risk for Spread of Infection  Goal: Prevent transmission of infectious organism to others  Description: Prevent the transmission of infectious organisms to other patients, staff members, and visitors. Outcome: Progressing Towards Goal     Problem: Nutrition Deficit  Goal: *Optimize nutritional status  Outcome: Progressing Towards Goal     Problem: Falls - Risk of  Goal: *Absence of Falls  Description: Document Ade Alamo Fall Risk and appropriate interventions in the flowsheet.   Outcome: Progressing Towards Goal  Note: Fall Risk Interventions:  Mobility Interventions: Bed/chair exit alarm, Patient to call before getting OOB    Mentation Interventions: Adequate sleep, hydration, pain control, Bed/chair exit alarm, Room close to nurse's station, Toileting rounds, Update white board    Medication Interventions: Bed/chair exit alarm, Patient to call before getting OOB    Elimination Interventions: Bed/chair exit alarm, Call light in reach, Patient to call for help with toileting needs              Problem: Pain  Goal: *Control of Pain  Outcome: Progressing Towards Goal  Goal: *PALLIATIVE CARE:  Alleviation of Pain  Outcome: Progressing Towards Goal

## 2022-10-17 NOTE — PROGRESS NOTES
Physician Progress Note      PATIENT:               Helene Litten  CSN #:                  [id number]  :                       1955  ADMIT DATE:       10/9/2022 6:15 PM  DISCH DATE:  RESPONDING  PROVIDER #:        Julia Castaneda MD          QUERY TEXT:    Pt noted to have Sepsis and acute organ dysfunction of PARAMJIT acute hypoxic respiratory failure. If possible, please document in progress notes and discharge summary the relationship, if any, between Sepsis and PARAMJIT or acute hypoxic respiratory failure. The medical record reflects the following:  Risk Factors: COVID-19, aspiration pneumonia, dysphagia, dehydration    Clinical Indicators:  10/12/2022, PN, Dr. Fletcher Ego:  \"Sepsis- secondary to Aspiration PNA/UTI. PARAMJIT- likely prerenal d/t dehydration. \"  10/10/2022, PCXR report:  IMPRESSION:  Persistent basilar opacities right greater than left as seen on the recent CT.  WBC:  11.3 on 10/9 ---> 14.6 on 10/11 ---> 11.4 on 10/13  temperature:  105 degrees rectal on 10/9  heart rate:  148 on admission  respiratory rate:  34 on admission  procal: 21.82 on 10/10  lactic acid 2.4 on 10/10    Treatment: ID consult, IV antibiotics, aspiration precautions, methylprednisolone IV    Thank you,  ALAINA Eid RN, CDS  [email or web address]  Options provided:  -- Acute organ dysfunction of PARAMJIT is associated with sepsis  -- Acute organ dysfunction of acute hypoxic respiratory failure is associated with sepsis  -- Acute organ dysfunction of PARAMJIT and acute hypoxic respiratory failure is unrelated to sepsis  -- Other - I will add my own diagnosis  -- Disagree - Not applicable / Not valid  -- Disagree - Clinically unable to determine / Unknown  -- Refer to Clinical Documentation Reviewer    PROVIDER RESPONSE TEXT:    This patient has acute organ dysfunction of PARAMJIT associated with sepsis. Query created by:  Saint Kraft on 10/13/2022 5:19 PM      Electronically signed by:  Julia Castaneda MD 10/17/2022 6:26 PM

## 2022-10-18 ENCOUNTER — APPOINTMENT (OUTPATIENT)
Dept: GENERAL RADIOLOGY | Age: 67
DRG: 871 | End: 2022-10-18
Attending: INTERNAL MEDICINE
Payer: COMMERCIAL

## 2022-10-18 LAB
ANION GAP SERPL CALC-SCNC: 1 MMOL/L (ref 3–18)
BACTERIA SPEC CULT: NORMAL
BASOPHILS # BLD: 0 K/UL (ref 0–0.1)
BASOPHILS NFR BLD: 0 % (ref 0–2)
BUN SERPL-MCNC: 2 MG/DL (ref 7–18)
BUN/CREAT SERPL: ABNORMAL (ref 12–20)
CALCIUM SERPL-MCNC: 8.3 MG/DL (ref 8.5–10.1)
CHLORIDE SERPL-SCNC: 114 MMOL/L (ref 100–111)
CO2 SERPL-SCNC: 32 MMOL/L (ref 21–32)
CREAT SERPL-MCNC: 0.28 MG/DL (ref 0.6–1.3)
DIFFERENTIAL METHOD BLD: ABNORMAL
EOSINOPHIL # BLD: 0.3 K/UL (ref 0–0.4)
EOSINOPHIL NFR BLD: 2 % (ref 0–5)
ERYTHROCYTE [DISTWIDTH] IN BLOOD BY AUTOMATED COUNT: 15.2 % (ref 11.6–14.5)
GLUCOSE BLD STRIP.AUTO-MCNC: 102 MG/DL (ref 70–110)
GLUCOSE BLD STRIP.AUTO-MCNC: 85 MG/DL (ref 70–110)
GLUCOSE BLD STRIP.AUTO-MCNC: 88 MG/DL (ref 70–110)
GLUCOSE BLD STRIP.AUTO-MCNC: 88 MG/DL (ref 70–110)
GLUCOSE SERPL-MCNC: 101 MG/DL (ref 74–99)
HCT VFR BLD AUTO: 27.3 % (ref 36–48)
HGB BLD-MCNC: 8.5 G/DL (ref 13–16)
IMM GRANULOCYTES # BLD AUTO: 0.1 K/UL (ref 0–0.04)
IMM GRANULOCYTES NFR BLD AUTO: 1 % (ref 0–0.5)
LYMPHOCYTES # BLD: 1.9 K/UL (ref 0.9–3.6)
LYMPHOCYTES NFR BLD: 14 % (ref 21–52)
MAGNESIUM SERPL-MCNC: 2 MG/DL (ref 1.6–2.6)
MCH RBC QN AUTO: 25.4 PG (ref 24–34)
MCHC RBC AUTO-ENTMCNC: 31.1 G/DL (ref 31–37)
MCV RBC AUTO: 81.5 FL (ref 78–100)
MONOCYTES # BLD: 0.7 K/UL (ref 0.05–1.2)
MONOCYTES NFR BLD: 5 % (ref 3–10)
NEUTS SEG # BLD: 11 K/UL (ref 1.8–8)
NEUTS SEG NFR BLD: 78 % (ref 40–73)
NRBC # BLD: 0 K/UL (ref 0–0.01)
NRBC BLD-RTO: 0 PER 100 WBC
PLATELET # BLD AUTO: 346 K/UL (ref 135–420)
PMV BLD AUTO: 11 FL (ref 9.2–11.8)
POTASSIUM SERPL-SCNC: 3.5 MMOL/L (ref 3.5–5.5)
RBC # BLD AUTO: 3.35 M/UL (ref 4.35–5.65)
SERVICE CMNT-IMP: NORMAL
SODIUM SERPL-SCNC: 147 MMOL/L (ref 136–145)
WBC # BLD AUTO: 14.2 K/UL (ref 4.6–13.2)

## 2022-10-18 PROCEDURE — 71045 X-RAY EXAM CHEST 1 VIEW: CPT

## 2022-10-18 PROCEDURE — 74011250636 HC RX REV CODE- 250/636: Performed by: NURSE PRACTITIONER

## 2022-10-18 PROCEDURE — 74011000250 HC RX REV CODE- 250: Performed by: FAMILY MEDICINE

## 2022-10-18 PROCEDURE — 80048 BASIC METABOLIC PNL TOTAL CA: CPT

## 2022-10-18 PROCEDURE — 85025 COMPLETE CBC W/AUTO DIFF WBC: CPT

## 2022-10-18 PROCEDURE — 74011250637 HC RX REV CODE- 250/637

## 2022-10-18 PROCEDURE — 99232 SBSQ HOSP IP/OBS MODERATE 35: CPT | Performed by: FAMILY MEDICINE

## 2022-10-18 PROCEDURE — 2709999900 HC NON-CHARGEABLE SUPPLY

## 2022-10-18 PROCEDURE — 65660000004 HC RM CVT STEPDOWN

## 2022-10-18 PROCEDURE — 83735 ASSAY OF MAGNESIUM: CPT

## 2022-10-18 PROCEDURE — 74011000258 HC RX REV CODE- 258: Performed by: NURSE PRACTITIONER

## 2022-10-18 PROCEDURE — 74011250636 HC RX REV CODE- 250/636: Performed by: INTERNAL MEDICINE

## 2022-10-18 PROCEDURE — 74011000250 HC RX REV CODE- 250: Performed by: INTERNAL MEDICINE

## 2022-10-18 PROCEDURE — 82962 GLUCOSE BLOOD TEST: CPT

## 2022-10-18 PROCEDURE — 74011000250 HC RX REV CODE- 250: Performed by: NURSE PRACTITIONER

## 2022-10-18 RX ADMIN — SODIUM CHLORIDE, PRESERVATIVE FREE 20 MG: 5 INJECTION INTRAVENOUS at 10:00

## 2022-10-18 RX ADMIN — HEPARIN SODIUM 5000 UNITS: 5000 INJECTION INTRAVENOUS; SUBCUTANEOUS at 00:30

## 2022-10-18 RX ADMIN — HEPARIN SODIUM 5000 UNITS: 5000 INJECTION INTRAVENOUS; SUBCUTANEOUS at 15:39

## 2022-10-18 RX ADMIN — FOLIC ACID: 5 INJECTION, SOLUTION INTRAMUSCULAR; INTRAVENOUS; SUBCUTANEOUS at 10:00

## 2022-10-18 RX ADMIN — HEPARIN SODIUM 5000 UNITS: 5000 INJECTION INTRAVENOUS; SUBCUTANEOUS at 10:00

## 2022-10-18 RX ADMIN — SODIUM CHLORIDE 750 MG: 900 INJECTION, SOLUTION INTRAVENOUS at 10:33

## 2022-10-18 RX ADMIN — SODIUM CHLORIDE, PRESERVATIVE FREE 10 ML: 5 INJECTION INTRAVENOUS at 06:00

## 2022-10-18 RX ADMIN — SODIUM CHLORIDE 750 MG: 900 INJECTION, SOLUTION INTRAVENOUS at 00:29

## 2022-10-18 RX ADMIN — SODIUM CHLORIDE, PRESERVATIVE FREE 10 ML: 5 INJECTION INTRAVENOUS at 13:02

## 2022-10-18 RX ADMIN — POTASSIUM CHLORIDE AND DEXTROSE MONOHYDRATE: 150; 5 INJECTION, SOLUTION INTRAVENOUS at 00:30

## 2022-10-18 RX ADMIN — SODIUM CHLORIDE, PRESERVATIVE FREE 20 MG: 5 INJECTION INTRAVENOUS at 00:30

## 2022-10-18 RX ADMIN — SODIUM CHLORIDE, PRESERVATIVE FREE 10 ML: 5 INJECTION INTRAVENOUS at 00:31

## 2022-10-18 RX ADMIN — METOPROLOL TARTRATE 25 MG: 25 TABLET, FILM COATED ORAL at 17:52

## 2022-10-18 RX ADMIN — SODIUM CHLORIDE, PRESERVATIVE FREE 10 ML: 5 INJECTION INTRAVENOUS at 13:03

## 2022-10-18 RX ADMIN — METOPROLOL TARTRATE 25 MG: 25 TABLET, FILM COATED ORAL at 10:00

## 2022-10-18 RX ADMIN — THIAMINE HYDROCHLORIDE 100 MG: 100 INJECTION, SOLUTION INTRAMUSCULAR; INTRAVENOUS at 10:00

## 2022-10-18 NOTE — PROGRESS NOTES
Progress Note    Dilan Malone  79 y.o. Admit Date: 10/9/2022  Active Problems:    Hypertension (9/2/2012) POA: Unknown      Hypokalemia (6/22/2013) POA: Unknown      Aspiration pneumonia ([facility] (8/22/2017) POA: Unknown      Hyperosmolality and hypernatremia (3/30/2021) POA: Unknown      Dehydration (3/30/2021) POA: Yes      COVID-19 (3/30/2021) POA: Yes      Respiratory failure ([address] (10/9/2022) POA: Unknown      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[facility] (63/7/1347) POA: Unknown      Aspiration into airway (10/9/2022) POA: Unknown      Acute kidney injury ([facility] (10/10/2022) POA: Unknown      Seizure disorder ([facility] (10/12/2022) POA: Yes            Subjective:     Patient remained on his baseline mental status. No successful PICC line placement was tried in both arms. Still having problem of difficulty drawing labs. CBC lab was drawn but not BMP. Patient has issues with electrolytes and then lab has to be done. A comprehensive review of systems was negative except for that written in the History of Present Illness.     Objective:     Visit Vitals  BP (!) 153/100 (BP 1 Location: Left upper arm, BP Patient Position: At rest)   Pulse 93   Temp 99.2 °F (37.3 °C)   Resp 19   Ht 5' 10\" (1.778 m)   Wt 68.1 kg (150 lb 2.1 oz)   SpO2 100%   BMI 21.54 kg/m²         Intake/Output Summary (Last 24 hours) at 10/18/2022 1220  Last data filed at 10/18/2022 0459  Gross per 24 hour   Intake 1068.75 ml   Output 700 ml   Net 368.75 ml       Current Facility-Administered Medications   Medication Dose Route Frequency Provider Last Rate Last Admin    sodium chloride (NS) flush 5-40 mL  5-40 mL IntraVENous Q8H Laura Madrid MD   10 mL at 10/18/22 0600    sodium chloride (NS) flush 5-40 mL  5-40 mL IntraVENous PRN Laura Madrid MD        metoprolol tartrate (LOPRESSOR) tablet 25 mg  25 mg Oral BID Matthew Fontanez NP   25 mg at 10/18/22 1000    dextrose 5% with KCl 20 mEq/L infusion   IntraVENous CONTINUOUS Mónica Patel MD 75 mL/hr at 10/18/22 0030 New Bag at 10/18/22 0030    sodium chloride (NS) flush 5-40 mL  5-40 mL IntraVENous Q8H Taj Gutierres MD   10 mL at 10/17/22 1413    sodium chloride (NS) flush 5-40 mL  5-40 mL IntraVENous PRN Taj Gutierres MD        albuterol-ipratropium (DUO-NEB) 2.5 MG-0.5 MG/3 ML  3 mL Nebulization Q4H PRN Alphonse Litten, NP        insulin lispro (HUMALOG) injection   SubCUTAneous Q6H Shraddha MURILLO, NP   2 Units at 10/10/22 2309    glucose chewable tablet 16 g  4 Tablet Oral PRN Alphonse Litten, NP        glucagon (GLUCAGEN) injection 1 mg  1 mg IntraMUSCular PRN Shraddha MURILLO, NP        dextrose 10% infusion 0-250 mL  0-250 mL IntraVENous PRN Shraddha MURILLO, NP        hydrALAZINE (APRESOLINE) 20 mg/mL injection 10 mg  10 mg IntraVENous Q6H PRN Shraddha MURILLO, NP        acetaminophen (TYLENOL) tablet 650 mg  650 mg Oral Q6H PRN Alphonse Litten, NP   650 mg at 10/12/22 2138    Or    acetaminophen (TYLENOL) suppository 650 mg  650 mg Rectal Q6H PRN Alphonse Litten, NP        polyethylene glycol (MIRALAX) packet 17 g  17 g Oral DAILY PRN Shraddha MURILLO, NP        ondansetron (ZOFRAN ODT) tablet 4 mg  4 mg Oral Q8H PRN Shraddha MURILLO NP        Or    ondansetron (ZOFRAN) injection 4 mg  4 mg IntraVENous Q6H PRN Shraddha MURILLO, NP        heparin (porcine) injection 5,000 Units  5,000 Units SubCUTAneous Q8H Laura Owens, NP   5,000 Units at 10/18/22 1000    levETIRAcetam (KEPPRA) 750 mg in 0.9% sodium chloride 100 mL IVPB  750 mg IntraVENous Q12H Laura Owens,  mL/hr at 10/18/22 1033 750 mg at 10/51/41 6529    folic acid (FOLVITE) 1 mg in 0.9% sodium chloride 50 mL ivpb   IntraVENous DAILY Shraddha MURILLO,  mL/hr at 10/18/22 1000 New Bag at 10/18/22 1000    thiamine (B-1) 100 mg in 0.9% sodium chloride 50 mL IVPB  100 mg IntraVENous DAILY Shraddha MURILLO  mL/hr at 10/18/22 1000 100 mg at 10/18/22 1000    famotidine (PF) (PEPCID) 20 mg in 0.9% sodium chloride 10 mL injection  20 mg IntraVENous Q12H [place] Mad C, NP   20 mg at 10/18/22 1000                  Data Review:    CBC w/Diff    Recent Labs     10/18/22  0046 10/16/22  1000   WBC 14.2* 9.1   RBC 3.35* 3.44*   HGB 8.5* 8.6*   HCT 27.3* 27.7*   MCV 81.5 80.5   MCH 25.4 25.0   MCHC 31.1 31.0   RDW 15.2* 15.1*    Recent Labs     10/18/22  0046 10/16/22  1000   MONOS 5 6   EOS 2 2   BASOS 0 0   RDW 15.2* 15.1*        Comprehensive Metabolic Profile    Recent Labs     10/16/22  1000   *   K 2.9*   *   CO2 27   BUN 3*   CREA 0.48*    Recent Labs     10/16/22  1000   CA 8.1*                        Impression:       Active Hospital Problems    Diagnosis Date Noted    Seizure disorder ([facility] 10/12/2022    Acute kidney injury ([facility] 10/10/2022    Respiratory failure ([address] 10/14/8433    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[facility] 07/05/4065    Aspiration into airway 10/09/2022    Hyperosmolality and hypernatremia 03/30/2021    COVID-19 03/30/2021    Dehydration 03/30/2021    Aspiration pneumonia ([facility] 08/22/2017    Hypokalemia 06/22/2013    Hypertension 09/02/2012            Plan: Will request phlebotomist to try to draw the BMP today and tomorrow.       Ezra Carter MD

## 2022-10-18 NOTE — PROGRESS NOTES
Problem: Pressure Injury - Risk of  Goal: *Prevention of pressure injury  Description: Document Brian Scale and appropriate interventions in the flowsheet. Outcome: Progressing Towards Goal  Note: Pressure Injury Interventions:  Sensory Interventions: Assess changes in LOC, Float heels, Keep linens dry and wrinkle-free, Maintain/enhance activity level, Turn and reposition approx. every two hours (pillows and wedges if needed)    Moisture Interventions: Absorbent underpads, Apply protective barrier, creams and emollients, Check for incontinence Q2 hours and as needed, Internal/External urinary devices, Limit adult briefs, Maintain skin hydration (lotion/cream), Minimize layers, Moisture barrier    Activity Interventions: Assess need for specialty bed, Increase time out of bed, Pressure redistribution bed/mattress(bed type), PT/OT evaluation    Mobility Interventions: HOB 30 degrees or less, Pressure redistribution bed/mattress (bed type), PT/OT evaluation, Suspension boots, Turn and reposition approx. every two hours(pillow and wedges)    Nutrition Interventions: Document food/fluid/supplement intake, Offer support with meals,snacks and hydration, Discuss nutritional consult with provider    Friction and Shear Interventions: Apply protective barrier, creams and emollients, Feet elevated on foot rest, HOB 30 degrees or less, Minimize layers                Problem: Patient Education: Go to Patient Education Activity  Goal: Patient/Family Education  Outcome: Progressing Towards Goal     Problem: Risk for Spread of Infection  Goal: Prevent transmission of infectious organism to others  Description: Prevent the transmission of infectious organisms to other patients, staff members, and visitors.   Outcome: Progressing Towards Goal     Problem: Patient Education:  Go to Education Activity  Goal: Patient/Family Education  Outcome: Progressing Towards Goal     Problem: Patient Education: Go to Patient Education Activity  Goal: Patient/Family Education  Outcome: Progressing Towards Goal     Problem: Nutrition Deficit  Goal: *Optimize nutritional status  Outcome: Progressing Towards Goal     Problem: Falls - Risk of  Goal: *Absence of Falls  Description: Document Ana Bartlett Fall Risk and appropriate interventions in the flowsheet. Outcome: Progressing Towards Goal  Note: Fall Risk Interventions:  Mobility Interventions: Bed/chair exit alarm, Communicate number of staff needed for ambulation/transfer, OT consult for ADLs    Mentation Interventions: Adequate sleep, hydration, pain control, Bed/chair exit alarm, More frequent rounding, Room close to nurse's station    Medication Interventions: Assess postural VS orthostatic hypotension, Bed/chair exit alarm, Patient to call before getting OOB, Teach patient to arise slowly    Elimination Interventions: Bed/chair exit alarm, Call light in reach, Toileting schedule/hourly rounds              Problem: Patient Education: Go to Patient Education Activity  Goal: Patient/Family Education  Outcome: Progressing Towards Goal     Problem: Pain  Goal: *Control of Pain  Outcome: Progressing Towards Goal  Goal: *PALLIATIVE CARE:  Alleviation of Pain  Outcome: Progressing Towards Goal     Problem: Patient Education: Go to Patient Education Activity  Goal: Patient/Family Education  Outcome: Progressing Towards Goal     Problem: Patient Education: Go to Patient Education Activity  Goal: Patient/Family Education  Outcome: Progressing Towards Goal     Problem: Risk for Spread of Infection  Goal: Prevent transmission of infectious organism to others  Description: Prevent the transmission of infectious organisms to other patients, staff members, and visitors.   Outcome: Progressing Towards Goal     Problem: Nutrition Deficit  Goal: *Optimize nutritional status  Outcome: Progressing Towards Goal     Problem: Falls - Risk of  Goal: *Absence of Falls  Description: Document Abena Fall Risk and appropriate interventions in the flowsheet.   Outcome: Progressing Towards Goal  Note: Fall Risk Interventions:  Mobility Interventions: Bed/chair exit alarm, Communicate number of staff needed for ambulation/transfer, OT consult for ADLs    Mentation Interventions: Adequate sleep, hydration, pain control, Bed/chair exit alarm, More frequent rounding, Room close to nurse's station    Medication Interventions: Assess postural VS orthostatic hypotension, Bed/chair exit alarm, Patient to call before getting OOB, Teach patient to arise slowly    Elimination Interventions: Bed/chair exit alarm, Call light in reach, Toileting schedule/hourly rounds              Problem: Patient Education: Go to Patient Education Activity  Goal: Patient/Family Education  Outcome: Progressing Towards Goal     Problem: Pain  Goal: *Control of Pain  Outcome: Progressing Towards Goal     Problem: Pain  Goal: *PALLIATIVE CARE:  Alleviation of Pain  Outcome: Progressing Towards Goal     Problem: Patient Education: Go to Patient Education Activity  Goal: Patient/Family Education  Outcome: Progressing Towards Goal

## 2022-10-18 NOTE — CONSULTS
Interventional Radiology    IR consult received to see if any there is an earlier availability for gastrostomy placement than currently planned PEG 10/20. We do not have any earlier availability due to a full outpatient schedule until Friday. Thank you for the consult, but please keep the patient on the GI ENDO schedule. We will remain available as needed.     [place], [address]

## 2022-10-18 NOTE — PROGRESS NOTES
[facility]    Patient: Anu Lady Age: 79 y.o. : 1955 MR#: [id number] SSN: [id number]  Date: 10/18/2022     Subjective/24-hour events:     Unchanged clinically. Assessment:   Aspiration pneumonia  Sepsis, POA, secondary to above  PARAMJIT  Dysphagia  Hypokalemia  Hypertension  Seizure disorder  COVID-19 infection  Hyponatremia  Dementia  Alcohol abuse history    Plan:   Stable off of antibiotics, continue to monitor. PEG tube placement per GI.  IR unable to place tube any sooner than Thursday. Have discussed case with GI [facility] by phone this afternoon and they will work on getting patient on the endoscopy schedule as soon as possible. Continue tube feeds as ordered in interim. Continue wound care per current orders. Volume/electrolytes per nephrology. Anticipate discharge back to long-term care facility once PEG tube in place and pending overall clinical stability. Therapy services: No new therapy recommendations. Equipment: None. Anticipated disposition: Return to long-term care. Case discussed with:  []Patient  []Family  [x]Nursing  [x]Case Management  DVT Prophylaxis:  []Lovenox  [x]Hep SQ  []SCDs  []Coumadin   []On Heparin gtt    Objective:   VS: Visit Vitals  BP (!) 162/88 (BP 1 Location: Left upper arm, BP Patient Position: At rest)   Pulse 82   Temp 99.3 °F (37.4 °C)   Resp 19   Ht 5' 10\" (1.778 m)   Wt 68.1 kg (150 lb 2.1 oz)   SpO2 100%   BMI 21.54 kg/m²      Tmax/24hrs: Temp (24hrs), Av.9 °F (37.2 °C), Min:98.4 °F (36.9 °C), Max:99.3 °F (37.4 °C)    Intake/Output Summary (Last 24 hours) at 10/18/2022 1251  Last data filed at 10/18/2022 0459  Gross per 24 hour   Intake 1068.75 ml   Output 700 ml   Net 368.75 ml       General:  In NAD. Nontoxic-appearing. NGT in place. Cardiovascular:  RRR. Pulmonary: Effort nonlabored. GI:  Abdomen soft. Extremities:  Warm, no edema or ischemia.   Neuro: Awake and alert, moves extremities spontaneously. Labs:    Recent Results (from the past 24 hour(s))   GLUCOSE, POC    Collection Time: 10/17/22  5:52 PM   Result Value Ref Range    Glucose (POC) 99 70 - 110 mg/dL   GLUCOSE, POC    Collection Time: 10/18/22 12:24 AM   Result Value Ref Range    Glucose (POC) 102 70 - 110 mg/dL   CBC WITH AUTOMATED DIFF    Collection Time: 10/18/22 12:46 AM   Result Value Ref Range    WBC 14.2 (H) 4.6 - 13.2 K/uL    RBC 3.35 (L) 4.35 - 5.65 M/uL    HGB 8.5 (L) 13.0 - 16.0 g/dL    HCT 27.3 (L) 36.0 - 48.0 %    MCV 81.5 78.0 - 100.0 FL    MCH 25.4 24.0 - 34.0 PG    MCHC 31.1 31.0 - 37.0 g/dL    RDW 15.2 (H) 11.6 - 14.5 %    PLATELET 742 784 - 422 K/uL    MPV 11.0 9.2 - 11.8 FL    NRBC 0.0 0  WBC    ABSOLUTE NRBC 0.00 0.00 - 0.01 K/uL    NEUTROPHILS 78 (H) 40 - 73 %    LYMPHOCYTES 14 (L) 21 - 52 %    MONOCYTES 5 3 - 10 %    EOSINOPHILS 2 0 - 5 %    BASOPHILS 0 0 - 2 %    IMMATURE GRANULOCYTES 1 (H) 0.0 - 0.5 %    ABS. NEUTROPHILS 11.0 (H) 1.8 - 8.0 K/UL    ABS. LYMPHOCYTES 1.9 0.9 - 3.6 K/UL    ABS. MONOCYTES 0.7 0.05 - 1.2 K/UL    ABS. EOSINOPHILS 0.3 0.0 - 0.4 K/UL    ABS. BASOPHILS 0.0 0.0 - 0.1 K/UL    ABS. IMM.  GRANS. 0.1 (H) 0.00 - 0.04 K/UL    DF AUTOMATED     MAGNESIUM    Collection Time: 10/18/22 12:46 AM   Result Value Ref Range    Magnesium 2.0 1.6 - 2.6 mg/dL   GLUCOSE, POC    Collection Time: 10/18/22  6:16 AM   Result Value Ref Range    Glucose (POC) 88 70 - 110 mg/dL   GLUCOSE, POC    Collection Time: 10/18/22 12:22 PM   Result Value Ref Range    Glucose (POC) 88 70 - 110 mg/dL       Signed By: Zay Prather MD     October 18, 2022

## 2022-10-18 NOTE — PROGRESS NOTES
Entered patients room on rounds, NGT to rt nare at 20cm, tube feeding turned off, NGT removed and on call MD paged.

## 2022-10-18 NOTE — PROGRESS NOTES
0715-  Bedside and Verbal shift change report given to Shari Sandoval RN (oncoming nurse) by Destinee Liu RN (offgoing nurse). Report included the following information SBAR, Kardex, Intake/Output, MAR, and Recent Results. 1915-  Bedside and Verbal shift change report given to Destinee Liu RN (oncoming nurse) by Shari Sandoval RN (offgoing nurse). Report included the following information SBAR, Kardex, Intake/Output, MAR, and Recent Results.          Wound Prevention Checklist    Patient: Veronika Sorto (46 y.o. male)  Date: 10/18/2022  Diagnosis: Respiratory failure ([facility] [J96.90]  Dementia, alcoholic ([facility] [facility] [N83.85]  Aspiration into airway [T17.908A] <principal problem not specified>    Precautions: Fall, Skin, Seizure, Aspiration       [x]  Heel prevention boots placed on patient    [x]  Patient turned q2h during shift    [x]  Lift team ordered    [x]  Patient on [place] bed/Specialty bed    [x]  Each Wound is documented during shift (Stage, Color, drainage, odor, measurements, and dressings)    [x]  Dual skin check done with Eulalio Barrett RN

## 2022-10-18 NOTE — PROGRESS NOTES
Chart reviewed. Patient not medically stable for discharge. LTC resident 68 Carissa Tierney.  will continue to monitor and assist with transition of care needs.     KATHRIN Smith, RN  Care Management

## 2022-10-18 NOTE — PROGRESS NOTES
INTERIM UPDATE - 0109 EST on 10/18/2022    Nursing Staff reports that Patient seems to have aspirated due to coughing and gagging. The NG Tube was supposed to be at 62 cm and was at 20 cm for more than one hour. Patient was noted to have Tube Feeds running at that time. NG Tube was reportedly visualized to terminate in back of Patient's mouth. Tube Feeds are now stopped. Patient has no additional oxygen requirement, and is still requiring 3 L/min O2 via NC. Plan:  STAT CXR to evaluated for Aspiration Pneumonia/Pneumonitis, which would not show pneumonitis due to recentness of event. Nursing Staff was advised to attempt to place another NG Tube and to order a KUB if NT Tube seems to be adequately placed.

## 2022-10-19 LAB
ANION GAP SERPL CALC-SCNC: 4 MMOL/L (ref 3–18)
BASOPHILS # BLD: 0 K/UL (ref 0–0.1)
BASOPHILS NFR BLD: 0 % (ref 0–2)
BUN SERPL-MCNC: 3 MG/DL (ref 7–18)
BUN/CREAT SERPL: 9 (ref 12–20)
CALCIUM SERPL-MCNC: 8.6 MG/DL (ref 8.5–10.1)
CHLORIDE SERPL-SCNC: 112 MMOL/L (ref 100–111)
CO2 SERPL-SCNC: 29 MMOL/L (ref 21–32)
CREAT SERPL-MCNC: 0.32 MG/DL (ref 0.6–1.3)
DIFFERENTIAL METHOD BLD: ABNORMAL
EOSINOPHIL # BLD: 0.2 K/UL (ref 0–0.4)
EOSINOPHIL NFR BLD: 2 % (ref 0–5)
ERYTHROCYTE [DISTWIDTH] IN BLOOD BY AUTOMATED COUNT: 15.5 % (ref 11.6–14.5)
GLUCOSE BLD STRIP.AUTO-MCNC: 105 MG/DL (ref 70–110)
GLUCOSE BLD STRIP.AUTO-MCNC: 108 MG/DL (ref 70–110)
GLUCOSE BLD STRIP.AUTO-MCNC: 83 MG/DL (ref 70–110)
GLUCOSE BLD STRIP.AUTO-MCNC: 89 MG/DL (ref 70–110)
GLUCOSE BLD STRIP.AUTO-MCNC: 96 MG/DL (ref 70–110)
GLUCOSE SERPL-MCNC: 100 MG/DL (ref 74–99)
HCT VFR BLD AUTO: 30.1 % (ref 36–48)
HGB BLD-MCNC: 9.4 G/DL (ref 13–16)
IMM GRANULOCYTES # BLD AUTO: 0.1 K/UL (ref 0–0.04)
IMM GRANULOCYTES NFR BLD AUTO: 1 % (ref 0–0.5)
LYMPHOCYTES # BLD: 1.2 K/UL (ref 0.9–3.6)
LYMPHOCYTES NFR BLD: 9 % (ref 21–52)
MAGNESIUM SERPL-MCNC: 2.1 MG/DL (ref 1.6–2.6)
MCH RBC QN AUTO: 25.1 PG (ref 24–34)
MCHC RBC AUTO-ENTMCNC: 31.2 G/DL (ref 31–37)
MCV RBC AUTO: 80.5 FL (ref 78–100)
MONOCYTES # BLD: 0.6 K/UL (ref 0.05–1.2)
MONOCYTES NFR BLD: 4 % (ref 3–10)
NEUTS SEG # BLD: 11.3 K/UL (ref 1.8–8)
NEUTS SEG NFR BLD: 84 % (ref 40–73)
NRBC # BLD: 0 K/UL (ref 0–0.01)
NRBC BLD-RTO: 0 PER 100 WBC
PLATELET # BLD AUTO: 365 K/UL (ref 135–420)
PMV BLD AUTO: 11.2 FL (ref 9.2–11.8)
POTASSIUM SERPL-SCNC: 3.3 MMOL/L (ref 3.5–5.5)
RBC # BLD AUTO: 3.74 M/UL (ref 4.35–5.65)
SODIUM SERPL-SCNC: 145 MMOL/L (ref 136–145)
WBC # BLD AUTO: 13.5 K/UL (ref 4.6–13.2)

## 2022-10-19 PROCEDURE — 82962 GLUCOSE BLOOD TEST: CPT

## 2022-10-19 PROCEDURE — 65660000004 HC RM CVT STEPDOWN

## 2022-10-19 PROCEDURE — 74011250637 HC RX REV CODE- 250/637

## 2022-10-19 PROCEDURE — 74011000250 HC RX REV CODE- 250: Performed by: FAMILY MEDICINE

## 2022-10-19 PROCEDURE — 80048 BASIC METABOLIC PNL TOTAL CA: CPT

## 2022-10-19 PROCEDURE — 74011250636 HC RX REV CODE- 250/636: Performed by: NURSE PRACTITIONER

## 2022-10-19 PROCEDURE — 74011000258 HC RX REV CODE- 258: Performed by: NURSE PRACTITIONER

## 2022-10-19 PROCEDURE — 74011000250 HC RX REV CODE- 250: Performed by: NURSE PRACTITIONER

## 2022-10-19 PROCEDURE — 74011000250 HC RX REV CODE- 250: Performed by: INTERNAL MEDICINE

## 2022-10-19 PROCEDURE — 85025 COMPLETE CBC W/AUTO DIFF WBC: CPT

## 2022-10-19 PROCEDURE — 74011250636 HC RX REV CODE- 250/636: Performed by: INTERNAL MEDICINE

## 2022-10-19 PROCEDURE — 99232 SBSQ HOSP IP/OBS MODERATE 35: CPT | Performed by: FAMILY MEDICINE

## 2022-10-19 PROCEDURE — 83735 ASSAY OF MAGNESIUM: CPT

## 2022-10-19 PROCEDURE — 94762 N-INVAS EAR/PLS OXIMTRY CONT: CPT

## 2022-10-19 PROCEDURE — 77010033678 HC OXYGEN DAILY

## 2022-10-19 RX ADMIN — SODIUM CHLORIDE 750 MG: 900 INJECTION, SOLUTION INTRAVENOUS at 23:32

## 2022-10-19 RX ADMIN — SODIUM CHLORIDE, PRESERVATIVE FREE 10 ML: 5 INJECTION INTRAVENOUS at 05:50

## 2022-10-19 RX ADMIN — SODIUM CHLORIDE, PRESERVATIVE FREE 20 MG: 5 INJECTION INTRAVENOUS at 21:55

## 2022-10-19 RX ADMIN — HEPARIN SODIUM 5000 UNITS: 5000 INJECTION INTRAVENOUS; SUBCUTANEOUS at 01:00

## 2022-10-19 RX ADMIN — SODIUM CHLORIDE 750 MG: 900 INJECTION, SOLUTION INTRAVENOUS at 00:57

## 2022-10-19 RX ADMIN — SODIUM CHLORIDE, PRESERVATIVE FREE 10 ML: 5 INJECTION INTRAVENOUS at 14:16

## 2022-10-19 RX ADMIN — HEPARIN SODIUM 5000 UNITS: 5000 INJECTION INTRAVENOUS; SUBCUTANEOUS at 18:35

## 2022-10-19 RX ADMIN — SODIUM CHLORIDE, PRESERVATIVE FREE 20 MG: 5 INJECTION INTRAVENOUS at 09:26

## 2022-10-19 RX ADMIN — SODIUM CHLORIDE, PRESERVATIVE FREE 20 MG: 5 INJECTION INTRAVENOUS at 00:48

## 2022-10-19 RX ADMIN — METOPROLOL TARTRATE 25 MG: 25 TABLET, FILM COATED ORAL at 09:26

## 2022-10-19 RX ADMIN — HEPARIN SODIUM 5000 UNITS: 5000 INJECTION INTRAVENOUS; SUBCUTANEOUS at 09:26

## 2022-10-19 RX ADMIN — SODIUM CHLORIDE, PRESERVATIVE FREE 10 ML: 5 INJECTION INTRAVENOUS at 21:55

## 2022-10-19 RX ADMIN — METOPROLOL TARTRATE 25 MG: 25 TABLET, FILM COATED ORAL at 18:35

## 2022-10-19 RX ADMIN — HEPARIN SODIUM 5000 UNITS: 5000 INJECTION INTRAVENOUS; SUBCUTANEOUS at 23:33

## 2022-10-19 RX ADMIN — THIAMINE HYDROCHLORIDE 100 MG: 100 INJECTION, SOLUTION INTRAMUSCULAR; INTRAVENOUS at 09:26

## 2022-10-19 RX ADMIN — FOLIC ACID: 5 INJECTION, SOLUTION INTRAMUSCULAR; INTRAVENOUS; SUBCUTANEOUS at 09:26

## 2022-10-19 RX ADMIN — SODIUM CHLORIDE, PRESERVATIVE FREE 10 ML: 5 INJECTION INTRAVENOUS at 01:06

## 2022-10-19 RX ADMIN — POTASSIUM CHLORIDE AND DEXTROSE MONOHYDRATE: 150; 5 INJECTION, SOLUTION INTRAVENOUS at 09:26

## 2022-10-19 RX ADMIN — SODIUM CHLORIDE 750 MG: 900 INJECTION, SOLUTION INTRAVENOUS at 10:10

## 2022-10-19 NOTE — PROGRESS NOTES
Progress Note    Jai Dhillon  79 y.o. Admit Date: 10/9/2022  Active Problems:    Hypertension (9/2/2012) POA: Unknown      Hypokalemia (6/22/2013) POA: Unknown      Aspiration pneumonia ([facility] (8/22/2017) POA: Unknown      Hyperosmolality and hypernatremia (3/30/2021) POA: Unknown      Dehydration (3/30/2021) POA: Yes      COVID-19 (3/30/2021) POA: Yes      Respiratory failure ([address] (10/9/2022) POA: Unknown      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[facility] [address] (01/3/0460) POA: Unknown      Aspiration into airway (10/9/2022) POA: Unknown      Acute kidney injury ([facility] [address] (10/10/2022) POA: Unknown      Seizure disorder ([facility] [address] (10/12/2022) POA: Yes            Subjective:     Patient remained at his baseline mental status does not communicate. Finally lab was drawn. Getting free water through the NG tube. IV fluid bag is finished and adalimumab has to be started soon. A comprehensive review of systems was negative except for that written in the History of Present Illness.     Objective:     Visit Vitals  BP (!) 149/79 (BP 1 Location: Left upper arm, BP Patient Position: At rest)   Pulse 98   Temp 100 °F (37.8 °C)   Resp 20   Ht 5' 10\" (1.778 m)   Wt 68.1 kg (150 lb 2.1 oz)   SpO2 100%   BMI 21.54 kg/m²         Intake/Output Summary (Last 24 hours) at 10/19/2022 1122  Last data filed at 10/19/2022 0057  Gross per 24 hour   Intake 50 ml   Output 1100 ml   Net -1050 ml       Current Facility-Administered Medications   Medication Dose Route Frequency Provider Last Rate Last Admin    sodium chloride (NS) flush 5-40 mL  5-40 mL IntraVENous Q8H Marie Solitario MD   10 mL at 10/19/22 0550    sodium chloride (NS) flush 5-40 mL  5-40 mL IntraVENous PRN Marie Solitario MD        metoprolol tartrate (LOPRESSOR) tablet 25 mg  25 mg Oral BID Cookie Shook, NP   25 mg at 10/19/22 0926    dextrose 5% with KCl 20 mEq/L infusion   IntraVENous CONTINUOUS Leticia Waldrop MD 75 mL/hr at 10/19/22 0926 New Bag at 10/19/22 0926    sodium chloride (NS) flush 5-40 mL  5-40 mL IntraVENous Q8H Sarina Concepcion MD   10 mL at 10/18/22 1302    sodium chloride (NS) flush 5-40 mL  5-40 mL IntraVENous PRN Sarina Concepcion MD        albuterol-ipratropium (DUO-NEB) 2.5 MG-0.5 MG/3 ML  3 mL Nebulization Q4H PRN Ant Richardsoner, NP        insulin lispro (HUMALOG) injection   SubCUTAneous Q6H Mohsen Aw C, NP   2 Units at 10/10/22 2309    glucose chewable tablet 16 g  4 Tablet Oral PRN [place] Shutter, NP        glucagon (GLUCAGEN) injection 1 mg  1 mg IntraMUSCular PRN [place] Aw C, NP        dextrose 10% infusion 0-250 mL  0-250 mL IntraVENous PRN Mohsen Aw C, NP        hydrALAZINE (APRESOLINE) 20 mg/mL injection 10 mg  10 mg IntraVENous Q6H PRN Mohsen Aw C, NP        acetaminophen (TYLENOL) tablet 650 mg  650 mg Oral Q6H PRN [place] Shutter, NP   650 mg at 10/12/22 2138    Or    acetaminophen (TYLENOL) suppository 650 mg  650 mg Rectal Q6H PRN Ant Shutter, NP        polyethylene glycol (MIRALAX) packet 17 g  17 g Oral DAILY PRN Mohsen Aw C, NP        ondansetron (ZOFRAN ODT) tablet 4 mg  4 mg Oral Q8H PRN Mohsen Aw C, NP        Or    ondansetron (ZOFRAN) injection 4 mg  4 mg IntraVENous Q6H PRN Mohsen Aw C, NP        heparin (porcine) injection 5,000 Units  5,000 Units SubCUTAneous Q8H Laura Owens, NP   5,000 Units at 10/19/22 0926    levETIRAcetam (KEPPRA) 750 mg in 0.9% sodium chloride 100 mL IVPB  750 mg IntraVENous Q12H Laura Owens,  mL/hr at 10/19/22 1010 750 mg at 46/20/30 9489    folic acid (FOLVITE) 1 mg in 0.9% sodium chloride 50 mL ivpb   IntraVENous DAILY Mohsen Carter C,  mL/hr at 10/19/22 0926 New Bag at 10/19/22 0926    thiamine (B-1) 100 mg in 0.9% sodium chloride 50 mL IVPB  100 mg IntraVENous DAILY Mohsen MURILLO,  mL/hr at 10/19/22 0926 100 mg at 10/19/22 0926    famotidine (PF) (PEPCID) 20 mg in 0.9% sodium chloride 10 mL injection  20 mg IntraVENous Q12H Laura Owens, NP   20 mg at 10/19/22 1830        Physical Exam:     Physical Exam:   General:  Alert, cooperative, no distress, appears stated age. non communicative   Neck: Supple, symmetrical, trachea midline, no adenopathy, thyroid: no enlargement/tenderness/nodules, no carotid bruit and no JVD. Lungs:   Clear to auscultation bilaterally. Heart:  Regular rate and rhythm, S1, S2 normal, no murmur, click, rub or gallop. Abdomen:   Soft, non-tender. Bowel sounds normal. No mass   Extremities: Extremities normal, atraumatic, no cyanosis or edema                         Data Review:    CBC w/Diff    Recent Labs     10/19/22  0555 10/18/22  0046   WBC 13.5* 14.2*   RBC 3.74* 3.35*   HGB 9.4* 8.5*   HCT 30.1* 27.3*   MCV 80.5 81.5   MCH 25.1 25.4   MCHC 31.2 31.1   RDW 15.5* 15.2*    Recent Labs     10/19/22  0555 10/18/22  0046   MONOS 4 5   EOS 2 2   BASOS 0 0   RDW 15.5* 15.2*        Comprehensive Metabolic Profile    Recent Labs     10/19/22  0555 10/18/22  1308    147*   K 3.3* 3.5   * 114*   CO2 29 32   BUN 3* 2*   CREA 0.32* 0.28*    Recent Labs     10/19/22  0555 10/18/22  1308   CA 8.6 8.3*                        Impression:       Active Hospital Problems    Diagnosis Date Noted    Seizure disorder ([facility] 10/12/2022    Acute kidney injury ([facility] [address] 10/10/2022    Respiratory failure ([facility] [address] 10/41/8068    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[facility] [address] 02/15/7514    Aspiration into airway 10/09/2022    Hyperosmolality and hypernatremia 03/30/2021    COVID-19 03/30/2021    Dehydration 03/30/2021    Aspiration pneumonia ([facility] [address] 08/22/2017    Hypokalemia 06/22/2013    Hypertension 09/02/2012        Sodium also has improved almost close to the normal range. Needs to continue to replace potassium in the IV fluid. Plan:     Continue IV fluid free water and the potassium replacement continue NG feeding. May need to plan for transfer back to nursing home soon.       Donato Lawson MD

## 2022-10-19 NOTE — PROGRESS NOTES
[facility]    Patient: Jai Dhillon Age: 79 y.o. : 1955 MR#: [id number] SSN: [id number]  Date: 10/19/2022     Subjective/24-hour events:     Continues to remain stable clinically, no new issues with respiratory status. Assessment:   Aspiration pneumonia  Sepsis, POA, secondary to above  PARAMJIT  Dysphagia  Hypokalemia  Hypertension  Seizure disorder  COVID-19 infection  Hyponatremia  Dementia  Alcohol abuse history    Plan:   PEG placement tomorrow per GI if no new issues develop in the interim. Discussed plan with patient's son by phone and he is agreeable to proceed. Continue to monitor off of antibiotics. Wound care per current orders. Volume and electrolytes per nephrology. Stable in this regard, no further recommendations per discussion with Dr. June Smith today. Anticipate return to long-term care facility by Friday once tolerating tube feeds. Case discussed with GI and nephrology today. Therapy services: No new therapy recommendations. Equipment: None. Anticipated disposition: Return to long-term care. Case discussed with:  []Patient  []Family  [x]Nursing  [x]Case Management  DVT Prophylaxis:  []Lovenox  [x]Hep SQ  []SCDs  []Coumadin   []On Heparin gtt    Objective:   VS: Visit Vitals  /88 (BP 1 Location: Left upper arm, BP Patient Position: At rest)   Pulse 90   Temp 99.4 °F (37.4 °C)   Resp 18   Ht 5' 10\" (1.778 m)   Wt 68.1 kg (150 lb 2.1 oz)   SpO2 100%   BMI 21.54 kg/m²      Tmax/24hrs: Temp (24hrs), Av.7 °F (37.1 °C), Min:97.5 °F (36.4 °C), Max:100 °F (37.8 °C)    Intake/Output Summary (Last 24 hours) at 10/19/2022 1435  Last data filed at 10/19/2022 0057  Gross per 24 hour   Intake 50 ml   Output 1100 ml   Net -1050 ml       General:  In NAD. Nontoxic-appearing. NGT in place. Cardiovascular:  RRR. Pulmonary: Effort nonlabored. GI:  Abdomen soft. Extremities:  Warm, no edema or ischemia.   Neuro: Awake and alert, moves extremities spontaneously. Labs:    Recent Results (from the past 24 hour(s))   GLUCOSE, POC    Collection Time: 10/18/22  5:51 PM   Result Value Ref Range    Glucose (POC) 85 70 - 110 mg/dL   GLUCOSE, POC    Collection Time: 10/19/22  1:03 AM   Result Value Ref Range    Glucose (POC) 108 70 - 110 mg/dL   GLUCOSE, POC    Collection Time: 10/19/22  5:12 AM   Result Value Ref Range    Glucose (POC) 83 70 - 110 mg/dL   CBC WITH AUTOMATED DIFF    Collection Time: 10/19/22  5:55 AM   Result Value Ref Range    WBC 13.5 (H) 4.6 - 13.2 K/uL    RBC 3.74 (L) 4.35 - 5.65 M/uL    HGB 9.4 (L) 13.0 - 16.0 g/dL    HCT 30.1 (L) 36.0 - 48.0 %    MCV 80.5 78.0 - 100.0 FL    MCH 25.1 24.0 - 34.0 PG    MCHC 31.2 31.0 - 37.0 g/dL    RDW 15.5 (H) 11.6 - 14.5 %    PLATELET 912 958 - 944 K/uL    MPV 11.2 9.2 - 11.8 FL    NRBC 0.0 0  WBC    ABSOLUTE NRBC 0.00 0.00 - 0.01 K/uL    NEUTROPHILS 84 (H) 40 - 73 %    LYMPHOCYTES 9 (L) 21 - 52 %    MONOCYTES 4 3 - 10 %    EOSINOPHILS 2 0 - 5 %    BASOPHILS 0 0 - 2 %    IMMATURE GRANULOCYTES 1 (H) 0.0 - 0.5 %    ABS. NEUTROPHILS 11.3 (H) 1.8 - 8.0 K/UL    ABS. LYMPHOCYTES 1.2 0.9 - 3.6 K/UL    ABS. MONOCYTES 0.6 0.05 - 1.2 K/UL    ABS. EOSINOPHILS 0.2 0.0 - 0.4 K/UL    ABS. BASOPHILS 0.0 0.0 - 0.1 K/UL    ABS. IMM.  GRANS. 0.1 (H) 0.00 - 0.04 K/UL    DF AUTOMATED     MAGNESIUM    Collection Time: 10/19/22  5:55 AM   Result Value Ref Range    Magnesium 2.1 1.6 - 2.6 mg/dL   METABOLIC PANEL, BASIC    Collection Time: 10/19/22  5:55 AM   Result Value Ref Range    Sodium 145 136 - 145 mmol/L    Potassium 3.3 (L) 3.5 - 5.5 mmol/L    Chloride 112 (H) 100 - 111 mmol/L    CO2 29 21 - 32 mmol/L    Anion gap 4 3.0 - 18 mmol/L    Glucose 100 (H) 74 - 99 mg/dL    BUN 3 (L) 7.0 - 18 MG/DL    Creatinine 0.32 (L) 0.6 - 1.3 MG/DL    BUN/Creatinine ratio 9 (L) 12 - 20      eGFR >60 >60 ml/min/1.73m2    Calcium 8.6 8.5 - 10.1 MG/DL   GLUCOSE, POC    Collection Time: 10/19/22 12:04 PM   Result Value Ref Range    Glucose (POC) 105 70 - 110 mg/dL       Signed By: Zay Prather MD     October 19, 2022

## 2022-10-19 NOTE — PROGRESS NOTES
[email or web address]  [phone]    Gastroenterology follow up-Progress note    Impression:  1. Aspiration pneumonia  2. Oropharyngeal dysphagia - receiving NGT feeds  3. COVID Positive (10/10), also positive for enterovirus and rhinovirus  - anticipate droplet plus contact precautions to be d/c tomorrow  - now off antibiotics  4. Dementia  5. Hx ETOH abuse  6. Seizure disorder  7. Hx prior PEG 2013 in setting of CVA  8. Hx CVA  9. EGD/colonoscopy 2021 - small sliding hiatal hernia, otherwise normal; diverticulosis, poor prep, hemorrhoids    Plan:  1.  Plan for PEG this week pending droplet plus contact precautions d/c    Chief Complaint: aspiration pneumonia, dysphagia, need for PEG      Subjective:  stable on tube feeds via NGT        Eyes: conjunctiva normal, EOM normal   Neck: ROM normal   Cardiovascular: heart normal, normal rate and regular rhythm   Pulmonary/Chest Wall: effort normal   Abdominal: appearance normal, soft, non-acute, non-tender     Patient Active Problem List   Diagnosis Code    Intractable hiccups R06.6    Hypertension I10    Hypokalemia E87.6    First degree AV block I44.0    Former heavy tobacco smoker Z87.891    Severe protein-calorie malnutrition (HCC) E43    Psychogenic polydipsia R63.1, F54    Hyponatremia E87.1    Cellulitis L03.90    Right foot infection L08.9    Bilateral leg and foot pain M79.604, M79.605, M79.671, M79.672    Aspiration pneumonia (HCC) J69.0    Stroke ([facility]) I63.9    Hyperosmolality and hypernatremia T40.5    Acute metabolic encephalopathy A19.50    Dehydration E86.0    COVID-19 U07.1    Hematuria R31.9    Dysuria R30.0    Goals of care, counseling/discussion Z71.89    Debility R53.81    Respiratory failure (HCC) G46.34    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    Aspiration into airway T17.908A    Acute kidney injury (HCC) N17.9    Seizure disorder (HCC) G40.909         Visit Vitals  BP (!) 149/79 (BP 1 Location: Left upper arm, BP Patient Position: At rest)   Pulse 98 Temp 100 °F (37.8 °C)   Resp 20   Ht 5' 10\" (1.778 m)   Wt 68.1 kg (150 lb 2.1 oz)   SpO2 100%   BMI 21.54 kg/m²           Intake/Output Summary (Last 24 hours) at 10/19/2022 1151  Last data filed at 10/19/2022 0057  Gross per 24 hour   Intake 50 ml   Output 1100 ml   Net -1050 ml       CBC w/Diff    Lab Results   Component Value Date/Time    WBC 13.5 (H) 10/19/2022 05:55 AM    RBC 3.74 (L) 10/19/2022 05:55 AM    HGB 9.4 (L) 10/19/2022 05:55 AM    HCT 30.1 (L) 10/19/2022 05:55 AM    MCV 80.5 10/19/2022 05:55 AM    MCH 25.1 10/19/2022 05:55 AM    MCHC 31.2 10/19/2022 05:55 AM    RDW 15.5 (H) 10/19/2022 05:55 AM     10/19/2022 05:55 AM    Lab Results   Component Value Date/Time    GRANS 84 (H) 10/19/2022 05:55 AM    LYMPH 9 (L) 10/19/2022 05:55 AM    EOS 2 10/19/2022 05:55 AM    BANDS 6 (H) 10/11/2022 06:10 AM    BASOS 0 10/19/2022 05:55 AM      Basic Metabolic Profile   Recent Labs     10/19/22  0555      K 3.3*   *   CO2 29   BUN 3*   CA 8.6   MG 2.1        Hepatic Function    Lab Results   Component Value Date/Time    ALB 2.7 (L) 10/09/2022 06:20 PM    ALB 2.8 (L) 10/09/2022 06:20 PM    TP 8.4 (H) 10/09/2022 06:20 PM    TP 8.5 (H) 10/09/2022 06:20 PM    AP 97 10/09/2022 06:20 PM    AP 93 10/09/2022 06:20 PM    No results found for: TBIL       Coags   No results for input(s): PTP, INR, APTT, INREXT in the last 72 hours. Anton Schultz NP    [facility]. [email or web address]  Phone: [phone]  Pager: [phone]

## 2022-10-19 NOTE — PROGRESS NOTES
Updated Amanda Preston at [facility], plan for PEG TUBE placement on Thursday. Possible discharge Friday or Saturday back to LTC at [address] [place] [address].  will continue to monitor and assist with transition of care needs.     LOLIS PratherN, RN  Care Management

## 2022-10-19 NOTE — PROGRESS NOTES
Problem: Pressure Injury - Risk of  Goal: *Prevention of pressure injury  Description: Document Brian Scale and appropriate interventions in the flowsheet. Outcome: Progressing Towards Goal  Note: Pressure Injury Interventions:  Sensory Interventions: Assess changes in LOC, Check visual cues for pain, Keep linens dry and wrinkle-free, Turn and reposition approx. every two hours (pillows and wedges if needed)    Moisture Interventions: Absorbent underpads, Internal/External urinary devices    Activity Interventions: Assess need for specialty bed, Pressure redistribution bed/mattress(bed type)    Mobility Interventions: HOB 30 degrees or less, Pressure redistribution bed/mattress (bed type), Turn and reposition approx. every two hours(pillow and wedges)    Nutrition Interventions: Document food/fluid/supplement intake    Friction and Shear Interventions: Apply protective barrier, creams and emollients, Foam dressings/transparent film/skin sealants, HOB 30 degrees or less, Lift sheet                Problem: Patient Education: Go to Patient Education Activity  Goal: Patient/Family Education  Outcome: Progressing Towards Goal     Problem: Risk for Spread of Infection  Goal: Prevent transmission of infectious organism to others  Description: Prevent the transmission of infectious organisms to other patients, staff members, and visitors. Outcome: Progressing Towards Goal     Problem: Patient Education:  Go to Education Activity  Goal: Patient/Family Education  Outcome: Progressing Towards Goal     Problem: Patient Education: Go to Patient Education Activity  Goal: Patient/Family Education  Outcome: Progressing Towards Goal     Problem: Nutrition Deficit  Goal: *Optimize nutritional status  Outcome: Progressing Towards Goal     Problem: Falls - Risk of  Goal: *Absence of Falls  Description: Document Darlen De Witt Fall Risk and appropriate interventions in the flowsheet.   Outcome: Progressing Towards Goal  Note: Fall Risk Interventions:  Mobility Interventions: Bed/chair exit alarm    Mentation Interventions: Adequate sleep, hydration, pain control, Bed/chair exit alarm    Medication Interventions: Bed/chair exit alarm    Elimination Interventions: Bed/chair exit alarm, Call light in reach              Problem: Patient Education: Go to Patient Education Activity  Goal: Patient/Family Education  Outcome: Progressing Towards Goal     Problem: Pain  Goal: *Control of Pain  Outcome: Progressing Towards Goal  Goal: *PALLIATIVE CARE:  Alleviation of Pain  Outcome: Progressing Towards Goal     Problem: Patient Education: Go to Patient Education Activity  Goal: Patient/Family Education  Outcome: Progressing Towards Goal     Problem: Patient Education: Go to Patient Education Activity  Goal: Patient/Family Education  Outcome: Progressing Towards Goal     Problem: Patient Education: Go to Patient Education Activity  Goal: Patient/Family Education  Outcome: Progressing Towards Goal     Problem: Patient Education: Go to Patient Education Activity  Goal: Patient/Family Education  Outcome: Progressing Towards Goal

## 2022-10-19 NOTE — PROGRESS NOTES
Comprehensive Nutrition Assessment    Type and Reason for Visit: Reassess, Consult, NPO/clear liquid    Nutrition Recommendations/Plan:   Continue tube feeding regimen:   Formula: Jevity 1.5  Goal Rate: 60 mL/hr   Water Flushes: 50 mL q 1 hours  Goal Regimen Provides: 2160 kcal, 119 g of protein, 1093 mL of free water, 2293 mL of TOTAL WATER, 100% RDIs  Continue IVF per MD   Initiate aspiration precautions   Monitor tolerance of EN feeding, readiness to advance to goal rate, weight, labs, and plan of care during admission. Malnutrition Assessment:  Malnutrition Status: At risk for malnutrition (specify) (dysphagia at baseline, BMI, COVID) (10/10/22 1114)    Context:  Acute illness       Nutrition Assessment:    Pt remains NPO and non verbal-COVID-19 +. Pt tolerating TF at goal rate providing 2160 kcal, 119 g of protein, 1093mL of free water, 100% RDIs. Pt continues to receive D5 at 75mL/hour providing (90 gm dextrose, 306 kcal per day). Current lab shows low Potassium (3.3), BUN (3) and Creatinine (0.32). Pt with PEG placement scheduled for tomorrow 10/19 per MD note. Discharge pending to LTC. Nutrition Related Findings:    Last BM 10/18-loose. Output: 200mL (urine-catheter). Pertinent Medications: metoprolol, D5 at 75mL/hour, humalog, miralax, zofran, keppra, folic acid, thiamine, pepcid. Wound Type: None       Current Nutrition Intake & Therapies:  Average Meal Intake: NPO  Average Supplement Intake: NPO  DIET NPO  ADULT TUBE FEEDING Nasogastric; Standard with Fiber; Delivery Method: Continuous; Continuous Initial Rate (mL/hr): 10; Continuous Advance Tube Feeding: Yes; Advancement Volume (mL/hr): 10; Advancement Frequency: Q 24 hours; Continuous Goal Rate (m. .. DIET NPO    Anthropometric Measures:  Height: 5' 10\" (177.8 cm)  Ideal Body Weight (IBW): 166 lbs (75 kg)  Admission Body Weight: 150 lb 2.1 oz (68.1 kg)  Current Body Wt:  67.9 kg (149 lb 11.1 oz), 90.2 % IBW.  Bed scale  Current BMI (kg/m2): 21.5  BMI Category: Underweight (BMI less than 22) age over 72    Estimated Daily Nutrient Needs:  Energy Requirements Based On: Formula  Weight Used for Energy Requirements: Admission  Energy (kcal/day): 9301-3696 (MSJ 1.2-1.4)  Weight Used for Protein Requirements: Current  Protein (g/day):  (1.2-1.5 g/day)  Method Used for Fluid Requirements: 1 ml/kcal  Fluid (ml/day): 5601-0370    Nutrition Diagnosis:   Swallowing difficulty related to  (dsyphagia) as evidenced by  (PTA previously on puree, mildly thick liquids, SLP rec'd NPO)  Increased nutrient needs related to catabolic illness as evidenced by  (COVID+)  Inadequate oral intake related to cognitive or neurological impairment, swallowing difficulty as evidenced by NPO or clear liquid status due to medical condition    Nutrition Interventions:   Food and/or Nutrient Delivery: Continue NPO, Continue tube feeding, IV fluid delivery  Nutrition Education/Counseling: Education not indicated, No recommendations at this time  Coordination of Nutrition Care: Continue to monitor while inpatient  Plan of Care discussed with: .     Goals:  Previous Goal Met: Progressing toward goal(s)  Goals: Meet at least 75% of estimated needs, Tolerate nutrition support at goal rate, within 7 days, by next RD assessment       Nutrition Monitoring and Evaluation:   Behavioral-Environmental Outcomes: None identified  Food/Nutrient Intake Outcomes: Enteral nutrition intake/tolerance, Vitamin/mineral intake, IVF intake  Physical Signs/Symptoms Outcomes: Biochemical data, GI status, Weight, Nutrition focused physical findings, Meal time behavior    Discharge Planning:    Enteral nutrition    Juvenal Al MA, RDN, LD   Contact: [phone]

## 2022-10-20 ENCOUNTER — ANESTHESIA (OUTPATIENT)
Dept: ENDOSCOPY | Age: 67
DRG: 871 | End: 2022-10-20
Payer: COMMERCIAL

## 2022-10-20 ENCOUNTER — ANESTHESIA EVENT (OUTPATIENT)
Dept: ENDOSCOPY | Age: 67
DRG: 871 | End: 2022-10-20
Payer: COMMERCIAL

## 2022-10-20 LAB
GLUCOSE BLD STRIP.AUTO-MCNC: 100 MG/DL (ref 70–110)
GLUCOSE BLD STRIP.AUTO-MCNC: 105 MG/DL (ref 70–110)
GLUCOSE BLD STRIP.AUTO-MCNC: 85 MG/DL (ref 70–110)
GLUCOSE BLD STRIP.AUTO-MCNC: 88 MG/DL (ref 70–110)
INR PPP: 1.3 (ref 0.8–1.2)
PROTHROMBIN TIME: 16.7 SEC (ref 11.5–15.2)

## 2022-10-20 PROCEDURE — 65660000004 HC RM CVT STEPDOWN

## 2022-10-20 PROCEDURE — 76040000019: Performed by: INTERNAL MEDICINE

## 2022-10-20 PROCEDURE — 0DH63UZ INSERTION OF FEEDING DEVICE INTO STOMACH, PERCUTANEOUS APPROACH: ICD-10-PCS | Performed by: INTERNAL MEDICINE

## 2022-10-20 PROCEDURE — 74011250636 HC RX REV CODE- 250/636: Performed by: INTERNAL MEDICINE

## 2022-10-20 PROCEDURE — 74011000250 HC RX REV CODE- 250: Performed by: ANESTHESIOLOGY

## 2022-10-20 PROCEDURE — 2709999900 HC NON-CHARGEABLE SUPPLY

## 2022-10-20 PROCEDURE — 74011000250 HC RX REV CODE- 250: Performed by: INTERNAL MEDICINE

## 2022-10-20 PROCEDURE — 36415 COLL VENOUS BLD VENIPUNCTURE: CPT

## 2022-10-20 PROCEDURE — 74011250636 HC RX REV CODE- 250/636: Performed by: ANESTHESIOLOGY

## 2022-10-20 PROCEDURE — 74011250637 HC RX REV CODE- 250/637

## 2022-10-20 PROCEDURE — 74011000258 HC RX REV CODE- 258: Performed by: NURSE PRACTITIONER

## 2022-10-20 PROCEDURE — 94762 N-INVAS EAR/PLS OXIMTRY CONT: CPT

## 2022-10-20 PROCEDURE — 77030005122 HC CATH GASTMY PEG BSC -B: Performed by: INTERNAL MEDICINE

## 2022-10-20 PROCEDURE — 74011000250 HC RX REV CODE- 250: Performed by: NURSE PRACTITIONER

## 2022-10-20 PROCEDURE — 76060000031 HC ANESTHESIA FIRST 0.5 HR: Performed by: INTERNAL MEDICINE

## 2022-10-20 PROCEDURE — 85610 PROTHROMBIN TIME: CPT

## 2022-10-20 PROCEDURE — 82962 GLUCOSE BLOOD TEST: CPT

## 2022-10-20 PROCEDURE — 00731 ANES UPR GI NDSC PX NOS: CPT | Performed by: ANESTHESIOLOGY

## 2022-10-20 PROCEDURE — 2709999900 HC NON-CHARGEABLE SUPPLY: Performed by: INTERNAL MEDICINE

## 2022-10-20 PROCEDURE — 74011250636 HC RX REV CODE- 250/636: Performed by: NURSE PRACTITIONER

## 2022-10-20 PROCEDURE — 99232 SBSQ HOSP IP/OBS MODERATE 35: CPT | Performed by: FAMILY MEDICINE

## 2022-10-20 RX ORDER — LIDOCAINE HYDROCHLORIDE 20 MG/ML
INJECTION, SOLUTION EPIDURAL; INFILTRATION; INTRACAUDAL; PERINEURAL AS NEEDED
Status: DISCONTINUED | OUTPATIENT
Start: 2022-10-20 | End: 2022-10-20 | Stop reason: HOSPADM

## 2022-10-20 RX ORDER — SODIUM CHLORIDE, SODIUM LACTATE, POTASSIUM CHLORIDE, CALCIUM CHLORIDE 600; 310; 30; 20 MG/100ML; MG/100ML; MG/100ML; MG/100ML
INJECTION, SOLUTION INTRAVENOUS
Status: DISCONTINUED | OUTPATIENT
Start: 2022-10-20 | End: 2022-10-20 | Stop reason: HOSPADM

## 2022-10-20 RX ORDER — BACITRACIN ZINC AND POLYMYXIN B SULFATE 500; 1000 [USP'U]/G; [USP'U]/G
OINTMENT TOPICAL DAILY
Status: DISCONTINUED | OUTPATIENT
Start: 2022-10-21 | End: 2022-10-22 | Stop reason: HOSPADM

## 2022-10-20 RX ORDER — PROPOFOL 10 MG/ML
INJECTION, EMULSION INTRAVENOUS AS NEEDED
Status: DISCONTINUED | OUTPATIENT
Start: 2022-10-20 | End: 2022-10-20 | Stop reason: HOSPADM

## 2022-10-20 RX ORDER — METOPROLOL TARTRATE 5 MG/5ML
2.5 INJECTION INTRAVENOUS
Status: DISCONTINUED | OUTPATIENT
Start: 2022-10-20 | End: 2022-10-22 | Stop reason: HOSPADM

## 2022-10-20 RX ORDER — CEFAZOLIN SODIUM 1 G/3ML
INJECTION, POWDER, FOR SOLUTION INTRAMUSCULAR; INTRAVENOUS AS NEEDED
Status: DISCONTINUED | OUTPATIENT
Start: 2022-10-20 | End: 2022-10-20 | Stop reason: HOSPADM

## 2022-10-20 RX ADMIN — WATER 2 G: 1 INJECTION INTRAMUSCULAR; INTRAVENOUS; SUBCUTANEOUS at 12:48

## 2022-10-20 RX ADMIN — PROPOFOL 10 MG: 10 INJECTION, EMULSION INTRAVENOUS at 14:49

## 2022-10-20 RX ADMIN — METOPROLOL TARTRATE 25 MG: 25 TABLET, FILM COATED ORAL at 17:19

## 2022-10-20 RX ADMIN — SODIUM CHLORIDE, PRESERVATIVE FREE 10 ML: 5 INJECTION INTRAVENOUS at 05:58

## 2022-10-20 RX ADMIN — SODIUM CHLORIDE, PRESERVATIVE FREE 10 ML: 5 INJECTION INTRAVENOUS at 23:40

## 2022-10-20 RX ADMIN — SODIUM CHLORIDE, PRESERVATIVE FREE 20 MG: 5 INJECTION INTRAVENOUS at 21:11

## 2022-10-20 RX ADMIN — POTASSIUM CHLORIDE AND DEXTROSE MONOHYDRATE: 150; 5 INJECTION, SOLUTION INTRAVENOUS at 23:37

## 2022-10-20 RX ADMIN — HEPARIN SODIUM 5000 UNITS: 5000 INJECTION INTRAVENOUS; SUBCUTANEOUS at 17:19

## 2022-10-20 RX ADMIN — SODIUM CHLORIDE, PRESERVATIVE FREE 10 ML: 5 INJECTION INTRAVENOUS at 17:20

## 2022-10-20 RX ADMIN — SODIUM CHLORIDE, SODIUM LACTATE, POTASSIUM CHLORIDE, AND CALCIUM CHLORIDE: 600; 310; 30; 20 INJECTION, SOLUTION INTRAVENOUS at 14:38

## 2022-10-20 RX ADMIN — PROPOFOL 25 MG: 10 INJECTION, EMULSION INTRAVENOUS at 14:42

## 2022-10-20 RX ADMIN — THIAMINE HYDROCHLORIDE 100 MG: 100 INJECTION, SOLUTION INTRAMUSCULAR; INTRAVENOUS at 09:11

## 2022-10-20 RX ADMIN — POTASSIUM CHLORIDE AND DEXTROSE MONOHYDRATE: 150; 5 INJECTION, SOLUTION INTRAVENOUS at 05:58

## 2022-10-20 RX ADMIN — HEPARIN SODIUM 5000 UNITS: 5000 INJECTION INTRAVENOUS; SUBCUTANEOUS at 09:13

## 2022-10-20 RX ADMIN — METOPROLOL TARTRATE 2.5 MG: 5 INJECTION, SOLUTION INTRAVENOUS at 02:18

## 2022-10-20 RX ADMIN — LIDOCAINE HYDROCHLORIDE 20 MG: 20 INJECTION, SOLUTION EPIDURAL; INFILTRATION; INTRACAUDAL; PERINEURAL at 14:42

## 2022-10-20 RX ADMIN — FOLIC ACID: 5 INJECTION, SOLUTION INTRAMUSCULAR; INTRAVENOUS; SUBCUTANEOUS at 09:11

## 2022-10-20 RX ADMIN — HYDRALAZINE HYDROCHLORIDE 10 MG: 20 INJECTION INTRAMUSCULAR; INTRAVENOUS at 00:51

## 2022-10-20 RX ADMIN — SODIUM CHLORIDE, PRESERVATIVE FREE 20 MG: 5 INJECTION INTRAVENOUS at 09:13

## 2022-10-20 RX ADMIN — CEFAZOLIN SODIUM 1 G: 1 INJECTION, POWDER, FOR SOLUTION INTRAMUSCULAR; INTRAVENOUS at 14:38

## 2022-10-20 RX ADMIN — PROPOFOL 10 MG: 10 INJECTION, EMULSION INTRAVENOUS at 14:51

## 2022-10-20 RX ADMIN — SODIUM CHLORIDE 750 MG: 900 INJECTION, SOLUTION INTRAVENOUS at 23:40

## 2022-10-20 RX ADMIN — PROPOFOL 10 MG: 10 INJECTION, EMULSION INTRAVENOUS at 14:47

## 2022-10-20 RX ADMIN — PROPOFOL 10 MG: 10 INJECTION, EMULSION INTRAVENOUS at 14:45

## 2022-10-20 RX ADMIN — SODIUM CHLORIDE 750 MG: 900 INJECTION, SOLUTION INTRAVENOUS at 11:00

## 2022-10-20 NOTE — ANESTHESIA PREPROCEDURE EVALUATION
Relevant Problems   RESPIRATORY SYSTEM   (+) Aspiration pneumonia (HCC)      NEUROLOGY   (+) 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HCC)   (+) Psychogenic polydipsia   (+) Seizure disorder (HCC)   (+) Stroke (HCC)      CARDIOVASCULAR   (+) First degree AV block   (+) Hypertension      RENAL FAILURE   (+) Acute kidney injury ([address]       Anesthetic History   No history of anesthetic complications            Review of Systems / Medical History  Patient summary reviewed, nursing notes reviewed and pertinent labs reviewed    Pulmonary  Within defined limits                 Neuro/Psych     seizures: well controlled  CVA  TIA     Cardiovascular    Hypertension: well controlled        Dysrhythmias            GI/Hepatic/Renal     GERD: well controlled           Endo/Other  Within defined limits           Other Findings   Comments: Admit to ICU for aspiration pneumonia and sepsis           Physical Exam    Airway  Mallampati: II  TM Distance: 4 - 6 cm  Neck ROM: normal range of motion   Mouth opening: Normal     Cardiovascular    Rhythm: regular  Rate: normal         Dental    Dentition: Poor dentition     Pulmonary  Breath sounds clear to auscultation               Abdominal  GI exam deferred       Other Findings            Anesthetic Plan    ASA: 4  Anesthesia type: MAC          Induction: Intravenous  Anesthetic plan and risks discussed with: Patient

## 2022-10-20 NOTE — PROGRESS NOTES
Progress Note    Isabella Birch  79 y.o. Admit Date: 10/9/2022  Active Problems:    Hypertension (9/2/2012) POA: Unknown      Hypokalemia (6/22/2013) POA: Unknown      Aspiration pneumonia ([facility] [address] (8/22/2017) POA: Unknown      Hyperosmolality and hypernatremia (3/30/2021) POA: Unknown      Dehydration (3/30/2021) POA: Yes      COVID-19 (3/30/2021) POA: Yes      Respiratory failure ([facility] [address] (10/9/2022) POA: Unknown      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[facility] [address] (91/3/5887) POA: Unknown      Aspiration into airway (10/9/2022) POA: Unknown      Acute kidney injury ([facility] [address] (10/10/2022) POA: Unknown      Seizure disorder ([facility] [address] (10/12/2022) POA: Yes            Subjective:     Patient seen earlier in his room. Remained noncommunicative. Tolerating IV fluid at current rate. No labs being done. Family now wants to have a PEG tube placement followed by transfer back to nursing home. A comprehensive review of systems was negative except for that written in the History of Present Illness.     Objective:     Visit Vitals  /72   Pulse 94   Temp 99.6 °F (37.6 °C)   Resp 18   Ht 5' 10\" (1.778 m)   Wt 68.1 kg (150 lb 2.1 oz)   SpO2 100%   BMI 21.54 kg/m²         Intake/Output Summary (Last 24 hours) at 10/20/2022 1528  Last data filed at 10/20/2022 1500  Gross per 24 hour   Intake 100 ml   Output 1900 ml   Net -1800 ml       Current Facility-Administered Medications   Medication Dose Route Frequency Provider Last Rate Last Admin    metoprolol (LOPRESSOR) injection 2.5 mg  2.5 mg IntraVENous Q6H PRN Cheryl Hines MD   2.5 mg at 10/20/22 0218    [START ON 10/21/2022] bacitracin zinc-polymyxin b (POLYSPORIN) 500-10,000 unit/gram ointment   Topical DAILY Luther Aguilar MD        sodium chloride (NS) flush 5-40 mL  5-40 mL IntraVENous PRN Cammie Rendon MD        metoprolol tartrate (LOPRESSOR) tablet 25 mg  25 mg Oral BID Rebeka Martinez NP   25 mg at 10/19/22 7377    dextrose 5% with KCl 20 mEq/L infusion   IntraVENous CONTINUOUS Maria Luz Mera MD 75 mL/hr at 10/20/22 0558 New Bag at 10/20/22 0558    sodium chloride (NS) flush 5-40 mL  5-40 mL IntraVENous Q8H Chelsy Fonseca MD   10 mL at 10/20/22 0558    sodium chloride (NS) flush 5-40 mL  5-40 mL IntraVENous PRN Chelsy Fonseca MD        albuterol-ipratropium (DUO-NEB) 2.5 MG-0.5 MG/3 ML  3 mL Nebulization Q4H PRN Gerardo Fortune, NP        insulin lispro (HUMALOG) injection   SubCUTAneous Q6H Lavella Burkitt C, NP   2 Units at 10/10/22 2309    glucose chewable tablet 16 g  4 Tablet Oral PRN Gerardo Fortune NP        glucagon (GLUCAGEN) injection 1 mg  1 mg IntraMUSCular PRN Lavella Burkitt C, NP        dextrose 10% infusion 0-250 mL  0-250 mL IntraVENous PRN Lavella Burkitt C, NP        hydrALAZINE (APRESOLINE) 20 mg/mL injection 10 mg  10 mg IntraVENous Q6H PRN Lavella Burkitt C, NP   10 mg at 10/20/22 0051    acetaminophen (TYLENOL) tablet 650 mg  650 mg Oral Q6H PRN Gerardo Fortune NP   650 mg at 10/12/22 2138    Or    acetaminophen (TYLENOL) suppository 650 mg  650 mg Rectal Q6H PRN Gerardo Fortune NP        polyethylene glycol (MIRALAX) packet 17 g  17 g Oral DAILY PRN Lavella Burkitt C, NP        ondansetron (ZOFRAN ODT) tablet 4 mg  4 mg Oral Q8H PRN Lavella Burkitt C, NP        Or    ondansetron (ZOFRAN) injection 4 mg  4 mg IntraVENous Q6H PRN Lavella Burkitt C, NP        heparin (porcine) injection 5,000 Units  5,000 Units SubCUTAneous Q8H Lavella Burkitt C, NP   5,000 Units at 10/20/22 0913    levETIRAcetam (KEPPRA) 750 mg in 0.9% sodium chloride 100 mL IVPB  750 mg IntraVENous Q12H Lavella Burkitt C,  mL/hr at 10/20/22 1100 750 mg at 38/21/11 1298    folic acid (FOLVITE) 1 mg in 0.9% sodium chloride 50 mL ivpb   IntraVENous DAILY Lavella Burkitt C,  mL/hr at 10/20/22 0911 New Bag at 10/20/22 0911    thiamine (B-1) 100 mg in 0.9% sodium chloride 50 mL IVPB  100 mg IntraVENous DAILY Lavella Burkitt C,  mL/hr at 10/20/22 0911 100 mg at 10/20/22 7901 famotidine (PF) (PEPCID) 20 mg in 0.9% sodium chloride 10 mL injection  20 mg IntraVENous Q12H Jossya Roles C, NP   20 mg at 10/20/22 9122        Physical Exam:     Physical Exam:   General:  Alert, cooperative, no distress, appears stated age. Noncommunicating   Eyes:  Conjunctivae/corneas clear. PERRL, EOMs intact. Mouth/Throat: Lips, mucosa, and tongue normal. Teeth and gums normal.   Neck: Supple, symmetrical, trachea midline, no adenopathy, thyroid: no enlargement/tenderness/nodules, no carotid bruit and no JVD. Lungs:   Clear to auscultation bilaterally. Heart:  Regular rate and rhythm, S1, S2 normal, no murmur, click, rub or gallop. Abdomen:   Soft, non-tender. Bowel sounds normal. No masses,  No organomegaly. Extremities: Extremities normal, atraumatic, no cyanosis or edema                         Data Review:    CBC w/Diff    Recent Labs     10/19/22  0555 10/18/22  0046   WBC 13.5* 14.2*   RBC 3.74* 3.35*   HGB 9.4* 8.5*   HCT 30.1* 27.3*   MCV 80.5 81.5   MCH 25.1 25.4   MCHC 31.2 31.1   RDW 15.5* 15.2*    Recent Labs     10/19/22  0555 10/18/22  0046   MONOS 4 5   EOS 2 2   BASOS 0 0   RDW 15.5* 15.2*        Comprehensive Metabolic Profile    Recent Labs     10/19/22  0555 10/18/22  1308    147*   K 3.3* 3.5   * 114*   CO2 29 32   BUN 3* 2*   CREA 0.32* 0.28*    Recent Labs     10/19/22  0555 10/18/22  1308   CA 8.6 8.3*                      Impression:       Active Hospital Problems    Diagnosis Date Noted    Seizure disorder ([facility] 10/12/2022    Acute kidney injury ([facility] 10/10/2022    Respiratory failure ([facility] 23/92/9272    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[facility] 95/16/0784    Aspiration into airway 10/09/2022    Hyperosmolality and hypernatremia 03/30/2021    COVID-19 03/30/2021    Dehydration 03/30/2021    Aspiration pneumonia ([facility] 08/22/2017    Hypokalemia 06/22/2013    Hypertension 09/02/2012            Plan:   Await PEG placement today.   Once done will start giving free water and the potassium replacement through the PEG tube and all the IV fluid will be discontinued. We need to check lab tomorrow if possible.   Continue DNR      Ely Zhao MD

## 2022-10-20 NOTE — PROGRESS NOTES
Bedside and Verbal shift change report given to Annelise Villalobos RN (oncoming nurse) by Jose Ang RN (offgoing nurse). Report included the following information SBAR, Kardex, and ED Summary. 1500: Pt returned to the unit from OR. PEG placed, NGT removed. 1600: Tube feeding started at 10mL/hr and water flushes 50mL Q1    Bedside and Verbal shift change report given to Rachel Noel (oncoming nurse) by Fanny Juarez (offgoing nurse). Report included the following information SBAR, Kardex, and ED Summary.

## 2022-10-20 NOTE — ROUTINE PROCESS
1900: Bedside and Verbal shift change report given to Patriica Smyth RN (oncoming nurse) by Louis Whalen RN (offgoing nurse). Report included the following information SBAR, Kardex, and ED Summary. 9010: PRN Hydralazine given. 0142: Paged hospitalist. Spoke to Dr. Chiquita Almazan regarding patient's BP and HR. Provider informed that patient has had one dose of PRN hydralazine and that scheduled lopressor is given on dayshift. Provider stated orders will be placed. 7777: PRN lopressor administered. 0700: Bedside and Verbal shift change report given to Louis Whalen RN (oncoming nurse) by Patricia Smyth RN (offgoing nurse). Report included the following information SBAR, Kardex, and ED Summary.

## 2022-10-20 NOTE — H&P
Date of Surgery Update:  Luanne Gonzales was seen and examined. History and physical has been reviewed. The patient has been examined.  There have been no significant clinical changes since the completion of the originally dated History and Physical.    Signed By: Hay Nugent MD     October 20, 2022 2:43 PM

## 2022-10-20 NOTE — ROUTINE PROCESS
TRANSFER - OUT REPORT:    Verbal report given to [address] RN (name) on Larissa Rodriguez  being transferred to [facility](unit) for routine post - op       Report consisted of patients Situation, Background, Assessment and   Recommendations(SBAR). Information from the following report(s) SBAR was reviewed with the receiving nurse. Opportunity for questions and clarification was provided.       Patient transported with:   O2 @ 3 liters  Registered Nurse

## 2022-10-20 NOTE — PROCEDURES
[email or web address]  [place], [address]        Percutaneous Endoscopic Gastroduodenoscopy Procedure Note    Laura Medina  1955  [id number]      Date of Procedure: 10/20/2022    Preoperative diagnosis: DYSPHAGIA    Postoperative diagnosis: Peg placement    Procedure: Procedure(s):  PERCUTANEOUS ENDOSCOPIC GASTROSTOMY TUBE INSERTION    Indication: Dysphagia    :  Dr. Frederic Case MD    Assistant(s): Circ-1: Vick Hudson RN  Circ-2: Elver Turner RN; Renea Prado RN  Endoscopy Technician-1: Josh Witt  Float Staff: Jose Traore RN    Anesthesia/Sedation:  MAC anesthesia Propofol      Procedure Details     After infomed consent was obtained for the procedure, with all risks and benefits of procedure explained the patient was taken to the endoscopy suite and placed in the left lateral decubitus position. Following sequential administration of sedation as per above, the endoscope was inserted into the mouth and advanced under direct vision to the second portion of the duodenum. The esohagus looked normal .  There were no diagnostic abnormalities of the body, fundus, antrum, cardia and iscisura of the stomach. The first and second portion of the duodenum appeared normal.      A site was selected on the anterior abdominal wall where the light shined and where one finger easily indented the anterior abdominal wall. Lidocaine analgesia was utilized (1%). The exploring needle easily indented the stomach and penetrated it. The needle-trocar device was then inserted into the skin after an incision. Once the needle trocar device entered the stomach the trocar was grasped by the snare. The needle was removed and a wire was placed thorough the trocar. The wire was grasped by the snare and removed at the mouth.   A 20 Fr [employer] tube was positioned over the wire and pushed out of the anterior abdominal wall. The tube was grabbed. The incision site was enlarged as necessary and the tube was pulled snug. A brief repeat endoscopy verified proper placement of the tube and no apparent complications. Complications:   None; patient tolerated the procedure well. EBL:  None.            Impression:   Peg inserted      Recommendations:  Feeding, formula and rate per Nutrition, OK to use PEG now     Paulina Nichols MD  10/20/2022  3:04 PM

## 2022-10-20 NOTE — PROGRESS NOTES
[facility]  [facility]    Patient: Iker Vivas Age: 79 y.o. : 1955 MR#: [id number] SSN: [id number]  Date: 10/20/2022     Subjective/24-hour events:     Stable clinically, no new issues overnight. Assessment:   Aspiration pneumonia  Sepsis, POA, secondary to above  PARAMJIT  Dysphagia  Hypokalemia  Hypertension  Seizure disorder  COVID-19 infection  Hyponatremia  Dementia  Alcohol abuse history    Plan:   PEG tube placement, timing per GI. Tentatively planned for today pending ability to schedule. We will follow-up this afternoon. Stable off of antibiotics, continue to monitor. Continue wound care. Disposition pending PEG tube placement and ability to tolerate tube feeds. Return to long-term care pending above. Therapy services: No new therapy recommendations. Equipment: None. Anticipated disposition: Return to long-term care. Case discussed with:  []Patient  []Family  [x]Nursing  [x]Case Management  DVT Prophylaxis:  []Lovenox  [x]Hep SQ  []SCDs  []Coumadin   []On Heparin gtt    Objective:   VS: Visit Vitals  BP (!) 143/78 (BP 1 Location: Left upper arm, BP Patient Position: At rest)   Pulse (!) 102   Temp 99.4 °F (37.4 °C)   Resp 17   Ht 5' 10\" (1.778 m)   Wt 68.1 kg (150 lb 2.1 oz)   SpO2 100%   BMI 21.54 kg/m²      Tmax/24hrs: Temp (24hrs), Av.5 °F (37.5 °C), Min:98.7 °F (37.1 °C), Max:100.3 °F (37.9 °C)    Intake/Output Summary (Last 24 hours) at 10/20/2022 1218  Last data filed at 10/20/2022 0845  Gross per 24 hour   Intake 0 ml   Output 1900 ml   Net -1900 ml       General:  In NAD. Nontoxic-appearing. NGT in place. Cardiovascular:  RRR. Pulmonary: Effort nonlabored. GI:  Abdomen soft. Extremities:  Warm, no edema or ischemia. Neuro: Awake and alert, moves extremities spontaneously.     Labs:    Recent Results (from the past 24 hour(s))   GLUCOSE, POC    Collection Time: 10/19/22  6:34 PM   Result Value Ref Range    Glucose (POC) 96 70 - 110 mg/dL   GLUCOSE, POC    Collection Time: 10/19/22 11:31 PM   Result Value Ref Range    Glucose (POC) 89 70 - 110 mg/dL   GLUCOSE, POC    Collection Time: 10/20/22 12:47 AM   Result Value Ref Range    Glucose (POC) 85 70 - 110 mg/dL   GLUCOSE, POC    Collection Time: 10/20/22  5:06 AM   Result Value Ref Range    Glucose (POC) 105 70 - 110 mg/dL       Signed By: Vicki Kraft MD     October 20, 2022

## 2022-10-20 NOTE — ANESTHESIA POSTPROCEDURE EVALUATION
Procedure(s):  PERCUTANEOUS ENDOSCOPIC GASTROSTOMY TUBE INSERTION.     MAC    Anesthesia Post Evaluation      Multimodal analgesia: multimodal analgesia used between 6 hours prior to anesthesia start to PACU discharge  Patient location during evaluation: PACU  Patient participation: complete - patient participated  Level of consciousness: sleepy but conscious  Pain management: adequate  Airway patency: patent  Anesthetic complications: no  Cardiovascular status: acceptable  Respiratory status: acceptable  Hydration status: acceptable  Post anesthesia nausea and vomiting:  controlled  Final Post Anesthesia Temperature Assessment:  Normothermia (36.0-37.5 degrees C)      INITIAL Post-op Vital signs:   Vitals Value Taken Time   /65 10/20/22 1503   Temp     Pulse 102 10/20/22 1503   Resp 12 10/20/22 1503   SpO2 97 % 10/20/22 1503

## 2022-10-21 LAB
ANION GAP SERPL CALC-SCNC: 4 MMOL/L (ref 3–18)
BASOPHILS # BLD: 0 K/UL (ref 0–0.1)
BASOPHILS NFR BLD: 0 % (ref 0–2)
BUN SERPL-MCNC: 6 MG/DL (ref 7–18)
BUN/CREAT SERPL: 16 (ref 12–20)
CALCIUM SERPL-MCNC: 8.3 MG/DL (ref 8.5–10.1)
CHLORIDE SERPL-SCNC: 111 MMOL/L (ref 100–111)
CO2 SERPL-SCNC: 28 MMOL/L (ref 21–32)
CREAT SERPL-MCNC: 0.38 MG/DL (ref 0.6–1.3)
DIFFERENTIAL METHOD BLD: ABNORMAL
EOSINOPHIL # BLD: 0.1 K/UL (ref 0–0.4)
EOSINOPHIL NFR BLD: 1 % (ref 0–5)
ERYTHROCYTE [DISTWIDTH] IN BLOOD BY AUTOMATED COUNT: 15.9 % (ref 11.6–14.5)
GLUCOSE BLD STRIP.AUTO-MCNC: 83 MG/DL (ref 70–110)
GLUCOSE BLD STRIP.AUTO-MCNC: 84 MG/DL (ref 70–110)
GLUCOSE BLD STRIP.AUTO-MCNC: 92 MG/DL (ref 70–110)
GLUCOSE BLD STRIP.AUTO-MCNC: 94 MG/DL (ref 70–110)
GLUCOSE SERPL-MCNC: 93 MG/DL (ref 74–99)
HCT VFR BLD AUTO: 27.9 % (ref 36–48)
HGB BLD-MCNC: 8.7 G/DL (ref 13–16)
IMM GRANULOCYTES # BLD AUTO: 0.1 K/UL (ref 0–0.04)
IMM GRANULOCYTES NFR BLD AUTO: 1 % (ref 0–0.5)
LYMPHOCYTES # BLD: 1.4 K/UL (ref 0.9–3.6)
LYMPHOCYTES NFR BLD: 14 % (ref 21–52)
MAGNESIUM SERPL-MCNC: 2 MG/DL (ref 1.6–2.6)
MCH RBC QN AUTO: 25.4 PG (ref 24–34)
MCHC RBC AUTO-ENTMCNC: 31.2 G/DL (ref 31–37)
MCV RBC AUTO: 81.3 FL (ref 78–100)
MONOCYTES # BLD: 0.7 K/UL (ref 0.05–1.2)
MONOCYTES NFR BLD: 7 % (ref 3–10)
NEUTS SEG # BLD: 7.6 K/UL (ref 1.8–8)
NEUTS SEG NFR BLD: 78 % (ref 40–73)
NRBC # BLD: 0 K/UL (ref 0–0.01)
NRBC BLD-RTO: 0 PER 100 WBC
PLATELET # BLD AUTO: 369 K/UL (ref 135–420)
PMV BLD AUTO: 10.6 FL (ref 9.2–11.8)
POTASSIUM SERPL-SCNC: 3.6 MMOL/L (ref 3.5–5.5)
RBC # BLD AUTO: 3.43 M/UL (ref 4.35–5.65)
SODIUM SERPL-SCNC: 143 MMOL/L (ref 136–145)
WBC # BLD AUTO: 9.8 K/UL (ref 4.6–13.2)

## 2022-10-21 PROCEDURE — 36415 COLL VENOUS BLD VENIPUNCTURE: CPT

## 2022-10-21 PROCEDURE — 74011000250 HC RX REV CODE- 250: Performed by: NURSE PRACTITIONER

## 2022-10-21 PROCEDURE — 83735 ASSAY OF MAGNESIUM: CPT

## 2022-10-21 PROCEDURE — 77010033678 HC OXYGEN DAILY

## 2022-10-21 PROCEDURE — 74011250636 HC RX REV CODE- 250/636: Performed by: NURSE PRACTITIONER

## 2022-10-21 PROCEDURE — 2709999900 HC NON-CHARGEABLE SUPPLY

## 2022-10-21 PROCEDURE — 99232 SBSQ HOSP IP/OBS MODERATE 35: CPT | Performed by: FAMILY MEDICINE

## 2022-10-21 PROCEDURE — 94762 N-INVAS EAR/PLS OXIMTRY CONT: CPT

## 2022-10-21 PROCEDURE — 85025 COMPLETE CBC W/AUTO DIFF WBC: CPT

## 2022-10-21 PROCEDURE — 65660000004 HC RM CVT STEPDOWN

## 2022-10-21 PROCEDURE — 74011000250 HC RX REV CODE- 250: Performed by: INTERNAL MEDICINE

## 2022-10-21 PROCEDURE — 74011000258 HC RX REV CODE- 258: Performed by: NURSE PRACTITIONER

## 2022-10-21 PROCEDURE — 74011250636 HC RX REV CODE- 250/636: Performed by: INTERNAL MEDICINE

## 2022-10-21 PROCEDURE — 80048 BASIC METABOLIC PNL TOTAL CA: CPT

## 2022-10-21 PROCEDURE — 74011250637 HC RX REV CODE- 250/637

## 2022-10-21 PROCEDURE — 82962 GLUCOSE BLOOD TEST: CPT

## 2022-10-21 RX ADMIN — HEPARIN SODIUM 5000 UNITS: 5000 INJECTION INTRAVENOUS; SUBCUTANEOUS at 16:40

## 2022-10-21 RX ADMIN — SODIUM CHLORIDE, PRESERVATIVE FREE 20 MG: 5 INJECTION INTRAVENOUS at 08:34

## 2022-10-21 RX ADMIN — HEPARIN SODIUM 5000 UNITS: 5000 INJECTION INTRAVENOUS; SUBCUTANEOUS at 23:57

## 2022-10-21 RX ADMIN — SODIUM CHLORIDE 750 MG: 900 INJECTION, SOLUTION INTRAVENOUS at 23:56

## 2022-10-21 RX ADMIN — SODIUM CHLORIDE, PRESERVATIVE FREE 10 ML: 5 INJECTION INTRAVENOUS at 15:00

## 2022-10-21 RX ADMIN — METOPROLOL TARTRATE 25 MG: 25 TABLET, FILM COATED ORAL at 08:34

## 2022-10-21 RX ADMIN — METOPROLOL TARTRATE 25 MG: 25 TABLET, FILM COATED ORAL at 17:04

## 2022-10-21 RX ADMIN — HEPARIN SODIUM 5000 UNITS: 5000 INJECTION INTRAVENOUS; SUBCUTANEOUS at 00:41

## 2022-10-21 RX ADMIN — FOLIC ACID: 5 INJECTION, SOLUTION INTRAMUSCULAR; INTRAVENOUS; SUBCUTANEOUS at 08:45

## 2022-10-21 RX ADMIN — SODIUM CHLORIDE 750 MG: 900 INJECTION, SOLUTION INTRAVENOUS at 11:27

## 2022-10-21 RX ADMIN — SODIUM CHLORIDE, PRESERVATIVE FREE 10 ML: 5 INJECTION INTRAVENOUS at 06:00

## 2022-10-21 RX ADMIN — THIAMINE HYDROCHLORIDE 100 MG: 100 INJECTION, SOLUTION INTRAMUSCULAR; INTRAVENOUS at 08:37

## 2022-10-21 RX ADMIN — SODIUM CHLORIDE, PRESERVATIVE FREE 10 ML: 5 INJECTION INTRAVENOUS at 23:17

## 2022-10-21 RX ADMIN — HEPARIN SODIUM 5000 UNITS: 5000 INJECTION INTRAVENOUS; SUBCUTANEOUS at 08:34

## 2022-10-21 RX ADMIN — BACITRACIN ZINC AND POLYMYXIN B SULFATE: 500; 10000 OINTMENT TOPICAL at 11:27

## 2022-10-21 RX ADMIN — SODIUM CHLORIDE, PRESERVATIVE FREE 20 MG: 5 INJECTION INTRAVENOUS at 23:17

## 2022-10-21 RX ADMIN — POTASSIUM CHLORIDE AND DEXTROSE MONOHYDRATE: 150; 5 INJECTION, SOLUTION INTRAVENOUS at 18:19

## 2022-10-21 NOTE — PROGRESS NOTES
[address]  [phone]    Gastroenterology follow up-Progress note    Impression:  1. Aspiration pneumonia  2. Oropharyngeal dysphagia   - PEG placed 10/20  - Receiving tube feeds  3. COVID Positive (10/10), also positive for enterovirus and rhinovirus  - droplet plus contact precautions d/c   - now off antibiotics  4. Dementia  5. Hx ETOH abuse  6. Seizure disorder  7. Hx CVA  8. EGD/colonoscopy 2021 - small sliding hiatal hernia, otherwise normal; diverticulosis, poor prep, hemorrhoids    Plan:  1. Continue tube feeds   2. Medical management as per primary team  3. Will sign off-Thank you for this consultation and the opportunity to participate in the care of this patient. Please do not hesitate to call with any questions or concerns, or should event occur that may necessitate additional GI evaluation.       Chief Complaint: aspiration pneumonia, dysphagia      Subjective:  stable on tube feeds via PEG        Eyes: conjunctiva normal, EOM normal   Neck: ROM normal   Cardiovascular: heart normal, normal rate and regular rhythm   Pulmonary/Chest Wall: effort normal   Abdominal: appearance normal, soft, non-acute, non-tender, PEG site clean/dry/intact     Patient Active Problem List   Diagnosis Code    Intractable hiccups R06.6    Hypertension I10    Hypokalemia E87.6    First degree AV block I44.0    Former heavy tobacco smoker Z87.891    Severe protein-calorie malnutrition (HCC) E43    Psychogenic polydipsia R63.1, F54    Hyponatremia E87.1    Cellulitis L03.90    Right foot infection L08.9    Bilateral leg and foot pain M79.604, M79.605, M79.671, M79.672    Aspiration pneumonia (HCC) J69.0    Stroke ([facility]) I63.9    Hyperosmolality and hypernatremia Y97.8    Acute metabolic encephalopathy H86.73    Dehydration E86.0    COVID-19 U07.1    Hematuria R31.9    Dysuria R30.0    Goals of care, counseling/discussion Z71.89    Debility R53.81    Respiratory failure (HCC) R33.73    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[facility]) C63.81 Aspiration into airway T17.908A    Acute kidney injury ([facility] [address] N17.9    Seizure disorder ([facility] [address] G40.909         Visit Vitals  BP (!) 158/88 (BP 1 Location: Left upper arm, BP Patient Position: At rest)   Pulse 94   Temp 98.7 °F (37.1 °C)   Resp 19   Ht 5' 10\" (1.778 m)   Wt 68.1 kg (150 lb 2.1 oz)   SpO2 99%   BMI 21.54 kg/m²           Intake/Output Summary (Last 24 hours) at 10/21/2022 1040  Last data filed at 10/21/2022 0725  Gross per 24 hour   Intake 210 ml   Output 2000 ml   Net -1790 ml         CBC w/Diff    Lab Results   Component Value Date/Time    WBC 9.8 10/21/2022 05:32 AM    RBC 3.43 (L) 10/21/2022 05:32 AM    HGB 8.7 (L) 10/21/2022 05:32 AM    HCT 27.9 (L) 10/21/2022 05:32 AM    MCV 81.3 10/21/2022 05:32 AM    MCH 25.4 10/21/2022 05:32 AM    MCHC 31.2 10/21/2022 05:32 AM    RDW 15.9 (H) 10/21/2022 05:32 AM     10/21/2022 05:32 AM    Lab Results   Component Value Date/Time    GRANS 78 (H) 10/21/2022 05:32 AM    LYMPH 14 (L) 10/21/2022 05:32 AM    EOS 1 10/21/2022 05:32 AM    BANDS 6 (H) 10/11/2022 06:10 AM    BASOS 0 10/21/2022 05:32 AM      Basic Metabolic Profile   Recent Labs     10/21/22  0532      K 3.6      CO2 28   BUN 6*   CA 8.3*   MG 2.0          Hepatic Function    Lab Results   Component Value Date/Time    ALB 2.7 (L) 10/09/2022 06:20 PM    ALB 2.8 (L) 10/09/2022 06:20 PM    TP 8.4 (H) 10/09/2022 06:20 PM    TP 8.5 (H) 10/09/2022 06:20 PM    AP 97 10/09/2022 06:20 PM    AP 93 10/09/2022 06:20 PM    No results found for: TBIL       Coags   Recent Labs     10/20/22  1615   PTP 16.7*   INR 1.3*                 Abdifatah Choi NP    [facility]. [employer] [facility]  Phone: [phone]  Pager: [phone]

## 2022-10-21 NOTE — PROGRESS NOTES
0710: Bedside shift change report given to Nicholas Hoyos RN (oncoming nurse) by Faith Hernandez RN (offgoing nurse). Report included the following information SBAR, Kardex, Intake/Output, MAR, and Cardiac Rhythm NSR .     0710: Wound Prevention Checklist    Patient: Lashawn Gupta (2 y.o. male)  Date: 10/21/2022  Diagnosis: Respiratory failure ([facility] [address] [J96.90]  Dementia, alcoholic ([facility] [address] [D03.44]  Aspiration into airway [T17.908A] <principal problem not specified>    Precautions: Fall, Skin, Seizure, Aspiration       []  Heel prevention boots placed on patient    [x]  Patient turned q2h during shift    []  Lift team ordered    []  Patient on [place] bed/Specialty bed    [x]  Each Wound is documented during shift (Stage, Color, drainage, odor, measurements, and dressings)  Popped blister on right toe. []  Dual skin check done with Maty Zee RN     4789: Bedside shift change report given to JOSEFA Negrete (oncoming nurse) by Nicholas Hoyos RN (offgoing nurse). Report included the following information SBAR, Kardex, Intake/Output, MAR, and Cardiac Rhythm NSR .

## 2022-10-21 NOTE — PROGRESS NOTES
[facility]  Progress Note    Patient: Kike Lira Age: 79 y.o. : 1955 MR#: [id number] SSN: [id number]  Date: 10/21/2022     Subjective/24-hour events:     Stable post-procedure, no new issues overnight. Assessment:   Aspiration pneumonia  Sepsis, POA, secondary to above  PARAMJIT  Dysphagia  Hypokalemia  Hypertension  Seizure disorder  COVID-19 infection  Hyponatremia  Dementia  Alcohol abuse history    Plan:   PEG tube placed yesterday but patient still essentially only receiving trickle feeds. Current rate is only 10 cc an hour and is scheduled to be increased to 20 cc/h after 5 PM.  Given this fact, will keep him hospitalized overnight to ensure that he is tolerating feeds at an appropriate rate and plan to discharge first thing in the morning. He does not need to be at goal but would like to see feeds at least greater than half of goal rate prior to discharge. Care management updated, plan to arrange transport back to long-term care facility. for tomorrow morning   Continue supportive care in interim. Therapy services: No new therapy recommendations. Equipment: None. Anticipated disposition: Return to long-term care. Case discussed with:  []Patient  []Family  [x]Nursing  [x]Case Management  DVT Prophylaxis:  []Lovenox  [x]Hep SQ  []SCDs  []Coumadin   []On Heparin gtt    Objective:   VS: Visit Vitals  BP (!) 155/87 (BP 1 Location: Left upper arm, BP Patient Position: At rest)   Pulse 86   Temp 98.7 °F (37.1 °C)   Resp 20   Ht 5' 10\" (1.778 m)   Wt 68.1 kg (150 lb 2.1 oz)   SpO2 100%   BMI 21.54 kg/m²      Tmax/24hrs: Temp (24hrs), Av.7 °F (37.1 °C), Min:98.1 °F (36.7 °C), Max:99.4 °F (37.4 °C)    Intake/Output Summary (Last 24 hours) at 10/21/2022 1409  Last data filed at 10/21/2022 0725  Gross per 24 hour   Intake 210 ml   Output 2000 ml   Net -1790 ml       General:  In NAD. Nontoxic-appearing. Cardiovascular:  RRR.     Pulmonary: Effort nonlabored. GI:  Abdomen soft. Extremities:  Warm, no edema or ischemia. Neuro: Awake and alert, moves extremities spontaneously. Labs:    Recent Results (from the past 24 hour(s))   PROTHROMBIN TIME + INR    Collection Time: 10/20/22  4:15 PM   Result Value Ref Range    Prothrombin time 16.7 (H) 11.5 - 15.2 sec    INR 1.3 (H) 0.8 - 1.2     GLUCOSE, POC    Collection Time: 10/20/22  5:08 PM   Result Value Ref Range    Glucose (POC) 88 70 - 110 mg/dL   GLUCOSE, POC    Collection Time: 10/21/22 12:10 AM   Result Value Ref Range    Glucose (POC) 94 70 - 110 mg/dL   GLUCOSE, POC    Collection Time: 10/21/22  5:05 AM   Result Value Ref Range    Glucose (POC) 83 70 - 110 mg/dL   CBC WITH AUTOMATED DIFF    Collection Time: 10/21/22  5:32 AM   Result Value Ref Range    WBC 9.8 4.6 - 13.2 K/uL    RBC 3.43 (L) 4.35 - 5.65 M/uL    HGB 8.7 (L) 13.0 - 16.0 g/dL    HCT 27.9 (L) 36.0 - 48.0 %    MCV 81.3 78.0 - 100.0 FL    MCH 25.4 24.0 - 34.0 PG    MCHC 31.2 31.0 - 37.0 g/dL    RDW 15.9 (H) 11.6 - 14.5 %    PLATELET 494 005 - 607 K/uL    MPV 10.6 9.2 - 11.8 FL    NRBC 0.0 0  WBC    ABSOLUTE NRBC 0.00 0.00 - 0.01 K/uL    NEUTROPHILS 78 (H) 40 - 73 %    LYMPHOCYTES 14 (L) 21 - 52 %    MONOCYTES 7 3 - 10 %    EOSINOPHILS 1 0 - 5 %    BASOPHILS 0 0 - 2 %    IMMATURE GRANULOCYTES 1 (H) 0.0 - 0.5 %    ABS. NEUTROPHILS 7.6 1.8 - 8.0 K/UL    ABS. LYMPHOCYTES 1.4 0.9 - 3.6 K/UL    ABS. MONOCYTES 0.7 0.05 - 1.2 K/UL    ABS. EOSINOPHILS 0.1 0.0 - 0.4 K/UL    ABS. BASOPHILS 0.0 0.0 - 0.1 K/UL    ABS. IMM.  GRANS. 0.1 (H) 0.00 - 0.04 K/UL    DF AUTOMATED     MAGNESIUM    Collection Time: 10/21/22  5:32 AM   Result Value Ref Range    Magnesium 2.0 1.6 - 2.6 mg/dL   METABOLIC PANEL, BASIC    Collection Time: 10/21/22  5:32 AM   Result Value Ref Range    Sodium 143 136 - 145 mmol/L    Potassium 3.6 3.5 - 5.5 mmol/L    Chloride 111 100 - 111 mmol/L    CO2 28 21 - 32 mmol/L    Anion gap 4 3.0 - 18 mmol/L    Glucose 93 74 - 99 mg/dL BUN 6 (L) 7.0 - 18 MG/DL    Creatinine 0.38 (L) 0.6 - 1.3 MG/DL    BUN/Creatinine ratio 16 12 - 20      eGFR >60 >60 ml/min/1.73m2    Calcium 8.3 (L) 8.5 - 10.1 MG/DL   GLUCOSE, POC    Collection Time: 10/21/22 11:26 AM   Result Value Ref Range    Glucose (POC) 92 70 - 110 mg/dL       Signed By: Mali Arellano MD     October 21, 2022

## 2022-10-21 NOTE — ADT AUTH CERT NOTES
General Surgery or Procedure GRG - Care Day 12 (10/20/2022) by Wu Estrada       Review Status Review Entered   Completed 10/21/2022 0849       Created By   Wu Estrada      Criteria Review      Care Day: 12 Care Date: 10/20/2022 Level of Care: Step Down    Guideline Day 1    Level Of Care    (X) OR to ICU or intermediate care    10/21/2022 08:40:47 EDT by Wu Estrada      10/20  Stepdown    Clinical Status    (X) * Clinical Indications met    10/21/2022 08:40:47 EDT by Wu Estrada      DYSPHAGIA, Peg placement    (X) * Procedure completed    10/21/2022 08:40:47 EDT by Wu Estrada      PERCUTANEOUS ENDOSCOPIC GASTROSTOMY TUBE INSERTION    Interventions    (X) Inpatient interventions as needed    10/21/2022 08:49:54 EDT by Wu Estrada      IVF dextrose 5% with KCl 20 mEq/L infusion 75ml/hr,  IV Pepcid 32XL H92H,  IV folic acid  1 mg in daily,  IV Keppra 750mg q12h,  IV Apresoline 10mg q6h prn given 1x,  IV thiamine 100mg daily ,  IV Lopressor 2.5mg q6h prn given 1x,  IV Ancef 2g given 1x    * Milestone   Additional Notes   10/20  Stepdown      Pertinent Updates:   PERCUTANEOUS ENDOSCOPIC GASTROSTOMY TUBE INSERTION   NGT removed   Tube feeding started at 10mL/hr and water flushes 50mL Q1      Vitals:    98.7 °F (37.1 °C) 115  157/91  29 100 % 3L nc       Abnl/Pertinent Labs   GLUCOSE,FAST - POC: 85,105,100,88   Prothrombin time: 16.7 (H)      Physical Exam:   General: Alert, cooperative, no distress, appears stated age. Noncommunicating   Eyes: Conjunctivae/corneas clear. PERRL, EOMs intact. Mouth/Throat: Lips, mucosa, and tongue normal. Teeth and gums normal.   Neck: Supple, symmetrical, trachea midline, no adenopathy, thyroid: no enlargement/tenderness/nodules, no carotid bruit and no JVD. Lungs:   Clear to auscultation bilaterally. Heart: Regular rate and rhythm, S1, S2 normal, no murmur, click, rub or gallop. Abdomen:   Soft, non-tender.  Bowel sounds normal. No masses,  No organomegaly. Extremities: Extremities normal, atraumatic, no cyanosis or edema      Per Attending   PEG tube placement, timing per GI. Tentatively planned for today pending ability to schedule. We will follow-up this afternoon. Stable off of antibiotics, continue to monitor. Continue wound care. Disposition pending PEG tube placement and ability to tolerate tube feeds. Return to long-term care pending above      Per Gastroenterology   Preoperative diagnosis: DYSPHAGIA   Postoperative diagnosis: Peg placement   Procedure: Procedure(s):   PERCUTANEOUS ENDOSCOPIC GASTROSTOMY TUBE INSERTION   Complications:   None; patient tolerated the procedure well. Recommendations:   Feeding, formula and rate per Nutrition, OK to use PEG now       Per Nephrology   PEG placement today. Once done will start giving free water and the potassium replacement through the PEG tube and all the IV fluid will be discontinued. We need to check lab tomorrow if possible.   Continue DNR      Medications:   SC heparin 5000 units q8h    PO Lopressor 25mg 2x daily      Orders:   droplet plus isolation,    daily labs, spot check oximetry, neuro vascular checks continuous, SCD                  General Surgery or Procedure GRG - Clinical Indications for Procedure by Lisa Romo       Review Status Review Entered   Completed 10/21/2022 0839       Created By   Lisa Romo      Criteria Review      Clinical Indications for Procedure    Most Recent : Lisa Romo Most Recent Date: 10/21/2022 08:39:00 EDT    (X) Surgery or other procedure covered by this guideline is indicated for  1 or more  of the following     (1):       (X) Procedure on stomach needed; indications include  1 or more  of the following  (10) (11):          (X) Other gastric surgery needed (eg, gastropexy for volvulus, gastrostomy) (21)          10/21/2022 08:39:00 EDT by Lisa Romo            PERCUTANEOUS ENDOSCOPIC GASTROSTOMY TUBE INSERTION        Pneumonia Due to Aspiration - Care Day 11 (10/19/2022) by Sherren Harvest       Review Status Review Entered   Completed 10/21/2022 0830       Created By   Sherren Harvest      Criteria Review      Care Day: 11 Care Date: 10/19/2022 Level of Care: Step Down    Guideline Day 2    Level Of Care    (X) Floor    10/21/2022 08:30:05 EDT by Sherren Harvest      10/19  Stepdown    Clinical Status    (X) * Hypotension absent    10/21/2022 08:30:05 EDT by Shawanda Soni      100.2 °F (37.9 °C) 100 166/92 18 93 % 3L nc    (X) * No requirement for mechanical ventilation    10/21/2022 08:30:05 EDT by Shawanda Soni      on 3L nc    (X) * Uncompensated respiratory acidosis absent    10/21/2022 08:30:05 EDT by Shawanda Soni      CO2: 29    Activity    ( ) * Activity as tolerated    10/21/2022 08:30:05 EDT by Sherren Harvest      turn or assist q2h    Routes    (X) * Liquid diet or tube feedings    10/21/2022 08:30:05 EDT by Sherren Harvest      ADULT TUBE FEEDING Nasogastric; Standard with Fiber NGT 10ml/hr continuous    Interventions    (X) CBC    10/21/2022 08:30:05 EDT by Sherren Harvest      GLUCOSE,FAST - POC: 108,83, 105, 96,89  WBC: 13.5 (H)  RBC: 3.74 (L)  HGB: 9.4 (L)  HCT: 30.1 (L)  Sodium: 145  Potassium: 3.3 (L)  Chloride: 112 (H)  Glucose: 100 (H)  BUN: 3 (L)  Creatinine: 0.32 (L)    (X) Possible feeding tube placement    10/21/2022 08:30:05 EDT by Sherren Harvest      plan for PEG placement tomorrow    * Milestone   Additional Notes   10/19  Stepdown      Pertinent Updates:   PEG placement tomorrow per GI if no new issues develop in the interim      Physical Exam:   General: Alert, cooperative, no distress, appears stated age. non communicative   Neck: Supple, symmetrical, trachea midline, no adenopathy, thyroid: no enlargement/tenderness/nodules, no carotid bruit and no JVD. Lungs:   Clear to auscultation bilaterally.    Heart: Regular rate and rhythm, S1, S2 normal, no murmur, click, rub or gallop. Abdomen:   Soft, non-tender. Bowel sounds normal. No mass   Extremities: Extremities normal, atraumatic, no cyanosis or edema      Per Nephrology   Respiratory failure ([facility] [address] (10/9/2022) POA: Unknown     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[facility] [address] (93/6/0383) POA: Unknown     Aspiration into airway (10/9/2022) POA: Unknown     Acute kidney injury ([facility] [address] (10/10/2022) POA: Unknown     Seizure disorder ([facility] [address] (10/12/2022) POA: Yes   Continue IV fluid free water and the potassium replacement continue NG feeding. May need to plan for transfer back to nursing home soon. Per Gastroenterology   COVID Positive (10/10), also positive for enterovirus and rhinovirus   - anticipate droplet plus contact precautions to be d/c tomorrow   - now off antibiotics   Plan for PEG this week pending droplet plus contact precautions d/c   Chief Complaint: aspiration pneumonia, dysphagia, need for PEG      Per Attending   PEG placement tomorrow per GI if no new issues develop in the interim. Discussed plan with patient's son by phone and he is agreeable to proceed. Continue to monitor off of antibiotics. Wound care per current orders. Volume and electrolytes per nephrology. Stable in this regard, no further recommendations per discussion with Dr. Denisse Bishop today. Anticipate return to long-term care facility by Friday once tolerating tube feeds. Case discussed with GI and nephrology today      Medications:   IVF dextrose 5% with KCl 20 mEq/L infusion 75ml/hr   IV Pepcid 23EK V30N   IV folic acid  1 mg in 0.5% sodium chloride 50 mL ivpb daily   SC heparin 5000 units q8h    IV Keppra 750mg q12h   PO Lopressor 25mg 2x daily   IV thiamine 100mg daily       Orders:   droplet plus isolation,    daily labs, spot check oximetry, neuro vascular checks continuous, SCD       CM Note:   Updated Stefan Schooling at [facility], plan for PEG TUBE placement on Thursday.  Possible discharge Friday or Saturday back to LTC at [address] Per Dietary   Continue tube feeding regimen:    Formula: Jevity 1.5   Goal Rate: 60 mL/hr    Water Flushes: 50 mL q 1 hours   Goal Regimen Provides: 2160 kcal, 119 g of protein, 1093 mL of free water, 2293 mL of TOTAL WATER, 100% RDIs   Continue IVF per MD    Initiate aspiration precautions    Monitor tolerance of EN feeding, readiness to advance to goal rate, weight, labs, and plan of care during admission.

## 2022-10-21 NOTE — PROGRESS NOTES
Progress Note    Annalisa Don  79 y.o. Admit Date: 10/9/2022  Active Problems:    Hypertension (9/2/2012) POA: Unknown      Hypokalemia (6/22/2013) POA: Unknown      Aspiration pneumonia ([facility] [address] (8/22/2017) POA: Unknown      Hyperosmolality and hypernatremia (3/30/2021) POA: Unknown      Dehydration (3/30/2021) POA: Yes      COVID-19 (3/30/2021) POA: Yes      Respiratory failure ([address] (10/9/2022) POA: Unknown      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[facility] [address] (66/5/9789) POA: Unknown      Aspiration into airway (10/9/2022) POA: Unknown      Acute kidney injury ([facility] [address] (10/10/2022) POA: Unknown      Seizure disorder ([facility] [address] (10/12/2022) POA: Yes            Subjective:     Patient remained non communicative, has PEG tube      A comprehensive review of systems was negative except for that written in the History of Present Illness.     Objective:     Visit Vitals  BP (!) 155/87 (BP 1 Location: Left upper arm, BP Patient Position: At rest)   Pulse 86   Temp 98.7 °F (37.1 °C)   Resp 20   Ht 5' 10\" (1.778 m)   Wt 68.1 kg (150 lb 2.1 oz)   SpO2 100%   BMI 21.54 kg/m²         Intake/Output Summary (Last 24 hours) at 10/21/2022 1220  Last data filed at 10/21/2022 0725  Gross per 24 hour   Intake 210 ml   Output 2000 ml   Net -1790 ml       Current Facility-Administered Medications   Medication Dose Route Frequency Provider Last Rate Last Admin    metoprolol (LOPRESSOR) injection 2.5 mg  2.5 mg IntraVENous Q6H PRN Valentin Long MD   2.5 mg at 10/20/22 0218    bacitracin zinc-polymyxin b (POLYSPORIN) 500-10,000 unit/gram ointment   Topical DAILY Kailash Dillard MD   Given at 10/21/22 1127    sodium chloride (NS) flush 5-40 mL  5-40 mL IntraVENous PRN Valeria Pierce MD        metoprolol tartrate (LOPRESSOR) tablet 25 mg  25 mg Oral BID Louisa Kwok NP   25 mg at 10/21/22 0834    dextrose 5% with KCl 20 mEq/L infusion   IntraVENous CONTINUOUS Greer Mccoy MD 75 mL/hr at 10/20/22 2337 New Bag at 10/20/22 2337    sodium chloride (NS) flush 5-40 mL  5-40 mL IntraVENous Q8H Chelsy Fonseca MD   10 mL at 10/21/22 0600    sodium chloride (NS) flush 5-40 mL  5-40 mL IntraVENous PRN Chelsy Fonseca MD        albuterol-ipratropium (DUO-NEB) 2.5 MG-0.5 MG/3 ML  3 mL Nebulization Q4H PRN Gerardo Fortune NP        insulin lispro (HUMALOG) injection   SubCUTAneous Q6H Lavella Burkitt C, NP   2 Units at 10/10/22 2309    glucose chewable tablet 16 g  4 Tablet Oral PRN Gerardo Fortune NP        glucagon (GLUCAGEN) injection 1 mg  1 mg IntraMUSCular PRN Lavella Burkitt C, NP        dextrose 10% infusion 0-250 mL  0-250 mL IntraVENous PRN Lavella Burkitt C, NP        hydrALAZINE (APRESOLINE) 20 mg/mL injection 10 mg  10 mg IntraVENous Q6H PRN Lavella Burkitt C, NP   10 mg at 10/20/22 0051    acetaminophen (TYLENOL) tablet 650 mg  650 mg Oral Q6H PRN Gerardo Fortune NP   650 mg at 10/12/22 2138    Or    acetaminophen (TYLENOL) suppository 650 mg  650 mg Rectal Q6H PRN Gerardo Fortune NP        polyethylene glycol (MIRALAX) packet 17 g  17 g Oral DAILY PRN Lavella Burkitt C, NP        ondansetron (ZOFRAN ODT) tablet 4 mg  4 mg Oral Q8H PRN Lavella Burkitt C, NP        Or    ondansetron (ZOFRAN) injection 4 mg  4 mg IntraVENous Q6H PRN Lavella Burkitt C, NP        heparin (porcine) injection 5,000 Units  5,000 Units SubCUTAneous Q8H Laura Owens NP   5,000 Units at 10/21/22 0834    levETIRAcetam (KEPPRA) 750 mg in 0.9% sodium chloride 100 mL IVPB  750 mg IntraVENous Q12H Laura Owens  mL/hr at 10/21/22 1127 750 mg at 94/33/50 8179    folic acid (FOLVITE) 1 mg in 0.9% sodium chloride 50 mL ivpb   IntraVENous DAILY Lavella Burkitt C,  mL/hr at 10/21/22 0845 New Bag at 10/21/22 0845    thiamine (B-1) 100 mg in 0.9% sodium chloride 50 mL IVPB  100 mg IntraVENous DAILY Lavella Burkitt C,  mL/hr at 10/21/22 0837 100 mg at 10/21/22 0837    famotidine (PF) (PEPCID) 20 mg in 0.9% sodium chloride 10 mL injection  20 mg IntraVENous Q12H Sherryle Grandchild, NP   20 mg at 10/21/22 1642        Physical Exam:     Physical Exam:   General:  Alert, cooperative, no distress, appears stated age. non communicative. Neck: Supple, symmetrical, trachea midline, no adenopathy, thyroid: no enlargement/tenderness/nodules, no carotid bruit and no JVD. Lungs:   Clear to auscultation bilaterally. Heart:  Regular rate and rhythm, S1, S2 normal, no murmur, click, rub or gallop. Abdomen:   Soft, non-tender. Bowel sounds normal. No masses,  No organomegaly. Extremities: Extremities normal, atraumatic, no cyanosis or edema                         Data Review:    CBC w/Diff    Recent Labs     10/21/22  0532 10/19/22  0555   WBC 9.8 13.5*   RBC 3.43* 3.74*   HGB 8.7* 9.4*   HCT 27.9* 30.1*   MCV 81.3 80.5   MCH 25.4 25.1   MCHC 31.2 31.2   RDW 15.9* 15.5*    Recent Labs     10/21/22  0532 10/19/22  0555   MONOS 7 4   EOS 1 2   BASOS 0 0   RDW 15.9* 15.5*        Comprehensive Metabolic Profile    Recent Labs     10/21/22  0532 10/19/22  0555 10/18/22  1308    145 147*   K 3.6 3.3* 3.5    112* 114*   CO2 28 29 32   BUN 6* 3* 2*   CREA 0.38* 0.32* 0.28*    Recent Labs     10/21/22  0532 10/19/22  0555 10/18/22  1308   CA 8.3* 8.6 8.3*                        Impression:       Active Hospital Problems    Diagnosis Date Noted    Seizure disorder ([address] 10/12/2022    Acute kidney injury ([address] 10/10/2022    Respiratory failure ([address] 42/37/0344    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[address] 36/77/7227    Aspiration into airway 10/09/2022    Hyperosmolality and hypernatremia 03/30/2021    COVID-19 03/30/2021    Dehydration 03/30/2021    Aspiration pneumonia ([address] 08/22/2017    Hypokalemia 06/22/2013    Hypertension 09/02/2012      ResolvedARF, Na is corrected, K is low normal      Plan:     Recommend to start PEG feeding & Give Free water & K replacement through PEG  & DC IVF & transfer back to Nursing home. Continue DNR.       Vernon Cole MD

## 2022-10-21 NOTE — PROGRESS NOTES
1900: Bedside and Verbal shift change report given to Janell Ellis RN (oncoming nurse) by Margy Kinney RN (offgoing nurse). Report included the following information SBAR, Kardex, and ED Summary. 0700: Bedside and Verbal shift change report given to Eileen Gong RN (oncoming nurse) by Janell Ellis RN (offgoing nurse). Report included the following information SBAR, Kardex, and ED Summary.

## 2022-10-21 NOTE — PROGRESS NOTES
Problem: Pressure Injury - Risk of  Goal: *Prevention of pressure injury  Description: Document Brian Scale and appropriate interventions in the flowsheet. Outcome: Progressing Towards Goal  Note: Pressure Injury Interventions:  Sensory Interventions: Assess changes in LOC, Assess need for specialty bed, Keep linens dry and wrinkle-free, Maintain/enhance activity level, Minimize linen layers, Monitor skin under medical devices    Moisture Interventions: Absorbent underpads, Apply protective barrier, creams and emollients, Maintain skin hydration (lotion/cream), Minimize layers, Moisture barrier    Activity Interventions: Assess need for specialty bed, Pressure redistribution bed/mattress(bed type)    Mobility Interventions: Assess need for specialty bed, HOB 30 degrees or less, Pressure redistribution bed/mattress (bed type)    Nutrition Interventions: Document food/fluid/supplement intake    Friction and Shear Interventions: Apply protective barrier, creams and emollients, HOB 30 degrees or less                Problem: Risk for Spread of Infection  Goal: Prevent transmission of infectious organism to others  Description: Prevent the transmission of infectious organisms to other patients, staff members, and visitors. Outcome: Progressing Towards Goal     Problem: Nutrition Deficit  Goal: *Optimize nutritional status  Outcome: Progressing Towards Goal     Problem: Falls - Risk of  Goal: *Absence of Falls  Description: Document Ana Bartlett Fall Risk and appropriate interventions in the flowsheet.   Outcome: Progressing Towards Goal  Note: Fall Risk Interventions:  Mobility Interventions: Bed/chair exit alarm    Mentation Interventions: Adequate sleep, hydration, pain control, Bed/chair exit alarm, More frequent rounding, Room close to nurse's station, Increase mobility    Medication Interventions: Bed/chair exit alarm    Elimination Interventions: Bed/chair exit alarm, Call light in reach    History of Falls Interventions: Bed/chair exit alarm

## 2022-10-22 VITALS
HEART RATE: 84 BPM | OXYGEN SATURATION: 100 % | TEMPERATURE: 99 F | WEIGHT: 150.13 LBS | BODY MASS INDEX: 21.49 KG/M2 | RESPIRATION RATE: 20 BRPM | HEIGHT: 70 IN | DIASTOLIC BLOOD PRESSURE: 79 MMHG | SYSTOLIC BLOOD PRESSURE: 126 MMHG

## 2022-10-22 LAB
ANION GAP SERPL CALC-SCNC: 4 MMOL/L (ref 3–18)
BASOPHILS # BLD: 0 K/UL (ref 0–0.1)
BASOPHILS NFR BLD: 0 % (ref 0–2)
BUN SERPL-MCNC: 4 MG/DL (ref 7–18)
BUN/CREAT SERPL: 15 (ref 12–20)
CALCIUM SERPL-MCNC: 8 MG/DL (ref 8.5–10.1)
CHLORIDE SERPL-SCNC: 111 MMOL/L (ref 100–111)
CO2 SERPL-SCNC: 29 MMOL/L (ref 21–32)
CREAT SERPL-MCNC: 0.26 MG/DL (ref 0.6–1.3)
DIFFERENTIAL METHOD BLD: ABNORMAL
EOSINOPHIL # BLD: 0.1 K/UL (ref 0–0.4)
EOSINOPHIL NFR BLD: 1 % (ref 0–5)
ERYTHROCYTE [DISTWIDTH] IN BLOOD BY AUTOMATED COUNT: 16 % (ref 11.6–14.5)
GLUCOSE BLD STRIP.AUTO-MCNC: 106 MG/DL (ref 70–110)
GLUCOSE BLD STRIP.AUTO-MCNC: 108 MG/DL (ref 70–110)
GLUCOSE BLD STRIP.AUTO-MCNC: 93 MG/DL (ref 70–110)
GLUCOSE SERPL-MCNC: 95 MG/DL (ref 74–99)
HCT VFR BLD AUTO: 23.9 % (ref 36–48)
HGB BLD-MCNC: 7.5 G/DL (ref 13–16)
IMM GRANULOCYTES # BLD AUTO: 0 K/UL (ref 0–0.04)
IMM GRANULOCYTES NFR BLD AUTO: 1 % (ref 0–0.5)
LYMPHOCYTES # BLD: 1.1 K/UL (ref 0.9–3.6)
LYMPHOCYTES NFR BLD: 13 % (ref 21–52)
MAGNESIUM SERPL-MCNC: 1.9 MG/DL (ref 1.6–2.6)
MCH RBC QN AUTO: 25.4 PG (ref 24–34)
MCHC RBC AUTO-ENTMCNC: 31.4 G/DL (ref 31–37)
MCV RBC AUTO: 81 FL (ref 78–100)
MONOCYTES # BLD: 0.6 K/UL (ref 0.05–1.2)
MONOCYTES NFR BLD: 7 % (ref 3–10)
NEUTS SEG # BLD: 6.2 K/UL (ref 1.8–8)
NEUTS SEG NFR BLD: 78 % (ref 40–73)
NRBC # BLD: 0 K/UL (ref 0–0.01)
NRBC BLD-RTO: 0 PER 100 WBC
PLATELET # BLD AUTO: 365 K/UL (ref 135–420)
PMV BLD AUTO: 11.1 FL (ref 9.2–11.8)
POTASSIUM SERPL-SCNC: 3.5 MMOL/L (ref 3.5–5.5)
RBC # BLD AUTO: 2.95 M/UL (ref 4.35–5.65)
SODIUM SERPL-SCNC: 144 MMOL/L (ref 136–145)
WBC # BLD AUTO: 8 K/UL (ref 4.6–13.2)

## 2022-10-22 PROCEDURE — 74011000250 HC RX REV CODE- 250: Performed by: NURSE PRACTITIONER

## 2022-10-22 PROCEDURE — 83735 ASSAY OF MAGNESIUM: CPT

## 2022-10-22 PROCEDURE — 82962 GLUCOSE BLOOD TEST: CPT

## 2022-10-22 PROCEDURE — 74011000250 HC RX REV CODE- 250: Performed by: INTERNAL MEDICINE

## 2022-10-22 PROCEDURE — 74011250636 HC RX REV CODE- 250/636: Performed by: NURSE PRACTITIONER

## 2022-10-22 PROCEDURE — 80048 BASIC METABOLIC PNL TOTAL CA: CPT

## 2022-10-22 PROCEDURE — 77010033678 HC OXYGEN DAILY

## 2022-10-22 PROCEDURE — 85025 COMPLETE CBC W/AUTO DIFF WBC: CPT

## 2022-10-22 PROCEDURE — 99239 HOSP IP/OBS DSCHRG MGMT >30: CPT | Performed by: FAMILY MEDICINE

## 2022-10-22 PROCEDURE — 94762 N-INVAS EAR/PLS OXIMTRY CONT: CPT

## 2022-10-22 PROCEDURE — 36415 COLL VENOUS BLD VENIPUNCTURE: CPT

## 2022-10-22 PROCEDURE — 74011250637 HC RX REV CODE- 250/637

## 2022-10-22 RX ORDER — LANOLIN ALCOHOL/MO/W.PET/CERES
100 CREAM (GRAM) TOPICAL DAILY
Qty: 30 TABLET | Refills: 0 | Status: SHIPPED
Start: 2022-10-22

## 2022-10-22 RX ORDER — LEVETIRACETAM 100 MG/ML
750 SOLUTION ORAL 2 TIMES DAILY
Qty: 473 ML | Refills: 0 | Status: SHIPPED
Start: 2022-10-22

## 2022-10-22 RX ADMIN — SODIUM CHLORIDE, PRESERVATIVE FREE 10 ML: 5 INJECTION INTRAVENOUS at 06:02

## 2022-10-22 RX ADMIN — BACITRACIN ZINC AND POLYMYXIN B SULFATE: 500; 10000 OINTMENT TOPICAL at 10:34

## 2022-10-22 RX ADMIN — SODIUM CHLORIDE, PRESERVATIVE FREE 20 MG: 5 INJECTION INTRAVENOUS at 10:33

## 2022-10-22 RX ADMIN — HEPARIN SODIUM 5000 UNITS: 5000 INJECTION INTRAVENOUS; SUBCUTANEOUS at 10:33

## 2022-10-22 RX ADMIN — METOPROLOL TARTRATE 25 MG: 25 TABLET, FILM COATED ORAL at 10:33

## 2022-10-22 NOTE — DISCHARGE SUMMARY
Discharge Summary    Patient: Lloyd De La Cruz MRN: [id number]  CSN: [id number]    YOB: 1955  Age: 79 y.o. Sex: male    DOA: 10/9/2022 LOS:  LOS: 13 days   Discharge Date: 10/22/2022     Admission Diagnoses: Respiratory failure ([facility] [address] [J96.90]  Dementia, alcoholic ([facility] [address] [X04.47]  Aspiration into airway [T17.908A]    Discharge Diagnoses:    Aspiration pneumonia  Sepsis, POA, secondary to above  PARAMJIT  Dysphagia  Hypokalemia  Hypertension  Seizure disorder  COVID-19 infection, asymptomatic  Hyponatremia  Dementia  Alcohol abuse history    Discharge Condition: Stable    PHYSICAL EXAM  Visit Vitals  BP (!) 141/79   Pulse 91   Temp 98.3 °F (36.8 °C)   Resp 20   Ht 5' 10\" (1.778 m)   Wt 68.1 kg (150 lb 2.1 oz)   SpO2 100%   BMI 21.54 kg/m²       General: In NAD. Nontoxic-appearing. HEENT: NCAT. Sclerae anicteric. Lungs:  Clear, no wheezes. No accessory muscle use. Heart:  RRR. Abdomen: Soft, NT/ND. Extremities: Warm, no edema or ischemia. Psych:   Mood normal.  Neurologic:  Awake and alert. Hospital Course:   See admission H&P for full details of HPI. Patient was admitted to the hospital from a long-term care facility after presenting to the ED for evaluation for acute respiratory distress. Oxygen saturations were 80% on room air and improved with a nonrebreather. Per EMS report, a fair amount of chewed food was suctioned from patient's mouth and airway prior to presentation to the ED. There was obvious concern for aspiration pneumonia and patient required admission to ICU due to infectious picture as well as concern for ability to protect airway. Cultures were obtained on admission and antibiotic therapy that had been initiated was continued. Infectious diseases evaluation was obtained. Patient has responded well to antibiotics and overall clinical status has continued to improve.   SLP evaluation was obtained and patient did not make any significant progress with swallowing dysfunction. Tube feeds were continued via NG tube which had been placed previously. Ultimately, PEG tube placement was recommended and this was performed after consent was obtained from patient's son. Tube feeds have been initiated per clinical nutrition recommendations and patient has tolerated well to this point. He is remained stable from an infection standpoint and is without any current signs/symptoms of active/ongoing infection. With regard to COVID-19 infection, patient has been asymptomatic and has not required any acute treatment. Per hospital protocol, COVID-19 droplet plus precautions have been discontinued on 10/20/2022. Patient is felt to be medically stable for discharge and he can return to his previous long-term care facility today for continued care. Consults:   [facility]  Gastroenterology  Palliative medicine  Infectious diseases    Significant Diagnostic Studies/Procedures:   CT chest:  COMPARISON: Chest x-ray 10/9/2022, CT chest 9/3/2013. TECHNIQUE: Standard helical images were obtained from the thoracic inlet through  the adrenals at 5 mm thick sections without intravenous contrast.    Coronal and sagittal reformations obtained. Images were reviewed on both soft  tissue, lung, and bone window settings. All CT scans at this facility are performed using dose optimization technique as  appropriate to a performed exam, to include automated exposure control,  adjustment of the mA and/or kV according to patient's size (including  appropriate matching for site-specific examinations), or use of iterative  reconstruction technique. FINDINGS:     Minimal left basal posterior dependently consolidated lung. Vague tree-in-bud faint nodularity within the posterior segment of the right  upper lobe. Vague tree-in-bud clustered perivascular nodules in the posterior superior right  middle lobe.   Multifocal vague patchy nodules in the superior left upper lobe with dense  consolidation. Likely secretions or soft tissue density within the proximal bronchus  intermedius. Mild elevation of the right hemidiaphragm. .     There is no evidence of mediastinal, hilar, nor axillary adenopathy. Evaluation of the mediastinum and jimmy is limited by the lack of IV contrast.   The great vessels and thoracic aorta are unremarkable. There are no pleural effusions. The included portion of the of the liver is unremarkable. Probable small  gallstones. The adrenal glands are normal.     The chest wall soft tissues are unremarkable. The bony structures are unremarkable. IMPRESSION     Dense consolidation within the right lower lobe with soft tissue density within  the bronchus intermedius proximal left lower lobe main bronchus. Likely  secretions. Associated with mild elevation of the hemidiaphragm with at least  some component of atelectatic volume loss. Smaller region of similar consolidation within the left lower lobe. Aspiration pneumonia not excluded. Tree-in-bud day fuzzy nodules in the perivascular spaces in the right upper  lobe, right middle lobe and superior right lower lobe of acute  infection/pneumonia. 2D echo: Interpretation Summary    Result status: Final result       Left Ventricle: Normal left ventricular systolic function. Left ventricle size is normal. Normal wall thickness. Normal wall motion. Comparison Study Information    Prior Study    There is a prior study available for comparison. Prior study date: 12/21/2016. As compared to the previous study, there are no significant changes. Echo Findings    Left Ventricle Normal left ventricular systolic function. Left ventricle size is normal. Normal wall thickness. Normal wall motion. Pulmonary Artery Pulmonary hypertension not present. IVC/Hepatic Veins IVC was not assessed due to poor image quality. Patient is ventilated, cannot use IVC diameter to estimate right atrial pressure. Pericardium No pericardial effusion. Study Details    Image quality: adequate. The view(s) performed were parasternal, apical and subcostal. Color flow Doppler was performed and pulse wave and/or continuous wave Doppler was performed. COVID+ No contrast was given. CXR 10/18/2022:  COMPARISON: Portable chest 10/14/2022 and CT chest 10/9/2022     FINDINGS:     Enteric tube appears to terminate tip and side-port projects over the stomach. Multiple devices project over the patient. The cardiac silhouette is unchanged in appearance. Bibasilar airspace opacities  appear slightly improved compared to prior exam.     Improvement of the left costophrenic angle blunting/haziness, could be related  to improving infiltrate although pleural effusion is possible. No definite right  pleural effusion. No definite pneumothorax. No acute osseous abnormality appreciated. Increased gas within nondilated bowel  loops throughout the visualized abdomen, nonspecific, similar to prior. IMPRESSION  Bibasilar opacities/infiltrates appears, slightly improved compared to prior  exam. Possible small left pleural effusion. Dictated by Lidia Hargrove MD, PGY-2     As attending radiologist, I have assessed the study images and dictated or  reviewed/edited the final report as needed      Discharge Medications:     Current Discharge Medication List        START taking these medications    Details   thiamine HCL (B-1) 100 mg tablet 1 Tablet by Per G Tube route daily. Qty: 30 Tablet, Refills: 0           CONTINUE these medications which have CHANGED    Details   levETIRAcetam (KEPPRA) 100 mg/mL solution 7.5 mL by Per G Tube route two (2) times a day. Qty: 473 mL, Refills: 0           CONTINUE these medications which have NOT CHANGED    Details   pravastatin (PRAVACHOL) 40 mg tablet Take 1 Tab by mouth nightly.   Qty: 30 Tab, Refills: 1      ascorbic acid, vitamin C, (VITAMIN C) 250 mg tablet Take 1 Tab by mouth daily.  Qty: 30 Tab, Refills: 2      ferrous sulfate 325 mg (65 mg iron) tablet Take 1 Tab by mouth daily (with breakfast). Qty: 30 Tab, Refills: 2      polyethylene glycol (MIRALAX) 17 gram packet Take 1 Packet by mouth daily. Qty: 30 Packet, Refills: 2      tamsulosin (FLOMAX) 0.4 mg capsule Take 0.4 mg by mouth daily. 1 tab daily      MULTIVITAMIN,THERAPEUTIC (THERA MULTI-VITAMIN PO) Take 500 mg by mouth daily. baclofen (LIORESAL) 10 mg tablet Take 1 Tab by mouth every twelve (12) hours as needed. Qty: 15 Tab, Refills: 0      metoprolol (LOPRESSOR) 25 mg tablet Take 1 Tab by mouth two (2) times a day. Qty: 60 Tab, Refills: 2      cyanocobalamin 1,000 mcg tablet Take 1 Tab by mouth daily. Qty: 30 Tab, Refills: 2      folic acid (FOLVITE) 1 mg tablet Take 1 Tab by mouth daily. Qty: 30 Tab, Refills: 1      aspirin 81 mg chewable tablet Take 1 Tab by mouth daily. Qty: 30 Tab, Refills: 0           STOP taking these medications       moxifloxacin (VIGAMOX) 0.5 % ophthalmic solution Comments:   Reason for Stopping:         doxepin (SINEquan) 10 mg capsule Comments:   Reason for Stopping:         clopidogrel (PLAVIX) 75 mg tab Comments:   Reason for Stopping:         senna-docusate (PERICOLACE) 8.6-50 mg per tablet Comments:   Reason for Stopping:               Activity: As tolerated. Diet: Tube feeds per current orders. Jevity 1.5 at 30 cc/h, increase by 10 cc every 8 hours to goal of 60 mL/h. Free water flushes of 50 cc every hour. Disposition: Long-term care facility. Follow-up: N/A. Minutes spent on discharge: >30 minutes spent coordinating this discharge. Liv Nance MD  [address] [place] [address]    Disclaimer: Sections of this note are dictated using utilizing voice recognition software. Minor typographical errors may be present. If questions arise, please do not hesitate to contact me or call our department.

## 2022-10-22 NOTE — PROGRESS NOTES
Progress Note    Fredna Mercury  79 y.o. Admit Date: 10/9/2022  Active Problems:    Hypertension (9/2/2012) POA: Unknown      Hypokalemia (6/22/2013) POA: Unknown      Aspiration pneumonia ([facility] (8/22/2017) POA: Unknown      Hyperosmolality and hypernatremia (3/30/2021) POA: Unknown      Dehydration (3/30/2021) POA: Yes      COVID-19 (3/30/2021) POA: Yes      Respiratory failure ([facility] (10/9/2022) POA: Unknown      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[facility] (62/1/0590) POA: Unknown      Aspiration into airway (10/9/2022) POA: Unknown      Acute kidney injury ([facility] (10/10/2022) POA: Unknown      Seizure disorder ([facility] (10/12/2022) POA: Yes            Subjective:     Patient not on COVID isolation anymore. Remained at his baseline mental status does not look dehydrated anymore. Noncommunicating. Has PEG tube placement yesterday started PEG feeding and also getting free water through the PEG tube      A comprehensive review of systems was negative except for that written in the History of Present Illness.     Objective:     Visit Vitals  BP (!) 141/79   Pulse 91   Temp 98.3 °F (36.8 °C)   Resp 20   Ht 5' 10\" (1.778 m)   Wt 68.1 kg (150 lb 2.1 oz)   SpO2 100%   BMI 21.54 kg/m²         Intake/Output Summary (Last 24 hours) at 10/22/2022 1149  Last data filed at 10/22/2022 2017  Gross per 24 hour   Intake 230 ml   Output 2450 ml   Net -2220 ml       Current Facility-Administered Medications   Medication Dose Route Frequency Provider Last Rate Last Admin    metoprolol (LOPRESSOR) injection 2.5 mg  2.5 mg IntraVENous Q6H PRN Cassius Mahoney MD   2.5 mg at 10/20/22 0218    bacitracin zinc-polymyxin b (POLYSPORIN) 500-10,000 unit/gram ointment   Topical DAILY Evelio Pace MD   Given at 10/22/22 1034    sodium chloride (NS) flush 5-40 mL  5-40 mL IntraVENous PRN Dominic Reyes MD        metoprolol tartrate (LOPRESSOR) tablet 25 mg  25 mg Oral BID Helen Elias NP   25 mg at 10/22/22 1033    dextrose 5% with KCl 20 mEq/L infusion   IntraVENous CONTINUOUS Kelvin Lugo MD 75 mL/hr at 10/21/22 1819 New Bag at 10/21/22 1819    sodium chloride (NS) flush 5-40 mL  5-40 mL IntraVENous Q8H Tammy Gomez MD   10 mL at 10/22/22 0602    sodium chloride (NS) flush 5-40 mL  5-40 mL IntraVENous PRN Tammy Gomez MD        albuterol-ipratropium (DUO-NEB) 2.5 MG-0.5 MG/3 ML  3 mL Nebulization Q4H PRN More Bray NP        insulin lispro (HUMALOG) injection   SubCUTAneous Q6H Fabian MURILLO NP   2 Units at 10/10/22 2309    glucose chewable tablet 16 g  4 Tablet Oral PRN More Bray NP        glucagon (GLUCAGEN) injection 1 mg  1 mg IntraMUSCular PRN Fabian MURILLO NP        dextrose 10% infusion 0-250 mL  0-250 mL IntraVENous PRN Fabian MURILLO NP        hydrALAZINE (APRESOLINE) 20 mg/mL injection 10 mg  10 mg IntraVENous Q6H PRN Fabian MURILLO NP   10 mg at 10/20/22 0051    acetaminophen (TYLENOL) tablet 650 mg  650 mg Oral Q6H PRN More Bray NP   650 mg at 10/12/22 2138    Or    acetaminophen (TYLENOL) suppository 650 mg  650 mg Rectal Q6H PRN More Bray NP        polyethylene glycol (MIRALAX) packet 17 g  17 g Oral DAILY PRN Fabian MURILLO NP        ondansetron (ZOFRAN ODT) tablet 4 mg  4 mg Oral Q8H PRN Fabian MURILLO NP        Or    ondansetron (ZOFRAN) injection 4 mg  4 mg IntraVENous Q6H PRN Fabian MURILLO NP        heparin (porcine) injection 5,000 Units  5,000 Units SubCUTAneous Q8H Laura Owens NP   5,000 Units at 10/22/22 1033    levETIRAcetam (KEPPRA) 750 mg in 0.9% sodium chloride 100 mL IVPB  750 mg IntraVENous Q12H Laura Owens  mL/hr at 10/21/22 2356 750 mg at 87/90/49 5062    folic acid (FOLVITE) 1 mg in 0.9% sodium chloride 50 mL ivpb   IntraVENous DAILY Fabian Candle C,  mL/hr at 10/21/22 0845 New Bag at 10/21/22 0845    thiamine (B-1) 100 mg in 0.9% sodium chloride 50 mL IVPB  100 mg IntraVENous DAILY Fabian Candle C,  mL/hr at 10/21/22 0837 100 mg at 10/21/22 0837    famotidine (PF) (PEPCID) 20 mg in 0.9% sodium chloride 10 mL injection  20 mg IntraVENous Q12H Madeleine MURILLO NP   20 mg at 10/22/22 1033        Physical Exam:     Physical Exam:   General:  Alert, cooperative, no distress, appears stated age. Noncommunicating   Eyes:  Conjunctivae/corneas clear. PERRL, EOMs intact. Mouth/Throat: Lips, mucosa, and tongue normal. Teeth and gums normal.   Neck: Supple, symmetrical, trachea midline, no adenopathy, thyroid: no enlargement/tenderness/nodules, no carotid bruit and no JVD. Lungs:   Clear to auscultation bilaterally. Heart:  Regular rate and rhythm, S1, S2 normal, no murmur, click, rub or gallop. Abdomen:   Soft, non-tender. Bowel sounds normal. No masses,  No organomegaly. Extremities: Extremities normal, atraumatic, no cyanosis or edema                         Data Review:    CBC w/Diff    Recent Labs     10/22/22  0314 10/21/22  0532   WBC 8.0 9.8   RBC 2.95* 3.43*   HGB 7.5* 8.7*   HCT 23.9* 27.9*   MCV 81.0 81.3   MCH 25.4 25.4   MCHC 31.4 31.2   RDW 16.0* 15.9*    Recent Labs     10/22/22  0314 10/21/22  0532   MONOS 7 7   EOS 1 1   BASOS 0 0   RDW 16.0* 15.9*        Comprehensive Metabolic Profile    Recent Labs     10/22/22  0314 10/21/22  0532    143   K 3.5 3.6    111   CO2 29 28   BUN 4* 6*   CREA 0.26* 0.38*    Recent Labs     10/22/22  0314 10/21/22  0532   CA 8.0* 8.3*                        Impression:       Active Hospital Problems    Diagnosis Date Noted    Seizure disorder ([facility] [facility] 10/12/2022    Acute kidney injury ([facility] 10/10/2022    Respiratory failure ([facility] 18/49/3000    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[facility] [address] 93/09/6218    Aspiration into airway 10/09/2022    Hyperosmolality and hypernatremia 03/30/2021    COVID-19 03/30/2021    Dehydration 03/30/2021    Aspiration pneumonia ([facility] 08/22/2017    Hypokalemia 06/22/2013    Hypertension 09/02/2012      Resolved acute renal failure.   Resolved hypernatremia and hypokalemia. Renal wise does not need any further work-up. Plan: Will sign off and patient can be transferred back to the nursing home.   His CODE STATUS will remain DNR      Helena Hermosillo MD

## 2022-10-22 NOTE — PROGRESS NOTES
Per Dr. Geovanny Carrillo, pt is ready for discharge. Called [facility] of [facility] for [address] She stated they can receive pt. Sent message to Dr. Geovanny Carrillo. Informed Fab that pt is on 3L oxygen    1150: Transport arranged with [facility] and they can  pt within the hour. Updated pt's nurse Manda Smith. Informed Fab.         LOLIS EscobarN RN  Care Management  Pager: [phone]

## 2022-10-22 NOTE — PROGRESS NOTES
1910hrs:  Bedside and Verbal shift change report given to JOSEFA Negrete (oncoming nurse) by Marga Mora RN (offgoing nurse). Report included the following information SBAR, Kardex, ED Summary, OR Summary, Procedure Summary, Intake/Output, MAR, Recent Results, and Cardiac Rhythm NSR . Wound Prevention Checklist    Patient: Selene Smith (31 y.o. male)  Date: 10/22/2022  Diagnosis: Respiratory failure ([facility] [J96.90]  Dementia, alcoholic ([facility] [address] [B21.15]  Aspiration into airway [T17.908A] <principal problem not specified>    Precautions: Fall, Skin, Seizure, Aspiration       [x]  Heel prevention boots placed on patient    [x]  Patient turned q2h during shift    []  Lift team ordered    []  Patient on [facility] bed/Specialty bed    [x]  Each Wound is documented during shift (Stage, Color, drainage, odor, measurements, and dressings)    [x]  Dual skin check done with JOSEFA Burdick RN     2350hrs:  Mellissa Restrepo at Pharmacy and advised that there were only two bags of Keppra IV in the pt's bin:  one bag  10/21/2022 at 2011hrs, and the other was for 10/22/2022 at 1100hrs. RN advised to use the bag for next day and send a message for replacement for admin scheduled on 10/22/2022 at 1100hrs. 2313hrs:  Pt with incontinent urine and BM episode. Pt was bathed with CHG and Bath Pack. Pt provided with new gown, linen, and bed pads. 0247hrs:  Message sent to Pharmacy for Keppra IV bag replacement. 0730hrs:  Bedside and Verbal shift change report given to Travis Marrufo RN (oncoming nurse) by Marga Mora RN (offgoing nurse). Report included the following information SBAR, Kardex, ED Summary, Procedure Summary, Intake/Output, MAR, Recent Results, and Cardiac Rhythm NSR .

## 2022-10-22 NOTE — PROGRESS NOTES
Transition of Care Plan to SNF/Rehab    SNF/Rehab Transition:  Patient has been accepted to [address] [place] [address] and meets criteria for admission. Patient will transported by  [facility] and expected to leave at 1pm.    Communication to Patient/Family:  Met with patient and  (identified care giver) and they are agreeable to the transition plan. Called pt's son Tr Barajas and voice mail full. Called pts stepdaughter listed Irwin Carrillo [phone] and message that number is not in service. communication to SNF/Rehab:  Bedside RN, Braulio Ayala, has been notified to update the transition plan to the facility and call report (phone number [phone]). Discharge information has been updated on the AVS.     Nursing Please include all hard scripts for controlled substances, med rec and dc summary, and AVS in packet. Reviewed and confirmed with facility, can manage the patient care needs for the following:     SNF/Rehab Transition:    Reviewed and confirmed with facility,they can manage the patient care needs for the following:     Herman Francois with (X) only those applicable:    Medication:  [x]  Medications will be available at the facility  []  IV Antibiotics   []  Controlled Substance - hard copy to be sent with patient   []  Weekly Labs   Documents:  [] Hard RX  [] MAR  [] Kardex  [] AVS  [x]Transfer Summary  [x]Discharge   Equipment:  []  CPAP/BiPAP  []  Wound Vacuum  []  Reid or Urinary Device  []  PICC/Central Line  []  Nebulizer  []  Ventilator   Treatment:  []Isolation (for MRSA, VRE, etc.)  []Surgical Drain Management  []Tracheostomy Care  []Dressing Changes  []Dialysis with transportation and chair time   []PEG Care  [x]Oxygen 3L  []Daily Weights for Heart Failure   Dietary:  []Any diet limitations  []Tube Feedings   []Total Parenteral Management (TPN)   Eligible for Medicaid Long Term Services and Supports  Yes:  [] Eligible for medical assistance or will become eligible within 180 days and UAI completed. [] Provider/Patient and/or support system has requested screening. [] UAI copy provided to patient or responsible party,   [] UAI unavailable at discharge will send once processed to SNF provider. [] UAI unavailable at discharged mailed to patient  No:   [] Private pay and is not financially eligible for Medicaid within the next 180 days. [] Reside out-of-state.   [] A residents of a state owned/operated facility that is licensed  by [address] [facility] or [facility]  [] Enrollment in [facility] hospice services  [] [address] [place] [address]  [] Patient /Family declines to have screening completed or provide financial information for screening     Financial Resources:  Medicaid    [] Initiated and application pending   [] Full coverage     Advanced Care Plan:  []Surrogate Decision Maker of Care  []POA  [x]Communicated Code Status DNR (DDNR\", \"Full\")    KATHRIN Broussard RN  Care Management  Pager: [phone]

## 2022-11-25 ENCOUNTER — APPOINTMENT (OUTPATIENT)
Dept: GENERAL RADIOLOGY | Age: 67
DRG: 177 | End: 2022-11-25
Attending: INTERNAL MEDICINE
Payer: COMMERCIAL

## 2022-11-25 ENCOUNTER — HOSPITAL ENCOUNTER (INPATIENT)
Age: 67
LOS: 6 days | Discharge: NURSING, REHAB OR LONG-TERM CARE | DRG: 177 | End: 2022-12-01
Attending: EMERGENCY MEDICINE | Admitting: INTERNAL MEDICINE
Payer: COMMERCIAL

## 2022-11-25 PROBLEM — D64.9 SYMPTOMATIC ANEMIA: Status: ACTIVE | Noted: 2022-11-25

## 2022-11-25 PROBLEM — E87.0 HYPERNATREMIA: Status: ACTIVE | Noted: 2022-11-25

## 2022-11-25 PROBLEM — D64.9 ANEMIA: Status: ACTIVE | Noted: 2022-11-25

## 2022-11-25 LAB
ALBUMIN SERPL-MCNC: 1.6 G/DL (ref 3.4–5)
ALBUMIN/GLOB SERPL: 0.3 {RATIO} (ref 0.8–1.7)
ALP SERPL-CCNC: 62 U/L (ref 45–117)
ALT SERPL-CCNC: 36 U/L (ref 16–61)
ANION GAP SERPL CALC-SCNC: 1 MMOL/L (ref 3–18)
APPEARANCE UR: ABNORMAL
AST SERPL-CCNC: 30 U/L (ref 10–38)
BACTERIA URNS QL MICRO: ABNORMAL /HPF
BASOPHILS # BLD: 0 K/UL (ref 0–0.1)
BASOPHILS NFR BLD: 0 % (ref 0–2)
BILIRUB SERPL-MCNC: 0.1 MG/DL (ref 0.2–1)
BILIRUB UR QL: NEGATIVE
BUN SERPL-MCNC: 29 MG/DL (ref 7–18)
BUN/CREAT SERPL: 71 (ref 12–20)
CALCIUM SERPL-MCNC: 9 MG/DL (ref 8.5–10.1)
CHLORIDE SERPL-SCNC: 136 MMOL/L (ref 100–111)
CO2 SERPL-SCNC: 29 MMOL/L (ref 21–32)
COLOR UR: YELLOW
CREAT SERPL-MCNC: 0.41 MG/DL (ref 0.6–1.3)
DIFFERENTIAL METHOD BLD: ABNORMAL
EOSINOPHIL # BLD: 0.4 K/UL (ref 0–0.4)
EOSINOPHIL NFR BLD: 6 % (ref 0–5)
EPITH CASTS URNS QL MICRO: ABNORMAL /LPF (ref 0–5)
ERYTHROCYTE [DISTWIDTH] IN BLOOD BY AUTOMATED COUNT: 17.3 % (ref 11.6–14.5)
FERRITIN SERPL-MCNC: 422 NG/ML (ref 8–388)
FOLATE SERPL-MCNC: >20 NG/ML (ref 3.1–17.5)
GLOBULIN SER CALC-MCNC: 5.6 G/DL (ref 2–4)
GLUCOSE SERPL-MCNC: 111 MG/DL (ref 74–99)
GLUCOSE UR STRIP.AUTO-MCNC: NEGATIVE MG/DL
HCT VFR BLD AUTO: 22.1 % (ref 36–48)
HGB BLD-MCNC: 6.4 G/DL (ref 13–16)
HGB UR QL STRIP: NEGATIVE
HISTORY CHECKED?,CKHIST: NORMAL
HYALINE CASTS URNS QL MICRO: ABNORMAL /LPF (ref 0–2)
IMM GRANULOCYTES # BLD AUTO: 0 K/UL (ref 0–0.04)
IMM GRANULOCYTES NFR BLD AUTO: 0 % (ref 0–0.5)
IRON SATN MFR SERPL: 17 % (ref 20–50)
IRON SERPL-MCNC: 23 UG/DL (ref 50–175)
KETONES UR QL STRIP.AUTO: NEGATIVE MG/DL
L PNEUMO AG UR QL IA: NEGATIVE
LEUKOCYTE ESTERASE UR QL STRIP.AUTO: ABNORMAL
LYMPHOCYTES # BLD: 1.2 K/UL (ref 0.9–3.6)
LYMPHOCYTES NFR BLD: 17 % (ref 21–52)
MCH RBC QN AUTO: 25.4 PG (ref 24–34)
MCHC RBC AUTO-ENTMCNC: 29 G/DL (ref 31–37)
MCV RBC AUTO: 87.7 FL (ref 78–100)
MONOCYTES # BLD: 0.3 K/UL (ref 0.05–1.2)
MONOCYTES NFR BLD: 4 % (ref 3–10)
NEUTS SEG # BLD: 5.2 K/UL (ref 1.8–8)
NEUTS SEG NFR BLD: 73 % (ref 40–73)
NITRITE UR QL STRIP.AUTO: NEGATIVE
NRBC # BLD: 0.03 K/UL (ref 0–0.01)
NRBC BLD-RTO: 0.4 PER 100 WBC
PH UR STRIP: 5.5 [PH] (ref 5–8)
PLATELET # BLD AUTO: 155 K/UL (ref 135–420)
POTASSIUM SERPL-SCNC: 3.4 MMOL/L (ref 3.5–5.5)
PROT SERPL-MCNC: 7.2 G/DL (ref 6.4–8.2)
PROT UR STRIP-MCNC: ABNORMAL MG/DL
RBC # BLD AUTO: 2.52 M/UL (ref 4.35–5.65)
RBC #/AREA URNS HPF: ABNORMAL /HPF (ref 0–5)
S PNEUM AG UR QL: NEGATIVE
SERVICE CMNT-IMP: NORMAL
SODIUM SERPL-SCNC: 166 MMOL/L (ref 136–145)
SP GR UR REFRACTOMETRY: 1.02 (ref 1–1.03)
TIBC SERPL-MCNC: 132 UG/DL (ref 250–450)
TSH SERPL DL<=0.05 MIU/L-ACNC: 2.09 UIU/ML (ref 0.36–3.74)
UROBILINOGEN UR QL STRIP.AUTO: 1 EU/DL (ref 0.2–1)
VIT B12 SERPL-MCNC: 498 PG/ML (ref 211–911)
WBC # BLD AUTO: 7.1 K/UL (ref 4.6–13.2)
WBC URNS QL MICRO: ABNORMAL /HPF (ref 0–4)

## 2022-11-25 PROCEDURE — 2709999900 HC NON-CHARGEABLE SUPPLY

## 2022-11-25 PROCEDURE — 74011000258 HC RX REV CODE- 258: Performed by: INTERNAL MEDICINE

## 2022-11-25 PROCEDURE — 82728 ASSAY OF FERRITIN: CPT

## 2022-11-25 PROCEDURE — 84443 ASSAY THYROID STIM HORMONE: CPT

## 2022-11-25 PROCEDURE — 87449 NOS EACH ORGANISM AG IA: CPT

## 2022-11-25 PROCEDURE — 80053 COMPREHEN METABOLIC PANEL: CPT

## 2022-11-25 PROCEDURE — 65270000046 HC RM TELEMETRY

## 2022-11-25 PROCEDURE — 74011250636 HC RX REV CODE- 250/636: Performed by: INTERNAL MEDICINE

## 2022-11-25 PROCEDURE — 86923 COMPATIBILITY TEST ELECTRIC: CPT

## 2022-11-25 PROCEDURE — 81001 URINALYSIS AUTO W/SCOPE: CPT

## 2022-11-25 PROCEDURE — 86900 BLOOD TYPING SEROLOGIC ABO: CPT

## 2022-11-25 PROCEDURE — C9113 INJ PANTOPRAZOLE SODIUM, VIA: HCPCS | Performed by: INTERNAL MEDICINE

## 2022-11-25 PROCEDURE — 71045 X-RAY EXAM CHEST 1 VIEW: CPT

## 2022-11-25 PROCEDURE — 83735 ASSAY OF MAGNESIUM: CPT

## 2022-11-25 PROCEDURE — 85025 COMPLETE CBC W/AUTO DIFF WBC: CPT

## 2022-11-25 PROCEDURE — 74011000250 HC RX REV CODE- 250: Performed by: INTERNAL MEDICINE

## 2022-11-25 PROCEDURE — 74011250636 HC RX REV CODE- 250/636: Performed by: EMERGENCY MEDICINE

## 2022-11-25 PROCEDURE — 85018 HEMOGLOBIN: CPT

## 2022-11-25 PROCEDURE — 87040 BLOOD CULTURE FOR BACTERIA: CPT

## 2022-11-25 PROCEDURE — 99285 EMERGENCY DEPT VISIT HI MDM: CPT

## 2022-11-25 PROCEDURE — 82607 VITAMIN B-12: CPT

## 2022-11-25 PROCEDURE — 83540 ASSAY OF IRON: CPT

## 2022-11-25 PROCEDURE — 84100 ASSAY OF PHOSPHORUS: CPT

## 2022-11-25 PROCEDURE — 99222 1ST HOSP IP/OBS MODERATE 55: CPT | Performed by: INTERNAL MEDICINE

## 2022-11-25 RX ORDER — ACETAMINOPHEN 325 MG/1
650 TABLET ORAL
Status: DISCONTINUED | OUTPATIENT
Start: 2022-11-25 | End: 2022-11-29

## 2022-11-25 RX ORDER — SODIUM CHLORIDE 0.9 % (FLUSH) 0.9 %
5-40 SYRINGE (ML) INJECTION AS NEEDED
Status: DISCONTINUED | OUTPATIENT
Start: 2022-11-25 | End: 2022-12-02 | Stop reason: HOSPADM

## 2022-11-25 RX ORDER — METOPROLOL TARTRATE 25 MG/1
25 TABLET, FILM COATED ORAL 2 TIMES DAILY
COMMUNITY

## 2022-11-25 RX ORDER — LEVOFLOXACIN 750 MG/1
TABLET ORAL
COMMUNITY
Start: 2022-11-25 | End: 2022-12-01

## 2022-11-25 RX ORDER — BACLOFEN 10 MG/1
10 TABLET ORAL
COMMUNITY

## 2022-11-25 RX ORDER — PROMETHAZINE HYDROCHLORIDE 12.5 MG/1
12.5 TABLET ORAL
Status: DISCONTINUED | OUTPATIENT
Start: 2022-11-25 | End: 2022-11-26

## 2022-11-25 RX ORDER — LANOLIN ALCOHOL/MO/W.PET/CERES
500 CREAM (GRAM) TOPICAL DAILY
COMMUNITY

## 2022-11-25 RX ORDER — SODIUM CHLORIDE 0.9 % (FLUSH) 0.9 %
5-40 SYRINGE (ML) INJECTION EVERY 8 HOURS
Status: DISCONTINUED | OUTPATIENT
Start: 2022-11-25 | End: 2022-12-02 | Stop reason: HOSPADM

## 2022-11-25 RX ORDER — DEXTROSE MONOHYDRATE 50 MG/ML
100 INJECTION, SOLUTION INTRAVENOUS CONTINUOUS
Status: DISPENSED | OUTPATIENT
Start: 2022-11-25 | End: 2022-11-26

## 2022-11-25 RX ORDER — POLYETHYLENE GLYCOL 3350 17 G/17G
17 POWDER, FOR SOLUTION ORAL DAILY
Status: DISCONTINUED | OUTPATIENT
Start: 2022-11-26 | End: 2022-12-02 | Stop reason: HOSPADM

## 2022-11-25 RX ORDER — ONDANSETRON 2 MG/ML
4 INJECTION INTRAMUSCULAR; INTRAVENOUS
Status: DISCONTINUED | OUTPATIENT
Start: 2022-11-25 | End: 2022-12-02 | Stop reason: HOSPADM

## 2022-11-25 RX ORDER — LANOLIN ALCOHOL/MO/W.PET/CERES
CREAM (GRAM) TOPICAL
COMMUNITY

## 2022-11-25 RX ORDER — METOPROLOL TARTRATE 25 MG/1
25 TABLET, FILM COATED ORAL 2 TIMES DAILY
Status: DISCONTINUED | OUTPATIENT
Start: 2022-11-25 | End: 2022-12-02 | Stop reason: HOSPADM

## 2022-11-25 RX ORDER — SODIUM CHLORIDE 9 MG/ML
250 INJECTION, SOLUTION INTRAVENOUS AS NEEDED
Status: DISCONTINUED | OUTPATIENT
Start: 2022-11-25 | End: 2022-11-28 | Stop reason: SDUPTHER

## 2022-11-25 RX ORDER — GUAIFENESIN 100 MG/5ML
81 LIQUID (ML) ORAL DAILY
COMMUNITY
End: 2022-12-01

## 2022-11-25 RX ORDER — ACETAMINOPHEN 650 MG/1
650 SUPPOSITORY RECTAL
Status: DISCONTINUED | OUTPATIENT
Start: 2022-11-25 | End: 2022-11-29

## 2022-11-25 RX ORDER — POLYETHYLENE GLYCOL 3350 17 G/17G
17 POWDER, FOR SOLUTION ORAL DAILY PRN
Status: DISCONTINUED | OUTPATIENT
Start: 2022-11-25 | End: 2022-11-29

## 2022-11-25 RX ADMIN — SODIUM CHLORIDE 1000 ML: 9 INJECTION, SOLUTION INTRAVENOUS at 13:02

## 2022-11-25 RX ADMIN — SODIUM CHLORIDE, PRESERVATIVE FREE 10 ML: 5 INJECTION INTRAVENOUS at 21:29

## 2022-11-25 RX ADMIN — DEXTROSE MONOHYDRATE 100 ML/HR: 50 INJECTION, SOLUTION INTRAVENOUS at 15:26

## 2022-11-25 RX ADMIN — PIPERACILLIN AND TAZOBACTAM 4.5 G: 4; .5 INJECTION, POWDER, FOR SOLUTION INTRAVENOUS at 20:06

## 2022-11-25 RX ADMIN — PANTOPRAZOLE SODIUM 40 MG: 40 INJECTION, POWDER, FOR SOLUTION INTRAVENOUS at 15:24

## 2022-11-25 NOTE — ED NOTES
TRANSFER - OUT REPORT:    Verbal report given to Joseph Leon RN on Patti Salvador  being transferred to N for routine progression of care       Report consisted of patients Situation, Background, Assessment and   Recommendations(SBAR). Information from the following report(s) SBAR, Kardex, ED Summary, Intake/Output, MAR, and Recent Results was reviewed with the receiving nurse. Lines:   Peripheral IV 11/25/22 Posterior;Proximal;Right Forearm (Active)        Opportunity for questions and clarification was provided.       Patient transported with:   O2 @ 2 liters

## 2022-11-25 NOTE — CONSULTS
Comprehensive Nutrition Assessment    Type and Reason for Visit: Initial, Consult, Positive nutrition screen    Nutrition Recommendations/Plan:   Initiate tube feeding:   Formula: Jevity 1.5  Initial Rate: 20 mL/hr  Advancement: 10 q 8 hrs as tolerated  Goal Rate: 50 mL/hr   Water Flushes: 100 mL q 4 hours (600 mL)  Modular(s): none at this time  Goal Regimen Provides (with modulars): 1800 kcal, 77 gm pro, 912 mL free water, 100% RDIs    Continue IVF per MD     Malnutrition Assessment:  Malnutrition Status:  Insufficient data (11/25/22 5148)      Nutrition History and Allergies:   Past medical hx:  non-verbal, has PEG tube, alcohol dependence, anemia, aphasia, covid 19, GERD, HLD, HTN, pneumonia, seizures, stroke. Wt trends PTA per chart hx:   195 lb on 7/19/17,  189 lb on 3/19/18,   154 lb on 4/6/21,  150 lb on 10/16/22,   160 lb on 11/25/22. No known food allergies     Nutrition Assessment:    Pt admitted from nursing facility for anemia and hypernatremia. Noted Na 166 mmol/L today. Dextrose IVF started, D5 at 100 mL/hr (120 gm dextrose, 408 kcal per day). Pt with low K; replacement ordered. Has PEG tube; plan to start tube feeds    Nutrition Related Findings:    BM unknown. No edema. Wound Type: None    Current Nutrition Intake & Therapies:  Average Meal Intake: NPO  Average Supplement Intake: NPO  DIET NPO    Anthropometric Measures:  Height: 5' 9\" (175.3 cm)  Ideal Body Weight (IBW): 160 lbs (73 kg)  Admission Body Weight: 160 lb 0.9 oz  Current Body Wt:  72.6 kg (160 lb 0.9 oz), 100 % IBW.  Bed scale  Current BMI (kg/m2): 23.6  Usual Body Weight: 68 kg (150 lb)  % Weight Change (Calculated): 6.7  Weight Adjustment: No adjustment  BMI Category: Normal weight (BMI 22.0-24.9) age over 72    Estimated Daily Nutrient Needs:  Energy Requirements Based On: Formula  Weight Used for Energy Requirements: Admission  Energy (kcal/day): 7410-4186  Weight Used for Protein Requirements: Admission  Protein (g/day): 58-73  Method Used for Fluid Requirements: 1 ml/kcal  Fluid (ml/day): 8548-0404    Nutrition Diagnosis:   Inadequate oral intake related to swallowing difficulty as evidenced by NPO or clear liquid status due to medical condition, nutrition support-enteral nutrition    Nutrition Interventions:   Food and/or Nutrient Delivery: Continue NPO, Start tube feeding, IV fluid delivery  Nutrition Education/Counseling: Education not indicated  Coordination of Nutrition Care: Continue to monitor while inpatient       Goals:     Goals: Meet at least 75% of estimated needs, Initiate nutrition support, Tolerate nutrition support at goal rate, by next RD assessment       Nutrition Monitoring and Evaluation:   Behavioral-Environmental Outcomes: None identified  Food/Nutrient Intake Outcomes: Enteral nutrition intake/tolerance, IVF intake  Physical Signs/Symptoms Outcomes: Biochemical data, Fluid status or edema    Discharge Planning:    Enteral nutrition    Vivienne Ojeda, [address]  Contact: [phone] Fall Risk

## 2022-11-25 NOTE — PROGRESS NOTES
Reason for Admission:  Anemia [D64.9]  Hypernatremia [E87.0]                 RUR Score:  N/A           Plan for utilizing home health: [facility]                    Likelihood of Readmission:   LOW                         Transition of Care Plan:              Initial assessment completed with nursing Osvaldo Members /  [address] Cognitive status of patient: disoriented. Face sheet information confirmed:  yes. This patient lives in a [facility] with patient, adult caretaker, and nursing attendant. Patient is not able to navigate steps as needed. Prior to hospitalization, patient was considered to be independent with ADLs/IADLS : no . If not independent,  patient needs assist with : dressing, bathing, food preparation, cooking, toileting, and grooming    Patient has a current ACP document on file: yes      Healthcare Decision Maker:   Supplemental (Other) Decision Maker: Duy Aida - [phone]    Click here to [address] including selection of the Healthcare Decision Maker Relationship (ie \"Primary\")    The patient's Medicare transport benefit will be available to transport patient home upon discharge. The patient already has none reported, and medical equipment available in the home. Patient is not currently active with home health. Patient has stayed in a skilled nursing facility or rehab. Was  stay within last 60 days : yes. This patient is on dialysis :no    SNF agencies were provided and reviewed with the patient prior to discharge. Freedom of choice signed: no (via Osvaldo Members with [address] the patient can return when discharged) Currently, the discharge plan is LTC and SNF. The patient states that he can obtain his medications from the pharmacy, and take his medications as directed.     Patient's current insurance is BLUE CROSS/[place] BLUE [place]OSS FEDERAL  / [facility] MEDICAID/[facility] COMPLETE CARE    Care Management Interventions  Transition of Care Consult (CM Consult): Discharge Planning  MyChart Signup: No  Discharge Durable Medical Equipment: No  Physical Therapy Consult: No  Occupational Therapy Consult: No  Speech Therapy Consult: No  Support Systems: [facility]  Discharge Location  Patient Expects to be Discharged to[de-identified] [facility], MSW, QMHP

## 2022-11-25 NOTE — ED PROVIDER NOTES
63-year-old male with multiple medical comorbidities and illnesses presents to the emergency department with abnormal labs from nursing home. Patient has a PEG tube and dementia normally does not talk he is a DO NOT RESUSCITATE patient.   No other history obtained       Past Medical History:   Diagnosis Date    Alcohol dependence with alcohol-induced persisting dementia ([facility] [address]     per [facility] 4/23/21    Anemia     Aphasia     Benign prostatic hyperplasia     Cerebral vascular disease     COVID-19 04/06/2021    Disturbances of salivary secretion     Dysphagia     GERD (gastroesophageal reflux disease)     Hemiparesis ([facility])     left side    Hemiplegia ([facility])     left side     Hiccups     Hyperlipidemia     Hypernatremia 03/2021    Hypertension     Hyponatremia     Insomnia     Lacunar infarction ([address] 2010    weakness both arms, unable to walk    Lower extremity edema     Moderate protein-calorie malnutrition (HCC)     MRSA (methicillin resistant Staphylococcus aureus)     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 ([facility])     Pneumonia     Psychogenic polydipsia     PVD (peripheral vascular disease) ([facility])     Seizures ([address]     Spinal stenosis     Stroke ([facility] 2017    Unspecified convulsions ([facility] [address]     4/23/2021 [facility] nurse       Past Surgical History:   Procedure Laterality Date    COLONOSCOPY N/A 4/29/2021    COLONOSCOPY performed by Purnima Vaughn MD at [facility] ENDOSCOPY    HX HEENT      pt reports past hx of surgery on ears unsure of what type    HX OTHER SURGICAL Bilateral     debridement of lower extremities         Family History:   Problem Relation Age of Onset    Hypertension Other        Social History     Socioeconomic History    Marital status:      Spouse name: Not on file    Number of children: Not on file    Years of education: Not on file    Highest education level: Not on file   Occupational History    Not on file   Tobacco Use    Smoking status: Former Packs/day: 0.50     Types: Cigarettes     Quit date: 2008     Years since quittin.4    Smokeless tobacco: Never   Substance and Sexual Activity    Alcohol use: No    Drug use: No    Sexual activity: Not Currently   Other Topics Concern    Not on file   Social History Narrative    Not on file     Social Determinants of Health     Financial Resource Strain: Not on file   Food Insecurity: Not on file   Transportation Needs: Not on file   Physical Activity: Not on file   Stress: Not on file   Social Connections: Not on file   Intimate Partner Violence: Not on file   Housing Stability: Not on file         ALLERGIES: Patient has no known allergies. Review of Systems   Unable to perform ROS: Dementia     Vitals:    22 1226 22 1241 22 1256   BP: 126/69 123/79    Pulse: 99 99 (!) 102   Resp: 19 20 23   Temp: 98.3 °F (36.8 °C)     SpO2: 99% 99% 100%   Weight: 72.6 kg (160 lb)     Height: 5' 9\" (1.753 m)              Physical Exam  Vitals and nursing note reviewed. Constitutional:       General: He is not in acute distress. Appearance: Normal appearance. He is ill-appearing. He is not toxic-appearing or diaphoretic. HENT:      Head: Normocephalic. Nose: Nose normal.      Mouth/Throat:      Mouth: Mucous membranes are moist.   Cardiovascular:      Rate and Rhythm: Normal rate and regular rhythm. Pulmonary:      Effort: Pulmonary effort is normal. No respiratory distress. Breath sounds: Normal breath sounds. No stridor. No wheezing, rhonchi or rales. Chest:      Chest wall: No tenderness. Abdominal:      General: There is no distension. Palpations: Abdomen is soft. There is no mass. Tenderness: There is no abdominal tenderness. There is no right CVA tenderness, left CVA tenderness, guarding or rebound. Hernia: No hernia is present. Comments: PEG tube in place   Skin:     General: Skin is warm.       Capillary Refill: Capillary refill takes less than 2 seconds. Neurological:      Mental Status: He is alert. MDM  Number of Diagnoses or Management Options  Diagnosis management comments: 49-year-old male multiple comorbidities we will admit patient for dehydration and anemia. Discussed case with hospitalist doctor will take the patient on telemetry setting.     Risk of Complications, Morbidity, and/or Mortality  Presenting problems: high  Diagnostic procedures: moderate  Management options: moderate    Patient Progress  Patient progress: improved         Procedures

## 2022-11-25 NOTE — PROGRESS NOTES
Received call from [facility]:    Patient with chronic sodium abnormality, receiving D5/NS (4L) over the last few days without significant change in abnormal bloodwork. Na: 162, Cl: 127 as of yesterday's labs. No labs today.  VS PTA per facility: 100.6F, 145/75, 104 bpm,  21 breaths/min, 98% on 4L NC.

## 2022-11-25 NOTE — Clinical Note
Status[de-identified] INPATIENT [101]   Type of Bed: Telemetry [19]   Cardiac Monitoring Required?: Yes   Inpatient Hospitalization Certified Necessary for the Following Reasons: 3.  Patient receiving treatment that can only be provided in an inpatient setting (further clarification in H&P documentation)   Admitting Diagnosis: Anemia [032464]   Admitting Diagnosis: Hypernatremia [284059]   Admitting Physician: Clifford Ren [[id number]]   Attending Physician: Flakito Vela   Estimated Length of Stay: 2 Midnights   Discharge Plan[de-identified] [facility]

## 2022-11-25 NOTE — ED NOTES
Tried calling son, Annamaria , to obtain consent for blood transfusion but he did not answer. Left a voicemail, waiting for call back.

## 2022-11-25 NOTE — ED NOTES
Pt came via EMS stretcher from [address] [facility], c/o abnormal lab results. Per EMS, lad results from yesterday were: Na: 162, Cl: 127 and pt had episode of fever. Pt is non-verbal. Noted with PEG tube dry and intact; midline catheter at left AC. Suctioned thick, whitish oral secretions. Bilateral legs on foot drop protection booties. Placed on monitor.

## 2022-11-25 NOTE — PROGRESS NOTES
Liv Alejandre (son of patient) contacted CM is ER to advise of the acceptance of the patient to receive blood transfusion. CM alerted attending Doctor via Nek[employer] to this information.     Lilia King, MSW, HP

## 2022-11-25 NOTE — H&P
History & Physical    Patient: Kike Lira MRN: [id number]  CSN: [id number]    YOB: 1955  Age: 79 y.o. Sex: male      DOA: 11/25/2022  CC:  abnormal labs    PCP: Bronwyn Mattson MD       HPI:     Kike Lira is a 79 y.o. male with medical co-morbidities including alcoholic dementia, dysphagia requires PEG tube, Seizure disorder, HTN, iron deficiency anemia, who presented from LTC facility due to concern of abnormal lab. He was recently started on Levofloxacin for PNA, recently BMP showed Na >162, mild drop in his Hgb. At the facility, there was report of Tmax 100. 6F with elevated HR and oxygen requirement. He remains nonverbal.     In the ER, he was found afebrile, his O2 saturated at 99% on 2 L/min O2. No leukocytosis. Hgb 6.4, Hct 22.1, Na 166, Chloride 136, K 3.4, BUN 29, creatinine 0.41. blood culture was collected. UA with large WBC and leukoesterase. He was given 1 L NS. 1 unit PRBC ordered by ER MD.         Review of Systems: unable due to non verbal status.        Past Medical History:   Diagnosis Date    Alcohol dependence with alcohol-induced persisting dementia ([facility]     per [facility] 4/23/21    Anemia     Aphasia     Benign prostatic hyperplasia     Cerebral vascular disease     COVID-19 04/06/2021    Disturbances of salivary secretion     Dysphagia     GERD (gastroesophageal reflux disease)     Hemiparesis (HCC)     left side    Hemiplegia (HCC)     left side     Hiccups     Hyperlipidemia     Hypernatremia 03/2021    Hypertension     Hyponatremia     Insomnia     Lacunar infarction ([address] 2010    weakness both arms, unable to walk    Lower extremity edema     Moderate protein-calorie malnutrition (HCC)     MRSA (methicillin resistant Staphylococcus aureus)     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)     Pneumonia     Psychogenic polydipsia     PVD (peripheral vascular disease) ([facility])     Seizures ([address]     Spinal stenosis Stroke [facility]) 2017    Unspecified convulsions ([address]     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       Past Surgical History:   Procedure Laterality Date    COLONOSCOPY N/A 2021    COLONOSCOPY performed by Josefina Valiente MD at [facility] ENDOSCOPY    HX HEENT      pt reports past hx of surgery on ears unsure of what type    HX OTHER SURGICAL Bilateral     debridement of lower extremities       Family History   Problem Relation Age of Onset    Hypertension Other        Social History     Socioeconomic History    Marital status:    Tobacco Use    Smoking status: Former     Packs/day: 0.50     Types: Cigarettes     Quit date: 2008     Years since quittin.4    Smokeless tobacco: Never   Substance and Sexual Activity    Alcohol use: No    Drug use: No    Sexual activity: Not Currently       Prior to Admission medications    Medication Sig Start Date End Date Taking? Authorizing Provider   thiamine HCL (B-1) 100 mg tablet 1 Tablet by Per G Tube route daily. 10/22/22   Cammie Rendon MD   levETIRAcetam (KEPPRA) 100 mg/mL solution 7.5 mL by Per G Tube route two (2) times a day. 10/22/22   Cammie Rendon MD   pravastatin (PRAVACHOL) 40 mg tablet Take 1 Tab by mouth nightly. 17   Ton Moreno MD   ascorbic acid, vitamin C, (VITAMIN C) 250 mg tablet Take 1 Tab by mouth daily. 17   Hugo Prather MD   ferrous sulfate 325 mg (65 mg iron) tablet Take 1 Tab by mouth daily (with breakfast). 17   Hugo Prather MD   polyethylene glycol (MIRALAX) 17 gram packet Take 1 Packet by mouth daily. 17   Hugo Prather MD   tamsulosin (FLOMAX) 0.4 mg capsule Take 0.4 mg by mouth daily. 1 tab daily    Provider, Historical   MULTIVITAMIN,THERAPEUTIC (THERA MULTI-VITAMIN PO) Take 500 mg by mouth daily. Provider, Historical   baclofen (LIORESAL) 10 mg tablet Take 1 Tab by mouth every twelve (12) hours as needed.   Patient taking differently: Take 1 Tab by mouth every twelve (12) hours as needed (for back pain, muscle ralaxant). as needed for pain, 1/25/16   Kirk Saldana PA   metoprolol (LOPRESSOR) 25 mg tablet Take 1 Tab by mouth two (2) times a day. 8/18/14   Maria De Jesus Leblanc MD   cyanocobalamin 1,000 mcg tablet Take 1 Tab by mouth daily. 8/18/14   Maria De Jesus Leblanc MD   folic acid (FOLVITE) 1 mg tablet Take 1 Tab by mouth daily. 8/18/14   Maria De Jesus Leblanc MD   aspirin 81 mg chewable tablet Take 1 Tab by mouth daily. 9/18/12   Maria De Jesus Leblanc MD       No Known Allergies           Physical Exam:      Visit Vitals  /79   Pulse (!) 102   Temp 98.3 °F (36.8 °C)   Resp 23   Ht 5' 9\" (1.753 m)   Wt 72.6 kg (160 lb)   SpO2 100%   BMI 23.63 kg/m²       Physical Exam:  Tele: sinus tachycardia   General:  Cooperative, Not in acute distress, non verbal  HEENT: PERRL, EOMI, supple neck, no JVD, dry oral mucosa  Cardiovascular: S1S2 regular, no rub/gallop   Pulmonary: air entry bilaterally, no wheezing, + crackle  GI:  Soft, non tender, non distended, +bs, no guarding   Extremities:  + pedal edema, +distal pulses appreciated   Bilateral UE edema, midline in left UE. Neuro: Awake and tracking but not verbalizing or able to follow commands     Lab/Data Review:  Labs: Results:       Chemistry Recent Labs     11/25/22  1250   *   *   K 3.4*   *   CO2 29   BUN 29*   CREA 0.41*   CA 9.0   AGAP 1*   BUCR 71*   AP 62   TP 7.2   ALB 1.6*   GLOB 5.6*   AGRAT 0.3*      CBC w/Diff Recent Labs     11/25/22  1250   WBC 7.1   RBC 2.52*   HGB 6.4*   HCT 22.1*      GRANS 73   LYMPH 17*   EOS 6*      Coagulation No results for input(s): PTP, INR, APTT, INREXT in the last 72 hours. Iron/Ferritin No results for input(s): IRON in the last 72 hours. No lab exists for component: TIBCCALC   BNP No results for input(s): BNPP in the last 72 hours. Cardiac Enzymes No results for input(s): CPK, CKND1, BARRINGTON in the last 72 hours.     No lab exists for component: CKRMB, TROIP   Liver Enzymes Recent Labs     11/25/22  1250 TP 7.2   ALB 1.6*   AP 62      Thyroid Studies Lab Results   Component Value Date/Time    TSH 0.81 10/09/2022 06:20 PM          All Micro Results       Procedure Component Value Units Date/Time    CULTURE, BLOOD [[id number]] Collected: 11/25/22 1235    Order Status: Sent Specimen: Blood Updated: 11/25/22 1323    CULTURE, BLOOD [[id number]] Collected: 11/25/22 1250    Order Status: No result Updated: 11/25/22 1323            Imaging Reviewed: none      Assessment:   Active Problems:    Acute hypoxia per report, currently requires 2 L/min NC. CXR was not done in the ER  Dysphagia with h/o aspiration pneumonia, currently, concern for recurrent aspiration leading to his noted hypoxia. Bacterial pneumonia from LTC, he was recently started on Levofloxacin from his facility for pneumonia, currently, the concern is aspiration pneumonia. CXR is pending   HyperNatremia, hyper-chloridemia, due to free water deficit and severe dehydration   Acute on chronic anemia, with iron deficiency anemia, no clear evidence of bleed at this time. Dementia without behavior problem, currently, he is not at baseline, likely acute metabolic encephalopathy on his chronic dementia   Seizure disorder, stable   Upper extremities edema, noted left upper arm with midline   Hypokalemia   Debility with physical deconditioning       Plan:     Admitted to telemetry, he will need PRBC transfusion, ER MD has ordered him 1 unit PRBC. Will start PPI BID, check stool occult blood. He will needs iron infusion post transfusion   Start on IV zosyn, check CXR as ER did not have one   Aspiration precaution  Blood culture follow up   Seizure precaution, cont his keppra   Start D5w post 1 L NS, will needs free water flush via his PEG tube  Oral care   Replaced electrolytes   Wound care  Duplex of his arms to rule out clots     Called to his son x2, unable to get intouch. ER team has left MV for call back to update clinical status.          Risk of deterioration: []Low    [x]Moderate  []High     Prophylaxis:  []Lovenox  []Coumadin  []Hep SQ  [x]SCDs  [x]H2B/PPI     Disposition:  []Home w/ Family   []HH PT,OT,RN   [x]SNF/LTC   []SAH/Rehab     Discussed Code Status:         []Full Code      [x]DNR         ___________________________________________________     Care Plan discussed with:    [x]Patient   []Family    []ED Care Manager  [x]ED Doc   []Specialist :  Total Time Coordinating Admission:   65   minutes    []Total Critical Care Time:       Katherine Suero MD  11/25/2022, 2:58 PM

## 2022-11-25 NOTE — PROGRESS NOTES
Pharmacy Dosing Services: 11/25/2022      The following medication: zosyn was automatically dose-adjusted per P&T Committee Protocol, with respect to renal function. Previous regimen: zosyn 3.375 g q 6 h   New Regimen: zosyn 4.5 gx 1 then 3.375 g  q 8 h       Pt Weight:   Wt Readings from Last 1 Encounters:   11/25/22 72.6 kg (160 lb)         Serum Creatinine Lab Results   Component Value Date/Time    Creatinine 0.41 (L) 11/25/2022 12:50 PM    Creatinine, POC 0.9 08/08/2014 11:53 AM       Creatinine Clearance Estimated Creatinine Clearance: 102.4 mL/min (A) (by C-G formula based on SCr of 0.41 mg/dL (L)). BUN Lab Results   Component Value Date/Time    BUN 29 (H) 11/25/2022 12:50 PM    BUN, POC 9 08/08/2014 11:53 AM         Additional notes:      Pharmacy to continue to monitor patient daily. Will make dosage adjustments based upon changing renal function.   Davie SullivanD  Remote Order Verification Pharmacist  hoozin Phone # [phone]

## 2022-11-26 ENCOUNTER — APPOINTMENT (OUTPATIENT)
Dept: VASCULAR SURGERY | Age: 67
DRG: 177 | End: 2022-11-26
Attending: INTERNAL MEDICINE
Payer: COMMERCIAL

## 2022-11-26 LAB
ANION GAP SERPL CALC-SCNC: 0 MMOL/L (ref 3–18)
ANION GAP SERPL CALC-SCNC: 2 MMOL/L (ref 3–18)
BUN SERPL-MCNC: 18 MG/DL (ref 7–18)
BUN SERPL-MCNC: 23 MG/DL (ref 7–18)
BUN/CREAT SERPL: 55 (ref 12–20)
BUN/CREAT SERPL: 64 (ref 12–20)
CALCIUM SERPL-MCNC: 8.3 MG/DL (ref 8.5–10.1)
CALCIUM SERPL-MCNC: 8.6 MG/DL (ref 8.5–10.1)
CHLORIDE SERPL-SCNC: 130 MMOL/L (ref 100–111)
CHLORIDE SERPL-SCNC: 134 MMOL/L (ref 100–111)
CO2 SERPL-SCNC: 28 MMOL/L (ref 21–32)
CO2 SERPL-SCNC: 30 MMOL/L (ref 21–32)
CREAT SERPL-MCNC: 0.33 MG/DL (ref 0.6–1.3)
CREAT SERPL-MCNC: 0.36 MG/DL (ref 0.6–1.3)
ERYTHROCYTE [DISTWIDTH] IN BLOOD BY AUTOMATED COUNT: 16.5 % (ref 11.6–14.5)
GLUCOSE SERPL-MCNC: 113 MG/DL (ref 74–99)
GLUCOSE SERPL-MCNC: 117 MG/DL (ref 74–99)
HCT VFR BLD AUTO: 22 % (ref 36–48)
HCT VFR BLD AUTO: 22.5 % (ref 36–48)
HEMOCCULT STL QL: NEGATIVE
HGB BLD-MCNC: 6.2 G/DL (ref 13–16)
HGB BLD-MCNC: 6.8 G/DL (ref 13–16)
HISTORY CHECKED?,CKHIST: NORMAL
MAGNESIUM SERPL-MCNC: 2.4 MG/DL (ref 1.6–2.6)
MAGNESIUM SERPL-MCNC: 2.4 MG/DL (ref 1.6–2.6)
MCH RBC QN AUTO: 26.1 PG (ref 24–34)
MCHC RBC AUTO-ENTMCNC: 30.2 G/DL (ref 31–37)
MCV RBC AUTO: 86.2 FL (ref 78–100)
NRBC # BLD: 0.02 K/UL (ref 0–0.01)
NRBC BLD-RTO: 0.2 PER 100 WBC
PHOSPHATE SERPL-MCNC: 2.1 MG/DL (ref 2.5–4.9)
PHOSPHATE SERPL-MCNC: 2.4 MG/DL (ref 2.5–4.9)
PLATELET # BLD AUTO: 151 K/UL (ref 135–420)
PMV BLD AUTO: 13.4 FL (ref 9.2–11.8)
POTASSIUM SERPL-SCNC: 3.1 MMOL/L (ref 3.5–5.5)
POTASSIUM SERPL-SCNC: 4.2 MMOL/L (ref 3.5–5.5)
RBC # BLD AUTO: 2.61 M/UL (ref 4.35–5.65)
SODIUM SERPL-SCNC: 160 MMOL/L (ref 136–145)
SODIUM SERPL-SCNC: 164 MMOL/L (ref 136–145)
WBC # BLD AUTO: 8.9 K/UL (ref 4.6–13.2)

## 2022-11-26 PROCEDURE — 74011250637 HC RX REV CODE- 250/637: Performed by: FAMILY MEDICINE

## 2022-11-26 PROCEDURE — 82272 OCCULT BLD FECES 1-3 TESTS: CPT

## 2022-11-26 PROCEDURE — 99233 SBSQ HOSP IP/OBS HIGH 50: CPT | Performed by: HOSPITALIST

## 2022-11-26 PROCEDURE — 74011250637 HC RX REV CODE- 250/637: Performed by: HOSPITALIST

## 2022-11-26 PROCEDURE — 74011000258 HC RX REV CODE- 258: Performed by: INTERNAL MEDICINE

## 2022-11-26 PROCEDURE — 36430 TRANSFUSION BLD/BLD COMPNT: CPT

## 2022-11-26 PROCEDURE — C9113 INJ PANTOPRAZOLE SODIUM, VIA: HCPCS | Performed by: INTERNAL MEDICINE

## 2022-11-26 PROCEDURE — 74011250637 HC RX REV CODE- 250/637: Performed by: INTERNAL MEDICINE

## 2022-11-26 PROCEDURE — 30233N1 TRANSFUSION OF NONAUTOLOGOUS RED BLOOD CELLS INTO PERIPHERAL VEIN, PERCUTANEOUS APPROACH: ICD-10-PCS | Performed by: INTERNAL MEDICINE

## 2022-11-26 PROCEDURE — 74011250636 HC RX REV CODE- 250/636: Performed by: INTERNAL MEDICINE

## 2022-11-26 PROCEDURE — 74011000250 HC RX REV CODE- 250: Performed by: INTERNAL MEDICINE

## 2022-11-26 PROCEDURE — 84100 ASSAY OF PHOSPHORUS: CPT

## 2022-11-26 PROCEDURE — 93970 EXTREMITY STUDY: CPT

## 2022-11-26 PROCEDURE — 80048 BASIC METABOLIC PNL TOTAL CA: CPT

## 2022-11-26 PROCEDURE — P9016 RBC LEUKOCYTES REDUCED: HCPCS

## 2022-11-26 PROCEDURE — 65270000046 HC RM TELEMETRY

## 2022-11-26 PROCEDURE — 74011250636 HC RX REV CODE- 250/636: Performed by: HOSPITALIST

## 2022-11-26 PROCEDURE — 36415 COLL VENOUS BLD VENIPUNCTURE: CPT

## 2022-11-26 PROCEDURE — 83735 ASSAY OF MAGNESIUM: CPT

## 2022-11-26 PROCEDURE — 85027 COMPLETE CBC AUTOMATED: CPT

## 2022-11-26 RX ORDER — VANCOMYCIN/0.9 % SOD CHLORIDE 1.5G/250ML
1500 PLASTIC BAG, INJECTION (ML) INTRAVENOUS ONCE
Status: COMPLETED | OUTPATIENT
Start: 2022-11-26 | End: 2022-11-26

## 2022-11-26 RX ORDER — SODIUM CHLORIDE 9 MG/ML
250 INJECTION, SOLUTION INTRAVENOUS AS NEEDED
Status: DISCONTINUED | OUTPATIENT
Start: 2022-11-26 | End: 2022-12-02 | Stop reason: HOSPADM

## 2022-11-26 RX ORDER — DEXTROSE MONOHYDRATE 50 MG/ML
100 INJECTION, SOLUTION INTRAVENOUS CONTINUOUS
Status: DISPENSED | OUTPATIENT
Start: 2022-11-26 | End: 2022-11-27

## 2022-11-26 RX ADMIN — LEVETIRACETAM 750 MG: 500 SOLUTION ORAL at 17:56

## 2022-11-26 RX ADMIN — PIPERACILLIN SODIUM AND TAZOBACTAM SODIUM 3.38 G: 3; .375 INJECTION, POWDER, LYOPHILIZED, FOR SOLUTION INTRAVENOUS at 20:53

## 2022-11-26 RX ADMIN — PANTOPRAZOLE SODIUM 40 MG: 40 INJECTION, POWDER, FOR SOLUTION INTRAVENOUS at 10:04

## 2022-11-26 RX ADMIN — METOPROLOL TARTRATE 25 MG: 50 TABLET, FILM COATED ORAL at 10:04

## 2022-11-26 RX ADMIN — DIBASIC SODIUM PHOSPHATE, MONOBASIC POTASSIUM PHOSPHATE AND MONOBASIC SODIUM PHOSPHATE 1 TABLET: 852; 155; 130 TABLET ORAL at 17:56

## 2022-11-26 RX ADMIN — POTASSIUM BICARBONATE 40 MEQ: 782 TABLET, EFFERVESCENT ORAL at 10:04

## 2022-11-26 RX ADMIN — DEXTROSE MONOHYDRATE 100 ML/HR: 5 INJECTION, SOLUTION INTRAVENOUS at 23:23

## 2022-11-26 RX ADMIN — METOPROLOL TARTRATE 25 MG: 50 TABLET, FILM COATED ORAL at 17:56

## 2022-11-26 RX ADMIN — SODIUM CHLORIDE, PRESERVATIVE FREE 10 ML: 5 INJECTION INTRAVENOUS at 20:56

## 2022-11-26 RX ADMIN — SODIUM CHLORIDE, PRESERVATIVE FREE 10 ML: 5 INJECTION INTRAVENOUS at 05:32

## 2022-11-26 RX ADMIN — DEXTROSE MONOHYDRATE 100 ML/HR: 5 INJECTION, SOLUTION INTRAVENOUS at 14:00

## 2022-11-26 RX ADMIN — POTASSIUM BICARBONATE 40 MEQ: 782 TABLET, EFFERVESCENT ORAL at 00:51

## 2022-11-26 RX ADMIN — SODIUM CHLORIDE, PRESERVATIVE FREE 10 ML: 5 INJECTION INTRAVENOUS at 14:00

## 2022-11-26 RX ADMIN — PIPERACILLIN SODIUM AND TAZOBACTAM SODIUM 3.38 G: 3; .375 INJECTION, POWDER, LYOPHILIZED, FOR SOLUTION INTRAVENOUS at 00:03

## 2022-11-26 RX ADMIN — PIPERACILLIN SODIUM AND TAZOBACTAM SODIUM 3.38 G: 3; .375 INJECTION, POWDER, LYOPHILIZED, FOR SOLUTION INTRAVENOUS at 10:04

## 2022-11-26 RX ADMIN — PANTOPRAZOLE SODIUM 40 MG: 40 INJECTION, POWDER, FOR SOLUTION INTRAVENOUS at 20:54

## 2022-11-26 RX ADMIN — POLYETHYLENE GLYCOL 3350 17 G: 17 POWDER, FOR SOLUTION ORAL at 10:04

## 2022-11-26 RX ADMIN — POTASSIUM BICARBONATE 40 MEQ: 782 TABLET, EFFERVESCENT ORAL at 04:37

## 2022-11-26 RX ADMIN — SODIUM CHLORIDE 750 MG: 9 INJECTION, SOLUTION INTRAVENOUS at 04:37

## 2022-11-26 RX ADMIN — VANCOMYCIN HYDROCHLORIDE 1500 MG: 10 INJECTION, POWDER, LYOPHILIZED, FOR SOLUTION INTRAVENOUS at 18:32

## 2022-11-26 RX ADMIN — LEVETIRACETAM 750 MG: 500 SOLUTION ORAL at 20:54

## 2022-11-26 NOTE — PROGRESS NOTES
[facility]  Progress Note    Patient: Maryann Hamm Age: 79 y.o. : 1955 MR#: [id number] SSN: [id number]  Date/Time: 2022    Subjective:     Transfused 1 unit prbc without appropriate increase in hct. .    Patient seen and examined at bedside, attempting to verbalize. No family at bedside. No issues with TF, no residuals per nsg at bedside. She will do skin assessment shortly and more oral care. Duplex RUE: Thrombus of indeterminate age noted in the left brachial vein(s). Assessment/Plan:     1. Hypoxia improving. 2. Dysphagia s/p peg. Tolerating TF. 3. Pneumonia, concern for aspiration. He received levaquin pta at LTC. Continue zosyn. Oral care. 4. Hypernatremia. At LTC, received D5/NS (4L) over the last few days. Pt received NS 1000 mL bolus in ED. Improving at appropriate rate of correction. Continue D5w. Free water flushes with TF.   5. Volume depletion improving. Continue TF, iv fluids. 6. Dementia w/o behavior problem  7. Seizure d/o on keppra. Seizure precautions. 8. Hypokalemia, hypophosphatemia replete as needed. 9. Thrombus of indeterminate age noted in the left brachial vein(s). No anticoagulation at this time. 10. Anemia requiring transfusion. 2nd unit prbc today. Unable to contact family for consent, this is considered an emergent need. Proceed w/ transfusion and update son as soon as we can reach him. 11. Anemia. Stool for occult blood negative 22. Repeat. 12. Debility. Supportive care  13. DNR / DNI. Limited additional interventions. POST on file. 1:28 pm left message for son to call. Hal Dillard [phone]         2:46 pm spoke with son, he gives permission for BT. States patient has been at [facility] for almost 5 years. Extensive discussion held, all questions answered to the best my ability.     I have discussed with the son the rationale for blood component transfusion; its benefits in treating or preventing fatigue, organ damage, or death; and its risk which includes mild transfusion reactions, rare risk of blood borne infection, or more serious but rare reactions. I have discussed the alternatives to transfusion, including the risk and consequences of not receiving transfusion. The son had an opportunity to ask questions and had agreed to proceed with transfusion of blood components. Additional Notes:  time spent 45 minutes    Case discussed with:  [x]Patient  []Family  [x]Nursing  []Case Management  DVT Prophylaxis:  []Lovenox  []Hep SQ  []SCDs  []Coumadin   []On Heparin gtt    Objective:   VS: Visit Vitals  /87   Pulse 91   Temp 98 °F (36.7 °C)   Resp 20   Ht 5' 9\" (1.753 m)   Wt 72.6 kg (160 lb)   SpO2 99%   BMI 23.63 kg/m²      Tmax/24hrs: Temp (24hrs), Av.1 °F (36.7 °C), Min:97.5 °F (36.4 °C), Max:98.9 °F (37.2 °C)    Input/Output:   Intake/Output Summary (Last 24 hours) at 2022 1038  Last data filed at 2022 0644  Gross per 24 hour   Intake 931.25 ml   Output 100 ml   Net 831.25 ml     General:  Cooperative, Not in acute distress, non verbal  HEENT: PERRL, EOMI, supple neck, no JVD, dry oral mucosa. Poor oral hygiene. Cardiovascular: S1S2 regular rate and rhythm, no rub/gallop   Pulmonary: air entry bilaterally, no wheezing, + crackle  GI:  Soft, non tender, non distended, nabs, no guarding. Peg site clean, no surrounding erythema, warmth; no drainage. Extremities:  + pedal edema, +distal pulses appreciated   Bilateral UE edema, midline in left UE. Neuro: Awake and tracking. Non verbal. Not following follow commands   No rash to visible skin.      Labs:    Recent Results (from the past 24 hour(s))   CULTURE, BLOOD    Collection Time: 22 12:35 PM    Specimen: Blood   Result Value Ref Range    Special Requests: NO SPECIAL REQUESTS      Culture result: NO GROWTH AFTER 17 HOURS     CBC WITH AUTOMATED DIFF    Collection Time: 22 12:50 PM   Result Value Ref Range    WBC 7.1 4.6 - 13.2 K/uL    RBC 2.52 (L) 4.35 - 5.65 M/uL    HGB 6.4 (L) 13.0 - 16.0 g/dL    HCT 22.1 (L) 36.0 - 48.0 %    MCV 87.7 78.0 - 100.0 FL    MCH 25.4 24.0 - 34.0 PG    MCHC 29.0 (L) 31.0 - 37.0 g/dL    RDW 17.3 (H) 11.6 - 14.5 %    PLATELET 691 098 - 457 K/uL    NRBC 0.4 (H) 0  WBC    ABSOLUTE NRBC 0.03 (H) 0.00 - 0.01 K/uL    NEUTROPHILS 73 40 - 73 %    LYMPHOCYTES 17 (L) 21 - 52 %    MONOCYTES 4 3 - 10 %    EOSINOPHILS 6 (H) 0 - 5 %    BASOPHILS 0 0 - 2 %    IMMATURE GRANULOCYTES 0 0.0 - 0.5 %    ABS. NEUTROPHILS 5.2 1.8 - 8.0 K/UL    ABS. LYMPHOCYTES 1.2 0.9 - 3.6 K/UL    ABS. MONOCYTES 0.3 0.05 - 1.2 K/UL    ABS. EOSINOPHILS 0.4 0.0 - 0.4 K/UL    ABS. BASOPHILS 0.0 0.0 - 0.1 K/UL    ABS. IMM. GRANS. 0.0 0.00 - 0.04 K/UL    DF AUTOMATED     METABOLIC PANEL, COMPREHENSIVE    Collection Time: 11/25/22 12:50 PM   Result Value Ref Range    Sodium 166 (HH) 136 - 145 mmol/L    Potassium 3.4 (L) 3.5 - 5.5 mmol/L    Chloride 136 (H) 100 - 111 mmol/L    CO2 29 21 - 32 mmol/L    Anion gap 1 (L) 3.0 - 18 mmol/L    Glucose 111 (H) 74 - 99 mg/dL    BUN 29 (H) 7.0 - 18 MG/DL    Creatinine 0.41 (L) 0.6 - 1.3 MG/DL    BUN/Creatinine ratio 71 (H) 12 - 20      eGFR >60 >60 ml/min/1.73m2    Calcium 9.0 8.5 - 10.1 MG/DL    Bilirubin, total 0.1 (L) 0.2 - 1.0 MG/DL    ALT (SGPT) 36 16 - 61 U/L    AST (SGOT) 30 10 - 38 U/L    Alk.  phosphatase 62 45 - 117 U/L    Protein, total 7.2 6.4 - 8.2 g/dL    Albumin 1.6 (L) 3.4 - 5.0 g/dL    Globulin 5.6 (H) 2.0 - 4.0 g/dL    A-G Ratio 0.3 (L) 0.8 - 1.7     CULTURE, BLOOD    Collection Time: 11/25/22 12:50 PM    Specimen: Blood   Result Value Ref Range    Special Requests: NO SPECIAL REQUESTS      Culture result: NO GROWTH AFTER 17 HOURS     IRON PROFILE    Collection Time: 11/25/22 12:50 PM   Result Value Ref Range    Iron 23 (L) 50 - 175 ug/dL    TIBC 132 (L) 250 - 450 ug/dL    Iron % saturation 17 (L) 20 - 50 %   FERRITIN    Collection Time: 11/25/22 12:50 PM   Result Value Ref Range    Ferritin 422 (H) 8 - 388 NG/ML   VITAMIN B12 & FOLATE    Collection Time: 11/25/22 12:50 PM   Result Value Ref Range    Vitamin B12 498 211 - 911 pg/mL    Folate >20.0 (H) 3.10 - 17.50 ng/mL   TSH 3RD GENERATION    Collection Time: 11/25/22 12:50 PM   Result Value Ref Range    TSH 2.09 0.36 - 3.74 uIU/mL   TYPE & SCREEN    Collection Time: 11/25/22  1:50 PM   Result Value Ref Range    Crossmatch Expiration 11/28/2022,2359     ABO/Rh(D) A POSITIVE     Antibody screen NEG     CALLED TO: RAYMOND IN ER AT 9771 ON [id number] BY [facility]     Unit number [id number]     Blood component type RC LR     Unit division 00     Status of unit ISSUED     Crossmatch result Compatible    RBC, ALLOCATE    Collection Time: 11/25/22  1:50 PM   Result Value Ref Range    HISTORY CHECKED?  Historical check performed    URINALYSIS W/ RFLX MICROSCOPIC    Collection Time: 11/25/22  3:53 PM   Result Value Ref Range    Color YELLOW      Appearance CLOUDY      Specific gravity 1.017 1.005 - 1.030      pH (UA) 5.5 5.0 - 8.0      Protein TRACE (A) NEG mg/dL    Glucose Negative NEG mg/dL    Ketone Negative NEG mg/dL    Bilirubin Negative NEG      Blood Negative NEG      Urobilinogen 1.0 0.2 - 1.0 EU/dL    Nitrites Negative NEG      Leukocyte Esterase LARGE (A) NEG     LEGIONELLA PNEUMOPHILA AG, URINE    Collection Time: 11/25/22  3:53 PM    Specimen: Urine, random   Result Value Ref Range    Legionella Ag, urine Negative NEG      Method A     STREP PNEUMO AG, URINE    Collection Time: 11/25/22  3:53 PM    Specimen: Urine, random   Result Value Ref Range    Strep pneumo Ag, urine Negative NEG     URINE MICROSCOPIC ONLY    Collection Time: 11/25/22  3:53 PM   Result Value Ref Range    WBC TOO NUMEROUS TO COUNT 0 - 4 /hpf    RBC 0 to 3 0 - 5 /hpf    Epithelial cells 2+ 0 - 5 /lpf    Bacteria 2+ (A) NEG /hpf    Hyaline cast 0 to 3 0 - 2 /lpf   HGB & HCT    Collection Time: 11/25/22 11:17 PM   Result Value Ref Range    HGB 6.2 (L) 13.0 - 16.0 g/dL    HCT 22.0 (L) 36.0 - 88.0 %   METABOLIC PANEL, BASIC    Collection Time: 11/25/22 11:17 PM   Result Value Ref Range    Sodium 164 (HH) 136 - 145 mmol/L    Potassium 3.1 (L) 3.5 - 5.5 mmol/L    Chloride 134 (H) 100 - 111 mmol/L    CO2 28 21 - 32 mmol/L    Anion gap 2 (L) 3.0 - 18 mmol/L    Glucose 117 (H) 74 - 99 mg/dL    BUN 23 (H) 7.0 - 18 MG/DL    Creatinine 0.36 (L) 0.6 - 1.3 MG/DL    BUN/Creatinine ratio 64 (H) 12 - 20      eGFR >60 >60 ml/min/1.73m2    Calcium 8.6 8.5 - 10.1 MG/DL   PHOSPHORUS    Collection Time: 11/25/22 11:17 PM   Result Value Ref Range    Phosphorus 2.4 (L) 2.5 - 4.9 MG/DL   MAGNESIUM    Collection Time: 11/25/22 11:17 PM   Result Value Ref Range    Magnesium 2.4 1.6 - 2.6 mg/dL   OCCULT BLOOD, STOOL    Collection Time: 11/26/22  5:20 AM   Result Value Ref Range    Occult blood, stool Negative NEG       Additional Data Reviewed:      Signed By: Dinah Monge MD     November 26, 2022 10:38 AM

## 2022-11-26 NOTE — PROGRESS NOTES
Late entry-   Pt received at [address] in bed with eyes open, alert to self only. Pt on 2L NC, midline to LUE patent and infusing D5w @100ml/hr. IV antibiotics administered with no adverse reaction. No s/s of redness, edema, or infiltration. Peg tube intact and patent. Jevity 1.5 began infusing @2030 @20ml/hr. Pt tolerating well. Fall precautions in place.  Continuous monitoring

## 2022-11-26 NOTE — PROGRESS NOTES
Notified by nursing staff that hgb has decreased slightly on repeat labs. Family unable to be contacted . There is documentation from case management, Eric Omalley, earlier this afternoon that son did return a call to the ED and consented to blood transfusion but there is no record of him haven spoken to a physician. Agree with Dr. Slcik Layton that patient is at increased risk for clinical deterioration if blood transfusion is unable to be administered. Nursing staff updated by phone to administer transfusion per previous order as two-physician consent is being given for the purpose of emergent transfusion in setting of inability to contact family.

## 2022-11-26 NOTE — PROGRESS NOTES
Left message for son Aj Vital at [phone] to call and answer admission questionnaire due to patient being non-verbal.

## 2022-11-26 NOTE — PROGRESS NOTES
I spoke with his nursing staff who provides care for Mr Norma Bhagat and at this time, there is no adequate evidence to indicate that patient's son has given permission for blood product transfusion. I has made multiple calls to [phone], his son Stanislaw Berg) but there was no answer. I have been unable to consent for blood transfusion at this time. I have spoken with Dr. Donnella Favre, who is covering over night, about this case and the risks associate with patient's clinical deterioration if his Hgb/Hct continue to decrease. We have agreed to repeat STAT Hgb/Hct and if his Hgb/Hct continue to drop significantly, then he will need emergency transfusion. We will both agree to consent on his emergent treatment if his Hgb/Hct dropped significant upon repeat. Otherwise, if there is no change, then his transfusion can be delayed until his son can consent tomorrow.      I spoke with his nursing team to alert to his new management plan       Signed By: Michael Lopez MD     November 25, 2022

## 2022-11-26 NOTE — PROGRESS NOTES
1526 patient started PRBCs at 60 ml/hr. 32 61 16 patient received mouth care. 1541 patient increase in PRBCs to 120 mL/hr. Vital signs: /78 RR 16 T 98.0 P 91 O2 100% on 2L nasal cannula. Patient is comfortable at this time and does not appear to be in distress, SOB, or pain. Patient is nonverbal and is able to track this nurse while in the room.

## 2022-11-26 NOTE — PROGRESS NOTES
Problem: Nutrition Deficit  Goal: *Optimize nutritional status  Outcome: Progressing Towards Goal     Problem: Pressure Injury - Risk of  Goal: *Prevention of pressure injury  Description: Document Brian Scale and appropriate interventions in the flowsheet.   Outcome: Progressing Towards Goal  Note: Pressure Injury Interventions:  Sensory Interventions: Assess changes in LOC, Assess need for specialty bed, Check visual cues for pain, Float heels    Moisture Interventions: Absorbent underpads, Internal/External urinary devices    Activity Interventions: Assess need for specialty bed, Pressure redistribution bed/mattress(bed type)    Mobility Interventions: Assess need for specialty bed, HOB 30 degrees or less, Pressure redistribution bed/mattress (bed type)    Nutrition Interventions: Document food/fluid/supplement intake    Friction and Shear Interventions: HOB 30 degrees or less, Minimize layers                Problem: Patient Education: Go to Patient Education Activity  Goal: Patient/Family Education  Outcome: Progressing Towards Goal

## 2022-11-26 NOTE — PROGRESS NOTES
Nursing called to notify that his Midline is working well. He has IV fluid running through it now. Will d/c PICC line placement  Will start his IV medications and transfusion through the midline as it is functional at this time.      Signed By: Susannah Avilez MD     November 25, 2022

## 2022-11-26 NOTE — PROGRESS NOTES
Dr. Pooja Curtis spoke with son and received a verbal consent over the phone for patient to have a blood transfusion. This nurse spoke with nursing administration to confirm consent by MD. Nursing administration stated the note is valid for consent and to call son to get confirmation and fill out the paper consent form. This nurse spoke with son, Alma He, at 4014 and he gave a verbal consent over the phone. This nurse placed paper consent signed in patient chart.

## 2022-11-26 NOTE — PROGRESS NOTES
Nutrition Note    Positive nutrition screen noted, EN/TPN PTA. Per chart review, nutrition assessment completed on 11/25 and TF recommendations/order updated. Will follow-up per policy/procedure.         Nutrition Recommendations/Plan:   Initiate tube feeding:   Formula: Jevity 1.5  Initial Rate: 20 mL/hr  Advancement: 10 q 8 hrs as tolerated  Goal Rate: 50 mL/hr   Water Flushes: 100 mL q 4 hours (600 mL)  Modular(s): none at this time  Goal Regimen Provides (with modulars): 1800 kcal, 77 gm pro, 912 mL free water, 100% RDIs     Continue IVF per MD        Electronically signed by Vikas Clark RD on 11/26/2022 at 1:06 PM    Contact: [phone]

## 2022-11-26 NOTE — PROGRESS NOTES
Perfect serve MD to clarify cardiac monitoring due to two orders: one stating he can go off unit without cardiac monitoring and another order that states patient cannot go off unit without cardiac unit.

## 2022-11-26 NOTE — PROGRESS NOTES
Patient was at 30 mL/hr on jevity 1.5. patient rate increase to 40 mL/hr at 1230. Patient due to increase q8h advancing as tolerated. Patient is due for a 10 mL increase at 2030. Patient goal is 50 mL/hr.

## 2022-11-26 NOTE — PROGRESS NOTES
Bedside and Verbal shift change report given to Dione Prader, RN (oncoming nurse) by Kevin Caraballo RN (offgoing nurse). Report included the following information SBAR, Kardex, ED Summary, Procedure Summary, MAR, and Recent Results.

## 2022-11-26 NOTE — PROGRESS NOTES
[address] [facility] Pharmacokinetic Monitoring Service - Vancomycin     Misa Bocanegra is a 79 y.o. male starting on vancomycin therapy for Pneumonia (HAP). Pharmacy consulted by Dr. Agusto iFsh for monitoring and adjustment. Target Concentration: Goal AUC/ALFREDITO 400-600 mg*hr/L    Additional Antimicrobials: Piperacillin/Tazobactam    Pertinent Laboratory Values:   Temp: 98 °F (36.7 °C)  Weight: 72.6 kg (160 lb)  Recent Labs     11/26/22  1229 11/25/22  2317 11/25/22  1250   CREA 0.33* 0.36* 0.41*   BUN 18 23* 29*   WBC 8.9  --  7.1     Estimated Creatinine Clearance: 102.4 mL/min (A) (by C-G formula based on SCr of 0.33 mg/dL (L)).     Pertinent Cultures:  Culture Date Source Results   11/25 blood ngtd   MRSA Nasal Swab: ordered by provider; pending    Plan:  Dosing recommendations based on Bayesian software  Start vancomycin 1500mg IVPB x1 followed by 1gm q12h  Anticipated AUC of 492 and trough concentration of 15 at steady state  Renal labs as indicated   Vancomycin concentration ordered for  11/27/22 @ 1200  Pharmacy will continue to monitor patient and adjust therapy as indicated    Thank you for the consult,  Teresa Lynn, [facility]  11/26/2022

## 2022-11-26 NOTE — PROGRESS NOTES
Vascular called post bilateral upper extremity duplex. Patient has small blood clot to the left upper arm that is not occlusive near the patients midline. MD notified of finding.

## 2022-11-26 NOTE — PROGRESS NOTES
Dr. Wilian Belcher and Dr. Oniel Omalley made aware of pt Hgb 6.4. Dual authorization to transfuse if Hgb decreases.  Dr. Wilian Belcher made aware or Hgb of 6.2, Na 164, K 3.1

## 2022-11-26 NOTE — PROGRESS NOTES
Patient has blood currently running with a remainder of 2.5 hours left. This nurse sent a message to pharmacy to request a change in time for antibiotics due to patient having one line and unable to start antibiotics ontime.

## 2022-11-27 LAB
ABO + RH BLD: NORMAL
ANION GAP SERPL CALC-SCNC: 2 MMOL/L (ref 3–18)
BASOPHILS # BLD: 0 K/UL (ref 0–0.1)
BASOPHILS NFR BLD: 0 % (ref 0–2)
BLD PROD TYP BPU: NORMAL
BLD PROD TYP BPU: NORMAL
BLOOD GROUP ANTIBODIES SERPL: NORMAL
BPU ID: NORMAL
BPU ID: NORMAL
BUN SERPL-MCNC: 14 MG/DL (ref 7–18)
BUN/CREAT SERPL: 37 (ref 12–20)
CALCIUM SERPL-MCNC: 8.2 MG/DL (ref 8.5–10.1)
CALLED TO:,BCALL1: NORMAL
CALLED TO:,BCALL2: NORMAL
CHLORIDE SERPL-SCNC: 124 MMOL/L (ref 100–111)
CO2 SERPL-SCNC: 28 MMOL/L (ref 21–32)
CREAT SERPL-MCNC: 0.38 MG/DL (ref 0.6–1.3)
CROSSMATCH RESULT,%XM: NORMAL
CROSSMATCH RESULT,%XM: NORMAL
DATE LAST DOSE: ABNORMAL
DIFFERENTIAL METHOD BLD: ABNORMAL
EOSINOPHIL # BLD: 0.5 K/UL (ref 0–0.4)
EOSINOPHIL NFR BLD: 7 % (ref 0–5)
ERYTHROCYTE [DISTWIDTH] IN BLOOD BY AUTOMATED COUNT: 15.9 % (ref 11.6–14.5)
GLUCOSE SERPL-MCNC: 112 MG/DL (ref 74–99)
HCT VFR BLD AUTO: 25.4 % (ref 36–48)
HGB BLD-MCNC: 7.7 G/DL (ref 13–16)
IMM GRANULOCYTES # BLD AUTO: 0 K/UL (ref 0–0.04)
IMM GRANULOCYTES NFR BLD AUTO: 1 % (ref 0–0.5)
LYMPHOCYTES # BLD: 0.9 K/UL (ref 0.9–3.6)
LYMPHOCYTES NFR BLD: 12 % (ref 21–52)
MAGNESIUM SERPL-MCNC: 2.3 MG/DL (ref 1.6–2.6)
MCH RBC QN AUTO: 26.6 PG (ref 24–34)
MCHC RBC AUTO-ENTMCNC: 30.3 G/DL (ref 31–37)
MCV RBC AUTO: 87.6 FL (ref 78–100)
MONOCYTES # BLD: 0.2 K/UL (ref 0.05–1.2)
MONOCYTES NFR BLD: 3 % (ref 3–10)
NEUTS SEG # BLD: 5.6 K/UL (ref 1.8–8)
NEUTS SEG NFR BLD: 77 % (ref 40–73)
NRBC # BLD: 0.02 K/UL (ref 0–0.01)
NRBC BLD-RTO: 0.3 PER 100 WBC
PHOSPHATE SERPL-MCNC: 2.5 MG/DL (ref 2.5–4.9)
PLATELET # BLD AUTO: 156 K/UL (ref 135–420)
PMV BLD AUTO: 13.6 FL (ref 9.2–11.8)
POTASSIUM SERPL-SCNC: 3.8 MMOL/L (ref 3.5–5.5)
RBC # BLD AUTO: 2.9 M/UL (ref 4.35–5.65)
REPORTED DOSE,DOSE: ABNORMAL UNITS
REPORTED DOSE/TIME,TMG: 200
SODIUM SERPL-SCNC: 154 MMOL/L (ref 136–145)
SPECIMEN EXP DATE BLD: NORMAL
STATUS OF UNIT,%ST: NORMAL
STATUS OF UNIT,%ST: NORMAL
UNIT DIVISION, %UDIV: 0
UNIT DIVISION, %UDIV: 0
VANCOMYCIN TROUGH SERPL-MCNC: 22.2 UG/ML (ref 10–20)
WBC # BLD AUTO: 7.2 K/UL (ref 4.6–13.2)

## 2022-11-27 PROCEDURE — 83735 ASSAY OF MAGNESIUM: CPT

## 2022-11-27 PROCEDURE — 80202 ASSAY OF VANCOMYCIN: CPT

## 2022-11-27 PROCEDURE — 74011250637 HC RX REV CODE- 250/637: Performed by: INTERNAL MEDICINE

## 2022-11-27 PROCEDURE — 65270000046 HC RM TELEMETRY

## 2022-11-27 PROCEDURE — 99232 SBSQ HOSP IP/OBS MODERATE 35: CPT | Performed by: HOSPITALIST

## 2022-11-27 PROCEDURE — 74011000250 HC RX REV CODE- 250: Performed by: INTERNAL MEDICINE

## 2022-11-27 PROCEDURE — 80048 BASIC METABOLIC PNL TOTAL CA: CPT

## 2022-11-27 PROCEDURE — 74011250637 HC RX REV CODE- 250/637: Performed by: HOSPITALIST

## 2022-11-27 PROCEDURE — 74011250636 HC RX REV CODE- 250/636: Performed by: HOSPITALIST

## 2022-11-27 PROCEDURE — 74011000258 HC RX REV CODE- 258: Performed by: INTERNAL MEDICINE

## 2022-11-27 PROCEDURE — 36415 COLL VENOUS BLD VENIPUNCTURE: CPT

## 2022-11-27 PROCEDURE — 84100 ASSAY OF PHOSPHORUS: CPT

## 2022-11-27 PROCEDURE — C9113 INJ PANTOPRAZOLE SODIUM, VIA: HCPCS | Performed by: INTERNAL MEDICINE

## 2022-11-27 PROCEDURE — 85025 COMPLETE CBC W/AUTO DIFF WBC: CPT

## 2022-11-27 PROCEDURE — 74011250636 HC RX REV CODE- 250/636: Performed by: INTERNAL MEDICINE

## 2022-11-27 RX ORDER — DEXTROSE MONOHYDRATE 50 MG/ML
50 INJECTION, SOLUTION INTRAVENOUS CONTINUOUS
Status: DISCONTINUED | OUTPATIENT
Start: 2022-11-27 | End: 2022-11-29

## 2022-11-27 RX ORDER — VANCOMYCIN HYDROCHLORIDE
1250
Status: DISCONTINUED | OUTPATIENT
Start: 2022-11-27 | End: 2022-12-02 | Stop reason: HOSPADM

## 2022-11-27 RX ADMIN — VANCOMYCIN HYDROCHLORIDE 1250 MG: 10 INJECTION, POWDER, LYOPHILIZED, FOR SOLUTION INTRAVENOUS at 22:12

## 2022-11-27 RX ADMIN — LEVETIRACETAM 750 MG: 500 SOLUTION ORAL at 21:19

## 2022-11-27 RX ADMIN — VANCOMYCIN HYDROCHLORIDE 1000 MG: 1 INJECTION, POWDER, LYOPHILIZED, FOR SOLUTION INTRAVENOUS at 07:28

## 2022-11-27 RX ADMIN — SODIUM CHLORIDE, PRESERVATIVE FREE 10 ML: 5 INJECTION INTRAVENOUS at 14:00

## 2022-11-27 RX ADMIN — PIPERACILLIN SODIUM AND TAZOBACTAM SODIUM 3.38 G: 3; .375 INJECTION, POWDER, LYOPHILIZED, FOR SOLUTION INTRAVENOUS at 11:30

## 2022-11-27 RX ADMIN — DIBASIC SODIUM PHOSPHATE, MONOBASIC POTASSIUM PHOSPHATE AND MONOBASIC SODIUM PHOSPHATE 1 TABLET: 852; 155; 130 TABLET ORAL at 18:36

## 2022-11-27 RX ADMIN — DIBASIC SODIUM PHOSPHATE, MONOBASIC POTASSIUM PHOSPHATE AND MONOBASIC SODIUM PHOSPHATE 1 TABLET: 852; 155; 130 TABLET ORAL at 10:08

## 2022-11-27 RX ADMIN — LEVETIRACETAM 750 MG: 500 SOLUTION ORAL at 10:08

## 2022-11-27 RX ADMIN — METOPROLOL TARTRATE 25 MG: 50 TABLET, FILM COATED ORAL at 18:36

## 2022-11-27 RX ADMIN — DEXTROSE MONOHYDRATE 50 ML/HR: 5 INJECTION, SOLUTION INTRAVENOUS at 21:15

## 2022-11-27 RX ADMIN — PIPERACILLIN SODIUM AND TAZOBACTAM SODIUM 3.38 G: 3; .375 INJECTION, POWDER, LYOPHILIZED, FOR SOLUTION INTRAVENOUS at 03:19

## 2022-11-27 RX ADMIN — PANTOPRAZOLE SODIUM 40 MG: 40 INJECTION, POWDER, FOR SOLUTION INTRAVENOUS at 21:20

## 2022-11-27 RX ADMIN — METOPROLOL TARTRATE 25 MG: 50 TABLET, FILM COATED ORAL at 10:08

## 2022-11-27 RX ADMIN — PANTOPRAZOLE SODIUM 40 MG: 40 INJECTION, POWDER, FOR SOLUTION INTRAVENOUS at 10:08

## 2022-11-27 RX ADMIN — PIPERACILLIN SODIUM AND TAZOBACTAM SODIUM 3.38 G: 3; .375 INJECTION, POWDER, LYOPHILIZED, FOR SOLUTION INTRAVENOUS at 18:37

## 2022-11-27 NOTE — PROGRESS NOTES
conducted an initial consultation and Spiritual Assessment for Lloyd De La Cruz, who is a 79 y.o.,male. Patients Primary Language is: [place]. According to the patients EMR Sikh Affiliation is: [facility].     The reason the Patient came to the hospital is:   Patient Active Problem List    Diagnosis Date Noted    Hypernatremia 11/25/2022    Anemia 11/25/2022    Symptomatic anemia 11/25/2022    Seizure disorder ([facility] [address] 10/12/2022    Respiratory failure ([address] 63/93/1402    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[address] 30/18/4315    Goals of care, counseling/discussion     Debility     Hyperosmolality and hypernatremia 94/28/3489    Acute metabolic encephalopathy 34/06/0890    Dehydration 03/30/2021    Stroke ([facility] [address] 09/16/2017    Bilateral leg and foot pain 07/06/2017    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 Chart reviewed. Assessment:  Patient was sleeping every time I attempted to visit. No family at bedside. Plan:  Chaplains will continue to follow and will provide pastoral care on an as needed/requested basis.  recommends bedside caregivers page  on duty if patient shows signs of acute spiritual or emotional distress.     [address]  [phone])

## 2022-11-27 NOTE — PROGRESS NOTES
Problem: Nutrition Deficit  Goal: *Optimize nutritional status  Outcome: Progressing Towards Goal     Problem: Pressure Injury - Risk of  Goal: *Prevention of pressure injury  Description: Document Brian Scale and appropriate interventions in the flowsheet. Outcome: Progressing Towards Goal  Note: Pressure Injury Interventions:  Sensory Interventions: Assess changes in LOC, Assess need for specialty bed, Check visual cues for pain, Float heels    Moisture Interventions: Absorbent underpads, Internal/External urinary devices    Activity Interventions: Assess need for specialty bed, Pressure redistribution bed/mattress(bed type)    Mobility Interventions: Assess need for specialty bed, Float heels, HOB 30 degrees or less, Pressure redistribution bed/mattress (bed type)    Nutrition Interventions: Document food/fluid/supplement intake    Friction and Shear Interventions: HOB 30 degrees or less                Problem: Patient Education: Go to Patient Education Activity  Goal: Patient/Family Education  Outcome: Progressing Towards Goal     Problem: Falls - Risk of  Goal: *Absence of Falls  Description: Document Abena Fall Risk and appropriate interventions in the flowsheet.   Outcome: Progressing Towards Goal  Note: Fall Risk Interventions:            Medication Interventions: Bed/chair exit alarm    Elimination Interventions: Bed/chair exit alarm              Problem: Patient Education: Go to Patient Education Activity  Goal: Patient/Family Education  Outcome: Progressing Towards Goal

## 2022-11-27 NOTE — PROGRESS NOTES
PT eval order received and chart reviewed. Spoke with nursing staff who reports they are at functional baseline for mobility. Reports inability to participate in any meaningful mobility and that they have no further need for assist or AD/DME. Will sign off per protocol and that if their functional mobility needs should change that they may have MD re-order PT at any time.  Thank you for this referral.     Steven Srinivasan , PT, DPT

## 2022-11-27 NOTE — PROGRESS NOTES
Problem: Nutrition Deficit  Goal: *Optimize nutritional status  Outcome: Progressing Towards Goal     Problem: Pressure Injury - Risk of  Goal: *Prevention of pressure injury  Description: Document Brian Scale and appropriate interventions in the flowsheet.   Outcome: Progressing Towards Goal  Note: Pressure Injury Interventions:  Sensory Interventions: Assess changes in LOC, Assess need for specialty bed, Check visual cues for pain, Float heels    Moisture Interventions: Absorbent underpads, Internal/External urinary devices    Activity Interventions: Assess need for specialty bed, Pressure redistribution bed/mattress(bed type)    Mobility Interventions: Assess need for specialty bed, HOB 30 degrees or less, Pressure redistribution bed/mattress (bed type)    Nutrition Interventions: Document food/fluid/supplement intake    Friction and Shear Interventions: HOB 30 degrees or less, Minimize layers

## 2022-11-27 NOTE — PROGRESS NOTES
[facility]    Patient: Anu Lady Age: 79 y.o. : 1955 MR#: [id number] SSN: [id number]  Date/Time: 2022    Subjective:     Transfused 1 unit prbc with appropriate increase in hct. Na 154. Patient seen and examined at bedside, patient seen and examined at bedside, he is found resting comfortably. Awakens briefly, then falls back asleep. No family at bedside. Assessment/Plan:     1. Hypoxia improving. 2. Dysphagia s/p peg. Tolerating TF. 3. Pneumonia, concern for aspiration. He received levaquin pta at LTC. Continue zosyn. Oral care. 4. Hypernatremia. At LTC, received D5/NS (4L) over the last few days. Pt received NS 1000 mL bolus in ED. Improving at appropriate rate of correction. Continue D5w. Free water flushes with TF.   5. Volume depletion improving. Continue TF, iv fluids. 6. Dementia w/o behavior problem  7. Seizure d/o on keppra. Seizure precautions. 8. Hypokalemia, hypophosphatemia replete as needed. 9. Thrombus of indeterminate age noted in the left brachial vein(s). No anticoagulation at this time. 10. Anemia requiring transfusion. 2nd unit prbc 22. 11. Anemia. Stool for occult blood negative 22. Repeat. 12. Debility. Supportive care  13. DNR / DNI. Limited additional interventions. POST on file. PT OT.     Cach[place] Sheldon Ice [phone]       Additional Notes:  time spent 25 minutes    Case discussed with:  [x]Patient  []Family  [x]Nursing  []Case Management  DVT Prophylaxis:  []Lovenox  []Hep SQ  []SCDs  []Coumadin   []On Heparin gtt    Objective:   VS: Visit Vitals  /86 (BP 1 Location: Right upper arm, BP Patient Position: Supine)   Pulse 85   Temp 97.7 °F (36.5 °C)   Resp 20   Ht 5' 9\" (1.753 m)   Wt 72.6 kg (160 lb)   SpO2 100%   BMI 23.63 kg/m²      Tmax/24hrs: Temp (24hrs), Av.7 °F (36.5 °C), Min:97.4 °F (36.3 °C), Max:98 °F (36.7 °C)    Input/Output:   Intake/Output Summary (Last 24 hours) at 11/27/2022 1026  Last data filed at 11/26/2022 2328  Gross per 24 hour   Intake 7485 ml   Output 850 ml   Net 6635 ml     General:  Cooperative, Not in acute distress, non verbal  HEENT: PERRL, EOMI, supple neck, no JVD, dry oral mucosa. Poor oral hygiene. Cardiovascular: S1S2 regular rate and rhythm, no rub/gallop   Pulmonary: air entry bilaterally, no wheezing, + crackle  GI:  Soft, non tender, non distended, nabs, no guarding. Peg site clean, no surrounding erythema, warmth; no drainage. Extremities:  + pedal edema, +distal pulses appreciated   Bilateral UE edema, midline in left UE. Neuro: Drowsy, awakens briefly and tracking. Non verbal. Not following commands   No rash to visible skin.      Labs:    Recent Results (from the past 24 hour(s))   CBC W/O DIFF    Collection Time: 11/26/22 12:29 PM   Result Value Ref Range    WBC 8.9 4.6 - 13.2 K/uL    RBC 2.61 (L) 4.35 - 5.65 M/uL    HGB 6.8 (L) 13.0 - 16.0 g/dL    HCT 22.5 (L) 36.0 - 48.0 %    MCV 86.2 78.0 - 100.0 FL    MCH 26.1 24.0 - 34.0 PG    MCHC 30.2 (L) 31.0 - 37.0 g/dL    RDW 16.5 (H) 11.6 - 14.5 %    PLATELET 629 826 - 106 K/uL    MPV 13.4 (H) 9.2 - 11.8 FL    NRBC 0.2 (H) 0  WBC    ABSOLUTE NRBC 0.02 (H) 0.00 - 0.01 K/uL   MAGNESIUM    Collection Time: 11/26/22 12:29 PM   Result Value Ref Range    Magnesium 2.4 1.6 - 2.6 mg/dL   METABOLIC PANEL, BASIC    Collection Time: 11/26/22 12:29 PM   Result Value Ref Range    Sodium 160 (H) 136 - 145 mmol/L    Potassium 4.2 3.5 - 5.5 mmol/L    Chloride 130 (H) 100 - 111 mmol/L    CO2 30 21 - 32 mmol/L    Anion gap 0 (L) 3.0 - 18 mmol/L    Glucose 113 (H) 74 - 99 mg/dL    BUN 18 7.0 - 18 MG/DL    Creatinine 0.33 (L) 0.6 - 1.3 MG/DL    BUN/Creatinine ratio 55 (H) 12 - 20      eGFR >60 >60 ml/min/1.73m2    Calcium 8.3 (L) 8.5 - 10.1 MG/DL   PHOSPHORUS    Collection Time: 11/26/22 12:29 PM   Result Value Ref Range    Phosphorus 2.1 (L) 2.5 - 4.9 MG/DL   RBC, ALLOCATE Collection Time: 11/26/22  1:30 PM   Result Value Ref Range    HISTORY CHECKED? Historical check performed    CBC WITH AUTOMATED DIFF    Collection Time: 11/27/22  8:16 AM   Result Value Ref Range    WBC 7.2 4.6 - 13.2 K/uL    RBC 2.90 (L) 4.35 - 5.65 M/uL    HGB 7.7 (L) 13.0 - 16.0 g/dL    HCT 25.4 (L) 36.0 - 48.0 %    MCV 87.6 78.0 - 100.0 FL    MCH 26.6 24.0 - 34.0 PG    MCHC 30.3 (L) 31.0 - 37.0 g/dL    RDW 15.9 (H) 11.6 - 14.5 %    PLATELET 748 287 - 740 K/uL    MPV 13.6 (H) 9.2 - 11.8 FL    NRBC 0.3 (H) 0  WBC    ABSOLUTE NRBC 0.02 (H) 0.00 - 0.01 K/uL    NEUTROPHILS 77 (H) 40 - 73 %    LYMPHOCYTES 12 (L) 21 - 52 %    MONOCYTES 3 3 - 10 %    EOSINOPHILS 7 (H) 0 - 5 %    BASOPHILS 0 0 - 2 %    IMMATURE GRANULOCYTES 1 (H) 0.0 - 0.5 %    ABS. NEUTROPHILS 5.6 1.8 - 8.0 K/UL    ABS. LYMPHOCYTES 0.9 0.9 - 3.6 K/UL    ABS. MONOCYTES 0.2 0.05 - 1.2 K/UL    ABS. EOSINOPHILS 0.5 (H) 0.0 - 0.4 K/UL    ABS. BASOPHILS 0.0 0.0 - 0.1 K/UL    ABS. IMM.  GRANS. 0.0 0.00 - 0.04 K/UL    DF AUTOMATED     MAGNESIUM    Collection Time: 11/27/22  8:16 AM   Result Value Ref Range    Magnesium 2.3 1.6 - 2.6 mg/dL   METABOLIC PANEL, BASIC    Collection Time: 11/27/22  8:16 AM   Result Value Ref Range    Sodium 154 (H) 136 - 145 mmol/L    Potassium 3.8 3.5 - 5.5 mmol/L    Chloride 124 (H) 100 - 111 mmol/L    CO2 28 21 - 32 mmol/L    Anion gap 2 (L) 3.0 - 18 mmol/L    Glucose 112 (H) 74 - 99 mg/dL    BUN 14 7.0 - 18 MG/DL    Creatinine 0.38 (L) 0.6 - 1.3 MG/DL    BUN/Creatinine ratio 37 (H) 12 - 20      eGFR >60 >60 ml/min/1.73m2    Calcium 8.2 (L) 8.5 - 10.1 MG/DL   PHOSPHORUS    Collection Time: 11/27/22  8:16 AM   Result Value Ref Range    Phosphorus 2.5 2.5 - 4.9 MG/DL     Additional Data Reviewed:      Signed By: Jeanie Benz MD     November 27, 2022 10:38 AM

## 2022-11-28 LAB
ANION GAP SERPL CALC-SCNC: 0 MMOL/L (ref 3–18)
ANION GAP SERPL CALC-SCNC: 3 MMOL/L (ref 3–18)
BASOPHILS # BLD: 0 K/UL (ref 0–0.1)
BASOPHILS NFR BLD: 0 % (ref 0–2)
BUN SERPL-MCNC: 14 MG/DL (ref 7–18)
BUN SERPL-MCNC: 15 MG/DL (ref 7–18)
BUN/CREAT SERPL: 39 (ref 12–20)
BUN/CREAT SERPL: 41 (ref 12–20)
CALCIUM SERPL-MCNC: 8.1 MG/DL (ref 8.5–10.1)
CALCIUM SERPL-MCNC: 8.3 MG/DL (ref 8.5–10.1)
CHLORIDE SERPL-SCNC: 121 MMOL/L (ref 100–111)
CHLORIDE SERPL-SCNC: 124 MMOL/L (ref 100–111)
CO2 SERPL-SCNC: 29 MMOL/L (ref 21–32)
CO2 SERPL-SCNC: 29 MMOL/L (ref 21–32)
CREAT SERPL-MCNC: 0.36 MG/DL (ref 0.6–1.3)
CREAT SERPL-MCNC: 0.37 MG/DL (ref 0.6–1.3)
DIFFERENTIAL METHOD BLD: ABNORMAL
EOSINOPHIL # BLD: 0.4 K/UL (ref 0–0.4)
EOSINOPHIL NFR BLD: 6 % (ref 0–5)
ERYTHROCYTE [DISTWIDTH] IN BLOOD BY AUTOMATED COUNT: 16.2 % (ref 11.6–14.5)
GLUCOSE SERPL-MCNC: 120 MG/DL (ref 74–99)
GLUCOSE SERPL-MCNC: 125 MG/DL (ref 74–99)
HCT VFR BLD AUTO: 25.4 % (ref 36–48)
HGB BLD-MCNC: 7.9 G/DL (ref 13–16)
IMM GRANULOCYTES # BLD AUTO: 0.1 K/UL (ref 0–0.04)
IMM GRANULOCYTES NFR BLD AUTO: 1 % (ref 0–0.5)
LYMPHOCYTES # BLD: 0.9 K/UL (ref 0.9–3.6)
LYMPHOCYTES NFR BLD: 14 % (ref 21–52)
MAGNESIUM SERPL-MCNC: 2.3 MG/DL (ref 1.6–2.6)
MCH RBC QN AUTO: 26.5 PG (ref 24–34)
MCHC RBC AUTO-ENTMCNC: 31.1 G/DL (ref 31–37)
MCV RBC AUTO: 85.2 FL (ref 78–100)
MONOCYTES # BLD: 0.2 K/UL (ref 0.05–1.2)
MONOCYTES NFR BLD: 3 % (ref 3–10)
NEUTS SEG # BLD: 5.1 K/UL (ref 1.8–8)
NEUTS SEG NFR BLD: 76 % (ref 40–73)
NRBC # BLD: 0.02 K/UL (ref 0–0.01)
NRBC BLD-RTO: 0.3 PER 100 WBC
PHOSPHATE SERPL-MCNC: 2.7 MG/DL (ref 2.5–4.9)
PLATELET # BLD AUTO: 168 K/UL (ref 135–420)
PLATELET COMMENTS,PCOM: ABNORMAL
PMV BLD AUTO: 13.5 FL (ref 9.2–11.8)
POTASSIUM SERPL-SCNC: 3.6 MMOL/L (ref 3.5–5.5)
POTASSIUM SERPL-SCNC: 3.8 MMOL/L (ref 3.5–5.5)
RBC # BLD AUTO: 2.98 M/UL (ref 4.35–5.65)
RBC MORPH BLD: ABNORMAL
SODIUM SERPL-SCNC: 153 MMOL/L (ref 136–145)
SODIUM SERPL-SCNC: 153 MMOL/L (ref 136–145)
WBC # BLD AUTO: 6.7 K/UL (ref 4.6–13.2)

## 2022-11-28 PROCEDURE — 80048 BASIC METABOLIC PNL TOTAL CA: CPT

## 2022-11-28 PROCEDURE — 74011000258 HC RX REV CODE- 258: Performed by: INTERNAL MEDICINE

## 2022-11-28 PROCEDURE — 74011000250 HC RX REV CODE- 250: Performed by: INTERNAL MEDICINE

## 2022-11-28 PROCEDURE — 83735 ASSAY OF MAGNESIUM: CPT

## 2022-11-28 PROCEDURE — 94761 N-INVAS EAR/PLS OXIMETRY MLT: CPT

## 2022-11-28 PROCEDURE — 74011250636 HC RX REV CODE- 250/636: Performed by: HOSPITALIST

## 2022-11-28 PROCEDURE — 65270000046 HC RM TELEMETRY

## 2022-11-28 PROCEDURE — 2709999900 HC NON-CHARGEABLE SUPPLY

## 2022-11-28 PROCEDURE — 36415 COLL VENOUS BLD VENIPUNCTURE: CPT

## 2022-11-28 PROCEDURE — 74011250637 HC RX REV CODE- 250/637: Performed by: INTERNAL MEDICINE

## 2022-11-28 PROCEDURE — 74011250636 HC RX REV CODE- 250/636: Performed by: INTERNAL MEDICINE

## 2022-11-28 PROCEDURE — 84100 ASSAY OF PHOSPHORUS: CPT

## 2022-11-28 PROCEDURE — 74011250637 HC RX REV CODE- 250/637: Performed by: HOSPITALIST

## 2022-11-28 PROCEDURE — 85025 COMPLETE CBC W/AUTO DIFF WBC: CPT

## 2022-11-28 PROCEDURE — 77010033678 HC OXYGEN DAILY

## 2022-11-28 PROCEDURE — C9113 INJ PANTOPRAZOLE SODIUM, VIA: HCPCS | Performed by: INTERNAL MEDICINE

## 2022-11-28 PROCEDURE — 99232 SBSQ HOSP IP/OBS MODERATE 35: CPT | Performed by: HOSPITALIST

## 2022-11-28 RX ORDER — LANOLIN ALCOHOL/MO/W.PET/CERES
100 CREAM (GRAM) TOPICAL DAILY
Status: DISCONTINUED | OUTPATIENT
Start: 2022-11-29 | End: 2022-12-02 | Stop reason: HOSPADM

## 2022-11-28 RX ADMIN — PIPERACILLIN SODIUM AND TAZOBACTAM SODIUM 3.38 G: 3; .375 INJECTION, POWDER, LYOPHILIZED, FOR SOLUTION INTRAVENOUS at 02:47

## 2022-11-28 RX ADMIN — LEVETIRACETAM 750 MG: 500 SOLUTION ORAL at 08:57

## 2022-11-28 RX ADMIN — METOPROLOL TARTRATE 25 MG: 50 TABLET, FILM COATED ORAL at 08:57

## 2022-11-28 RX ADMIN — PIPERACILLIN SODIUM AND TAZOBACTAM SODIUM 3.38 G: 3; .375 INJECTION, POWDER, LYOPHILIZED, FOR SOLUTION INTRAVENOUS at 20:05

## 2022-11-28 RX ADMIN — DIBASIC SODIUM PHOSPHATE, MONOBASIC POTASSIUM PHOSPHATE AND MONOBASIC SODIUM PHOSPHATE 1 TABLET: 852; 155; 130 TABLET ORAL at 08:57

## 2022-11-28 RX ADMIN — PIPERACILLIN SODIUM AND TAZOBACTAM SODIUM 3.38 G: 3; .375 INJECTION, POWDER, LYOPHILIZED, FOR SOLUTION INTRAVENOUS at 11:30

## 2022-11-28 RX ADMIN — VANCOMYCIN HYDROCHLORIDE 1250 MG: 10 INJECTION, POWDER, LYOPHILIZED, FOR SOLUTION INTRAVENOUS at 18:06

## 2022-11-28 RX ADMIN — METOPROLOL TARTRATE 25 MG: 50 TABLET, FILM COATED ORAL at 18:00

## 2022-11-28 RX ADMIN — LEVETIRACETAM 750 MG: 500 SOLUTION ORAL at 20:05

## 2022-11-28 RX ADMIN — SODIUM CHLORIDE, PRESERVATIVE FREE 10 ML: 5 INJECTION INTRAVENOUS at 14:00

## 2022-11-28 RX ADMIN — SODIUM CHLORIDE, PRESERVATIVE FREE 10 ML: 5 INJECTION INTRAVENOUS at 21:27

## 2022-11-28 RX ADMIN — DEXTROSE MONOHYDRATE 50 ML/HR: 5 INJECTION, SOLUTION INTRAVENOUS at 17:00

## 2022-11-28 RX ADMIN — APIXABAN 5 MG: 5 TABLET, FILM COATED ORAL at 20:04

## 2022-11-28 RX ADMIN — PANTOPRAZOLE SODIUM 40 MG: 40 INJECTION, POWDER, FOR SOLUTION INTRAVENOUS at 08:58

## 2022-11-28 RX ADMIN — Medication 15 MG: at 20:04

## 2022-11-28 NOTE — PROGRESS NOTES
[facility]    Patient: Kike Lira Age: 79 y.o. : 1955 MR#: [id number] SSN: [id number]  Date/Time: 2022    Subjective:     Transfused 1 unit prbc with appropriate increase in hct. Na 153. Patient seen and examined at bedside, eye contact is good and he is tracking. Attempting to speak, not forming any words. Assessment/Plan:     1. Hypoxia improving. 2. Dysphagia s/p peg. Tolerating TF. 3. Pneumonia, concern for aspiration. He received levaquin pta at LTC. Continue zosyn, vanco. Oral care. 4. Hypernatremia. At LTC, received D5/NS (4L) pta. received NS 1000 mL bolus in ED. Improving at appropriate rate of correction. Continue D5w. Increased Free water flushes with TF. Repeat Na now. 5. Volume depletion resolving. Continue TF. 6. Dementia w/o behavior problem  7. Seizure d/o on keppra. Seizure precautions. 8. Hypokalemia, hypophosphatemia replete as needed. 9. Thrombus of indeterminate age noted in the left brachial vein(s). Eliquis, monitor closely for bleeding. Remove midline when able. 10. Anemia requiring transfusion. 2nd unit prbc 22. 11. Anemia. Stool for occult blood negative 22. 12. Debility. Supportive care  13. DNR / DNI. Limited additional interventions. POST on file. Return to LTC at [facility] when ready. Son Dennys Valero [phone] 4:40 pm left message. Additional Notes:  time spent 25 minutes. Discussed on IDT.      Case discussed with:  [x]Patient  []Family  [x]Nursing  [x]Case Management  DVT Prophylaxis:  []Lovenox  []Hep SQ  []SCDs  []Coumadin   []On Heparin gtt    Objective:   VS: Visit Vitals  BP (!) 162/97 (BP 1 Location: Right lower arm, BP Patient Position: At rest)   Pulse 86   Temp 98.1 °F (36.7 °C)   Resp 18   Ht 5' 9\" (1.753 m)   Wt 72.6 kg (160 lb)   SpO2 100%   BMI 23.63 kg/m²      Tmax/24hrs: Temp (24hrs), Av.1 °F (36.7 °C), Min:97.6 °F (36.4 °C), Max:98.8 °F (37.1 °C)    Input/Output:   Intake/Output Summary (Last 24 hours) at 11/28/2022 1633  Last data filed at 11/28/2022 1304  Gross per 24 hour   Intake 100 ml   Output 2125 ml   Net -2025 ml     General:  Cooperative, Not in acute distress, non verbal  HEENT: PERRL, EOMI, supple neck, no JVD, dry oral mucosa. Poor oral hygiene. Cardiovascular: S1S2 regular rate and rhythm, no rub/gallop   Pulmonary: air entry bilaterally, no wheezing, no crackles  GI:  Soft, non tender, non distended, nabs, no guarding. Peg site clean, no surrounding erythema, warmth; no drainage. Extremities:  trace pedal edema, +distal pulses appreciated   Bilateral UE edema, midline in left UE. Neuro: awake, good eye contact and tracking around the room. Non verbal. Not following commands   No rash to visible skin. Labs:    Recent Results (from the past 24 hour(s))   CBC WITH AUTOMATED DIFF    Collection Time: 11/28/22  2:37 AM   Result Value Ref Range    WBC 6.7 4.6 - 13.2 K/uL    RBC 2.98 (L) 4.35 - 5.65 M/uL    HGB 7.9 (L) 13.0 - 16.0 g/dL    HCT 25.4 (L) 36.0 - 48.0 %    MCV 85.2 78.0 - 100.0 FL    MCH 26.5 24.0 - 34.0 PG    MCHC 31.1 31.0 - 37.0 g/dL    RDW 16.2 (H) 11.6 - 14.5 %    PLATELET 439 137 - 798 K/uL    MPV 13.5 (H) 9.2 - 11.8 FL    NRBC 0.3 (H) 0  WBC    ABSOLUTE NRBC 0.02 (H) 0.00 - 0.01 K/uL    NEUTROPHILS 76 (H) 40 - 73 %    LYMPHOCYTES 14 (L) 21 - 52 %    MONOCYTES 3 3 - 10 %    EOSINOPHILS 6 (H) 0 - 5 %    BASOPHILS 0 0 - 2 %    IMMATURE GRANULOCYTES 1 (H) 0.0 - 0.5 %    ABS. NEUTROPHILS 5.1 1.8 - 8.0 K/UL    ABS. LYMPHOCYTES 0.9 0.9 - 3.6 K/UL    ABS. MONOCYTES 0.2 0.05 - 1.2 K/UL    ABS. EOSINOPHILS 0.4 0.0 - 0.4 K/UL    ABS. BASOPHILS 0.0 0.0 - 0.1 K/UL    ABS. IMM.  GRANS. 0.1 (H) 0.00 - 0.04 K/UL    DF AUTOMATED      PLATELET COMMENTS ADEQUATE PLATELETS      RBC COMMENTS ANISOCYTOSIS  1+        RBC COMMENTS TARGET CELLS  1+        RBC COMMENTS OVALOCYTES  2+        RBC COMMENTS LULI CELLS  2+ MAGNESIUM    Collection Time: 11/28/22  2:37 AM   Result Value Ref Range    Magnesium 2.3 1.6 - 2.6 mg/dL   METABOLIC PANEL, BASIC    Collection Time: 11/28/22  2:37 AM   Result Value Ref Range    Sodium 153 (H) 136 - 145 mmol/L    Potassium 3.8 3.5 - 5.5 mmol/L    Chloride 124 (H) 100 - 111 mmol/L    CO2 29 21 - 32 mmol/L    Anion gap 0 (L) 3.0 - 18 mmol/L    Glucose 125 (H) 74 - 99 mg/dL    BUN 15 7.0 - 18 MG/DL    Creatinine 0.37 (L) 0.6 - 1.3 MG/DL    BUN/Creatinine ratio 41 (H) 12 - 20      eGFR >60 >60 ml/min/1.73m2    Calcium 8.1 (L) 8.5 - 10.1 MG/DL   PHOSPHORUS    Collection Time: 11/28/22  2:37 AM   Result Value Ref Range    Phosphorus 2.7 2.5 - 4.9 MG/DL     Additional Data Reviewed:      Signed By: Lucia Olmos MD     November 28, 2022 10:38 AM

## 2022-11-28 NOTE — PROGRESS NOTES
Comprehensive Nutrition Assessment    Type and Reason for Visit: Initial, Consult, Positive nutrition screen    Nutrition Recommendations/Plan:   Initiate tube feeding:   Formula: Jevity 1.5  Goal Rate: 50 mL/hr  Water Flushes: 150 mL q 4 hours (900 mL)  Modular(s): none at this time  Goal Regimen Provides (with modulars): 1800 kcal, 77 gm pro, 912 mL free water, 100% RDIs     Continue IVF per MD        Malnutrition Assessment:  Malnutrition Status:  Insufficient data (11/25/22 1738)      Nutrition Assessment:    Pt tolerating tube feeds at goal rate. Received call from MD regarding having increased water flushes from 100 mL q 4 hours to 150 mL q 4 hours. Received call from RN regarding modified TF order; verifying if okay to continue at goal rate of 50 mL/hr. Per RN, pt has been tolerating feeds at goal rate. Discussed with RN, okay to continue TF at goal rate and MD only plan for modifying water flushes; this writer will modify tube feeding order to reflect plan for nutrition, continuing TF at goal rate. Na levels slowly improving, but remain high; was 166 mmol/L on 11/25; 153 mmol/L today. Pt also remains on dextrose IVF; D5 at 50 mL/hr (60 gm protein, 204 kcal per day)     Nutrition Related Findings:    BM 11/28 Wound Type: None    Current Nutrition Intake & Therapies:  Average Meal Intake: NPO  Average Supplement Intake: NPO  DIET NPO  ADULT TUBE FEEDING PEG; Standard with Fiber; Delivery Method: Continuous; Continuous Initial Rate (mL/hr): 20; Continuous Advance Tube Feeding: Yes; Advancement Volume (mL/hr): 10; Advancement Frequency: Q 8 hours; Continuous Goal Rate (mL/hr): 50. .. Anthropometric Measures:  Height: 5' 9\" (175.3 cm)  Ideal Body Weight (IBW): 160 lbs (73 kg)  Admission Body Weight: 160 lb 0.9 oz  Current Body Wt:  72.6 kg (160 lb 0.9 oz), 100 % IBW.  Bed scale  Current BMI (kg/m2): 23.6  Usual Body Weight: 68 kg (150 lb)  % Weight Change (Calculated): 6.7  Weight Adjustment: No adjustment  BMI Category: Normal weight (BMI 22.0-24.9) age over 72    Estimated Daily Nutrient Needs:  Energy Requirements Based On: Formula  Weight Used for Energy Requirements: Admission  Energy (kcal/day): 5553-5888  Weight Used for Protein Requirements: Admission  Protein (g/day): 58-73  Method Used for Fluid Requirements: 1 ml/kcal  Fluid (ml/day): 7302-1263    Nutrition Diagnosis:   Inadequate oral intake related to swallowing difficulty as evidenced by NPO or clear liquid status due to medical condition, nutrition support-enteral nutrition      Nutrition Interventions:   Food and/or Nutrient Delivery: Continue NPO, Continue tube feeding, IV fluid delivery  Nutrition Education/Counseling: Education not indicated  Coordination of Nutrition Care: Continue to monitor while inpatient  Plan of Care discussed with: MD and RN    Goals:  Previous Goal Met: Goal(s) achieved  Goals: Meet at least 75% of estimated needs, Tolerate nutrition support at goal rate, by next RD assessment       Nutrition Monitoring and Evaluation:   Behavioral-Environmental Outcomes: None identified  Food/Nutrient Intake Outcomes: Enteral nutrition intake/tolerance, IVF intake  Physical Signs/Symptoms Outcomes: Biochemical data, Fluid status or edema    Discharge Planning:    Enteral nutrition    Marquis Xavier, [address] [place] [address]  Contact: [phone]

## 2022-11-28 NOTE — PROGRESS NOTES
OT order received and chart reviewed. Per chart, pt is coming from LTC facility. Per nursing and chart review pt requires total care and does not participate in ADLs. Due to pt at baseline functional status does not require acute OT services. Will recommend pt return to previous level of care.  Thank you, Carlos Enrique Stein, OTRL

## 2022-11-28 NOTE — PROGRESS NOTES
Problem: Nutrition Deficit  Goal: *Optimize nutritional status  Outcome: Progressing Towards Goal     Problem: Pressure Injury - Risk of  Goal: *Prevention of pressure injury  Description: Document Brian Scale and appropriate interventions in the flowsheet. Outcome: Progressing Towards Goal  Note: Pressure Injury Interventions:  Sensory Interventions: Assess changes in LOC, Assess need for specialty bed, Check visual cues for pain    Moisture Interventions: Absorbent underpads, Internal/External urinary devices    Activity Interventions: Assess need for specialty bed, Pressure redistribution bed/mattress(bed type), PT/OT evaluation    Mobility Interventions: Assess need for specialty bed, Float heels, HOB 30 degrees or less, Pressure redistribution bed/mattress (bed type), PT/OT evaluation    Nutrition Interventions: Document food/fluid/supplement intake    Friction and Shear Interventions: HOB 30 degrees or less                Problem: Patient Education: Go to Patient Education Activity  Goal: Patient/Family Education  Outcome: Progressing Towards Goal     Problem: Falls - Risk of  Goal: *Absence of Falls  Description: Document Abena Fall Risk and appropriate interventions in the flowsheet. Outcome: Progressing Towards Goal  Note: Fall Risk Interventions:            Medication Interventions: Bed/chair exit alarm    Elimination Interventions:  Toileting schedule/hourly rounds (condom cath on patient)              Problem: Patient Education: Go to Patient Education Activity  Goal: Patient/Family Education  Outcome: Progressing Towards Goal

## 2022-11-28 NOTE — PROGRESS NOTES
Problem: Nutrition Deficit  Goal: *Optimize nutritional status  Outcome: Progressing Towards Goal     Problem: Pressure Injury - Risk of  Goal: *Prevention of pressure injury  Description: Document Brian Scale and appropriate interventions in the flowsheet.   Outcome: Progressing Towards Goal  Note: Pressure Injury Interventions:  Sensory Interventions: Check visual cues for pain    Moisture Interventions: Absorbent underpads, Internal/External urinary devices    Activity Interventions: Assess need for specialty bed, Pressure redistribution bed/mattress(bed type)    Mobility Interventions: Assess need for specialty bed, Float heels, HOB 30 degrees or less, Pressure redistribution bed/mattress (bed type)    Nutrition Interventions: Document food/fluid/supplement intake    Friction and Shear Interventions: HOB 30 degrees or less

## 2022-11-29 LAB
ANION GAP SERPL CALC-SCNC: 2 MMOL/L (ref 3–18)
BASOPHILS # BLD: 0 K/UL (ref 0–0.1)
BASOPHILS NFR BLD: 0 % (ref 0–2)
BUN SERPL-MCNC: 13 MG/DL (ref 7–18)
BUN/CREAT SERPL: 37 (ref 12–20)
CALCIUM SERPL-MCNC: 8.2 MG/DL (ref 8.5–10.1)
CHLORIDE SERPL-SCNC: 119 MMOL/L (ref 100–111)
CO2 SERPL-SCNC: 29 MMOL/L (ref 21–32)
CREAT SERPL-MCNC: 0.35 MG/DL (ref 0.6–1.3)
DIFFERENTIAL METHOD BLD: ABNORMAL
EOSINOPHIL # BLD: 0.4 K/UL (ref 0–0.4)
EOSINOPHIL NFR BLD: 6 % (ref 0–5)
ERYTHROCYTE [DISTWIDTH] IN BLOOD BY AUTOMATED COUNT: 16.2 % (ref 11.6–14.5)
GLUCOSE BLD STRIP.AUTO-MCNC: 120 MG/DL (ref 70–110)
GLUCOSE BLD STRIP.AUTO-MCNC: 125 MG/DL (ref 70–110)
GLUCOSE BLD STRIP.AUTO-MCNC: 129 MG/DL (ref 70–110)
GLUCOSE BLD STRIP.AUTO-MCNC: 131 MG/DL (ref 70–110)
GLUCOSE SERPL-MCNC: 119 MG/DL (ref 74–99)
HCT VFR BLD AUTO: 26.4 % (ref 36–48)
HGB BLD-MCNC: 8 G/DL (ref 13–16)
IMM GRANULOCYTES # BLD AUTO: 0 K/UL (ref 0–0.04)
IMM GRANULOCYTES NFR BLD AUTO: 1 % (ref 0–0.5)
LYMPHOCYTES # BLD: 0.9 K/UL (ref 0.9–3.6)
LYMPHOCYTES NFR BLD: 15 % (ref 21–52)
MCH RBC QN AUTO: 26.1 PG (ref 24–34)
MCHC RBC AUTO-ENTMCNC: 30.3 G/DL (ref 31–37)
MCV RBC AUTO: 86.3 FL (ref 78–100)
MONOCYTES # BLD: 0.2 K/UL (ref 0.05–1.2)
MONOCYTES NFR BLD: 4 % (ref 3–10)
NEUTS SEG # BLD: 4.2 K/UL (ref 1.8–8)
NEUTS SEG NFR BLD: 74 % (ref 40–73)
NRBC # BLD: 0 K/UL (ref 0–0.01)
NRBC BLD-RTO: 0 PER 100 WBC
PLATELET # BLD AUTO: 197 K/UL (ref 135–420)
PMV BLD AUTO: 12.3 FL (ref 9.2–11.8)
POTASSIUM SERPL-SCNC: 3.9 MMOL/L (ref 3.5–5.5)
RBC # BLD AUTO: 3.06 M/UL (ref 4.35–5.65)
SODIUM SERPL-SCNC: 150 MMOL/L (ref 136–145)
WBC # BLD AUTO: 5.7 K/UL (ref 4.6–13.2)

## 2022-11-29 PROCEDURE — 94761 N-INVAS EAR/PLS OXIMETRY MLT: CPT

## 2022-11-29 PROCEDURE — 74011250636 HC RX REV CODE- 250/636: Performed by: HOSPITALIST

## 2022-11-29 PROCEDURE — 85025 COMPLETE CBC W/AUTO DIFF WBC: CPT

## 2022-11-29 PROCEDURE — 2709999900 HC NON-CHARGEABLE SUPPLY

## 2022-11-29 PROCEDURE — 99233 SBSQ HOSP IP/OBS HIGH 50: CPT | Performed by: STUDENT IN AN ORGANIZED HEALTH CARE EDUCATION/TRAINING PROGRAM

## 2022-11-29 PROCEDURE — 65270000046 HC RM TELEMETRY

## 2022-11-29 PROCEDURE — 82962 GLUCOSE BLOOD TEST: CPT

## 2022-11-29 PROCEDURE — 77030012890

## 2022-11-29 PROCEDURE — 74011250637 HC RX REV CODE- 250/637: Performed by: HOSPITALIST

## 2022-11-29 PROCEDURE — 80048 BASIC METABOLIC PNL TOTAL CA: CPT

## 2022-11-29 PROCEDURE — 74011000250 HC RX REV CODE- 250: Performed by: INTERNAL MEDICINE

## 2022-11-29 PROCEDURE — 74011250636 HC RX REV CODE- 250/636: Performed by: STUDENT IN AN ORGANIZED HEALTH CARE EDUCATION/TRAINING PROGRAM

## 2022-11-29 PROCEDURE — 74011250636 HC RX REV CODE- 250/636: Performed by: INTERNAL MEDICINE

## 2022-11-29 PROCEDURE — 74011000258 HC RX REV CODE- 258: Performed by: INTERNAL MEDICINE

## 2022-11-29 PROCEDURE — 74011250637 HC RX REV CODE- 250/637: Performed by: INTERNAL MEDICINE

## 2022-11-29 PROCEDURE — 36415 COLL VENOUS BLD VENIPUNCTURE: CPT

## 2022-11-29 RX ORDER — DEXTROSE, SODIUM CHLORIDE, AND POTASSIUM CHLORIDE 5; .45; .075 G/100ML; G/100ML; G/100ML
100 INJECTION INTRAVENOUS CONTINUOUS
Status: DISPENSED | OUTPATIENT
Start: 2022-11-29 | End: 2022-11-29

## 2022-11-29 RX ORDER — ACETAMINOPHEN 650 MG/1
650 SUPPOSITORY RECTAL
Status: DISCONTINUED | OUTPATIENT
Start: 2022-11-29 | End: 2022-12-02 | Stop reason: HOSPADM

## 2022-11-29 RX ORDER — POLYETHYLENE GLYCOL 3350 17 G/17G
17 POWDER, FOR SOLUTION ORAL DAILY PRN
Status: DISCONTINUED | OUTPATIENT
Start: 2022-11-29 | End: 2022-12-02 | Stop reason: HOSPADM

## 2022-11-29 RX ADMIN — SODIUM CHLORIDE, PRESERVATIVE FREE 10 ML: 5 INJECTION INTRAVENOUS at 06:00

## 2022-11-29 RX ADMIN — VANCOMYCIN HYDROCHLORIDE 1250 MG: 10 INJECTION, POWDER, LYOPHILIZED, FOR SOLUTION INTRAVENOUS at 10:10

## 2022-11-29 RX ADMIN — PIPERACILLIN SODIUM AND TAZOBACTAM SODIUM 3.38 G: 3; .375 INJECTION, POWDER, LYOPHILIZED, FOR SOLUTION INTRAVENOUS at 12:42

## 2022-11-29 RX ADMIN — METOPROLOL TARTRATE 25 MG: 50 TABLET, FILM COATED ORAL at 09:40

## 2022-11-29 RX ADMIN — LEVETIRACETAM 750 MG: 500 SOLUTION ORAL at 21:20

## 2022-11-29 RX ADMIN — PIPERACILLIN SODIUM AND TAZOBACTAM SODIUM 3.38 G: 3; .375 INJECTION, POWDER, LYOPHILIZED, FOR SOLUTION INTRAVENOUS at 19:00

## 2022-11-29 RX ADMIN — SODIUM CHLORIDE, PRESERVATIVE FREE 10 ML: 5 INJECTION INTRAVENOUS at 22:00

## 2022-11-29 RX ADMIN — Medication 100 MG: at 09:42

## 2022-11-29 RX ADMIN — METOPROLOL TARTRATE 25 MG: 50 TABLET, FILM COATED ORAL at 17:40

## 2022-11-29 RX ADMIN — APIXABAN 5 MG: 5 TABLET, FILM COATED ORAL at 09:41

## 2022-11-29 RX ADMIN — Medication 15 MG: at 09:39

## 2022-11-29 RX ADMIN — APIXABAN 5 MG: 5 TABLET, FILM COATED ORAL at 21:20

## 2022-11-29 RX ADMIN — POTASSIUM CHLORIDE, DEXTROSE MONOHYDRATE AND SODIUM CHLORIDE 100 ML/HR: 75; 5; 450 INJECTION, SOLUTION INTRAVENOUS at 09:47

## 2022-11-29 RX ADMIN — Medication 15 MG: at 21:00

## 2022-11-29 RX ADMIN — SODIUM CHLORIDE, PRESERVATIVE FREE 10 ML: 5 INJECTION INTRAVENOUS at 15:29

## 2022-11-29 RX ADMIN — LEVETIRACETAM 750 MG: 500 SOLUTION ORAL at 09:35

## 2022-11-29 RX ADMIN — PIPERACILLIN SODIUM AND TAZOBACTAM SODIUM 3.38 G: 3; .375 INJECTION, POWDER, LYOPHILIZED, FOR SOLUTION INTRAVENOUS at 02:41

## 2022-11-29 NOTE — ROUTINE PROCESS
Bedside shift change report given to Enrique Moyer RN (oncoming nurse) by Mari Agrawal RN (offgoing nurse). Report included the following information SBAR, Kardex, Intake/Output, and Recent Results.

## 2022-11-29 NOTE — ROUTINE PROCESS
Wound Prevention Checklist    Patient: Jai Dhillon (82 y.o. male)  Date: 11/29/2022  Diagnosis: Anemia [D64.9]  Hypernatremia [E87.0] <principal problem not specified>    Precautions:         []  Heel prevention boots placed on patient    []  Patient turned q2h during shift    []  Lift team ordered    [x]  Patient on Williams bed/Specialty bed    []  Each Wound is documented during shift (Stage, Color, drainage, odor, measurements, and dressings)    [x]  Dual skin check done with JOSEFA Fagan RN

## 2022-11-29 NOTE — PROGRESS NOTES
[facility] Hospitalist Group  Progress Note    Patient: Lucy Bowers Age: 79 y.o. : 1955 MR#: [id number] SSN: [id number]  Date/Time: 2022    Subjective:     Patient seen at bedside. Sodium continues to be elevated. Does not speak with writer. Assessment/Plan:     1. Hypoxia improving. 2. Dysphagia s/p peg. Tolerating TF. 3. Pneumonia, concern for aspiration. He received levaquin pta at LTC. Continue zosyn, vanco. Oral care. 4. Hypernatremia. At LTC, received D5/NS (4L) pta. received NS 1000 mL bolus in ED. Improving at appropriate rate of correction. Continue D5w. Increased Free water flushes with TF. Repeat Na now. 5. Volume depletion resolving. Continue TF. 6. Dementia w/o behavior problem  7. Seizure d/o on keppra. Seizure precautions. 8. Hypokalemia, hypophosphatemia replete as needed. 9. Thrombus of indeterminate age noted in the left brachial vein(s). Eliquis, monitor closely for bleeding. Remove midline when able. 10. Anemia requiring transfusion. 2nd unit prbc 22. 11. Anemia. Stool for occult blood negative 22. 12. Debility. Supportive care  13. DNR / DNI. Limited additional interventions. POST on file. Return to LTC at [facility] when ready. Son Lilli Rosas [phone] 4:40 pm left message. Additional Notes:  time spent 25 minutes. Discussed on IDT.      Case discussed with:  [x]Patient  []Family  [x]Nursing  [x]Case Management  DVT Prophylaxis:  []Lovenox  []Hep SQ  []SCDs  []Coumadin   []On Heparin gtt    Objective:   VS: Visit Vitals  BP (!) 149/84 (BP 1 Location: Right lower arm, BP Patient Position: Lying)   Pulse 85   Temp 98 °F (36.7 °C)   Resp 19   Ht 5' 9\" (1.753 m)   Wt 72.6 kg (160 lb)   SpO2 100%   BMI 23.63 kg/m²        Tmax/24hrs: Temp (24hrs), Av.8 °F (36.6 °C), Min:97.4 °F (36.3 °C), Max:98.1 °F (36.7 °C)    Input/Output:   Intake/Output Summary (Last 24 hours) at 2022 1644  Last data filed at 11/29/2022 0947  Gross per 24 hour   Intake 0 ml   Output 2150 ml   Net -2150 ml       General:  Cooperative, Not in acute distress, non verbal  HEENT: PERRL, EOMI, supple neck, no JVD, dry oral mucosa. Poor oral hygiene. Cardiovascular: S1S2 regular rate and rhythm, no rub/gallop   Pulmonary: air entry bilaterally, no wheezing, no crackles  GI:  Soft, non tender, non distended, nabs, no guarding. Peg site clean, no surrounding erythema, warmth; no drainage. Extremities:  trace pedal edema, +distal pulses appreciated   Bilateral UE edema, midline in left UE. Neuro: awake, good eye contact and tracking around the room. Non verbal. Not following commands   No rash to visible skin.      Labs:    Recent Results (from the past 24 hour(s))   METABOLIC PANEL, BASIC    Collection Time: 11/28/22  5:20 PM   Result Value Ref Range    Sodium 153 (H) 136 - 145 mmol/L    Potassium 3.6 3.5 - 5.5 mmol/L    Chloride 121 (H) 100 - 111 mmol/L    CO2 29 21 - 32 mmol/L    Anion gap 3 3.0 - 18 mmol/L    Glucose 120 (H) 74 - 99 mg/dL    BUN 14 7.0 - 18 MG/DL    Creatinine 0.36 (L) 0.6 - 1.3 MG/DL    BUN/Creatinine ratio 39 (H) 12 - 20      eGFR >60 >60 ml/min/1.73m2    Calcium 8.3 (L) 8.5 - 10.1 MG/DL   GLUCOSE, POC    Collection Time: 11/29/22 12:55 AM   Result Value Ref Range    Glucose (POC) 131 (H) 70 - 110 mg/dL   CBC WITH AUTOMATED DIFF    Collection Time: 11/29/22  2:31 AM   Result Value Ref Range    WBC 5.7 4.6 - 13.2 K/uL    RBC 3.06 (L) 4.35 - 5.65 M/uL    HGB 8.0 (L) 13.0 - 16.0 g/dL    HCT 26.4 (L) 36.0 - 48.0 %    MCV 86.3 78.0 - 100.0 FL    MCH 26.1 24.0 - 34.0 PG    MCHC 30.3 (L) 31.0 - 37.0 g/dL    RDW 16.2 (H) 11.6 - 14.5 %    PLATELET 178 801 - 954 K/uL    MPV 12.3 (H) 9.2 - 11.8 FL    NRBC 0.0 0  WBC    ABSOLUTE NRBC 0.00 0.00 - 0.01 K/uL    NEUTROPHILS 74 (H) 40 - 73 %    LYMPHOCYTES 15 (L) 21 - 52 %    MONOCYTES 4 3 - 10 %    EOSINOPHILS 6 (H) 0 - 5 %    BASOPHILS 0 0 - 2 %    IMMATURE GRANULOCYTES 1 (H) 0.0 - 0.5 %    ABS. NEUTROPHILS 4.2 1.8 - 8.0 K/UL    ABS. LYMPHOCYTES 0.9 0.9 - 3.6 K/UL    ABS. MONOCYTES 0.2 0.05 - 1.2 K/UL    ABS. EOSINOPHILS 0.4 0.0 - 0.4 K/UL    ABS. BASOPHILS 0.0 0.0 - 0.1 K/UL    ABS. IMM.  GRANS. 0.0 0.00 - 0.04 K/UL    DF AUTOMATED     METABOLIC PANEL, BASIC    Collection Time: 11/29/22  2:31 AM   Result Value Ref Range    Sodium 150 (H) 136 - 145 mmol/L    Potassium 3.9 3.5 - 5.5 mmol/L    Chloride 119 (H) 100 - 111 mmol/L    CO2 29 21 - 32 mmol/L    Anion gap 2 (L) 3.0 - 18 mmol/L    Glucose 119 (H) 74 - 99 mg/dL    BUN 13 7.0 - 18 MG/DL    Creatinine 0.35 (L) 0.6 - 1.3 MG/DL    BUN/Creatinine ratio 37 (H) 12 - 20      eGFR >60 >60 ml/min/1.73m2    Calcium 8.2 (L) 8.5 - 10.1 MG/DL   GLUCOSE, POC    Collection Time: 11/29/22  8:24 AM   Result Value Ref Range    Glucose (POC) 125 (H) 70 - 110 mg/dL   GLUCOSE, POC    Collection Time: 11/29/22 11:32 AM   Result Value Ref Range    Glucose (POC) 120 (H) 70 - 110 mg/dL     Additional Data Reviewed:      Signed By: Alfredo Aguirre DO     November 29, 2022 10:38 AM

## 2022-11-30 LAB
ANION GAP SERPL CALC-SCNC: 3 MMOL/L (ref 3–18)
BASOPHILS # BLD: 0 K/UL (ref 0–0.1)
BASOPHILS NFR BLD: 0 % (ref 0–2)
BUN SERPL-MCNC: 11 MG/DL (ref 7–18)
BUN/CREAT SERPL: 33 (ref 12–20)
CALCIUM SERPL-MCNC: 8.7 MG/DL (ref 8.5–10.1)
CHLORIDE SERPL-SCNC: 117 MMOL/L (ref 100–111)
CO2 SERPL-SCNC: 29 MMOL/L (ref 21–32)
CREAT SERPL-MCNC: 0.33 MG/DL (ref 0.6–1.3)
DIFFERENTIAL METHOD BLD: ABNORMAL
EOSINOPHIL # BLD: 0.3 K/UL (ref 0–0.4)
EOSINOPHIL NFR BLD: 5 % (ref 0–5)
ERYTHROCYTE [DISTWIDTH] IN BLOOD BY AUTOMATED COUNT: 16.7 % (ref 11.6–14.5)
GLUCOSE BLD STRIP.AUTO-MCNC: 117 MG/DL (ref 70–110)
GLUCOSE SERPL-MCNC: 96 MG/DL (ref 74–99)
HCT VFR BLD AUTO: 31.7 % (ref 36–48)
HGB BLD-MCNC: 9.4 G/DL (ref 13–16)
IMM GRANULOCYTES # BLD AUTO: 0 K/UL (ref 0–0.04)
IMM GRANULOCYTES NFR BLD AUTO: 0 % (ref 0–0.5)
LYMPHOCYTES # BLD: 1.4 K/UL (ref 0.9–3.6)
LYMPHOCYTES NFR BLD: 20 % (ref 21–52)
MCH RBC QN AUTO: 26.3 PG (ref 24–34)
MCHC RBC AUTO-ENTMCNC: 29.7 G/DL (ref 31–37)
MCV RBC AUTO: 88.8 FL (ref 78–100)
MONOCYTES # BLD: 0.3 K/UL (ref 0.05–1.2)
MONOCYTES NFR BLD: 4 % (ref 3–10)
NEUTS SEG # BLD: 4.9 K/UL (ref 1.8–8)
NEUTS SEG NFR BLD: 71 % (ref 40–73)
NRBC # BLD: 0 K/UL (ref 0–0.01)
NRBC BLD-RTO: 0 PER 100 WBC
PLATELET # BLD AUTO: 217 K/UL (ref 135–420)
PMV BLD AUTO: 12.3 FL (ref 9.2–11.8)
POTASSIUM SERPL-SCNC: 4.3 MMOL/L (ref 3.5–5.5)
RBC # BLD AUTO: 3.57 M/UL (ref 4.35–5.65)
SODIUM SERPL-SCNC: 149 MMOL/L (ref 136–145)
VANCOMYCIN SERPL-MCNC: 17.2 UG/ML (ref 5–40)
WBC # BLD AUTO: 7 K/UL (ref 4.6–13.2)

## 2022-11-30 PROCEDURE — 82962 GLUCOSE BLOOD TEST: CPT

## 2022-11-30 PROCEDURE — 74011250636 HC RX REV CODE- 250/636: Performed by: STUDENT IN AN ORGANIZED HEALTH CARE EDUCATION/TRAINING PROGRAM

## 2022-11-30 PROCEDURE — 85025 COMPLETE CBC W/AUTO DIFF WBC: CPT

## 2022-11-30 PROCEDURE — 99233 SBSQ HOSP IP/OBS HIGH 50: CPT | Performed by: STUDENT IN AN ORGANIZED HEALTH CARE EDUCATION/TRAINING PROGRAM

## 2022-11-30 PROCEDURE — 94761 N-INVAS EAR/PLS OXIMETRY MLT: CPT

## 2022-11-30 PROCEDURE — 80202 ASSAY OF VANCOMYCIN: CPT

## 2022-11-30 PROCEDURE — 77010033678 HC OXYGEN DAILY

## 2022-11-30 PROCEDURE — 65270000046 HC RM TELEMETRY

## 2022-11-30 PROCEDURE — 74011250636 HC RX REV CODE- 250/636: Performed by: INTERNAL MEDICINE

## 2022-11-30 PROCEDURE — 80048 BASIC METABOLIC PNL TOTAL CA: CPT

## 2022-11-30 PROCEDURE — 74011000258 HC RX REV CODE- 258: Performed by: INTERNAL MEDICINE

## 2022-11-30 PROCEDURE — 2709999900 HC NON-CHARGEABLE SUPPLY

## 2022-11-30 PROCEDURE — 74011000250 HC RX REV CODE- 250: Performed by: INTERNAL MEDICINE

## 2022-11-30 PROCEDURE — 74011250637 HC RX REV CODE- 250/637: Performed by: HOSPITALIST

## 2022-11-30 PROCEDURE — 36415 COLL VENOUS BLD VENIPUNCTURE: CPT

## 2022-11-30 PROCEDURE — 74011250637 HC RX REV CODE- 250/637: Performed by: INTERNAL MEDICINE

## 2022-11-30 PROCEDURE — 74011250636 HC RX REV CODE- 250/636: Performed by: HOSPITALIST

## 2022-11-30 RX ORDER — DEXTROSE, SODIUM CHLORIDE, AND POTASSIUM CHLORIDE 5; .45; .075 G/100ML; G/100ML; G/100ML
150 INJECTION INTRAVENOUS CONTINUOUS
Status: DISCONTINUED | OUTPATIENT
Start: 2022-11-30 | End: 2022-11-30

## 2022-11-30 RX ORDER — DEXTROSE MONOHYDRATE 50 MG/ML
100 INJECTION, SOLUTION INTRAVENOUS CONTINUOUS
Status: DISPENSED | OUTPATIENT
Start: 2022-11-30 | End: 2022-12-01

## 2022-11-30 RX ADMIN — LEVETIRACETAM 750 MG: 500 SOLUTION ORAL at 10:53

## 2022-11-30 RX ADMIN — SODIUM CHLORIDE, PRESERVATIVE FREE 10 ML: 5 INJECTION INTRAVENOUS at 21:32

## 2022-11-30 RX ADMIN — PIPERACILLIN SODIUM AND TAZOBACTAM SODIUM 3.38 G: 3; .375 INJECTION, POWDER, LYOPHILIZED, FOR SOLUTION INTRAVENOUS at 03:57

## 2022-11-30 RX ADMIN — METOPROLOL TARTRATE 25 MG: 50 TABLET, FILM COATED ORAL at 10:53

## 2022-11-30 RX ADMIN — Medication 15 MG: at 21:30

## 2022-11-30 RX ADMIN — SODIUM CHLORIDE, PRESERVATIVE FREE 10 ML: 5 INJECTION INTRAVENOUS at 14:00

## 2022-11-30 RX ADMIN — POLYETHYLENE GLYCOL 3350 17 G: 17 POWDER, FOR SOLUTION ORAL at 10:53

## 2022-11-30 RX ADMIN — APIXABAN 5 MG: 5 TABLET, FILM COATED ORAL at 21:34

## 2022-11-30 RX ADMIN — Medication 100 MG: at 10:53

## 2022-11-30 RX ADMIN — PIPERACILLIN SODIUM AND TAZOBACTAM SODIUM 3.38 G: 3; .375 INJECTION, POWDER, LYOPHILIZED, FOR SOLUTION INTRAVENOUS at 10:52

## 2022-11-30 RX ADMIN — METOPROLOL TARTRATE 25 MG: 50 TABLET, FILM COATED ORAL at 18:51

## 2022-11-30 RX ADMIN — VANCOMYCIN HYDROCHLORIDE 1250 MG: 10 INJECTION, POWDER, LYOPHILIZED, FOR SOLUTION INTRAVENOUS at 03:57

## 2022-11-30 RX ADMIN — VANCOMYCIN HYDROCHLORIDE 1250 MG: 10 INJECTION, POWDER, LYOPHILIZED, FOR SOLUTION INTRAVENOUS at 22:36

## 2022-11-30 RX ADMIN — DEXTROSE MONOHYDRATE 100 ML/HR: 50 INJECTION, SOLUTION INTRAVENOUS at 15:17

## 2022-11-30 RX ADMIN — Medication 15 MG: at 10:53

## 2022-11-30 RX ADMIN — POTASSIUM CHLORIDE, DEXTROSE MONOHYDRATE AND SODIUM CHLORIDE 100 ML/HR: 75; 5; 450 INJECTION, SOLUTION INTRAVENOUS at 13:27

## 2022-11-30 RX ADMIN — SODIUM CHLORIDE, PRESERVATIVE FREE 10 ML: 5 INJECTION INTRAVENOUS at 06:00

## 2022-11-30 RX ADMIN — LEVETIRACETAM 750 MG: 500 SOLUTION ORAL at 21:28

## 2022-11-30 RX ADMIN — PIPERACILLIN SODIUM AND TAZOBACTAM SODIUM 3.38 G: 3; .375 INJECTION, POWDER, LYOPHILIZED, FOR SOLUTION INTRAVENOUS at 18:33

## 2022-11-30 RX ADMIN — APIXABAN 5 MG: 5 TABLET, FILM COATED ORAL at 10:53

## 2022-11-30 NOTE — ROUTINE PROCESS
Bedside shift change report given to [facility], RN (oncoming nurse) by Mari Agrawal RN (offgoing nurse). Report included the following information SBAR, Kardex, Intake/Output, and Recent Results.

## 2022-11-30 NOTE — PROGRESS NOTES
[facility]    Patient: Torsten Hermosillo Age: 79 y.o. : 1955 MR#: [id number] SSN: [id number]  Date/Time: 2022    Subjective:     Patient seen at bedside. Sodium continues to be elevated. Does not speak with writer. Assessment/Plan:     1. Hypoxia improving. 2. Dysphagia s/p peg. Tolerating TF. 3. Pneumonia, concern for aspiration. He received levaquin pta at LTC. Continue zosyn, vanco. Oral care. 4. Hypernatremia. At LTC, received D5/NS (4L) pta. received NS 1000 mL bolus in ED. Improving at appropriate rate of correction. Continue D5w at increased rate. Increased Free water flushes with TF. Repeat Na now. 5. Volume depletion resolving. Continue TF. 6. Dementia w/o behavior problem  7. Seizure d/o on keppra. Seizure precautions. 8. Hypokalemia, hypophosphatemia replete as needed. 9. Thrombus of indeterminate age noted in the left brachial vein(s). Eliquis, monitor closely for bleeding. Remove midline when able. 10. Anemia requiring transfusion. 2nd unit prbc 22. 11. Anemia. Stool for occult blood negative 22. 12. Debility. Supportive care  13. DNR / DNI. Limited additional interventions. POST on file. Return to LTC at [facility] when ready. Son Gregory Acuna [phone]      Additional Notes:  time spent 25 minutes. Discussed on IDT.      Case discussed with:  [x]Patient  []Family  [x]Nursing  [x]Case Management  DVT Prophylaxis:  []Lovenox  []Hep SQ  []SCDs  []Coumadin   []On Heparin gtt    Objective:   VS: Visit Vitals  BP (!) 152/96 (BP 1 Location: Right upper arm, BP Patient Position: At rest)   Pulse 93   Temp 97.8 °F (36.6 °C)   Resp 20   Ht 5' 9\" (1.753 m)   Wt 72.6 kg (160 lb)   SpO2 96%   BMI 23.63 kg/m²        Tmax/24hrs: Temp (24hrs), Av.9 °F (36.6 °C), Min:97.2 °F (36.2 °C), Max:98.4 °F (36.9 °C)    Input/Output:   Intake/Output Summary (Last 24 hours) at 2022 1402  Last data filed at 11/30/2022 1334  Gross per 24 hour   Intake 250 ml   Output 1350 ml   Net -1100 ml       General:  Cooperative, Not in acute distress, non verbal  HEENT: PERRL, EOMI, supple neck, no JVD, dry oral mucosa. Poor oral hygiene. Cardiovascular: S1S2 regular rate and rhythm, no rub/gallop   Pulmonary: air entry bilaterally, no wheezing, no crackles  GI:  Soft, non tender, non distended, nabs, no guarding. Peg site clean, no surrounding erythema, warmth; no drainage. Extremities:  trace pedal edema, +distal pulses appreciated   Bilateral UE edema, midline in left UE. Neuro: awake, good eye contact and tracking around the room. Non verbal. Not following commands   No rash to visible skin.      Labs:    Recent Results (from the past 24 hour(s))   GLUCOSE, POC    Collection Time: 11/29/22  9:15 PM   Result Value Ref Range    Glucose (POC) 129 (H) 70 - 110 mg/dL   VANCOMYCIN, RANDOM    Collection Time: 11/30/22  2:48 AM   Result Value Ref Range    Vancomycin, random 17.2 5.0 - 40.0 UG/ML   GLUCOSE, POC    Collection Time: 11/30/22  6:15 AM   Result Value Ref Range    Glucose (POC) 117 (H) 70 - 767 mg/dL   METABOLIC PANEL, BASIC    Collection Time: 11/30/22  9:31 AM   Result Value Ref Range    Sodium 149 (H) 136 - 145 mmol/L    Potassium 4.3 3.5 - 5.5 mmol/L    Chloride 117 (H) 100 - 111 mmol/L    CO2 29 21 - 32 mmol/L    Anion gap 3 3.0 - 18 mmol/L    Glucose 96 74 - 99 mg/dL    BUN 11 7.0 - 18 MG/DL    Creatinine 0.33 (L) 0.6 - 1.3 MG/DL    BUN/Creatinine ratio 33 (H) 12 - 20      eGFR >60 >60 ml/min/1.73m2    Calcium 8.7 8.5 - 10.1 MG/DL   CBC WITH AUTOMATED DIFF    Collection Time: 11/30/22  9:31 AM   Result Value Ref Range    WBC 7.0 4.6 - 13.2 K/uL    RBC 3.57 (L) 4.35 - 5.65 M/uL    HGB 9.4 (L) 13.0 - 16.0 g/dL    HCT 31.7 (L) 36.0 - 48.0 %    MCV 88.8 78.0 - 100.0 FL    MCH 26.3 24.0 - 34.0 PG    MCHC 29.7 (L) 31.0 - 37.0 g/dL    RDW 16.7 (H) 11.6 - 14.5 %    PLATELET 817 199 - 197 K/uL    MPV 12.3 (H) 9.2 - 11.8 FL    NRBC 0.0 0  WBC    ABSOLUTE NRBC 0.00 0.00 - 0.01 K/uL    NEUTROPHILS 71 40 - 73 %    LYMPHOCYTES 20 (L) 21 - 52 %    MONOCYTES 4 3 - 10 %    EOSINOPHILS 5 0 - 5 %    BASOPHILS 0 0 - 2 %    IMMATURE GRANULOCYTES 0 0.0 - 0.5 %    ABS. NEUTROPHILS 4.9 1.8 - 8.0 K/UL    ABS. LYMPHOCYTES 1.4 0.9 - 3.6 K/UL    ABS. MONOCYTES 0.3 0.05 - 1.2 K/UL    ABS. EOSINOPHILS 0.3 0.0 - 0.4 K/UL    ABS. BASOPHILS 0.0 0.0 - 0.1 K/UL    ABS. IMM.  GRANS. 0.0 0.00 - 0.04 K/UL    DF AUTOMATED       Additional Data Reviewed:      Signed By: [employer]     November 30, 2022 10:38 AM

## 2022-11-30 NOTE — PROGRESS NOTES
Problem: Nutrition Deficit  Goal: *Optimize nutritional status  Outcome: Progressing Towards Goal     Problem: Pressure Injury - Risk of  Goal: *Prevention of pressure injury  Description: Document Brian Scale and appropriate interventions in the flowsheet. Outcome: Progressing Towards Goal  Note: Pressure Injury Interventions:  Sensory Interventions: Float heels, Keep linens dry and wrinkle-free, Minimize linen layers, Monitor skin under medical devices, Pressure redistribution bed/mattress (bed type), Turn and reposition approx. every two hours (pillows and wedges if needed)    Moisture Interventions: Absorbent underpads, Check for incontinence Q2 hours and as needed    Activity Interventions: Pressure redistribution bed/mattress(bed type)    Mobility Interventions: Pressure redistribution bed/mattress (bed type)    Nutrition Interventions: Document food/fluid/supplement intake    Friction and Shear Interventions: Foam dressings/transparent film/skin sealants, Transferring/repositioning devices                Problem: Patient Education: Go to Patient Education Activity  Goal: Patient/Family Education  Outcome: Progressing Towards Goal     Problem: Falls - Risk of  Goal: *Absence of Falls  Description: Document Abena Fall Risk and appropriate interventions in the flowsheet.   Outcome: Progressing Towards Goal  Note: Fall Risk Interventions:       Mentation Interventions: Adequate sleep, hydration, pain control, Bed/chair exit alarm    Medication Interventions: Bed/chair exit alarm    Elimination Interventions: Bed/chair exit alarm, Toileting schedule/hourly rounds              Problem: Patient Education: Go to Patient Education Activity  Goal: Patient/Family Education  Outcome: Progressing Towards Goal

## 2022-11-30 NOTE — PROGRESS NOTES
[facility] Pharmacokinetic Monitoring Service - Vancomycin    Consulting Provider: William Redmond MD  Indication: Pneumonia (HAP)  Target Concentration: Goal AUC/ALFREDITO 400-600 mg*hr/L  Day of Therapy: 4  Additional Antimicrobials: Piperacillin/Tazobactam    Pertinent Laboratory Values:   Temp: 98.3 °F (36.8 °C)  Weight: 72.6 kg (160 lb)  Recent Labs     11/29/22  0231 11/28/22  1720 11/28/22  0237   CREA 0.35* 0.36* 0.37*   BUN 13 14 15   WBC 5.7  --  6.7       Estimated Creatinine Clearance: 102.4 mL/min (A) (by C-G formula based on SCr of 0.35 mg/dL (L)).     Pertinent Cultures:  Culture Date Source Results   11/25 blood NO GROWTH 4 DAYS   MRSA Nasal Swab: Ordered by provider, awaiting results    Assessment:  Date/Time Current Dose Concentration Timing of Concentration (h) AUC   11/26 1832 1500mg - - -   11/27 0728 1gm q12h - - -   11/27 1247 - 22.2 5 594   11/27 2212 1250mg q18h - - -   11/28 1806 1250 mg - - -   11/29 1010 1250 mg - -    11/30 0248 - 17.2 17 526   11/30 0357 1250 mg - - -   Note: Serum concentrations collected for AUC dosing may appear elevated if collected in close proximity to the dose administered, this is not necessarily an indication of toxicity    Plan:  Current regimen is therapeutic  Renal labs as indicated   Pharmacy will continue to monitor patient and adjust therapy as indicated

## 2022-11-30 NOTE — ROUTINE PROCESS
Wound Prevention Checklist    Patient: Dilan Malone (10 y.o. male)  Date: 11/30/2022  Diagnosis: Anemia [D64.9]  Hypernatremia [E87.0] <principal problem not specified>    Precautions:         []  Heel prevention boots placed on patient    []  Patient turned q2h during shift    []  Lift team ordered    [x]  Patient on [facility] bed/Specialty bed    []  Each Wound is documented during shift (Stage, Color, drainage, odor, measurements, and dressings)    [x]  Dual skin check done with Eveleen Severin, JOSEFA Banegas RN

## 2022-12-01 VITALS
HEIGHT: 69 IN | HEART RATE: 95 BPM | DIASTOLIC BLOOD PRESSURE: 94 MMHG | OXYGEN SATURATION: 100 % | RESPIRATION RATE: 16 BRPM | WEIGHT: 160 LBS | SYSTOLIC BLOOD PRESSURE: 159 MMHG | TEMPERATURE: 98.8 F | BODY MASS INDEX: 23.7 KG/M2

## 2022-12-01 PROBLEM — J69.0 ASPIRATION PNEUMONIA (HCC): Status: ACTIVE | Noted: 2022-12-01

## 2022-12-01 LAB
ANION GAP SERPL CALC-SCNC: 3 MMOL/L (ref 3–18)
BACTERIA SPEC CULT: NORMAL
BACTERIA SPEC CULT: NORMAL
BASOPHILS # BLD: 0 K/UL (ref 0–0.1)
BASOPHILS NFR BLD: 0 % (ref 0–2)
BUN SERPL-MCNC: 10 MG/DL (ref 7–18)
BUN/CREAT SERPL: 26 (ref 12–20)
CALCIUM SERPL-MCNC: 8.9 MG/DL (ref 8.5–10.1)
CHLORIDE SERPL-SCNC: 114 MMOL/L (ref 100–111)
CO2 SERPL-SCNC: 28 MMOL/L (ref 21–32)
CREAT SERPL-MCNC: 0.38 MG/DL (ref 0.6–1.3)
DIFFERENTIAL METHOD BLD: ABNORMAL
EOSINOPHIL # BLD: 0.4 K/UL (ref 0–0.4)
EOSINOPHIL NFR BLD: 4 % (ref 0–5)
ERYTHROCYTE [DISTWIDTH] IN BLOOD BY AUTOMATED COUNT: 16.9 % (ref 11.6–14.5)
GLUCOSE SERPL-MCNC: 135 MG/DL (ref 74–99)
HCT VFR BLD AUTO: 30.1 % (ref 36–48)
HGB BLD-MCNC: 9.2 G/DL (ref 13–16)
IMM GRANULOCYTES # BLD AUTO: 0 K/UL (ref 0–0.04)
IMM GRANULOCYTES NFR BLD AUTO: 0 % (ref 0–0.5)
LYMPHOCYTES # BLD: 1.6 K/UL (ref 0.9–3.6)
LYMPHOCYTES NFR BLD: 17 % (ref 21–52)
MCH RBC QN AUTO: 26.1 PG (ref 24–34)
MCHC RBC AUTO-ENTMCNC: 30.6 G/DL (ref 31–37)
MCV RBC AUTO: 85.3 FL (ref 78–100)
MONOCYTES # BLD: 0.4 K/UL (ref 0.05–1.2)
MONOCYTES NFR BLD: 4 % (ref 3–10)
NEUTS SEG # BLD: 6.8 K/UL (ref 1.8–8)
NEUTS SEG NFR BLD: 74 % (ref 40–73)
NRBC # BLD: 0 K/UL (ref 0–0.01)
NRBC BLD-RTO: 0 PER 100 WBC
PLATELET # BLD AUTO: 276 K/UL (ref 135–420)
PMV BLD AUTO: 12.2 FL (ref 9.2–11.8)
POTASSIUM SERPL-SCNC: 3.8 MMOL/L (ref 3.5–5.5)
RBC # BLD AUTO: 3.53 M/UL (ref 4.35–5.65)
SERVICE CMNT-IMP: NORMAL
SERVICE CMNT-IMP: NORMAL
SODIUM SERPL-SCNC: 145 MMOL/L (ref 136–145)
WBC # BLD AUTO: 9.2 K/UL (ref 4.6–13.2)

## 2022-12-01 PROCEDURE — 77030040392 HC DRSG OPTIFOAM MDII -A

## 2022-12-01 PROCEDURE — 99239 HOSP IP/OBS DSCHRG MGMT >30: CPT | Performed by: STUDENT IN AN ORGANIZED HEALTH CARE EDUCATION/TRAINING PROGRAM

## 2022-12-01 PROCEDURE — 80048 BASIC METABOLIC PNL TOTAL CA: CPT

## 2022-12-01 PROCEDURE — 74011000258 HC RX REV CODE- 258: Performed by: INTERNAL MEDICINE

## 2022-12-01 PROCEDURE — 94761 N-INVAS EAR/PLS OXIMETRY MLT: CPT

## 2022-12-01 PROCEDURE — 74011250637 HC RX REV CODE- 250/637: Performed by: HOSPITALIST

## 2022-12-01 PROCEDURE — 74011250637 HC RX REV CODE- 250/637: Performed by: INTERNAL MEDICINE

## 2022-12-01 PROCEDURE — 77030037878 HC DRSG MEPILEX >48IN BORD MOLN -B

## 2022-12-01 PROCEDURE — 85025 COMPLETE CBC W/AUTO DIFF WBC: CPT

## 2022-12-01 PROCEDURE — 74011250636 HC RX REV CODE- 250/636: Performed by: INTERNAL MEDICINE

## 2022-12-01 PROCEDURE — 74011250636 HC RX REV CODE- 250/636: Performed by: HOSPITALIST

## 2022-12-01 PROCEDURE — 36415 COLL VENOUS BLD VENIPUNCTURE: CPT

## 2022-12-01 PROCEDURE — 2709999900 HC NON-CHARGEABLE SUPPLY

## 2022-12-01 PROCEDURE — 74011250636 HC RX REV CODE- 250/636: Performed by: STUDENT IN AN ORGANIZED HEALTH CARE EDUCATION/TRAINING PROGRAM

## 2022-12-01 PROCEDURE — 74011000250 HC RX REV CODE- 250: Performed by: INTERNAL MEDICINE

## 2022-12-01 PROCEDURE — 77030040393 HC DRSG OPTIFOAM GENT MDII -B

## 2022-12-01 RX ORDER — AMOXICILLIN AND CLAVULANATE POTASSIUM 875; 125 MG/1; MG/1
1 TABLET, FILM COATED ORAL 2 TIMES DAILY
Qty: 18 TABLET | Refills: 0 | Status: SHIPPED | OUTPATIENT
Start: 2022-12-01 | End: 2022-12-10

## 2022-12-01 RX ORDER — HYDRALAZINE HYDROCHLORIDE 20 MG/ML
10 INJECTION INTRAMUSCULAR; INTRAVENOUS ONCE
Status: COMPLETED | OUTPATIENT
Start: 2022-12-01 | End: 2022-12-01

## 2022-12-01 RX ADMIN — LEVETIRACETAM 750 MG: 500 SOLUTION ORAL at 09:30

## 2022-12-01 RX ADMIN — APIXABAN 5 MG: 5 TABLET, FILM COATED ORAL at 09:32

## 2022-12-01 RX ADMIN — Medication 100 MG: at 09:32

## 2022-12-01 RX ADMIN — HYDRALAZINE HYDROCHLORIDE 10 MG: 20 INJECTION INTRAMUSCULAR; INTRAVENOUS at 01:19

## 2022-12-01 RX ADMIN — POLYETHYLENE GLYCOL 3350 17 G: 17 POWDER, FOR SOLUTION ORAL at 09:30

## 2022-12-01 RX ADMIN — VANCOMYCIN HYDROCHLORIDE 1250 MG: 10 INJECTION, POWDER, LYOPHILIZED, FOR SOLUTION INTRAVENOUS at 15:43

## 2022-12-01 RX ADMIN — DEXTROSE MONOHYDRATE 100 ML/HR: 50 INJECTION, SOLUTION INTRAVENOUS at 01:15

## 2022-12-01 RX ADMIN — PIPERACILLIN SODIUM AND TAZOBACTAM SODIUM 3.38 G: 3; .375 INJECTION, POWDER, LYOPHILIZED, FOR SOLUTION INTRAVENOUS at 03:09

## 2022-12-01 RX ADMIN — Medication 15 MG: at 09:48

## 2022-12-01 RX ADMIN — PIPERACILLIN SODIUM AND TAZOBACTAM SODIUM 3.38 G: 3; .375 INJECTION, POWDER, LYOPHILIZED, FOR SOLUTION INTRAVENOUS at 11:13

## 2022-12-01 RX ADMIN — METOPROLOL TARTRATE 25 MG: 50 TABLET, FILM COATED ORAL at 17:01

## 2022-12-01 RX ADMIN — SODIUM CHLORIDE, PRESERVATIVE FREE 10 ML: 5 INJECTION INTRAVENOUS at 13:31

## 2022-12-01 RX ADMIN — METOPROLOL TARTRATE 25 MG: 50 TABLET, FILM COATED ORAL at 09:38

## 2022-12-01 RX ADMIN — SODIUM CHLORIDE, PRESERVATIVE FREE 10 ML: 5 INJECTION INTRAVENOUS at 07:15

## 2022-12-01 NOTE — DISCHARGE SUMMARY
Discharge Summary    Patient: Isabella Birch MRN: [id number]  CSN: [id number]    YOB: 1955  Age: 79 y.o. Sex: male    DOA: 11/25/2022 LOS:  LOS: 6 days   Discharge Date:      Admission Diagnosis: Anemia [D64.9]  Hypernatremia [E87.0]    Discharge Diagnosis:    Hospital Problems  Date Reviewed: 10/11/2022            Codes Class Noted POA    Aspiration pneumonia ([facility] ICD-10-CM: J69.0  ICD-9-CM: 507.0  12/1/2022 Unknown        Hypernatremia ICD-10-CM: E87.0  ICD-9-CM: 276.0  11/25/2022 Unknown        Symptomatic anemia ICD-10-CM: D64.9  ICD-9-CM: 285.9  11/25/2022 Unknown        Seizure disorder ([facility] ICD-10-CM: G40.909  ICD-9-CM: 345.90  10/12/2022 Yes        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  ICD-9-CM: 291.2  10/9/2022 Yes        Debility ICD-10-CM: R53.81  ICD-9-CM: 799.3  Unknown Yes        Acute metabolic encephalopathy [facility]-28-KF: G93.41  ICD-9-CM: 348.31  3/30/2021 Yes        Dehydration ICD-10-CM: E86.0  ICD-9-CM: 276.51  3/30/2021 Yes        Hypokalemia ICD-10-CM: E87.6  ICD-9-CM: 276.8  6/22/2013 Yes           Discharge Condition: Stable  Discharge Disposition: [address] EXAM  Visit Vitals  BP (!) 157/94 (BP 1 Location: Right lower arm, BP Patient Position: At rest)   Pulse (!) 102   Temp 97.9 °F (36.6 °C)   Resp 20   Ht 5' 9\" (1.753 m)   Wt 72.6 kg (160 lb)   SpO2 99%   BMI 23.63 kg/m²     General:  Cooperative, Not in acute distress, non verbal  HEENT: PERRL, EOMI, supple neck, no JVD, dry oral mucosa. Poor oral hygiene. Cardiovascular: S1S2 regular rate and rhythm, no rub/gallop   Pulmonary: air entry bilaterally, no wheezing, no crackles  GI:  Soft, non tender, non distended, nabs, no guarding. Peg site clean, no surrounding erythema, warmth; no drainage.    Extremities:  trace pedal edema, +distal pulses appreciated   Bilateral UE edema, midline in left UE (to be removed prior to discharge)  Neuro: awake, good eye contact and tracking around the room. Non verbal. Not following commands   No rash to visible skin. Hospital Course By Problem:   1. Hypoxia - Patient required 2L  NC in ED. 2. Dysphagia s/p peg. Tolerating TF. 3. Pneumonia, concern for aspiration. He received levaquin pta at LTC. Continued on zosyn, vanco until 12/1/22. To be started on Augmentin for additional 9 days to complete 14 day course. 4. Hypernatremia. At LTC, received D5/NS (4L) pta. received NS 1000 mL bolus in ED. Improving at appropriate rate of correction. Continued on D5w for 24 hours at 100 cc/hr. Increased Free water flushes with TF.   5. Volume depletion resolving. Continue TF. 6. Dementia w/o behavior problem - patient pleasant throughout stay. 7. Seizure d/o on keppra. Seizure precautions. 8. Hypokalemia, hypophosphatemia. Repleted as needed. Potassium 3.8 on day of discharge. 9. Thrombus of indeterminate age noted in the left brachial vein(s). Eliquis 5 mg BID started. 10. Acute normocytic anemia requiring transfusion. 2nd unit prbc 11/26/22. 11. Anemia. Stool for occult blood negative 11/26/22. 12. Debility. Supportive care  13. DNR / DNI. Limited additional interventions. POST on file. Return to LTC at [facility]. Consults:     Significant Diagnostic Studies:   Upper Extremity Venous Findings    Right Upper Venous    The internal jugular, subclavian, axillary, brachial prox, brachial mid, brachial dist, cephalic upper arm and basilic upper arm vein(s) were visualized in the transverse and longitudinal views. The vessels showed normal color filling and compressibility. Doppler interrogation showed phasic and spontaneous flow. Left Upper Venous    Thrombus of indeterminate age noted in the left brachial vein(s). Access line (i.e. permacath, portacath or PICC line) noted within the left brachial vein.      The innominate, subclavian, mid brachial, distal brachial, cephalic upper arm and basilic upper arm vein(s) were visualized in the transverse and longitudinal views. The vessels showed normal color filling and compressibility. Doppler interrogation showed phasic and spontaneous flow. CXR (11/25/22): IMPRESSION     1. Bibasilar streaky/hazy opacities, concerning for infiltrate and component of  atelectasis. Overall slightly improved aeration at the left lung base and  slightly worsened at the right lower lung when compared to most recent prior  exam.    Discharge Medications:     Current Discharge Medication List        START taking these medications    Details   apixaban (ELIQUIS) 5 mg tablet Take 1 Tablet by mouth every twelve (12) hours. Qty: 60 Tablet, Refills: 0      amoxicillin-clavulanate (Augmentin) 875-125 mg per tablet Take 1 Tablet by mouth two (2) times a day for 9 days. Qty: 18 Tablet, Refills: 0           CONTINUE these medications which have NOT CHANGED    Details   baclofen (LIORESAL) 10 mg tablet 10 mg by Per G Tube route two (2) times daily as needed for Muscle Spasm(s). 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 Indications: prevention of vitamin B12 deficiency      ferrous sulfate (FerrouSuL) 325 mg (65 mg iron) tablet Take  by mouth Daily (before breakfast). metoprolol tartrate (LOPRESSOR) 25 mg tablet 25 mg by Per G Tube route two (2) times a day. thiamine HCL (B-1) 100 mg tablet 1 Tablet by Per G Tube route daily. Qty: 30 Tablet, Refills: 0      levETIRAcetam (KEPPRA) 100 mg/mL solution 7.5 mL by Per G Tube route two (2) times a day. Qty: 473 mL, Refills: 0      pravastatin (PRAVACHOL) 40 mg tablet Take 1 Tab by mouth nightly. Qty: 30 Tab, Refills: 1      ascorbic acid, vitamin C, (VITAMIN C) 250 mg tablet Take 1 Tab by mouth daily. Qty: 30 Tab, Refills: 2      polyethylene glycol (MIRALAX) 17 gram packet Take 1 Packet by mouth daily.   Qty: 30 Packet, Refills: 2      tamsulosin (FLOMAX) 0.4 mg capsule Take 0.4 mg by mouth daily. 1 tab daily      MULTIVITAMIN,THERAPEUTIC (THERA MULTI-VITAMIN PO) Take 500 mg by mouth daily. folic acid (FOLVITE) 1 mg tablet Take 1 Tab by mouth daily. Qty: 30 Tab, Refills: 1           STOP taking these medications       aspirin 81 mg chewable tablet Comments:   Reason for Stopping: on eliquis         levoFLOXacin (LEVAQUIN) 750 mg tablet Comments:   Reason for Stopping: past medication              Activity: activity as tolerated    Diet:  PEG - Jevity 1.5. Standard with Fiber. Continuous at 50 mL/hr, Free water flushes - 150 mL every 4 hours.        Wound Care: None needed    Follow-up: with PCP, Blaire Salinas MD in 7-10days    Minutes spent on discharge: >30 minutes spent coordinating this discharge (review instructions/follow-up, prescriptions, preparing report for sign off)    Dianne Joyner DO

## 2022-12-01 NOTE — PROGRESS NOTES
Dr. Justine Valera is informed of    12/01/22 0038   Vital Signs   BP (!) 174/105   MAP (Calculated) 128   BP 1 Method Automatic   BP 1 Location Right lower arm   BP Patient Position At rest;Semi fowlers   New order of hydralazine is received.

## 2022-12-01 NOTE — ROUTINE PROCESS
TRANSFER - OUT REPORT:    Verbal report given to Nurse Gosia Ferreira (name) on Isabella Birch  [facility] for routine progression of care       Report consisted of patients Situation, Background, Assessment and   Recommendations(SBAR). Information from the following report(s) SBAR, Kardex, and MAR was reviewed with the receiving nurse. Lines:  Midline removed. Pt tolerated well     Opportunity for questions and clarification was provided.

## 2022-12-01 NOTE — PROGRESS NOTES
Bedside shift change report given to Danny Colunga RN (oncoming nurse) by Ely Ravi RN and Rj Haddad RN (offgoing nurse). Report included the following information SBAR, Kardex, Intake/Output, MAR, and Recent Results.       Wound Prevention Checklist    Patient: Maryann Hamm (54 y.o. male)  Date: 11/30/2022  Diagnosis: Anemia [D64.9]  Hypernatremia [E87.0] <principal problem not specified>    Precautions:         []  Heel prevention boots placed on patient    []  Patient turned q2h during shift    []  Lift team ordered    []  Patient on [place] bed/Specialty bed    []  Each Wound is documented during shift (Stage, Color, drainage, odor, measurements, and dressings)    [x]  Dual skin check done with Mary Beth Rodriguez

## 2022-12-01 NOTE — DISCHARGE INSTRUCTIONS
DISCHARGE SUMMARY from Nurse    PATIENT INSTRUCTIONS:    After general anesthesia or intravenous sedation, for 24 hours or while taking prescription Narcotics:  Limit your activities  Do not drive and operate hazardous machinery  Do not make important personal or business decisions  Do  not drink alcoholic beverages  If you have not urinated within 8 hours after discharge, please contact your surgeon on call. Report the following to your surgeon:  Excessive pain, swelling, redness or odor of or around the surgical area  Temperature over 100.5  Nausea and vomiting lasting longer than 4 hours or if unable to take medications  Any signs of decreased circulation or nerve impairment to extremity: change in color, persistent  numbness, tingling, coldness or increase pain  Any questions    What to do at Home:  Recommended activity: Activity as tolerated,     If you experience any of the following symptoms chest pain, shortness of breath, fever greater than 100.5, nausea, vomiting, please follow up with Ernie Estrada. *  Please give a list of your current medications to your Primary Care Provider. *  Please update this list whenever your medications are discontinued, doses are      changed, or new medications (including over-the-counter products) are added. *  Please carry medication information at all times in case of emergency situations. These are general instructions for a healthy lifestyle:    No smoking/ No tobacco products/ Avoid exposure to second hand smoke  Surgeon General's Warning:  Quitting smoking now greatly reduces serious risk to your health.     Obesity, smoking, and sedentary lifestyle greatly increases your risk for illness    A healthy diet, regular physical exercise & weight monitoring are important for maintaining a healthy lifestyle    You may be retaining fluid if you have a history of heart failure or if you experience any of the following symptoms:  Weight gain of 3 pounds or more overnight or 5 pounds in a week, increased swelling in our hands or feet or shortness of breath while lying flat in bed. Please call your doctor as soon as you notice any of these symptoms; do not wait until your next office visit. Patient armband removed and shredded   MyChart Activation    Thank you for requesting access to itravel. Please follow the instructions below to securely access and download your online medical record. itravel allows you to send messages to your doctor, view your test results, renew your prescriptions, schedule appointments, and more. How Do I Sign Up? In your internet browser, go to [email or web address]  Click on the First Time User? Click Here link in the Sign In box. You will be redirect to the New Member Sign Up page. Enter your itravel Access Code exactly as it appears below. You will not need to use this code after youve completed the sign-up process. If you do not sign up before the expiration date, you must request a new code. itravel Access Code: [email or web address]  Expires: 2023 12:17 PM (This is the date your itravel access code will )    Enter the last four digits of your Social Security Number (xxxx) and Date of Birth (mm/dd/yyyy) as indicated and click Submit. You will be taken to the next sign-up page. Create a itravel ID. This will be your itravel login ID and cannot be changed, so think of one that is secure and easy to remember. Create a itravel password. You can change your password at any time. Enter your Password Reset Question and Answer. This can be used at a later time if you forget your password. Enter your e-mail address. You will receive e-mail notification when new information is available in [address] Click Sign Up. You can now view and download portions of your medical record. Click the [employer] link to download a portable copy of your medical information.     Additional Information    If you have questions, please visit the Frequently Asked Questions section of the [employer] website at [email or web address] Remember, [employer] is NOT to be used for urgent needs. For medical emergencies, dial 911. The discharge information has been reviewed with the patient. The {PATIENT PARENT GUARDIAN:68449} verbalized understanding. Discharge medications reviewed with the {[employer] meds reviewed RUTB:08730} and appropriate educational materials and side effects teaching were provided.   ___________________________________________________________________________________________________________________________________

## 2022-12-01 NOTE — PROGRESS NOTES
Transition of Care Plan to LTC  LTC Transition:  Patient is returning to LTC facility. Patient will transported by [facility]  and will call nursing station with ETA    Transportation to [facility]. Address is [address] [place] [address] and phone number is [phone]  Patient will require BLS transport. Pt requires Stretcher If stretcher, reason: Aspiration pneumonia, acute metabolic encephalopathy, hx seizure DO  Patient is currently requiring oxygen Yes Yes: Comment: 2liters  Height:5'9\"   Weight: 160lb  Pt is on isolation: No   Is the pt ready now? yes  Requested time: Next Available  PCS Faxed: N/A  Insurance verified on face sheet: yes  Auth needed for transport: yes  CM completed PCS/ Envelope and placed on chart. Spoke with Scott Juarez with [facility] ([phone] with confirmation # [id number])    Communication to Patient/Family:  Jose Valentin, son ([phone]) via phone agreeable to the transition plan. Communication to SNF/Rehab:  Bedside RN, Taniya Tse, has been notified to update the transition plan to the facility and call report [phone]  Discharge information has been updated on the AVS.       Nursing Please include all hard scripts for controlled substances, med rec and dc summary, and AVS in packet.      Reviewed and confirmed with Stefan Armas with [facility] and  can manage the patient care needs for the following:     Gino Rojas with (X) only those applicable:    Medication:  [x]  Medications will be available at the facility     Documents:    [x]Discharge Summary      Treatment:  [x]PEG Care  [x]Oxygen 2 liters     Dietary:    [x]Tube Feedings  see d/c summary          Financial Resources:  Medicaid       Advanced Care Plan:    [x]Communicated Code Status  (DDNR\")   Other    Ayan KATHRIN Siu, RN  Pager # [phone]  Care Manager

## 2022-12-01 NOTE — ROUTINE PROCESS
Bedside and Verbal shift change report given to JOSEFA Hughes (oncoming nurse) by Lilian Leong RN (offgoing nurse). Report included the following information SBAR, Kardex, MAR and Recent Results.     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  ICD-9-CM: 291.2  10/9/2022 Yes        Debility ICD-10-CM: R53.81  ICD-9-CM: 799.3  Unknown Yes        Acute metabolic encephalopathy FSP-78-HC: G93.41  ICD-9-CM: 348.31  3/30/2021 Yes        Dehydration ICD-10-CM: E86.0  ICD-9-CM: 276.51  3/30/2021 Yes        Hypokalemia ICD-10-CM: E87.6  ICD-9-CM: 276.8  6/22/2013 Yes           BACKGROUND:   Past Medical History:   Past Medical History:   Diagnosis Date    Alcohol dependence with alcohol-induced persisting dementia ([facility]     per [facility] 4/23/21    Anemia     Aphasia     Benign prostatic hyperplasia     Cerebral vascular disease     COVID-19 04/06/2021    Disturbances of salivary secretion     Dysphagia     GERD (gastroesophageal reflux disease)     Hemiparesis (HCC)     left side    Hemiplegia (HCC)     left side     Hiccups     Hyperlipidemia     Hypernatremia 03/2021    Hypertension     Hyponatremia     Insomnia     Lacunar infarction ([facility] 2010    weakness both arms, unable to walk    Lower extremity edema     Moderate protein-calorie malnutrition (HCC)     MRSA (methicillin resistant Staphylococcus aureus)     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 ([address]     Pneumonia     Psychogenic polydipsia     PVD (peripheral vascular disease) ([facility]     Seizures ([facility]     Spinal stenosis     Stroke [facility]) 2017    Unspecified convulsions ([facility]     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      Patient taking anticoagulants yes    Patient has a defibrillator: no    History of shots YES for example, flu, pneumonia, tetanus   Isolation History NO for example, MRSA, CDiff    ASSESSMENT:  Changes in Assessment Throughout Shift: NONE  Significant Changes in 24 hours (for example, RR/code, fall)  Patient has Central Line: no   Patient has Reid Cath: no   Mobility Issues  PT  IV Patency  OR Checklist  Pending Tests    Last Vitals:  Vitals w/ MEWS Score (last day)       Date/Time MEWS Score Pulse Resp Temp BP Level of Consciousness SpO2    12/01/22 0721 2 102 20 97.9 °F (36.6 °C) 157/94 0 99 %    12/01/22 0354 1 95 20 97 °F (36.1 °C) 154/90 0 98 %    12/01/22 0229 -- -- -- -- 150/84 -- --    12/01/22 0038 -- -- -- -- 174/105 -- --    12/01/22 0031 1 96 18 97.8 °F (36.6 °C) 172/106 0 98 %    11/30/22 2102 1 89 16 97.9 °F (36.6 °C) 163/99 0 99 %    11/30/22 1851 -- 80 -- -- 143/79 -- --    11/30/22 1609 1 87 18 98.1 °F (36.7 °C) 146/88 0 97 %    11/30/22 1146 1 93 20 97.8 °F (36.6 °C) 152/96 0 96 %    11/30/22 0833 1 85 18 97.2 °F (36.2 °C) 144/93 0 98 %    11/30/22 0400 -- 92 18 97.7 °F (36.5 °C) 146/95 -- 100 %    11/30/22 0000 -- 93 18 98.3 °F (36.8 °C) 152/92 -- 100 %          PAIN    Pain Assessment    Pain Intensity 1: 0 (12/01/22 0312)              Patient Stated Pain Goal: Unable to verbalize/indicate pain  Intervention effective: N/A  Time of last intervention: N/A Reassessment Completed: yes   Other actions taken for pain: Distraction    Last 3 Weights:  Last 3 Recorded Weights in this Encounter    11/25/22 1226   Weight: 72.6 kg (160 lb)   Weight change:     INTAKE/OUPUT    Current Shift: 12/01 0701 - 12/01 1900  In: 0   Out: 100 [Urine:100]    Last three shifts: 11/29 1901 - 12/01 0700  In: 3180 [I.V.:900]  Out: 4450 [SQKS]    RECOMMENDATIONS AND DISCHARGE PLANNING  Patient needs and requests: Assistance with ADL's    Pending tests/procedures: labs     Discharge plan for patient: SNF    Discharge planning Needs or Barriers: None    Estimated Discharge Date: 2022 Posted on Whiteboard in Patients Room: yes       \"HEALS\" SAFETY CHECK  A safety check occurred in the patient's room between off going nurse and oncoming nurse listed above. The safety check included the below items:    H  High Alert Medications Verify all high alert medication drips (heparin, PCA, etc.)  E  Equipment Suction is set up for ALL patients (with erika)  Red plugs utilized for all equipment (IV pumps, etc.)  WOWs wiped down at end of shift. Room stocked with oxygen, suction, and other unit-specific supplies  A  Alarms Bed alarm is set for fall risk patients  Ensure chair alarm is in place and activated if patient is up in a chair  L  Lines Check IV for any infiltration  Reid bag is empty if patient has a Reid   Tubing and IV bags are labeled  S  Safety  Room is clean, patient is clean, and equipment is clean. Hallways are clear from equipment besides carts. Fall bracelet on for fall risk patients  Ensure room is clear and free of clutter  Suction is set up for ALL patients (with erika)  Hallways are clear from equipment besides carts.    Isolation precautions followed, supplies available outside room, sign posted    Chip Melendez RN

## 2022-12-01 NOTE — PROGRESS NOTES
Problem: Nutrition Deficit  Goal: *Optimize nutritional status  Outcome: Progressing Towards Goal     Problem: Pressure Injury - Risk of  Goal: *Prevention of pressure injury  Description: Document Brian Scale and appropriate interventions in the flowsheet. Outcome: Progressing Towards Goal  Note: Pressure Injury Interventions:  Sensory Interventions: Assess changes in LOC    Moisture Interventions: Absorbent underpads, Maintain skin hydration (lotion/cream)    Activity Interventions: Pressure redistribution bed/mattress(bed type)    Mobility Interventions: HOB 30 degrees or less, Pressure redistribution bed/mattress (bed type)    Nutrition Interventions: Document food/fluid/supplement intake    Friction and Shear Interventions: HOB 30 degrees or less                Problem: Patient Education: Go to Patient Education Activity  Goal: Patient/Family Education  Outcome: Progressing Towards Goal     Problem: Falls - Risk of  Goal: *Absence of Falls  Description: Document Abena Fall Risk and appropriate interventions in the flowsheet.   Outcome: Progressing Towards Goal  Note: Fall Risk Interventions:       Mentation Interventions: Bed/chair exit alarm, Evaluate medications/consider consulting pharmacy    Medication Interventions: Bed/chair exit alarm, Evaluate medications/consider consulting pharmacy    Elimination Interventions: Stay With Me (per policy)              Problem: Patient Education: Go to Patient Education Activity  Goal: Patient/Family Education  Outcome: Progressing Towards Goal

## 2022-12-02 NOTE — PROGRESS NOTES
Patient is discharged to [facility] in no acute distress, per stretcher by [employer].

## 2022-12-02 NOTE — ROUTINE PROCESS
Bedside and Verbal shift change report given to [facility] Melody (oncoming nurse) by Vanessa Majano RN (offgoing nurse). Report included the following information SBAR, Kardex, MAR and Recent Results.     SITUATION:  Code Status: DNR  Reason for Admission: Anemia [D64.9]  Hypernatremia [E87.0]  Hospital day: 6  Problem List:       Hospital Problems  Date Reviewed: 10/11/2022            Codes Class Noted POA    Aspiration pneumonia ([facility] ICD-10-CM: J69.0  ICD-9-CM: 507.0  12/1/2022 Unknown        Hypernatremia ICD-10-CM: E87.0  ICD-9-CM: 276.0  11/25/2022 Unknown        Symptomatic anemia ICD-10-CM: D64.9  ICD-9-CM: 285.9  11/25/2022 Unknown        Seizure disorder ([facility] ICD-10-CM: G40.909  ICD-9-CM: 345.90  10/12/2022 Yes        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[facility] XHC-74-GC: F10.27  ICD-9-CM: 291.2  10/9/2022 Yes        Debility ICD-10-CM: R53.81  ICD-9-CM: 799.3  Unknown Yes        Acute metabolic encephalopathy [facility]: G93.41  ICD-9-CM: 348.31  3/30/2021 Yes        Dehydration ICD-10-CM: E86.0  ICD-9-CM: 276.51  3/30/2021 Yes        Hypokalemia ICD-10-CM: E87.6  ICD-9-CM: 276.8  6/22/2013 Yes         BACKGROUND:   Past Medical History:   Past Medical History:   Diagnosis Date    Alcohol dependence with alcohol-induced persisting dementia ([facility]     per [facility] 4/23/21    Anemia     Aphasia     Benign prostatic hyperplasia     Cerebral vascular disease     COVID-19 04/06/2021    Disturbances of salivary secretion     Dysphagia     GERD (gastroesophageal reflux disease)     Hemiparesis (HCC)     left side    Hemiplegia (HCC)     left side     Hiccups     Hyperlipidemia     Hypernatremia 03/2021    Hypertension     Hyponatremia     Insomnia     Lacunar infarction ([facility] 2010    weakness both arms, unable to walk    Lower extremity edema     Moderate protein-calorie malnutrition (HCC)     MRSA (methicillin resistant Staphylococcus aureus)     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 ([facility] Pneumonia     Psychogenic polydipsia     PVD (peripheral vascular disease) ([facility]     Seizures ([facility]     Spinal stenosis     Stroke [facility]) 2017    Unspecified convulsions ([facility]     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      Patient taking anticoagulants yes    Patient has a defibrillator: no    History of shots YES for example, flu, pneumonia, tetanus   Isolation History NO for example, MRSA, CDiff    ASSESSMENT:  Changes in Assessment Throughout Shift: NONE  Significant Changes in 24 hours (for example, RR/code, fall)  Patient has Central Line: no   Patient has Reid Cath: no   Mobility Issues  PT  IV Patency  OR Checklist  Pending Tests    Last Vitals:  Vitals w/ MEWS Score (last day)       Date/Time MEWS Score Pulse Resp Temp BP Level of Consciousness SpO2    12/01/22 1611 1 95 16 98.8 °F (37.1 °C) 159/94 0 100 %    12/01/22 1130 2 101 18 98.9 °F (37.2 °C) 167/84 0 99 %    12/01/22 0721 2 102 20 97.9 °F (36.6 °C) 157/94 0 99 %    12/01/22 0354 1 95 20 97 °F (36.1 °C) 154/90 0 98 %    12/01/22 0229 -- -- -- -- 150/84 -- --    12/01/22 0038 -- -- -- -- 174/105 -- --    12/01/22 0031 1 96 18 97.8 °F (36.6 °C) 172/106 0 98 %    11/30/22 2102 1 89 16 97.9 °F (36.6 °C) 163/99 0 99 %    11/30/22 1851 -- 80 -- -- 143/79 -- --    11/30/22 1609 1 87 18 98.1 °F (36.7 °C) 146/88 0 97 %    11/30/22 1146 1 93 20 97.8 °F (36.6 °C) 152/96 0 96 %    11/30/22 0833 1 85 18 97.2 °F (36.2 °C) 144/93 0 98 %    11/30/22 0400 -- 92 18 97.7 °F (36.5 °C) 146/95 -- 100 %    11/30/22 0000 -- 93 18 98.3 °F (36.8 °C) 152/92 -- 100 %          PAIN    Pain Assessment    Pain Intensity 1: 0 (12/01/22 1650)              Patient Stated Pain Goal: Unable to verbalize/indicate pain  Intervention effective: N/A  Time of last intervention: N/A Reassessment Completed: yes   Other actions taken for pain: Distraction    Last 3 Weights:  Last 3 Recorded Weights in this Encounter    11/25/22 1226   Weight: 72.6 kg (160 lb)   Weight change:     INTAKE/OUPUT    Current Shift: No intake/output data recorded. Last three shifts: 11/30 0701 - 12/01 1900  In: 3180 [I.V.:900]  Out: 5350 [Urine:5350]    RECOMMENDATIONS AND DISCHARGE PLANNING  Patient needs and requests: Assistance with ADL's    Pending tests/procedures: labs     Discharge plan for patient: SNF    Discharge planning Needs or Barriers: None    Estimated Discharge Date: 12/04/2022 Posted on Whiteboard in Patients Room: yes       \"HEALS\" SAFETY CHECK  A safety check occurred in the patient's room between off going nurse and oncoming nurse listed above. The safety check included the below items:    H  High Alert Medications Verify all high alert medication drips (heparin, PCA, etc.)  E  Equipment Suction is set up for ALL patients (with erika)  Red plugs utilized for all equipment (IV pumps, etc.)  WOWs wiped down at end of shift. Room stocked with oxygen, suction, and other unit-specific supplies  A  Alarms Bed alarm is set for fall risk patients  Ensure chair alarm is in place and activated if patient is up in a chair  L  Lines Check IV for any infiltration  Reid bag is empty if patient has a Reid   Tubing and IV bags are labeled  S  Safety  Room is clean, patient is clean, and equipment is clean. Hallways are clear from equipment besides carts. Fall bracelet on for fall risk patients  Ensure room is clear and free of clutter  Suction is set up for ALL patients (with erika)  Hallways are clear from equipment besides carts.    Isolation precautions followed, supplies available outside room, sign posted    Vanessa Majano RN

## 2023-01-04 ENCOUNTER — HOSPITAL ENCOUNTER (INPATIENT)
Age: 68
LOS: 9 days | Discharge: NURSING, REHAB OR LONG-TERM CARE | DRG: 853 | End: 2023-01-15
Attending: EMERGENCY MEDICINE | Admitting: INTERNAL MEDICINE
Payer: COMMERCIAL

## 2023-01-04 ENCOUNTER — APPOINTMENT (OUTPATIENT)
Dept: CT IMAGING | Age: 68
DRG: 853 | End: 2023-01-04
Attending: PHYSICIAN ASSISTANT
Payer: COMMERCIAL

## 2023-01-04 ENCOUNTER — APPOINTMENT (OUTPATIENT)
Dept: GENERAL RADIOLOGY | Age: 68
DRG: 853 | End: 2023-01-04
Attending: PHYSICIAN ASSISTANT
Payer: COMMERCIAL

## 2023-01-04 DIAGNOSIS — J18.9 PNEUMONIA OF BOTH LOWER LOBES DUE TO INFECTIOUS ORGANISM: ICD-10-CM

## 2023-01-04 DIAGNOSIS — E87.0 HYPERNATREMIA: ICD-10-CM

## 2023-01-04 DIAGNOSIS — K59.00 CONSTIPATION, UNSPECIFIED CONSTIPATION TYPE: ICD-10-CM

## 2023-01-04 DIAGNOSIS — N30.00 ACUTE CYSTITIS WITHOUT HEMATURIA: ICD-10-CM

## 2023-01-04 DIAGNOSIS — L89.90 PRESSURE INJURY OF SKIN, UNSPECIFIED INJURY STAGE, UNSPECIFIED LOCATION: ICD-10-CM

## 2023-01-04 DIAGNOSIS — L89.154 PRESSURE INJURY OF SACRAL REGION, STAGE 4 (HCC): ICD-10-CM

## 2023-01-04 DIAGNOSIS — R79.89 ELEVATED LFTS: ICD-10-CM

## 2023-01-04 DIAGNOSIS — F03.90 DEMENTIA, UNSPECIFIED DEMENTIA SEVERITY, UNSPECIFIED DEMENTIA TYPE, UNSPECIFIED WHETHER BEHAVIORAL, PSYCHOTIC, OR MOOD DISTURBANCE OR ANXIETY (HCC): ICD-10-CM

## 2023-01-04 DIAGNOSIS — R41.82 ALTERED MENTAL STATUS, UNSPECIFIED ALTERED MENTAL STATUS TYPE: Primary | ICD-10-CM

## 2023-01-04 DIAGNOSIS — A41.9 SEPSIS, DUE TO UNSPECIFIED ORGANISM, UNSPECIFIED WHETHER ACUTE ORGAN DYSFUNCTION PRESENT (HCC): ICD-10-CM

## 2023-01-04 DIAGNOSIS — D64.9 ANEMIA, UNSPECIFIED TYPE: ICD-10-CM

## 2023-01-04 DIAGNOSIS — R53.81 DEBILITY: ICD-10-CM

## 2023-01-04 DIAGNOSIS — A41.9 SEPTIC SHOCK (HCC): ICD-10-CM

## 2023-01-04 DIAGNOSIS — Z71.89 GOALS OF CARE, COUNSELING/DISCUSSION: ICD-10-CM

## 2023-01-04 DIAGNOSIS — R65.21 SEPTIC SHOCK (HCC): ICD-10-CM

## 2023-01-04 DIAGNOSIS — K94.23 GASTROSTOMY TUBE DYSFUNCTION (HCC): ICD-10-CM

## 2023-01-04 LAB
ALBUMIN SERPL-MCNC: 1.6 G/DL (ref 3.4–5)
ALBUMIN/GLOB SERPL: 0.3 (ref 0.8–1.7)
ALP SERPL-CCNC: 108 U/L (ref 45–117)
ALT SERPL-CCNC: 148 U/L (ref 16–61)
ANION GAP SERPL CALC-SCNC: 6 MMOL/L (ref 3–18)
AST SERPL-CCNC: 141 U/L (ref 10–38)
BILIRUB SERPL-MCNC: 0.6 MG/DL (ref 0.2–1)
BNP SERPL-MCNC: 469 PG/ML (ref 0–900)
BUN SERPL-MCNC: 74 MG/DL (ref 7–18)
BUN/CREAT SERPL: 62 (ref 12–20)
CALCIUM SERPL-MCNC: 9.2 MG/DL (ref 8.5–10.1)
CHLORIDE SERPL-SCNC: 130 MMOL/L (ref 100–111)
CO2 SERPL-SCNC: 26 MMOL/L (ref 21–32)
CREAT SERPL-MCNC: 1.2 MG/DL (ref 0.6–1.3)
GLOBULIN SER CALC-MCNC: 6 G/DL (ref 2–4)
GLUCOSE BLD STRIP.AUTO-MCNC: 139 MG/DL (ref 70–110)
GLUCOSE SERPL-MCNC: 138 MG/DL (ref 74–99)
LACTATE BLD-SCNC: 2.02 MMOL/L (ref 0.4–2)
MAGNESIUM SERPL-MCNC: 3.1 MG/DL (ref 1.6–2.6)
POTASSIUM SERPL-SCNC: 3.6 MMOL/L (ref 3.5–5.5)
PROT SERPL-MCNC: 7.6 G/DL (ref 6.4–8.2)
SODIUM SERPL-SCNC: 162 MMOL/L (ref 136–145)
TROPONIN-HIGH SENSITIVITY: 51 NG/L (ref 0–78)

## 2023-01-04 PROCEDURE — 96372 THER/PROPH/DIAG INJ SC/IM: CPT

## 2023-01-04 PROCEDURE — 86900 BLOOD TYPING SEROLOGIC ABO: CPT

## 2023-01-04 PROCEDURE — 87186 SC STD MICRODIL/AGAR DIL: CPT

## 2023-01-04 PROCEDURE — 80177 DRUG SCRN QUAN LEVETIRACETAM: CPT

## 2023-01-04 PROCEDURE — 96375 TX/PRO/DX INJ NEW DRUG ADDON: CPT

## 2023-01-04 PROCEDURE — 83605 ASSAY OF LACTIC ACID: CPT

## 2023-01-04 PROCEDURE — 83690 ASSAY OF LIPASE: CPT

## 2023-01-04 PROCEDURE — 36573 INSJ PICC RS&I 5 YR+: CPT | Performed by: EMERGENCY MEDICINE

## 2023-01-04 PROCEDURE — 99285 EMERGENCY DEPT VISIT HI MDM: CPT

## 2023-01-04 PROCEDURE — 02HV33Z INSERTION OF INFUSION DEVICE INTO SUPERIOR VENA CAVA, PERCUTANEOUS APPROACH: ICD-10-PCS | Performed by: UROLOGY

## 2023-01-04 PROCEDURE — 85610 PROTHROMBIN TIME: CPT

## 2023-01-04 PROCEDURE — 96365 THER/PROPH/DIAG IV INF INIT: CPT

## 2023-01-04 PROCEDURE — 82962 GLUCOSE BLOOD TEST: CPT

## 2023-01-04 PROCEDURE — 85025 COMPLETE CBC W/AUTO DIFF WBC: CPT

## 2023-01-04 PROCEDURE — 87040 BLOOD CULTURE FOR BACTERIA: CPT

## 2023-01-04 PROCEDURE — 74011250637 HC RX REV CODE- 250/637: Performed by: PHYSICIAN ASSISTANT

## 2023-01-04 PROCEDURE — 84484 ASSAY OF TROPONIN QUANT: CPT

## 2023-01-04 PROCEDURE — 83880 ASSAY OF NATRIURETIC PEPTIDE: CPT

## 2023-01-04 PROCEDURE — 80053 COMPREHEN METABOLIC PANEL: CPT

## 2023-01-04 PROCEDURE — 83735 ASSAY OF MAGNESIUM: CPT

## 2023-01-04 PROCEDURE — 74011250636 HC RX REV CODE- 250/636: Performed by: PHYSICIAN ASSISTANT

## 2023-01-04 PROCEDURE — 0202U NFCT DS 22 TRGT SARS-COV-2: CPT

## 2023-01-04 PROCEDURE — 71045 X-RAY EXAM CHEST 1 VIEW: CPT

## 2023-01-04 PROCEDURE — 87077 CULTURE AEROBIC IDENTIFY: CPT

## 2023-01-04 PROCEDURE — 86923 COMPATIBILITY TEST ELECTRIC: CPT

## 2023-01-04 PROCEDURE — 74011000258 HC RX REV CODE- 258: Performed by: PHYSICIAN ASSISTANT

## 2023-01-04 PROCEDURE — 96368 THER/DIAG CONCURRENT INF: CPT

## 2023-01-04 PROCEDURE — 74011000250 HC RX REV CODE- 250: Performed by: PHYSICIAN ASSISTANT

## 2023-01-04 PROCEDURE — 81001 URINALYSIS AUTO W/SCOPE: CPT

## 2023-01-04 PROCEDURE — 87150 DNA/RNA AMPLIFIED PROBE: CPT

## 2023-01-04 PROCEDURE — 96366 THER/PROPH/DIAG IV INF ADDON: CPT

## 2023-01-04 PROCEDURE — 71260 CT THORAX DX C+: CPT

## 2023-01-04 RX ORDER — DEXTROSE MONOHYDRATE AND SODIUM CHLORIDE 5; .45 G/100ML; G/100ML
100 INJECTION, SOLUTION INTRAVENOUS CONTINUOUS
Status: DISCONTINUED | OUTPATIENT
Start: 2023-01-04 | End: 2023-01-05

## 2023-01-04 RX ORDER — VANCOMYCIN 1.75 GRAM/500 ML IN 0.9 % SODIUM CHLORIDE INTRAVENOUS
1750 ONCE
Status: COMPLETED | OUTPATIENT
Start: 2023-01-04 | End: 2023-01-05

## 2023-01-04 RX ORDER — SODIUM CHLORIDE 450 MG/100ML
150 INJECTION, SOLUTION INTRAVENOUS ONCE
Status: COMPLETED | OUTPATIENT
Start: 2023-01-04 | End: 2023-01-04

## 2023-01-04 RX ORDER — ACETAMINOPHEN 650 MG/1
650 SUPPOSITORY RECTAL
Status: COMPLETED | OUTPATIENT
Start: 2023-01-04 | End: 2023-01-04

## 2023-01-04 RX ORDER — LEVOFLOXACIN 5 MG/ML
750 INJECTION, SOLUTION INTRAVENOUS EVERY 24 HOURS
Status: DISCONTINUED | OUTPATIENT
Start: 2023-01-04 | End: 2023-01-09 | Stop reason: ALTCHOICE

## 2023-01-04 RX ORDER — SODIUM CHLORIDE 0.9 % (FLUSH) 0.9 %
5-10 SYRINGE (ML) INJECTION AS NEEDED
Status: DISCONTINUED | OUTPATIENT
Start: 2023-01-04 | End: 2023-01-15 | Stop reason: HOSPADM

## 2023-01-04 RX ADMIN — PIPERACILLIN AND TAZOBACTAM 4.5 G: 4; .5 INJECTION, POWDER, FOR SOLUTION INTRAVENOUS at 22:49

## 2023-01-04 RX ADMIN — VANCOMYCIN HYDROCHLORIDE 1750 MG: 10 INJECTION, POWDER, LYOPHILIZED, FOR SOLUTION INTRAVENOUS at 22:49

## 2023-01-04 RX ADMIN — SODIUM CHLORIDE 150 ML/HR: 450 INJECTION, SOLUTION INTRAVENOUS at 22:56

## 2023-01-04 RX ADMIN — LEVOFLOXACIN 750 MG: 5 INJECTION, SOLUTION INTRAVENOUS at 22:49

## 2023-01-04 RX ADMIN — ACETAMINOPHEN 650 MG: 650 SUPPOSITORY RECTAL at 22:49

## 2023-01-04 RX ADMIN — SODIUM CHLORIDE 1000 ML: 9 INJECTION, SOLUTION INTRAVENOUS at 22:44

## 2023-01-05 LAB
ALBUMIN SERPL-MCNC: 1.5 G/DL (ref 3.4–5)
ALBUMIN/GLOB SERPL: 0.3 (ref 0.8–1.7)
ALP SERPL-CCNC: 107 U/L (ref 45–117)
ALT SERPL-CCNC: 130 U/L (ref 16–61)
AMMONIA PLAS-SCNC: 14 UMOL/L (ref 11–32)
AMMONIA PLAS-SCNC: <10 UMOL/L (ref 11–32)
AMORPH CRY URNS QL MICRO: ABNORMAL
ANION GAP SERPL CALC-SCNC: 3 MMOL/L (ref 3–18)
ANION GAP SERPL CALC-SCNC: 4 MMOL/L (ref 3–18)
ANION GAP SERPL CALC-SCNC: 5 MMOL/L (ref 3–18)
APPEARANCE UR: ABNORMAL
ARTERIAL PATENCY WRIST A: POSITIVE
AST SERPL-CCNC: 109 U/L (ref 10–38)
ATRIAL RATE: 110 BPM
B PERT DNA SPEC QL NAA+PROBE: NOT DETECTED
BACTERIA URNS QL MICRO: ABNORMAL /HPF
BASE EXCESS BLD CALC-SCNC: 4.1 MMOL/L
BASOPHILS # BLD: 0 K/UL (ref 0–0.1)
BASOPHILS NFR BLD: 0 % (ref 0–2)
BDY SITE: ABNORMAL
BILIRUB SERPL-MCNC: 0.8 MG/DL (ref 0.2–1)
BILIRUB UR QL: ABNORMAL
BORDETELLA PARAPERTUSSIS PCR, BORPAR: NOT DETECTED
BUN SERPL-MCNC: 75 MG/DL (ref 7–18)
BUN SERPL-MCNC: 77 MG/DL (ref 7–18)
BUN SERPL-MCNC: 80 MG/DL (ref 7–18)
BUN/CREAT SERPL: 60 (ref 12–20)
BUN/CREAT SERPL: 61 (ref 12–20)
BUN/CREAT SERPL: 63 (ref 12–20)
C PNEUM DNA SPEC QL NAA+PROBE: NOT DETECTED
CALCIUM SERPL-MCNC: 8.5 MG/DL (ref 8.5–10.1)
CALCIUM SERPL-MCNC: 9 MG/DL (ref 8.5–10.1)
CALCIUM SERPL-MCNC: 9.5 MG/DL (ref 8.5–10.1)
CALCULATED P AXIS, ECG09: -25 DEGREES
CALCULATED R AXIS, ECG10: 63 DEGREES
CALCULATED T AXIS, ECG11: 40 DEGREES
CHLORIDE SERPL-SCNC: 128 MMOL/L (ref 100–111)
CHLORIDE SERPL-SCNC: 128 MMOL/L (ref 100–111)
CHLORIDE SERPL-SCNC: 130 MMOL/L (ref 100–111)
CO2 SERPL-SCNC: 26 MMOL/L (ref 21–32)
CO2 SERPL-SCNC: 28 MMOL/L (ref 21–32)
CO2 SERPL-SCNC: 30 MMOL/L (ref 21–32)
COLOR UR: ABNORMAL
CREAT SERPL-MCNC: 1.23 MG/DL (ref 0.6–1.3)
CREAT SERPL-MCNC: 1.27 MG/DL (ref 0.6–1.3)
CREAT SERPL-MCNC: 1.29 MG/DL (ref 0.6–1.3)
DIAGNOSIS, 93000: NORMAL
DIFFERENTIAL METHOD BLD: ABNORMAL
EOSINOPHIL # BLD: 0 K/UL (ref 0–0.4)
EOSINOPHIL # BLD: 0.1 K/UL (ref 0–0.4)
EOSINOPHIL # BLD: 0.3 K/UL (ref 0–0.4)
EOSINOPHIL NFR BLD: 0 % (ref 0–5)
EOSINOPHIL NFR BLD: 1 % (ref 0–5)
EOSINOPHIL NFR BLD: 2 % (ref 0–5)
EPITH CASTS URNS QL MICRO: ABNORMAL /LPF (ref 0–5)
ERYTHROCYTE [DISTWIDTH] IN BLOOD BY AUTOMATED COUNT: 16.7 % (ref 11.6–14.5)
ERYTHROCYTE [DISTWIDTH] IN BLOOD BY AUTOMATED COUNT: 16.8 % (ref 11.6–14.5)
ERYTHROCYTE [DISTWIDTH] IN BLOOD BY AUTOMATED COUNT: 16.8 % (ref 11.6–14.5)
EST. AVERAGE GLUCOSE BLD GHB EST-MCNC: 108 MG/DL
FLUAV H1 2009 PAND RNA SPEC QL NAA+PROBE: NOT DETECTED
FLUAV H1 RNA SPEC QL NAA+PROBE: NOT DETECTED
FLUAV H3 RNA SPEC QL NAA+PROBE: NOT DETECTED
FLUAV SUBTYP SPEC NAA+PROBE: NOT DETECTED
FLUBV RNA SPEC QL NAA+PROBE: NOT DETECTED
GAS FLOW.O2 O2 DELIVERY SYS: ABNORMAL
GAS FLOW.O2 SETTING OXYMISER: 35 BPM
GLOBULIN SER CALC-MCNC: 5.1 G/DL (ref 2–4)
GLUCOSE BLD STRIP.AUTO-MCNC: 133 MG/DL (ref 70–110)
GLUCOSE BLD STRIP.AUTO-MCNC: 147 MG/DL (ref 70–110)
GLUCOSE SERPL-MCNC: 143 MG/DL (ref 74–99)
GLUCOSE SERPL-MCNC: 158 MG/DL (ref 74–99)
GLUCOSE SERPL-MCNC: 160 MG/DL (ref 74–99)
GLUCOSE UR STRIP.AUTO-MCNC: NEGATIVE MG/DL
HADV DNA SPEC QL NAA+PROBE: NOT DETECTED
HBA1C MFR BLD: 5.4 % (ref 4.2–5.6)
HCO3 BLD-SCNC: 27.2 MMOL/L (ref 22–26)
HCOV 229E RNA SPEC QL NAA+PROBE: NOT DETECTED
HCOV HKU1 RNA SPEC QL NAA+PROBE: NOT DETECTED
HCOV NL63 RNA SPEC QL NAA+PROBE: NOT DETECTED
HCOV OC43 RNA SPEC QL NAA+PROBE: NOT DETECTED
HCT VFR BLD AUTO: 24 % (ref 36–48)
HCT VFR BLD AUTO: 24.1 % (ref 36–48)
HCT VFR BLD AUTO: 25.7 % (ref 36–48)
HGB BLD-MCNC: 7.1 G/DL (ref 13–16)
HGB BLD-MCNC: 7.2 G/DL (ref 13–16)
HGB BLD-MCNC: 7.8 G/DL (ref 13–16)
HGB UR QL STRIP: NEGATIVE
HMPV RNA SPEC QL NAA+PROBE: NOT DETECTED
HPIV1 RNA SPEC QL NAA+PROBE: NOT DETECTED
HPIV2 RNA SPEC QL NAA+PROBE: NOT DETECTED
HPIV3 RNA SPEC QL NAA+PROBE: NOT DETECTED
HPIV4 RNA SPEC QL NAA+PROBE: NOT DETECTED
IMM GRANULOCYTES # BLD AUTO: 0 K/UL (ref 0–0.04)
IMM GRANULOCYTES NFR BLD AUTO: 0 % (ref 0–0.5)
INR PPP: 1.6 (ref 0.8–1.2)
KETONES UR QL STRIP.AUTO: NEGATIVE MG/DL
LACTATE BLD-SCNC: 0.92 MMOL/L (ref 0.4–2)
LEUKOCYTE ESTERASE UR QL STRIP.AUTO: ABNORMAL
LIPASE SERPL-CCNC: 91 U/L (ref 73–393)
LYMPHOCYTES # BLD: 0.3 K/UL (ref 0.9–3.6)
LYMPHOCYTES # BLD: 0.6 K/UL (ref 0.9–3.6)
LYMPHOCYTES # BLD: 0.9 K/UL (ref 0.9–3.6)
LYMPHOCYTES NFR BLD: 2 % (ref 21–52)
LYMPHOCYTES NFR BLD: 5 % (ref 21–52)
LYMPHOCYTES NFR BLD: 9 % (ref 21–52)
M PNEUMO DNA SPEC QL NAA+PROBE: NOT DETECTED
MAGNESIUM SERPL-MCNC: 2.9 MG/DL (ref 1.6–2.6)
MCH RBC QN AUTO: 26.4 PG (ref 24–34)
MCH RBC QN AUTO: 26.6 PG (ref 24–34)
MCH RBC QN AUTO: 26.7 PG (ref 24–34)
MCHC RBC AUTO-ENTMCNC: 29.6 G/DL (ref 31–37)
MCHC RBC AUTO-ENTMCNC: 29.9 G/DL (ref 31–37)
MCHC RBC AUTO-ENTMCNC: 30.4 G/DL (ref 31–37)
MCV RBC AUTO: 88 FL (ref 78–100)
MCV RBC AUTO: 88.9 FL (ref 78–100)
MCV RBC AUTO: 89.2 FL (ref 78–100)
MONOCYTES # BLD: 0.1 K/UL (ref 0.05–1.2)
MONOCYTES # BLD: 0.3 K/UL (ref 0.05–1.2)
MONOCYTES # BLD: 0.5 K/UL (ref 0.05–1.2)
MONOCYTES NFR BLD: 1 % (ref 3–10)
MONOCYTES NFR BLD: 2 % (ref 3–10)
MONOCYTES NFR BLD: 4 % (ref 3–10)
MUCOUS THREADS URNS QL MICRO: NEGATIVE /LPF
NEUTS BAND NFR BLD MANUAL: 12 %
NEUTS BAND NFR BLD MANUAL: 17 %
NEUTS BAND NFR BLD MANUAL: 9 %
NEUTS SEG # BLD: 10.7 K/UL (ref 1.8–8)
NEUTS SEG # BLD: 13.4 K/UL (ref 1.8–8)
NEUTS SEG # BLD: 9 K/UL (ref 1.8–8)
NEUTS SEG NFR BLD: 72 % (ref 40–73)
NEUTS SEG NFR BLD: 82 % (ref 40–73)
NEUTS SEG NFR BLD: 82 % (ref 40–73)
NITRITE UR QL STRIP.AUTO: NEGATIVE
NRBC # BLD: 0 K/UL (ref 0–0.01)
NRBC BLD-RTO: 0 PER 100 WBC
O2/TOTAL GAS SETTING VFR VENT: 24 %
P-R INTERVAL, ECG05: 150 MS
PCO2 BLD: 33.3 MMHG (ref 35–45)
PH BLD: 7.52 (ref 7.35–7.45)
PH UR STRIP: 5.5 (ref 5–8)
PHOSPHATE SERPL-MCNC: 2.1 MG/DL (ref 2.5–4.9)
PLATELET # BLD AUTO: 201 K/UL (ref 135–420)
PLATELET # BLD AUTO: 230 K/UL (ref 135–420)
PLATELET # BLD AUTO: 242 K/UL (ref 135–420)
PLATELET COMMENTS,PCOM: ABNORMAL
PMV BLD AUTO: 12.8 FL (ref 9.2–11.8)
PO2 BLD: 85 MMHG (ref 80–100)
POTASSIUM SERPL-SCNC: 3 MMOL/L (ref 3.5–5.5)
POTASSIUM SERPL-SCNC: 3.2 MMOL/L (ref 3.5–5.5)
POTASSIUM SERPL-SCNC: 3.6 MMOL/L (ref 3.5–5.5)
PROT SERPL-MCNC: 6.6 G/DL (ref 6.4–8.2)
PROT UR STRIP-MCNC: 30 MG/DL
PROTHROMBIN TIME: 19.2 SEC (ref 11.5–15.2)
Q-T INTERVAL, ECG07: 362 MS
QRS DURATION, ECG06: 90 MS
QTC CALCULATION (BEZET), ECG08: 489 MS
RBC # BLD AUTO: 2.69 M/UL (ref 4.35–5.65)
RBC # BLD AUTO: 2.71 M/UL (ref 4.35–5.65)
RBC # BLD AUTO: 2.92 M/UL (ref 4.35–5.65)
RBC #/AREA URNS HPF: ABNORMAL /HPF (ref 0–5)
RBC MORPH BLD: ABNORMAL
RSV RNA SPEC QL NAA+PROBE: NOT DETECTED
RV+EV RNA SPEC QL NAA+PROBE: NOT DETECTED
SAO2 % BLD: 97.5 % (ref 92–97)
SARS-COV-2 PCR, COVPCR: NOT DETECTED
SERVICE CMNT-IMP: ABNORMAL
SODIUM SERPL-SCNC: 160 MMOL/L (ref 136–145)
SODIUM SERPL-SCNC: 161 MMOL/L (ref 136–145)
SODIUM SERPL-SCNC: 161 MMOL/L (ref 136–145)
SP GR UR REFRACTOMETRY: 1.01 (ref 1–1.03)
SPECIMEN TYPE: ABNORMAL
TOTAL RESP. RATE, ITRR: 35
UROBILINOGEN UR QL STRIP.AUTO: 1 EU/DL (ref 0.2–1)
VENTRICULAR RATE, ECG03: 110 BPM
WBC # BLD AUTO: 10.1 K/UL (ref 4.6–13.2)
WBC # BLD AUTO: 11.8 K/UL (ref 4.6–13.2)
WBC # BLD AUTO: 14.3 K/UL (ref 4.6–13.2)
WBC URNS QL MICRO: ABNORMAL /HPF (ref 0–4)

## 2023-01-05 PROCEDURE — 82140 ASSAY OF AMMONIA: CPT

## 2023-01-05 PROCEDURE — 82962 GLUCOSE BLOOD TEST: CPT

## 2023-01-05 PROCEDURE — 85025 COMPLETE CBC W/AUTO DIFF WBC: CPT

## 2023-01-05 PROCEDURE — 36600 WITHDRAWAL OF ARTERIAL BLOOD: CPT

## 2023-01-05 PROCEDURE — 83735 ASSAY OF MAGNESIUM: CPT

## 2023-01-05 PROCEDURE — 93005 ELECTROCARDIOGRAM TRACING: CPT

## 2023-01-05 PROCEDURE — 99291 CRITICAL CARE FIRST HOUR: CPT | Performed by: INTERNAL MEDICINE

## 2023-01-05 PROCEDURE — 74011000258 HC RX REV CODE- 258: Performed by: PHYSICIAN ASSISTANT

## 2023-01-05 PROCEDURE — 74011000250 HC RX REV CODE- 250: Performed by: INTERNAL MEDICINE

## 2023-01-05 PROCEDURE — 83605 ASSAY OF LACTIC ACID: CPT

## 2023-01-05 PROCEDURE — 80053 COMPREHEN METABOLIC PANEL: CPT

## 2023-01-05 PROCEDURE — 99233 SBSQ HOSP IP/OBS HIGH 50: CPT | Performed by: INTERNAL MEDICINE

## 2023-01-05 PROCEDURE — 84100 ASSAY OF PHOSPHORUS: CPT

## 2023-01-05 PROCEDURE — 65270000046 HC RM TELEMETRY

## 2023-01-05 PROCEDURE — 83036 HEMOGLOBIN GLYCOSYLATED A1C: CPT

## 2023-01-05 PROCEDURE — 74011000258 HC RX REV CODE- 258: Performed by: INTERNAL MEDICINE

## 2023-01-05 PROCEDURE — 74011250636 HC RX REV CODE- 250/636: Performed by: EMERGENCY MEDICINE

## 2023-01-05 PROCEDURE — 74011000250 HC RX REV CODE- 250: Performed by: PHYSICIAN ASSISTANT

## 2023-01-05 PROCEDURE — 82803 BLOOD GASES ANY COMBINATION: CPT

## 2023-01-05 PROCEDURE — 74011000636 HC RX REV CODE- 636: Performed by: EMERGENCY MEDICINE

## 2023-01-05 PROCEDURE — 74011250636 HC RX REV CODE- 250/636: Performed by: PHYSICIAN ASSISTANT

## 2023-01-05 PROCEDURE — 74011250636 HC RX REV CODE- 250/636

## 2023-01-05 PROCEDURE — 74011250637 HC RX REV CODE- 250/637: Performed by: INTERNAL MEDICINE

## 2023-01-05 PROCEDURE — 74011000250 HC RX REV CODE- 250

## 2023-01-05 RX ORDER — POTASSIUM CHLORIDE 7.45 MG/ML
10 INJECTION INTRAVENOUS
Status: DISCONTINUED | OUTPATIENT
Start: 2023-01-05 | End: 2023-01-05 | Stop reason: SDUPTHER

## 2023-01-05 RX ORDER — DEXTROSE MONOHYDRATE AND SODIUM CHLORIDE 5; .45 G/100ML; G/100ML
50 INJECTION, SOLUTION INTRAVENOUS CONTINUOUS
Status: DISCONTINUED | OUTPATIENT
Start: 2023-01-05 | End: 2023-01-05

## 2023-01-05 RX ORDER — POTASSIUM CHLORIDE 7.45 MG/ML
10 INJECTION INTRAVENOUS
Status: COMPLETED | OUTPATIENT
Start: 2023-01-05 | End: 2023-01-05

## 2023-01-05 RX ORDER — IBUPROFEN 200 MG
4 TABLET ORAL AS NEEDED
Status: DISCONTINUED | OUTPATIENT
Start: 2023-01-05 | End: 2023-01-15 | Stop reason: HOSPADM

## 2023-01-05 RX ORDER — MIDODRINE HYDROCHLORIDE 5 MG/1
10 TABLET ORAL
Status: DISCONTINUED | OUTPATIENT
Start: 2023-01-05 | End: 2023-01-05

## 2023-01-05 RX ORDER — HEPARIN SODIUM 5000 [USP'U]/ML
5000 INJECTION, SOLUTION INTRAVENOUS; SUBCUTANEOUS EVERY 8 HOURS
Status: DISCONTINUED | OUTPATIENT
Start: 2023-01-05 | End: 2023-01-15 | Stop reason: HOSPADM

## 2023-01-05 RX ORDER — POLYETHYLENE GLYCOL 3350 17 G/17G
17 POWDER, FOR SOLUTION ORAL DAILY PRN
Status: DISCONTINUED | OUTPATIENT
Start: 2023-01-05 | End: 2023-01-06

## 2023-01-05 RX ORDER — ACETAMINOPHEN 650 MG/1
650 SUPPOSITORY RECTAL
Status: DISCONTINUED | OUTPATIENT
Start: 2023-01-05 | End: 2023-01-15 | Stop reason: HOSPADM

## 2023-01-05 RX ORDER — ACETAMINOPHEN 325 MG/1
650 TABLET ORAL
Status: DISCONTINUED | OUTPATIENT
Start: 2023-01-05 | End: 2023-01-15 | Stop reason: HOSPADM

## 2023-01-05 RX ORDER — MIDODRINE HYDROCHLORIDE 5 MG/1
5 TABLET ORAL
Status: DISCONTINUED | OUTPATIENT
Start: 2023-01-05 | End: 2023-01-15 | Stop reason: HOSPADM

## 2023-01-05 RX ORDER — DEXTROSE MONOHYDRATE 100 MG/ML
0-250 INJECTION, SOLUTION INTRAVENOUS AS NEEDED
Status: DISCONTINUED | OUTPATIENT
Start: 2023-01-05 | End: 2023-01-15 | Stop reason: HOSPADM

## 2023-01-05 RX ORDER — ONDANSETRON 4 MG/1
4 TABLET, ORALLY DISINTEGRATING ORAL
Status: DISCONTINUED | OUTPATIENT
Start: 2023-01-05 | End: 2023-01-15 | Stop reason: HOSPADM

## 2023-01-05 RX ORDER — ONDANSETRON 2 MG/ML
4 INJECTION INTRAMUSCULAR; INTRAVENOUS
Status: DISCONTINUED | OUTPATIENT
Start: 2023-01-05 | End: 2023-01-15 | Stop reason: HOSPADM

## 2023-01-05 RX ORDER — DEXTROSE MONOHYDRATE 50 MG/ML
100 INJECTION, SOLUTION INTRAVENOUS CONTINUOUS
Status: DISCONTINUED | OUTPATIENT
Start: 2023-01-05 | End: 2023-01-08

## 2023-01-05 RX ORDER — NOREPINEPHRINE BITARTRATE/D5W 8 MG/250ML
2-16 PLASTIC BAG, INJECTION (ML) INTRAVENOUS
Status: DISCONTINUED | OUTPATIENT
Start: 2023-01-05 | End: 2023-01-05

## 2023-01-05 RX ORDER — IPRATROPIUM BROMIDE AND ALBUTEROL SULFATE 2.5; .5 MG/3ML; MG/3ML
3 SOLUTION RESPIRATORY (INHALATION)
Status: DISCONTINUED | OUTPATIENT
Start: 2023-01-05 | End: 2023-01-10

## 2023-01-05 RX ORDER — SODIUM CHLORIDE 0.9 % (FLUSH) 0.9 %
5-40 SYRINGE (ML) INJECTION AS NEEDED
Status: DISCONTINUED | OUTPATIENT
Start: 2023-01-05 | End: 2023-01-15 | Stop reason: HOSPADM

## 2023-01-05 RX ORDER — INSULIN LISPRO 100 [IU]/ML
INJECTION, SOLUTION INTRAVENOUS; SUBCUTANEOUS EVERY 6 HOURS
Status: DISCONTINUED | OUTPATIENT
Start: 2023-01-05 | End: 2023-01-15 | Stop reason: HOSPADM

## 2023-01-05 RX ORDER — SODIUM CHLORIDE 0.9 % (FLUSH) 0.9 %
5-40 SYRINGE (ML) INJECTION EVERY 8 HOURS
Status: DISCONTINUED | OUTPATIENT
Start: 2023-01-05 | End: 2023-01-15 | Stop reason: HOSPADM

## 2023-01-05 RX ADMIN — PIPERACILLIN SODIUM AND TAZOBACTAM SODIUM 3.38 G: 3; .375 INJECTION, POWDER, LYOPHILIZED, FOR SOLUTION INTRAVENOUS at 14:47

## 2023-01-05 RX ADMIN — IOPAMIDOL 80 ML: 612 INJECTION, SOLUTION INTRAVENOUS at 00:09

## 2023-01-05 RX ADMIN — POTASSIUM BICARBONATE 40 MEQ: 782 TABLET, EFFERVESCENT ORAL at 18:13

## 2023-01-05 RX ADMIN — HEPARIN SODIUM 5000 UNITS: 5000 INJECTION INTRAVENOUS; SUBCUTANEOUS at 23:39

## 2023-01-05 RX ADMIN — DEXTROSE AND SODIUM CHLORIDE 100 ML/HR: 5; 450 INJECTION, SOLUTION INTRAVENOUS at 14:37

## 2023-01-05 RX ADMIN — POTASSIUM CHLORIDE 10 MEQ: 7.46 INJECTION, SOLUTION INTRAVENOUS at 21:13

## 2023-01-05 RX ADMIN — POTASSIUM CHLORIDE 10 MEQ: 7.46 INJECTION, SOLUTION INTRAVENOUS at 18:42

## 2023-01-05 RX ADMIN — PIPERACILLIN SODIUM AND TAZOBACTAM SODIUM 3.38 G: 3; .375 INJECTION, POWDER, LYOPHILIZED, FOR SOLUTION INTRAVENOUS at 23:40

## 2023-01-05 RX ADMIN — DEXTROSE MONOHYDRATE 3 MCG/MIN: 50 INJECTION, SOLUTION INTRAVENOUS at 14:35

## 2023-01-05 RX ADMIN — DEXTROSE MONOHYDRATE 100 ML/HR: 5 INJECTION, SOLUTION INTRAVENOUS at 17:03

## 2023-01-05 RX ADMIN — DEXTROSE MONOHYDRATE 1 MCG/MIN: 50 INJECTION, SOLUTION INTRAVENOUS at 15:26

## 2023-01-05 RX ADMIN — POTASSIUM CHLORIDE 10 MEQ: 7.46 INJECTION, SOLUTION INTRAVENOUS at 19:49

## 2023-01-05 RX ADMIN — SODIUM CHLORIDE, PRESERVATIVE FREE 10 ML: 5 INJECTION INTRAVENOUS at 23:40

## 2023-01-05 RX ADMIN — POTASSIUM CHLORIDE 10 MEQ: 7.46 INJECTION, SOLUTION INTRAVENOUS at 17:32

## 2023-01-05 RX ADMIN — DEXTROSE AND SODIUM CHLORIDE 100 ML/HR: 5; 450 INJECTION, SOLUTION INTRAVENOUS at 00:29

## 2023-01-05 RX ADMIN — IPRATROPIUM BROMIDE AND ALBUTEROL SULFATE 3 ML: .5; 3 SOLUTION RESPIRATORY (INHALATION) at 19:49

## 2023-01-05 RX ADMIN — HEPARIN SODIUM 5000 UNITS: 5000 INJECTION INTRAVENOUS; SUBCUTANEOUS at 14:45

## 2023-01-05 RX ADMIN — PIPERACILLIN SODIUM AND TAZOBACTAM SODIUM 3.38 G: 3; .375 INJECTION, POWDER, LYOPHILIZED, FOR SOLUTION INTRAVENOUS at 06:01

## 2023-01-05 RX ADMIN — DEXTROSE MONOHYDRATE 5 MCG/MIN: 50 INJECTION, SOLUTION INTRAVENOUS at 01:06

## 2023-01-05 RX ADMIN — SODIUM CHLORIDE, PRESERVATIVE FREE 10 ML: 5 INJECTION INTRAVENOUS at 14:47

## 2023-01-05 RX ADMIN — SODIUM CHLORIDE, POTASSIUM CHLORIDE, SODIUM LACTATE AND CALCIUM CHLORIDE 1000 ML: 600; 310; 30; 20 INJECTION, SOLUTION INTRAVENOUS at 12:03

## 2023-01-05 RX ADMIN — LEVOFLOXACIN 750 MG: 5 INJECTION, SOLUTION INTRAVENOUS at 23:40

## 2023-01-05 RX ADMIN — POTASSIUM PHOSPHATE, MONOBASIC POTASSIUM PHOSPHATE, DIBASIC: 224; 236 INJECTION, SOLUTION, CONCENTRATE INTRAVENOUS at 19:19

## 2023-01-05 NOTE — ED NOTES
BP: 113/66  Informed Dr. Shar Vivas of pt's BP and he made another verbal order with read back to titrate down Levophed from 3mcg/min to 1mcg/min after 30mins.

## 2023-01-05 NOTE — PROGRESS NOTES
Hospitalist Progress Note    Subjective:   Daily Progress Note: 1/5/2023     Patient was seen in presence of nursing. Patient is nonverbal.  He is awake. Patient's son was here earlier today. No obvious shortness of breath or nausea. Patient came from nursing home per nursing. Patient was on IV pressor until 4 PM and was taken off IV pressors and his blood pressure has been stable now.     Current Facility-Administered Medications   Medication Dose Route Frequency    sodium chloride (NS) flush 5-40 mL  5-40 mL IntraVENous Q8H    sodium chloride (NS) flush 5-40 mL  5-40 mL IntraVENous PRN    acetaminophen (TYLENOL) tablet 650 mg  650 mg Oral Q6H PRN    Or    acetaminophen (TYLENOL) suppository 650 mg  650 mg Rectal Q6H PRN    polyethylene glycol (MIRALAX) packet 17 g  17 g Oral DAILY PRN    ondansetron (ZOFRAN ODT) tablet 4 mg  4 mg Oral Q8H PRN    Or    ondansetron (ZOFRAN) injection 4 mg  4 mg IntraVENous Q6H PRN    heparin (porcine) injection 5,000 Units  5,000 Units SubCUTAneous Q8H    insulin lispro (HUMALOG) injection   SubCUTAneous Q6H    glucose chewable tablet 16 g  4 Tablet Oral PRN    glucagon (GLUCAGEN) injection 1 mg  1 mg IntraMUSCular PRN    dextrose 10% infusion 0-250 mL  0-250 mL IntraVENous PRN    dextrose 5% infusion  100 mL/hr IntraVENous CONTINUOUS    potassium chloride 10 mEq in 100 ml IVPB  10 mEq IntraVENous Q1H    midodrine (PROAMATINE) tablet 5 mg  5 mg Per G Tube Q8H PRN    albuterol-ipratropium (DUO-NEB) 2.5 MG-0.5 MG/3 ML  3 mL Nebulization Q6H RT    potassium bicarb-citric acid (EFFER-K) tablet 40 mEq  40 mEq Per G Tube NOW    dextrose 5 % - 0.45% NaCl infusion  50 mL/hr IntraVENous CONTINUOUS    potassium phosphate 20 mmol in dextrose 5% 250 mL infusion   IntraVENous ONCE    sodium chloride (NS) flush 5-10 mL  5-10 mL IntraVENous PRN    piperacillin-tazobactam (ZOSYN) 3.375 g in 0.9% sodium chloride (MBP/ADV) 100 mL MBP  3.375 g IntraVENous Q8H    levoFLOXacin (LEVAQUIN) 750 mg in D5W IVPB  750 mg IntraVENous Q24H    VANCOMYCIN INFORMATION NOTE   Other Rx Dosing/Monitoring     Current Outpatient Medications   Medication Sig    apixaban (ELIQUIS) 5 mg tablet Take 1 Tablet by mouth every twelve (12) hours. baclofen (LIORESAL) 10 mg tablet 10 mg by Per G Tube route two (2) times daily as needed for Muscle Spasm(s). 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 Indications: prevention of vitamin B12 deficiency    ferrous sulfate (FerrouSuL) 325 mg (65 mg iron) tablet Take  by mouth Daily (before breakfast). metoprolol tartrate (LOPRESSOR) 25 mg tablet 25 mg by Per G Tube route two (2) times a day. thiamine HCL (B-1) 100 mg tablet 1 Tablet by Per G Tube route daily. levETIRAcetam (KEPPRA) 100 mg/mL solution 7.5 mL by Per G Tube route two (2) times a day. pravastatin (PRAVACHOL) 40 mg tablet Take 1 Tab by mouth nightly. ascorbic acid, vitamin C, (VITAMIN C) 250 mg tablet Take 1 Tab by mouth daily. polyethylene glycol (MIRALAX) 17 gram packet Take 1 Packet by mouth daily. tamsulosin (FLOMAX) 0.4 mg capsule Take 0.4 mg by mouth daily. 1 tab daily    MULTIVITAMIN,THERAPEUTIC (THERA MULTI-VITAMIN PO) Take 500 mg by mouth daily. folic acid (FOLVITE) 1 mg tablet Take 1 Tab by mouth daily. Review of Systems  Pertinent items are noted in HPI. Objective:     Visit Vitals  /61   Pulse 82   Temp 97.5 °F (36.4 °C)   Resp 15   Wt 75.3 kg (166 lb)   SpO2 100%   BMI 24.51 kg/m²    O2 Flow Rate (L/min): 2 l/min O2 Device: Nasal cannula    Temp (24hrs), Av.1 °F (37.3 °C), Min:97.5 °F (36.4 °C), Max:100.5 °F (38.1 °C)       0701 -  1900  In: -   Out: 800 [Urine:800]  No intake/output data recorded.       General appearance -awake, nonverbal, does not follow any meaningful commands, facial droop present, not in acute distress  Eyes - sclera anicteric, no pallor  Nose - no obvious nasal discharge. Neck - supple, no JVD, trachea is midline  Chest -diminished air entry noted in bases, no wheezes  Heart - S1 and S2 normal, no murmurs appreciated currently  Abdomen - soft, nontender, nondistended, PEG tube is in situ, bowel sounds present  Neurological -awake, nonverbal, does not follow any meaningful commands, no tremors noted  Musculoskeletal - no joint tenderness or erythema of knees bilaterally  Extremities - no pedal edema noted, no cyanosis  Psych -no agitation or hallucination    Data Review    Recent Results (from the past 24 hour(s))   CULTURE, BLOOD    Collection Time: 01/04/23 10:12 PM    Specimen: Blood   Result Value Ref Range    Special Requests: NO SPECIAL REQUESTS      Culture result: NO GROWTH AFTER 8 HOURS     METABOLIC PANEL, COMPREHENSIVE    Collection Time: 01/04/23 10:12 PM   Result Value Ref Range    Sodium 162 (HH) 136 - 145 mmol/L    Potassium 3.6 3.5 - 5.5 mmol/L    Chloride 130 (H) 100 - 111 mmol/L    CO2 26 21 - 32 mmol/L    Anion gap 6 3.0 - 18 mmol/L    Glucose 138 (H) 74 - 99 mg/dL    BUN 74 (H) 7.0 - 18 MG/DL    Creatinine 1.20 0.6 - 1.3 MG/DL    BUN/Creatinine ratio 62 (H) 12 - 20      eGFR >60 >60 ml/min/1.73m2    Calcium 9.2 8.5 - 10.1 MG/DL    Bilirubin, total 0.6 0.2 - 1.0 MG/DL    ALT (SGPT) 148 (H) 16 - 61 U/L    AST (SGOT) 141 (H) 10 - 38 U/L    Alk.  phosphatase 108 45 - 117 U/L    Protein, total 7.6 6.4 - 8.2 g/dL    Albumin 1.6 (L) 3.4 - 5.0 g/dL    Globulin 6.0 (H) 2.0 - 4.0 g/dL    A-G Ratio 0.3 (L) 0.8 - 1.7     CBC WITH AUTOMATED DIFF    Collection Time: 01/04/23 10:12 PM   Result Value Ref Range    WBC 11.8 4.6 - 13.2 K/uL    RBC 2.92 (L) 4.35 - 5.65 M/uL    HGB 7.8 (L) 13.0 - 16.0 g/dL    HCT 25.7 (L) 36.0 - 48.0 %    MCV 88.0 78.0 - 100.0 FL    MCH 26.7 24.0 - 34.0 PG    MCHC 30.4 (L) 31.0 - 37.0 g/dL    RDW 16.7 (H) 11.6 - 14.5 %    PLATELET 444 412 - 649 K/uL    MPV 12.8 (H) 9.2 - 11.8 FL    NRBC 0.0 0  WBC    ABSOLUTE NRBC 0.00 0.00 - 0.01 K/uL    NEUTROPHILS 82 (H) 40 - 73 %    BAND NEUTROPHILS 9 %    LYMPHOCYTES 5 (L) 21 - 52 %    MONOCYTES 4 3 - 10 %    EOSINOPHILS 0 0 - 5 %    BASOPHILS 0 0 - 2 %    IMMATURE GRANULOCYTES 0 0.0 - 0.5 %    ABS. NEUTROPHILS 10.7 (H) 1.8 - 8.0 K/UL    ABS. LYMPHOCYTES 0.6 (L) 0.9 - 3.6 K/UL    ABS. MONOCYTES 0.5 0.05 - 1.2 K/UL    ABS. EOSINOPHILS 0.0 0.0 - 0.4 K/UL    ABS. BASOPHILS 0.0 0.0 - 0.1 K/UL    ABS. IMM.  GRANS. 0.0 0.00 - 0.04 K/UL    DF MANUAL      PLATELET COMMENTS ADEQUATE PLATELETS      RBC COMMENTS ANISOCYTOSIS  2+        RBC COMMENTS POLYCHROMASIA  2+        RBC COMMENTS POIKILOCYTOSIS  FEW       TROPONIN-HIGH SENSITIVITY    Collection Time: 01/04/23 10:12 PM   Result Value Ref Range    Troponin-High Sensitivity 51 0 - 78 ng/L   MAGNESIUM    Collection Time: 01/04/23 10:12 PM   Result Value Ref Range    Magnesium 3.1 (H) 1.6 - 2.6 mg/dL   NT-PRO BNP    Collection Time: 01/04/23 10:12 PM   Result Value Ref Range    NT pro- 0 - 900 PG/ML   GLUCOSE, POC    Collection Time: 01/04/23 11:18 PM   Result Value Ref Range    Glucose (POC) 139 (H) 70 - 110 mg/dL   CULTURE, BLOOD    Collection Time: 01/04/23 11:33 PM    Specimen: Blood   Result Value Ref Range    Special Requests: NO SPECIAL REQUESTS      Culture result: NO GROWTH AFTER 7 HOURS     TYPE & SCREEN    Collection Time: 01/04/23 11:33 PM   Result Value Ref Range    Crossmatch Expiration 01/07/2023,2359     ABO/Rh(D) A POSITIVE     Antibody screen NEG    LIPASE    Collection Time: 01/04/23 11:33 PM   Result Value Ref Range    Lipase 91 73 - 393 U/L   PROTHROMBIN TIME + INR    Collection Time: 01/04/23 11:33 PM   Result Value Ref Range    Prothrombin time 19.2 (H) 11.5 - 15.2 sec    INR 1.6 (H) 0.8 - 1.2     METABOLIC PANEL, BASIC    Collection Time: 01/04/23 11:33 PM   Result Value Ref Range    Sodium 160 (H) 136 - 145 mmol/L    Potassium 3.6 3.5 - 5.5 mmol/L    Chloride 130 (H) 100 - 111 mmol/L    CO2 26 21 - 32 mmol/L    Anion gap 4 3.0 - 18 mmol/L    Glucose 143 (H) 74 - 99 mg/dL    BUN 77 (H) 7.0 - 18 MG/DL    Creatinine 1.29 0.6 - 1.3 MG/DL    BUN/Creatinine ratio 60 (H) 12 - 20      eGFR >60 >60 ml/min/1.73m2    Calcium 9.5 8.5 - 10.1 MG/DL   POC LACTIC ACID    Collection Time: 01/04/23 11:37 PM   Result Value Ref Range    Lactic Acid (POC) 2.02 (HH) 0.40 - 2.00 mmol/L   URINALYSIS W/ RFLX MICROSCOPIC    Collection Time: 01/04/23 11:45 PM   Result Value Ref Range    Color DARK YELLOW      Appearance CLOUDY      Specific gravity 1.015 1.005 - 1.030      pH (UA) 5.5 5.0 - 8.0      Protein 30 (A) NEG mg/dL    Glucose Negative NEG mg/dL    Ketone Negative NEG mg/dL    Bilirubin SMALL (A) NEG      Blood Negative NEG      Urobilinogen 1.0 0.2 - 1.0 EU/dL    Nitrites Negative NEG      Leukocyte Esterase MODERATE (A) NEG     URINE MICROSCOPIC ONLY    Collection Time: 01/04/23 11:45 PM   Result Value Ref Range    WBC 10 to 20 0 - 4 /hpf    RBC 0 to 1 0 - 5 /hpf    Epithelial cells FEW 0 - 5 /lpf    Bacteria 2+ (A) NEG /hpf    Mucus Negative NEG /lpf    Amorphous Crystals 2+ (A) NEG   RESPIRATORY VIRUS PANEL W/COVID-19, PCR    Collection Time: 01/04/23 11:49 PM    Specimen: Nasopharyngeal   Result Value Ref Range    Adenovirus Not detected NOTD      Coronavirus 229E Not detected NOTD      Coronavirus HKU1 Not detected NOTD      Coronavirus CVNL63 Not detected NOTD      Coronavirus OC43 Not detected NOTD      SARS-CoV-2, PCR Not detected NOTD      Metapneumovirus Not detected NOTD      Rhinovirus and Enterovirus Not detected NOTD      Influenza A Not detected NOTD      Influenza A, subtype H1 Not detected NOTD      Influenza A, subtype H3 Not detected NOTD      INFLUENZA A H1N1 PCR Not detected NOTD      Influenza B Not detected NOTD      Parainfluenza 1 Not detected NOTD      Parainfluenza 2 Not detected NOTD      Parainfluenza 3 Not detected NOTD      Parainfluenza virus 4 Not detected NOTD      RSV by PCR Not detected NOTD      B. parapertussis, PCR Not detected NOTD      Bordetella pertussis - PCR Not detected NOTD      Chlamydophila pneumoniae DNA, QL, PCR Not detected NOTD      Mycoplasma pneumoniae DNA, QL, PCR Not detected NOTD     EKG, 12 LEAD, INITIAL    Collection Time: 01/05/23 12:15 AM   Result Value Ref Range    Ventricular Rate 110 BPM    Atrial Rate 110 BPM    P-R Interval 150 ms    QRS Duration 90 ms    Q-T Interval 362 ms    QTC Calculation (Bezet) 489 ms    Calculated P Axis -25 degrees    Calculated R Axis 63 degrees    Calculated T Axis 40 degrees    Diagnosis       Sinus tachycardia  Otherwise normal ECG  When compared with ECG of 09-OCT-2022 18:21,  ST no longer depressed in Inferior leads  ST no longer depressed in Lateral leads  T wave inversion no longer evident in Inferior leads  Nonspecific T wave abnormality no longer evident in Lateral leads  Confirmed by Merna Watkins ([id number]) on 1/5/2023 7:31:54 AM     BLOOD GAS, ARTERIAL POC    Collection Time: 01/05/23  1:49 AM   Result Value Ref Range    Device: NASAL CANNULA      FIO2 (POC) 24 %    pH (POC) 7.52 (H) 7.35 - 7.45      pCO2 (POC) 33.3 (L) 35.0 - 45.0 MMHG    pO2 (POC) 85 80 - 100 MMHG    HCO3 (POC) 27.2 (H) 22 - 26 MMOL/L    sO2 (POC) 97.5 (H) 92 - 97 %    Base excess (POC) 4.1 mmol/L    Set Rate 35 bpm    Allens test (POC) Positive      Total resp.  rate 35      Site LEFT RADIAL      Specimen type (POC) ARTERIAL      Performed by Calin Patel    AMMONIA    Collection Time: 01/05/23  2:42 AM   Result Value Ref Range    Ammonia, plasma 14 11 - 32 UMOL/L   AMMONIA    Collection Time: 01/05/23  9:49 AM   Result Value Ref Range    Ammonia, plasma <10 (L) 11 - 32 UMOL/L   METABOLIC PANEL, COMPREHENSIVE    Collection Time: 01/05/23  9:49 AM   Result Value Ref Range    Sodium 161 (HH) 136 - 145 mmol/L    Potassium 3.2 (L) 3.5 - 5.5 mmol/L    Chloride 128 (H) 100 - 111 mmol/L    CO2 28 21 - 32 mmol/L    Anion gap 5 3.0 - 18 mmol/L    Glucose 158 (H) 74 - 99 mg/dL    BUN 80 (H) 7.0 - 18 MG/DL    Creatinine 1.27 0.6 - 1.3 MG/DL    BUN/Creatinine ratio 63 (H) 12 - 20      eGFR >60 >60 ml/min/1.73m2    Calcium 8.5 8.5 - 10.1 MG/DL    Bilirubin, total 0.8 0.2 - 1.0 MG/DL    ALT (SGPT) 130 (H) 16 - 61 U/L    AST (SGOT) 109 (H) 10 - 38 U/L    Alk. phosphatase 107 45 - 117 U/L    Protein, total 6.6 6.4 - 8.2 g/dL    Albumin 1.5 (L) 3.4 - 5.0 g/dL    Globulin 5.1 (H) 2.0 - 4.0 g/dL    A-G Ratio 0.3 (L) 0.8 - 1.7     CBC WITH AUTOMATED DIFF    Collection Time: 01/05/23  9:49 AM   Result Value Ref Range    WBC 14.3 (H) 4.6 - 13.2 K/uL    RBC 2.71 (L) 4.35 - 5.65 M/uL    HGB 7.2 (L) 13.0 - 16.0 g/dL    HCT 24.1 (L) 36.0 - 48.0 %    MCV 88.9 78.0 - 100.0 FL    MCH 26.6 24.0 - 34.0 PG    MCHC 29.9 (L) 31.0 - 37.0 g/dL    RDW 16.8 (H) 11.6 - 14.5 %    PLATELET 276 873 - 837 K/uL    MPV 12.8 (H) 9.2 - 11.8 FL    NRBC 0.0 0  WBC    ABSOLUTE NRBC 0.00 0.00 - 0.01 K/uL    NEUTROPHILS 82 (H) 40 - 73 %    BAND NEUTROPHILS 12 %    LYMPHOCYTES 2 (L) 21 - 52 %    MONOCYTES 2 (L) 3 - 10 %    EOSINOPHILS 2 0 - 5 %    BASOPHILS 0 0 - 2 %    IMMATURE GRANULOCYTES 0 0.0 - 0.5 %    ABS. NEUTROPHILS 13.4 (H) 1.8 - 8.0 K/UL    ABS. LYMPHOCYTES 0.3 (L) 0.9 - 3.6 K/UL    ABS. MONOCYTES 0.3 0.05 - 1.2 K/UL    ABS. EOSINOPHILS 0.3 0.0 - 0.4 K/UL    ABS. BASOPHILS 0.0 0.0 - 0.1 K/UL    ABS. IMM.  GRANS. 0.0 0.00 - 0.04 K/UL    DF MANUAL      PLATELET COMMENTS ADEQUATE PLATELETS      RBC COMMENTS ANISOCYTOSIS  1+        RBC COMMENTS POLYCHROMASIA  1+       POC LACTIC ACID    Collection Time: 01/05/23  9:54 AM   Result Value Ref Range    Lactic Acid (POC) 0.92 0.40 - 1.55 mmol/L   METABOLIC PANEL, BASIC    Collection Time: 01/05/23  2:15 PM   Result Value Ref Range    Sodium 161 (HH) 136 - 145 mmol/L    Potassium 3.0 (L) 3.5 - 5.5 mmol/L    Chloride 128 (H) 100 - 111 mmol/L    CO2 30 21 - 32 mmol/L    Anion gap 3 3.0 - 18 mmol/L    Glucose 160 (H) 74 - 99 mg/dL    BUN 75 (H) 7.0 - 18 MG/DL    Creatinine 1.23 0.6 - 1.3 MG/DL BUN/Creatinine ratio 61 (H) 12 - 20      eGFR >60 >60 ml/min/1.73m2    Calcium 9.0 8.5 - 10.1 MG/DL   MAGNESIUM    Collection Time: 01/05/23  2:15 PM   Result Value Ref Range    Magnesium 2.9 (H) 1.6 - 2.6 mg/dL   PHOSPHORUS    Collection Time: 01/05/23  2:15 PM   Result Value Ref Range    Phosphorus 2.1 (L) 2.5 - 4.9 MG/DL   GLUCOSE, POC    Collection Time: 01/05/23  5:31 PM   Result Value Ref Range    Glucose (POC) 147 (H) 70 - 110 mg/dL         Assessment/Plan:     Active Problems:    * No active hospital problems. *    ASSESSMENT:    1. Acute on chronic encephalopathy due to severe dehydration  2. Hypernatremia  3. Hypotension, improving. Off IV pressors  4. Acute cystitis  5. Bibasilar atelectasis versus pneumonia  6. Dementia and dysphagia s/p PEG tube  7. Leukocytosis  8. Hypokalemia and low phosphorus  9. Anemia, most likely nutritional  10. Pressure skin injuries    PLAN:    We will start patient on D5 half-normal saline and nephrology will be consulted  Dietitian will be consulted to start patient on tube feeding and water flushes  Continue midodrine as needed  Continue Zosyn, Levaquin and vancomycin and follow culture  Check iron profile and monitor H&H  Patient was started on DuoNebs 4 #5  Replace potassium and phosphorus  Patient is a DNR    Total time to take care of this patient was 35 minutes and more than 50% of time was spent counseling and coordinating care. Disclaimer: Sections of this note are dictated using utilizing voice recognition software, which may have resulted in some phonetic based errors in grammar and contents. Even though attempts were made to correct all the mistakes, some may have been missed, and remained in the body of the document. If questions arise, please contact our department.

## 2023-01-05 NOTE — ED PROVIDER NOTES
EMERGENCY DEPARTMENT HISTORY AND PHYSICAL EXAM    10:29 PM      Date: 1/4/2023  Patient Name: Marcellus Abarca    History of Presenting Illness     Chief Complaint   Patient presents with    Altered mental status         History Provided By: EMS, Nursing Notes     Additional History (Context): Marcellus Abarca is a 79 y.o. male with  hx of dementia, COVID, hyperlipidemia, hypertension, chronic hyponatremia, CVA, seizures, and other noted past medical history  who presents via EMS due to suspected sepsis and altered mental status x unknown time. Per EMS, pt febrile and tachycardic upon their initial assessment. History is limited due to baseline mental status of patient. Pt is a resident at [facility]. Pt is a DNR/ DNI. PCP: Alessandro Todd MD    Current Facility-Administered Medications   Medication Dose Route Frequency Provider Last Rate Last Admin    NOREPINephrine (LEVOPHED) 8 mg in 5% dextrose 250mL (32 mcg/mL) infusion  2-16 mcg/min IntraVENous TITRATE Carolene Height, PA 9.4 mL/hr at 01/05/23 0106 5 mcg/min at 01/05/23 0106    sodium chloride (NS) flush 5-10 mL  5-10 mL IntraVENous PRN Carolene Height, PA        piperacillin-tazobactam (ZOSYN) 3.375 g in 0.9% sodium chloride (MBP/ADV) 100 mL MBP  3.375 g IntraVENous Q8H Carolene Height, PA        levoFLOXacin (LEVAQUIN) 750 mg in D5W IVPB  750 mg IntraVENous Q24H Carolene Height,  mL/hr at 01/04/23 2249 750 mg at 01/04/23 2249    VANCOMYCIN INFORMATION NOTE   Other Rx Dosing/Monitoring Carolene Height, PA        dextrose 5 % - 0.45% NaCl infusion  100 mL/hr IntraVENous CONTINUOUS Carolene Height,  mL/hr at 01/05/23 0029 100 mL/hr at 01/05/23 0029     Current Outpatient Medications   Medication Sig Dispense Refill    apixaban (ELIQUIS) 5 mg tablet Take 1 Tablet by mouth every twelve (12) hours. 60 Tablet 0    baclofen (LIORESAL) 10 mg tablet 10 mg by Per G Tube route two (2) times daily as needed for Muscle Spasm(s).  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 Indications: prevention of vitamin B12 deficiency      ferrous sulfate (FerrouSuL) 325 mg (65 mg iron) tablet Take  by mouth Daily (before breakfast). metoprolol tartrate (LOPRESSOR) 25 mg tablet 25 mg by Per G Tube route two (2) times a day. thiamine HCL (B-1) 100 mg tablet 1 Tablet by Per G Tube route daily. 30 Tablet 0    levETIRAcetam (KEPPRA) 100 mg/mL solution 7.5 mL by Per G Tube route two (2) times a day. 473 mL 0    pravastatin (PRAVACHOL) 40 mg tablet Take 1 Tab by mouth nightly. 30 Tab 1    ascorbic acid, vitamin C, (VITAMIN C) 250 mg tablet Take 1 Tab by mouth daily. 30 Tab 2    polyethylene glycol (MIRALAX) 17 gram packet Take 1 Packet by mouth daily. 30 Packet 2    tamsulosin (FLOMAX) 0.4 mg capsule Take 0.4 mg by mouth daily. 1 tab daily      MULTIVITAMIN,THERAPEUTIC (THERA MULTI-VITAMIN PO) Take 500 mg by mouth daily. folic acid (FOLVITE) 1 mg tablet Take 1 Tab by mouth daily.  30 Tab 1       Past History     Past Medical History:  Past Medical History:   Diagnosis Date    Alcohol dependence with alcohol-induced persisting dementia ([facility] [address]     per [facility] 4/23/21    Anemia     Aphasia     Benign prostatic hyperplasia     Cerebral vascular disease     COVID-19 04/06/2021    Disturbances of salivary secretion     Dysphagia     GERD (gastroesophageal reflux disease)     Hemiparesis (HCC)     left side    Hemiplegia (HCC)     left side     Hiccups     Hyperlipidemia     Hypernatremia 03/2021    Hypertension     Hyponatremia     Insomnia     Lacunar infarction ([address] 2010    weakness both arms, unable to walk    Lower extremity edema     Moderate protein-calorie malnutrition (HCC)     MRSA (methicillin resistant Staphylococcus aureus)     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)     Pneumonia     Psychogenic polydipsia     PVD (peripheral vascular disease) ([facility] [address]     Seizures ([facility] [address]     Spinal stenosis     Stroke ([facility] [address] 2017    Unspecified convulsions ([facility] [address]     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       Past Surgical History:  Past Surgical History:   Procedure Laterality Date    COLONOSCOPY N/A 2021    COLONOSCOPY performed by Van Woody MD at [facility]    HX HEENT      pt reports past hx of surgery on ears unsure of what type    HX OTHER SURGICAL Bilateral     debridement of lower extremities       Family History:  Family History   Problem Relation Age of Onset    Hypertension Other        Social History:  Social History     Tobacco Use    Smoking status: Former     Packs/day: 0.50     Types: Cigarettes     Quit date: 2008     Years since quittin.5    Smokeless tobacco: Never   Substance Use Topics    Alcohol use: No    Drug use: No       Allergies:  No Known Allergies      Review of Systems       Review of Systems   Unable to perform ROS: Dementia   Constitutional:  Positive for fever. Physical Exam   Visit Vitals  BP (!) 106/52   Pulse 94   Temp 99.4 °F (37.4 °C)   Resp 27   Wt 75.3 kg (166 lb)   SpO2 97%   BMI 24.51 kg/m²         Physical Exam  Vitals and nursing note reviewed. Constitutional:       General: He is not in acute distress. Appearance: He is cachectic. He is ill-appearing and toxic-appearing. He is not diaphoretic. HENT:      Head: Normocephalic and atraumatic. Mouth/Throat:      Mouth: Mucous membranes are dry. Cardiovascular:      Rate and Rhythm: Regular rhythm. Tachycardia present. Heart sounds: Normal heart sounds. No murmur heard. No friction rub. No gallop. Pulmonary:      Effort: Tachypnea present. No respiratory distress. Breath sounds: Rhonchi present. No wheezing or rales. Abdominal:      General: There is no distension. Palpations: Abdomen is soft. Tenderness: There is no abdominal tenderness.       Comments: PEG tube in place with mild surrounding drainage    Musculoskeletal:         General: Normal range of motion. Cervical back: Normal range of motion and neck supple. Skin:     General: Skin is warm. Findings: No rash. Neurological:      Mental Status: He is lethargic. Diagnostic Study Results     Labs -  Recent Results (from the past 12 hour(s))   METABOLIC PANEL, COMPREHENSIVE    Collection Time: 01/04/23 10:12 PM   Result Value Ref Range    Sodium 162 (HH) 136 - 145 mmol/L    Potassium 3.6 3.5 - 5.5 mmol/L    Chloride 130 (H) 100 - 111 mmol/L    CO2 26 21 - 32 mmol/L    Anion gap 6 3.0 - 18 mmol/L    Glucose 138 (H) 74 - 99 mg/dL    BUN 74 (H) 7.0 - 18 MG/DL    Creatinine 1.20 0.6 - 1.3 MG/DL    BUN/Creatinine ratio 62 (H) 12 - 20      eGFR >60 >60 ml/min/1.73m2    Calcium 9.2 8.5 - 10.1 MG/DL    Bilirubin, total 0.6 0.2 - 1.0 MG/DL    ALT (SGPT) 148 (H) 16 - 61 U/L    AST (SGOT) 141 (H) 10 - 38 U/L    Alk. phosphatase 108 45 - 117 U/L    Protein, total 7.6 6.4 - 8.2 g/dL    Albumin 1.6 (L) 3.4 - 5.0 g/dL    Globulin 6.0 (H) 2.0 - 4.0 g/dL    A-G Ratio 0.3 (L) 0.8 - 1.7     CBC WITH AUTOMATED DIFF    Collection Time: 01/04/23 10:12 PM   Result Value Ref Range    WBC 11.8 4.6 - 13.2 K/uL    RBC 2.92 (L) 4.35 - 5.65 M/uL    HGB 7.8 (L) 13.0 - 16.0 g/dL    HCT 25.7 (L) 36.0 - 48.0 %    MCV 88.0 78.0 - 100.0 FL    MCH 26.7 24.0 - 34.0 PG    MCHC 30.4 (L) 31.0 - 37.0 g/dL    RDW 16.7 (H) 11.6 - 14.5 %    PLATELET 313 850 - 229 K/uL    MPV 12.8 (H) 9.2 - 11.8 FL    NRBC 0.0 0  WBC    ABSOLUTE NRBC 0.00 0.00 - 0.01 K/uL    NEUTROPHILS 82 (H) 40 - 73 %    BAND NEUTROPHILS 9 %    LYMPHOCYTES 5 (L) 21 - 52 %    MONOCYTES 4 3 - 10 %    EOSINOPHILS 0 0 - 5 %    BASOPHILS 0 0 - 2 %    IMMATURE GRANULOCYTES 0 0.0 - 0.5 %    ABS. NEUTROPHILS 10.7 (H) 1.8 - 8.0 K/UL    ABS. LYMPHOCYTES 0.6 (L) 0.9 - 3.6 K/UL    ABS. MONOCYTES 0.5 0.05 - 1.2 K/UL    ABS. EOSINOPHILS 0.0 0.0 - 0.4 K/UL    ABS. BASOPHILS 0.0 0.0 - 0.1 K/UL    ABS. IMM. GRANS. 0.0 0.00 - 0.04 K/UL    DF MANUAL      PLATELET COMMENTS ADEQUATE PLATELETS      RBC COMMENTS ANISOCYTOSIS  2+        RBC COMMENTS POLYCHROMASIA  2+        RBC COMMENTS POIKILOCYTOSIS  FEW       TROPONIN-HIGH SENSITIVITY    Collection Time: 01/04/23 10:12 PM   Result Value Ref Range    Troponin-High Sensitivity 51 0 - 78 ng/L   MAGNESIUM    Collection Time: 01/04/23 10:12 PM   Result Value Ref Range    Magnesium 3.1 (H) 1.6 - 2.6 mg/dL   NT-PRO BNP    Collection Time: 01/04/23 10:12 PM   Result Value Ref Range    NT pro- 0 - 900 PG/ML   GLUCOSE, POC    Collection Time: 01/04/23 11:18 PM   Result Value Ref Range    Glucose (POC) 139 (H) 70 - 110 mg/dL   TYPE & SCREEN    Collection Time: 01/04/23 11:33 PM   Result Value Ref Range    Crossmatch Expiration 01/07/2023,2359     ABO/Rh(D) A POSITIVE     Antibody screen NEG    LIPASE    Collection Time: 01/04/23 11:33 PM   Result Value Ref Range    Lipase 91 73 - 393 U/L   PROTHROMBIN TIME + INR    Collection Time: 01/04/23 11:33 PM   Result Value Ref Range    Prothrombin time 19.2 (H) 11.5 - 15.2 sec    INR 1.6 (H) 0.8 - 1.2     METABOLIC PANEL, BASIC    Collection Time: 01/04/23 11:33 PM   Result Value Ref Range    Sodium 160 (H) 136 - 145 mmol/L    Potassium 3.6 3.5 - 5.5 mmol/L    Chloride 130 (H) 100 - 111 mmol/L    CO2 26 21 - 32 mmol/L    Anion gap 4 3.0 - 18 mmol/L    Glucose 143 (H) 74 - 99 mg/dL    BUN 77 (H) 7.0 - 18 MG/DL    Creatinine 1.29 0.6 - 1.3 MG/DL    BUN/Creatinine ratio 60 (H) 12 - 20      eGFR >60 >60 ml/min/1.73m2    Calcium 9.5 8.5 - 10.1 MG/DL   POC LACTIC ACID    Collection Time: 01/04/23 11:37 PM   Result Value Ref Range    Lactic Acid (POC) 2.02 (HH) 0.40 - 2.00 mmol/L   URINALYSIS W/ RFLX MICROSCOPIC    Collection Time: 01/04/23 11:45 PM   Result Value Ref Range    Color DARK YELLOW      Appearance CLOUDY      Specific gravity 1.015 1.005 - 1.030      pH (UA) 5.5 5.0 - 8.0      Protein 30 (A) NEG mg/dL    Glucose Negative NEG mg/dL Ketone Negative NEG mg/dL    Bilirubin SMALL (A) NEG      Blood Negative NEG      Urobilinogen 1.0 0.2 - 1.0 EU/dL    Nitrites Negative NEG      Leukocyte Esterase MODERATE (A) NEG     URINE MICROSCOPIC ONLY    Collection Time: 01/04/23 11:45 PM   Result Value Ref Range    WBC 10 to 20 0 - 4 /hpf    RBC 0 to 1 0 - 5 /hpf    Epithelial cells FEW 0 - 5 /lpf    Bacteria 2+ (A) NEG /hpf    Mucus Negative NEG /lpf    Amorphous Crystals 2+ (A) NEG   RESPIRATORY VIRUS PANEL W/COVID-19, PCR    Collection Time: 01/04/23 11:49 PM    Specimen: Nasopharyngeal   Result Value Ref Range    Adenovirus Not detected NOTD      Coronavirus 229E Not detected NOTD      Coronavirus HKU1 Not detected NOTD      Coronavirus CVNL63 Not detected NOTD      Coronavirus OC43 Not detected NOTD      SARS-CoV-2, PCR Not detected NOTD      Metapneumovirus Not detected NOTD      Rhinovirus and Enterovirus Not detected NOTD      Influenza A Not detected NOTD      Influenza A, subtype H1 Not detected NOTD      Influenza A, subtype H3 Not detected NOTD      INFLUENZA A H1N1 PCR Not detected NOTD      Influenza B Not detected NOTD      Parainfluenza 1 Not detected NOTD      Parainfluenza 2 Not detected NOTD      Parainfluenza 3 Not detected NOTD      Parainfluenza virus 4 Not detected NOTD      RSV by PCR Not detected NOTD      B. parapertussis, PCR Not detected NOTD      Bordetella pertussis - PCR Not detected NOTD      Chlamydophila pneumoniae DNA, QL, PCR Not detected NOTD      Mycoplasma pneumoniae DNA, QL, PCR Not detected NOTD     EKG, 12 LEAD, INITIAL    Collection Time: 01/05/23 12:15 AM   Result Value Ref Range    Ventricular Rate 110 BPM    Atrial Rate 110 BPM    P-R Interval 150 ms    QRS Duration 90 ms    Q-T Interval 362 ms    QTC Calculation (Bezet) 489 ms    Calculated P Axis -25 degrees    Calculated R Axis 63 degrees    Calculated T Axis 40 degrees    Diagnosis       Sinus tachycardia  Otherwise normal ECG  When compared with ECG of 09-OCT-2022 18:21,  ST no longer depressed in Inferior leads  ST no longer depressed in Lateral leads  T wave inversion no longer evident in Inferior leads  Nonspecific T wave abnormality no longer evident in Lateral leads     BLOOD GAS, ARTERIAL POC    Collection Time: 01/05/23  1:49 AM   Result Value Ref Range    Device: NASAL CANNULA      FIO2 (POC) 24 %    pH (POC) 7.52 (H) 7.35 - 7.45      pCO2 (POC) 33.3 (L) 35.0 - 45.0 MMHG    pO2 (POC) 85 80 - 100 MMHG    HCO3 (POC) 27.2 (H) 22 - 26 MMOL/L    sO2 (POC) 97.5 (H) 92 - 97 %    Base excess (POC) 4.1 mmol/L    Set Rate 35 bpm    Allens test (POC) Positive      Total resp. rate 35      Site LEFT RADIAL      Specimen type (POC) ARTERIAL      Performed by Aretha Suero    AMMONIA    Collection Time: 01/05/23  2:42 AM   Result Value Ref Range    Ammonia, plasma 14 11 - 32 UMOL/L       Radiologic Studies -   CT CHEST ABD PELV W CONT   Final Result   1. Bilateral posterior basilar pneumonia, slightly less severe than before. Trace right pleural effusion. 2. Hiatal hernia/distal esophagitis. 3. Severe constipation. Fecal impaction in the rectum with distended rectum,   large fecal ball. 4. Decubitus ulcer with air approaching the posterior wall of anus/rectum and   posterior cortex of the sacrum and coccyx. No abscess. XR CHEST PORT    (Results Pending)         Medical Decision Making   I am the first provider for this patient. I reviewed the vital signs, available nursing notes, past medical history, past surgical history, family history and social history. Vital Signs-Reviewed the patient's vital signs. Pulse Oximetry Analysis -   97% on room air     Records Reviewed: Nursing Notes, Old Medical Records, Previous electrocardiograms, Ambulance Run Sheet, Previous Radiology Studies, and Previous Laboratory Studies (Time of Review: 10:29 PM)  ADM on 11/25:  1. Hypoxia - Patient required 2L  NC in ED. 2. Dysphagia s/p peg. Tolerating TF.    3. Pneumonia, concern for aspiration. He received levaquin pta at LTC. Continued on zosyn, vanco until 12/1/22. To be started on Augmentin for additional 9 days to complete 14 day course. 4. Hypernatremia. At LTC, received D5/NS (4L) pta. received NS 1000 mL bolus in ED. Improving at appropriate rate of correction. Continued on D5w for 24 hours at 100 cc/hr. Increased Free water flushes with TF.   5. Volume depletion resolving. Continue TF. 6. Dementia w/o behavior problem - patient pleasant throughout stay. 7. Seizure d/o on keppra. Seizure precautions. 8. Hypokalemia, hypophosphatemia. Repleted as needed. Potassium 3.8 on day of discharge. 9. Thrombus of indeterminate age noted in the left brachial vein(s). Eliquis 5 mg BID started. 10. Acute normocytic anemia requiring transfusion. 2nd unit prbc 11/26/22. 11. Anemia. Stool for occult blood negative 11/26/22. 12. Debility. Supportive care  13. DNR / DNI. Limited additional interventions. POST on file. Return to LTC at [facility]. ED Course: Progress Notes, Reevaluation, and Consults:  10:29 PM: Initial assessment of patient complete. Sepsis order set placed. DNR/DNI at bedside. Attempted to call next of kin, Terence Bergeron, cell # unable to go through [facility] you are calling cannot accept calls at this time\", attempted 2nd emergency contact, unable to go through  11:00 PM: Attempted both sons x 2   11:29 PM: Discussed with Dr. Chrissy Jay, nephrology, recommends D5NS 100 mL/hr, will consult  12:58 AM: Gladsy Ibrahim, pt's son, called back. Discussed severity of patient's symptoms, discussed importance of calling brother to let him know as well. Notes they would like pressors, abx, and further interventions. Discussed with Dr. Yaneth Hooker ordered. 4:20 AM: /56, MAP 66  Discussed with Dr. Trell Esquivel, hospitalist, recommends ICU consultation as patient is still on pressors. Levophed at 6 mcg/min.   Paged ICU   PROGRESS NOTE:  4:39 AM Patient care will be transferred to Dr. Ally Goodwin. Discussed available diagnostic results and care plan at length. Pending ICU consultation. Written by Blake Rodgers PA-C       Critical Care Time: Critical Care Time:  The services I provided to this patient were to treat and/or prevent clinically significant deterioration that could result in the failure of one or more body systems and/or organ systems due to Sepsis. Services included the following:  -reviewing nursing notes and old charts  -vital sign assessments  -direct patient care  -medication orders and management  -interpreting and reviewing diagnostic studies/labs  -re-evaluations  -documentation time    Aggregate critical care time was 45 minutes, which includes only time during which I was engaged in work directly related to the patient's care as described above, whether I was at bedside or elsewhere in the Emergency Department. It did not include time spent performing other reported procedures or the services of residents, students, or nurses. ARSEN Wu    4:23 AM       Diagnosis     Clinical Impression:   1. Altered mental status, unspecified altered mental status type    2. Sepsis, due to unspecified organism, unspecified whether acute organ dysfunction present ([address]    3. Hypernatremia    4. Acute cystitis without hematuria    5. Pneumonia of both lower lobes due to infectious organism    6. Pressure injury of skin, unspecified injury stage, unspecified location    7. Constipation, unspecified constipation type    8. Dementia, unspecified dementia severity, unspecified dementia type, unspecified whether behavioral, psychotic, or mood disturbance or anxiety ([address]    9. Anemia, unspecified type    10.  Elevated LFTs        Disposition: admission    Follow-up Information    None          Patient's Medications   Start Taking    No medications on file   Continue Taking    APIXABAN (ELIQUIS) 5 MG TABLET    Take 1 Tablet by mouth every twelve (12) hours. ASCORBIC ACID, VITAMIN C, (VITAMIN C) 250 MG TABLET    Take 1 Tab by mouth daily. BACLOFEN (LIORESAL) 10 MG TABLET    10 mg by Per G Tube route two (2) times daily as needed for Muscle Spasm(s). Indications: muscle spasms caused by a spinal disease    CYANOCOBALAMIN (VITAMIN B12) 500 MCG TABLET    500 mcg by Per G Tube route daily. Indications: prevention of vitamin B12 deficiency    FERROUS SULFATE (FERROUSUL) 325 MG (65 MG IRON) TABLET    Take  by mouth Daily (before breakfast). FOLIC ACID (FOLVITE) 1 MG TABLET    Take 1 Tab by mouth daily. LEVETIRACETAM (KEPPRA) 100 MG/ML SOLUTION    7.5 mL by Per G Tube route two (2) times a day. METOPROLOL TARTRATE (LOPRESSOR) 25 MG TABLET    25 mg by Per G Tube route two (2) times a day. MULTIVITAMIN,THERAPEUTIC (THERA MULTI-VITAMIN PO)    Take 500 mg by mouth daily. POLYETHYLENE GLYCOL (MIRALAX) 17 GRAM PACKET    Take 1 Packet by mouth daily. PRAVASTATIN (PRAVACHOL) 40 MG TABLET    Take 1 Tab by mouth nightly. TAMSULOSIN (FLOMAX) 0.4 MG CAPSULE    Take 0.4 mg by mouth daily. 1 tab daily    THIAMINE HCL (B-1) 100 MG TABLET    1 Tablet by Per G Tube route daily. These Medications have changed    No medications on file   Stop Taking    No medications on file       Dictation disclaimer:  Please note that this dictation was completed with [employer], the computer voice recognition software. Quite often unanticipated grammatical, syntax, homophones, and other interpretive errors are inadvertently transcribed by the computer software. Please disregard these errors. Please excuse any errors that have escaped final proofreading.

## 2023-01-05 NOTE — ED TRIAGE NOTES
[facility] comes from [facility] for altered mental status and suspected sepsis.  Patient is responsive to sternal rub

## 2023-01-05 NOTE — ED NOTES
Confirmed with Dr. Bo Kessler regarding the IVF order of D5%-0.45%NaCl 50ml/hr infusion because pt is already on D5% 100ml/hr infusion. Received a verbal order with read back to d/c the new IVF order and decrease D5% to 75ml/hr.

## 2023-01-05 NOTE — H&P
[facility]  Pulmonary, Critical Care, and Sleep Medicine    Name: David Patricia MRN: [id number]   : 1955 Hospital: [facility]   Date: 2023        Critical Care History and Physical      IMPRESSION:   Altered mental status  Septic Shock  Hypernatremia  Acute cystitis without hematuria  Pneumonia of both lower lobes due to infectious organism  Pressure injury of skin, unspecified injury stage  Constipation  Dementia  Anemia  Elevated LFTs     Patient Active Problem List   Diagnosis Code    Intractable hiccups R06.6    Hypertension I10    Hypokalemia E87.6    First degree AV block I44.0    Former heavy tobacco smoker Z87.891    Severe protein-calorie malnutrition ([facility] [address] E43    Psychogenic polydipsia R63.1, F54    Bilateral leg and foot pain M79.604, M79.605, M79.671, M79.672    Stroke ([facility]) I63.9    Hyperosmolality and hypernatremia B12.2    Acute metabolic encephalopathy N33.05    Dehydration E86.0    Goals of care, counseling/discussion Z71.89    Debility R53.81    Respiratory failure ([facility]) L77.93    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 LA ordered- initial and repeat q4hrs until normal. ABX :levofloxacin 750mg daily, zosyn q8hr, vancomycin Deescalate ABX once Cx's finalize. Hem/Onc: Daily CBC; H/H, and plts are stable  CVS: HD stable; Monitor CVP, Actively titrate vasopressors aim MAP >65mmHg, Check cardiac panel, ECHO results.  Levophed 8mg at rate of 5  Metabolic: Daily BMP; monitor e-lytes; replace PRN  Renal: Trend Renal indices; Diuresis, Reid   Endocrine: POC Glucose q6; Check TSH level  GI: SUP, Trend LFTs, Zofran PRN for N/V   Musc/Skin: No acute issues, wound care  Neuro: PRN pain medications, +/- sedation  Fluids: D5 1/2NS at 100cc/hr  Code Status: DNR     Best Practices/Safety Bundles:  Sepsis Bundle per Hospital Protocol  Glycemic control; avoid Hypoglycemia  IHI ICU Bundles: Bradley Bundle Followed    [facility] Vent patients/ Pulmonary pts:   VAP bundle, Aim to keep peak plateau pressure 93-24PR H2O  Aspiration Precautions - HOB >30'  Daily sedation holiday as indicated  SBT as tolerated/appropriate  Stress Ulcer prophylaxis: pending   DVT prophylaxis: SCD on left leg  Need for Lines, bradley assessed. Palliative care evaluation? pending    Subjective/History: This patient has been seen and evaluated at the request of Dr. Carolyn Braxton for AMS and severe hypernatremia with septic shock. 01/05/23    Patient is a 79 y.o. male with hx of dementia, COVID, HLD, HTN, chronic hyponatremia, CVA, seizures who initially presented to EMS due to suspected sepsis and AMS for unknown amt of time. Patient was febrile and tachycardic on initial assessment with EMS. Patient is a resident of [facility] and is DNR/DNI. In the ED at [facility], labs were remarkable for severe hypernatremia Na 162, hypokalemia K 3.2, WBC 14.3, Lactic acid 2.02. Patient was febrile 100.5*F, tachycardic , and hypotensive BP 80/50 mmHg, and tachypneic with RR 41 with O2 sat 92% on room air. Patient was in septic shock and received IVF resuscitation with 2100 cc NS followed by 1/2NS at 150. After discussion with ICU, added an additional 1L NS which was then completed. Patient was also placed on levo at rate of 5mcg/min. The patient is critically ill and can not provide additional history due to Unconsciousness, Unable to speak, and Unable to comprehend.        Past Medical History:   Diagnosis Date    Alcohol dependence with alcohol-induced persisting dementia ([facility] [address]     per [facility] 4/23/21    Anemia     Aphasia     Benign prostatic hyperplasia     Cerebral vascular disease     COVID-19 04/06/2021 Disturbances of salivary secretion     Dysphagia     GERD (gastroesophageal reflux disease)     Hemiparesis (HCC)     left side    Hemiplegia (HCC)     left side     Hiccups     Hyperlipidemia     Hypernatremia 03/2021    Hypertension     Hyponatremia     Insomnia     Lacunar infarction ([facility] [address] 2010    weakness both arms, unable to walk    Lower extremity edema     Moderate protein-calorie malnutrition (HCC)     MRSA (methicillin resistant Staphylococcus aureus)     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)     Pneumonia     Psychogenic polydipsia     PVD (peripheral vascular disease) ([facility] [address]     Seizures ([facility] [address]     Spinal stenosis     Stroke ([facility] [address] 2017    Unspecified convulsions ([facility] [address]     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 Authorizing Provider   apixaban (ELIQUIS) 5 mg tablet Take 1 Tablet by mouth every twelve (12) hours. 12/1/22   Chelsie Larose N, DO   baclofen (LIORESAL) 10 mg tablet 10 mg by Per G Tube route two (2) times daily as needed for Muscle Spasm(s). Indications: muscle spasms caused by a spinal disease    Provider, Historical   cyanocobalamin (VITAMIN B12) 500 mcg tablet 500 mcg by Per G Tube route daily. Indications: prevention of vitamin B12 deficiency    Provider, Historical   ferrous sulfate (FerrouSuL) 325 mg (65 mg iron) tablet Take  by mouth Daily (before breakfast). Provider, Historical   metoprolol tartrate (LOPRESSOR) 25 mg tablet 25 mg by Per G Tube route two (2) times a day. Provider, Historical   thiamine HCL (B-1) 100 mg tablet 1 Tablet by Per G Tube route daily.  10/22/22   Vandana Vernon MD KOBE   levETIRAcetam (KEPPRA) 100 mg/mL solution 7.5 mL by Per G Tube route two (2) times a day. 10/22/22   Rina Holloway MD   pravastatin (PRAVACHOL) 40 mg tablet Take 1 Tab by mouth nightly. 17   Urbano Lawson MD   ascorbic acid, vitamin C, (VITAMIN C) 250 mg tablet Take 1 Tab by mouth daily. 17   Bear Perdue MD   polyethylene glycol (MIRALAX) 17 gram packet Take 1 Packet by mouth daily. 17   Bear Perdue MD   tamsulosin (FLOMAX) 0.4 mg capsule Take 0.4 mg by mouth daily. 1 tab daily    Provider, Historical   MULTIVITAMIN,THERAPEUTIC (THERA MULTI-VITAMIN PO) Take 500 mg by mouth daily. Provider, Historical   folic acid (FOLVITE) 1 mg tablet Take 1 Tab by mouth daily. 14   Bear Perdue MD       Current Facility-Administered Medications   Medication Dose Route Frequency    NOREPINephrine (LEVOPHED) 8 mg in 5% dextrose 250mL (32 mcg/mL) infusion  2-16 mcg/min IntraVENous TITRATE    piperacillin-tazobactam (ZOSYN) 3.375 g in 0.9% sodium chloride (MBP/ADV) 100 mL MBP  3.375 g IntraVENous Q8H    levoFLOXacin (LEVAQUIN) 750 mg in D5W IVPB  750 mg IntraVENous Q24H    VANCOMYCIN INFORMATION NOTE   Other Rx Dosing/Monitoring    dextrose 5 % - 0.45% NaCl infusion  100 mL/hr IntraVENous CONTINUOUS       No Known Allergies     Social History     Tobacco Use    Smoking status: Former     Packs/day: 0.50     Types: Cigarettes     Quit date: 2008     Years since quittin.5    Smokeless tobacco: Never   Substance Use Topics    Alcohol use: No        Family History   Problem Relation Age of Onset    Hypertension Other           Review of Systems:  Review of systems not obtained due to patient factors.     Objective:   Vital Signs:    Visit Vitals  /60   Pulse 79   Temp 97.5 °F (36.4 °C)   Resp 23   Wt 75.3 kg (166 lb)   SpO2 100%   BMI 24.51 kg/m²       O2 Device: Nasal cannula   O2 Flow Rate (L/min): 2 l/min   Temp (24hrs), Av.1 °F (37.3 °C), Min:97.5 °F (36.4 °C), Max:100.5 °F (38.1 °C)       Intake/Output:   Last shift:      01/05 0701 - 01/05 1900  In: -   Out: 500 [Urine:500]  Last 3 shifts: No intake/output data recorded. Intake/Output Summary (Last 24 hours) at 1/5/2023 1302  Last data filed at 1/5/2023 1226  Gross per 24 hour   Intake --   Output 500 ml   Net -500 ml       Ventilator Settings: Patient not on ventilator  Mode Rate Tidal Volume Pressure FiO2 PEEP                    Peak airway pressure:      Minute ventilation:        ARDS network Guidelines:   Lung protective strategy and Plateau  Pressure goal < 30 cm H2O goals  Oxygenation Goals PaO2 55-80 mm Hg or SaO2 88-95%  PH goal 7.30-7.45      Physical Exam:     General:  Unresponsive with mouth open   Head:  Normocephalic, without obvious abnormality, atraumatic. Eyes:  Conjunctivae/corneas clear. PERRL, unable to assess for EOM   Nose: Nares normal. Septum midline. Mucosa normal. No drainage or sinus tenderness. Throat: Lips, mucosa, and tongue normal. Poor dentition    Neck: Supple, symmetrical, trachea midline, no adenopathy, no carotid bruit and no JVD. Lungs:   Symmetrical chest rise; good AE bilat; CTAB; no wheezes/rhonchi/rales noted. Heart:  RRR, S1, S2 normal, no m/r/g   Abdomen:   Soft, non-tender. Bowel sounds normal. No masses,  No organomegaly. Extremities: Extremities normal, atraumatic, no cyanosis or edema.    Pulses: 2+ and symmetric in upper extremities and RLE; unable to feel pulse in LLE   Skin: Skin color, texture, turgor normal. No rashes or lesions; onychhomycosis all 10 toes   Neurologic: Grossly nonfocal   Devices:        Data:     Recent Results (from the past 24 hour(s))   CULTURE, BLOOD    Collection Time: 01/04/23 10:12 PM    Specimen: Blood   Result Value Ref Range    Special Requests: NO SPECIAL REQUESTS      Culture result: NO GROWTH AFTER 8 HOURS     METABOLIC PANEL, COMPREHENSIVE    Collection Time: 01/04/23 10:12 PM   Result Value Ref Range    Sodium 162 (HH) 136 - 145 mmol/L    Potassium 3.6 3.5 - 5.5 mmol/L    Chloride 130 (H) 100 - 111 mmol/L    CO2 26 21 - 32 mmol/L    Anion gap 6 3.0 - 18 mmol/L    Glucose 138 (H) 74 - 99 mg/dL    BUN 74 (H) 7.0 - 18 MG/DL    Creatinine 1.20 0.6 - 1.3 MG/DL    BUN/Creatinine ratio 62 (H) 12 - 20      eGFR >60 >60 ml/min/1.73m2    Calcium 9.2 8.5 - 10.1 MG/DL    Bilirubin, total 0.6 0.2 - 1.0 MG/DL    ALT (SGPT) 148 (H) 16 - 61 U/L    AST (SGOT) 141 (H) 10 - 38 U/L    Alk. phosphatase 108 45 - 117 U/L    Protein, total 7.6 6.4 - 8.2 g/dL    Albumin 1.6 (L) 3.4 - 5.0 g/dL    Globulin 6.0 (H) 2.0 - 4.0 g/dL    A-G Ratio 0.3 (L) 0.8 - 1.7     CBC WITH AUTOMATED DIFF    Collection Time: 01/04/23 10:12 PM   Result Value Ref Range    WBC 11.8 4.6 - 13.2 K/uL    RBC 2.92 (L) 4.35 - 5.65 M/uL    HGB 7.8 (L) 13.0 - 16.0 g/dL    HCT 25.7 (L) 36.0 - 48.0 %    MCV 88.0 78.0 - 100.0 FL    MCH 26.7 24.0 - 34.0 PG    MCHC 30.4 (L) 31.0 - 37.0 g/dL    RDW 16.7 (H) 11.6 - 14.5 %    PLATELET 667 111 - 176 K/uL    MPV 12.8 (H) 9.2 - 11.8 FL    NRBC 0.0 0  WBC    ABSOLUTE NRBC 0.00 0.00 - 0.01 K/uL    NEUTROPHILS 82 (H) 40 - 73 %    BAND NEUTROPHILS 9 %    LYMPHOCYTES 5 (L) 21 - 52 %    MONOCYTES 4 3 - 10 %    EOSINOPHILS 0 0 - 5 %    BASOPHILS 0 0 - 2 %    IMMATURE GRANULOCYTES 0 0.0 - 0.5 %    ABS. NEUTROPHILS 10.7 (H) 1.8 - 8.0 K/UL    ABS. LYMPHOCYTES 0.6 (L) 0.9 - 3.6 K/UL    ABS. MONOCYTES 0.5 0.05 - 1.2 K/UL    ABS. EOSINOPHILS 0.0 0.0 - 0.4 K/UL    ABS. BASOPHILS 0.0 0.0 - 0.1 K/UL    ABS. IMM.  GRANS. 0.0 0.00 - 0.04 K/UL    DF MANUAL      PLATELET COMMENTS ADEQUATE PLATELETS      RBC COMMENTS ANISOCYTOSIS  2+        RBC COMMENTS POLYCHROMASIA  2+        RBC COMMENTS POIKILOCYTOSIS  FEW       TROPONIN-HIGH SENSITIVITY    Collection Time: 01/04/23 10:12 PM   Result Value Ref Range    Troponin-High Sensitivity 51 0 - 78 ng/L   MAGNESIUM    Collection Time: 01/04/23 10:12 PM   Result Value Ref Range    Magnesium 3.1 (H) 1.6 - 2.6 mg/dL   NT-PRO BNP    Collection Time: 01/04/23 10:12 PM   Result Value Ref Range    NT pro- 0 - 900 PG/ML   GLUCOSE, POC    Collection Time: 01/04/23 11:18 PM   Result Value Ref Range    Glucose (POC) 139 (H) 70 - 110 mg/dL   CULTURE, BLOOD    Collection Time: 01/04/23 11:33 PM    Specimen: Blood   Result Value Ref Range    Special Requests: NO SPECIAL REQUESTS      Culture result: NO GROWTH AFTER 7 HOURS     TYPE & SCREEN    Collection Time: 01/04/23 11:33 PM   Result Value Ref Range    Crossmatch Expiration 01/07/2023,2359     ABO/Rh(D) A POSITIVE     Antibody screen NEG    LIPASE    Collection Time: 01/04/23 11:33 PM   Result Value Ref Range    Lipase 91 73 - 393 U/L   PROTHROMBIN TIME + INR    Collection Time: 01/04/23 11:33 PM   Result Value Ref Range    Prothrombin time 19.2 (H) 11.5 - 15.2 sec    INR 1.6 (H) 0.8 - 1.2     METABOLIC PANEL, BASIC    Collection Time: 01/04/23 11:33 PM   Result Value Ref Range    Sodium 160 (H) 136 - 145 mmol/L    Potassium 3.6 3.5 - 5.5 mmol/L    Chloride 130 (H) 100 - 111 mmol/L    CO2 26 21 - 32 mmol/L    Anion gap 4 3.0 - 18 mmol/L    Glucose 143 (H) 74 - 99 mg/dL    BUN 77 (H) 7.0 - 18 MG/DL    Creatinine 1.29 0.6 - 1.3 MG/DL    BUN/Creatinine ratio 60 (H) 12 - 20      eGFR >60 >60 ml/min/1.73m2    Calcium 9.5 8.5 - 10.1 MG/DL   POC LACTIC ACID    Collection Time: 01/04/23 11:37 PM   Result Value Ref Range    Lactic Acid (POC) 2.02 (HH) 0.40 - 2.00 mmol/L   URINALYSIS W/ RFLX MICROSCOPIC    Collection Time: 01/04/23 11:45 PM   Result Value Ref Range    Color DARK YELLOW      Appearance CLOUDY      Specific gravity 1.015 1.005 - 1.030      pH (UA) 5.5 5.0 - 8.0      Protein 30 (A) NEG mg/dL    Glucose Negative NEG mg/dL    Ketone Negative NEG mg/dL    Bilirubin SMALL (A) NEG      Blood Negative NEG      Urobilinogen 1.0 0.2 - 1.0 EU/dL    Nitrites Negative NEG      Leukocyte Esterase MODERATE (A) NEG     URINE MICROSCOPIC ONLY    Collection Time: 01/04/23 11:45 PM   Result Value Ref Range    WBC 10 to 20 0 - 4 /hpf    RBC 0 to 1 0 - 5 /hpf    Epithelial cells FEW 0 - 5 /lpf    Bacteria 2+ (A) NEG /hpf    Mucus Negative NEG /lpf    Amorphous Crystals 2+ (A) NEG   RESPIRATORY VIRUS PANEL W/COVID-19, PCR    Collection Time: 01/04/23 11:49 PM    Specimen: Nasopharyngeal   Result Value Ref Range    Adenovirus Not detected NOTD      Coronavirus 229E Not detected NOTD      Coronavirus HKU1 Not detected NOTD      Coronavirus CVNL63 Not detected NOTD      Coronavirus OC43 Not detected NOTD      SARS-CoV-2, PCR Not detected NOTD      Metapneumovirus Not detected NOTD      Rhinovirus and Enterovirus Not detected NOTD      Influenza A Not detected NOTD      Influenza A, subtype H1 Not detected NOTD      Influenza A, subtype H3 Not detected NOTD      INFLUENZA A H1N1 PCR Not detected NOTD      Influenza B Not detected NOTD      Parainfluenza 1 Not detected NOTD      Parainfluenza 2 Not detected NOTD      Parainfluenza 3 Not detected NOTD      Parainfluenza virus 4 Not detected NOTD      RSV by PCR Not detected NOTD      B. parapertussis, PCR Not detected NOTD      Bordetella pertussis - PCR Not detected NOTD      Chlamydophila pneumoniae DNA, QL, PCR Not detected NOTD      Mycoplasma pneumoniae DNA, QL, PCR Not detected NOTD     EKG, 12 LEAD, INITIAL    Collection Time: 01/05/23 12:15 AM   Result Value Ref Range    Ventricular Rate 110 BPM    Atrial Rate 110 BPM    P-R Interval 150 ms    QRS Duration 90 ms    Q-T Interval 362 ms    QTC Calculation (Bezet) 489 ms    Calculated P Axis -25 degrees    Calculated R Axis 63 degrees    Calculated T Axis 40 degrees    Diagnosis       Sinus tachycardia  Otherwise normal ECG  When compared with ECG of 09-OCT-2022 18:21,  ST no longer depressed in Inferior leads  ST no longer depressed in Lateral leads  T wave inversion no longer evident in Inferior leads  Nonspecific T wave abnormality no longer evident in Lateral leads  Confirmed by Barrie Kerr ([phone]) on 1/5/2023 7:31:54 AM     BLOOD GAS, ARTERIAL POC    Collection Time: 01/05/23  1:49 AM   Result Value Ref Range    Device: NASAL CANNULA      FIO2 (POC) 24 %    pH (POC) 7.52 (H) 7.35 - 7.45      pCO2 (POC) 33.3 (L) 35.0 - 45.0 MMHG    pO2 (POC) 85 80 - 100 MMHG    HCO3 (POC) 27.2 (H) 22 - 26 MMOL/L    sO2 (POC) 97.5 (H) 92 - 97 %    Base excess (POC) 4.1 mmol/L    Set Rate 35 bpm    Allens test (POC) Positive      Total resp. rate 35      Site LEFT RADIAL      Specimen type (POC) ARTERIAL      Performed by Opal Borja    AMMONIA    Collection Time: 01/05/23  2:42 AM   Result Value Ref Range    Ammonia, plasma 14 11 - 32 UMOL/L   AMMONIA    Collection Time: 01/05/23  9:49 AM   Result Value Ref Range    Ammonia, plasma <10 (L) 11 - 32 UMOL/L   METABOLIC PANEL, COMPREHENSIVE    Collection Time: 01/05/23  9:49 AM   Result Value Ref Range    Sodium 161 (HH) 136 - 145 mmol/L    Potassium 3.2 (L) 3.5 - 5.5 mmol/L    Chloride 128 (H) 100 - 111 mmol/L    CO2 28 21 - 32 mmol/L    Anion gap 5 3.0 - 18 mmol/L    Glucose 158 (H) 74 - 99 mg/dL    BUN 80 (H) 7.0 - 18 MG/DL    Creatinine 1.27 0.6 - 1.3 MG/DL    BUN/Creatinine ratio 63 (H) 12 - 20      eGFR >60 >60 ml/min/1.73m2    Calcium 8.5 8.5 - 10.1 MG/DL    Bilirubin, total 0.8 0.2 - 1.0 MG/DL    ALT (SGPT) 130 (H) 16 - 61 U/L    AST (SGOT) 109 (H) 10 - 38 U/L    Alk.  phosphatase 107 45 - 117 U/L    Protein, total 6.6 6.4 - 8.2 g/dL    Albumin 1.5 (L) 3.4 - 5.0 g/dL    Globulin 5.1 (H) 2.0 - 4.0 g/dL    A-G Ratio 0.3 (L) 0.8 - 1.7     CBC WITH AUTOMATED DIFF    Collection Time: 01/05/23  9:49 AM   Result Value Ref Range    WBC 14.3 (H) 4.6 - 13.2 K/uL    RBC 2.71 (L) 4.35 - 5.65 M/uL    HGB 7.2 (L) 13.0 - 16.0 g/dL    HCT 24.1 (L) 36.0 - 48.0 %    MCV 88.9 78.0 - 100.0 FL    MCH 26.6 24.0 - 34.0 PG    MCHC 29.9 (L) 31.0 - 37.0 g/dL    RDW 16.8 (H) 11.6 - 14.5 %    PLATELET 592 943 - 080 K/uL    MPV 12.8 (H) 9.2 - 11.8 FL    NRBC 0.0 0  WBC    ABSOLUTE NRBC 0.00 0.00 - 0.01 K/uL    NEUTROPHILS 82 (H) 40 - 73 %    BAND NEUTROPHILS 12 %    LYMPHOCYTES 2 (L) 21 - 52 %    MONOCYTES 2 (L) 3 - 10 %    EOSINOPHILS 2 0 - 5 %    BASOPHILS 0 0 - 2 %    IMMATURE GRANULOCYTES 0 0.0 - 0.5 %    ABS. NEUTROPHILS 13.4 (H) 1.8 - 8.0 K/UL    ABS. LYMPHOCYTES 0.3 (L) 0.9 - 3.6 K/UL    ABS. MONOCYTES 0.3 0.05 - 1.2 K/UL    ABS. EOSINOPHILS 0.3 0.0 - 0.4 K/UL    ABS. BASOPHILS 0.0 0.0 - 0.1 K/UL    ABS. IMM. GRANS. 0.0 0.00 - 0.04 K/UL    DF MANUAL      PLATELET COMMENTS ADEQUATE PLATELETS      RBC COMMENTS ANISOCYTOSIS  1+        RBC COMMENTS POLYCHROMASIA  1+       POC LACTIC ACID    Collection Time: 01/05/23  9:54 AM   Result Value Ref Range    Lactic Acid (POC) 0.92 0.40 - 2.00 mmol/L           Recent Labs     01/05/23  0149   FIO2I 24   HCO3I 27.2*   PCO2I 33.3*   PHI 7.52*   PO2I 85       Telemetry:normal sinus rhythm    Imaging:  I have personally reviewed the patients radiographs and have reviewed the reports:    CXR [date]:   Streaky bibasilar opacities, right greater than left. Findings may represent atelectasis versus developing infiltrate    CT HEAD/CHEST/ABD/PELVIS [date]:  CT Chest abdomen pelvis remarkable for bilateral posterior basilar pneumonia, trace right pleural effusion, hiatial hernia with distal esophagitis, severe constipation, decubitus ulcer          Work done to facilitate the patient's plan of care by reviewing EMR, completing initial care plan and assess and initiate treatment, diagnostic work-up for attending MD to complete consultation. Total of   30  min critical care time spent at bedside during the course of care providing evaluation,management and care decisions and ordering appropriate treatment related to critical care problems exclusive of procedures.   The reason for providing this level of medical care for this critically ill patient was due a critical illness that impaired one or more vital organ systems such that there was a high probability of imminent or life threatening deterioration in the patients condition. This care involved high complexity decision making to assess, manipulate, and support vital system functions, to treat this degree vital organ system failure and to prevent further life threatening deterioration of the patients condition.         Angelica Joella Cushing, MD  24/77/77      Pulmonary Critical Care Medicine  [facility]

## 2023-01-05 NOTE — PROGRESS NOTES
[address] [facility] Pharmacokinetic Monitoring Service - Vancomycin     Jacob Pennington is a 79 y.o. male starting on vancomycin therapy for sepsis. Pharmacy consulted by ARSEN Coffey for monitoring and adjustment. Target Concentration: Dosing based on anticipated concentration <15 mg/L due to renal impairment/insufficiency    Additional Antimicrobials: pip-tazo, LVX    Pertinent Laboratory Values: Wt Readings from Last 1 Encounters:   01/04/23 75.3 kg (166 lb)     Temp Readings from Last 1 Encounters:   01/04/23 (!) 100.5 °F (38.1 °C)     No components found for: PROCAL  Estimated Creatinine Clearance: 59.7 mL/min (based on SCr of 1.2 mg/dL). Recent Labs     01/04/23  2212   WBC 11.8       Pertinent Cultures:  Culture Date Source Results   1/4 blood pending   MRSA Nasal Swab: N/A. Non-respiratory infection.     Plan:  Concentration-guided dosing due to renal impairment/insufficiency  Start vancomycin 1,750 mg x1  No level ordered at this time  Pharmacy will continue to monitor patient and adjust therapy as indicated    Thank you for the consult,  NATACHA Alvarado  1/4/2023 11:29 PM

## 2023-01-05 NOTE — ED NOTES
ED Course as of 01/11/23 0052   Thu Jan 05, 2023   4840  There are multiple attempts to reach the intensivist on-call, Dr. Darya Treviño the patient was discussed with this intensivist who recommended patient have repeat laboratory studies including CBC, CMP, as well as confirmation of the level fed currently. The patient results are to be called back to Dr. Darya Treviño when available. The patient likely will be signed out to the oncoming emergency provider to return the call to the intensivist. Clyde Fonseca 8:59 AM : Pt care transferred to Dr. Teresa Contreras  ,ED provider. History of patient complaint(s), available diagnostic reports and current treatment plan has been discussed thoroughly. Bedside rounding on patient occured : no . Intended disposition of patient : ICU  Pending diagnostics reports and/or labs (please list): Repeat labs of CBC and CMP as well as sure the Levaquin level and call  laboratory results. Dr. Livier Wood assistance in completion of this plan is greatly appreciated but it should be noted that I will be the provider of record for this patient. [CA]   1132 New labs Na 161 D/w Dr Darya Treviño intensivist, reviewed patient's fluid so far, it appears he has had 2100 cc of normal saline last night, followed by half-normal at 150 since then. Dr. Scott Mainland discussed with the intensivist, wanted repeat labs. After discussion with intensivist will add 1 L of normal saline. I saw and evaluated the patient and does not appear fluid overloaded. LR ordered. Dr. Darya Treviño will see and evaluate the patient [CB]   [phone] D/w Dr Lina Elizalde, for admission. Patient has received a liter bolus of LR and is now off of Levophed. Dr. Jacinto Gallego has actually talked to Dr. Darya Treviño from the ICU and is familiar with the patient from before, they will coordinate which unit will accept the patient.  [CB]      ED Course User Mikey Lopez MD  [CB] Filiberto Sprague MD

## 2023-01-05 NOTE — ED NOTES
Bedside report received from [facility]    Pt is asleep on bed, on o2 at 2lpm via NC; with bradley catheter connected to UB draining erica-colored urine. Noted with PEG tube dry and intact.

## 2023-01-06 ENCOUNTER — ANESTHESIA (OUTPATIENT)
Dept: SURGERY | Age: 68
DRG: 853 | End: 2023-01-06
Payer: COMMERCIAL

## 2023-01-06 ENCOUNTER — ANESTHESIA EVENT (OUTPATIENT)
Dept: SURGERY | Age: 68
DRG: 853 | End: 2023-01-06
Payer: COMMERCIAL

## 2023-01-06 PROBLEM — J18.9 COMMUNITY ACQUIRED PNEUMONIA OF RIGHT LOWER LOBE OF LUNG: Status: ACTIVE | Noted: 2023-01-06

## 2023-01-06 PROBLEM — L98.429 STAGE 4 SKIN ULCER OF SACRAL REGION (HCC): Status: ACTIVE | Noted: 2023-01-06

## 2023-01-06 PROBLEM — A41.9 SEPTIC SHOCK (HCC): Status: ACTIVE | Noted: 2023-01-06

## 2023-01-06 PROBLEM — R65.21 SEPTIC SHOCK (HCC): Status: ACTIVE | Noted: 2023-01-06

## 2023-01-06 LAB
ACC. NO. FROM MICRO ORDER, ACCP: ABNORMAL
ACINETOBACTER CALCOACETICUS-BAUMANII COMPLEX, ACBCX: NOT DETECTED
ANION GAP SERPL CALC-SCNC: 5 MMOL/L (ref 3–18)
BACTEROIDES FRAGILIS, BFRA: DETECTED
BASOPHILS # BLD: 0 K/UL (ref 0–0.1)
BASOPHILS NFR BLD: 0 % (ref 0–2)
BIOFIRE COMMENT, BCIDPF: ABNORMAL
BUN SERPL-MCNC: 63 MG/DL (ref 7–18)
BUN/CREAT SERPL: 68 (ref 12–20)
C GLABRATA DNA VAG QL NAA+PROBE: NOT DETECTED
CALCIUM SERPL-MCNC: 8.6 MG/DL (ref 8.5–10.1)
CANDIDA ALBICANS: NOT DETECTED
CANDIDA AURIS, CAAU: NOT DETECTED
CANDIDA KRUSEI, CKRP: NOT DETECTED
CANDIDA PARAPSILOSIS, CPAUP: NOT DETECTED
CANDIDA TROPICALIS, CTROP: NOT DETECTED
CHLORIDE SERPL-SCNC: 127 MMOL/L (ref 100–111)
CHLORIDE UR-SCNC: <10 MMOL/L (ref 55–125)
CO2 SERPL-SCNC: 27 MMOL/L (ref 21–32)
CREAT SERPL-MCNC: 0.93 MG/DL (ref 0.6–1.3)
CRYPTO NEOFORMANS/GATTII, CRYNEG: NOT DETECTED
CTX-M (ESBL RESISTANT GENE), CTX: NOT DETECTED
DIFFERENTIAL METHOD BLD: ABNORMAL
ENTEROBACTER CLOACAE COMPLEX, ECCP: NOT DETECTED
ENTEROBACTERALES SP. , ENBLS: DETECTED
ENTEROCOCCUS FAECALIS, ENFA: NOT DETECTED
ENTEROCOCCUS FAECIUM, ENFAM: NOT DETECTED
EOSINOPHIL # BLD: 0.5 K/UL (ref 0–0.4)
EOSINOPHIL NFR BLD: 4 % (ref 0–5)
ERYTHROCYTE [DISTWIDTH] IN BLOOD BY AUTOMATED COUNT: 17 % (ref 11.6–14.5)
ESCHERICHIA COLI: DETECTED
GLUCOSE BLD STRIP.AUTO-MCNC: 103 MG/DL (ref 70–110)
GLUCOSE BLD STRIP.AUTO-MCNC: 112 MG/DL (ref 70–110)
GLUCOSE BLD STRIP.AUTO-MCNC: 114 MG/DL (ref 70–110)
GLUCOSE SERPL-MCNC: 117 MG/DL (ref 74–99)
HAEMOPHILUS INFLUENZAE, HMI: NOT DETECTED
HCT VFR BLD AUTO: 25.2 % (ref 36–48)
HGB BLD-MCNC: 7.5 G/DL (ref 13–16)
IMM GRANULOCYTES # BLD AUTO: 0 K/UL (ref 0–0.04)
IMM GRANULOCYTES NFR BLD AUTO: 0 % (ref 0–0.5)
IMP (CARBAPENEMASE RESISTANT GENE), IMPC: NOT DETECTED
IRON SATN MFR SERPL: 35 % (ref 20–50)
IRON SERPL-MCNC: 35 UG/DL (ref 50–175)
KLEBSIELLA AEROGENES, KLAE: NOT DETECTED
KLEBSIELLA OXYTOCA: NOT DETECTED
KLEBSIELLA PNEUMONIAE GROUP, KPPG: NOT DETECTED
KPC (CARBAPENEM RESISTANCE GENE): NOT DETECTED
LISTERIA MONOCYTOGENES, LMONP: NOT DETECTED
LYMPHOCYTES # BLD: 0.6 K/UL (ref 0.9–3.6)
LYMPHOCYTES NFR BLD: 5 % (ref 21–52)
MAGNESIUM SERPL-MCNC: 2.7 MG/DL (ref 1.6–2.6)
MCH RBC QN AUTO: 26.7 PG (ref 24–34)
MCHC RBC AUTO-ENTMCNC: 29.8 G/DL (ref 31–37)
MCR-1 (COLISTIN RESISTANT GENE), MCR: NOT DETECTED
MCV RBC AUTO: 89.7 FL (ref 78–100)
METAMYELOCYTES NFR BLD MANUAL: 1 %
MONOCYTES # BLD: 0.1 K/UL (ref 0.05–1.2)
MONOCYTES NFR BLD: 1 % (ref 3–10)
NDM (CARBAPENEMASE RESISTANT GENE), NDM: NOT DETECTED
NEISSERIA MENINGITIDIS, NMNI: NOT DETECTED
NEUTS BAND NFR BLD MANUAL: 14 %
NEUTS SEG # BLD: 10.1 K/UL (ref 1.8–8)
NEUTS SEG NFR BLD: 75 % (ref 40–73)
NRBC # BLD: 0 K/UL (ref 0–0.01)
NRBC BLD-RTO: 0 PER 100 WBC
OXA-48-LIKE (CARBAPENEMASE RESISTANT GENE), OXA48: NOT DETECTED
PHOSPHATE SERPL-MCNC: 4.1 MG/DL (ref 2.5–4.9)
PLATELET # BLD AUTO: 238 K/UL (ref 135–420)
PLATELET COMMENTS,PCOM: ABNORMAL
PMV BLD AUTO: 12.7 FL (ref 9.2–11.8)
POTASSIUM SERPL-SCNC: 4.1 MMOL/L (ref 3.5–5.5)
POTASSIUM UR-SCNC: 44 MMOL/L (ref 12–62)
PROTEUS, PRP: DETECTED
PSEUDOMONAS AERUGINOSA: NOT DETECTED
RBC # BLD AUTO: 2.81 M/UL (ref 4.35–5.65)
RBC MORPH BLD: ABNORMAL
RBC MORPH BLD: ABNORMAL
RESISTANT GENE SPACE, REGENE: ABNORMAL
SALMONELLA, SALMO: NOT DETECTED
SERRATIA MARCESCENS: NOT DETECTED
SODIUM SERPL-SCNC: 159 MMOL/L (ref 136–145)
SODIUM UR-SCNC: <5 MMOL/L (ref 20–110)
STAPH EPIDERMIDIS, STEP: NOT DETECTED
STAPH LUGDUNENSIS, STALUG: NOT DETECTED
STAPHYLOCOCCUS AUREUS: NOT DETECTED
STAPHYLOCOCCUS, STAPP: NOT DETECTED
STENO MALTOPHILIA, STMA: NOT DETECTED
STREPTOCOCCUS , STPSP: NOT DETECTED
STREPTOCOCCUS AGALACTIAE (GROUP B): NOT DETECTED
STREPTOCOCCUS PNEUMONIAE , SPNP: NOT DETECTED
STREPTOCOCCUS PYOGENES (GROUP A), SPYOP: NOT DETECTED
TIBC SERPL-MCNC: 99 UG/DL (ref 250–450)
VANCOMYCIN SERPL-MCNC: 16.3 UG/ML (ref 5–40)
VIM (CARBAPENEMASE RESISTANT GENE), VIM: NOT DETECTED
WBC # BLD AUTO: 11.3 K/UL (ref 4.6–13.2)

## 2023-01-06 PROCEDURE — 74011250636 HC RX REV CODE- 250/636: Performed by: NURSE ANESTHETIST, CERTIFIED REGISTERED

## 2023-01-06 PROCEDURE — 84133 ASSAY OF URINE POTASSIUM: CPT

## 2023-01-06 PROCEDURE — 74011000258 HC RX REV CODE- 258: Performed by: PHYSICIAN ASSISTANT

## 2023-01-06 PROCEDURE — 83550 IRON BINDING TEST: CPT

## 2023-01-06 PROCEDURE — 99233 SBSQ HOSP IP/OBS HIGH 50: CPT | Performed by: INTERNAL MEDICINE

## 2023-01-06 PROCEDURE — 82962 GLUCOSE BLOOD TEST: CPT

## 2023-01-06 PROCEDURE — 83930 ASSAY OF BLOOD OSMOLALITY: CPT

## 2023-01-06 PROCEDURE — 74011250636 HC RX REV CODE- 250/636: Performed by: PHYSICIAN ASSISTANT

## 2023-01-06 PROCEDURE — 76010000149 HC OR TIME 1 TO 1.5 HR: Performed by: COLON & RECTAL SURGERY

## 2023-01-06 PROCEDURE — 0QB10ZZ EXCISION OF SACRUM, OPEN APPROACH: ICD-10-PCS | Performed by: COLON & RECTAL SURGERY

## 2023-01-06 PROCEDURE — 94762 N-INVAS EAR/PLS OXIMTRY CONT: CPT

## 2023-01-06 PROCEDURE — 76210000063 HC OR PH I REC FIRST 0.5 HR: Performed by: COLON & RECTAL SURGERY

## 2023-01-06 PROCEDURE — 65660000004 HC RM CVT STEPDOWN

## 2023-01-06 PROCEDURE — 77010033678 HC OXYGEN DAILY

## 2023-01-06 PROCEDURE — 76060000033 HC ANESTHESIA 1 TO 1.5 HR: Performed by: COLON & RECTAL SURGERY

## 2023-01-06 PROCEDURE — 84300 ASSAY OF URINE SODIUM: CPT

## 2023-01-06 PROCEDURE — 87077 CULTURE AEROBIC IDENTIFY: CPT

## 2023-01-06 PROCEDURE — 80202 ASSAY OF VANCOMYCIN: CPT

## 2023-01-06 PROCEDURE — 74011000250 HC RX REV CODE- 250

## 2023-01-06 PROCEDURE — 87186 SC STD MICRODIL/AGAR DIL: CPT

## 2023-01-06 PROCEDURE — 99223 1ST HOSP IP/OBS HIGH 75: CPT | Performed by: COLON & RECTAL SURGERY

## 2023-01-06 PROCEDURE — 83935 ASSAY OF URINE OSMOLALITY: CPT

## 2023-01-06 PROCEDURE — 87075 CULTR BACTERIA EXCEPT BLOOD: CPT

## 2023-01-06 PROCEDURE — 87185 SC STD ENZYME DETCJ PER NZM: CPT

## 2023-01-06 PROCEDURE — 85025 COMPLETE CBC W/AUTO DIFF WBC: CPT

## 2023-01-06 PROCEDURE — 77030008683 HC TU ET CUF COVD -A: Performed by: ANESTHESIOLOGY

## 2023-01-06 PROCEDURE — 87205 SMEAR GRAM STAIN: CPT

## 2023-01-06 PROCEDURE — 87324 CLOSTRIDIUM AG IA: CPT

## 2023-01-06 PROCEDURE — 74011250636 HC RX REV CODE- 250/636: Performed by: INTERNAL MEDICINE

## 2023-01-06 PROCEDURE — 84100 ASSAY OF PHOSPHORUS: CPT

## 2023-01-06 PROCEDURE — 74011250637 HC RX REV CODE- 250/637: Performed by: INTERNAL MEDICINE

## 2023-01-06 PROCEDURE — 74011000250 HC RX REV CODE- 250: Performed by: INTERNAL MEDICINE

## 2023-01-06 PROCEDURE — 77030018836 HC SOL IRR NACL ICUM -A: Performed by: COLON & RECTAL SURGERY

## 2023-01-06 PROCEDURE — 83735 ASSAY OF MAGNESIUM: CPT

## 2023-01-06 PROCEDURE — 74011000250 HC RX REV CODE- 250: Performed by: NURSE ANESTHETIST, CERTIFIED REGISTERED

## 2023-01-06 PROCEDURE — 2709999900 HC NON-CHARGEABLE SUPPLY: Performed by: COLON & RECTAL SURGERY

## 2023-01-06 PROCEDURE — 88304 TISSUE EXAM BY PATHOLOGIST: CPT

## 2023-01-06 PROCEDURE — 87076 CULTURE ANAEROBE IDENT EACH: CPT

## 2023-01-06 PROCEDURE — 82436 ASSAY OF URINE CHLORIDE: CPT

## 2023-01-06 PROCEDURE — 80048 BASIC METABOLIC PNL TOTAL CA: CPT

## 2023-01-06 RX ORDER — SODIUM CHLORIDE, SODIUM LACTATE, POTASSIUM CHLORIDE, CALCIUM CHLORIDE 600; 310; 30; 20 MG/100ML; MG/100ML; MG/100ML; MG/100ML
25 INJECTION, SOLUTION INTRAVENOUS CONTINUOUS
Status: DISCONTINUED | OUTPATIENT
Start: 2023-01-06 | End: 2023-01-06 | Stop reason: HOSPADM

## 2023-01-06 RX ORDER — HYDROMORPHONE HYDROCHLORIDE 1 MG/ML
0.5 INJECTION, SOLUTION INTRAMUSCULAR; INTRAVENOUS; SUBCUTANEOUS
Status: DISCONTINUED | OUTPATIENT
Start: 2023-01-06 | End: 2023-01-06 | Stop reason: HOSPADM

## 2023-01-06 RX ORDER — SODIUM CHLORIDE, SODIUM LACTATE, POTASSIUM CHLORIDE, CALCIUM CHLORIDE 600; 310; 30; 20 MG/100ML; MG/100ML; MG/100ML; MG/100ML
INJECTION, SOLUTION INTRAVENOUS
Status: DISCONTINUED | OUTPATIENT
Start: 2023-01-06 | End: 2023-01-06 | Stop reason: HOSPADM

## 2023-01-06 RX ORDER — SODIUM CHLORIDE 0.9 % (FLUSH) 0.9 %
5-40 SYRINGE (ML) INJECTION AS NEEDED
Status: DISCONTINUED | OUTPATIENT
Start: 2023-01-06 | End: 2023-01-06 | Stop reason: HOSPADM

## 2023-01-06 RX ORDER — ONDANSETRON 2 MG/ML
4 INJECTION INTRAMUSCULAR; INTRAVENOUS ONCE
Status: DISCONTINUED | OUTPATIENT
Start: 2023-01-06 | End: 2023-01-06 | Stop reason: HOSPADM

## 2023-01-06 RX ORDER — SUCCINYLCHOLINE CHLORIDE 20 MG/ML
INJECTION INTRAMUSCULAR; INTRAVENOUS AS NEEDED
Status: DISCONTINUED | OUTPATIENT
Start: 2023-01-06 | End: 2023-01-06 | Stop reason: HOSPADM

## 2023-01-06 RX ORDER — POLYETHYLENE GLYCOL 3350 17 G/17G
17 POWDER, FOR SOLUTION ORAL DAILY
Status: DISCONTINUED | OUTPATIENT
Start: 2023-01-07 | End: 2023-01-06

## 2023-01-06 RX ORDER — POLYETHYLENE GLYCOL 3350 17 G/17G
17 POWDER, FOR SOLUTION ORAL DAILY
Status: DISCONTINUED | OUTPATIENT
Start: 2023-01-07 | End: 2023-01-07

## 2023-01-06 RX ORDER — PHENYLEPHRINE HCL IN 0.9% NACL 1 MG/10 ML
SYRINGE (ML) INTRAVENOUS AS NEEDED
Status: DISCONTINUED | OUTPATIENT
Start: 2023-01-06 | End: 2023-01-06 | Stop reason: HOSPADM

## 2023-01-06 RX ORDER — LIDOCAINE HYDROCHLORIDE 20 MG/ML
INJECTION, SOLUTION EPIDURAL; INFILTRATION; INTRACAUDAL; PERINEURAL AS NEEDED
Status: DISCONTINUED | OUTPATIENT
Start: 2023-01-06 | End: 2023-01-06 | Stop reason: HOSPADM

## 2023-01-06 RX ORDER — FENTANYL CITRATE 50 UG/ML
INJECTION, SOLUTION INTRAMUSCULAR; INTRAVENOUS AS NEEDED
Status: DISCONTINUED | OUTPATIENT
Start: 2023-01-06 | End: 2023-01-06 | Stop reason: HOSPADM

## 2023-01-06 RX ORDER — ONDANSETRON 2 MG/ML
INJECTION INTRAMUSCULAR; INTRAVENOUS AS NEEDED
Status: DISCONTINUED | OUTPATIENT
Start: 2023-01-06 | End: 2023-01-06 | Stop reason: HOSPADM

## 2023-01-06 RX ORDER — SODIUM CHLORIDE 0.9 % (FLUSH) 0.9 %
5-40 SYRINGE (ML) INJECTION EVERY 8 HOURS
Status: DISCONTINUED | OUTPATIENT
Start: 2023-01-06 | End: 2023-01-06 | Stop reason: HOSPADM

## 2023-01-06 RX ORDER — FENTANYL CITRATE 50 UG/ML
25 INJECTION, SOLUTION INTRAMUSCULAR; INTRAVENOUS AS NEEDED
Status: DISCONTINUED | OUTPATIENT
Start: 2023-01-06 | End: 2023-01-06 | Stop reason: HOSPADM

## 2023-01-06 RX ORDER — PROPOFOL 10 MG/ML
INJECTION, EMULSION INTRAVENOUS AS NEEDED
Status: DISCONTINUED | OUTPATIENT
Start: 2023-01-06 | End: 2023-01-06 | Stop reason: HOSPADM

## 2023-01-06 RX ORDER — LANOLIN ALCOHOL/MO/W.PET/CERES
500 CREAM (GRAM) TOPICAL DAILY
Status: DISCONTINUED | OUTPATIENT
Start: 2023-01-07 | End: 2023-01-15 | Stop reason: HOSPADM

## 2023-01-06 RX ORDER — SODIUM CHLORIDE 9 MG/ML
INJECTION, SOLUTION INTRAVENOUS
Status: DISCONTINUED | OUTPATIENT
Start: 2023-01-06 | End: 2023-01-06

## 2023-01-06 RX ADMIN — SODIUM CHLORIDE, PRESERVATIVE FREE 10 ML: 5 INJECTION INTRAVENOUS at 06:00

## 2023-01-06 RX ADMIN — FENTANYL CITRATE 25 MCG: 50 INJECTION, SOLUTION INTRAMUSCULAR; INTRAVENOUS at 20:23

## 2023-01-06 RX ADMIN — LIDOCAINE HYDROCHLORIDE 50 MG: 20 INJECTION, SOLUTION EPIDURAL; INFILTRATION; INTRACAUDAL; PERINEURAL at 20:23

## 2023-01-06 RX ADMIN — Medication 100 MCG: at 20:58

## 2023-01-06 RX ADMIN — SODIUM CHLORIDE, PRESERVATIVE FREE 10 ML: 5 INJECTION INTRAVENOUS at 23:00

## 2023-01-06 RX ADMIN — PROPOFOL 120 MG: 10 INJECTION, EMULSION INTRAVENOUS at 20:23

## 2023-01-06 RX ADMIN — Medication 100 MCG: at 20:37

## 2023-01-06 RX ADMIN — PIPERACILLIN SODIUM AND TAZOBACTAM SODIUM 3.38 G: 3; .375 INJECTION, POWDER, LYOPHILIZED, FOR SOLUTION INTRAVENOUS at 15:01

## 2023-01-06 RX ADMIN — DEXTROSE MONOHYDRATE 75 ML/HR: 5 INJECTION, SOLUTION INTRAVENOUS at 07:54

## 2023-01-06 RX ADMIN — Medication 100 MCG: at 20:40

## 2023-01-06 RX ADMIN — Medication 100 MCG: at 21:04

## 2023-01-06 RX ADMIN — FENTANYL CITRATE 25 MCG: 50 INJECTION, SOLUTION INTRAMUSCULAR; INTRAVENOUS at 20:42

## 2023-01-06 RX ADMIN — IPRATROPIUM BROMIDE AND ALBUTEROL SULFATE 3 ML: .5; 3 SOLUTION RESPIRATORY (INHALATION) at 09:10

## 2023-01-06 RX ADMIN — FENTANYL CITRATE 25 MCG: 50 INJECTION, SOLUTION INTRAMUSCULAR; INTRAVENOUS at 20:56

## 2023-01-06 RX ADMIN — LEVETIRACETAM 750 MG: 100 SOLUTION ORAL at 19:10

## 2023-01-06 RX ADMIN — SODIUM CHLORIDE, PRESERVATIVE FREE 30 ML: 5 INJECTION INTRAVENOUS at 14:00

## 2023-01-06 RX ADMIN — ONDANSETRON 4 MG: 2 INJECTION INTRAMUSCULAR; INTRAVENOUS at 21:24

## 2023-01-06 RX ADMIN — VANCOMYCIN HYDROCHLORIDE 1000 MG: 1 INJECTION, POWDER, LYOPHILIZED, FOR SOLUTION INTRAVENOUS at 09:19

## 2023-01-06 RX ADMIN — FENTANYL CITRATE 25 MCG: 50 INJECTION, SOLUTION INTRAMUSCULAR; INTRAVENOUS at 21:06

## 2023-01-06 RX ADMIN — Medication 100 MCG: at 20:45

## 2023-01-06 RX ADMIN — SODIUM CHLORIDE, SODIUM LACTATE, POTASSIUM CHLORIDE, AND CALCIUM CHLORIDE: 600; 310; 30; 20 INJECTION, SOLUTION INTRAVENOUS at 20:11

## 2023-01-06 RX ADMIN — Medication 200 MCG: at 20:51

## 2023-01-06 RX ADMIN — SUCCINYLCHOLINE CHLORIDE 80 MG: 20 INJECTION, SOLUTION INTRAMUSCULAR; INTRAVENOUS at 20:23

## 2023-01-06 RX ADMIN — IPRATROPIUM BROMIDE AND ALBUTEROL SULFATE 3 ML: .5; 3 SOLUTION RESPIRATORY (INHALATION) at 15:02

## 2023-01-06 NOTE — PROGRESS NOTES
Hospitalist Progress Note    NAME:  Marcellus Abarca   :   1955   MRN:   [id number]     Date/Time:  2023  Subjective:   Chief Complaint:   Pt remain confused; BP is better; Non verbal     23;   B/c 2/2 with   Culture result:   PROBABLE BACTEROIDES FRAGILIS GROWING IN BOTH BOTTLES DRAWN No Site Indicated Abnormal  P  [address]   Culture result:   PROBABLE ESCHERICHIA COLI GROWING IN BOTH BOTTLES DRAWN No Site Indicated Abnormal  P  [address]   Culture result:   PROBABLE PROTEUS SPECIES GROWING IN BOTH BOTTLES DRAWN No Site Indicated Abnormal  P     GRAM POSITIVE COCCI IN CLUSTERS GROWING IN THE SAME ABOVE BOTTLE Abnormal  P     CT ;     IMPRESSION  1. Bilateral posterior basilar pneumonia, slightly less severe than before. Trace right pleural effusion. 2. Hiatal hernia/distal esophagitis. 3. Severe constipation. Fecal impaction in the rectum with distended rectum,  large fecal ball. 4. Decubitus ulcer with air approaching the posterior wall of anus/rectum and  posterior cortex of the sacrum and coccyx. No abscess. [x]  Unable to obtain  ROS due to  [x]  mental status change  []  sedated   []  intubated     Past Med History and Social history reviewed. No changes. [x]  Current Medication list and allergies reviewed*    Objective:   Vitals:  Visit Vitals  /63   Pulse 99   Temp 97.7 °F (36.5 °C)   Resp 19   Ht 5' 9\" (1.753 m)   Wt 75.3 kg (166 lb)   SpO2 100%   BMI 24.51 kg/m²     Temp (24hrs), Av.5 °F (36.4 °C), Min:97.3 °F (36.3 °C), Max:97.7 °F (36.5 °C)      Last 24hr Input/Output:    Intake/Output Summary (Last 24 hours) at 2023 1600  Last data filed at 2023 0340  Gross per 24 hour   Intake 1106.8 ml   Output 700 ml   Net 406.8 ml        PHYSICAL EXAM:     General appearance -awake, nonverbal, in mild acute distress  Eyes - sclera anicteric, no pallor  Nose - no obvious nasal discharge.    Neck - supple, no JVD, trachea is midline  Chest -diminished air entry noted in bases, no wheezes  Heart - S1 and S2 normal, no murmurs appreciated currently  Abdomen - soft, nontender, nondistended, PEG tube is in situ, bowel sounds present  Neurological -awake, nonverbal, does not follow any meaningful commands, no tremors noted  Musculoskeletal - no joint tenderness or erythema of knees bilaterally  Extremities - no pedal edema noted, no cyanosis  Psych -no agitation or hallucination    Skin; stage 4 sacral ulcer;       Large necrotic decubitus ulcer with evidence of purulent exudate            Lab Data Reviewed:  No components found for: [place]  Recent Labs     01/06/23  0300 01/05/23  1830 01/05/23  1415 01/05/23  0949 01/04/23  2333 01/04/23  2212   *  --  161* 161*   < > 162*   K 4.1  --  3.0* 3.2*   < > 3.6   *  --  128* 128*   < > 130*   CO2 27  --  30 28   < > 26   BUN 63*  --  75* 80*   < > 74*   CREA 0.93  --  1.23 1.27   < > 1.20   *  --  160* 158*   < > 138*   PHOS 4.1  --  2.1*  --   --   --    CA 8.6  --  9.0 8.5   < > 9.2   ALB  --   --   --  1.5*  --  1.6*   WBC 11.3 10.1  --  14.3*  --  11.8   HGB 7.5* 7.1*  --  7.2*  --  7.8*   HCT 25.2* 24.0*  --  24.1*  --  25.7*    201  --  230  --  242    < > = values in this interval not displayed. []    I have personally reviewed the   []  xray  []  CT scan    Assessment/Plan:     Principal Problem:    Septic shock ([facility] [address] (1/6/2023)    Active Problems:    Hyperosmolality and hypernatremia (3/30/2021)      Acute metabolic encephalopathy (6/46/4762)      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[facility] [address] (74/1/3779)      Hypernatremia (11/25/2022)      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 ([facility] [address] (1/6/2023)      Community acquired pneumonia of right lower lobe of lung (1/6/2023)       ___________________________________________________  PLAN:  Risk of deterioration:  []  Low    []  Moderate  [x]  High    Id consulted;;  Continue IV abx; with V/Z and levaquin;   Await final B/c results;  Surgery consulted; NPO for now  OR possibly tomorrow am vs this evening for ID of the Necrotic sacral ulcer;    Continue D5W drip for hypernatremia; now down to 159;    DNR/DNI  ___________________________________________________    Total time spent with patient:     minutes    []  Critical Care time:      minutes    Care Plan discussed with:    []  Patient   []  Family    []  Care Manager  [x]  Nursing   [x]  Consultant/Specialist :      []    >50% of visit spent in counseling and coordination of care   (Discussed []  CODE status,  []  Care Plan, []  D/C Planning)    Prophylaxis:  []  Lovenox  []  Coumadin  [x]  Hep SQ  []  SCDs  []  H2B/PPI    Disposition:  []  Home w/ Family   []   PT,OT,RN   [x]  SNF/LTC   []  SAH/Rehab  ___________________________________________________    Hospitalist: Trenton Betts MD     ___________________________________________________    *Medications reviewed:  Current Facility-Administered Medications   Medication Dose Route Frequency    vancomycin (VANCOCIN) 1,000 mg in 0.9% sodium chloride 250 mL (VIAL-MATE)  1,000 mg IntraVENous Q24H    [START ON 1/7/2023] cyanocobalamin tablet 500 mcg  500 mcg Per G Tube DAILY    levETIRAcetam (KEPPRA) oral solution 750 mg  750 mg Oral BID    [START ON 1/7/2023] polyethylene glycol (MIRALAX) packet 17 g  17 g Per G Tube DAILY    sodium chloride (NS) flush 5-40 mL  5-40 mL IntraVENous Q8H    sodium chloride (NS) flush 5-40 mL  5-40 mL IntraVENous PRN    acetaminophen (TYLENOL) tablet 650 mg  650 mg Oral Q6H PRN    Or    acetaminophen (TYLENOL) suppository 650 mg  650 mg Rectal Q6H PRN    ondansetron (ZOFRAN ODT) tablet 4 mg  4 mg Oral Q8H PRN    Or    ondansetron (ZOFRAN) injection 4 mg  4 mg IntraVENous Q6H PRN    [Held by provider] heparin (porcine) injection 5,000 Units  5,000 Units SubCUTAneous Q8H    insulin lispro (HUMALOG) injection   SubCUTAneous Q6H    glucose chewable tablet 16 g  4 Tablet Oral PRN    glucagon (GLUCAGEN) injection 1 mg  1 mg IntraMUSCular PRN    dextrose 10% infusion 0-250 mL  0-250 mL IntraVENous PRN    dextrose 5% infusion  75 mL/hr IntraVENous CONTINUOUS    midodrine (PROAMATINE) tablet 5 mg  5 mg Per G Tube Q8H PRN    albuterol-ipratropium (DUO-NEB) 2.5 MG-0.5 MG/3 ML  3 mL Nebulization Q6H RT    sodium chloride (NS) flush 5-10 mL  5-10 mL IntraVENous PRN    piperacillin-tazobactam (ZOSYN) 3.375 g in 0.9% sodium chloride (MBP/ADV) 100 mL MBP  3.375 g IntraVENous Q8H    levoFLOXacin (LEVAQUIN) 750 mg in D5W IVPB  750 mg IntraVENous Q24H     Current Outpatient Medications   Medication Sig    apixaban (ELIQUIS) 5 mg tablet Take 1 Tablet by mouth every twelve (12) hours. baclofen (LIORESAL) 10 mg tablet 10 mg by Per G Tube route two (2) times daily as needed for Muscle Spasm(s). 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 Indications: prevention of vitamin B12 deficiency    ferrous sulfate (FerrouSuL) 325 mg (65 mg iron) tablet Take  by mouth Daily (before breakfast). metoprolol tartrate (LOPRESSOR) 25 mg tablet 25 mg by Per G Tube route two (2) times a day. thiamine HCL (B-1) 100 mg tablet 1 Tablet by Per G Tube route daily. levETIRAcetam (KEPPRA) 100 mg/mL solution 7.5 mL by Per G Tube route two (2) times a day. pravastatin (PRAVACHOL) 40 mg tablet Take 1 Tab by mouth nightly. ascorbic acid, vitamin C, (VITAMIN C) 250 mg tablet Take 1 Tab by mouth daily. polyethylene glycol (MIRALAX) 17 gram packet Take 1 Packet by mouth daily. tamsulosin (FLOMAX) 0.4 mg capsule Take 0.4 mg by mouth daily. 1 tab daily    MULTIVITAMIN,THERAPEUTIC (THERA MULTI-VITAMIN PO) Take 500 mg by mouth daily. folic acid (FOLVITE) 1 mg tablet Take 1 Tab by mouth daily.

## 2023-01-06 NOTE — CONSULTS
HPI: Shay Blanchard is a 79 y.o. male presenting with chief complain of sepsis. Pt has large decubitus ulcer with necrosis. Consulted because of findings on CT.      Past Medical History:   Diagnosis Date    Alcohol dependence with alcohol-induced persisting dementia ([address]     per [facility]    Anemia     Aphasia     Benign prostatic hyperplasia     Cerebral vascular disease     COVID-19 2021    Disturbances of salivary secretion     Dysphagia     GERD (gastroesophageal reflux disease)     Hemiparesis ([facility])     left side    Hemiplegia ([facility])     left side     Hiccups     Hyperlipidemia     Hypernatremia 2021    Hypertension     Hyponatremia     Insomnia     Lacunar infarction ([address] 2010    weakness both arms, unable to walk    Lower extremity edema     Moderate protein-calorie malnutrition ([facility])     MRSA (methicillin resistant Staphylococcus aureus)     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 ([facility])     Pneumonia     Psychogenic polydipsia     PVD (peripheral vascular disease) ([facility])     Seizures ([address]     Spinal stenosis     Stroke ([facility] [address] 2017    Unspecified convulsions ([address]     2021 [facility]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       Past Surgical History:   Procedure Laterality Date    COLONOSCOPY N/A 2021    COLONOSCOPY performed by Valentina Borjas MD at [facility]COPY    HX HEENT      pt reports past hx of surgery on ears unsure of what type    HX OTHER SURGICAL Bilateral     debridement of lower extremities       Family History   Problem Relation Age of Onset    Hypertension Other        Social History     Socioeconomic History    Marital status:    Tobacco Use    Smoking status: Former     Packs/day: 0.50     Types: Cigarettes     Quit date: 2008     Years since quittin.5    Smokeless tobacco: Never   Substance and Sexual Activity    Alcohol use: No    Drug use: No    Sexual activity: Not Currently       Review of Systems - unable to obtain    [unfilled]    No Known Allergies    Vitals:    01/06/23 1239 01/06/23 1254 01/06/23 1309 01/06/23 1324   BP:  107/63     Pulse: 97 99 97 99   Resp: 18 19 20 19   Temp:       SpO2: 100%   100%   Weight:       Height:         Exam:    VADIM reduced tone, no mass or impacted stool, loose stool  Large necrotic decubitus ulcer with evidence of purulent exudate    CMP:   Lab Results   Component Value Date/Time     (H) 01/06/2023 03:00 AM    K 4.1 01/06/2023 03:00 AM     (H) 01/06/2023 03:00 AM    CO2 27 01/06/2023 03:00 AM    AGAP 5 01/06/2023 03:00 AM     (H) 01/06/2023 03:00 AM    BUN 63 (H) 01/06/2023 03:00 AM    CREA 0.93 01/06/2023 03:00 AM    CA 8.6 01/06/2023 03:00 AM    MG 2.7 (H) 01/06/2023 03:00 AM    PHOS 4.1 01/06/2023 03:00 AM     CBC:   Lab Results   Component Value Date/Time    WBC 11.3 01/06/2023 03:00 AM    HGB 7.5 (L) 01/06/2023 03:00 AM    HCT 25.2 (L) 01/06/2023 03:00 AM     01/06/2023 03:00 AM     CT personally visualized; air tracking in sacral region beneath ulcer    Assessment / Plan    Large necrotic sacral decubitus ulcer in setting of sepsis, severe hypernatremia  Ulcer should be drained and debrided  Pt is DNR, will need to discuss with son if he would like surgical measures as pt does not appear consentable currently  Continue abx  NPO for now  OR possibly tomorrow am vs this evening      The diagnoses and plan were discussed with the patient. All questions answered. Plan of care agreed to by all concerned.

## 2023-01-06 NOTE — PROGRESS NOTES
OT order received and chart reviewed. Per chart review, pt LTC resident of [facility] and requires total care for ADLs. Due to pt at baseline functional status, patient does not require acute OT services. Will recommend pt return to previous level of care.          Thank you,  Antonio Gregory MS, OTR/L

## 2023-01-06 NOTE — PERIOP NOTES
TRANSFER - IN REPORT:    Verbal report received from [address] on [facility]  being received from ED for ordered procedure      Report consisted of patients Situation, Background, Assessment and   Recommendations(SBAR). Information from the following report(s) SBAR was reviewed with the receiving nurse. Opportunity for questions and clarification was provided. Assessment to be completed upon patients arrival to unit and care assumed. Report given to PACU RN.

## 2023-01-06 NOTE — ED NOTES
Pt resting in bed, appear to be in no distress. Breathing is equal bilaterally. Will continue to monitor.

## 2023-01-06 NOTE — ED NOTES
Pt had loose bowel movement, cleaned and new brief and chucks pad placed under pt. Pt was lying on back, repositioned and placed pillow on pt left side underneath hip.

## 2023-01-06 NOTE — PROGRESS NOTES
[address] [facility] Pharmacokinetic Monitoring Service - Vancomycin    Consulting Provider: Alba Perdue   Indication: Sepsis of Unknown Etiology  Target Concentration: Goal AUC/ALFREDITO 400-600 mg*hr/L  Day of Therapy: 3  Additional Antimicrobials: Piperacillin/Tazobactam    Pertinent Laboratory Values:   Temp: 97.3 °F (36.3 °C)  Weight: 75.3 kg (166 lb)  Recent Labs     01/06/23  0300 01/05/23  1830 01/05/23  1415   CREA 0.93  --  1.23   BUN 63*  --  75*   WBC 11.3 10.1  --        Estimated Creatinine Clearance: 77.1 mL/min (based on SCr of 0.93 mg/dL). Pertinent Cultures:  Culture Date Source Results   1/4 blood Bacteroides fragilis   E.coli  Proteus   MRSA Nasal Swab: N/A.  Non-respiratory infection    Assessment:  Date/Time Current Dose Concentration Timing of Concentration (h) AUC   1/4 1750mg 16.3 31h -   1/5 -      1/6 1gm q24h      Note: Serum concentrations collected for AUC dosing may appear elevated if collected in close proximity to the dose administered, this is not necessarily an indication of toxicity    Plan:  Begin 1000mg q24h , projected AUCss/T = 483/12.8  Renal labs as indicated   Vancomycin concentration ordered for  1/8 @ 0400  Pharmacy will continue to monitor patient and adjust therapy as indicated    Thank you for the consult,  Bharati Daigle, [facility]  1/6/2023

## 2023-01-06 NOTE — CONSULTS
Infectious Disease Consultation Note        Reason: Sepsis    Current abx Prior abx   Zosyn, levofloxacin, vancomycin since 1/4/2023      Lines:       Assessment :   79 y.o. male with hx of dementia, COVID, HLD, HTN, chronic hyponatremia, CVA, seizures who presented to ED on 1/4/2023 for altered mental status. Hospitalization [facility] July 2017 for right foot cellulitis, infected right foot ulcer secondary to Proteus, citrobacter, e.coli, mssa, e.fecalis, e.avium, strep viridans. Surgical wound cultures 7/11 : proteus,citrobacter   S/p I&D of right foot ulcer on 7/11- intra op findings noted - no evidence of bone infection. Hospitalization [facility] October 2022 for sepsis, acute hypoxic respiratory failure-present on admission due to aspiration pneumonia superimposed on recent COVID-19 infection     Now with leukocytosis, bandemia, hypotension requiring pressors on admission, CT scan 1/5/2023 with air noted in the posterior wall of anus/rectum, posterior cortex of the sacrum/coccyx, bacteremia    Clinical presentation consistent with septic shock-present on admission due to polymicrobial bloodstream infection -( blood cx 1/4/23 positive for e.coli, proteus, bacteroides, gram positive cocci), necrotizing infection of the sacrum/acute on chronic sacral osteomyelitis    Most likely source of polymicrobial bloodstream infection is necrotizing infection of sacral ulcer    Fecal impaction-bowel movement noted today    Acute kidney injury-likely due to sepsis    Recommendations:     1. Continue Zosyn, levofloxacin, vancomycin  2. Obtain surgery consult for I&D sacral ulcer  3. Obtain wound culture sacral ulcer-I obtained specimen and gave it to RN  4. Obtain stool C. Difficile  5. Management of acute kidney injury per primary team  6. Increase MiraLAX to 17 g twice daily. Start Colace  7.   Recommend palliative care consult to address goals of care      Thank you for consultation request. Above plan was discussed in details with RN, dr. Deyvi Shelton and dr Lisandra Solis. Please call me if any further questions or concerns. Will continue to participate in the care of this patient. HPI:     79 y.o. male with hx of dementia, COVID, HLD, HTN, chronic hyponatremia, CVA, seizures who presented to ED on 1/4/2023 for altered mental status. Patient was febrile and tachycardic on initial assessment with EMS. Patient is a resident of [address] [place] [address],[address]. In the ED at [facility], labs were remarkable for severe hypernatremia Na 162, hypokalemia K 3.2, WBC 14.3, Lactic acid 2.02. Patient was febrile 100.5*F, tachycardic , and hypotensive BP 80/50 mmHg, and tachypneic with RR 41 with O2 sat 92% on room air. Patient was in septic shock and received IVF resuscitation with 2100 cc NS followed by 1/2NS at 150, Levophed. Patient was initiated on broad-spectrum antibiotics. Patient had hemodynamic improvement yesterday evening and was transferred to stepdown status. Patient remains in the emergency room. I have been consulted for further management since source of sepsis not entirely clear. Patient mumbles some words. Unable to communicate effectively.   Detailed review of system not feasible    Past Medical History:   Diagnosis Date    Alcohol dependence with alcohol-induced persisting dementia ([facility] [address]     per [facility] 4/23/21    Anemia     Aphasia     Benign prostatic hyperplasia     Cerebral vascular disease     COVID-19 04/06/2021    Disturbances of salivary secretion     Dysphagia     GERD (gastroesophageal reflux disease)     Hemiparesis (HCC)     left side    Hemiplegia (HCC)     left side     Hiccups     Hyperlipidemia     Hypernatremia 03/2021    Hypertension     Hyponatremia     Insomnia     Lacunar infarction ([address] 2010    weakness both arms, unable to walk    Lower extremity edema     Moderate protein-calorie malnutrition (HCC)     MRSA (methicillin resistant Staphylococcus aureus)     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 ([facility] [address]     Pneumonia     Psychogenic polydipsia     PVD (peripheral vascular disease) ([facility] [address]     Seizures ([facility] [address]     Spinal stenosis     Stroke ([facility] [address] 2017    Unspecified convulsions ([facility] [address]     4/23/2021 [facility] Care nurse       Past Surgical History:   Procedure Laterality Date    COLONOSCOPY N/A 4/29/2021    COLONOSCOPY performed by Petra Mary MD at [address] [facility]      pt reports past hx of surgery on ears unsure of what type    HX OTHER SURGICAL Bilateral     debridement of lower extremities       Patient's Medications   Start Taking    No medications on file   Continue Taking    APIXABAN (ELIQUIS) 5 MG TABLET    Take 1 Tablet by mouth every twelve (12) hours. ASCORBIC ACID, VITAMIN C, (VITAMIN C) 250 MG TABLET    Take 1 Tab by mouth daily. BACLOFEN (LIORESAL) 10 MG TABLET    10 mg by Per G Tube route two (2) times daily as needed for Muscle Spasm(s). Indications: muscle spasms caused by a spinal disease    CYANOCOBALAMIN (VITAMIN B12) 500 MCG TABLET    500 mcg by Per G Tube route daily. Indications: prevention of vitamin B12 deficiency    FERROUS SULFATE (FERROUSUL) 325 MG (65 MG IRON) TABLET    Take  by mouth Daily (before breakfast). FOLIC ACID (FOLVITE) 1 MG TABLET    Take 1 Tab by mouth daily. LEVETIRACETAM (KEPPRA) 100 MG/ML SOLUTION    7.5 mL by Per G Tube route two (2) times a day. METOPROLOL TARTRATE (LOPRESSOR) 25 MG TABLET    25 mg by Per G Tube route two (2) times a day. MULTIVITAMIN,THERAPEUTIC (THERA MULTI-VITAMIN PO)    Take 500 mg by mouth daily. POLYETHYLENE GLYCOL (MIRALAX) 17 GRAM PACKET    Take 1 Packet by mouth daily. PRAVASTATIN (PRAVACHOL) 40 MG TABLET    Take 1 Tab by mouth nightly. TAMSULOSIN (FLOMAX) 0.4 MG CAPSULE    Take 0.4 mg by mouth daily. 1 tab daily    THIAMINE HCL (B-1) 100 MG TABLET    1 Tablet by Per G Tube route daily.    These Medications have changed    No medications on file   Stop Taking    No medications on file       Current Facility-Administered Medications   Medication Dose Route Frequency    vancomycin (VANCOCIN) 1,000 mg in 0.9% sodium chloride 250 mL (VIAL-MATE)  1,000 mg IntraVENous Q24H    [START ON 1/7/2023] cyanocobalamin tablet 500 mcg  500 mcg Per G Tube DAILY    levETIRAcetam (KEPPRA) oral solution 750 mg  750 mg Oral BID    [START ON 1/7/2023] polyethylene glycol (MIRALAX) packet 17 g  17 g Per G Tube DAILY    [START ON 1/7/2023] pantoprazole (PROTONIX) 40 mg in 0.9% sodium chloride 10 mL injection  40 mg IntraVENous DAILY    sodium chloride (NS) flush 5-40 mL  5-40 mL IntraVENous Q8H    sodium chloride (NS) flush 5-40 mL  5-40 mL IntraVENous PRN    acetaminophen (TYLENOL) tablet 650 mg  650 mg Oral Q6H PRN    Or    acetaminophen (TYLENOL) suppository 650 mg  650 mg Rectal Q6H PRN    ondansetron (ZOFRAN ODT) tablet 4 mg  4 mg Oral Q8H PRN    Or    ondansetron (ZOFRAN) injection 4 mg  4 mg IntraVENous Q6H PRN    [Held by provider] heparin (porcine) injection 5,000 Units  5,000 Units SubCUTAneous Q8H    insulin lispro (HUMALOG) injection   SubCUTAneous Q6H    glucose chewable tablet 16 g  4 Tablet Oral PRN    glucagon (GLUCAGEN) injection 1 mg  1 mg IntraMUSCular PRN    dextrose 10% infusion 0-250 mL  0-250 mL IntraVENous PRN    dextrose 5% infusion  100 mL/hr IntraVENous CONTINUOUS    midodrine (PROAMATINE) tablet 5 mg  5 mg Per G Tube Q8H PRN    albuterol-ipratropium (DUO-NEB) 2.5 MG-0.5 MG/3 ML  3 mL Nebulization Q6H RT    sodium chloride (NS) flush 5-10 mL  5-10 mL IntraVENous PRN    piperacillin-tazobactam (ZOSYN) 3.375 g in 0.9% sodium chloride (MBP/ADV) 100 mL MBP  3.375 g IntraVENous Q8H    levoFLOXacin (LEVAQUIN) 750 mg in D5W IVPB  750 mg IntraVENous Q24H     Current Outpatient Medications   Medication Sig    apixaban (ELIQUIS) 5 mg tablet Take 1 Tablet by mouth every twelve (12) hours. baclofen (LIORESAL) 10 mg tablet 10 mg by Per G Tube route two (2) times daily as needed for Muscle Spasm(s).  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 Indications: prevention of vitamin B12 deficiency    ferrous sulfate (FerrouSuL) 325 mg (65 mg iron) tablet Take  by mouth Daily (before breakfast). metoprolol tartrate (LOPRESSOR) 25 mg tablet 25 mg by Per G Tube route two (2) times a day. thiamine HCL (B-1) 100 mg tablet 1 Tablet by Per G Tube route daily. levETIRAcetam (KEPPRA) 100 mg/mL solution 7.5 mL by Per G Tube route two (2) times a day. pravastatin (PRAVACHOL) 40 mg tablet Take 1 Tab by mouth nightly. ascorbic acid, vitamin C, (VITAMIN C) 250 mg tablet Take 1 Tab by mouth daily. polyethylene glycol (MIRALAX) 17 gram packet Take 1 Packet by mouth daily. tamsulosin (FLOMAX) 0.4 mg capsule Take 0.4 mg by mouth daily. 1 tab daily    MULTIVITAMIN,THERAPEUTIC (THERA MULTI-VITAMIN PO) Take 500 mg by mouth daily. folic acid (FOLVITE) 1 mg tablet Take 1 Tab by mouth daily. Allergies: Patient has no known allergies.     Family History   Problem Relation Age of Onset    Hypertension Other      Social History     Socioeconomic History    Marital status:      Spouse name: Not on file    Number of children: Not on file    Years of education: Not on file    Highest education level: Not on file   Occupational History    Not on file   Tobacco Use    Smoking status: Former     Packs/day: 0.50     Types: Cigarettes     Quit date: 2008     Years since quittin.5    Smokeless tobacco: Never   Substance and Sexual Activity    Alcohol use: No    Drug use: No    Sexual activity: Not Currently   Other Topics Concern    Not on file   Social History Narrative    Not on file     Social Determinants of Health     Financial Resource Strain: Not on file   Food Insecurity: Not on file   Transportation Needs: Not on file   Physical Activity: Not on file   Stress: Not on file   Social Connections: Not on file   Intimate Partner Violence: Not on file   Housing Stability: Not on file     Social History     Tobacco Use   Smoking Status Former    Packs/day: 0.50    Types: Cigarettes    Quit date: 2008    Years since quittin.5   Smokeless Tobacco Never        Temp (24hrs), Av.5 °F (36.4 °C), Min:97.3 °F (36.3 °C), Max:97.7 °F (36.5 °C)    Visit Vitals  /63   Pulse 99   Temp 97.7 °F (36.5 °C)   Resp 19   Ht 5' 9\" (1.753 m)   Wt 75.3 kg (166 lb)   SpO2 100%   BMI 24.51 kg/m²       ROS: Unable to obtain due to patient factors  Physical Exam:    General:  male patient laying on the bed, eyes open, alert, mumbles some words. Head:  Normocephalic, without obvious abnormality, atraumatic. Eyes:  Conjunctivae/corneas clear. Nose: Nares normal.  No drainage or sinus tenderness. Throat: Lips, mucosa, and tongue normal. Teeth and gums of poor dentition, lips dried and cracked/peeling    Neck: Supple, symmetrical, trachea midline, no adenopathy, thyroid: no enlargment/tenderness/nodules, no carotid bruit and no JVD. Back:   Symmetric, no curvature. Lungs:   Coarse to auscultation bilaterally. Chest wall:  No tenderness or deformity. Heart:  S1, S2 normal, no  click, rub or gallop. Abdomen:   Abd flat, Soft, non-tender. Bowel sounds normal. No masses,  No organomegaly. Healed surgical scar in upper abdomen   Extremities: Extremities normal, atraumatic, no cyanosis or edema. Pulses: 2+ and symmetric all extremities.    Skin: BLE with ulcers per report covered with dressing, necrotic sacral decubiti with foul smell    Lymph nodes:  Not examined   Neurologic: Grossly non-focal; non-verbal, does not follow commands       Labs: Results:   Chemistry Recent Labs     23  0300 23  1415 23  0949 23  2333 23  2212   * 160* 158*   < > 138*   * 161* 161*   < > 162*   K 4.1 3.0* 3.2*   < > 3.6   * 128* 128*   < > 130*   CO2 27 30 28   < > 26   BUN 63* 75* 80*   < > 74* CREA 0.93 1.23 1.27   < > 1.20   CA 8.6 9.0 8.5   < > 9.2   AGAP 5 3 5   < > 6   BUCR 68* 61* 63*   < > 62*   AP  --   --  107  --  108   TP  --   --  6.6  --  7.6   ALB  --   --  1.5*  --  1.6*   GLOB  --   --  5.1*  --  6.0*   AGRAT  --   --  0.3*  --  0.3*    < > = values in this interval not displayed. CBC w/Diff Recent Labs     01/06/23  0300 01/05/23  1830 01/05/23  0949   WBC 11.3 10.1 14.3*   RBC 2.81* 2.69* 2.71*   HGB 7.5* 7.1* 7.2*   HCT 25.2* 24.0* 24.1*    201 230   GRANS 75* 72 82*   LYMPH 5* 9* 2*   EOS 4 1 2      Microbiology Recent Labs     01/04/23  2333 01/04/23  2212   CULT GRAM NEGATIVE RODS GROWING IN 1 OF 2 BOTTLES DRAWN No Site Indicated*  GRAM POSITIVE COCCI IN CLUSTERS GROWING IN THE SAME ABOVE BOTTLE*  REMAINING BOTTLE(S) HAS/HAVE NO GROWTH SO FAR PROBABLE BACTEROIDES FRAGILIS GROWING IN BOTH BOTTLES DRAWN No Site Indicated*  PROBABLE ESCHERICHIA COLI GROWING IN BOTH BOTTLES DRAWN No Site Indicated*  PROBABLE PROTEUS SPECIES GROWING IN 1 OF 2 BOTTLES DRAWN No Site Indicated*          RADIOLOGY:    All available imaging studies/reports in [facility] [place] care for this admission were reviewed      Disclaimer: Sections of this note are dictated utilizing voice recognition software, which may have resulted in some phonetic based errors in grammar and contents. Even though attempts were made to correct all the mistakes, some may have been missed, and remained in the body of the document. If questions arise, please contact our department.     Dr. Jacquelynne Kayser, Infectious Disease Specialist  [phone]  January 6, 2023  5:16 PM

## 2023-01-06 NOTE — CONSULTS
Comprehensive Nutrition Assessment    Type and Reason for Visit: Initial, Consult, Positive nutrition screen, NPO/clear liquid    Nutrition Recommendations/Plan:   Initiate tube feeding:   Formula: Jevity 1.5  Initial Rate: 10 mL/hr  Advancement: 10 q 6 hrs as tolerated  Goal Rate: 50 mL/hr   Water Flushes: 175 mL q 4 hours (1050 mL total) while D5 infusing  Goal Regimen Provides: 1800 kcal, 77 gm pro, 1962 mL TOTAL WATER, 100% RDIs   Continue IVF d/t elevated Na levels   Initiate aspiration precautions   Monitor tolerance of EN feeding till goal rate, weight, labs and plan of care during admission. Malnutrition Assessment:  Malnutrition Status: At risk for malnutrition (specify) (r/t PEG dependence, and advance age) (01/06/23 80)      Nutrition History and Allergies:   Past medical history: PEG placement, alcohol dependence, anemia, aphasia, cerebral vascular disease, COVID-10, dysphagia, GERD, hemiparesis, hyperlipidemia, hypernatremia, hyponatremia, moderate protein-calorie malnutrition, MRSA, pneumonia, PVD, seizures, spinal stenosis, stroke, dementia, HTN, chronic hyponatremia, CVA, seizures. NKFA. Weight history per chart review:  CBW: 01/04/23 : 75.3 kg (166 lb), 90 days: 10/16/22 : 68.1 kg (150 lb 2.1 oz). Weight trending up. Nutrition Assessment:    Pt admitted for AMS and severe hypernatremia with septic shock from nursing home. Consulted for TF recommendations/order. Constipation noted per MD note. Current lab shows elevated Na (159)-trending down, BUN (63) and Magnesium (2.7). Pt receiving D5 at 75mL/hour providing (90 gm dextrose, 306 kcal per day). Will initiate TF of Jevity at 10mL/hour; goal rate of 50mL/hour will provide 1800 kcal, 77 gm protein, 912 mL free water, 100% RDIs. Nutrition Related Findings:    Last BM PTA. Output: 400mL (urine). Pertinent Medications: miralax, zofran, D5 infusion, midodrine, levofloxacin.  Wound Type: Multiple, Skin tears    Current Nutrition Intake & Therapies:  Average Meal Intake: NPO  Average Supplement Intake: NPO  DIET NPO    Anthropometric Measures:  Height: 5' 9\" (175.3 cm)  Ideal Body Weight (IBW): 160 lbs (73 kg)  Admission Body Weight: 166 lb 0.1 oz  Current Body Wt:  75.3 kg (166 lb 0.1 oz), 103.8 % IBW. Not specified  Current BMI (kg/m2): 24.5  Usual Body Weight: 68 kg (150 lb)  % Weight Change (Calculated): 10.7  Weight Adjustment: No adjustment  BMI Category: Normal weight (BMI 18.5-24. 9)    Estimated Daily Nutrient Needs:  Energy Requirements Based On: Formula (MSJ x 1.2-1.3)  Weight Used for Energy Requirements: Current  Energy (kcal/day): 6552-4925  Weight Used for Protein Requirements: Current (1.0-1.2)  Protein (g/day): 75-90  Method Used for Fluid Requirements: 1 ml/kcal  Fluid (ml/day): 9960-0918    Nutrition Diagnosis:   Inadequate oral intake related to swallowing difficulty as evidenced by NPO or clear liquid status due to medical condition, nutrition support-enteral nutrition, constipation    Nutrition Interventions:   Food and/or Nutrient Delivery: Continue NPO, Start tube feeding, IV fluid delivery  Nutrition Education/Counseling: Education not indicated, No recommendations at this time  Coordination of Nutrition Care: Continue to monitor while inpatient       Goals:     Goals: Meet at least 75% of estimated needs, by next RD assessment       Nutrition Monitoring and Evaluation:   Behavioral-Environmental Outcomes: None identified  Food/Nutrient Intake Outcomes: Enteral nutrition intake/tolerance, IVF intake  Physical Signs/Symptoms Outcomes: Biochemical data, Meal time behavior, Nutrition focused physical findings, Weight, GI status    Discharge Planning:    Enteral nutrition    Loren Merritt MA, RDN, LD   Contact: [phone]

## 2023-01-06 NOTE — PROGRESS NOTES
Physician Progress Note      PATIENT:               Jose Oliver  CSN #:                  [id number]  :                       1955  ADMIT DATE:       2023 10:08 PM  [address] [place] DATE:  RESPONDING  PROVIDER #:        Arnie Fonseca MD          QUERY TEXT:    Dear Hospitalist  Patient admitted with septic shock. Patient  noted to also have large sacral decubitus  ulcer. If possible, please document in progress notes and discharge summary the   stage of the ulcer. The medical record reflects the following:  Risk Factors: NH  resident  Clinical Indicators: large necrotic  sacral ulcer  with necrosis  noted   but no stage given . Treatment: plan is for  debridement ;   IV  vanco    Thank you,   Raphael Juraez RN   CCDS  Options provided:  -- Decubitus  Ulcer to sacrum,  stage 2  -- Decubitis ulcer   to sacrum, stage 3  -- Decubitus ulcer   to sacrum, stage 4  -- Other - I will add my own diagnosis  -- Disagree - Not applicable / Not valid  -- Disagree - Clinically unable to determine / Unknown  -- Refer to Clinical Documentation Reviewer    PROVIDER RESPONSE TEXT:    This patient has sacral decubitus ulcer , stage 4.     Query created by: Kirill Fair on 2023 3:04 PM      Electronically signed by:  Arnie Fonseca MD 2023 3:22 PM

## 2023-01-06 NOTE — PROGRESS NOTES
PT order received and chart reviewed. Per chart review, pt LTC resident of [address] [place] [address],[address] and requires total care for all mobility. Due to pt at baseline functional status, patient does not require acute PT services. Will recommend pt return to previous level of care. Thank you.     Surya Puentes, PT, DPT

## 2023-01-06 NOTE — PROGRESS NOTES
Hyper Osmolar Hypernatremic dehydration. Has Approximately 10 liters of free water Deficit. Poly Microbial Sepsis. Hypokalemia. Large Sacral Decubitus Ulcer. Pre-Renal Bun/Creatinine ratio. Fecal Impaction. Pneumonia. Plan : careful Hydration with D5 W. Free water through PEG            Urine Electrolytes            Continue antibiotics            Need Wound Debridement. .            Will follow.

## 2023-01-07 LAB
ALBUMIN SERPL-MCNC: 1.4 G/DL (ref 3.4–5)
ALBUMIN/GLOB SERPL: 0.3 (ref 0.8–1.7)
ALP SERPL-CCNC: 90 U/L (ref 45–117)
ALT SERPL-CCNC: 66 U/L (ref 16–61)
ANION GAP SERPL CALC-SCNC: 5 MMOL/L (ref 3–18)
AST SERPL-CCNC: 26 U/L (ref 10–38)
BACTERIA SPEC CULT: ABNORMAL
BASOPHILS # BLD: 0 K/UL (ref 0–0.1)
BASOPHILS NFR BLD: 0 % (ref 0–2)
BILIRUB SERPL-MCNC: 0.5 MG/DL (ref 0.2–1)
BUN SERPL-MCNC: 38 MG/DL (ref 7–18)
BUN/CREAT SERPL: 48 (ref 12–20)
C DIFF GDH STL QL: POSITIVE
C DIFF TOX A+B STL QL IA: POSITIVE
CALCIUM SERPL-MCNC: 8.3 MG/DL (ref 8.5–10.1)
CHLORIDE SERPL-SCNC: 125 MMOL/L (ref 100–111)
CO2 SERPL-SCNC: 26 MMOL/L (ref 21–32)
CREAT SERPL-MCNC: 0.8 MG/DL (ref 0.6–1.3)
DIFFERENTIAL METHOD BLD: ABNORMAL
EOSINOPHIL # BLD: 0.1 K/UL (ref 0–0.4)
EOSINOPHIL NFR BLD: 1 % (ref 0–5)
ERYTHROCYTE [DISTWIDTH] IN BLOOD BY AUTOMATED COUNT: 16.8 % (ref 11.6–14.5)
GLOBULIN SER CALC-MCNC: 5.2 G/DL (ref 2–4)
GLUCOSE BLD STRIP.AUTO-MCNC: 106 MG/DL (ref 70–110)
GLUCOSE BLD STRIP.AUTO-MCNC: 106 MG/DL (ref 70–110)
GLUCOSE BLD STRIP.AUTO-MCNC: 114 MG/DL (ref 70–110)
GLUCOSE SERPL-MCNC: 104 MG/DL (ref 74–99)
GRAM STN SPEC: ABNORMAL
GRAM STN SPEC: ABNORMAL
HCT VFR BLD AUTO: 21.1 % (ref 36–48)
HGB BLD-MCNC: 6.4 G/DL (ref 13–16)
HISTORY CHECKED?,CKHIST: NORMAL
IMM GRANULOCYTES # BLD AUTO: 0 K/UL (ref 0–0.04)
IMM GRANULOCYTES NFR BLD AUTO: 0 % (ref 0–0.5)
INTERPRETATION: ABNORMAL
LYMPHOCYTES # BLD: 0.5 K/UL (ref 0.9–3.6)
LYMPHOCYTES NFR BLD: 4 % (ref 21–52)
MAGNESIUM SERPL-MCNC: 2.5 MG/DL (ref 1.6–2.6)
MCH RBC QN AUTO: 26.8 PG (ref 24–34)
MCHC RBC AUTO-ENTMCNC: 30.3 G/DL (ref 31–37)
MCV RBC AUTO: 88.3 FL (ref 78–100)
MONOCYTES # BLD: 0.1 K/UL (ref 0.05–1.2)
MONOCYTES NFR BLD: 1 % (ref 3–10)
NEUTS BAND NFR BLD MANUAL: 4 %
NEUTS SEG # BLD: 12.4 K/UL (ref 1.8–8)
NEUTS SEG NFR BLD: 90 % (ref 40–73)
NRBC # BLD: 0 K/UL (ref 0–0.01)
NRBC BLD-RTO: 0 PER 100 WBC
PHOSPHATE SERPL-MCNC: 2.6 MG/DL (ref 2.5–4.9)
PLATELET # BLD AUTO: 238 K/UL (ref 135–420)
PLATELET COMMENTS,PCOM: ABNORMAL
PMV BLD AUTO: 12.7 FL (ref 9.2–11.8)
POTASSIUM SERPL-SCNC: 2.8 MMOL/L (ref 3.5–5.5)
PROT SERPL-MCNC: 6.6 G/DL (ref 6.4–8.2)
RBC # BLD AUTO: 2.39 M/UL (ref 4.35–5.65)
RBC MORPH BLD: ABNORMAL
SERVICE CMNT-IMP: ABNORMAL
SODIUM SERPL-SCNC: 156 MMOL/L (ref 136–145)
WBC # BLD AUTO: 13.1 K/UL (ref 4.6–13.2)

## 2023-01-07 PROCEDURE — 94640 AIRWAY INHALATION TREATMENT: CPT

## 2023-01-07 PROCEDURE — 99233 SBSQ HOSP IP/OBS HIGH 50: CPT | Performed by: INTERNAL MEDICINE

## 2023-01-07 PROCEDURE — 99232 SBSQ HOSP IP/OBS MODERATE 35: CPT | Performed by: COLON & RECTAL SURGERY

## 2023-01-07 PROCEDURE — 65660000004 HC RM CVT STEPDOWN

## 2023-01-07 PROCEDURE — 2709999900 HC NON-CHARGEABLE SUPPLY

## 2023-01-07 PROCEDURE — 36430 TRANSFUSION BLD/BLD COMPNT: CPT

## 2023-01-07 PROCEDURE — 94761 N-INVAS EAR/PLS OXIMETRY MLT: CPT

## 2023-01-07 PROCEDURE — 74011250637 HC RX REV CODE- 250/637: Performed by: STUDENT IN AN ORGANIZED HEALTH CARE EDUCATION/TRAINING PROGRAM

## 2023-01-07 PROCEDURE — 77030018798 HC PMP KT ENTRL FED COVD -A

## 2023-01-07 PROCEDURE — C9113 INJ PANTOPRAZOLE SODIUM, VIA: HCPCS | Performed by: INTERNAL MEDICINE

## 2023-01-07 PROCEDURE — 74011250636 HC RX REV CODE- 250/636: Performed by: PHYSICIAN ASSISTANT

## 2023-01-07 PROCEDURE — 74011250636 HC RX REV CODE- 250/636: Performed by: INTERNAL MEDICINE

## 2023-01-07 PROCEDURE — 74011000250 HC RX REV CODE- 250

## 2023-01-07 PROCEDURE — 77010033678 HC OXYGEN DAILY

## 2023-01-07 PROCEDURE — 74011000250 HC RX REV CODE- 250: Performed by: INTERNAL MEDICINE

## 2023-01-07 PROCEDURE — 36415 COLL VENOUS BLD VENIPUNCTURE: CPT

## 2023-01-07 PROCEDURE — 85018 HEMOGLOBIN: CPT

## 2023-01-07 PROCEDURE — 30233N1 TRANSFUSION OF NONAUTOLOGOUS RED BLOOD CELLS INTO PERIPHERAL VEIN, PERCUTANEOUS APPROACH: ICD-10-PCS | Performed by: UROLOGY

## 2023-01-07 PROCEDURE — 80053 COMPREHEN METABOLIC PANEL: CPT

## 2023-01-07 PROCEDURE — P9016 RBC LEUKOCYTES REDUCED: HCPCS

## 2023-01-07 PROCEDURE — 74011250637 HC RX REV CODE- 250/637: Performed by: INTERNAL MEDICINE

## 2023-01-07 PROCEDURE — 74011000258 HC RX REV CODE- 258: Performed by: PHYSICIAN ASSISTANT

## 2023-01-07 PROCEDURE — 85025 COMPLETE CBC W/AUTO DIFF WBC: CPT

## 2023-01-07 PROCEDURE — 82962 GLUCOSE BLOOD TEST: CPT

## 2023-01-07 PROCEDURE — 84100 ASSAY OF PHOSPHORUS: CPT

## 2023-01-07 PROCEDURE — 94762 N-INVAS EAR/PLS OXIMTRY CONT: CPT

## 2023-01-07 PROCEDURE — 83735 ASSAY OF MAGNESIUM: CPT

## 2023-01-07 RX ORDER — POTASSIUM CHLORIDE 14.9 MG/ML
10 INJECTION INTRAVENOUS
Status: DISCONTINUED | OUTPATIENT
Start: 2023-01-07 | End: 2023-01-07

## 2023-01-07 RX ORDER — POTASSIUM CHLORIDE 20 MEQ/1
40 TABLET, EXTENDED RELEASE ORAL ONCE
Status: COMPLETED | OUTPATIENT
Start: 2023-01-07 | End: 2023-01-07

## 2023-01-07 RX ORDER — SODIUM CHLORIDE 9 MG/ML
250 INJECTION, SOLUTION INTRAVENOUS AS NEEDED
Status: DISCONTINUED | OUTPATIENT
Start: 2023-01-07 | End: 2023-01-15 | Stop reason: HOSPADM

## 2023-01-07 RX ORDER — POTASSIUM CHLORIDE 7.45 MG/ML
10 INJECTION INTRAVENOUS
Status: COMPLETED | OUTPATIENT
Start: 2023-01-07 | End: 2023-01-07

## 2023-01-07 RX ADMIN — IPRATROPIUM BROMIDE AND ALBUTEROL SULFATE 3 ML: .5; 3 SOLUTION RESPIRATORY (INHALATION) at 21:06

## 2023-01-07 RX ADMIN — IPRATROPIUM BROMIDE AND ALBUTEROL SULFATE 3 ML: .5; 3 SOLUTION RESPIRATORY (INHALATION) at 03:07

## 2023-01-07 RX ADMIN — POTASSIUM CHLORIDE 40 MEQ: 1500 TABLET, EXTENDED RELEASE ORAL at 10:06

## 2023-01-07 RX ADMIN — LEVOFLOXACIN 750 MG: 5 INJECTION, SOLUTION INTRAVENOUS at 01:09

## 2023-01-07 RX ADMIN — DEXTROSE MONOHYDRATE 100 ML/HR: 5 INJECTION, SOLUTION INTRAVENOUS at 22:51

## 2023-01-07 RX ADMIN — POLYETHYLENE GLYCOL 3350 17 G: 17 POWDER, FOR SOLUTION ORAL at 10:10

## 2023-01-07 RX ADMIN — CYANOCOBALAMIN TAB 1000 MCG 500 MCG: 1000 TAB at 10:08

## 2023-01-07 RX ADMIN — Medication 125 MG: at 13:52

## 2023-01-07 RX ADMIN — POTASSIUM CHLORIDE 10 MEQ: 7.46 INJECTION, SOLUTION INTRAVENOUS at 17:01

## 2023-01-07 RX ADMIN — POTASSIUM CHLORIDE 10 MEQ: 7.46 INJECTION, SOLUTION INTRAVENOUS at 11:51

## 2023-01-07 RX ADMIN — PIPERACILLIN SODIUM AND TAZOBACTAM SODIUM 3.38 G: 3; .375 INJECTION, POWDER, LYOPHILIZED, FOR SOLUTION INTRAVENOUS at 10:08

## 2023-01-07 RX ADMIN — SODIUM CHLORIDE, PRESERVATIVE FREE 10 ML: 5 INJECTION INTRAVENOUS at 22:00

## 2023-01-07 RX ADMIN — DEXTROSE MONOHYDRATE 100 ML/HR: 5 INJECTION, SOLUTION INTRAVENOUS at 10:24

## 2023-01-07 RX ADMIN — VANCOMYCIN HYDROCHLORIDE 1000 MG: 1 INJECTION, POWDER, LYOPHILIZED, FOR SOLUTION INTRAVENOUS at 10:06

## 2023-01-07 RX ADMIN — LEVETIRACETAM 750 MG: 100 SOLUTION ORAL at 10:08

## 2023-01-07 RX ADMIN — IPRATROPIUM BROMIDE AND ALBUTEROL SULFATE 3 ML: .5; 3 SOLUTION RESPIRATORY (INHALATION) at 07:40

## 2023-01-07 RX ADMIN — POTASSIUM CHLORIDE 40 MEQ: 1500 TABLET, EXTENDED RELEASE ORAL at 06:57

## 2023-01-07 RX ADMIN — SODIUM CHLORIDE, PRESERVATIVE FREE 10 ML: 5 INJECTION INTRAVENOUS at 05:56

## 2023-01-07 RX ADMIN — SODIUM CHLORIDE, PRESERVATIVE FREE 10 ML: 5 INJECTION INTRAVENOUS at 13:54

## 2023-01-07 RX ADMIN — PANTOPRAZOLE SODIUM 40 MG: 40 INJECTION, POWDER, FOR SOLUTION INTRAVENOUS at 10:06

## 2023-01-07 RX ADMIN — POTASSIUM CHLORIDE 10 MEQ: 7.46 INJECTION, SOLUTION INTRAVENOUS at 13:52

## 2023-01-07 RX ADMIN — PIPERACILLIN SODIUM AND TAZOBACTAM SODIUM 3.38 G: 3; .375 INJECTION, POWDER, LYOPHILIZED, FOR SOLUTION INTRAVENOUS at 01:03

## 2023-01-07 NOTE — PROGRESS NOTES
Problem: Nutrition Deficit  Goal: *Optimize nutritional status  Outcome: Progressing Towards Goal     Problem: Risk for Spread of Infection  Goal: Prevent transmission of infectious organism to others  Description: Prevent the transmission of infectious organisms to other patients, staff members, and visitors. Outcome: Progressing Towards Goal     Problem: Patient Education:  Go to Education Activity  Goal: Patient/Family Education  Outcome: Progressing Towards Goal     Problem: Falls - Risk of  Goal: *Absence of Falls  Description: Document Tristan Connt Fall Risk and appropriate interventions in the flowsheet.   Outcome: Progressing Towards Goal  Note: Fall Risk Interventions:       Mentation Interventions: Room close to nurse's station    Medication Interventions: Bed/chair exit alarm    Elimination Interventions: Bed/chair exit alarm              Problem: Patient Education: Go to Patient Education Activity  Goal: Patient/Family Education  Outcome: Progressing Towards Goal

## 2023-01-07 NOTE — PROGRESS NOTES
Nursing staff called for RUE swelling noted few hours after his procedure. Pt had D5 going at 100 mL/h, though nursing does not think the IV is infiltrated. Instructed to start IV in the other arm, elevate the arm and will continue to reassess.

## 2023-01-07 NOTE — PROGRESS NOTES
[facility] [address] [place], [place] [id number] [phone]  Colon and Rectal Surgery Progress Note      Patient: Hayder Webster MRN: [id number]  SSN: [id number]    YOB: 1955  Age: 79 y.o. Sex: male      Admit Date: 1/4/2023    LOS: 1 day     Subjective:     Appears comfortable. No overnight events    Objective:     Vitals:    01/06/23 2203 01/06/23 2259 01/07/23 0420 01/07/23 0742   BP: 117/66 116/68 (!) 100/57    Pulse: (!) 101 (!) 105 (!) 102    Resp: 21 18 22    Temp:  97.3 °F (36.3 °C) 98.4 °F (36.9 °C)    SpO2: 100% 100% 100% 100%   Weight:  73.5 kg (162 lb 0.6 oz)     Height:  5' 9\" (1.753 m)          Intake and Output:  Current Shift: No intake/output data recorded.   Last three shifts: 01/05 1901 - 01/07 0700  In: 1636.8 [I.V.:1606.8]  Out: 1725 [Urine:1700]    Physical Exam:     Constitutional: well developed, in NAD  Nursing states no blood on dressing    Lab/Data Review:    CMP:   Lab Results   Component Value Date/Time     (H) 01/07/2023 05:37 AM    K 2.8 (LL) 01/07/2023 05:37 AM     (H) 01/07/2023 05:37 AM    CO2 26 01/07/2023 05:37 AM    AGAP 5 01/07/2023 05:37 AM     (H) 01/07/2023 05:37 AM    BUN 38 (H) 01/07/2023 05:37 AM    CREA 0.80 01/07/2023 05:37 AM    CA 8.3 (L) 01/07/2023 05:37 AM    MG 2.5 01/07/2023 05:37 AM    PHOS 2.6 01/07/2023 05:37 AM    ALB 1.4 (L) 01/07/2023 05:37 AM    TP 6.6 01/07/2023 05:37 AM    GLOB 5.2 (H) 01/07/2023 05:37 AM    AGRAT 0.3 (L) 01/07/2023 05:37 AM    ALT 66 (H) 01/07/2023 05:37 AM     CBC:   Lab Results   Component Value Date/Time    WBC 13.1 01/07/2023 05:37 AM    HGB 6.4 (L) 01/07/2023 05:37 AM    HCT 21.1 (L) 01/07/2023 05:37 AM     01/07/2023 05:37 AM        Assessment:     S/p wide debridement stage 4 sacral decubitus ulcer    Plan:     Continue abx  F/U intraop cultures  Dressing changes daily, to start today (D/W nursing)  Wound nurse consult Monday to consider wound vac    Signed By: Erica Rojas Kaycee Khan MD        January 7, 2023

## 2023-01-07 NOTE — ANESTHESIA PREPROCEDURE EVALUATION
Relevant Problems   RESPIRATORY SYSTEM   (+) Aspiration pneumonia (HCC)   (+) Community acquired pneumonia of right lower lobe of lung      NEUROLOGY   (+) 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HCC)   (+) Psychogenic polydipsia   (+) Seizure disorder (HCC)   (+) Stroke (HCC)      CARDIOVASCULAR   (+) First degree AV block   (+) Hypertension      HEMATOLOGY   (+) Anemia   (+) Symptomatic anemia       Anesthetic History   No history of anesthetic complications            Review of Systems / Medical History  Patient summary reviewed, nursing notes reviewed and pertinent labs reviewed    Pulmonary          Pneumonia      Comments: Hx of aspiration pneumonia, appears septic now, was on pressors but now off. Neuro/Psych     seizures: well controlled  CVA  TIA    Comments: Non verbal post CVA. L hemiparesis. Cardiovascular    Hypertension: well controlled        Dysrhythmias         Comments: Was on levophed last PM, now weaned off after fluid resuscitation. GI/Hepatic/Renal     GERD: well controlled           Endo/Other  Within defined limits           Other Findings              Physical Exam    Airway  Mallampati: II  TM Distance: 4 - 6 cm  Neck ROM: normal range of motion   Mouth opening: Normal     Cardiovascular    Rhythm: regular  Rate: normal         Dental    Dentition: Poor dentition     Pulmonary  Breath sounds clear to auscultation               Abdominal  GI exam deferred       Other Findings            Anesthetic Plan    ASA: 4  Anesthesia type: general  Possible post op ventilation and ICU care. Induction: Intravenous  Anesthetic plan and risks discussed with: Son / Daughter      Son next of kin, spoke with him regarding GA and also DNR suspension for periop period.

## 2023-01-07 NOTE — PROGRESS NOTES
Spoke with Dr. René Carney, nurse requested patient to receive a PICC line. Per Dr. René Carney, since PICC probably will be place Monday and patient is NPO change Levaquin to PEG tube route, give Zosyn when you can. Nurse verbalized understanding, noted message and gave message to primary nurse Kacie Saleh.

## 2023-01-07 NOTE — BRIEF OP NOTE
Brief Postoperative Note    Patient: Malcolm Hickman  YOB: 1955  MRN: [id number]    Date of Procedure: 1/6/2023     Pre-Op Diagnosis: Pressure injury of deep tissue of sacral region [L89.156]    Post-Op Diagnosis: Same as preoperative diagnosis.       Procedure(s):  IRRIGATION AND DEBRIDEMENT OF SACRAL DECUBITUS ULCER / PRONE    Surgeon(s):  Latrice Jurado MD    Surgical Assistant: Surg Asst-1: Verona PAYTON    Anesthesia: General     Estimated Blood Loss (mL): less than 50     Complications: None    Specimens:   ID Type Source Tests Collected by Time Destination   1 : SACRAL DECUBITUS ULCER Preservative Sacral Wound  Latrice Jurado MD 1/6/2023 2048 Pathology   1 : SACRAL DECUBITUS ULCER #1 Wound Sacral Wound CULTURE, ANAEROBIC, CULTURE, WOUND W Armin Garrido MD 1/6/2023 2045 Microbiology   2 : SACRAL DECUBITUS ULCER #2 Wound Sacral Wound CULTURE, ANAEROBIC, CULTURE, WOUND W Armin Garrido MD 1/6/2023 2051 Microbiology        Implants: * No implants in log *    Drains:   [REMOVED] Nasogastric Tube 10/10/22 (Removed)       [REMOVED] PEG/Gastrostomy Tube 10/20/22 (Removed)       [REMOVED] PEG/Gastrostomy Tube (Removed)       [REMOVED] Condom Catheter  (Removed)       [REMOVED] Condom Catheter 08/23/17 (Removed)       [REMOVED] Condom Catheter 08/25/17 (Removed)       [REMOVED] Condom Catheter 09/18/17 (Removed)       [REMOVED] Condom Catheter 03/31/21 (Removed)       [REMOVED] Condom Catheter 12/01/22 (Removed)       [REMOVED] External Urinary Catheter 10/14/22 (Removed)       Findings: 15 cm by 15 cm by 4 cm, down to sacral bone    [id number]    Electronically Signed by Bonifacio Casas MD on 1/6/2023 at 9:24 PM

## 2023-01-07 NOTE — PROGRESS NOTES
Hospitalist Progress Note    NAME:  Fanny Devries   :   1955   MRN:   [id number]     Date/Time:  2023  Subjective: pt is complicated with multiple issues; Chief Complaint:   Pt remain confused; BP is better; Non verbal     23;   B/c 2/2 with   Culture result:   PROBABLE BACTEROIDES FRAGILIS GROWING IN BOTH BOTTLES DRAWN No Site Indicated Abnormal  P  [address]   Culture result:   PROBABLE ESCHERICHIA COLI GROWING IN BOTH BOTTLES DRAWN No Site Indicated Abnormal  P  [address]   Culture result:   PROBABLE PROTEUS SPECIES GROWING IN BOTH BOTTLES DRAWN No Site Indicated Abnormal  P     GRAM POSITIVE COCCI IN CLUSTERS GROWING IN THE SAME ABOVE BOTTLE Abnormal  P     CT ;     IMPRESSION  1. Bilateral posterior basilar pneumonia, slightly less severe than before. Trace right pleural effusion. 2. Hiatal hernia/distal esophagitis. 3. Severe constipation. Fecal impaction in the rectum with distended rectum,  large fecal ball. 4. Decubitus ulcer with air approaching the posterior wall of anus/rectum and  posterior cortex of the sacrum and coccyx. No abscess. ; s/p ID of the Sacral IRRIGATION AND DEBRIDEMENT OF SACRAL DECUBITUS ULCER ;    23;   h/h dropped to 6.4/; Plan for 1 unit of blood;  C diff is positive; start po vanco; hold Levaquin and PPI; Sodium down to 156; on D5w drip and free water in the G tube;  K is 2.8; replace;             [x]  Unable to obtain  ROS due to  [x]  mental status change  []  sedated   []  Intubated non verbal;     Past Med History and Social history reviewed. No changes. [x]  Current Medication list and allergies reviewed*    Objective:   Vitals:  Visit Vitals  /80 (BP 1 Location: Left lower arm, BP Patient Position: Supine; At rest)   Pulse 98   Temp 100.3 °F (37.9 °C)   Resp 20   Ht 5' 9\" (1.753 m)   Wt 73.5 kg (162 lb 0.6 oz)   SpO2 100%   BMI 23.93 kg/m²     Temp (24hrs), Av.5 °F (36.9 °C), Min:97.3 °F (36.3 °C), Max:100.3 °F (37.9 °C)      Last 24hr Input/Output:    Intake/Output Summary (Last 24 hours) at 1/7/2023 1252  Last data filed at 1/7/2023 0600  Gross per 24 hour   Intake 530 ml   Output 1325 ml   Net -795 ml        PHYSICAL EXAM:     General appearance -awake, nonverbal, in mild acute distress  Nose - no obvious nasal discharge. Neck - supple, no JVD, trachea is midline  Chest -diminished air entry noted in bases, no wheezes  Heart - S1 and S2 normal, no murmurs appreciated currently  Abdomen - soft, nontender, nondistended, PEG tube is in situ, bowel sounds present  Neurological -awake, nonverbal, does not follow any meaningful commands, no tremors noted  Musculoskeletal - no joint tenderness or erythema of knees bilaterally  Extremities - no pedal edema noted, no cyanosis  Psych -no agitation or hallucination    Skin; stage 4 sacral ulcer;       Large necrotic decubitus ulcer with evidence of purulent exudate            Lab Data Reviewed:  No components found for: [place]  Recent Labs     01/07/23  0537 01/06/23  0300 01/05/23  1830 01/05/23  1415 01/05/23  0949 01/04/23  2333 01/04/23  2212   * 159*  --  161* 161*   < > 162*   K 2.8* 4.1  --  3.0* 3.2*   < > 3.6   * 127*  --  128* 128*   < > 130*   CO2 26 27  --  30 28   < > 26   BUN 38* 63*  --  75* 80*   < > 74*   CREA 0.80 0.93  --  1.23 1.27   < > 1.20   * 117*  --  160* 158*   < > 138*   PHOS 2.6 4.1  --  2.1*  --   --   --    CA 8.3* 8.6  --  9.0 8.5   < > 9.2   ALB 1.4*  --   --   --  1.5*  --  1.6*   WBC 13.1 11.3 10.1  --  14.3*  --  11.8   HGB 6.4* 7.5* 7.1*  --  7.2*  --  7.8*   HCT 21.1* 25.2* 24.0*  --  24.1*  --  25.7*    238 201  --  230  --  242    < > = values in this interval not displayed.         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 ([facility] (1/6/2023)      Community acquired pneumonia of right lower lobe of lung (1/6/2023)    Acute post op anemia; give 1 unit of blood    C diff positive POA; start Po Vanco hold levaquin and PPI;     ___________________________________________________  PLAN:  Risk of deterioration:  []  Low    []  Moderate  [x]  High    Id consulted;;  Continue IV abx; with continue V/Z and  dc levaquin; due to c diff;   Await final B/c results;  Surgery on the case s/p ID    Continue D5W drip ; and T feeds and free water boluses on G tube;    DNR/DNI  ___________________________________________________    Total time spent with patient:     minutes    []  Critical Care time:      minutes    Care Plan discussed with:    []  Patient   []  Family    []  Care Manager  [x]  Nursing   [x]  Consultant/Specialist :      []    >50% of visit spent in counseling and coordination of care   (Discussed []  CODE status,  []  Care Plan, []  D/C Planning)    Prophylaxis:  []  Lovenox  []  Coumadin  [x]  Hep SQ  []  SCDs  []  H2B/PPI    Disposition:  []  Home w/ Family   []  HH PT,OT,RN   [x]  SNF/LTC   []  SAH/Rehab  ___________________________________________________    Hospitalist: Shahana Tomas MD     ___________________________________________________    *Medications reviewed:  Current Facility-Administered Medications   Medication Dose Route Frequency    0.9% sodium chloride infusion 250 mL  250 mL IntraVENous PRN    potassium chloride 10 mEq in 100 ml IVPB  10 mEq IntraVENous Q2H    vancomycin 50 mg/mL oral solution (compounded) 125 mg  125 mg Per G Tube Q6H    vancomycin (VANCOCIN) 1,000 mg in 0.9% sodium chloride 250 mL (VIAL-MATE)  1,000 mg IntraVENous Q24H    cyanocobalamin tablet 500 mcg  500 mcg Per G Tube DAILY    levETIRAcetam (KEPPRA) oral solution 750 mg  750 mg Oral BID    [Held by provider] pantoprazole (PROTONIX) 40 mg in 0.9% sodium chloride 10 mL injection  40 mg IntraVENous DAILY    sodium chloride (NS) flush 5-40 mL  5-40 mL IntraVENous Q8H    sodium chloride (NS) flush 5-40 mL  5-40 mL IntraVENous PRN    acetaminophen (TYLENOL) tablet 650 mg  650 mg Oral Q6H PRN    Or    acetaminophen (TYLENOL) suppository 650 mg  650 mg Rectal Q6H PRN    ondansetron (ZOFRAN ODT) tablet 4 mg  4 mg Oral Q8H PRN    Or    ondansetron (ZOFRAN) injection 4 mg  4 mg IntraVENous Q6H PRN    [Held by provider] heparin (porcine) injection 5,000 Units  5,000 Units SubCUTAneous Q8H    insulin lispro (HUMALOG) injection   SubCUTAneous Q6H    glucose chewable tablet 16 g  4 Tablet Oral PRN    glucagon (GLUCAGEN) injection 1 mg  1 mg IntraMUSCular PRN    dextrose 10% infusion 0-250 mL  0-250 mL IntraVENous PRN    dextrose 5% infusion  100 mL/hr IntraVENous CONTINUOUS    midodrine (PROAMATINE) tablet 5 mg  5 mg Per G Tube Q8H PRN    albuterol-ipratropium (DUO-NEB) 2.5 MG-0.5 MG/3 ML  3 mL Nebulization Q6H RT    sodium chloride (NS) flush 5-10 mL  5-10 mL IntraVENous PRN    piperacillin-tazobactam (ZOSYN) 3.375 g in 0.9% sodium chloride (MBP/ADV) 100 mL MBP  3.375 g IntraVENous Q8H    [Held by provider] levoFLOXacin (LEVAQUIN) 750 mg in D5W IVPB  750 mg IntraVENous Q24H

## 2023-01-07 NOTE — PROGRESS NOTES
Progress Note    Hayder Webster  79 y.o. 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 (1/6/2023) POA: Yes      Community acquired pneumonia of right lower lobe of lung (1/6/2023) POA: Yes            Subjective:     Patient is back to his room after surgery. Undergone debridement plan of sacral decubitus. Has underlying multi microbial  sepsis. Currently n.p.o. receiving potassium bolus supplement IV fluid is on hold. Does not communicate and just opens eyes      A comprehensive review of systems was negative except for that written in the History of Present Illness. Objective:     Visit Vitals  /80 (BP 1 Location: Left lower arm, BP Patient Position: Supine; At rest)   Pulse 98   Temp 100.3 °F (37.9 °C)   Resp 20   Ht 5' 9\" (1.753 m)   Wt 73.5 kg (162 lb 0.6 oz)   SpO2 100%   BMI 23.93 kg/m²         Intake/Output Summary (Last 24 hours) at 1/7/2023 1352  Last data filed at 1/7/2023 0800  Gross per 24 hour   Intake 530 ml   Output 1325 ml   Net -795 ml       Current Facility-Administered Medications   Medication Dose Route Frequency Provider Last Rate Last Admin    0.9% sodium chloride infusion 250 mL  250 mL IntraVENous PRN Lizzie Mason MD        potassium chloride 10 mEq in 100 ml IVPB  10 mEq IntraVENous Q2H Skylar DAI  mL/hr at 01/07/23 1151 10 mEq at 01/07/23 1151    vancomycin 50 mg/mL oral solution (compounded) 125 mg  125 mg Per G Tube Q6H Jus Raya MD        vancomycin (VANCOCIN) 1,000 mg in 0.9% sodium chloride 250 mL (VIAL-MATE)  1,000 mg IntraVENous Q24H Lizzie Mason  mL/hr at 01/07/23 1006 1,000 mg at 01/07/23 1006 cyanocobalamin tablet 500 mcg  500 mcg Per G Tube DAILY Joseph Mejia MD   500 mcg at 01/07/23 1008    levETIRAcetam (KEPPRA) oral solution 750 mg  750 mg Oral BID Joseph Mejia MD   750 mg at 01/07/23 1008    [Held by provider] pantoprazole (PROTONIX) 40 mg in 0.9% sodium chloride 10 mL injection  40 mg IntraVENous DAILY Joseph Mejia MD   40 mg at 01/07/23 1006    sodium chloride (NS) flush 5-40 mL  5-40 mL IntraVENous W6K Sil Hawkins MD   10 mL at 01/07/23 0556    sodium chloride (NS) flush 5-40 mL  5-40 mL IntraVENous PRN Sil Hawkins MD        acetaminophen (TYLENOL) tablet 650 mg  650 mg Oral Q6H PRN Sil Hawkins MD        Or    acetaminophen (TYLENOL) suppository 650 mg  650 mg Rectal Q6H PRN Sil Hawkins MD        ondansetron (ZOFRAN ODT) tablet 4 mg  4 mg Oral Q8H PRN Sil Hawkins MD        Or    ondansetron (ZOFRAN) injection 4 mg  4 mg IntraVENous Q6H PRN Sil Hawkins MD        [Held by provider] heparin (porcine) injection 5,000 Units  5,000 Units SubCUTAneous Y7V Sil Hawkins MD   4,650 Units at 01/05/23 2339    insulin lispro (HUMALOG) injection   SubCUTAneous Q6H Alma Julian PA-C        glucose chewable tablet 16 g  4 Tablet Oral PRN Alma Julian PA-C        glucagon (GLUCAGEN) injection 1 mg  1 mg IntraMUSCular PRN Alma Julian PA-C        dextrose 10% infusion 0-250 mL  0-250 mL IntraVENous PRN Alma Julian PA-C        dextrose 5% infusion  100 mL/hr IntraVENous CONTINUOUS Joseph Mejia  mL/hr at 01/07/23 1024 100 mL/hr at 01/07/23 1024    midodrine (PROAMATINE) tablet 5 mg  5 mg Per G Tube Q8H PRN Agusto Cerrato MD        albuterol-ipratropium (DUO-NEB) 2.5 MG-0.5 MG/3 ML  3 mL Nebulization Q6H RT Agusto Cerrato MD   3 mL at 01/07/23 0740    sodium chloride (NS) flush 5-10 mL  5-10 mL IntraVENous PRN ARSEN Armstrong        piperacillin-tazobactam (ZOSYN) 3.375 g in 0.9% sodium chloride (MBP/ADV) 100 mL MBP  3.375 g IntraVENous Q8H Chris Holman, [address] 25 mL/hr at 01/07/23 1008 3.375 g at 01/07/23 1008    [Held by provider] levoFLOXacin (LEVAQUIN) 750 mg in D5W IVPB  750 mg IntraVENous Q24H ARSEN Sebastian 100 mL/hr at 01/07/23 0109 750 mg at 01/07/23 0109        Physical Exam:     Physical Exam:   General:  Non communicative, no distress, appears stated age. Neck: Supple, symmetrical, trachea midline, no adenopathy, thyroid: no enlargement/tenderness/nodules, no carotid bruit and no JVD. Lungs:   Clear to auscultation bilaterally. Heart:  Regular rate and rhythm, S1, S2 normal, no murmur, click, rub or gallop. Abdomen:   Soft, non-tender. Bowel sounds normal. No masses,  No organomegaly. ,  Has PEG tube   Extremities: Extremities normal, atraumatic, no cyanosis or edema,                         Data Review:    CBC w/Diff    Recent Labs     01/07/23  0537 01/06/23  0300 01/05/23  1830   WBC 13.1 11.3 10.1   RBC 2.39* 2.81* 2.69*   HGB 6.4* 7.5* 7.1*   HCT 21.1* 25.2* 24.0*   MCV 88.3 89.7 89.2   MCH 26.8 26.7 26.4   MCHC 30.3* 29.8* 29.6*   RDW 16.8* 17.0* 16.8*    Recent Labs     01/07/23  0537 01/06/23  0300 01/05/23  1830   BANDS 4 14 17   MONOS 1* 1* 1*   EOS 1 4 1   BASOS 0 0 0   RDW 16.8* 17.0* 16.8*        Comprehensive Metabolic Profile    Recent Labs     01/07/23  0537 01/06/23  0300 01/05/23  1415   * 159* 161*   K 2.8* 4.1 3.0*   * 127* 128*   CO2 26 27 30   BUN 38* 63* 75*   CREA 0.80 0.93 1.23    Recent Labs     01/07/23  0537 01/06/23  0300 01/05/23  1415 01/05/23  0949 01/04/23  2333 01/04/23  2212   CA 8.3* 8.6 9.0 8.5   < > 9.2   PHOS 2.6 4.1 2.1*  --   --   --    ALB 1.4*  --   --  1.5*  --  1.6*   TP 6.6  --   --  6.6  --  7.6   TBILI 0.5  --   --  0.8  --  0.6    < > = values in this interval not displayed.                         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 Renal blood urea creatinine ratio slowly improving with hydration      Plan:     Continue IV antibiotics hydration and nutritional supplementation and free water through the PEG tube. Follow labs particularly sodium and other electrolytes.       Bayron Cadet MD

## 2023-01-07 NOTE — PERIOP NOTES
TRANSFER - OUT REPORT:    Verbal report given to Loyd(name) on Malcolm Hickman  being transferred to room 356(unit) for routine progression of care       Report consisted of patients Situation, Background, Assessment and   Recommendations(SBAR). Information from the following report(s) SBAR, OR Summary, MAR, and Recent Results was reviewed with the receiving nurse. Lines:   Peripheral IV 01/04/23 Anterior;Distal;Right Forearm (Active)   Site Assessment Clean, dry, & intact 01/06/23 2133   Phlebitis Assessment 0 01/06/23 2133   Infiltration Assessment 0 01/06/23 2133   Dressing Status Clean, dry, & intact 01/06/23 2133   Dressing Type Transparent 01/06/23 2133       Peripheral IV 01/04/23 Left;Posterior Hand (Active)   Site Assessment Clean, dry, & intact 01/06/23 2133   Phlebitis Assessment 0 01/06/23 2133   Infiltration Assessment 0 01/06/23 2133   Dressing Status Occlusive 01/06/23 2133   Dressing Type Transparent 01/06/23 2133        Opportunity for questions and clarification was provided.       Patient transported with:   O2 @ 3 liters  Tech

## 2023-01-07 NOTE — PROGRESS NOTES
TRANSFER - IN REPORT:    [facility] Se[facility] Marcos  being received from [facility] (unit) for routine progression of care      Report consisted of patients Situation, Background, Assessment and   Recommendations(SBAR). Information from the following report(s) SBAR, Kardex, ED Summary, OR Summary, Procedure Summary, Intake/Output, MAR, Recent Results, and Cardiac Rhythm ST  was reviewed with the receiving nurse. Opportunity for questions and clarification was provided. Assessment completed upon patients arrival to unit and care assumed.

## 2023-01-07 NOTE — PROGRESS NOTES
2257hrs:  Pt arrived to unit via stretcher and transport personnel x1. Wound Care Skin Assessment    0220hrs:  Per Billie Najjar, unable to obtain lab specimen from pt. Will send another technician. 0434hrs:  Pt's /57 and noted Right Upper Extremity is swollen. No evidence of IV infiltration. Right Upper Extremity was elevated on pillow. Paged and spoken with Dr. Javier Sheehan, advised to switch IV fluids to Left Upper Extremity and elevate. RN ensure to monitor pt and advise in morning report of findings. 0627hrs:  Contacted by Duy Page. Pt with critical lab:  Potassium, 2.8. Telephone read back completed. MD paged. 0630hrs:  Spoke with Dr. Javier Sheehan to advise on pt's critical lab. New MD orders pending to pt's chart. 0730hrs:  Bedside and Verbal shift change report given to Sridevi Espinoza RN (oncoming nurse) by Irma Tom RN (offgoing nurse).  Report included the following information SBAR, Kardex, ED Summary, OR Summary, Procedure Summary, Intake/Output, MAR, Recent Results, and Cardiac Rhythm ST

## 2023-01-07 NOTE — PROGRESS NOTES
Consult Note    Patient: Monster Hatfield               Sex: male          DOA: 1/4/2023         YOB: 1955      Age:  79 y.o.        LOS:  LOS: 1 day              HPI:     Monster Hatfield is a 79 y.o. male who has been seen on consultation for evaluation of Hypernatremia. He is known to me from previous hospitalization. He has History of dementia, COVID, hyperlipidemia, hypertension, chronic hypernatremia, CVA, seizures, and other noted past medical history  who presents via EMS due to suspected sepsis and altered mental status x unknown time. Per EMS, pt febrile and tachycardic upon their initial assessment. History is limited due to baseline mental status of patient. Pt is a resident at [facility]. Pt is a DNR/ DNI. Patient can not communicate. He also has Has History of Sacral  & other area decubiti us necrotizing wound. Patient was febrile and tachycardic on initial assessment with EMS. Patient is a resident of [facility]. In the ED at [facility], labs were remarkable for severe hypernatremia Na 162, hypokalemia K 3.2, WBC 14.3, Lactic acid 2.02. Patient was febrile 100.5*F, tachycardic , and hypotensive BP 80/50 mmHg, and tachypneic with RR 41 with O2 sat 92% on room air. Patient was in septic shock and received IVF resuscitation with 2100 cc NS followed by 1/2NS at 150, Levophed. Patient was initiated on broad-spectrum antibiotics. Patient had hemodynamic improvement yesterday evening and was transferred to stepdown status. Patient remains in the emergency room. I have been consulted for further management of Hypernatremia, other electrolyte problem.   He is receiving IVF & Started on Jevity  along with Free water through [employer]    Past Medical History:   Diagnosis Date    Alcohol dependence with alcohol-induced persisting dementia ([facility] [address]     per [facility] 4/23/21    Anemia     Aphasia     Benign prostatic hyperplasia     Cerebral vascular disease     COVID-19 04/06/2021 Disturbances of salivary secretion     Dysphagia     GERD (gastroesophageal reflux disease)     Hemiparesis (HCC)     left side    Hemiplegia (HCC)     left side     Hiccups     Hyperlipidemia     Hypernatremia 2021    Hypertension     Hyponatremia     Insomnia     Lacunar infarction ([facility] [address] 2010    weakness both arms, unable to walk    Lower extremity edema     Moderate protein-calorie malnutrition (HCC)     MRSA (methicillin resistant Staphylococcus aureus)     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)     Pneumonia     Psychogenic polydipsia     PVD (peripheral vascular disease) ([facility])     Seizures ([facility] [address]     Spinal stenosis     Stroke ([facility] [address] 2017    Unspecified convulsions ([facility] [address]     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       Past Surgical History:   Procedure Laterality Date    COLONOSCOPY N/A 2021    COLONOSCOPY performed by Carlitos Huber MD at [facility] ENDOSCOPY    HX HEENT      pt reports past hx of surgery on ears unsure of what type    HX OTHER SURGICAL Bilateral     debridement of lower extremities       Family History   Problem Relation Age of Onset    Hypertension Other        Social History     Socioeconomic History    Marital status:    Tobacco Use    Smoking status: Former     Packs/day: 0.50     Types: Cigarettes     Quit date: 2008     Years since quittin.5    Smokeless tobacco: Never   Substance and Sexual Activity    Alcohol use: No    Drug use: No    Sexual activity: Not Currently       Prior to Admission medications    Medication Sig Start Date End Date Taking? Authorizing Provider   apixaban (ELIQUIS) 5 mg tablet Take 1 Tablet by mouth every twelve (12) hours. 22   Chelsie Larose N,    baclofen (LIORESAL) 10 mg tablet 10 mg by Per G Tube route two (2) times daily as needed for Muscle Spasm(s).  Indications: muscle spasms caused by a spinal disease    Provider, Historical   cyanocobalamin (VITAMIN B12) 500 mcg tablet 500 mcg by Per G Tube route daily. Indications: prevention of vitamin B12 deficiency    Provider, Historical   ferrous sulfate (FerrouSuL) 325 mg (65 mg iron) tablet Take  by mouth Daily (before breakfast). Provider, Historical   metoprolol tartrate (LOPRESSOR) 25 mg tablet 25 mg by Per G Tube route two (2) times a day. Provider, Historical   thiamine HCL (B-1) 100 mg tablet 1 Tablet by Per G Tube route daily. 10/22/22   Jamaal Parry MD   levETIRAcetam (KEPPRA) 100 mg/mL solution 7.5 mL by Per G Tube route two (2) times a day. 10/22/22   Jamaal Parry MD   pravastatin (PRAVACHOL) 40 mg tablet Take 1 Tab by mouth nightly. 9/20/17   Juan Antonio Johnston MD   ascorbic acid, vitamin C, (VITAMIN C) 250 mg tablet Take 1 Tab by mouth daily. 7/13/17   Xiomara Bettencourt MD   polyethylene glycol (MIRALAX) 17 gram packet Take 1 Packet by mouth daily. 7/13/17   Xiomara Bettencourt MD   tamsulosin (FLOMAX) 0.4 mg capsule Take 0.4 mg by mouth daily. 1 tab daily    Provider, Historical   MULTIVITAMIN,THERAPEUTIC (THERA MULTI-VITAMIN PO) Take 500 mg by mouth daily. Provider, Historical   folic acid (FOLVITE) 1 mg tablet Take 1 Tab by mouth daily. 8/18/14   Xiomara Bettencourt MD       No Known Allergies    Review of Systems  A comprehensive review of systems was negative except for that written in the History of Present Illness. Physical Exam:      Visit Vitals  /80 (BP 1 Location: Left lower arm, BP Patient Position: Supine; At rest)   Pulse 98   Temp 100.3 °F (37.9 °C)   Resp 20   Ht 5' 9\" (1.753 m)   Wt 73.5 kg (162 lb 0.6 oz)   SpO2 100%   BMI 23.93 kg/m²       Physical Exam:  Physical Exam:   General:  Encephalopathic  no distress, appears stated age. Does not communicate   Eyes:  Conjunctivae/corneas clear. PERRL, EOMs intact. Fundi benign   Ears:  Normal TMs and external ear canals both ears. Nose: Nares normal. Septum midline. Mucosa normal. No drainage or sinus tenderness.    Mouth/Throat: Lips, mucosa, and tongue normal. Teeth and gums normal.   Neck: Supple, symmetrical, trachea midline, no adenopathy, thyroid: no enlargement/tenderness/nodules, no carotid bruit and no JVD. Back:   Symmetric, no curvature. ROM normal. No CVA tenderness. Lungs:   Clear to auscultation bilaterally. Heart:  Regular rate and rhythm, S1, S2 normal, no murmur, click, rub or gallop. Abdomen:   Soft, non-tender. Bowel sounds normal. No masses,  No organomegaly. Has PEG tube   Extremities: Extremities normal, atraumatic, has chronic wound. Stage 4 necrotic wound in the sacral area.  & pressure ulcers both wound on both heels                       Labs Reviewed:  BMP:   Lab Results   Component Value Date/Time     (H) 01/07/2023 05:37 AM    K 2.8 (LL) 01/07/2023 05:37 AM     (H) 01/07/2023 05:37 AM    CO2 26 01/07/2023 05:37 AM    AGAP 5 01/07/2023 05:37 AM     (H) 01/07/2023 05:37 AM    BUN 38 (H) 01/07/2023 05:37 AM    CREA 0.80 01/07/2023 05:37 AM     CMP:   Lab Results   Component Value Date/Time     (H) 01/07/2023 05:37 AM    K 2.8 (LL) 01/07/2023 05:37 AM     (H) 01/07/2023 05:37 AM    CO2 26 01/07/2023 05:37 AM    AGAP 5 01/07/2023 05:37 AM     (H) 01/07/2023 05:37 AM    BUN 38 (H) 01/07/2023 05:37 AM    CREA 0.80 01/07/2023 05:37 AM    CA 8.3 (L) 01/07/2023 05:37 AM    MG 2.5 01/07/2023 05:37 AM    PHOS 2.6 01/07/2023 05:37 AM    ALB 1.4 (L) 01/07/2023 05:37 AM    TP 6.6 01/07/2023 05:37 AM    GLOB 5.2 (H) 01/07/2023 05:37 AM    AGRAT 0.3 (L) 01/07/2023 05:37 AM    ALT 66 (H) 01/07/2023 05:37 AM     CBC:   Lab Results   Component Value Date/Time    WBC 13.1 01/07/2023 05:37 AM    HGB 6.4 (L) 01/07/2023 05:37 AM    HCT 21.1 (L) 01/07/2023 05:37 AM     01/07/2023 05:37 AM      Latest Reference Range & Units Most Recent 6/10/15 18:03 6/10/15 23:20 3/30/21 16:00 10/10/22 12:00 1/6/23 19:24   Creatinine, urine random 30 - 125 mg/dL 8.81 (L)  12/26/13 03:59        Sodium,urine random 20 - 110 MMOL/L <5 (L)  1/6/23 19:24 13 (L) 27 14 (L) 13 (L) <5 (L)   Potassium urine, random 12.0 - 62 MMOL/L 44  1/6/23 19:24   63 (H) 93 (H) 44   Chloride,urine random 55 - 125 MMOL/L <10 (L)  1/6/23 19:24   30 (L) <10 (L) <10 (L)   Osmolality,urine 300 - 900 MOSM/kg H2O 66 (L)  6/10/15 23:20 33 (L) 66 (L)      (L): Data is abnormally low  (H): Data is abnormally high. Culture report reviewed. Assessment/Plan     Principal Problem:    Septic shock ([facility] [address] (1/6/2023)    Active Problems:    Hyperosmolality and hypernatremia (3/30/2021)      Acute metabolic encephalopathy (3/12/6280)      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[facility] [address] (93/5/0258)      Hypernatremia (11/25/2022)      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 [facility]) (1/6/2023)      Community acquired pneumonia of right lower lobe of lung (1/6/2023)    Hyper Osmolar Hypernatremic dehydration. Has Approximately 10 liters of free water Deficit. Poly Microbial Sepsis. Hypokalemia. Large Sacral Decubitus Ulcer. Pre-Renal Bun/Creatinine ratio. Fecal Impaction. Pneumonia. Plan : careful Hydration with D5 W. Free water through PEG            Urine Electrolytes            Continue antibiotics            Need Wound Debridement. .  Follow ID's recommendations            Will follow.

## 2023-01-08 LAB
ABO + RH BLD: NORMAL
ANION GAP SERPL CALC-SCNC: 6 MMOL/L (ref 3–18)
BACTERIA SPEC CULT: NORMAL
BASOPHILS # BLD: 0 K/UL (ref 0–0.1)
BASOPHILS NFR BLD: 0 % (ref 0–2)
BLD PROD TYP BPU: NORMAL
BLOOD GROUP ANTIBODIES SERPL: NORMAL
BPU ID: NORMAL
BUN SERPL-MCNC: 27 MG/DL (ref 7–18)
BUN/CREAT SERPL: 45 (ref 12–20)
CALCIUM SERPL-MCNC: 8.1 MG/DL (ref 8.5–10.1)
CALLED TO:,BCALL1: NORMAL
CHLORIDE SERPL-SCNC: 127 MMOL/L (ref 100–111)
CO2 SERPL-SCNC: 23 MMOL/L (ref 21–32)
CREAT SERPL-MCNC: 0.6 MG/DL (ref 0.6–1.3)
CROSSMATCH RESULT,%XM: NORMAL
DIFFERENTIAL METHOD BLD: ABNORMAL
EOSINOPHIL # BLD: 0.1 K/UL (ref 0–0.4)
EOSINOPHIL NFR BLD: 1 % (ref 0–5)
ERYTHROCYTE [DISTWIDTH] IN BLOOD BY AUTOMATED COUNT: 17.7 % (ref 11.6–14.5)
GLUCOSE BLD STRIP.AUTO-MCNC: 110 MG/DL (ref 70–110)
GLUCOSE BLD STRIP.AUTO-MCNC: 112 MG/DL (ref 70–110)
GLUCOSE BLD STRIP.AUTO-MCNC: 123 MG/DL (ref 70–110)
GLUCOSE BLD STRIP.AUTO-MCNC: 82 MG/DL (ref 70–110)
GLUCOSE BLD STRIP.AUTO-MCNC: 94 MG/DL (ref 70–110)
GLUCOSE SERPL-MCNC: 104 MG/DL (ref 74–99)
GRAM STN SPEC: NORMAL
HCT VFR BLD AUTO: 18 % (ref 36–48)
HCT VFR BLD AUTO: 19.1 % (ref 36–48)
HCT VFR BLD AUTO: 31.6 % (ref 36–48)
HGB BLD-MCNC: 4.7 G/DL (ref 13–16)
HGB BLD-MCNC: 5.3 G/DL (ref 13–16)
HGB BLD-MCNC: 9.6 G/DL (ref 13–16)
HISTORY CHECKED?,CKHIST: NORMAL
IMM GRANULOCYTES # BLD AUTO: 0 K/UL (ref 0–0.04)
IMM GRANULOCYTES NFR BLD AUTO: 0 % (ref 0–0.5)
LYMPHOCYTES # BLD: 0.1 K/UL (ref 0.9–3.6)
LYMPHOCYTES NFR BLD: 1 % (ref 21–52)
MAGNESIUM SERPL-MCNC: 2.4 MG/DL (ref 1.6–2.6)
MCH RBC QN AUTO: 28 PG (ref 24–34)
MCHC RBC AUTO-ENTMCNC: 29.4 G/DL (ref 31–37)
MCV RBC AUTO: 95.2 FL (ref 78–100)
METAMYELOCYTES NFR BLD MANUAL: 1 %
MONOCYTES # BLD: 0.1 K/UL (ref 0.05–1.2)
MONOCYTES NFR BLD: 1 % (ref 3–10)
NEUTS BAND NFR BLD MANUAL: 5 %
NEUTS SEG # BLD: 8.4 K/UL (ref 1.8–8)
NEUTS SEG NFR BLD: 91 % (ref 40–73)
NRBC # BLD: 0 K/UL (ref 0–0.01)
NRBC BLD-RTO: 0 PER 100 WBC
OSMOLALITY SERPL: 340 MOSMOL/KG (ref 280–301)
OSMOLALITY UR: 460 MOSMOL/KG
PHOSPHATE SERPL-MCNC: 2.6 MG/DL (ref 2.5–4.9)
PLATELET # BLD AUTO: 153 K/UL (ref 135–420)
PLATELET COMMENTS,PCOM: ABNORMAL
PMV BLD AUTO: 12.8 FL (ref 9.2–11.8)
POTASSIUM SERPL-SCNC: 3.4 MMOL/L (ref 3.5–5.5)
RBC # BLD AUTO: 1.89 M/UL (ref 4.35–5.65)
RBC MORPH BLD: ABNORMAL
SERVICE CMNT-IMP: NORMAL
SODIUM SERPL-SCNC: 156 MMOL/L (ref 136–145)
SPECIMEN EXP DATE BLD: NORMAL
STATUS OF UNIT,%ST: NORMAL
UNIT DIVISION, %UDIV: 0
VANCOMYCIN SERPL-MCNC: 11.7 UG/ML (ref 5–40)
WBC # BLD AUTO: 8.7 K/UL (ref 4.6–13.2)

## 2023-01-08 PROCEDURE — 2709999900 HC NON-CHARGEABLE SUPPLY

## 2023-01-08 PROCEDURE — 83735 ASSAY OF MAGNESIUM: CPT

## 2023-01-08 PROCEDURE — 74011250637 HC RX REV CODE- 250/637: Performed by: INTERNAL MEDICINE

## 2023-01-08 PROCEDURE — 85018 HEMOGLOBIN: CPT

## 2023-01-08 PROCEDURE — 11044 DBRDMT BONE 1ST 20 SQ CM/<: CPT | Performed by: COLON & RECTAL SURGERY

## 2023-01-08 PROCEDURE — 36573 INSJ PICC RS&I 5 YR+: CPT | Performed by: INTERNAL MEDICINE

## 2023-01-08 PROCEDURE — 74011000250 HC RX REV CODE- 250: Performed by: INTERNAL MEDICINE

## 2023-01-08 PROCEDURE — 77030040392 HC DRSG OPTIFOAM MDII -A

## 2023-01-08 PROCEDURE — 36430 TRANSFUSION BLD/BLD COMPNT: CPT

## 2023-01-08 PROCEDURE — 94640 AIRWAY INHALATION TREATMENT: CPT

## 2023-01-08 PROCEDURE — 11047 DBRDMT BONE EACH ADDL: CPT | Performed by: COLON & RECTAL SURGERY

## 2023-01-08 PROCEDURE — 99232 SBSQ HOSP IP/OBS MODERATE 35: CPT | Performed by: COLON & RECTAL SURGERY

## 2023-01-08 PROCEDURE — 82962 GLUCOSE BLOOD TEST: CPT

## 2023-01-08 PROCEDURE — 36415 COLL VENOUS BLD VENIPUNCTURE: CPT

## 2023-01-08 PROCEDURE — 77010033678 HC OXYGEN DAILY

## 2023-01-08 PROCEDURE — 84100 ASSAY OF PHOSPHORUS: CPT

## 2023-01-08 PROCEDURE — 74011250636 HC RX REV CODE- 250/636: Performed by: INTERNAL MEDICINE

## 2023-01-08 PROCEDURE — 86900 BLOOD TYPING SEROLOGIC ABO: CPT

## 2023-01-08 PROCEDURE — 87040 BLOOD CULTURE FOR BACTERIA: CPT

## 2023-01-08 PROCEDURE — 74011000258 HC RX REV CODE- 258: Performed by: PHYSICIAN ASSISTANT

## 2023-01-08 PROCEDURE — P9016 RBC LEUKOCYTES REDUCED: HCPCS

## 2023-01-08 PROCEDURE — 74011000250 HC RX REV CODE- 250

## 2023-01-08 PROCEDURE — 65660000004 HC RM CVT STEPDOWN

## 2023-01-08 PROCEDURE — 80048 BASIC METABOLIC PNL TOTAL CA: CPT

## 2023-01-08 PROCEDURE — 99233 SBSQ HOSP IP/OBS HIGH 50: CPT | Performed by: INTERNAL MEDICINE

## 2023-01-08 PROCEDURE — 86923 COMPATIBILITY TEST ELECTRIC: CPT

## 2023-01-08 PROCEDURE — 74011250636 HC RX REV CODE- 250/636: Performed by: PHYSICIAN ASSISTANT

## 2023-01-08 PROCEDURE — 94761 N-INVAS EAR/PLS OXIMETRY MLT: CPT

## 2023-01-08 PROCEDURE — 80202 ASSAY OF VANCOMYCIN: CPT

## 2023-01-08 PROCEDURE — 02HV33Z INSERTION OF INFUSION DEVICE INTO SUPERIOR VENA CAVA, PERCUTANEOUS APPROACH: ICD-10-PCS | Performed by: UROLOGY

## 2023-01-08 PROCEDURE — 85025 COMPLETE CBC W/AUTO DIFF WBC: CPT

## 2023-01-08 RX ORDER — POTASSIUM CHLORIDE, DEXTROSE MONOHYDRATE 150; 5 MG/100ML; G/100ML
INJECTION, SOLUTION INTRAVENOUS CONTINUOUS
Status: DISCONTINUED | OUTPATIENT
Start: 2023-01-08 | End: 2023-01-15

## 2023-01-08 RX ORDER — SODIUM CHLORIDE 9 MG/ML
250 INJECTION, SOLUTION INTRAVENOUS AS NEEDED
Status: DISCONTINUED | OUTPATIENT
Start: 2023-01-08 | End: 2023-01-15 | Stop reason: HOSPADM

## 2023-01-08 RX ORDER — VANCOMYCIN HYDROCHLORIDE
1250 EVERY 24 HOURS
Status: DISCONTINUED | OUTPATIENT
Start: 2023-01-08 | End: 2023-01-09 | Stop reason: ALTCHOICE

## 2023-01-08 RX ADMIN — SODIUM CHLORIDE, PRESERVATIVE FREE 10 ML: 5 INJECTION INTRAVENOUS at 17:25

## 2023-01-08 RX ADMIN — IPRATROPIUM BROMIDE AND ALBUTEROL SULFATE 3 ML: .5; 3 SOLUTION RESPIRATORY (INHALATION) at 13:30

## 2023-01-08 RX ADMIN — Medication 125 MG: at 13:30

## 2023-01-08 RX ADMIN — SODIUM CHLORIDE, PRESERVATIVE FREE 10 ML: 5 INJECTION INTRAVENOUS at 06:00

## 2023-01-08 RX ADMIN — DEXTROSE MONOHYDRATE AND POTASSIUM CHLORIDE INJECTION, SOLUTION: 5; .149 INJECTION, SOLUTION INTRAVENOUS at 17:23

## 2023-01-08 RX ADMIN — DEXTROSE MONOHYDRATE 100 ML/HR: 5 INJECTION, SOLUTION INTRAVENOUS at 10:06

## 2023-01-08 RX ADMIN — IPRATROPIUM BROMIDE AND ALBUTEROL SULFATE 3 ML: .5; 3 SOLUTION RESPIRATORY (INHALATION) at 07:19

## 2023-01-08 RX ADMIN — SODIUM CHLORIDE, PRESERVATIVE FREE 10 ML: 5 INJECTION INTRAVENOUS at 22:28

## 2023-01-08 RX ADMIN — PIPERACILLIN SODIUM AND TAZOBACTAM SODIUM 3.38 G: 3; .375 INJECTION, POWDER, LYOPHILIZED, FOR SOLUTION INTRAVENOUS at 10:10

## 2023-01-08 RX ADMIN — Medication 125 MG: at 06:38

## 2023-01-08 RX ADMIN — IPRATROPIUM BROMIDE AND ALBUTEROL SULFATE 3 ML: .5; 3 SOLUTION RESPIRATORY (INHALATION) at 21:18

## 2023-01-08 RX ADMIN — LEVETIRACETAM 750 MG: 100 SOLUTION ORAL at 17:25

## 2023-01-08 RX ADMIN — PIPERACILLIN SODIUM AND TAZOBACTAM SODIUM 3.38 G: 3; .375 INJECTION, POWDER, LYOPHILIZED, FOR SOLUTION INTRAVENOUS at 00:51

## 2023-01-08 RX ADMIN — LEVETIRACETAM 750 MG: 100 SOLUTION ORAL at 10:20

## 2023-01-08 RX ADMIN — Medication 125 MG: at 17:26

## 2023-01-08 RX ADMIN — CYANOCOBALAMIN TAB 1000 MCG 500 MCG: 1000 TAB at 10:20

## 2023-01-08 RX ADMIN — PIPERACILLIN SODIUM AND TAZOBACTAM SODIUM 3.38 G: 3; .375 INJECTION, POWDER, LYOPHILIZED, FOR SOLUTION INTRAVENOUS at 17:24

## 2023-01-08 RX ADMIN — Medication 125 MG: at 00:51

## 2023-01-08 RX ADMIN — VANCOMYCIN HYDROCHLORIDE 1250 MG: 10 INJECTION, POWDER, LYOPHILIZED, FOR SOLUTION INTRAVENOUS at 10:12

## 2023-01-08 NOTE — PROGRESS NOTES
Hospitalist Progress Note    NAME:  Fidencio Barajas   :   1955   MRN:   [id number]     Date/Time:  2023  Subjective: pt is complicated with multiple issues; Chief Complaint:   Pt remain confused; BP is better; Non verbal   H/h dropped again no clear source of GI bleed;    23;   B/c 2/2 with   Culture result:   PROBABLE BACTEROIDES FRAGILIS GROWING IN BOTH BOTTLES DRAWN No Site Indicated Abnormal  P  [address]   Culture result:   PROBABLE ESCHERICHIA COLI GROWING IN BOTH BOTTLES DRAWN No Site Indicated Abnormal  P  [address]   Culture result:   PROBABLE PROTEUS SPECIES GROWING IN BOTH BOTTLES DRAWN No Site Indicated Abnormal  P     GRAM POSITIVE COCCI IN CLUSTERS GROWING IN THE SAME ABOVE BOTTLE Abnormal  P     CT ;     IMPRESSION  1. Bilateral posterior basilar pneumonia, slightly less severe than before. Trace right pleural effusion. 2. Hiatal hernia/distal esophagitis. 3. Severe constipation. Fecal impaction in the rectum with distended rectum,  large fecal ball. 4. Decubitus ulcer with air approaching the posterior wall of anus/rectum and  posterior cortex of the sacrum and coccyx. No abscess. ; s/p ID of the Sacral IRRIGATION AND DEBRIDEMENT OF SACRAL DECUBITUS ULCER ;    23;   h/h dropped to 6.4/; Plan for 1 unit of blood;  C diff is positive; start po vanco; hold Levaquin ; Sodium down to 156; on D5w drip and free water in the G tube;  K is 2.8; replace;    23; h/h dropped to 5.3/18; give 1 U of blood now total of 2 units;  K 3.4, sodium is 156;             [x]  Unable to obtain  ROS due to  [x]  mental status change  []  sedated   []  Intubated non verbal;     Past Med History and Social history reviewed. No changes.    [x]  Current Medication list and allergies reviewed*    Objective:   Vitals:  Visit Vitals  /73   Pulse 87   Temp 97 °F (36.1 °C)   Resp 18   Ht 5' 9\" (1.753 m)   Wt 73.5 kg (162 lb 0.6 oz)   SpO2 99%   BMI 23.93 kg/m²     Temp (24hrs), Av.7 °F (36.5 °C), Min:96.7 °F (35.9 °C), Max:100.3 °F (37.9 °C)      Last 24hr Input/Output:    Intake/Output Summary (Last 24 hours) at 2023 0934  Last data filed at 2023 0500  Gross per 24 hour   Intake 590.83 ml   Output --   Net 590.83 ml        PHYSICAL EXAM:     General appearance -awake, nonverbal    Chest -diminished air entry noted in bases, no wheezes    Heart - S1 and S2 normal, no murmurs appreciated currently    Abdomen - soft, nontender, nondistended, PEG tube is in situ, bowel sounds present    Neurological -awake, nonverbal, does not follow any meaningful commands, no tremors noted    Extremities - no pedal edema noted, no cyanosis    Psych -no agitation or hallucination    Skin; stage 4 sacral ulcer;       Large necrotic decubitus ulcer with evidence of purulent exudate            Lab Data Reviewed:  No components found for: [place]  Recent Labs     23  0341 23  0126 23  2335 23  0537 23  0300 23  1415 23  0949   *  --   --  156* 159*   < > 161*   K 3.4*  --   --  2.8* 4.1   < > 3.2*   *  --   --  125* 127*   < > 128*   CO2 23  --   --  26 27   < > 28   BUN 27*  --   --  38* 63*   < > 80*   CREA 0.60  --   --  0.80 0.93   < > 1.27   *  --   --  104* 117*   < > 158*   PHOS 2.6  --   --  2.6 4.1   < >  --    CA 8.1*  --   --  8.3* 8.6   < > 8.5   ALB  --   --   --  1.4*  --   --  1.5*   WBC  --  8.7  --  13.1 11.3   < > 14.3*   HGB  --  5.3* 4.7* 6.4* 7.5*   < > 7.2*   HCT  --  18.0* 19.1* 21.1* 25.2*   < > 24.1*   PLT  --  153  --  238 238   < > 230    < > = values in this interval not displayed.         []    I have personally reviewed the   []  xray  []  CT scan    Assessment/Plan:     Principal Problem:    Septic shock ([facility] (2023)    Active Problems:    Hyperosmolality and hypernatremia (3/30/2021)      Acute metabolic encephalopathy ()      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[facility] ()      Hypernatremia (11/25/2022)      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 [facility]) (1/6/2023)      Community acquired pneumonia of right lower lobe of lung (1/6/2023)    Acute post op anemia; give 1 unit of blood total 2 units; Pt was on ELIQUIS at [address] [facility] has been on hold;    C diff positive POA; continue Po Vanco hold levaquin ;     ___________________________________________________  PLAN:  Risk of deterioration:  []  Low    []  Moderate  [x]  High    Id consulted;;  Continue IV abx; with continue V/Z and  hold levaquin; due to c diff;   Await final B/c results;  Surgery on the case s/p ID    Continue D5W drip ; and T feeds and free water boluses on G tube;    DNR/DNI    Overall poor prognosis;  ___________________________________________________    Total time spent with patient:     minutes    []  Critical Care time:      minutes    Care Plan discussed with:    []  Patient   []  Family    []  Care Manager  [x]  Nursing   [x]  Consultant/Specialist :      []    >50% of visit spent in counseling and coordination of care   (Discussed []  CODE status,  []  Care Plan, []  D/C Planning)    Prophylaxis:  []  Lovenox  []  Coumadin  [x]  Hep SQ  []  SCDs  []  H2B/PPI    Disposition:  []  Home w/ Family   []  HH PT,OT,RN   [x]  SNF/LTC   []  SAH/Rehab  ___________________________________________________    Hospitalist: John Rachel MD     ___________________________________________________    *Medications reviewed:  Current Facility-Administered Medications   Medication Dose Route Frequency    0.9% sodium chloride infusion 250 mL  250 mL IntraVENous PRN    vancomycin (VANCOCIN) 1250 mg in  ml infusion  1,250 mg IntraVENous Q24H    0.9% sodium chloride infusion 250 mL  250 mL IntraVENous PRN    vancomycin 50 mg/mL oral solution (compounded) 125 mg  125 mg Per G Tube Q6H    cyanocobalamin tablet 500 mcg  500 mcg Per G Tube DAILY    levETIRAcetam (KEPPRA) oral solution 750 mg  750 mg Oral BID    pantoprazole (PROTONIX) 40 mg in 0.9% sodium chloride 10 mL injection  40 mg IntraVENous DAILY    sodium chloride (NS) flush 5-40 mL  5-40 mL IntraVENous Q8H    sodium chloride (NS) flush 5-40 mL  5-40 mL IntraVENous PRN    acetaminophen (TYLENOL) tablet 650 mg  650 mg Oral Q6H PRN    Or    acetaminophen (TYLENOL) suppository 650 mg  650 mg Rectal Q6H PRN    ondansetron (ZOFRAN ODT) tablet 4 mg  4 mg Oral Q8H PRN    Or    ondansetron (ZOFRAN) injection 4 mg  4 mg IntraVENous Q6H PRN    [Held by provider] heparin (porcine) injection 5,000 Units  5,000 Units SubCUTAneous Q8H    insulin lispro (HUMALOG) injection   SubCUTAneous Q6H    glucose chewable tablet 16 g  4 Tablet Oral PRN    glucagon (GLUCAGEN) injection 1 mg  1 mg IntraMUSCular PRN    dextrose 10% infusion 0-250 mL  0-250 mL IntraVENous PRN    dextrose 5% infusion  100 mL/hr IntraVENous CONTINUOUS    midodrine (PROAMATINE) tablet 5 mg  5 mg Per G Tube Q8H PRN    albuterol-ipratropium (DUO-NEB) 2.5 MG-0.5 MG/3 ML  3 mL Nebulization Q6H RT    sodium chloride (NS) flush 5-10 mL  5-10 mL IntraVENous PRN    piperacillin-tazobactam (ZOSYN) 3.375 g in 0.9% sodium chloride (MBP/ADV) 100 mL MBP  3.375 g IntraVENous Q8H    [Held by provider] levoFLOXacin (LEVAQUIN) 750 mg in D5W IVPB  750 mg IntraVENous Q24H

## 2023-01-08 NOTE — PROGRESS NOTES
Infectious Disease progress Note  Chart review      Reason: Sepsis    Current abx Prior abx   Zosyn, levofloxacin, vancomycin since 1/4/2023  Po vancomycin since 1/7/23      Lines:       Assessment :   79 y.o. male with hx of dementia, COVID, HLD, HTN, chronic hyponatremia, CVA, seizures who presented to ED on 1/4/2023 for altered mental status. Hospitalization [facility] July 2017 for right foot cellulitis, infected right foot ulcer secondary to Proteus, citrobacter, e.coli, mssa, e.fecalis, e.avium, strep viridans. Surgical wound cultures 7/11 : proteus,citrobacter   S/p I&D of right foot ulcer on 7/11- intra op findings noted - no evidence of bone infection. Hospitalization [facility] October 2022 for sepsis, acute hypoxic respiratory failure-present on admission due to aspiration pneumonia superimposed on recent COVID-19 infection     Now with leukocytosis, bandemia, hypotension requiring pressors on admission, CT scan 1/5/2023 with air noted in the posterior wall of anus/rectum, posterior cortex of the sacrum/coccyx, bacteremia    Clinical presentation consistent with septic shock-present on admission due to polymicrobial bloodstream infection -( blood cx 1/4/23 positive for e.coli, proteus, bacteroides, gram positive cocci), necrotizing infection of the sacrum/acute on chronic sacral osteomyelitis, c.diff infection    Most likely source of polymicrobial bloodstream infection is necrotizing infection of sacral ulcer  Status post I&D on 1/6/2023. Intra-Op findings noted. Surgery help appreciated  Preop wound cultures 1/6-mixed enteric larisa  Intra-Op cultures-pending    Stool c.diff positive- likely suggestive of evolving c.diff infection. Acute kidney injury-likely due to sepsis    Recommendations:     1. Continue Zosyn, levofloxacin, vancomycin. Agree with adding oral vancomycin  2. Repeat blood culture today  3. Follow-up wound cultures, blood cultures and de-escalate antibiotics accordingly  4. Follow-up surgery recommendations regarding wound care sacral ulcer  5. Management of acute kidney injury per primary team  6. Will hold laxatives  7. Recommend palliative care consult to address goals of care      Time spent > 10 min. Please call me if any further questions or concerns. Will continue to participate in the care of this patient. HPI:    Past Medical History:   Diagnosis Date    Alcohol dependence with alcohol-induced persisting dementia ([address]     per [facility] 4/23/21    Anemia     Aphasia     Benign prostatic hyperplasia     Cerebral vascular disease     COVID-19 04/06/2021    Disturbances of salivary secretion     Dysphagia     GERD (gastroesophageal reflux disease)     Hemiparesis (HCC)     left side    Hemiplegia (HCC)     left side     Hiccups     Hyperlipidemia     Hypernatremia 03/2021    Hypertension     Hyponatremia     Insomnia     Lacunar infarction ([address] 2010    weakness both arms, unable to walk    Lower extremity edema     Moderate protein-calorie malnutrition (HCC)     MRSA (methicillin resistant Staphylococcus aureus)     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)     Pneumonia     Psychogenic polydipsia     PVD (peripheral vascular disease) ([address]     Seizures ([address]     Spinal stenosis     Stroke ([address] 2017    Unspecified convulsions ([address]     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   Qty: 60 Tablet, Refills: 0      baclofen (LIORESAL) 10 mg tablet 10 mg by Per G Tube route two (2) times daily as needed for Muscle Spasm(s). 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 Indications: prevention of vitamin B12 deficiency      ferrous sulfate (FerrouSuL) 325 mg (65 mg iron) tablet Take  by mouth Daily (before breakfast). metoprolol tartrate (LOPRESSOR) 25 mg tablet 25 mg by Per G Tube route two (2) times a day. thiamine HCL (B-1) 100 mg tablet 1 Tablet by Per G Tube route daily. Qty: 30 Tablet, Refills: 0      levETIRAcetam (KEPPRA) 100 mg/mL solution 7.5 mL by Per G Tube route two (2) times a day. Qty: 473 mL, Refills: 0      pravastatin (PRAVACHOL) 40 mg tablet Take 1 Tab by mouth nightly. Qty: 30 Tab, Refills: 1      ascorbic acid, vitamin C, (VITAMIN C) 250 mg tablet Take 1 Tab by mouth daily. Qty: 30 Tab, Refills: 2      polyethylene glycol (MIRALAX) 17 gram packet Take 1 Packet by mouth daily. Qty: 30 Packet, Refills: 2      tamsulosin (FLOMAX) 0.4 mg capsule Take 0.4 mg by mouth daily. 1 tab daily      MULTIVITAMIN,THERAPEUTIC (THERA MULTI-VITAMIN PO) Take 500 mg by mouth daily. folic acid (FOLVITE) 1 mg tablet Take 1 Tab by mouth daily.   Qty: 30 Tab, Refills: 1             Current Facility-Administered Medications   Medication Dose Route Frequency    0.9% sodium chloride infusion 250 mL  250 mL IntraVENous PRN    vancomycin (VANCOCIN) 1250 mg in  ml infusion  1,250 mg IntraVENous Q24H    0.9% sodium chloride infusion 250 mL  250 mL IntraVENous PRN    vancomycin 50 mg/mL oral solution (compounded) 125 mg  125 mg Per G Tube Q6H    cyanocobalamin tablet 500 mcg  500 mcg Per G Tube DAILY    levETIRAcetam (KEPPRA) oral solution 750 mg  750 mg Oral BID    [Held by provider] pantoprazole (PROTONIX) 40 mg in 0.9% sodium chloride 10 mL injection  40 mg IntraVENous DAILY    sodium chloride (NS) flush 5-40 mL  5-40 mL IntraVENous Q8H    sodium chloride (NS) flush 5-40 mL  5-40 mL IntraVENous PRN    acetaminophen (TYLENOL) tablet 650 mg  650 mg Oral Q6H PRN    Or    acetaminophen (TYLENOL) suppository 650 mg  650 mg Rectal Q6H PRN    ondansetron (ZOFRAN ODT) tablet 4 mg  4 mg Oral Q8H PRN    Or    ondansetron (ZOFRAN) injection 4 mg  4 mg IntraVENous Q6H PRN    [Held by provider] heparin (porcine) injection 5,000 Units  5,000 Units SubCUTAneous Q8H    insulin lispro (HUMALOG) injection   SubCUTAneous Q6H    glucose chewable tablet 16 g  4 Tablet Oral PRN    glucagon (GLUCAGEN) injection 1 mg  1 mg IntraMUSCular PRN    dextrose 10% infusion 0-250 mL  0-250 mL IntraVENous PRN    dextrose 5% infusion  100 mL/hr IntraVENous CONTINUOUS    midodrine (PROAMATINE) tablet 5 mg  5 mg Per G Tube Q8H PRN    albuterol-ipratropium (DUO-NEB) 2.5 MG-0.5 MG/3 ML  3 mL Nebulization Q6H RT    sodium chloride (NS) flush 5-10 mL  5-10 mL IntraVENous PRN    piperacillin-tazobactam (ZOSYN) 3.375 g in 0.9% sodium chloride (MBP/ADV) 100 mL MBP  3.375 g IntraVENous Q8H    [Held by provider] levoFLOXacin (LEVAQUIN) 750 mg in D5W IVPB  750 mg IntraVENous Q24H       Allergies: Patient has no known allergies.     Family History   Problem Relation Age of Onset    Hypertension Other      Social History     Socioeconomic History    Marital status:      Spouse name: Not on file    Number of children: Not on file    Years of education: Not on file    Highest education level: Not on file   Occupational History    Not on file   Tobacco Use    Smoking status: Former     Packs/day: 0.50     Types: Cigarettes     Quit date: 2008     Years since quittin.5    Smokeless tobacco: Never   Substance and Sexual Activity    Alcohol use: No    Drug use: No    Sexual activity: Not Currently   Other Topics Concern    Not on file   Social History Narrative    Not on file     Social Determinants of Health     Financial Resource Strain: Not on file   Food Insecurity: Not on file   Transportation Needs: Not on file   Physical Activity: Not on file   Stress: Not on file   Social Connections: Not on file   Intimate Partner Violence: Not on file   Housing Stability: Not on file     Social History     Tobacco Use   Smoking Status Former    Packs/day: 0.50    Types: Cigarettes    Quit date: 2008    Years since quittin.5   Smokeless Tobacco Never        Temp (24hrs), Av.7 °F (36.5 °C), Min:96.7 °F (35.9 °C), Max:100.3 °F (37.9 °C)    Visit Vitals  /73   Pulse 87   Temp 97 °F (36.1 °C)   Resp 18   Ht 5' 9\" (1.753 m)   Wt 73.5 kg (162 lb 0.6 oz)   SpO2 99%   BMI 23.93 kg/m²       ROS:   Physical Exam:      Labs: Results:   Chemistry Recent Labs     23  0341 23  0537 23  0300 23  1415 23  0949   * 104* 117*   < > 158*   * 156* 159*   < > 161*   K 3.4* 2.8* 4.1   < > 3.2*   * 125* 127*   < > 128*   CO2 23 26 27   < > 28   BUN 27* 38* 63*   < > 80*   CREA 0.60 0.80 0.93   < > 1.27   CA 8.1* 8.3* 8.6   < > 8.5   AGAP 6 5 5   < > 5   BUCR 45* 48* 68*   < > 63*   AP  --  90  --   --  107   TP  --  6.6  --   --  6.6   ALB  --  1.4*  --   --  1.5*   GLOB  --  5.2*  --   --  5.1*   AGRAT  --  0.3*  --   --  0.3*    < > = values in this interval not displayed. CBC w/Diff Recent Labs     23  0126 23  2335 23  0537 23  0300   WBC 8.7  --  13.1 11.3   RBC 1.89*  --  2.39* 2.81*   HGB 5.3* 4.7* 6.4* 7.5*   HCT 18.0* 19.1* 21.1* 25.2*     --  238 238   GRANS 91*  --  90* 75*   LYMPH 1*  --  4* 5*   EOS 1  --  1 4        Microbiology Recent Labs     23  1300   CULT PENDING PENDING CHECKING FOR POSSIBLE MIXED ENTERIC RASHEED            RADIOLOGY:    All available imaging studies/reports in [facility] for this admission were reviewed      Disclaimer: Sections of this note are dictated utilizing voice recognition software, which may have resulted in some phonetic based errors in grammar and contents.  Even though attempts were made to correct all the mistakes, some may have been missed, and remained in the body of the document. If questions arise, please contact our department.     Dr. Shivani Cha, Infectious Disease Specialist  [phone]  January 8, 2023  5:16 PM

## 2023-01-08 NOTE — PROGRESS NOTES
[facility] [address] [place] [address]  [phone]  Colon and Rectal Surgery Progress Note      Patient: Marcellus Abarca MRN: [id number]  SSN: [id number]    YOB: 1955  Age: 79 y.o. Sex: male      Admit Date: 1/4/2023    LOS: 2 days     Subjective:     Low hgb. No bleeding noted from wound. Objective:     Vitals:    01/07/23 2357 01/08/23 0415 01/08/23 0625 01/08/23 0722   BP: 97/60 104/65 117/73    Pulse: (!) 115 85 87    Resp: 18 18 18    Temp: (!) 96.7 °F (35.9 °C) 97.5 °F (36.4 °C) 97 °F (36.1 °C)    SpO2: 100% 100% 100% 99%   Weight:       Height:            Intake and Output:  Current Shift: No intake/output data recorded.   Last three shifts: 01/06 1901 - 01/08 0700  In: 1090.8 [I.V.:500]  Out: 825 [Urine:800]    Physical Exam:     Constitutional: well developed, in NAD  Nursing states no blood on dressing    Lab/Data Review:    CMP:   Lab Results   Component Value Date/Time     (H) 01/08/2023 03:41 AM    K 3.4 (L) 01/08/2023 03:41 AM     (H) 01/08/2023 03:41 AM    CO2 23 01/08/2023 03:41 AM    AGAP 6 01/08/2023 03:41 AM     (H) 01/08/2023 03:41 AM    BUN 27 (H) 01/08/2023 03:41 AM    CREA 0.60 01/08/2023 03:41 AM    CA 8.1 (L) 01/08/2023 03:41 AM    MG 2.4 01/08/2023 03:41 AM    PHOS 2.6 01/08/2023 03:41 AM     CBC:   Lab Results   Component Value Date/Time    WBC 8.7 01/08/2023 01:26 AM    HGB 5.3 (LL) 01/08/2023 01:26 AM    HCT 18.0 (L) 01/08/2023 01:26 AM     01/08/2023 01:26 AM        Assessment:     S/p wide debridement stage 4 sacral decubitus ulcer    Plan:     Continue abx  F/U intraop cultures  Transfuse as necessary  Wound nurse consult for tomorrow to consider wound vac    Signed By: Delilah Silva MD        January 8, 2023

## 2023-01-08 NOTE — ANESTHESIA POSTPROCEDURE EVALUATION
Procedure(s):  IRRIGATION AND DEBRIDEMENT OF SACRAL DECUBITUS ULCER / Akhil Si.    general    Anesthesia Post Evaluation      Multimodal analgesia: multimodal analgesia used between 6 hours prior to anesthesia start to PACU discharge  Patient location during evaluation: PACU  Patient participation: complete - patient participated  Level of consciousness: responsive to physical stimuli (returned to baseline, non verbal state.)  Pain management: adequate  Airway patency: patent  Anesthetic complications: no  Cardiovascular status: acceptable  Respiratory status: acceptable  Hydration status: acceptable  Post anesthesia nausea and vomiting:  none  Final Post Anesthesia Temperature Assessment:  Normothermia (36.0-37.5 degrees C)      INITIAL Post-op Vital signs:   Vitals Value Taken Time   /73 01/08/23 0625   Temp 36.1 °C (97 °F) 01/08/23 0625   Pulse 87 01/08/23 0625   Resp 18 01/08/23 0625   SpO2 100 % 01/08/23 0625

## 2023-01-08 NOTE — PROGRESS NOTES
Progress Note    Monster Hatfield  79 y.o. Admit Date: 1/4/2023  Principal Problem:    Septic shock ([facility] (1/6/2023) POA: Yes    Active Problems:    Hyperosmolality and hypernatremia (3/30/2021) POA: Yes      Acute metabolic encephalopathy (2/57/8015) POA: Yes      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[facility] (60/4/1841) POA: Yes      Hypernatremia (11/25/2022) POA: Yes      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 ([facility] (1/6/2023) POA: Yes      Community acquired pneumonia of right lower lobe of lung (1/6/2023) POA: Yes            Subjective:     Patient remained noncommunicative. Opens eyes when follows kept his mouth open. No fever. This morning got blood transfusion. Currently getting IV fluid and potassium replacement. Blood cultures and wound cultures have grown different organisms including staph coag negative,  Undergone wound debridement of stage IV necrotic sacral decubitus  Finally made to bowel movement with soapsuds enema      A comprehensive review of systems was negative except for that written in the History of Present Illness.     Objective:     Visit Vitals  /75 (BP 1 Location: Left upper arm, BP Patient Position: At rest)   Pulse 92   Temp 97.3 °F (36.3 °C)   Resp 17   Ht 5' 9\" (1.753 m)   Wt 73.5 kg (162 lb 0.6 oz)   SpO2 100%   BMI 23.93 kg/m²         Intake/Output Summary (Last 24 hours) at 1/8/2023 1403  Last data filed at 1/8/2023 9109  Gross per 24 hour   Intake 910.83 ml   Output --   Net 910.83 ml       Current Facility-Administered Medications   Medication Dose Route Frequency Provider Last Rate Last Admin    0.9% sodium chloride infusion 250 mL  250 mL IntraVENous PRN Rocio Tristan DO        vancomycin (VANCOCIN) 1250 mg in  ml infusion  1,250 mg IntraVENous Q24H Oleta Alpers,  mL/hr at 01/08/23 1012 1,250 mg at 01/08/23 1012    dextrose 5% with KCl 20 mEq/L infusion   IntraVENous CONTINUOUS Margarette Mcfadden MD        0.9% sodium chloride infusion 250 mL  250 mL IntraVENous PRN Riddhi Ramirez MD        vancomycin 50 mg/mL oral solution (compounded) 125 mg  125 mg Per G Tube Q6H Riddhi Ramirez MD   125 mg at 01/08/23 1330    cyanocobalamin tablet 500 mcg  500 mcg Per G Tube DAILY Riddhi Ramirez MD   500 mcg at 01/08/23 1020    levETIRAcetam (KEPPRA) oral solution 750 mg  750 mg Oral BID Riddhi Ramirez MD   750 mg at 01/08/23 1020    pantoprazole (PROTONIX) 40 mg in 0.9% sodium chloride 10 mL injection  40 mg IntraVENous DAILY Riddhi Ramirez MD   40 mg at 01/07/23 1006    sodium chloride (NS) flush 5-40 mL  5-40 mL IntraVENous S5O Sil Hawkins MD   10 mL at 01/08/23 0600    sodium chloride (NS) flush 5-40 mL  5-40 mL IntraVENous PRN Sil Hawkins MD        acetaminophen (TYLENOL) tablet 650 mg  650 mg Oral Q6H PRN Sil Hawkins MD        Or    acetaminophen (TYLENOL) suppository 650 mg  650 mg Rectal Q6H PRN Sil Hawkins MD        ondansetron (ZOFRAN ODT) tablet 4 mg  4 mg Oral Q8H PRN Sil Hawkins MD        Or    ondansetron (ZOFRAN) injection 4 mg  4 mg IntraVENous Q6H PRN Sil Hawkins MD        [Held by provider] heparin (porcine) injection 5,000 Units  5,000 Units SubCUTAneous Z0T Sil Hawkins MD   6,861 Units at 01/05/23 2339    insulin lispro (HUMALOG) injection   SubCUTAneous Q6H Alma Julian PA-C        glucose chewable tablet 16 g  4 Tablet Oral PRN Alma Julian PA-C        glucagon (GLUCAGEN) injection 1 mg  1 mg IntraMUSCular PRN Alma Julian PA-C        dextrose 10% infusion 0-250 mL  0-250 mL IntraVENous PRN Alma Julian PA-C        midodrine (PROAMATINE) tablet 5 mg  5 mg Per G Tube Q8H PRN Nica Barney MD        albuterol-ipratropium (DUO-NEB) 2.5 MG-0.5 MG/3 ML  3 mL Nebulization Q6H RT Nica Barney MD   3 mL at 01/08/23 1330    sodium chloride (NS) flush 5-10 mL  5-10 mL IntraVENous PRN ARSEN Bhakta        piperacillin-tazobactam (ZOSYN) 3.375 g in 0.9% sodium chloride (MBP/ADV) 100 mL MBP  3.375 g IntraVENous Q8H Adonis Holman, [place] 25 mL/hr at 01/08/23 1010 3.375 g at 01/08/23 1010    [Held by provider] levoFLOXacin (LEVAQUIN) 750 mg in D5W IVPB  750 mg IntraVENous Q24H ARSEN Hill 100 mL/hr at 01/07/23 0109 750 mg at 01/07/23 0109        Physical Exam:     Physical Exam:   General:  Tired looking, Emaciate & generalized wasting. no distress, appears stated age. Neck: Supple, symmetrical, trachea midline, no adenopathy, thyroid: no enlargement/tenderness/nodules, no carotid bruit and no JVD. Lungs:   Clear to auscultation bilaterally. Heart:  Regular rate and rhythm, S1, S2 normal, no murmur, click, rub or gallop. Abdomen:   Soft, non-tender. Bowel sounds normal. No masses,  No organomegaly. Extremities: Extremities normal, atraumatic, no cyanosis or edema.    Skin: Skin color, texture, turgor normal. No rashes or lesions                 Data Review:    CBC w/Diff    Recent Labs     01/08/23  0126 01/07/23  2335 01/07/23  0537 01/06/23  0300   WBC 8.7  --  13.1 11.3   RBC 1.89*  --  2.39* 2.81*   HGB 5.3* 4.7* 6.4* 7.5*   HCT 18.0* 19.1* 21.1* 25.2*   MCV 95.2  --  88.3 89.7   MCH 28.0  --  26.8 26.7   MCHC 29.4*  --  30.3* 29.8*   RDW 17.7*  --  16.8* 17.0*    Recent Labs     01/08/23  0126 01/07/23  0537 01/06/23  0300   BANDS 5 4 14   MONOS 1* 1* 1*   EOS 1 1 4   BASOS 0 0 0   RDW 17.7* 16.8* 17.0*        Comprehensive Metabolic Profile    Recent Labs     01/08/23  0341 01/07/23  0537 01/06/23  0300   * 156* 159*   K 3.4* 2.8* 4.1   * 125* 127*   CO2 23 26 27   BUN 27* 38* 63*   CREA 0.60 0.80 0.93    Recent Labs     01/08/23  0341 01/07/23  0537 01/06/23  0300   CA 8.1* 8.3* 8.6   PHOS 2.6 2.6 4.1   ALB  --  1.4*  --    TP  --  6.6  --    TBILI  --  0.5  --                       Impression:       Active Hospital Problems    Diagnosis Date Noted    Septic shock ([facility] 01/06/2023    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 ([facility] 01/06/2023    Community acquired pneumonia of right lower lobe of lung 01/06/2023    Hypernatremia 12/64/5550    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[facility] 18/50/8508    Hyperosmolality and hypernatremia 75/32/2282    Acute metabolic encephalopathy 64/76/0449      Hypernatremic dehydration still persist.  Last hypokalemia, still has around 9 to 10 L of free water deficit      Plan:     Continue IV fluid with D5W and potassium in the D5W. Continue the free water through the PEG tube along with Jevity. Continued antibiotics for the wound sepsis as well as pneumonia.   Avoid nephrotoxins      Heena De Leon MD

## 2023-01-08 NOTE — PROGRESS NOTES
1937: Telephone consent given by Saintclair Moots, son, agreed to PICC line for pt with [facility] KEENAN RIVERO.

## 2023-01-08 NOTE — PROGRESS NOTES
Update:  Repeat H&H still low at 5.3/18.0  -Will order 1 unit of PRBC to be infused now    Nursing staff paged for critical hemoglobin of 4.7. Patient had a 1 unit transfusion and this was the post transfusion repeat. Nursing deny any obvious loss of blood. Patient is nonverbal at baseline. We will repeat a stat H&H to verify and give additional transfusion if hemoglobin is less than 7.

## 2023-01-08 NOTE — PROGRESS NOTES
Problem: Nutrition Deficit  Goal: *Optimize nutritional status  Outcome: Progressing Towards Goal     Problem: Risk for Spread of Infection  Goal: Prevent transmission of infectious organism to others  Description: Prevent the transmission of infectious organisms to other patients, staff members, and visitors. Outcome: Progressing Towards Goal     Problem: Patient Education:  Go to Education Activity  Goal: Patient/Family Education  Outcome: Progressing Towards Goal     Problem: Falls - Risk of  Goal: *Absence of Falls  Description: Document Eudelia Romberg Fall Risk and appropriate interventions in the flowsheet.   Outcome: Progressing Towards Goal  Note: Fall Risk Interventions:       Mentation Interventions: Room close to nurse's station, Adequate sleep, hydration, pain control, Bed/chair exit alarm    Medication Interventions: Bed/chair exit alarm    Elimination Interventions: Bed/chair exit alarm, Call light in reach              Problem: Patient Education: Go to Patient Education Activity  Goal: Patient/Family Education  Outcome: Progressing Towards Goal

## 2023-01-08 NOTE — PROGRESS NOTES
[address] Pharmacokinetic Monitoring Service - Vancomycin    Consulting Provider: ARSEN Hill  Indication: Sepsis of Unknown Etiology  Target Concentration: Goal AUC/ALFREDITO 400-600 mg*hr/L  Day of Therapy: 5  Additional Antimicrobials: Piperacillin/Tazobactam, levaquin (Held)    Pertinent Cultures:  Culture Date Source Results   01/06 Wound Pending   01/06 Wound - Sacral Pending   01/06 Wound Drainage FEW GRAM POSITIVE COCCI IN PAIRS P   01/04 Blood LACTOSE FERMENTING GRAM NEGATIVE RODS GROWING IN 1 OF 2 BOTTLES  STAPHYLOCOCCUS SPECIES, COAGULASE NEGATIVE ALSO GROWING IN SAME 1 OF 2 BOTTLES  APPARENT GRAM VARIABLE RODS GROWING IN THE 2ND BOTTLE   01/04 Blood ESCHERICHIA COLI GROWING IN BOTH BOTTLES DRAWN  PROTEUS MIRABILIS GROWING IN BOTH BOTTLES DRAWN  NO BACTEROIDES AS PREVIOUSLY SUSPECTED   MRSA Nasal Swab: N/A.  Non-respiratory infection    Assessment:  Date/Time Current Dose Concentration Timing of Concentration (h) AUC   01/04 2249 1750mg - - -   01/06 0300 - 16.3 31 -   01/06 0919 1000 mg q24h - - -   01/07 1000 1000 mg - - -   01/08 0126 - 11.7 15 380          Note: Serum concentrations collected for AUC dosing may appear elevated if collected in close proximity to the dose administered, this is not necessarily an indication of toxicity    Plan:  Increase Vancomycin to 1250 mg IV q24h for anticipated AUC of 474 and trough of 11 at steady state  Renal labs as indicated   Vancomycin concentration ordered for 1/11 @ 0400  Pharmacy will continue to monitor patient and adjust therapy as indicated

## 2023-01-08 NOTE — ACP (ADVANCE CARE PLANNING)
Advance Care Planning   Advance Care Planning Inpatient Note  [facility]  Spiritual Care Department    Today's Date: 1/8/2023  Unit: [facility] [address]    Received request from . Upon review of chart and communication with care team, patient's decision making abilities are not in question. Patient was/were present in the room during visit. Goals of ACP Conversation:  Discuss Advance Care planning documents    Health Care Decision Makers:      Supplemental (Other) Decision Maker: Eda Hernandez - [phone]    Summary:  Verified Documents    Advance Care Planning Documents (Patient Wishes) on file:  Portable DNR form  POLST Document     Assessment:     attempted to visit with patient in room 356 but found him not responsive at present.  Patient has a POST form and a DDNR on chart/      Interventions:      Care Preferences Communicated:  No    Outcomes/Plan:  Existing Advance Directive reviewed with patient; no changes to patient's previously recorded wishes    [facility] on 1/8/2023 at 2:00 PM

## 2023-01-08 NOTE — PROGRESS NOTES
conducted an initial consultation and Spiritual Assessment for Miladis Mack, who is a 79 y.o.,male. Patients Primary Language is: [place]. According to the patients EMR Gnosticism Affiliation is: Pentecostal.     The reason the Patient came to the hospital is:   Patient Active Problem List    Diagnosis Date Noted    Septic shock ([address] 01/06/2023    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 [facility]) 01/06/2023    Community acquired pneumonia of right lower lobe of lung 01/06/2023    Aspiration pneumonia ([address] 12/01/2022    Hypernatremia 11/25/2022    Anemia 11/25/2022    Symptomatic anemia 11/25/2022    Seizure disorder ([address] 10/12/2022    Respiratory failure ([address] 13/84/3185    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[address] 85/12/1843    Goals of care, counseling/discussion     Debility     Hyperosmolality and hypernatremia 25/84/9136    Acute metabolic encephalopathy 73/57/1047    Dehydration 03/30/2021    Stroke ([address] 09/16/2017    Bilateral leg and foot pain 07/06/2017    Psychogenic polydipsia 05/22/2014    Severe protein-calorie malnutrition ([address] 09/17/2013    First degree AV block 09/01/2013    Former heavy tobacco smoker 09/01/2013    Hypokalemia 06/22/2013    Intractable hiccups 09/02/2012    Hypertension 09/02/2012        The  provided the following Interventions:   attempted to visit with patient in room 356 as a new admit even thought he has been here better that 3 days. Patient was found non responsive at present with a POST form and a DDNR on his chart'  Prognosis seems poor. No family seen. Provided information about Spiritual Care Services. Offered prayer on patients behalf. Assessment:  Patient does not have any Muslim/cultural needs that will affect patients preferences in health care. There are no further spiritual or Muslim issues which require Spiritual Care Services interventions at this time.        Plan:  Chaplains will continue to follow and will provide pastoral care on an as needed/requested basis    . Mode Bennett   Spiritual Care   [phone]

## 2023-01-09 LAB
ABO + RH BLD: NORMAL
ANION GAP SERPL CALC-SCNC: 4 MMOL/L (ref 3–18)
BACTERIA SPEC CULT: ABNORMAL
BACTERIA SPEC CULT: NORMAL
BACTERIA SPEC CULT: NORMAL
BASOPHILS # BLD: 0 K/UL (ref 0–0.1)
BASOPHILS NFR BLD: 0 % (ref 0–2)
BLD PROD TYP BPU: NORMAL
BLOOD GROUP ANTIBODIES SERPL: NORMAL
BPU ID: NORMAL
BUN SERPL-MCNC: 16 MG/DL (ref 7–18)
BUN/CREAT SERPL: 32 (ref 12–20)
CALCIUM SERPL-MCNC: 8.2 MG/DL (ref 8.5–10.1)
CALLED TO:,BCALL1: NORMAL
CHLORIDE SERPL-SCNC: 123 MMOL/L (ref 100–111)
CO2 SERPL-SCNC: 23 MMOL/L (ref 21–32)
CREAT SERPL-MCNC: 0.5 MG/DL (ref 0.6–1.3)
CROSSMATCH RESULT,%XM: NORMAL
DIFFERENTIAL METHOD BLD: ABNORMAL
EOSINOPHIL # BLD: 0.4 K/UL (ref 0–0.4)
EOSINOPHIL NFR BLD: 4 % (ref 0–5)
ERYTHROCYTE [DISTWIDTH] IN BLOOD BY AUTOMATED COUNT: 16.3 % (ref 11.6–14.5)
GLUCOSE BLD STRIP.AUTO-MCNC: 102 MG/DL (ref 70–110)
GLUCOSE BLD STRIP.AUTO-MCNC: 123 MG/DL (ref 70–110)
GLUCOSE BLD STRIP.AUTO-MCNC: 129 MG/DL (ref 70–110)
GLUCOSE SERPL-MCNC: 113 MG/DL (ref 74–99)
GRAM STN SPEC: ABNORMAL
GRAM STN SPEC: NORMAL
GRAM STN SPEC: NORMAL
HCT VFR BLD AUTO: 27 % (ref 36–48)
HGB BLD-MCNC: 8.2 G/DL (ref 13–16)
IMM GRANULOCYTES # BLD AUTO: 0.1 K/UL (ref 0–0.04)
IMM GRANULOCYTES NFR BLD AUTO: 1 % (ref 0–0.5)
LEVETIRACETAM SERPL-MCNC: 44.7 UG/ML (ref 10–40)
LYMPHOCYTES # BLD: 0.9 K/UL (ref 0.9–3.6)
LYMPHOCYTES NFR BLD: 8 % (ref 21–52)
MAGNESIUM SERPL-MCNC: 2.1 MG/DL (ref 1.6–2.6)
MCH RBC QN AUTO: 27.7 PG (ref 24–34)
MCHC RBC AUTO-ENTMCNC: 30.4 G/DL (ref 31–37)
MCV RBC AUTO: 91.2 FL (ref 78–100)
MONOCYTES # BLD: 0.3 K/UL (ref 0.05–1.2)
MONOCYTES NFR BLD: 3 % (ref 3–10)
NEUTS SEG # BLD: 9.2 K/UL (ref 1.8–8)
NEUTS SEG NFR BLD: 84 % (ref 40–73)
NRBC # BLD: 0 K/UL (ref 0–0.01)
NRBC BLD-RTO: 0 PER 100 WBC
PHOSPHATE SERPL-MCNC: 2.5 MG/DL (ref 2.5–4.9)
PLATELET # BLD AUTO: 151 K/UL (ref 135–420)
PLATELET COMMENTS,PCOM: ABNORMAL
PMV BLD AUTO: 13.5 FL (ref 9.2–11.8)
POTASSIUM SERPL-SCNC: 3.3 MMOL/L (ref 3.5–5.5)
RBC # BLD AUTO: 2.96 M/UL (ref 4.35–5.65)
RBC MORPH BLD: ABNORMAL
SERVICE CMNT-IMP: ABNORMAL
SERVICE CMNT-IMP: NORMAL
SERVICE CMNT-IMP: NORMAL
SODIUM SERPL-SCNC: 150 MMOL/L (ref 136–145)
SPECIMEN EXP DATE BLD: NORMAL
STATUS OF UNIT,%ST: NORMAL
UNIT DIVISION, %UDIV: 0
WBC # BLD AUTO: 10.9 K/UL (ref 4.6–13.2)

## 2023-01-09 PROCEDURE — 97606 NEG PRS WND THER DME>50 SQCM: CPT

## 2023-01-09 PROCEDURE — 94762 N-INVAS EAR/PLS OXIMTRY CONT: CPT

## 2023-01-09 PROCEDURE — 83735 ASSAY OF MAGNESIUM: CPT

## 2023-01-09 PROCEDURE — 2709999900 HC NON-CHARGEABLE SUPPLY

## 2023-01-09 PROCEDURE — 74011250636 HC RX REV CODE- 250/636: Performed by: INTERNAL MEDICINE

## 2023-01-09 PROCEDURE — 74011000258 HC RX REV CODE- 258: Performed by: PHYSICIAN ASSISTANT

## 2023-01-09 PROCEDURE — 99233 SBSQ HOSP IP/OBS HIGH 50: CPT | Performed by: INTERNAL MEDICINE

## 2023-01-09 PROCEDURE — 84100 ASSAY OF PHOSPHORUS: CPT

## 2023-01-09 PROCEDURE — 74011000250 HC RX REV CODE- 250: Performed by: INTERNAL MEDICINE

## 2023-01-09 PROCEDURE — 82962 GLUCOSE BLOOD TEST: CPT

## 2023-01-09 PROCEDURE — 74011250636 HC RX REV CODE- 250/636: Performed by: PHYSICIAN ASSISTANT

## 2023-01-09 PROCEDURE — 36415 COLL VENOUS BLD VENIPUNCTURE: CPT

## 2023-01-09 PROCEDURE — 99221 1ST HOSP IP/OBS SF/LOW 40: CPT | Performed by: NURSE PRACTITIONER

## 2023-01-09 PROCEDURE — 94640 AIRWAY INHALATION TREATMENT: CPT

## 2023-01-09 PROCEDURE — 74011000258 HC RX REV CODE- 258: Performed by: INTERNAL MEDICINE

## 2023-01-09 PROCEDURE — 77030040393 HC DRSG OPTIFOAM GENT MDII -B

## 2023-01-09 PROCEDURE — 74011250637 HC RX REV CODE- 250/637: Performed by: INTERNAL MEDICINE

## 2023-01-09 PROCEDURE — 2W15X6Z COMPRESSION OF BACK USING PRESSURE DRESSING: ICD-10-PCS | Performed by: UROLOGY

## 2023-01-09 PROCEDURE — 85025 COMPLETE CBC W/AUTO DIFF WBC: CPT

## 2023-01-09 PROCEDURE — 74011000250 HC RX REV CODE- 250

## 2023-01-09 PROCEDURE — 80048 BASIC METABOLIC PNL TOTAL CA: CPT

## 2023-01-09 PROCEDURE — C9113 INJ PANTOPRAZOLE SODIUM, VIA: HCPCS | Performed by: INTERNAL MEDICINE

## 2023-01-09 PROCEDURE — 77030025414 HC DRSG VAC ASST SMPLC KCON -B

## 2023-01-09 PROCEDURE — 77030038554 HC DRSG WND THERAHONEY MDII -Z

## 2023-01-09 PROCEDURE — 65660000004 HC RM CVT STEPDOWN

## 2023-01-09 RX ORDER — POTASSIUM CHLORIDE 20 MEQ/1
20 TABLET, EXTENDED RELEASE ORAL 2 TIMES DAILY
Status: DISCONTINUED | OUTPATIENT
Start: 2023-01-09 | End: 2023-01-10

## 2023-01-09 RX ADMIN — PIPERACILLIN SODIUM AND TAZOBACTAM SODIUM 3.38 G: 3; .375 INJECTION, POWDER, LYOPHILIZED, FOR SOLUTION INTRAVENOUS at 00:08

## 2023-01-09 RX ADMIN — DEXTROSE MONOHYDRATE AND POTASSIUM CHLORIDE INJECTION, SOLUTION: 5; .149 INJECTION, SOLUTION INTRAVENOUS at 12:25

## 2023-01-09 RX ADMIN — SODIUM CHLORIDE, PRESERVATIVE FREE 10 ML: 5 INJECTION INTRAVENOUS at 05:17

## 2023-01-09 RX ADMIN — LEVETIRACETAM 500 MG: 500 INJECTION, SOLUTION INTRAVENOUS at 12:28

## 2023-01-09 RX ADMIN — SODIUM CHLORIDE, PRESERVATIVE FREE 10 ML: 5 INJECTION INTRAVENOUS at 22:00

## 2023-01-09 RX ADMIN — IPRATROPIUM BROMIDE AND ALBUTEROL SULFATE 3 ML: .5; 3 SOLUTION RESPIRATORY (INHALATION) at 20:03

## 2023-01-09 RX ADMIN — SODIUM CHLORIDE, PRESERVATIVE FREE 10 ML: 5 INJECTION INTRAVENOUS at 13:01

## 2023-01-09 RX ADMIN — PIPERACILLIN SODIUM AND TAZOBACTAM SODIUM 3.38 G: 3; .375 INJECTION, POWDER, LYOPHILIZED, FOR SOLUTION INTRAVENOUS at 10:47

## 2023-01-09 RX ADMIN — Medication 125 MG: at 18:16

## 2023-01-09 RX ADMIN — PIPERACILLIN SODIUM AND TAZOBACTAM SODIUM 3.38 G: 3; .375 INJECTION, POWDER, LYOPHILIZED, FOR SOLUTION INTRAVENOUS at 18:16

## 2023-01-09 RX ADMIN — IPRATROPIUM BROMIDE AND ALBUTEROL SULFATE 3 ML: .5; 3 SOLUTION RESPIRATORY (INHALATION) at 14:40

## 2023-01-09 RX ADMIN — Medication 125 MG: at 05:17

## 2023-01-09 RX ADMIN — POTASSIUM CHLORIDE 20 MEQ: 1500 TABLET, EXTENDED RELEASE ORAL at 14:40

## 2023-01-09 RX ADMIN — POTASSIUM CHLORIDE 20 MEQ: 1500 TABLET, EXTENDED RELEASE ORAL at 18:17

## 2023-01-09 RX ADMIN — PANTOPRAZOLE SODIUM 40 MG: 40 INJECTION, POWDER, FOR SOLUTION INTRAVENOUS at 12:28

## 2023-01-09 RX ADMIN — DEXTROSE MONOHYDRATE AND POTASSIUM CHLORIDE INJECTION, SOLUTION: 5; .149 INJECTION, SOLUTION INTRAVENOUS at 03:06

## 2023-01-09 RX ADMIN — Medication 125 MG: at 00:05

## 2023-01-09 RX ADMIN — VANCOMYCIN HYDROCHLORIDE 1250 MG: 10 INJECTION, POWDER, LYOPHILIZED, FOR SOLUTION INTRAVENOUS at 12:28

## 2023-01-09 NOTE — CONSULTS
[email or web address]  [phone]    GASTROENTEROLOGY CONSULT      Impression:     Jacob Pennington is a 79 y.o. male who I am being asked to see in consultation for an opinion regarding PEG leakage 1/9. Patient has a hx of CVA in 2013 w/ intermittent need for PEG since. Patient was seen by our office in 2021 w/ EGD/colo revealing small sliding hiatal hernia, otherwise normal; diverticulosis, poor prep, hemorrhoids. 1/9 RN noted bed and gown soiled w/ feeds this morning, feeds subsequently held and GI consulted. Patient is non-communicative but there have been no complaints or signs of n/v, abd pain, or bloody/black stools. Plan:     PEG visualized without recent leakage, bumper is flush with skin. Tube feeds attempted and site flushed successfully without leakage or signs of mal-placement w/ RN. Suspect leakage was positional and likely related to the bumper not having been pulled tightly against the skin. No indication for change at this time. Thank you for this consultation. We will not actively follow, but remain available for questions. Chief Complaint: PEG dysfunction      HPI:  Jacob Pennington is a 79 y.o. male who I am being asked to see in consultation for an opinion regarding PEG leakage. Patient has a hx of CVA in 2013 w/ intermittent need for PEG since. Patient was seen by our office in 2021 w/ 4/2021 EGD/colo revealing small sliding hiatal hernia, otherwise normal; diverticulosis, poor prep, hemorrhoids.        PMH:   Past Medical History:   Diagnosis Date    Alcohol dependence with alcohol-induced persisting dementia ([facility]     per [facility] 4/23/21    Anemia     Aphasia     Benign prostatic hyperplasia     Cerebral vascular disease     COVID-19 04/06/2021    Disturbances of salivary secretion     Dysphagia     GERD (gastroesophageal reflux disease)     Hemiparesis (HCC)     left side    Hemiplegia (HCC)     left side     Hiccups     Hyperlipidemia     Hypernatremia 03/2021 Hypertension     Hyponatremia     Insomnia     Lacunar infarction ([facility] 2010    weakness both arms, unable to walk    Lower extremity edema     Moderate protein-calorie malnutrition (HCC)     MRSA (methicillin resistant Staphylococcus aureus)     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)     Pneumonia     Psychogenic polydipsia     PVD (peripheral vascular disease) ([facility])     Seizures ([facility]     Spinal stenosis     Stroke ([facility] 2017    Unspecified convulsions ([facility]     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       PSH:   Past Surgical History:   Procedure Laterality Date    COLONOSCOPY N/A 2021    COLONOSCOPY performed by Catrina Alejandre MD at [facility] ENDOSCOPY    HX HEENT      pt reports past hx of surgery on ears unsure of what type    HX OTHER SURGICAL Bilateral     debridement of lower extremities       Social HX:   Social History     Socioeconomic History    Marital status:      Spouse name: Not on file    Number of children: Not on file    Years of education: Not on file    Highest education level: Not on file   Occupational History    Not on file   Tobacco Use    Smoking status: Former     Packs/day: 0.50     Types: Cigarettes     Quit date: 2008     Years since quittin.5    Smokeless tobacco: Never   Substance and Sexual Activity    Alcohol use: No    Drug use: No    Sexual activity: Not Currently   Other Topics Concern    Not on file   Social History Narrative    Not on file     Social Determinants of Health     Financial Resource Strain: Not on file   Food Insecurity: Not on file   Transportation Needs: Not on file   Physical Activity: Not on file   Stress: Not on file   Social Connections: Not on file   Intimate Partner Violence: Not on file   Housing Stability: Not on file       FHX:   Family History   Problem Relation Age of Onset    Hypertension Other        Allergy:   No Known Allergies    Patient Active Problem List   Diagnosis Code    Intractable hiccups R06.6 Hypertension I10    Hypokalemia E87.6    First degree AV block I44.0    Former heavy tobacco smoker Z87.891    Severe protein-calorie malnutrition ([facility]) E43    Psychogenic polydipsia R63.1, F54    Bilateral leg and foot pain M79.604, M79.605, M79.671, M79.672    Stroke ([facility]) I63.9    Hyperosmolality and hypernatremia N06.2    Acute metabolic encephalopathy M78.12    Dehydration E86.0    Goals of care, counseling/discussion Z71.89    Debility R53.81    Respiratory failure ([facility]) V39.39    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[facility]) M60.60    Seizure disorder ([facility]) G40.909    Hypernatremia E87.0    Anemia D64.9    Symptomatic anemia D64.9    Aspiration pneumonia ([facility]) J69.0    Septic shock ([facility]) A41.9, R65.21    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 [facility]) L98.429    Community acquired pneumonia of right lower lobe of lung J18.9       Home Medications:     Medications Prior to Admission   Medication Sig    apixaban (ELIQUIS) 5 mg tablet Take 1 Tablet by mouth every twelve (12) hours. baclofen (LIORESAL) 10 mg tablet 10 mg by Per G Tube route two (2) times daily as needed for Muscle Spasm(s). 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 Indications: prevention of vitamin B12 deficiency    ferrous sulfate (FerrouSuL) 325 mg (65 mg iron) tablet Take  by mouth Daily (before breakfast). metoprolol tartrate (LOPRESSOR) 25 mg tablet 25 mg by Per G Tube route two (2) times a day. thiamine HCL (B-1) 100 mg tablet 1 Tablet by Per G Tube route daily. levETIRAcetam (KEPPRA) 100 mg/mL solution 7.5 mL by Per G Tube route two (2) times a day. pravastatin (PRAVACHOL) 40 mg tablet Take 1 Tab by mouth nightly. ascorbic acid, vitamin C, (VITAMIN C) 250 mg tablet Take 1 Tab by mouth daily. polyethylene glycol (MIRALAX) 17 gram packet Take 1 Packet by mouth daily. tamsulosin (FLOMAX) 0.4 mg capsule Take 0.4 mg by mouth daily.  1 tab daily MULTIVITAMIN,THERAPEUTIC (THERA MULTI-VITAMIN PO) Take 500 mg by mouth daily. folic acid (FOLVITE) 1 mg tablet Take 1 Tab by mouth daily. Review of Systems:     Unable to obtain    Visit Vitals  /89 (BP 1 Location: Left upper arm, BP Patient Position: At rest)   Pulse 85   Temp 97.5 °F (36.4 °C)   Resp 16   Ht 5' 9\" (1.753 m)   Wt 78.9 kg (173 lb 15.1 oz)   SpO2 100%   BMI 25.69 kg/m²       Physical Assessment:     constitutional: resting in bed, in no acute distress. skin: no rashes, ulcers, icterus or other lesions  eyes: normal conjunctivae and lids; no jaundice pupils: normal  HEENT: normocephalic, atraumatic  neck: supple, normal ROM   respiratory: normal chest excursion; no intercostal retraction or accessory muscle use; clear to ascultation bilaterally    cardiovascular: regular rate and rhythm, no murmur, rub or gallop.   abdominal: non-distended, active bowel sounds, soft, non-tender, non-acute, no palpable masses or hernias. + PEG w/o overlying skin changes. liver/spleen: not palpable. extremities: no significant deformity or contracture, no edema. Gait not assessed   neurologic: cranial nerves: grossly normal.         Basic Metabolic Profile   Recent Labs     01/09/23  0252   *   K 3.3*   *   CO2 23   BUN 16   *   CA 8.2*   MG 2.1   PHOS 2.5         CBC w/Diff    Recent Labs     01/09/23  0252   WBC 10.9   RBC 2.96*   HGB 8.2*   HCT 27.0*   MCV 91.2   MCH 27.7   MCHC 30.4*   RDW 16.3*       Recent Labs     01/09/23  0252 01/08/23  0126   GRANS 84* 91*   LYMPH 8* 1*   EOS 4 1   METAS  --  1        Hepatic Function   Recent Labs     01/07/23  0537   ALB 1.4*   TP 6.6   TBILI 0.5   AP 90        Coags   No results for input(s): PTP, INR, APTT, INREXT in the last 72 hours. Mikey Childress PA-C.   01/09/23, 1:52 PM   [facility]  [email or web address]  Phone: [phone]  Pager: [phone]

## 2023-01-09 NOTE — PROGRESS NOTES
Infectious Disease progress Note        Reason: Sepsis    Current abx Prior abx   Zosyn, levofloxacin, vancomycin since 1/4/2023  Po vancomycin since 1/7/23      Lines:       Assessment :   79 y.o. male with hx of dementia, COVID, HLD, HTN, chronic hyponatremia, CVA, seizures who presented to ED on 1/4/2023 for altered mental status. Hospitalization [facility] July 2017 for right foot cellulitis, infected right foot ulcer secondary to Proteus, citrobacter, e.coli, mssa, e.fecalis, e.avium, strep viridans. Surgical wound cultures 7/11 : proteus,citrobacter   S/p I&D of right foot ulcer on 7/11- intra op findings noted - no evidence of bone infection. Hospitalization [facility] October 2022 for sepsis, acute hypoxic respiratory failure-present on admission due to aspiration pneumonia superimposed on recent COVID-19 infection     Now with leukocytosis, bandemia, hypotension requiring pressors on admission, CT scan 1/5/2023 with air noted in the posterior wall of anus/rectum, posterior cortex of the sacrum/coccyx, bacteremia    Clinical presentation consistent with septic shock-present on admission due to polymicrobial bloodstream infection -( blood cx 1/4/23 positive for e.coli, proteus, bacteroides, gram positive cocci), necrotizing infection of the sacrum/acute on chronic sacral osteomyelitis, c.diff infection    Most likely source of polymicrobial bloodstream infection is necrotizing infection of sacral ulcer  Status post I&D on 1/6/2023. Intra-Op findings noted. Surgery help appreciated  Preop wound cultures 1/6-mixed enteric larisa  Intra-Op cultures-mixed enteric larisa, e.coli    Stool c.diff positive- likely suggestive of evolving c.diff infection. Acute kidney injury-likely due to sepsis    Leakage around PEG tube    Recommendations:     1. Continue Zosyn, d/c levofloxacin,d/c  vancomycin. continue oral vancomycin  2. Follow up Repeat blood culture 1/8  3.   Follow-up wound cultures and de-escalate antibiotics accordingly  4. Follow-up surgery recommendations regarding wound care sacral ulcer  5. Management of acute kidney injury per primary team  6. Will hold laxatives  7. Recommend palliative care consult to address goals of care  8. Patient will need outpatient IV antibiotics and family wishes for aggressive measures. Patient is at very high risk for wound nonhealing, recurrent infection. 9.  Follow-up IR recommendations regarding drainage around PEG tube      D/w dr. Martha Nugent Please call me if any further questions or concerns. Will continue to participate in the care of this patient.   HPI:    Unable to communicate effectively    Past Medical History:   Diagnosis Date    Alcohol dependence with alcohol-induced persisting dementia ([address]     per [facility] 4/23/21    Anemia     Aphasia     Benign prostatic hyperplasia     Cerebral vascular disease     COVID-19 04/06/2021    Disturbances of salivary secretion     Dysphagia     GERD (gastroesophageal reflux disease)     Hemiparesis (HCC)     left side    Hemiplegia (HCC)     left side     Hiccups     Hyperlipidemia     Hypernatremia 03/2021    Hypertension     Hyponatremia     Insomnia     Lacunar infarction ([address] 2010    weakness both arms, unable to walk    Lower extremity edema     Moderate protein-calorie malnutrition (HCC)     MRSA (methicillin resistant Staphylococcus aureus)     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)     Pneumonia     Psychogenic polydipsia     PVD (peripheral vascular disease) ([address]     Seizures ([address]     Spinal stenosis     Stroke ([address] 2017    Unspecified convulsions ([address]     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 Qty: 60 Tablet, Refills: 0      baclofen (LIORESAL) 10 mg tablet 10 mg by Per G Tube route two (2) times daily as needed for Muscle Spasm(s). 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 Indications: prevention of vitamin B12 deficiency      ferrous sulfate (FerrouSuL) 325 mg (65 mg iron) tablet Take  by mouth Daily (before breakfast). metoprolol tartrate (LOPRESSOR) 25 mg tablet 25 mg by Per G Tube route two (2) times a day. thiamine HCL (B-1) 100 mg tablet 1 Tablet by Per G Tube route daily. Qty: 30 Tablet, Refills: 0      levETIRAcetam (KEPPRA) 100 mg/mL solution 7.5 mL by Per G Tube route two (2) times a day. Qty: 473 mL, Refills: 0      pravastatin (PRAVACHOL) 40 mg tablet Take 1 Tab by mouth nightly. Qty: 30 Tab, Refills: 1      ascorbic acid, vitamin C, (VITAMIN C) 250 mg tablet Take 1 Tab by mouth daily. Qty: 30 Tab, Refills: 2      polyethylene glycol (MIRALAX) 17 gram packet Take 1 Packet by mouth daily. Qty: 30 Packet, Refills: 2      tamsulosin (FLOMAX) 0.4 mg capsule Take 0.4 mg by mouth daily. 1 tab daily      MULTIVITAMIN,THERAPEUTIC (THERA MULTI-VITAMIN PO) Take 500 mg by mouth daily. folic acid (FOLVITE) 1 mg tablet Take 1 Tab by mouth daily.   Qty: 30 Tab, Refills: 1             Current Facility-Administered Medications   Medication Dose Route Frequency    0.9% sodium chloride infusion 250 mL  250 mL IntraVENous PRN    vancomycin (VANCOCIN) 1250 mg in  ml infusion  1,250 mg IntraVENous Q24H    dextrose 5% with KCl 20 mEq/L infusion   IntraVENous CONTINUOUS    0.9% sodium chloride infusion 250 mL  250 mL IntraVENous PRN    vancomycin 50 mg/mL oral solution (compounded) 125 mg  125 mg Per G Tube Q6H    cyanocobalamin tablet 500 mcg  500 mcg Per G Tube DAILY    levETIRAcetam (KEPPRA) oral solution 750 mg  750 mg Oral BID    pantoprazole (PROTONIX) 40 mg in 0.9% sodium chloride 10 mL injection  40 mg IntraVENous DAILY    sodium chloride (NS) flush 5-40 mL  5-40 mL IntraVENous Q8H    sodium chloride (NS) flush 5-40 mL  5-40 mL IntraVENous PRN    acetaminophen (TYLENOL) tablet 650 mg  650 mg Oral Q6H PRN    Or    acetaminophen (TYLENOL) suppository 650 mg  650 mg Rectal Q6H PRN    ondansetron (ZOFRAN ODT) tablet 4 mg  4 mg Oral Q8H PRN    Or    ondansetron (ZOFRAN) injection 4 mg  4 mg IntraVENous Q6H PRN    [Held by provider] heparin (porcine) injection 5,000 Units  5,000 Units SubCUTAneous Q8H    insulin lispro (HUMALOG) injection   SubCUTAneous Q6H    glucose chewable tablet 16 g  4 Tablet Oral PRN    glucagon (GLUCAGEN) injection 1 mg  1 mg IntraMUSCular PRN    dextrose 10% infusion 0-250 mL  0-250 mL IntraVENous PRN    midodrine (PROAMATINE) tablet 5 mg  5 mg Per G Tube Q8H PRN    albuterol-ipratropium (DUO-NEB) 2.5 MG-0.5 MG/3 ML  3 mL Nebulization Q6H RT    sodium chloride (NS) flush 5-10 mL  5-10 mL IntraVENous PRN    piperacillin-tazobactam (ZOSYN) 3.375 g in 0.9% sodium chloride (MBP/ADV) 100 mL MBP  3.375 g IntraVENous Q8H    [Held by provider] levoFLOXacin (LEVAQUIN) 750 mg in D5W IVPB  750 mg IntraVENous Q24H       Allergies: Patient has no known allergies.     Family History   Problem Relation Age of Onset    Hypertension Other      Social History     Socioeconomic History    Marital status:      Spouse name: Not on file    Number of children: Not on file    Years of education: Not on file    Highest education level: Not on file   Occupational History    Not on file   Tobacco Use    Smoking status: Former     Packs/day: 0.50     Types: Cigarettes     Quit date: 2008     Years since quittin.5    Smokeless tobacco: Never   Substance and Sexual Activity    Alcohol use: No    Drug use: No    Sexual activity: Not Currently   Other Topics Concern    Not on file   Social History Narrative    Not on file     Social Determinants of Health     Financial Resource Strain: Not on file   Food Insecurity: Not on file   Transportation Needs: Not on file   Physical Activity: Not on file   Stress: Not on file   Social Connections: Not on file   Intimate Partner Violence: Not on file   Housing Stability: Not on file     Social History     Tobacco Use   Smoking Status Former    Packs/day: 0.50    Types: Cigarettes    Quit date: 2008    Years since quittin.5   Smokeless Tobacco Never        Temp (24hrs), Av.5 °F (36.4 °C), Min:97.2 °F (36.2 °C), Max:98.3 °F (36.8 °C)    Visit Vitals  /75 (BP 1 Location: Left upper arm, BP Patient Position: At rest)   Pulse 88   Temp 98.3 °F (36.8 °C)   Resp 17   Ht 5' 9\" (1.753 m)   Wt 78.9 kg (173 lb 15.1 oz)   SpO2 100%   BMI 25.69 kg/m²       ROS: Unable to obtain due to patient factors    Physical Exam:    General:  male patient laying on the bed, eyes open, alert, mumbles some words. Head:  Normocephalic, without obvious abnormality, atraumatic. Eyes:  Conjunctivae/corneas clear. Nose: Nares normal.  No drainage or sinus tenderness. Throat: Lips, mucosa, and tongue normal. Teeth and gums of poor dentition, lips dried and cracked/peeling    Neck: Supple, symmetrical, trachea midline, no adenopathy, thyroid: no enlargment/tenderness/nodules, no carotid bruit and no JVD. Back:   Symmetric, no curvature. Lungs:   Coarse to auscultation bilaterally. Chest wall:  No tenderness or deformity. Heart:  S1, S2 normal, no  click, rub or gallop. Abdomen:   Abd flat, Soft, non-tender. Bowel sounds normal. No masses,  No organomegaly. Healed surgical scar in upper abdomen   Extremities: Extremities normal, atraumatic, no cyanosis or edema. Pulses: 2+ and symmetric all extremities.    Skin: BLE with ulcers per report covered with dressing, sacral wound dressing not opened   Lymph nodes:  Not examined   Neurologic: Grossly non-focal; non-verbal, does not follow commands      Labs: Results:   Chemistry Recent Labs     01/09/23  0252 01/08/23  0341 01/07/23  0537   * 104* 104*   * 156* 156*   K 3.3* 3.4* 2.8*   * 127* 125*   CO2 23 23 26   BUN 16 27* 38*   CREA 0.50* 0.60 0.80   CA 8.2* 8.1* 8.3*   AGAP 4 6 5   BUCR 32* 45* 48*   AP  --   --  90   TP  --   --  6.6   ALB  --   --  1.4*   GLOB  --   --  5.2*   AGRAT  --   --  0.3*        CBC w/Diff Recent Labs     01/09/23  0252 01/08/23  1423 01/08/23  0126 01/07/23  2335 01/07/23  0537   WBC 10.9  --  8.7  --  13.1   RBC 2.96*  --  1.89*  --  2.39*   HGB 8.2* 9.6* 5.3*   < > 6.4*   HCT 27.0* 31.6* 18.0*   < > 21.1*     --  153  --  238   GRANS 84*  --  91*  --  90*   LYMPH 8*  --  1*  --  4*   EOS 4  --  1  --  1    < > = values in this interval not displayed. Microbiology Recent Labs     01/08/23  1020 01/06/23 2051 01/06/23 2045 01/06/23  1300   CULT NO GROWTH AFTER 20 HOURS LIGHT MIXED ENTERIC RASHEED CONSISTING OF PROTEUS SPECIES, LACTOSEFERMENTING GRAM NEGATIVE MARY, AND ENTEROCOCCUS SPECIES LIGHT MIXED ENTERIC RASHEED CONSISTING OF PROTEUS SPECIES, LACTOSE FERMENTING GRAM NEGATIVE MARY, AND ENTEROCOCCU SPECIES  PLATES HELD 3 DAYS. CALL MICROBIOLOGY LAB IF SENSITIVITIES ARE NEEDED. MODERATE MIXED ENTERIC RASEHED            RADIOLOGY:    All available imaging studies/reports in [facility] for this admission were reviewed    Total time spent >35 minutes. High complexity decision making was performed during the evaluation of this patient at high risk for decompensation with multiple organ involvement     Above mentioned total time spent on reviewing the case/medical record/data/notes/EMR/patient examination/documentation/coordinating care with nurse/consultants, exclusive of procedures with complex decision making performed and > 50% time spent in face to face evaluation. Disclaimer: Sections of this note are dictated utilizing voice recognition software, which may have resulted in some phonetic based errors in grammar and contents. Even though attempts were made to correct all the mistakes, some may have been missed, and remained in the body of the document. If questions arise, please contact our department.     Dr. Addy Mays, Infectious Disease Specialist  [phone]  January 9, 2023  5:16 PM

## 2023-01-09 NOTE — CONSULTS
[facility]: [phone] ([phone])  [facility]: [phone]     Patient Name: Sean Kunz  YOB: 1955    Date of consult : 1/9/23  Reason for Consult: establish goals of care  Requesting Provider: Sarai Gonzales MD    Primary Care Physician: Alexa Heard MD      SUMMARY:   Sean Kunz is a 79 y.o. male with a past history of HTN, Stroke, dementia, COVID, Chronic hyponatremia, Alcoholic dementia, First degree AV block, severe debility, who was admitted on 1/4/2023 from [address] [place] [address],[address] with a diagnosis of sepsis and AMS. Current medical issues leading to Palliative Medicine involvement include: Pt with a long term life limiting chronic disease process that warrants discussions about his goals of care. .    CHIEF COMPLAINT: pneumonia and wounds     HPI/SUBJECTIVE:    Pt is a 79year old AAM that lives in [address] [place] [address] was brought in through EMS with Altered mental status with suspected sepsis. He has multiple chronic disease processes and is quite debilitated at base line. The patient is:   [] Verbal and participatory  [x] Non-participatory due to: dementia    GOALS OF CARE:  Patient/Health Care Proxy Stated Goals: Prolong life      TREATMENT PREFERENCES:   Code Status: DNR         PALLIATIVE DIAGNOSES:   Goals of care/ACP  Sepsis  Wounds  Pneumonia  Debility        PLAN:   Goals of care/ACP  This NP along with Desiree Carr LMSW and Hamilton Ellis RN in to see the patient at the bedside. He was receiving a nebulized treatment and did not appear in any distress. Pt is not responding to questions except to look at you. We have reached out to his son James Sewell to discuss further goals of care. Pt has a POST on file for DNR/DNI limited interventions and ok with long term feeding tube. Will attempt to set up a meeting to see if patient meets hospice criteria and could benefit from that support.   Goals of care: DNR/DNI Limited interventions. [id number] [address] with long term feeding tube  2. Sepsis  Seen by ID, found to have leukocytosis, bandemia, hypotension. Needing pressor support. Clinical presentation is consistent with septic shock due to polymicrobial bloodstream infection. Likely source is the sacrum  3. Wounds  Pt with necrotizing infection of the sacrum/acute on chronic sacral osteomyelitis  4. Debility  Palliative performance for this patient has declined since last admission and is now around 30%. Pt is bed bound, total care for self care and functional ADL's and has reduced nutritional intake with low albumin levels 1.4.  5.   Initial consult note routed to primary continuity provider  6. Communicated plan of care with: Palliative IDT      Advance Care Planning:  [] [facility] Interdisciplinary Team has updated the ACP Navigator with [address] and Patient Capacity    Primary Decision [address] ([address]):   Secondary Decision Maker: Manda Croft (stepdaughter) - Other Non-relative - [phone]    Supplemental (Other) Decision Maker: Dennis Martínez - [phone]    Medical Interventions: Limited additional interventions   Other Instructions:         As far as possible, the palliative care team has discussed with patient / health care proxy about goals of care / treatment preferences for patient.          HISTORY:     History obtained from: chart review   Principal Problem:    Septic shock ([address] (1/6/2023)    Active Problems:    Hypokalemia (6/22/2013)      Hyperosmolality and hypernatremia (3/30/2021)      Acute metabolic encephalopathy (7/22/0759)      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[address] (05/3/5956)      Hypernatremia (11/25/2022)      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 [facility]) (1/6/2023)      Community acquired pneumonia of right lower lobe of lung (1/6/2023)    Past Medical History:   Diagnosis Date    Alcohol dependence with alcohol-induced persisting dementia ([address]     per [facility] Care 4/23/21    Anemia     Aphasia Benign prostatic hyperplasia     Cerebral vascular disease     COVID-19 2021    Disturbances of salivary secretion     Dysphagia     GERD (gastroesophageal reflux disease)     Hemiparesis (HCC)     left side    Hemiplegia (HCC)     left side     Hiccups     Hyperlipidemia     Hypernatremia 2021    Hypertension     Hyponatremia     Insomnia     Lacunar infarction ([facility]     weakness both arms, unable to walk    Lower extremity edema     Moderate protein-calorie malnutrition (HCC)     MRSA (methicillin resistant Staphylococcus aureus)     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)     Pneumonia     Psychogenic polydipsia     PVD (peripheral vascular disease) ([address]     Seizures ([address]     Spinal stenosis     Stroke ([facility] 2017    Unspecified convulsions ([facility]     2021 [facility]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   Social History     Tobacco Use    Smoking status: Former     Packs/day: 0.50     Types: Cigarettes     Quit date: 2008     Years since quittin.5    Smokeless tobacco: Never   Substance Use Topics    Alcohol use: No     No Known Allergies   Current Facility-Administered Medications   Medication Dose Route Frequency    levETIRAcetam (KEPPRA) 500 mg in 0.9% sodium chloride (MBP/ADV) 100 mL MBP  500 mg IntraVENous Q12H    potassium chloride (K-DUR, KLOR-CON M20) SR tablet 20 mEq  20 mEq Oral BID    0.9% sodium chloride infusion 250 mL  250 mL IntraVENous PRN    vancomycin (VANCOCIN) 1250 mg in  ml infusion  1,250 mg IntraVENous Q24H    dextrose 5% with KCl 20 mEq/L infusion   IntraVENous CONTINUOUS    0.9% sodium chloride infusion 250 mL  250 mL IntraVENous PRN    vancomycin 50 mg/mL oral solution (compounded) 125 mg  125 mg Per G Tube Q6H    cyanocobalamin tablet 500 mcg  500 mcg Per G Tube DAILY    pantoprazole (PROTONIX) 40 mg in 0.9% sodium chloride 10 mL injection  40 mg IntraVENous DAILY    sodium chloride (NS) flush 5-40 mL  5-40 mL IntraVENous Q8H    sodium chloride (NS) flush 5-40 mL  5-40 mL IntraVENous PRN    acetaminophen (TYLENOL) tablet 650 mg  650 mg Oral Q6H PRN    Or    acetaminophen (TYLENOL) suppository 650 mg  650 mg Rectal Q6H PRN    ondansetron (ZOFRAN ODT) tablet 4 mg  4 mg Oral Q8H PRN    Or    ondansetron (ZOFRAN) injection 4 mg  4 mg IntraVENous Q6H PRN    [Held by provider] heparin (porcine) injection 5,000 Units  5,000 Units SubCUTAneous Q8H    insulin lispro (HUMALOG) injection   SubCUTAneous Q6H    glucose chewable tablet 16 g  4 Tablet Oral PRN    glucagon (GLUCAGEN) injection 1 mg  1 mg IntraMUSCular PRN    dextrose 10% infusion 0-250 mL  0-250 mL IntraVENous PRN    midodrine (PROAMATINE) tablet 5 mg  5 mg Per G Tube Q8H PRN    albuterol-ipratropium (DUO-NEB) 2.5 MG-0.5 MG/3 ML  3 mL Nebulization Q6H RT    sodium chloride (NS) flush 5-10 mL  5-10 mL IntraVENous PRN    piperacillin-tazobactam (ZOSYN) 3.375 g in 0.9% sodium chloride (MBP/ADV) 100 mL MBP  3.375 g IntraVENous Q8H    [Held by provider] levoFLOXacin (LEVAQUIN) 750 mg in D5W IVPB  750 mg IntraVENous Q24H          Clinical Pain Assessment (nonverbal scale for nonverbal patients):        Activity (Movement): Seeking attention through movement or slow, cautious movement    Duration: for how long has pt been experiencing pain (e.g., 2 days, 1 month, years)  Frequency: how often pain is an issue (e.g., several times per day, once every few days, constant)     FUNCTIONAL ASSESSMENT:     Palliative Performance Scale (PPS):  PPS: 30    ECOG  ECOG Status : Completely disabled PSYCHOSOCIAL/SPIRITUAL SCREENING:      Any spiritual / Christian concerns:  [] Yes /  [x] No    Caregiver Burnout:  [] Yes /  [] No /  [x] No Caregiver Present      Anticipatory grief assessment:   [x] Normal  / [] Maladaptive        REVIEW OF SYSTEMS:     Systems: constitutional, ears/nose/mouth/throat, respiratory, gastrointestinal, genitourinary, musculoskeletal, integumentary, neurologic, psychiatric, endocrine. Positive findings noted below. Modified ESAS Completed by: provider                       Dyspnea: 3           Stool Occurrence(s): 1   Positive and pertinent negative findings in ROS are noted above in HPI. The following systems were [x] reviewed / [] unable to be reviewed as noted in HPI  Other findings are noted below. PHYSICAL EXAM:     Constitutional: alert but minimally interactive   Eyes: pupils equal, anicteric  ENMT: no nasal discharge, moist mucous membranes  Cardiovascular: regular rhythm, distal pulses intact  Respiratory: breathing somewhat labored, symmetric  Gastrointestinal: soft non-tender, +bowel sounds  Musculoskeletal: no deformity, no tenderness to palpation  Skin: warm, dry  Neurologic: not following commands or moving all extremities    Other: Wt Readings from Last 3 Encounters:   01/09/23 78.9 kg (173 lb 15.1 oz)   11/25/22 72.6 kg (160 lb)   10/16/22 68.1 kg (150 lb 2.1 oz)     Blood pressure 139/89, pulse 85, temperature 97.5 °F (36.4 °C), resp. rate 16, height 5' 9\" (1.753 m), weight 78.9 kg (173 lb 15.1 oz), SpO2 100 %.   Pain:  Pain Scale 1: Adult Nonverbal Pain Scale  Pain Intensity 1: 0                      LAB AND IMAGING FINDINGS:     Lab Results   Component Value Date/Time    WBC 10.9 01/09/2023 02:52 AM    HGB 8.2 (L) 01/09/2023 02:52 AM    PLATELET 906 57/96/6039 02:52 AM     Lab Results   Component Value Date/Time    Sodium 150 (H) 01/09/2023 02:52 AM    Potassium 3.3 (L) 01/09/2023 02:52 AM    Chloride 123 (H) 01/09/2023 02:52 AM    CO2 23 01/09/2023 02:52 AM BUN 16 01/09/2023 02:52 AM    Creatinine 0.50 (L) 01/09/2023 02:52 AM    Calcium 8.2 (L) 01/09/2023 02:52 AM    Magnesium 2.1 01/09/2023 02:52 AM    Phosphorus 2.5 01/09/2023 02:52 AM      Lab Results   Component Value Date/Time    Alk. phosphatase 90 01/07/2023 05:37 AM    Protein, total 6.6 01/07/2023 05:37 AM    Albumin 1.4 (L) 01/07/2023 05:37 AM    Globulin 5.2 (H) 01/07/2023 05:37 AM     Lab Results   Component Value Date/Time    INR 1.6 (H) 01/04/2023 11:33 PM    Prothrombin time 19.2 (H) 01/04/2023 11:33 PM    aPTT 31.3 03/30/2021 02:15 PM      Lab Results   Component Value Date/Time    Iron 35 (L) 01/06/2023 03:00 AM    TIBC 99 (L) 01/06/2023 03:00 AM    Iron % saturation 35 01/06/2023 03:00 AM    Ferritin 422 (H) 11/25/2022 12:50 PM      No results found for: PH, PCO2, PO2  No components found for: [place]   Lab Results   Component Value Date/Time    CK 88 10/10/2022 12:52 AM    CK - MB <1.0 03/30/2021 02:15 PM              Total time: 40 minutes   Counseling / coordination time, spent as noted above:   > 50% counseling / coordination:  Time spent in direct consultation with the patient, medical team, and family     Prolonged service was provided for  []30 min   []75 min in face to face time in the presence of the patient, spent as noted above. Time Start:   Time End:     Disclaimer: Sections of this note are dictated using utilizing voice recognition software, which may have resulted in some phonetic based errors in grammar and contents. Even though attempts were made to correct all the mistakes, some may have been missed, and remained in the body of the document. If questions arise, please contact our department.

## 2023-01-09 NOTE — PROGRESS NOTES
Hospitalist Progress Note    NAME:  Trey Plaza   :   1955   MRN:   [id number]     Date/Time:  2023  Subjective: pt is complicated with multiple issues; Chief Complaint:   Pt remain confused; BP is better; Non verbal   H/h dropped again no clear source of GI bleed;    23;   B/c 2/2 with   Culture result:   PROBABLE BACTEROIDES FRAGILIS GROWING IN BOTH BOTTLES DRAWN No Site Indicated Abnormal  P  [address]   Culture result:   PROBABLE ESCHERICHIA COLI GROWING IN BOTH BOTTLES DRAWN No Site Indicated Abnormal  P  [address]   Culture result:   PROBABLE PROTEUS SPECIES GROWING IN BOTH BOTTLES DRAWN No Site Indicated Abnormal  P     GRAM POSITIVE COCCI IN CLUSTERS GROWING IN THE SAME ABOVE BOTTLE Abnormal  P     CT ;     IMPRESSION  1. Bilateral posterior basilar pneumonia, slightly less severe than before. Trace right pleural effusion. 2. Hiatal hernia/distal esophagitis. 3. Severe constipation. Fecal impaction in the rectum with distended rectum,  large fecal ball. 4. Decubitus ulcer with air approaching the posterior wall of anus/rectum and  posterior cortex of the sacrum and coccyx. No abscess. ; s/p ID of the Sacral IRRIGATION AND DEBRIDEMENT OF SACRAL DECUBITUS ULCER ;    23;   h/h dropped to 6.4/; Plan for 1 unit of blood;  C diff is positive; start po vanco; hold Levaquin ; Sodium down to 156; on D5w drip and free water in the G tube;  K is 2.8; replace;    23; h/h dropped to 5.3/18; give 1 U of blood now total of 2 units;  K 3.4, sodium is 156;    23; His Peg tube is leaking ; consulted IR to change;  H/h is now 8.2; s/p 2 Units of blood; mointer; Sodium down to 150; As per ID plan for PICC line and IV abx on dc;           [x]  Unable to obtain  ROS due to  [x]  mental status change  []  sedated   []  Intubated non verbal;     Past Med History and Social history reviewed. No changes.    [x]  Current Medication list and allergies reviewed*    Objective:   Vitals:  Visit Vitals  BP (!) 144/91 (BP 1 Location: Left upper arm, BP Patient Position: At rest)   Pulse 82   Temp 97.8 °F (36.6 °C)   Resp 16   Ht 5' 9\" (1.753 m)   Wt 78.9 kg (173 lb 15.1 oz)   SpO2 100%   BMI 25.69 kg/m²     Temp (24hrs), Av.6 °F (36.4 °C), Min:97.2 °F (36.2 °C), Max:98.3 °F (36.8 °C)      Last 24hr Input/Output:    Intake/Output Summary (Last 24 hours) at 2023 1127  Last data filed at 2023 0911  Gross per 24 hour   Intake 1752.08 ml   Output 2500 ml   Net -747.92 ml        PHYSICAL EXAM:     General appearance -awake, nonverbal    Chest -diminished air entry noted in bases, no wheezes    Heart - S1 and S2 normal, no murmurs appreciated currently    Abdomen - soft, nontender, nondistended, PEG tube is leaking;    Neurological -awake, nonverbal,     Extremities - no pedal edema noted, no cyanosis    Psych -no agitation or hallucination    Skin; stage 4 sacral ulcer;       Large necrotic decubitus ulcer with evidence of purulent exudate            Lab Data Reviewed:  No components found for: [place]  Recent Labs     23  0252 23  1423 23  0341 23  0126 23  2335 23  0537   *  --  156*  --   --  156*   K 3.3*  --  3.4*  --   --  2.8*   *  --  127*  --   --  125*   CO2   --   --  26   BUN 16  --  27*  --   --  38*   CREA 0.50*  --  0.60  --   --  0.80   *  --  104*  --   --  104*   PHOS 2.5  --  2.6  --   --  2.6   CA 8.2*  --  8.1*  --   --  8.3*   ALB  --   --   --   --   --  1.4*   WBC 10.9  --   --  8.7  --  13.1   HGB 8.2* 9.6*  --  5.3*   < > 6.4*   HCT 27.0* 31.6*  --  18.0*   < > 21.1*     --   --  153  --  238    < > = values in this interval not displayed.         []    I have personally reviewed the   []  xray  []  CT scan    Assessment/Plan:     Principal Problem:    Septic shock ([address] (2023)    Active Problems:    Hyperosmolality and hypernatremia (3/30/2021)      Acute metabolic encephalopathy (2/48/2269)      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[facility] (39/8/0064)      Hypernatremia (11/25/2022)      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 [facility]) (1/6/2023)      Community acquired pneumonia of right lower lobe of lung (1/6/2023)    Acute post op anemia; s/p 2 units; now hgb above 8; Pt was on ELIQUIS at [address] [facility] [facility] has been on hold; h/o dvt; dc it for now;    C diff positive POA; continue Po Vanco hold levaquin ;     ___________________________________________________  PLAN:  Risk of deterioration:  []  Low    []  Moderate  [x]  High    Id consulted;;  Continue IV abx; with continue V/Z and  hold levaquin; due to c diff;   Await final B/c results;  Surgery on the case s/p ID    Continue D5W drip per Renal;    Hold T feeds; change the peg;    DNR/DNI    Overall poor prognosis;  ___________________________________________________    Total time spent with patient:     minutes    []  Critical Care time:      minutes    Care Plan discussed with:    []  Patient   []  Family    []  Care Manager  [x]  Nursing   [x]  Consultant/Specialist :      []    >50% of visit spent in counseling and coordination of care   (Discussed []  CODE status,  []  Care Plan, []  D/C Planning)    Prophylaxis:  []  Lovenox  []  Coumadin  [x]  Hep SQ  []  SCDs  []  H2B/PPI    Disposition:  []  Home w/ Family   []  HH PT,OT,RN   [x]  SNF/LTC   []  SAH/Rehab  ___________________________________________________    Hospitalist: Mike Mederos MD     ___________________________________________________    *Medications reviewed:  Current Facility-Administered Medications   Medication Dose Route Frequency    levETIRAcetam (KEPPRA) 500 mg in 0.9% sodium chloride (MBP/ADV) 100 mL MBP  500 mg IntraVENous Q12H    0.9% sodium chloride infusion 250 mL  250 mL IntraVENous PRN    vancomycin (VANCOCIN) 1250 mg in  ml infusion  1,250 mg IntraVENous Q24H    dextrose 5% with KCl 20 mEq/L infusion   IntraVENous CONTINUOUS    0.9% sodium chloride infusion 250 mL  250 mL IntraVENous PRN    vancomycin 50 mg/mL oral solution (compounded) 125 mg  125 mg Per G Tube Q6H    cyanocobalamin tablet 500 mcg  500 mcg Per G Tube DAILY    pantoprazole (PROTONIX) 40 mg in 0.9% sodium chloride 10 mL injection  40 mg IntraVENous DAILY    sodium chloride (NS) flush 5-40 mL  5-40 mL IntraVENous Q8H    sodium chloride (NS) flush 5-40 mL  5-40 mL IntraVENous PRN    acetaminophen (TYLENOL) tablet 650 mg  650 mg Oral Q6H PRN    Or    acetaminophen (TYLENOL) suppository 650 mg  650 mg Rectal Q6H PRN    ondansetron (ZOFRAN ODT) tablet 4 mg  4 mg Oral Q8H PRN    Or    ondansetron (ZOFRAN) injection 4 mg  4 mg IntraVENous Q6H PRN    [Held by provider] heparin (porcine) injection 5,000 Units  5,000 Units SubCUTAneous Q8H    insulin lispro (HUMALOG) injection   SubCUTAneous Q6H    glucose chewable tablet 16 g  4 Tablet Oral PRN    glucagon (GLUCAGEN) injection 1 mg  1 mg IntraMUSCular PRN    dextrose 10% infusion 0-250 mL  0-250 mL IntraVENous PRN    midodrine (PROAMATINE) tablet 5 mg  5 mg Per G Tube Q8H PRN    albuterol-ipratropium (DUO-NEB) 2.5 MG-0.5 MG/3 ML  3 mL Nebulization Q6H RT    sodium chloride (NS) flush 5-10 mL  5-10 mL IntraVENous PRN    piperacillin-tazobactam (ZOSYN) 3.375 g in 0.9% sodium chloride (MBP/ADV) 100 mL MBP  3.375 g IntraVENous Q8H    [Held by provider] levoFLOXacin (LEVAQUIN) 750 mg in D5W IVPB  750 mg IntraVENous Q24H

## 2023-01-09 NOTE — PROGRESS NOTES
Progress Note    Sean Kunz  79 y.o. Admit Date: 1/4/2023  Principal Problem:    Septic shock ([facility] (1/6/2023) POA: Yes    Active Problems:    Hypokalemia (6/22/2013) POA: Yes      Hyperosmolality and hypernatremia (3/30/2021) POA: Yes      Acute metabolic encephalopathy (8/65/7397) POA: Yes      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[facility] (87/0/0140) POA: Yes      Hypernatremia (11/25/2022) POA: Yes      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 ([facility] (1/6/2023) POA: Yes      Community acquired pneumonia of right lower lobe of lung (1/6/2023) POA: Yes          Subjective:     Patient looks more alert & Awake with hydration, but remained non Communicative. A comprehensive review of systems was negative except for that written in the History of Present Illness.     Objective:     Visit Vitals  BP (!) 144/91 (BP 1 Location: Left upper arm, BP Patient Position: At rest)   Pulse 82   Temp 97.8 °F (36.6 °C)   Resp 16   Ht 5' 9\" (1.753 m)   Wt 78.9 kg (173 lb 15.1 oz)   SpO2 100%   BMI 25.69 kg/m²         Intake/Output Summary (Last 24 hours) at 1/9/2023 1131  Last data filed at 1/9/2023 0911  Gross per 24 hour   Intake 1752.08 ml   Output 2500 ml   Net -747.92 ml       Current Facility-Administered Medications   Medication Dose Route Frequency Provider Last Rate Last Admin    levETIRAcetam (KEPPRA) 500 mg in 0.9% sodium chloride (MBP/ADV) 100 mL MBP  500 mg IntraVENous Q12H Marie Raya MD        0.9% sodium chloride infusion 250 mL  250 mL IntraVENous PRN Rocio Tristan DO        vancomycin (VANCOCIN) 1250 mg in  ml infusion  1,250 mg IntraVENous Q24H ARSEN Delaney 125 mL/hr at 01/08/23 1012 1,250 mg at 01/08/23 1012    dextrose 5% with KCl 20 mEq/L infusion   IntraVENous CONTINUOUS Kim Mg  mL/hr at 01/09/23 0306 New Bag at 01/09/23 0306    0.9% sodium chloride infusion 250 mL  250 mL IntraVENous PRN Maria R Joseph MD        vancomycin 50 mg/mL oral solution (compounded) 125 mg  125 mg Per G Tube Q6H Cuco Bergman MD   125 mg at 01/09/23 7563    cyanocobalamin tablet 500 mcg  500 mcg Per G Tube DAILY Cuco Bergman MD   500 mcg at 01/08/23 1020    pantoprazole (PROTONIX) 40 mg in 0.9% sodium chloride 10 mL injection  40 mg IntraVENous DAILY Cuco Bergman MD   40 mg at 01/07/23 1006    sodium chloride (NS) flush 5-40 mL  5-40 mL IntraVENous L0X Sil Hawkins MD   10 mL at 01/09/23 0517    sodium chloride (NS) flush 5-40 mL  5-40 mL IntraVENous PRN Sil Hawkins MD        acetaminophen (TYLENOL) tablet 650 mg  650 mg Oral Q6H PRN Sil Hawkins MD        Or    acetaminophen (TYLENOL) suppository 650 mg  650 mg Rectal Q6H PRN Sil Hawkins MD        ondansetron (ZOFRAN ODT) tablet 4 mg  4 mg Oral Q8H PRN Sil Hawkins MD        Or    ondansetron (ZOFRAN) injection 4 mg  4 mg IntraVENous Q6H PRN Sil Hawkins MD        [Held by provider] heparin (porcine) injection 5,000 Units  5,000 Units SubCUTAneous K7I Sil Hawkins MD   1,358 Units at 01/05/23 2339    insulin lispro (HUMALOG) injection   SubCUTAneous Q6H Alma Julian PA-C        glucose chewable tablet 16 g  4 Tablet Oral PRN Alma Julian PA-C        glucagon (GLUCAGEN) injection 1 mg  1 mg IntraMUSCular PRN Alma Julian PA-C        dextrose 10% infusion 0-250 mL  0-250 mL IntraVENous PRN Alma Julian PA-C        midodrine (PROAMATINE) tablet 5 mg  5 mg Per G Tube Q8H PRN Elon Angelucci, MD        albuterol-ipratropium (DUO-NEB) 2.5 MG-0.5 MG/3 ML  3 mL Nebulization Q6H RT Elon Angelucci, MD   3 mL at 01/08/23 2118    sodium chloride (NS) flush 5-10 mL  5-10 mL IntraVENous PRN Herchel Sudhir, PA        piperacillin-tazobactam (ZOSYN) 3.375 g in 0.9% sodium chloride (MBP/ADV) 100 mL MBP  3.375 g IntraVENous Q8H Adonis Whyte PA 25 mL/hr at 01/09/23 1047 3.375 g at 01/09/23 1047    [Held by provider] levoFLOXacin (LEVAQUIN) 750 mg in D5W IVPB  750 mg IntraVENous Q24H Maribel Byrd, [address] 100 mL/hr at 01/07/23 0109 750 mg at 01/07/23 0109        Physical Exam:     Physical Exam:   General:  Alert, , no distress, appears stated age. Neck: Supple, symmetrical, trachea midline, no adenopathy, thyroid: no enlargement/tenderness/nodules, no carotid bruit and no JVD. Lungs:   Clear to auscultation bilaterally. Heart:  Regular rate and rhythm, S1, S2 normal, no murmur, click, rub or gallop. Abdomen:   Soft, non-tender. Bowel sounds normal. No masses,  No organomegaly. Extremities: Extremities normal, atraumatic, no cyanosis or edema. Data Review:    CBC w/Diff    Recent Labs     01/09/23 0252 01/08/23  1423 01/08/23  0126 01/07/23  2335 01/07/23  0537   WBC 10.9  --  8.7  --  13.1   RBC 2.96*  --  1.89*  --  2.39*   HGB 8.2* 9.6* 5.3*   < > 6.4*   HCT 27.0* 31.6* 18.0*   < > 21.1*   MCV 91.2  --  95.2  --  88.3   MCH 27.7  --  28.0  --  26.8   MCHC 30.4*  --  29.4*  --  30.3*   RDW 16.3*  --  17.7*  --  16.8*    < > = values in this interval not displayed.     Recent Labs     01/09/23 0252 01/08/23  0126 01/07/23  0537   BANDS  --  5 4   MONOS 3 1* 1*   EOS 4 1 1   BASOS 0 0 0   RDW 16.3* 17.7* 16.8*        Comprehensive Metabolic Profile    Recent Labs     01/09/23 0252 01/08/23  0341 01/07/23  0537   * 156* 156*   K 3.3* 3.4* 2.8*   * 127* 125*   CO2 23 23 26   BUN 16 27* 38*   CREA 0.50* 0.60 0.80    Recent Labs     01/09/23 0252 01/08/23  0341 01/07/23  0537   CA 8.2* 8.1* 8.3*   PHOS 2.5 2.6 2.6   ALB  --   --  1.4*   TP  --   --  6.6   TBILI  --   --  0.5          Department: [address] Telemetry Released By: Luis Birch RN (auto-released) Authorizing: Gladis Obrien MD      Contains abnormal data CULTURE, BLOOD  Order: [id number]  Collected 1/4/2023 23:33    Status: Preliminary result    Specimen Information: Blood   0 Result Notes  Component Ref Range & Units 1/4/23 2333  Resulting Agency   Special Requests:   NO SPECIAL REQUESTS P     GRAM STAIN   AEROBIC BOTTLE GRAM POSITIVE COCCI IN GROUPS P  [facility]    GRAM STAIN   GRAM NEGATIVE RODS   [facility]    GRAM STAIN   SMEAR CALLED TO AND CORRECTLY REPEATED BY: JOSEFA WONG [facility] ED AT 2045 BY CHELE 1/5/23 [address]    Culture result:   ESCHERICHIA COLI GROWING IN 1 OF 2 BOTTLES DRAWN No Site Indicated Abnormal  P  [address]   Culture result:    Abnormal   STAPHYLOCOCCUS SPECIES, COAGULASE NEGATIVE (MULTIPLE COLONY TYPES) ALSO GROWING IN SAME 1 OF 2 BOTTLES DRAWN No Site Indicated [address]   Culture result:   APPARENT   GRAM VARIABLE RODS   GROWING IN THE 2ND BOTTLE   No Site Indicated                         Impression:       Active Hospital Problems    Diagnosis Date Noted    Septic shock ([facility] 01/06/2023    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 ([facility] 01/06/2023    Community acquired pneumonia of right lower lobe of lung 01/06/2023    Hypernatremia 20/34/3536    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[facility] 29/30/7322    Hyperosmolality and hypernatremia 32/49/3249    Acute metabolic encephalopathy 31/90/9512    Hypokalemia 06/22/2013      Hypernatremia Slowly improving with Hydration & Free water, Still Hypokalemic      Plan: Will continue Current IVF with K, also will supplement K through NG Tube,H/H has improved following  transfusion. .  Continue antibiotics & Wound care.       Armond Fonseca MD

## 2023-01-09 NOTE — PROGRESS NOTES
01/08/23 2116   RT Protocol   History of Pulmonary Disease 1   Respiratory Pattern 0   Breath Sounds 2   Cough 0   Bronchodilator Assessment Score 3

## 2023-01-09 NOTE — ACP (ADVANCE CARE PLANNING)
Advance Care Planning     General Advance Care Planning (ACP) Conversation      Palliative Medicine  Visited patient along with Palliative team members DANA Washington MSW and DANA Moncada NP. Patient resting quietly in bed, eyes opened. Did not respond to verbal or tactile stimuli. Patient is known to our team.    Patient presented to the ED 1/4/23 with AMS from [facility]. PMH: Dementia, HTN, CVA, Seizures, Sacral Decubitus. Pt does have an Advance Directive on file in EMR. However, the primary decision maker has chosen to step down from this role, making patient's son Ivery Saint, his legal next of kin, as his medical decision maker. He also has a POST form on file. Call placed to son Mika Armstrong at [phone], received voice mail, able to leave a message to please return our call. 1/9/23 4:10 pm  Received return call from patient's son. States he is aware of POST form and agrees. He is agreeable to meeting with our team Wednesday 1/11/23 at 10:00 am to further discuss goals of care moving forward. Primary Decision Maker [facility] Agent): Ivery Saint  Relationship to patient: son  Phone number: [phone]  [] Named in a scanned document   [x] Legal Next of Kin  [] Guardian    ACP documents you currently have include:  [] Advance Directive or Living Will  [] Durable Do Not Resuscitate  [x] Physician Orders for Scope of Treatment (POST)  [] Medical Power of   [] Other    Thank you for the Palliative Medicine consult and allowing us to participate in the care of Mr. Fercho Rodas. Will continue to monitor and provide support.     CODE STATUS: DNR/DNI, LIMITED INTERVENTIONS, FEEDING TUBE LONG TERM    Armin Ro RN  Palliative Medicine Inpatient RN  [address] [facility]: [phone] ([phone])

## 2023-01-09 NOTE — WOUND CARE
Physical Exam  Room 356: focused wound assess  Assisted primary nurse Rocio Ravi RN with dressing change & incontinence of stool care, pericare provided,  Bed pad changed. Sacrum, stage 4 pressure injury, [id number], 5cm undermining at 6 o'clock position, (+) odor, (+) purulent drainage small to moderate amount, base has bone, red muscle, & brown yellow slough, & adipose tissue exposed, POA. Scrotum, Moisture Associated Skin Damage (MASD), moderate, denuded skin, edema, POA. Left elbow, stage 3 pressure injury, yellow base, pink edges, moderate amount yellow & brown drainage, POA. Right elbow, stage 2 pressure injury, pink base, small amount yellow drainage, POA. Right thigh, unstageable pressure injury, yellow center, pink edges, POA. Both great toes have arterial ulcers, dark discoloration, flaky skin, POA. Both heels are flaky, pink, no pressure injuries, have silicone heel dressings in use, & heel prevention boots. Pt has contractures of upper & lower extremities, & poor skin turgor. Wound Care Orders for \[facility]\"": Wound vac dressing first application & dressing changes by unit staff nurses every Monday, Wednesday, & Friday on day shift. Measure wound size & document with each dressing change. Settings: 125mmHG low continuous suction. Upon discharge from [facility], place hospital machine in dirty utility room under white laminated sign. Wound Care Orders for SNF if needed: Remove wound vac dressing upon discharge from [facility], place saline dressing in wound, SNF will place their wound vac when pt arrives to their facility. Wound Care Orders both elbows & right lateral thigh: Unit nursing staff to cleanse wound with wound spray from par room, apply small amount & thin layer Therahoney gel, then apply Opticell AG gelling fiber dressing, rope/ribbon or 4x5 rectangle, cut dressing to fit, cover with Optifoam Gentle Silicone dressing, change every 48hrs & prn soilage.   Will turn over care to nursing staff at this time.   Norah DANIELLEN, RN, [facility], [address]

## 2023-01-09 NOTE — RT PROTOCOL NOTE
RT Inhaler-Nebulizer Bronchodilator Protocol Note    There is a bronchodilator order in the chart from a provider indicating to follow the RT Bronchodilator Protocol and there is an Initiate RT Bronchodilator Protocol order as well (see protocol at bottom of note). CXR Findings:  No results found for this or any previous visit from the past 2 days. The findings from the last RT Protocol Assessment were as follows:  History of Pulmonary Disease: Smoker 15 pack years or more  Respiratory Pattern: Regular pattern and RR 12-20 bpm  Breath Sounds: Slighly diminished and/or crackles  Cough: Strong, spontaneous, non-productive  Indication for Bronchodilator Therapy:    Bronchodilator Assessment Score: 3    Aerosolized bronchodilator medication orders have been revised according to the RT Inhaler-Nebulizer Bronchodilator Protocol below. Respiratory Therapist to perform RT Therapy Protocol Assessment initially then follow the protocol. Repeat RT Therapy Protocol Assessment PRN for score 0-3 or on second treatment, BID, and PRN for scores above 3. No Indications - adjust the frequency to every 6 hours PRN wheezing or bronchospasm, if no treatments needed after 48 hours then discontinue using Per Protocol order mode. If indication present, adjust the RT bronchodilator orders based on the Bronchodilator Assessment Score as indicated below. Use Inhaler orders unless patient has one or more of the following: on home nebulizer, not able to hold breath for 10 seconds, is not alert and oriented, cannot activate and use MDI correctly, or respiratory rate 25 breaths per minute or more, then use the equivalent nebulizer order(s) with same Frequency and PRN reasons based on the score. If a patient is on this medication at home then do not decrease Frequency below that used at home.     0-3 - enter or revise RT bronchodilator order(s) to equivalent RT Bronchodilator order with Frequency of every 4 hours PRN for wheezing or increased work of breathing using Per Protocol order mode. 4-6 - enter or revise RT Bronchodilator order(s) to two equivalent RT bronchodilator orders with one order with BID Frequency and one order with Frequency of every 4 hours PRN wheezing or increased work of breathing using Per Protocol order mode. 7-10 - enter or revise RT Bronchodilator order(s) to two equivalent RT bronchodilator orders with one order with TID Frequency and one order with Frequency of every 4 hours PRN wheezing or increased work of breathing using Per Protocol order mode. 11-13 - enter or revise RT Bronchodilator order(s) to one equivalent RT bronchodilator order with QID Frequency and an Albuterol order with Frequency of every 4 hours PRN wheezing or increased work of breathing using Per Protocol order mode. Greater than 13 - enter or revise RT Bronchodilator order(s) to one equivalent RT bronchodilator order with every 4 hours Frequency and an Albuterol order with Frequency of every 2 hours PRN wheezing or increased work of breathing using Per Protocol order mode. RT to enter RT Home Evaluation for COPD & MDI Assessment order using Per Protocol order mode.     Electronically signed by RT Amada on 1/8/2023 at 9:16 PM

## 2023-01-09 NOTE — PROGRESS NOTES
Problem: Risk for Spread of Infection  Goal: Prevent transmission of infectious organism to others  Description: Prevent the transmission of infectious organisms to other patients, staff members, and visitors. Outcome: Progressing Towards Goal     Problem: Patient Education:  Go to Education Activity  Goal: Patient/Family Education  Outcome: Progressing Towards Goal     Problem: Falls - Risk of  Goal: *Absence of Falls  Description: Document Eugenia Nares Fall Risk and appropriate interventions in the flowsheet.   Outcome: Progressing Towards Goal  Note: Fall Risk Interventions:       Mentation Interventions: Bed/chair exit alarm, Adequate sleep, hydration, pain control, Door open when patient unattended, More frequent rounding, Room close to nurse's station, Toileting rounds, Update white board    Medication Interventions: Bed/chair exit alarm, Evaluate medications/consider consulting pharmacy    Elimination Interventions: Bed/chair exit alarm, Toileting schedule/hourly rounds              Problem: Patient Education: Go to Patient Education Activity  Goal: Patient/Family Education  Outcome: Progressing Towards Goal

## 2023-01-09 NOTE — OP NOTES
[address] [place] [address]   OPERATIVE REPORT    Name:  Otf Dahl  MR#:   [id number]  :  1955  ACCOUNT #:  [de-identified]  DATE OF SERVICE:  2023    PREOPERATIVE DIAGNOSIS:  Sacral decubitus ulcer. POSTOPERATIVE DIAGNOSIS:  Stage IV sacral decubitus ulcer. PROCEDURE PERFORMED:  Irrigation and debridement of sacral decubitus ulcer. SURGEON:  Faviola Osborne. Debbie Hays MD.    ASSISTANT:  Dominga Gooden. ANESTHESIA:  General.    COMPLICATIONS:  None. SPECIMENS REMOVED:  Ulcer tissue to Pathology. IMPLANTS:  None. ESTIMATED BLOOD LOSS:  20 mL. FINDINGS:  A 15 cm x 15 cm x 4 cm sacral decubitus ulcer with necrotic and purulent tissues. Wound extended to sacral bone. INDICATIONS:  The patient is a 60-year-old male who presented to the ER with sepsis. He had a large necrotic sacral decubitus ulcer. There was a small amount of drainage coming from the area. CT imaging showed possible air in subcutaneous tissues as well. He was brought to the operating room for debridement. I explained the risks to the patient and to his son including bleeding and infection. They understood and wished for me to proceed. DESCRIPTION OF PROCEDURE:  The patient was properly identified in the holding area and brought to the operating room. He was intubated and placed in the prone jackknife position. Sacral region was prepped and draped in the usual sterile fashion. There was a large area of necrotic dermal tissue. This was all excised. Subcutaneous tissues were malodorous with purulence and necrotic appearance. We continued the debridement all the way down to healthy tissue deeply and laterally. This included excision all the way down to expose sacral bone. There was some tracking down towards the perianal area, but the subcutaneous tissues here, although there was some pus evacuated, did appear healthy.   We, therefore, did not debride this region, as the fat appeared to be yellow with bleeding. We then thoroughly irrigated the area with PulsaVac irrigation system. Hemostasis was assured with cautery. The wound was then packed with half-strength Betadine-soaked Kerlix and dry sterile dressings were applied. Total size of the wound was 15 cm x 15 cm x 4 cm. This was an excisional debridement down to the level of the sacral bone. The patient tolerated the procedure well. All instrument, sponge, and needle counts were correct at the end of the case x2. The patient was awoken from anesthesia. He was extubated and transported to the PACU in stable condition.         Yvonne Stallings MD      [id number]  D:  01/08/2023 8:31  T:  01/08/2023 20:12  JOB #:  [id number]

## 2023-01-10 ENCOUNTER — APPOINTMENT (OUTPATIENT)
Dept: GENERAL RADIOLOGY | Age: 68
DRG: 853 | End: 2023-01-10
Attending: STUDENT IN AN ORGANIZED HEALTH CARE EDUCATION/TRAINING PROGRAM
Payer: COMMERCIAL

## 2023-01-10 LAB
ANION GAP SERPL CALC-SCNC: 4 MMOL/L (ref 3–18)
BACTERIA SPEC CULT: ABNORMAL
BASOPHILS # BLD: 0 K/UL (ref 0–0.1)
BASOPHILS NFR BLD: 0 % (ref 0–2)
BUN SERPL-MCNC: 11 MG/DL (ref 7–18)
BUN/CREAT SERPL: 24 (ref 12–20)
CALCIUM SERPL-MCNC: 8.1 MG/DL (ref 8.5–10.1)
CHLORIDE SERPL-SCNC: 120 MMOL/L (ref 100–111)
CO2 SERPL-SCNC: 24 MMOL/L (ref 21–32)
CREAT SERPL-MCNC: 0.45 MG/DL (ref 0.6–1.3)
DIFFERENTIAL METHOD BLD: ABNORMAL
EOSINOPHIL # BLD: 0.4 K/UL (ref 0–0.4)
EOSINOPHIL NFR BLD: 3 % (ref 0–5)
ERYTHROCYTE [DISTWIDTH] IN BLOOD BY AUTOMATED COUNT: 16 % (ref 11.6–14.5)
GLUCOSE BLD STRIP.AUTO-MCNC: 102 MG/DL (ref 70–110)
GLUCOSE BLD STRIP.AUTO-MCNC: 122 MG/DL (ref 70–110)
GLUCOSE BLD STRIP.AUTO-MCNC: 83 MG/DL (ref 70–110)
GLUCOSE BLD STRIP.AUTO-MCNC: 97 MG/DL (ref 70–110)
GLUCOSE BLD STRIP.AUTO-MCNC: 98 MG/DL (ref 70–110)
GLUCOSE SERPL-MCNC: 94 MG/DL (ref 74–99)
HCT VFR BLD AUTO: 34.3 % (ref 36–48)
HGB BLD-MCNC: 10.6 G/DL (ref 13–16)
IMM GRANULOCYTES # BLD AUTO: 0.1 K/UL (ref 0–0.04)
IMM GRANULOCYTES NFR BLD AUTO: 1 % (ref 0–0.5)
LYMPHOCYTES # BLD: 1.5 K/UL (ref 0.9–3.6)
LYMPHOCYTES NFR BLD: 13 % (ref 21–52)
MAGNESIUM SERPL-MCNC: 2.2 MG/DL (ref 1.6–2.6)
MCH RBC QN AUTO: 27.3 PG (ref 24–34)
MCHC RBC AUTO-ENTMCNC: 30.9 G/DL (ref 31–37)
MCV RBC AUTO: 88.4 FL (ref 78–100)
MONOCYTES # BLD: 0.6 K/UL (ref 0.05–1.2)
MONOCYTES NFR BLD: 5 % (ref 3–10)
NEUTS SEG # BLD: 9.1 K/UL (ref 1.8–8)
NEUTS SEG NFR BLD: 78 % (ref 40–73)
NRBC # BLD: 0 K/UL (ref 0–0.01)
NRBC BLD-RTO: 0 PER 100 WBC
PHOSPHATE SERPL-MCNC: 2.2 MG/DL (ref 2.5–4.9)
PLATELET # BLD AUTO: 249 K/UL (ref 135–420)
PMV BLD AUTO: 12.8 FL (ref 9.2–11.8)
POTASSIUM SERPL-SCNC: 3.9 MMOL/L (ref 3.5–5.5)
RBC # BLD AUTO: 3.88 M/UL (ref 4.35–5.65)
SERVICE CMNT-IMP: ABNORMAL
SODIUM SERPL-SCNC: 148 MMOL/L (ref 136–145)
WBC # BLD AUTO: 11.7 K/UL (ref 4.6–13.2)

## 2023-01-10 PROCEDURE — 74011250636 HC RX REV CODE- 250/636: Performed by: INTERNAL MEDICINE

## 2023-01-10 PROCEDURE — 74011250636 HC RX REV CODE- 250/636: Performed by: PHYSICIAN ASSISTANT

## 2023-01-10 PROCEDURE — 80048 BASIC METABOLIC PNL TOTAL CA: CPT

## 2023-01-10 PROCEDURE — 84100 ASSAY OF PHOSPHORUS: CPT

## 2023-01-10 PROCEDURE — C9113 INJ PANTOPRAZOLE SODIUM, VIA: HCPCS | Performed by: INTERNAL MEDICINE

## 2023-01-10 PROCEDURE — 99233 SBSQ HOSP IP/OBS HIGH 50: CPT | Performed by: INTERNAL MEDICINE

## 2023-01-10 PROCEDURE — 74018 RADEX ABDOMEN 1 VIEW: CPT

## 2023-01-10 PROCEDURE — 82962 GLUCOSE BLOOD TEST: CPT

## 2023-01-10 PROCEDURE — 94640 AIRWAY INHALATION TREATMENT: CPT

## 2023-01-10 PROCEDURE — 74011000258 HC RX REV CODE- 258: Performed by: PHYSICIAN ASSISTANT

## 2023-01-10 PROCEDURE — 74011000258 HC RX REV CODE- 258: Performed by: INTERNAL MEDICINE

## 2023-01-10 PROCEDURE — 74011000636 HC RX REV CODE- 636: Performed by: INTERNAL MEDICINE

## 2023-01-10 PROCEDURE — 77010033678 HC OXYGEN DAILY

## 2023-01-10 PROCEDURE — 85025 COMPLETE CBC W/AUTO DIFF WBC: CPT

## 2023-01-10 PROCEDURE — 2709999900 HC NON-CHARGEABLE SUPPLY

## 2023-01-10 PROCEDURE — 94761 N-INVAS EAR/PLS OXIMETRY MLT: CPT

## 2023-01-10 PROCEDURE — 65660000004 HC RM CVT STEPDOWN

## 2023-01-10 PROCEDURE — 74011000250 HC RX REV CODE- 250

## 2023-01-10 PROCEDURE — 36415 COLL VENOUS BLD VENIPUNCTURE: CPT

## 2023-01-10 PROCEDURE — 74011000250 HC RX REV CODE- 250: Performed by: INTERNAL MEDICINE

## 2023-01-10 PROCEDURE — 83735 ASSAY OF MAGNESIUM: CPT

## 2023-01-10 RX ORDER — POTASSIUM CHLORIDE 20 MEQ/1
20 TABLET, EXTENDED RELEASE ORAL DAILY
Status: DISCONTINUED | OUTPATIENT
Start: 2023-01-11 | End: 2023-01-15

## 2023-01-10 RX ORDER — IPRATROPIUM BROMIDE AND ALBUTEROL SULFATE 2.5; .5 MG/3ML; MG/3ML
3 SOLUTION RESPIRATORY (INHALATION)
Status: DISCONTINUED | OUTPATIENT
Start: 2023-01-10 | End: 2023-01-13

## 2023-01-10 RX ADMIN — DEXTROSE MONOHYDRATE AND POTASSIUM CHLORIDE INJECTION, SOLUTION: 5; .149 INJECTION, SOLUTION INTRAVENOUS at 16:28

## 2023-01-10 RX ADMIN — LEVETIRACETAM 500 MG: 500 INJECTION, SOLUTION INTRAVENOUS at 11:28

## 2023-01-10 RX ADMIN — SODIUM CHLORIDE, PRESERVATIVE FREE 10 ML: 5 INJECTION INTRAVENOUS at 06:00

## 2023-01-10 RX ADMIN — DEXTROSE MONOHYDRATE AND POTASSIUM CHLORIDE INJECTION, SOLUTION: 5; .149 INJECTION, SOLUTION INTRAVENOUS at 03:24

## 2023-01-10 RX ADMIN — PIPERACILLIN SODIUM AND TAZOBACTAM SODIUM 3.38 G: 3; .375 INJECTION, POWDER, LYOPHILIZED, FOR SOLUTION INTRAVENOUS at 16:28

## 2023-01-10 RX ADMIN — PIPERACILLIN SODIUM AND TAZOBACTAM SODIUM 3.38 G: 3; .375 INJECTION, POWDER, LYOPHILIZED, FOR SOLUTION INTRAVENOUS at 00:14

## 2023-01-10 RX ADMIN — PANTOPRAZOLE SODIUM 40 MG: 40 INJECTION, POWDER, FOR SOLUTION INTRAVENOUS at 08:05

## 2023-01-10 RX ADMIN — IPRATROPIUM BROMIDE AND ALBUTEROL SULFATE 3 ML: .5; 3 SOLUTION RESPIRATORY (INHALATION) at 13:17

## 2023-01-10 RX ADMIN — LEVETIRACETAM 500 MG: 500 INJECTION, SOLUTION INTRAVENOUS at 00:09

## 2023-01-10 RX ADMIN — SODIUM CHLORIDE, PRESERVATIVE FREE 10 ML: 5 INJECTION INTRAVENOUS at 22:24

## 2023-01-10 RX ADMIN — SODIUM CHLORIDE, PRESERVATIVE FREE 10 ML: 5 INJECTION INTRAVENOUS at 16:29

## 2023-01-10 RX ADMIN — DIATRIZOATE MEGLUMINE AND DIATRIZOATE SODIUM 30 ML: 660; 100 LIQUID ORAL; RECTAL at 16:28

## 2023-01-10 RX ADMIN — Medication 125 MG: at 19:54

## 2023-01-10 RX ADMIN — PIPERACILLIN SODIUM AND TAZOBACTAM SODIUM 3.38 G: 3; .375 INJECTION, POWDER, LYOPHILIZED, FOR SOLUTION INTRAVENOUS at 08:05

## 2023-01-10 RX ADMIN — IPRATROPIUM BROMIDE AND ALBUTEROL SULFATE 3 ML: .5; 3 SOLUTION RESPIRATORY (INHALATION) at 02:15

## 2023-01-10 RX ADMIN — IPRATROPIUM BROMIDE AND ALBUTEROL SULFATE 3 ML: .5; 3 SOLUTION RESPIRATORY (INHALATION) at 07:33

## 2023-01-10 RX ADMIN — IPRATROPIUM BROMIDE AND ALBUTEROL SULFATE 3 ML: .5; 3 SOLUTION RESPIRATORY (INHALATION) at 21:23

## 2023-01-10 NOTE — ROUTINE PROCESS
Bedside and Verbal shift change report given to [facility] (oncoming nurse) by Brittany Rock RN (offgoing nurse). Report included the following information SBAR, Kardex, Intake/Output, MAR, and Recent Results. 0800 - PEG tube is out upon assessment (occurred overnight per night shift RN). Dressing applied by night shift RN. Tube feeds held at this time.  Patient resting in bed quietly     1000 - MD paged for IR consult for PEG tube

## 2023-01-10 NOTE — PROGRESS NOTES
[email or web address]  [phone]    Gastroenterology Progress Note    Impression:  Shay Blanchard is a 79 y.o. male who I am being asked to see in consultation for an opinion regarding PEG leakage 1/9. Patient has a hx of CVA in 2013 w/ intermittent need for PEG since. Patient was seen by our office in 2021 w/ EGD/colo revealing small sliding hiatal hernia, otherwise normal; diverticulosis, poor prep, hemorrhoids. Most recent PEG placed by Dr. Chaparro Friends 10/2022. Pt admitted 1/4 with AMS, sepsis secondary to acute on chronic sacral osteomyelitis, bilateral pneumonia, and C. Diff infection. 1/9 RN noted bed and gown soiled w/ feeds this morning, feeds subsequently held and GI consulted. Patient is non-communicative but there have been no complaints or signs of n/v, abd pain, or bloody/black stools. Patient was seen by myself with tube feeds attempted and site flushed successfully without leakage or signs of malplacement w/ RN at bedside. 1/10 00:25 PEG was dislodge as he was being repositioned in bed by RN. GI notified at 1300. Plan:      PEG tube unable to be replaced at bedside by GI team. Recommend primary consult wound care for stoma site. Will consult IR to see if they can successfully recannulize the mature tract with fluoroscopic guidance. IR recommended RN attempt to place a bradley in the tract, until patient can possibly be added to schedule tomorrow. If IR is unable, recommend NG tube placement for 4-5 days ensuring the tract to close completely and subsequently allowing us to safely insert a new PEG tube at a separate site.          Chief Complaint: PEG dislodgement        ROS: unable to obtain      General: responds with \"yeah\", overall non-verbal but more awake and alert than yesterday  Eyes: conjunctiva normal, EOM normal  Cardiovascular: heart normal, intact distal pulses, normal rate and regular rhythm  Pulmonary: breath sounds normal and effort normal  Abdominal: non-distended, active bowel sounds, soft, + bandage with stoma from prior PEG site w/o discharge or erythema; non-tender, non-acute  Patient Active Problem List   Diagnosis Code    Intractable hiccups R06.6    Hypertension I10    Hypokalemia E87.6    First degree AV block I44.0    Former heavy tobacco smoker Z87.891    Severe protein-calorie malnutrition ([facility]) E43    Psychogenic polydipsia R63.1, F54    Bilateral leg and foot pain M79.604, M79.605, M79.671, M79.672    Stroke ([facility]) I63.9    Hyperosmolality and hypernatremia T20.4    Acute metabolic encephalopathy L85.71    Dehydration E86.0    Goals of care, counseling/discussion Z71.89    Debility R53.81    Respiratory failure ([facility]) E95.00    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[facility]) J65.80    Seizure disorder ([facility]) G40.909    Hypernatremia E87.0    Anemia D64.9    Symptomatic anemia D64.9    Aspiration pneumonia ([address] J69.0    Septic shock ([facility]) A41.9, R65.21    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 [facility]) L98.429    Community acquired pneumonia of right lower lobe of lung J18.9         Visit Vitals  BP (!) 148/91   Pulse 88   Temp 98.8 °F (37.1 °C)   Resp 19   Ht 5' 9\" (1.753 m)   Wt 78.9 kg (173 lb 15.1 oz)   SpO2 100%   BMI 25.69 kg/m²           Intake/Output Summary (Last 24 hours) at 1/10/2023 1259  Last data filed at 1/10/2023 0657  Gross per 24 hour   Intake 175 ml   Output 2275 ml   Net -2100 ml       CBC w/Diff    Lab Results   Component Value Date/Time    WBC 11.7 01/10/2023 01:24 AM    RBC 3.88 (L) 01/10/2023 01:24 AM    HGB 10.6 (L) 01/10/2023 01:24 AM    HCT 34.3 (L) 01/10/2023 01:24 AM    MCV 88.4 01/10/2023 01:24 AM    MCH 27.3 01/10/2023 01:24 AM    MCHC 30.9 (L) 01/10/2023 01:24 AM    RDW 16.0 (H) 01/10/2023 01:24 AM     01/10/2023 01:24 AM    Lab Results   Component Value Date/Time    GRANS 78 (H) 01/10/2023 01:24 AM    LYMPH 13 (L) 01/10/2023 01:24 AM    EOS 3 01/10/2023 01:24 AM    BANDS 5 01/08/2023 01:26 AM    BASOS 0 01/10/2023 01:24 AM    METAS 1 01/08/2023 01:26 AM      Basic Metabolic Profile   Recent Labs     01/10/23  0124   *   K 3.9   *   CO2 24   BUN 11   CA 8.1*   MG 2.2   PHOS 2.2*        Hepatic Function    Lab Results   Component Value Date/Time    ALB 1.4 (L) 01/07/2023 05:37 AM    TP 6.6 01/07/2023 05:37 AM    AP 90 01/07/2023 05:37 AM    No results found for: TBIL       Coags   No results for input(s): PTP, INR, APTT, INREXT in the last 72 hours. Claritza Adams PA-C.   01/10/23, 2:14 PM   [facility]  [email or web address]  Phone: [phone]  Pager: [phone]

## 2023-01-10 NOTE — PROGRESS NOTES
Patient leaking around peg tub it became dislodged as he was being repositioned in bed no s/s of distress or pain TF has been removed, Dr. Mistry Come notified.

## 2023-01-10 NOTE — CONSULTS
Interventional Radiology Consult Note  Patient: Monster Hatfield               Sex: male          DOA: 1/4/2023       YOB: 1955      Age:  79 y.o.        LOS:  LOS: 4 days              Assessment   Monster Hatfield is a 79 y.o. male with h/o dysphagia secondary to CVA (2013) s/p endoscopic gastrostomy tube insertion by Dr. Do Gomez (GI) 10/20/2022 with dislodgment of gastrostomy tube overnight. Patient chart and available imaging reviewed with Dr. Jaspal Mcneal. Gastrostomy tube replacement is indicated in the setting of continued need for long-term feeding conduit. Per conversation with GI, they are not able to perform bedside replacements. Given limitations in IR schedule, will attempt gastrostomy tube replacement at bedside today. Plan     Gastrostomy tube replacement easily performed at bedside without issue. Stat KUB with Gastrografin ordered to evaluate position of gastrostomy tube. Please hold tube feeds pending final read of KUB. Recommend wound care consult for significant gastrostomy site erosion. Suspected this to be secondary to chronic leakage of stomach contents at site (likely due to maladjustment of planned). Recommend nursing check/adjust flange twice daily. Defer future recommendations and further management management to GI. Please reconsult IR if needed. Thank you,  Williams Saldaña PA-C  [id number]    HPI:     Consult for dislodged gastrostomy tube received from García Maki PA-C and reviewed with Dr. Jaspal Mcneal. Monster Hatfield is a 79 y.o. male with a PMH of CVA (2017) with residual left hemiparesis dysphagia s/p gastrostomy tube placement by GI 10/20/2022, GERD, HLD, HTN, dementia who presented to [address] [place] [address] on 1/4/2023 for evaluation of altered mental status. In the course of this admission, patient with a leaking gastrostomy tube, which was evaluated by GI on 1/9 and found to be maladjusted, but functioning well after adjustment of flange.   Overnight on 1/9, gastrostomy tube became dislodged. Per conversation with GI, GI is unable to replace gastrostomy tubes. IR was consulted for assistance with further management.      Past Medical History:   Diagnosis Date    Alcohol dependence with alcohol-induced persisting dementia ([address]     per [facility]    Anemia     Aphasia     Benign prostatic hyperplasia     Cerebral vascular disease     COVID-19 2021    Disturbances of salivary secretion     Dysphagia     GERD (gastroesophageal reflux disease)     Hemiparesis ([facility])     left side    Hemiplegia (H[facility])     left side     Hiccups     Hyperlipidemia     Hypernatremia 2021    Hypertension     Hyponatremia     Insomnia     Lacunar infarction ([address]     weakness both arms, unable to walk    Lower extremity edema     Moderate protein-calorie malnutrition (HCC)     MRSA (methicillin resistant Staphylococcus aureus)     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 ([facility])     Pneumonia     Psychogenic polydipsia     PVD (peripheral vascular disease) ([facility])     Seizures ([address]     Spinal stenosis     Stroke ([address]     Unspecified convulsions ([address]     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     Past Surgical History:   Procedure Laterality Date    COLONOSCOPY N/A 2021    COLONOSCOPY performed by Maine Chester MD at [facility]    HX HEENT      pt reports past hx of surgery on ears unsure of what type    HX OTHER SURGICAL Bilateral     debridement of lower extremities     Family History   Problem Relation Age of Onset    Hypertension Other      Social History     Socioeconomic History    Marital status:    Tobacco Use    Smoking status: Former     Packs/day: 0.50     Types: Cigarettes     Quit date: 2008     Years since quittin.5    Smokeless tobacco: Never   Substance and Sexual Activity    Alcohol use: No    Drug use: No    Sexual activity: Not Currently     Prior to Admission medications    Medication Sig Start Date End Date Taking? Authorizing Provider   apixaban (ELIQUIS) 5 mg tablet Take 1 Tablet by mouth every twelve (12) hours. 12/1/22   Chelsie Larose,    baclofen (LIORESAL) 10 mg tablet 10 mg by Per G Tube route two (2) times daily as needed for Muscle Spasm(s). Indications: muscle spasms caused by a spinal disease    Provider, Historical   cyanocobalamin (VITAMIN B12) 500 mcg tablet 500 mcg by Per G Tube route daily. Indications: prevention of vitamin B12 deficiency    Provider, Historical   ferrous sulfate (FerrouSuL) 325 mg (65 mg iron) tablet Take  by mouth Daily (before breakfast). Provider, Historical   metoprolol tartrate (LOPRESSOR) 25 mg tablet 25 mg by Per G Tube route two (2) times a day. Provider, Historical   thiamine HCL (B-1) 100 mg tablet 1 Tablet by Per G Tube route daily. 10/22/22   Néstor Ontiveros MD   levETIRAcetam (KEPPRA) 100 mg/mL solution 7.5 mL by Per G Tube route two (2) times a day. 10/22/22   Néstor Ontiveros MD   pravastatin (PRAVACHOL) 40 mg tablet Take 1 Tab by mouth nightly. 9/20/17   Robbie Schultz MD   ascorbic acid, vitamin C, (VITAMIN C) 250 mg tablet Take 1 Tab by mouth daily. 7/13/17   Dayna Gordon MD   polyethylene glycol (MIRALAX) 17 gram packet Take 1 Packet by mouth daily. 7/13/17   Dayna Gordon MD   tamsulosin (FLOMAX) 0.4 mg capsule Take 0.4 mg by mouth daily. 1 tab daily    Provider, Historical   MULTIVITAMIN,THERAPEUTIC (THERA MULTI-VITAMIN PO) Take 500 mg by mouth daily. Provider, Historical   folic acid (FOLVITE) 1 mg tablet Take 1 Tab by mouth daily. 8/18/14   Dayna Gordon MD     No Known Allergies    Review of Systems  As above    Physical Exam:      Visit Vitals  BP (!) 148/91   Pulse 88   Temp 98.8 °F (37.1 °C)   Resp 19   Ht 5' 9\" (1.753 m)   Wt 78.9 kg (173 lb 15.1 oz)   SpO2 99%   BMI 25.69 kg/m²       Physical Exam:  Gen: Awake, NAD.   No coherent speech  Respiratory: Normal work of breathing, equal chest rise and fall  Cardiovascular: RRR  Gastrointestinal: Soft, NT/ND. Reid catheter in gastrostomy tract. Patient with significant erosion of ostomy itself and with shallow, superficial ulceration left lateral to ostomy site. Extremities: Skin warm and dry.   Skin: Warm and dry    Labs Reviewed:  CMP:   Lab Results   Component Value Date/Time     (H) 01/10/2023 01:24 AM    K 3.9 01/10/2023 01:24 AM     (H) 01/10/2023 01:24 AM    CO2 24 01/10/2023 01:24 AM    AGAP 4 01/10/2023 01:24 AM    GLU 94 01/10/2023 01:24 AM    BUN 11 01/10/2023 01:24 AM    CREA 0.45 (L) 01/10/2023 01:24 AM    CA 8.1 (L) 01/10/2023 01:24 AM    MG 2.2 01/10/2023 01:24 AM    PHOS 2.2 (L) 01/10/2023 01:24 AM     CBC:   Lab Results   Component Value Date/Time    WBC 11.7 01/10/2023 01:24 AM    HGB 10.6 (L) 01/10/2023 01:24 AM    HCT 34.3 (L) 01/10/2023 01:24 AM     01/10/2023 01:24 AM     Blood Thinners:  Heparin 5000 units SC every 8 hours

## 2023-01-10 NOTE — PROGRESS NOTES
Infectious Disease progress Note        Reason: Sepsis    Current abx Prior abx   Zosyn since 1/4/2023  Po vancomycin since 1/7/23 levofloxacin, vancomycin 1/4-1/9     Lines:       Assessment :   79 y.o. male with hx of dementia, COVID, HLD, HTN, chronic hyponatremia, CVA, seizures who presented to ED on 1/4/2023 for altered mental status. Hospitalization [facility] July 2017 for right foot cellulitis, infected right foot ulcer secondary to Proteus, citrobacter, e.coli, mssa, e.fecalis, e.avium, strep viridans. Surgical wound cultures 7/11 : proteus,citrobacter   S/p I&D of right foot ulcer on 7/11- intra op findings noted - no evidence of bone infection. Hospitalization [facility] October 2022 for sepsis, acute hypoxic respiratory failure-present on admission due to aspiration pneumonia superimposed on recent COVID-19 infection     Now with leukocytosis, bandemia, hypotension requiring pressors on admission, CT scan 1/5/2023 with air noted in the posterior wall of anus/rectum, posterior cortex of the sacrum/coccyx, bacteremia    Clinical presentation consistent with septic shock-present on admission due to polymicrobial bloodstream infection -( blood cx 1/4/23 positive for e.coli, proteus, bacteroides, gram positive cocci), necrotizing infection of the sacrum/acute on chronic sacral osteomyelitis, c.diff infection    Most likely source of polymicrobial bloodstream infection is necrotizing infection of sacral ulcer  Status post I&D on 1/6/2023. Intra-Op findings noted. Surgery help appreciated  Preop wound cultures 1/6-mixed enteric larisa  Intra-Op cultures-mixed enteric larisa, e.coli, Enterococcus    Stool c.diff positive- likely suggestive of evolving c.diff infection. Acute kidney injury-likely due to sepsis    Leakage around PEG tube-plans for IR evaluation noted    Recommendations:     1. Continue Zosyn. continue oral vancomycin  2. Follow up Repeat blood culture 1/8  3.   Follow-up wound cultures -I have requested microbiology lab to check susceptibilities of Enterococcus in wound culture 1/6  4. Follow-up surgery recommendations regarding wound care sacral ulcer  5. Management of acute kidney injury per primary team  6. Await palliative care recommendations regarding address goals of care  8. Patient will need outpatient IV antibiotics and family wishes for aggressive measures. Patient is at very high risk for wound nonhealing, recurrent infection. 9.  Follow-up IR recommendations regarding drainage around PEG tube      D/w dr. Bia Haney Please call me if any further questions or concerns. Will continue to participate in the care of this patient.   HPI:    Unable to communicate effectively    Past Medical History:   Diagnosis Date    Alcohol dependence with alcohol-induced persisting dementia ([address]     per [facility] 4/23/21    Anemia     Aphasia     Benign prostatic hyperplasia     Cerebral vascular disease     COVID-19 04/06/2021    Disturbances of salivary secretion     Dysphagia     GERD (gastroesophageal reflux disease)     Hemiparesis (HCC)     left side    Hemiplegia (HCC)     left side     Hiccups     Hyperlipidemia     Hypernatremia 03/2021    Hypertension     Hyponatremia     Insomnia     Lacunar infarction ([address] 2010    weakness both arms, unable to walk    Lower extremity edema     Moderate protein-calorie malnutrition (HCC)     MRSA (methicillin resistant Staphylococcus aureus)     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)     Pneumonia     Psychogenic polydipsia     PVD (peripheral vascular disease) ([address]     Seizures ([address]     Spinal stenosis     Stroke ([address] 2017    Unspecified convulsions ([address]     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 Qty: 60 Tablet, Refills: 0      baclofen (LIORESAL) 10 mg tablet 10 mg by Per G Tube route two (2) times daily as needed for Muscle Spasm(s). 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 Indications: prevention of vitamin B12 deficiency      ferrous sulfate (FerrouSuL) 325 mg (65 mg iron) tablet Take  by mouth Daily (before breakfast). metoprolol tartrate (LOPRESSOR) 25 mg tablet 25 mg by Per G Tube route two (2) times a day. thiamine HCL (B-1) 100 mg tablet 1 Tablet by Per G Tube route daily. Qty: 30 Tablet, Refills: 0      levETIRAcetam (KEPPRA) 100 mg/mL solution 7.5 mL by Per G Tube route two (2) times a day. Qty: 473 mL, Refills: 0      pravastatin (PRAVACHOL) 40 mg tablet Take 1 Tab by mouth nightly. Qty: 30 Tab, Refills: 1      ascorbic acid, vitamin C, (VITAMIN C) 250 mg tablet Take 1 Tab by mouth daily. Qty: 30 Tab, Refills: 2      polyethylene glycol (MIRALAX) 17 gram packet Take 1 Packet by mouth daily. Qty: 30 Packet, Refills: 2      tamsulosin (FLOMAX) 0.4 mg capsule Take 0.4 mg by mouth daily. 1 tab daily      MULTIVITAMIN,THERAPEUTIC (THERA MULTI-VITAMIN PO) Take 500 mg by mouth daily. folic acid (FOLVITE) 1 mg tablet Take 1 Tab by mouth daily.   Qty: 30 Tab, Refills: 1             Current Facility-Administered Medications   Medication Dose Route Frequency    levETIRAcetam (KEPPRA) 500 mg in 0.9% sodium chloride (MBP/ADV) 100 mL MBP  500 mg IntraVENous Q12H    potassium chloride (K-DUR, KLOR-CON M20) SR tablet 20 mEq  20 mEq Oral BID    0.9% sodium chloride infusion 250 mL  250 mL IntraVENous PRN    dextrose 5% with KCl 20 mEq/L infusion IntraVENous CONTINUOUS    0.9% sodium chloride infusion 250 mL  250 mL IntraVENous PRN    vancomycin 50 mg/mL oral solution (compounded) 125 mg  125 mg Per G Tube Q6H    cyanocobalamin tablet 500 mcg  500 mcg Per G Tube DAILY    pantoprazole (PROTONIX) 40 mg in 0.9% sodium chloride 10 mL injection  40 mg IntraVENous DAILY    sodium chloride (NS) flush 5-40 mL  5-40 mL IntraVENous Q8H    sodium chloride (NS) flush 5-40 mL  5-40 mL IntraVENous PRN    acetaminophen (TYLENOL) tablet 650 mg  650 mg Oral Q6H PRN    Or    acetaminophen (TYLENOL) suppository 650 mg  650 mg Rectal Q6H PRN    ondansetron (ZOFRAN ODT) tablet 4 mg  4 mg Oral Q8H PRN    Or    ondansetron (ZOFRAN) injection 4 mg  4 mg IntraVENous Q6H PRN    [Held by provider] heparin (porcine) injection 5,000 Units  5,000 Units SubCUTAneous Q8H    insulin lispro (HUMALOG) injection   SubCUTAneous Q6H    glucose chewable tablet 16 g  4 Tablet Oral PRN    glucagon (GLUCAGEN) injection 1 mg  1 mg IntraMUSCular PRN    dextrose 10% infusion 0-250 mL  0-250 mL IntraVENous PRN    midodrine (PROAMATINE) tablet 5 mg  5 mg Per G Tube Q8H PRN    albuterol-ipratropium (DUO-NEB) 2.5 MG-0.5 MG/3 ML  3 mL Nebulization Q6H RT    sodium chloride (NS) flush 5-10 mL  5-10 mL IntraVENous PRN    piperacillin-tazobactam (ZOSYN) 3.375 g in 0.9% sodium chloride (MBP/ADV) 100 mL MBP  3.375 g IntraVENous Q8H       Allergies: Patient has no known allergies. Family History   Problem Relation Age of Onset    Hypertension Other      Social History     Socioeconomic History    Marital status:      Spouse name: Not on file    Number of children: Not on file    Years of education: Not on file    Highest education level: Not on file   Occupational History    Not on file   Tobacco Use    Smoking status: Former     Packs/day: 0.50     Types: Cigarettes     Quit date: 2008     Years since quittin.5    Smokeless tobacco: Never   Substance and Sexual Activity    Alcohol use:  No Drug use: No    Sexual activity: Not Currently   Other Topics Concern    Not on file   Social History Narrative    Not on file     Social Determinants of Health     Financial Resource Strain: Not on file   Food Insecurity: Not on file   Transportation Needs: Not on file   Physical Activity: Not on file   Stress: Not on file   Social Connections: Not on file   Intimate Partner Violence: Not on file   Housing Stability: Not on file     Social History     Tobacco Use   Smoking Status Former    Packs/day: 0.50    Types: Cigarettes    Quit date: 2008    Years since quittin.5   Smokeless Tobacco Never        Temp (24hrs), Av.2 °F (36.8 °C), Min:97.5 °F (36.4 °C), Max:98.9 °F (37.2 °C)    Visit Vitals  BP (!) 148/90   Pulse 91   Temp 98 °F (36.7 °C)   Resp 18   Ht 5' 9\" (1.753 m)   Wt 78.9 kg (173 lb 15.1 oz)   SpO2 100%   BMI 25.69 kg/m²       ROS: Unable to obtain due to patient factors    Physical Exam:    General:  male patient laying on the bed, eyes open, alert, mumbles some words. Head:  Normocephalic, without obvious abnormality, atraumatic. Eyes:  Conjunctivae/corneas clear. Nose: Nares normal.  No drainage or sinus tenderness. Throat: Lips, mucosa, and tongue normal. Teeth and gums of poor dentition, lips dried and cracked/peeling    Neck: Supple, symmetrical, trachea midline, no adenopathy, thyroid: no enlargment/tenderness/nodules, no carotid bruit and no JVD. Back:   Symmetric, no curvature. Lungs:   Coarse to auscultation bilaterally. Chest wall:  No tenderness or deformity. Heart:  S1, S2 normal, no  click, rub or gallop. Abdomen:   Abd flat, Soft, non-tender. Bowel sounds normal. No masses,  No organomegaly. Healed surgical scar in upper abdomen   Extremities: Extremities normal, atraumatic, no cyanosis or edema. Pulses: 2+ and symmetric all extremities.    Skin: BLE with ulcers per report covered with dressing, sacral wound dressing not opened   Lymph nodes:  Not examined   Neurologic: Grossly non-focal; non-verbal, does not follow commands      Labs: Results:   Chemistry Recent Labs     01/10/23  0124 01/09/23  0252 01/08/23  0341   GLU 94 113* 104*   * 150* 156*   K 3.9 3.3* 3.4*   * 123* 127*   CO2 24 23 23   BUN 11 16 27*   CREA 0.45* 0.50* 0.60   CA 8.1* 8.2* 8.1*   AGAP 4 4 6   BUCR 24* 32* 45*        CBC w/Diff Recent Labs     01/10/23  0124 01/09/23  0252 01/08/23  1423 01/08/23  0126   WBC 11.7 10.9  --  8.7   RBC 3.88* 2.96*  --  1.89*   HGB 10.6* 8.2* 9.6* 5.3*   HCT 34.3* 27.0* 31.6* 18.0*    151  --  153   GRANS 78* 84*  --  91*   LYMPH 13* 8*  --  1*   EOS 3 4  --  1        Microbiology Recent Labs     01/08/23  1020   CULT NO GROWTH 2 DAYS            RADIOLOGY:    All available imaging studies/reports in [facility] care for this admission were reviewed    Total time spent >35 minutes. High complexity decision making was performed during the evaluation of this patient at high risk for decompensation with multiple organ involvement     Above mentioned total time spent on reviewing the case/medical record/data/notes/EMR/patient examination/documentation/coordinating care with nurse/consultants, exclusive of procedures with complex decision making performed and > 50% time spent in face to face evaluation. Disclaimer: Sections of this note are dictated utilizing voice recognition software, which may have resulted in some phonetic based errors in grammar and contents. Even though attempts were made to correct all the mistakes, some may have been missed, and remained in the body of the document. If questions arise, please contact our department.     Dr. Sushil Parsons, Infectious Disease Specialist  [phone]  January 10, 2023  5:16 PM

## 2023-01-10 NOTE — PROGRESS NOTES
Hospitalist Progress Note    NAME:  Steven Herrera   :   1955   MRN:   [id number]     Date/Time:  1/10/2023  Subjective: pt is complicated with multiple issues; Chief Complaint:   Pt is non verbal; G tube got dislodged;      23;   B/c 2/2 with   Culture result:   PROBABLE BACTEROIDES FRAGILIS GROWING IN BOTH BOTTLES DRAWN No Site Indicated Abnormal  P  [address]   Culture result:   PROBABLE ESCHERICHIA COLI GROWING IN BOTH BOTTLES DRAWN No Site Indicated Abnormal  P  [address]   Culture result:   PROBABLE PROTEUS SPECIES GROWING IN BOTH BOTTLES DRAWN No Site Indicated Abnormal  P     GRAM POSITIVE COCCI IN CLUSTERS GROWING IN THE SAME ABOVE BOTTLE Abnormal  P     CT ;     IMPRESSION  1. Bilateral posterior basilar pneumonia, slightly less severe than before. Trace right pleural effusion. 2. Hiatal hernia/distal esophagitis. 3. Severe constipation. Fecal impaction in the rectum with distended rectum,  large fecal ball. 4. Decubitus ulcer with air approaching the posterior wall of anus/rectum and  posterior cortex of the sacrum and coccyx. No abscess. ; s/p ID of the Sacral IRRIGATION AND DEBRIDEMENT OF SACRAL DECUBITUS ULCER ;    23;   h/h dropped to 6.4/; Plan for 1 unit of blood;  C diff is positive; start po vanco; hold Levaquin ; Sodium down to 156; on D5w drip and free water in the G tube;  K is 2.8; replace;    23; h/h dropped to 5.3/18; give 1 U of blood now total of 2 units;  K 3.4, sodium is 156;    23; His Peg tube is leaking ; consulted IR to change;  H/h is now 8.2; s/p 2 Units of blood; mointer; Sodium down to 150; As per ID plan for PICC line and IV abx on dc;    1/10/23;  PEG was dislodge as he was being repositioned in bed by RN. GI notified at 1300.    GI consulted;    H/h is better; 10/34; serum sodium down to 148;           [x]  Unable to obtain  ROS due to  [x]  mental status change  []  sedated   []  Intubated non verbal;     Past Med History and Social history reviewed. No changes. [x]  Current Medication list and allergies reviewed*    Objective:   Vitals:  Visit Vitals  BP (!) 148/91   Pulse 88   Temp 98.8 °F (37.1 °C)   Resp 19   Ht 5' 9\" (1.753 m)   Wt 78.9 kg (173 lb 15.1 oz)   SpO2 99%   BMI 25.69 kg/m²     Temp (24hrs), Av.5 °F (36.9 °C), Min:97.9 °F (36.6 °C), Max:98.9 °F (37.2 °C)      Last 24hr Input/Output:    Intake/Output Summary (Last 24 hours) at 1/10/2023 1434  Last data filed at 1/10/2023 0234  Gross per 24 hour   Intake 175 ml   Output 2275 ml   Net -2100 ml        PHYSICAL EXAM:     General appearance -awake, nonverbal    Chest -no distress    Heart - S1 and S2 normal, no murmurs appreciated currently    Abdomen - soft, nontender, nondistended, PEG tube is leaking;    Neurological -awake, nonverbal,     Extremities - no pedal edema noted, no cyanosis    Skin; stage 4 sacral ulcer;       Large necrotic decubitus ulcer with evidence of purulent exudate            Lab Data Reviewed:  No components found for: [place]  Recent Labs     01/10/23  0124 23  0252 23  1423 23  0341 23  0126   * 150*  --  156*  --    K 3.9 3.3*  --  3.4*  --    * 123*  --  127*  --    CO2 24   --  23  --    BUN 11 16  --  27*  --    CREA 0.45* 0.50*  --  0.60  --    GLU 94 113*  --  104*  --    PHOS 2.2* 2.5  --  2.6  --    CA 8.1* 8.2*  --  8.1*  --    WBC 11.7 10.9  --   --  8.7   HGB 10.6* 8.2* 9.6*  --  5.3*   HCT 34.3* 27.0* 31.6*  --  18.0*    151  --   --  153        []    I have personally reviewed the   []  xray  []  CT scan    Assessment/Plan:     Principal Problem:    Septic shock ([facility] (2023) resolved;     Active Problems:    Hyperosmolality and hypernatremia (3/30/2021) improving;      Acute metabolic encephalopathy (3/28/2982)      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[facility] [address] ()      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 [facility]) (2023)      Community acquired pneumonia of right lower lobe of lung (1/6/2023)    Acute post op anemia; s/p 2 units; now hgb above 8; Pt was on ELIQUIS at [address] has been on hold; h/o dvt; dc it for now;    C diff positive POA; continue Po Vanco dc levaquin ;    G tube dialoged;  await GI or IR to replace it;      ___________________________________________________  PLAN:  Risk of deterioration:  []  Low    []  Moderate  [x]  High    Id consulted;;  Continue IV abx; with continue V/Z and  dc levaquin; due to c diff;   Await final B/c results;  Surgery on the case s/p ID    Continue D5W drip per Renal;    DNR/DNI    Overall poor prognosis;  ___________________________________________________    Total time spent with patient:     minutes    []  Critical Care time:      minutes    Care Plan discussed with:    []  Patient   []  Family    []  Care Manager  [x]  Nursing   [x]  Consultant/Specialist :      []    >50% of visit spent in counseling and coordination of care   (Discussed []  CODE status,  []  Care Plan, []  D/C Planning)    Prophylaxis:  []  Lovenox  []  Coumadin  [x]  Hep SQ  []  SCDs  []  H2B/PPI    Disposition:  []  Home w/ Family   []  HH PT,OT,RN   [x]  SNF/LTC   []  SAH/Rehab  ___________________________________________________    Hospitalist: Sampson Wasserman MD     ___________________________________________________    *Medications reviewed:  Current Facility-Administered Medications   Medication Dose Route Frequency    [START ON 1/11/2023] potassium chloride (K-DUR, KLOR-CON M20) SR tablet 20 mEq  20 mEq Oral DAILY    levETIRAcetam (KEPPRA) 500 mg in 0.9% sodium chloride (MBP/ADV) 100 mL MBP  500 mg IntraVENous Q12H    0.9% sodium chloride infusion 250 mL  250 mL IntraVENous PRN    dextrose 5% with KCl 20 mEq/L infusion   IntraVENous CONTINUOUS    0.9% sodium chloride infusion 250 mL  250 mL IntraVENous PRN    vancomycin 50 mg/mL oral solution (compounded) 125 mg  125 mg Per G Tube Q6H    cyanocobalamin tablet 500 mcg  500 mcg Per G Tube DAILY    pantoprazole (PROTONIX) 40 mg in 0.9% sodium chloride 10 mL injection  40 mg IntraVENous DAILY    sodium chloride (NS) flush 5-40 mL  5-40 mL IntraVENous Q8H    sodium chloride (NS) flush 5-40 mL  5-40 mL IntraVENous PRN    acetaminophen (TYLENOL) tablet 650 mg  650 mg Oral Q6H PRN    Or    acetaminophen (TYLENOL) suppository 650 mg  650 mg Rectal Q6H PRN    ondansetron (ZOFRAN ODT) tablet 4 mg  4 mg Oral Q8H PRN    Or    ondansetron (ZOFRAN) injection 4 mg  4 mg IntraVENous Q6H PRN    [Held by provider] heparin (porcine) injection 5,000 Units  5,000 Units SubCUTAneous Q8H    insulin lispro (HUMALOG) injection   SubCUTAneous Q6H    glucose chewable tablet 16 g  4 Tablet Oral PRN    glucagon (GLUCAGEN) injection 1 mg  1 mg IntraMUSCular PRN    dextrose 10% infusion 0-250 mL  0-250 mL IntraVENous PRN    midodrine (PROAMATINE) tablet 5 mg  5 mg Per G Tube Q8H PRN    albuterol-ipratropium (DUO-NEB) 2.5 MG-0.5 MG/3 ML  3 mL Nebulization Q6H RT    sodium chloride (NS) flush 5-10 mL  5-10 mL IntraVENous PRN    piperacillin-tazobactam (ZOSYN) 3.375 g in 0.9% sodium chloride (MBP/ADV) 100 mL MBP  3.375 g IntraVENous Q8H

## 2023-01-10 NOTE — PROGRESS NOTES
Progress Note    Steven Herrera  79 y.o. Admit Date: 1/4/2023  Principal Problem:    Septic shock ([facility] (1/6/2023) POA: Yes    Active Problems:    Hypokalemia (6/22/2013) POA: Yes      Hyperosmolality and hypernatremia (3/30/2021) POA: Yes      Acute metabolic encephalopathy (5/90/1051) POA: Yes      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[facility] (89/2/7413) POA: Yes      Hypernatremia (11/25/2022) POA: Yes      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 ([facility] (1/6/2023) POA: Yes      Community acquired pneumonia of right lower lobe of lung (1/6/2023) POA: Yes            Subjective:     Patient looks more alert and awake. Responds to commands and follow commands functional opens eyes and opening mouth remains soft stool. Trying to say something but very difficult to understand ,mumbles. Tolerating IV fluid as well as free water through the PEG tube and potassium supplementation      A comprehensive review of systems was negative except for that written in the History of Present Illness.     Objective:     Visit Vitals  BP (!) 148/90   Pulse 91   Temp 98 °F (36.7 °C)   Resp 18   Ht 5' 9\" (1.753 m)   Wt 78.9 kg (173 lb 15.1 oz)   SpO2 100%   BMI 25.69 kg/m²         Intake/Output Summary (Last 24 hours) at 1/10/2023 1128  Last data filed at 1/10/2023 2631  Gross per 24 hour   Intake 350 ml   Output 2575 ml   Net -2225 ml       Current Facility-Administered Medications   Medication Dose Route Frequency Provider Last Rate Last Admin    [START ON 1/11/2023] potassium chloride (K-DUR, KLOR-CON M20) SR tablet 20 mEq  20 mEq Oral DAILY Ysabel Caldera MD        levETIRAcetam (KEPPRA) 500 mg in 0.9% sodium chloride (MBP/ADV) 100 mL MBP  500 mg IntraVENous Q12H Senia Raya  mL/hr at 01/10/23 1128 500 mg at 01/10/23 1128    0.9% sodium chloride infusion 250 mL  250 mL IntraVENous PRN Rocio Tristan DO        dextrose 5% with KCl 20 mEq/L infusion   IntraVENous CONTINUOUS Ysabel Caldera  mL/hr at 01/10/23 0324 New Bag at 01/10/23 0324    0.9% sodium chloride infusion 250 mL  250 mL IntraVENous PRN Cuco Bergman MD        vancomycin 50 mg/mL oral solution (compounded) 125 mg  125 mg Per G Tube Q6H Cuco Bergman MD   125 mg at 01/09/23 1816    cyanocobalamin tablet 500 mcg  500 mcg Per G Tube DAILY Cuco Bergman MD   500 mcg at 01/08/23 1020    pantoprazole (PROTONIX) 40 mg in 0.9% sodium chloride 10 mL injection  40 mg IntraVENous DAILY Cuco Bergman MD   40 mg at 01/10/23 0805    sodium chloride (NS) flush 5-40 mL  5-40 mL IntraVENous N4S Sil Hawkins MD   10 mL at 01/10/23 0600    sodium chloride (NS) flush 5-40 mL  5-40 mL IntraVENous PRN Sil Hawkins MD        acetaminophen (TYLENOL) tablet 650 mg  650 mg Oral Q6H PRN Sil Hawkins MD        Or    acetaminophen (TYLENOL) suppository 650 mg  650 mg Rectal Q6H PRN Sil Hawkins MD        ondansetron (ZOFRAN ODT) tablet 4 mg  4 mg Oral Q8H PRN Sil Hawkins MD        Or    ondansetron (ZOFRAN) injection 4 mg  4 mg IntraVENous Q6H PRN Sil Hawkins MD        [Held by provider] heparin (porcine) injection 5,000 Units  5,000 Units SubCUTAneous P4J Sil Hawkins MD   7,551 Units at 01/05/23 2339    insulin lispro (HUMALOG) injection   SubCUTAneous Q6H Alma Julian PA-C        glucose chewable tablet 16 g  4 Tablet Oral PRN Alma Julian PA-C        glucagon (GLUCAGEN) injection 1 mg  1 mg IntraMUSCular PRN Alma Julian PA-C        dextrose 10% infusion 0-250 mL  0-250 mL IntraVENous PRN Alma Julian PA-C        midodrine (PROAMATINE) tablet 5 mg  5 mg Per G Tube Q8H PRN Elon Angelucci, MD        albuterol-ipratropium (DUO-NEB) 2.5 MG-0.5 MG/3 ML  3 mL Nebulization Q6H RT Elon Angelucci, MD   3 mL at 01/10/23 0733    sodium chloride (NS) flush 5-10 mL  5-10 mL IntraVENous PRN ARSEN Puckett        piperacillin-tazobactam (ZOSYN) 3.375 g in 0.9% sodium chloride (MBP/ADV) 100 mL MBP 3.375 g IntraVENous Q8H Benjie Abrams, [address] 25 mL/hr at 01/10/23 0805 3.375 g at 01/10/23 0805        Physical Exam:     Physical Exam:   General:  Alert, cooperative, no distress, appears stated age. Neck: Supple, symmetrical, trachea midline, no adenopathy, thyroid: no enlargement/tenderness/nodules, no carotid bruit and no JVD. Lungs:   Clear to auscultation bilaterally. Heart:  Regular rate and rhythm, S1, S2 normal, no murmur, click, rub or gallop. Abdomen:   Soft, non-tender. Bowel sounds normal. No masses,  No organomegaly.  Has PEG                             Data Review:    CBC w/Diff    Recent Labs     01/10/23  0124 01/09/23  0252 01/08/23  1423 01/08/23  0126   WBC 11.7 10.9  --  8.7   RBC 3.88* 2.96*  --  1.89*   HGB 10.6* 8.2* 9.6* 5.3*   HCT 34.3* 27.0* 31.6* 18.0*   MCV 88.4 91.2  --  95.2   MCH 27.3 27.7  --  28.0   MCHC 30.9* 30.4*  --  29.4*   RDW 16.0* 16.3*  --  17.7*    Recent Labs     01/10/23  0124 01/09/23  0252 01/08/23  0126   BANDS  --   --  5   MONOS 5 3 1*   EOS 3 4 1   BASOS 0 0 0   RDW 16.0* 16.3* 17.7*        Comprehensive Metabolic Profile    Recent Labs     01/10/23  0124 01/09/23  0252 01/08/23 0341   * 150* 156*   K 3.9 3.3* 3.4*   * 123* 127*   CO2 24 23 23   BUN 11 16 27*   CREA 0.45* 0.50* 0.60    Recent Labs     01/10/23  0124 01/09/23  0252 01/08/23  0341   CA 8.1* 8.2* 8.1*   PHOS 2.2* 2.5 2.6                        Impression:       Active Hospital Problems    Diagnosis Date Noted    Septic shock ([facility] [address] 01/06/2023    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 ([facility] 01/06/2023    Community acquired pneumonia of right lower lobe of lung 01/06/2023    Hypernatremia 57/13/9546    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[facility] 64/11/4322    Hyperosmolality and hypernatremia 67/12/1214    Acute metabolic encephalopathy 79/71/9089    Hypokalemia 06/22/2013      Sodium continues to improve so does the potassium with parenteral and oral supplementation of potassium      Plan:     Continue current IV fluid. No signs of any volume overload.   We will decrease the potassium supplement through the PEG tube once a day      Heena De Leon MD

## 2023-01-10 NOTE — ROUTINE PROCESS
0800 - During initial assessment patient gown and two blankets saturated with tube feeds . Tube feeds held and MD notified. Consult to IR placed by MD to replace PEG tube    1400 - GI at bedside to assess tube    1410 - Wound vac applied to stage 4 sacral wound with wound care nurse at the bedside. Honey and silicone placed on bilateral elbow and right hip stage 2 wounds.

## 2023-01-11 LAB
ANION GAP SERPL CALC-SCNC: 5 MMOL/L (ref 3–18)
BACTERIA SPEC CULT: ABNORMAL
BACTERIA SPEC CULT: ABNORMAL
BASOPHILS # BLD: 0.1 K/UL (ref 0–0.1)
BASOPHILS NFR BLD: 1 % (ref 0–2)
BUN SERPL-MCNC: 5 MG/DL (ref 7–18)
BUN/CREAT SERPL: 11 (ref 12–20)
CALCIUM SERPL-MCNC: 8.4 MG/DL (ref 8.5–10.1)
CHLORIDE SERPL-SCNC: 120 MMOL/L (ref 100–111)
CO2 SERPL-SCNC: 24 MMOL/L (ref 21–32)
CREAT SERPL-MCNC: 0.47 MG/DL (ref 0.6–1.3)
DIFFERENTIAL METHOD BLD: ABNORMAL
EOSINOPHIL # BLD: 0.3 K/UL (ref 0–0.4)
EOSINOPHIL NFR BLD: 2 % (ref 0–5)
ERYTHROCYTE [DISTWIDTH] IN BLOOD BY AUTOMATED COUNT: 16 % (ref 11.6–14.5)
GLUCOSE BLD STRIP.AUTO-MCNC: 109 MG/DL (ref 70–110)
GLUCOSE BLD STRIP.AUTO-MCNC: 110 MG/DL (ref 70–110)
GLUCOSE BLD STRIP.AUTO-MCNC: 110 MG/DL (ref 70–110)
GLUCOSE BLD STRIP.AUTO-MCNC: 133 MG/DL (ref 70–110)
GLUCOSE SERPL-MCNC: 95 MG/DL (ref 74–99)
GRAM STN SPEC: ABNORMAL
GRAM STN SPEC: ABNORMAL
HCT VFR BLD AUTO: 36.1 % (ref 36–48)
HGB BLD-MCNC: 11.1 G/DL (ref 13–16)
IMM GRANULOCYTES # BLD AUTO: 0.1 K/UL (ref 0–0.04)
IMM GRANULOCYTES NFR BLD AUTO: 1 % (ref 0–0.5)
LYMPHOCYTES # BLD: 1.1 K/UL (ref 0.9–3.6)
LYMPHOCYTES NFR BLD: 9 % (ref 21–52)
MAGNESIUM SERPL-MCNC: 2.1 MG/DL (ref 1.6–2.6)
MCH RBC QN AUTO: 27.9 PG (ref 24–34)
MCHC RBC AUTO-ENTMCNC: 30.7 G/DL (ref 31–37)
MCV RBC AUTO: 90.7 FL (ref 78–100)
MONOCYTES # BLD: 0.5 K/UL (ref 0.05–1.2)
MONOCYTES NFR BLD: 4 % (ref 3–10)
NEUTS SEG # BLD: 10.8 K/UL (ref 1.8–8)
NEUTS SEG NFR BLD: 84 % (ref 40–73)
NRBC # BLD: 0 K/UL (ref 0–0.01)
NRBC BLD-RTO: 0 PER 100 WBC
PHOSPHATE SERPL-MCNC: 2.5 MG/DL (ref 2.5–4.9)
PLATELET # BLD AUTO: 263 K/UL (ref 135–420)
PMV BLD AUTO: 11.9 FL (ref 9.2–11.8)
POTASSIUM SERPL-SCNC: 3.7 MMOL/L (ref 3.5–5.5)
RBC # BLD AUTO: 3.98 M/UL (ref 4.35–5.65)
SERVICE CMNT-IMP: ABNORMAL
SODIUM SERPL-SCNC: 149 MMOL/L (ref 136–145)
WBC # BLD AUTO: 12.9 K/UL (ref 4.6–13.2)

## 2023-01-11 PROCEDURE — 74011250636 HC RX REV CODE- 250/636: Performed by: PHYSICIAN ASSISTANT

## 2023-01-11 PROCEDURE — 74011250637 HC RX REV CODE- 250/637

## 2023-01-11 PROCEDURE — 74011250637 HC RX REV CODE- 250/637: Performed by: INTERNAL MEDICINE

## 2023-01-11 PROCEDURE — 2709999900 HC NON-CHARGEABLE SUPPLY

## 2023-01-11 PROCEDURE — 74011250636 HC RX REV CODE- 250/636: Performed by: INTERNAL MEDICINE

## 2023-01-11 PROCEDURE — 99232 SBSQ HOSP IP/OBS MODERATE 35: CPT | Performed by: INTERNAL MEDICINE

## 2023-01-11 PROCEDURE — 36415 COLL VENOUS BLD VENIPUNCTURE: CPT

## 2023-01-11 PROCEDURE — 80048 BASIC METABOLIC PNL TOTAL CA: CPT

## 2023-01-11 PROCEDURE — 85025 COMPLETE CBC W/AUTO DIFF WBC: CPT

## 2023-01-11 PROCEDURE — 99232 SBSQ HOSP IP/OBS MODERATE 35: CPT | Performed by: NURSE PRACTITIONER

## 2023-01-11 PROCEDURE — 74011000250 HC RX REV CODE- 250

## 2023-01-11 PROCEDURE — 74011000258 HC RX REV CODE- 258: Performed by: PHYSICIAN ASSISTANT

## 2023-01-11 PROCEDURE — 74011000258 HC RX REV CODE- 258: Performed by: INTERNAL MEDICINE

## 2023-01-11 PROCEDURE — 65660000004 HC RM CVT STEPDOWN

## 2023-01-11 PROCEDURE — 82962 GLUCOSE BLOOD TEST: CPT

## 2023-01-11 PROCEDURE — 74011000250 HC RX REV CODE- 250: Performed by: INTERNAL MEDICINE

## 2023-01-11 PROCEDURE — C9113 INJ PANTOPRAZOLE SODIUM, VIA: HCPCS | Performed by: INTERNAL MEDICINE

## 2023-01-11 PROCEDURE — 84100 ASSAY OF PHOSPHORUS: CPT

## 2023-01-11 PROCEDURE — 83735 ASSAY OF MAGNESIUM: CPT

## 2023-01-11 RX ADMIN — Medication 125 MG: at 23:52

## 2023-01-11 RX ADMIN — DEXTROSE MONOHYDRATE AND POTASSIUM CHLORIDE INJECTION, SOLUTION: 5; .149 INJECTION, SOLUTION INTRAVENOUS at 08:10

## 2023-01-11 RX ADMIN — LEVETIRACETAM 500 MG: 500 INJECTION, SOLUTION INTRAVENOUS at 00:35

## 2023-01-11 RX ADMIN — PIPERACILLIN SODIUM AND TAZOBACTAM SODIUM 3.38 G: 3; .375 INJECTION, POWDER, LYOPHILIZED, FOR SOLUTION INTRAVENOUS at 00:34

## 2023-01-11 RX ADMIN — LEVETIRACETAM 500 MG: 500 INJECTION, SOLUTION INTRAVENOUS at 23:51

## 2023-01-11 RX ADMIN — PANTOPRAZOLE SODIUM 40 MG: 40 INJECTION, POWDER, FOR SOLUTION INTRAVENOUS at 08:09

## 2023-01-11 RX ADMIN — PIPERACILLIN SODIUM AND TAZOBACTAM SODIUM 3.38 G: 3; .375 INJECTION, POWDER, LYOPHILIZED, FOR SOLUTION INTRAVENOUS at 08:10

## 2023-01-11 RX ADMIN — ACETAMINOPHEN 650 MG: 325 TABLET, FILM COATED ORAL at 23:52

## 2023-01-11 RX ADMIN — DEXTROSE MONOHYDRATE AND POTASSIUM CHLORIDE INJECTION, SOLUTION: 5; .149 INJECTION, SOLUTION INTRAVENOUS at 18:06

## 2023-01-11 RX ADMIN — Medication 125 MG: at 06:51

## 2023-01-11 RX ADMIN — Medication 125 MG: at 00:36

## 2023-01-11 RX ADMIN — Medication 125 MG: at 12:00

## 2023-01-11 RX ADMIN — SODIUM CHLORIDE, PRESERVATIVE FREE 10 ML: 5 INJECTION INTRAVENOUS at 23:52

## 2023-01-11 RX ADMIN — DEXTROSE MONOHYDRATE AND POTASSIUM CHLORIDE INJECTION, SOLUTION: 5; .149 INJECTION, SOLUTION INTRAVENOUS at 00:51

## 2023-01-11 RX ADMIN — SODIUM CHLORIDE, PRESERVATIVE FREE 10 ML: 5 INJECTION INTRAVENOUS at 06:51

## 2023-01-11 RX ADMIN — SODIUM CHLORIDE, PRESERVATIVE FREE 10 ML: 5 INJECTION INTRAVENOUS at 15:14

## 2023-01-11 RX ADMIN — Medication 125 MG: at 18:06

## 2023-01-11 RX ADMIN — POTASSIUM CHLORIDE 20 MEQ: 1500 TABLET, EXTENDED RELEASE ORAL at 08:16

## 2023-01-11 RX ADMIN — LEVETIRACETAM 500 MG: 500 INJECTION, SOLUTION INTRAVENOUS at 12:40

## 2023-01-11 RX ADMIN — CYANOCOBALAMIN TAB 1000 MCG 500 MCG: 1000 TAB at 08:09

## 2023-01-11 RX ADMIN — PIPERACILLIN SODIUM AND TAZOBACTAM SODIUM 3.38 G: 3; .375 INJECTION, POWDER, LYOPHILIZED, FOR SOLUTION INTRAVENOUS at 16:22

## 2023-01-11 NOTE — PROGRESS NOTES
[facility]– [place]: [phone] ([phone])  [facility]: [phone]     Patient Name: Gaby Dick  YOB: 1955    Date of consult : 1/9/23  Follow up 1/11/2023   Reason for Consult: establish goals of care  Requesting Provider: Alireza Sprague MD    Primary Care Physician: Nhan Oropeza MD      SUMMARY:   Gaby Dick is a 79 y.o. male with a past history of HTN, Stroke, dementia, COVID, Chronic hyponatremia, Alcoholic dementia, First degree AV block, severe debility, who was admitted on 1/4/2023 from [address] [place] [address],[address] with a diagnosis of sepsis and AMS. Current medical issues leading to Palliative Medicine involvement include: Pt with a long term life limiting chronic disease process that warrants discussions about his goals of care. .    CHIEF COMPLAINT: pneumonia and wounds     HPI/SUBJECTIVE:    Pt is a 79year old AAM that lives in [address] [place] [address] was brought in through EMS with Altered mental status with suspected sepsis. He has multiple chronic disease processes and is quite debilitated at base line. 1/11/203 comfortable appearing aphasic. Met with his son Mr Estela Duggan at bedside. Discussed hospice services. He is not ready for this level of care. The patient is:   [] Verbal and participatory  [x] Non-participatory due to: dementia    GOALS OF CARE:  Patient/Health Care Proxy Stated Goals: Prolong life      TREATMENT PREFERENCES:   Code Status: DNR         PALLIATIVE DIAGNOSES:   Goals of care/ACP  Sepsis  Wounds  Pneumonia  Debility        PLAN:   1/11/2023 Follow up along with Ms Nereida Monterroso RN. Patient is lying in bed. Alert however aphasic. He appears comfortable. We met with his son Mr Estela Duggan. Discussed patient's overall poor prognosis and debility. Son agreed his father is very chronically ill and overall not doing well. We discussed hospice services if treatments become to burdensome. Mr Estela Duggan voiced his understanding.  However, he is not ready for this level of care. He is very supportive. Goals of care DNR/DNI limited interventions PEG and feeding tube long term acceptable. ( Please see below for previous notes per palliative team)     Goals of care/ACP  This NP along with Thad Prader LMSW and Bolivar Aguirre RN in to see the patient at the bedside. He was receiving a nebulized treatment and did not appear in any distress. Pt is not responding to questions except to look at you. We have reached out to his son Jorge Sauceda to discuss further goals of care. Pt has a POST on file for DNR/DNI limited interventions and ok with long term feeding tube. Will attempt to set up a meeting to see if patient meets hospice criteria and could benefit from that support. Goals of care: DNR/DNI Limited interventions. [id number] [address] Cloud with long term feeding tube  2. Sepsis  Seen by ID, found to have leukocytosis, bandemia, hypotension. Needing pressor support. Clinical presentation is consistent with septic shock due to polymicrobial bloodstream infection. Likely source is the sacrum  3. Wounds  Pt with necrotizing infection of the sacrum/acute on chronic sacral osteomyelitis  4. Debility  Palliative performance for this patient has declined since last admission and is now around 30%. Pt is bed bound, total care for self care and functional ADL's and has reduced nutritional intake with low albumin levels 1.4.  5.   Initial consult note routed to primary continuity provider  6.    Communicated plan of care with: Palliative IDT      Advance Care Planning:  [] [facility] Interdisciplinary Team has updated the ACP Navigator with [address] and Patient Capacity    Primary Decision [facility] ([address]):   Secondary Decision Maker: Minnie Lopez (stepdaughter) - Other Non-relative - [phone]    Supplemental (Other) Decision Maker: Jayla Up - [phone]    Medical Interventions: Limited additional interventions   Other Instructions: Artificially Administered Nutrition: Feeding tube long-term, if indicated     As far as possible, the palliative care team has discussed with patient / health care proxy about goals of care / treatment preferences for patient.          HISTORY:     History obtained from: chart review   Principal Problem:    Septic shock ([address] (1/6/2023)    Active Problems:    Hypokalemia (6/22/2013)      Hyperosmolality and hypernatremia (3/30/2021)      Acute metabolic encephalopathy (1/19/5836)      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[address] (11/9/9075)      Hypernatremia (11/25/2022)      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 ([address] (1/6/2023)      Community acquired pneumonia of right lower lobe of lung (1/6/2023)    Past Medical History:   Diagnosis Date    Alcohol dependence with alcohol-induced persisting dementia ([address]     per [facility] 4/23/21    Anemia     Aphasia     Benign prostatic hyperplasia     Cerebral vascular disease     COVID-19 04/06/2021    Disturbances of salivary secretion     Dysphagia     GERD (gastroesophageal reflux disease)     Hemiparesis (HCC)     left side    Hemiplegia (HCC)     left side     Hiccups     Hyperlipidemia     Hypernatremia 03/2021    Hypertension     Hyponatremia     Insomnia     Lacunar infarction ([address] 2010    weakness both arms, unable to walk    Lower extremity edema     Moderate protein-calorie malnutrition (HCC)     MRSA (methicillin resistant Staphylococcus aureus)     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)     Pneumonia     Psychogenic polydipsia     PVD (peripheral vascular disease) ([address]     Seizures ([address]     Spinal stenosis     Stroke ([address] 2017    Unspecified convulsions ([address]     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   Social History     Tobacco Use    Smoking status: Former     Packs/day: 0.50     Types: Cigarettes     Quit date: 2008     Years since quittin.5    Smokeless tobacco: Never   Substance Use Topics    Alcohol use: No     No Known Allergies   Current Facility-Administered Medications   Medication Dose Route Frequency    potassium chloride (K-DUR, KLOR-CON M20) SR tablet 20 mEq  20 mEq Oral DAILY    albuterol-ipratropium (DUO-NEB) 2.5 MG-0.5 MG/3 ML  3 mL Nebulization Q4H PRN    levETIRAcetam (KEPPRA) 500 mg in 0.9% sodium chloride (MBP/ADV) 100 mL MBP  500 mg IntraVENous Q12H    0.9% sodium chloride infusion 250 mL  250 mL IntraVENous PRN    dextrose 5% with KCl 20 mEq/L infusion   IntraVENous CONTINUOUS    0.9% sodium chloride infusion 250 mL  250 mL IntraVENous PRN    vancomycin 50 mg/mL oral solution (compounded) 125 mg  125 mg Per G Tube Q6H    cyanocobalamin tablet 500 mcg  500 mcg Per G Tube DAILY    pantoprazole (PROTONIX) 40 mg in 0.9% sodium chloride 10 mL injection  40 mg IntraVENous DAILY    sodium chloride (NS) flush 5-40 mL  5-40 mL IntraVENous Q8H    sodium chloride (NS) flush 5-40 mL  5-40 mL IntraVENous PRN    acetaminophen (TYLENOL) tablet 650 mg  650 mg Oral Q6H PRN    Or    acetaminophen (TYLENOL) suppository 650 mg  650 mg Rectal Q6H PRN    ondansetron (ZOFRAN ODT) tablet 4 mg  4 mg Oral Q8H PRN    Or    ondansetron (ZOFRAN) injection 4 mg  4 mg IntraVENous Q6H PRN    [Held by provider] heparin (porcine) injection 5,000 Units  5,000 Units SubCUTAneous Q8H    insulin lispro (HUMALOG) injection   SubCUTAneous Q6H    glucose chewable tablet 16 g  4 Tablet Oral PRN    glucagon (GLUCAGEN) injection 1 mg  1 mg IntraMUSCular PRN    dextrose 10% infusion 0-250 mL  0-250 mL IntraVENous PRN    midodrine (PROAMATINE) tablet 5 mg 5 mg Per G Tube Q8H PRN    sodium chloride (NS) flush 5-10 mL  5-10 mL IntraVENous PRN    piperacillin-tazobactam (ZOSYN) 3.375 g in 0.9% sodium chloride (MBP/ADV) 100 mL MBP  3.375 g IntraVENous Q8H          Clinical Pain Assessment (nonverbal scale for nonverbal patients): Clinical Pain Assessment  Severity: 0     Activity (Movement): Lying quietly, normal position    Duration: for how long has pt been experiencing pain (e.g., 2 days, 1 month, years)  Frequency: how often pain is an issue (e.g., several times per day, once every few days, constant)     FUNCTIONAL ASSESSMENT:     Palliative Performance Scale (PPS):  PPS: 30    ECOG  ECOG Status : Completely disabled     PSYCHOSOCIAL/SPIRITUAL SCREENING:      Any spiritual / Roman Catholic concerns:  [] Yes /  [x] No    Caregiver Burnout:  [] Yes /  [] No /  [x] No Caregiver Present      Anticipatory grief assessment:   [x] Normal  / [] Maladaptive        REVIEW OF SYSTEMS:     Systems: constitutional, ears/nose/mouth/throat, respiratory, gastrointestinal, genitourinary, musculoskeletal, integumentary, neurologic, psychiatric, endocrine. Positive findings noted below. Modified ESAS Completed by: provider           Pain: 0           Dyspnea: 0           Stool Occurrence(s): 1   Positive and pertinent negative findings in ROS are noted above in HPI. The following systems were [x] reviewed / [] unable to be reviewed as noted in HPI  Other findings are noted below. PHYSICAL EXAM:     Constitutional:alert but aphasic comfortable appearing   Eyes: pupils equal  ENMT: moist MM   Cardiovascular: regular rhythm  Respiratory: respirations not labored   Gastrointestinal: soft non-tender wound vac in place   Skin: warm, dry  Neurologic: alert not following commands     Other:   Wt Readings from Last 3 Encounters:   01/11/23 76.5 kg (168 lb 10.4 oz)   11/25/22 72.6 kg (160 lb)   10/16/22 68.1 kg (150 lb 2.1 oz)     Blood pressure (!) 146/91, pulse 95, temperature 98.3 °F (36.8 °C), resp. rate 18, height 5' 9\" (1.753 m), weight 76.5 kg (168 lb 10.4 oz), SpO2 100 %. Pain:  Pain Scale 1: Adult Nonverbal Pain Scale  Pain Intensity 1: 0                      LAB AND IMAGING FINDINGS:     Lab Results   Component Value Date/Time    WBC 12.9 01/11/2023 03:10 AM    HGB 11.1 (L) 01/11/2023 03:10 AM    PLATELET 240 59/56/8230 03:10 AM     Lab Results   Component Value Date/Time    Sodium 149 (H) 01/11/2023 03:10 AM    Potassium 3.7 01/11/2023 03:10 AM    Chloride 120 (H) 01/11/2023 03:10 AM    CO2 24 01/11/2023 03:10 AM    BUN 5 (L) 01/11/2023 03:10 AM    Creatinine 0.47 (L) 01/11/2023 03:10 AM    Calcium 8.4 (L) 01/11/2023 03:10 AM    Magnesium 2.1 01/11/2023 03:10 AM    Phosphorus 2.5 01/11/2023 03:10 AM      Lab Results   Component Value Date/Time    Alk. phosphatase 90 01/07/2023 05:37 AM    Protein, total 6.6 01/07/2023 05:37 AM    Albumin 1.4 (L) 01/07/2023 05:37 AM    Globulin 5.2 (H) 01/07/2023 05:37 AM     Lab Results   Component Value Date/Time    INR 1.6 (H) 01/04/2023 11:33 PM    Prothrombin time 19.2 (H) 01/04/2023 11:33 PM    aPTT 31.3 03/30/2021 02:15 PM      Lab Results   Component Value Date/Time    Iron 35 (L) 01/06/2023 03:00 AM    TIBC 99 (L) 01/06/2023 03:00 AM    Iron % saturation 35 01/06/2023 03:00 AM    Ferritin 422 (H) 11/25/2022 12:50 PM      No results found for: PH, PCO2, PO2  No components found for: [place]   Lab Results   Component Value Date/Time    CK 88 10/10/2022 12:52 AM    CK - MB <1.0 03/30/2021 02:15 PM              Total time: 35 minutes   Counseling / coordination time, spent as noted above:   > 50% counseling / coordination:  Time spent in direct consultation with the patient, medical team, and family     Prolonged service was provided for  []30 min   []75 min in face to face time in the presence of the patient, spent as noted above.   Time Start:   Time End:     Disclaimer: Sections of this note are dictated using utilizing voice recognition software, which may have resulted in some phonetic based errors in grammar and contents. Even though attempts were made to correct all the mistakes, some may have been missed, and remained in the body of the document. If questions arise, please contact our department.

## 2023-01-11 NOTE — RT PROTOCOL NOTE
Respiratory Care Assessment for Bronchial hygiene or Lung Expansion Therapy  Patient  Marcellus Abarca     79 y.o.   male     1/10/2023  9:37 PM  Patient Active Problem List   Diagnosis Code    Intractable hiccups R06.6    Hypertension I10    Hypokalemia E87.6    First degree AV block I44.0    Former heavy tobacco smoker Z87.891    Severe protein-calorie malnutrition (HCC) E43    Psychogenic polydipsia R63.1, F54    Bilateral leg and foot pain M79.604, M79.605, M79.671, M79.672    Stroke ([facility]) I63.9    Hyperosmolality and hypernatremia M89.5    Acute metabolic encephalopathy B73.87    Dehydration E86.0    Goals of care, counseling/discussion Z71.89    Debility R53.81    Respiratory failure ([facility]) P89.99    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[facility]) J32.81    Seizure disorder ([facility]) G40.909    Hypernatremia E87.0    Anemia D64.9    Symptomatic anemia D64.9    Aspiration pneumonia ([facility]) J69.0    Septic shock ([facility]) A41.9, R65.21    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 [facility]) L98.429    Community acquired pneumonia of right lower lobe of lung J18.9       ABG:  Date:1/10/2023  No results found for: PH, PHI, PCO2, PCO2I, PO2, PO2I, HCO3, HCO3I, FIO2, FIO2I    Chest X-ray:  Date:1/10/2023  Results from Hospital Encounter encounter on 01/04/23    XR ABD PORT  1 V    Narrative  XR ABD PORT  1 V: 1/10/2023 4:34 PM    CLINICAL INFORMATION  Check position of gastrostomy tube s/p bedside replacement. COMPARISON  CT abdomen/pelvis January 4, 2023    FINDINGS  Administered 30 cc Gastrografin via gastrostomy tube. Contrast appropriately  outlines gastric rugae and proximal duodenum. Mildly dilated loops of bowel. No pathologic calcifications present. Soft tissue structures are within normal  limits. Osseous structures within normal limits. Impression  Administered contrast via gastrostomy appropriately outlines gastric rugae and  proximal duodenum.       Lab Test:  Date:1/10/2023  WBC:   Lab Results   Component Value Date/Time    WBC 11.7 01/10/2023 01:24 AM   HGB:   Lab Results   Component Value Date/Time    HGB 10.6 (L) 01/10/2023 01:24 AM    PLTS:   Lab Results   Component Value Date/Time    PLATELET 395 61/83/1535 01:24 AM       SaO2%/flow: @LASTSAO2(1)@    Vital Signs:   Patient Vitals for the past 8 hrs:   Temp Pulse Resp BP SpO2   01/10/23 2123 -- -- -- -- 100 %   01/10/23 2016 97.9 °F (36.6 °C) -- 18 (!) 157/96 100 %   01/10/23 1837 97 °F (36.1 °C) 95 16 (!) 160/91 100 %         RA/O2 flow/device:1L NC        First Inital Assesment:     [x]Yes []No   Reevaluation/Reassessment:    []Yes [x]No     CHART REVIEW   Points 0 X 1 X 2 X 3 X 4 X Points   Pulmonary History Smoking History (1) none  Recent Smoking History <1 PPD  Recent Smoking History >1 PPD  Pulmonary Disease or Impairment  Severe Pulmonary Disease  0   Surgical History No Surgery  General Surgery  Lower Abdominal  Thoracic or Upper Abdominal  Thoracic & Pulmonary Disease  0   CXR Clear or not indicated  Chronic changes or CXR Pending  Infiltrate, atelectasis or pleural effusions x Infiltrates in more than 1lobe  Infiltrates +atelectasis  +/or pleural effusions  2     PATIENT ASSESSMENT    0 X 1 X 2 X 3 X 4 X Points   Respiratory Pattern Regular pattern RR 12-20  Increased RR 21-27   Mild Dyspnea at rest, irregular pattern RR 28-32  Moderate Dyspnea at rest, Use of accessory muscles, RR 33-36  Severe Dyspnea, Use of accessory muscles RR >36  0   Mental Status Alert Oriented cooperative  Confused, Follows commands  Lethargic, Does not follow commands x Obtunded  Unresponsive  2   Breath Sounds Clear  Decreased Unilaterally  Decreased Bilaterally x Mild Scattered wheezing or Crackles in bases  Severe Wheezing or rhonchi  2   Cough Strong dry NPC  Strong Productive  Weak NPC x Weak productive or weak with rhonchi  No cough or may require suctioning  2   Level of Activity Ambulatory  Ambulatory with assistance  Temporarily Non-ambulatory  Non-Ambulatory, able to position self  Unable to position self, confined to bed x 4   Total Points/Score:   12     Specific Intervention Chart(lung expansion)    Bronchial Hygiene/Secretion Clearance:    [x]EZPAP []Rotation bed with vibration    []CPT with percussor []CPT via vest   []Oscillastiang positive pressure expiratory device      Lung Expansion:    []Incentive Spirometer w/RT visits []Incentive Spirometer w/nursing    [x]EZPAP      *Suctioning:    []Nasal Tracheal []Tracheal     *suctioning will be ordered and done PRN with an associated frequency such as QID/PRN based on score       Other:    Care Plan   Level # Score Modality Frequency Comment   Level 1 >17 0 0    Level 2 14-17 0 0    Level 3 10-13 EZPAP TID    Level 4 1-9 0 0      BRONCHIAL HYGIENE SCORING AND FREQUENCY GUIDELINES   Frequency Indications/Findings Level #   Q4 ATC Copious secretions, SOB, unable to sleep 1   QID & PRN at night Moderate amounts of secretions 2   TID or Q6 wa Small amounts of secretions and poor cough: recent history of secretions 3   BID or Q8 wa Unable to deep breathe and cough effectively 4        Comments:  EZPAP TID for lung expansion/atelectasis.     Respiratory Therapist: Bri Da Silva, RT          lung

## 2023-01-11 NOTE — PROGRESS NOTES
Progress Note    Ernestine Wright  79 y.o. Admit Date: 1/4/2023  Principal Problem:    Septic shock ([facility] (1/6/2023) POA: Yes    Active Problems:    Hypokalemia (6/22/2013) POA: Yes      Hyperosmolality and hypernatremia (3/30/2021) POA: Yes      Acute metabolic encephalopathy (6/02/7665) POA: Yes      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[facility] (06/6/3279) POA: Yes      Hypernatremia (11/25/2022) POA: Yes      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 ([facility] (1/6/2023) POA: Yes      Community acquired pneumonia of right lower lobe of lung (1/6/2023) POA: Yes            Subjective:     Patient remained sort of encephalopathic. But definitely much better than at the time of admission. Try to say something mumbles but very difficult to understand. Continue to have IV fluid as ordered along with the potassium replacement. A comprehensive review of systems was negative except for that written in the History of Present Illness.     Objective:     Visit Vitals  BP (!) 140/82 (BP 1 Location: Left upper arm)   Pulse 91   Temp (!) 96.4 °F (35.8 °C)   Resp 18   Ht 5' 9\" (1.753 m)   Wt 76.5 kg (168 lb 10.4 oz)   SpO2 100%   BMI 24.91 kg/m²         Intake/Output Summary (Last 24 hours) at 1/11/2023 1234  Last data filed at 1/11/2023 0425  Gross per 24 hour   Intake --   Output 2000 ml   Net -2000 ml       Current Facility-Administered Medications   Medication Dose Route Frequency Provider Last Rate Last Admin    potassium chloride (K-DUR, KLOR-CON M20) SR tablet 20 mEq  20 mEq Oral DAILY Rosaline Ross MD   20 mEq at 01/11/23 0816    albuterol-ipratropium (DUO-NEB) 2.5 MG-0.5 MG/3 ML  3 mL Nebulization Q4H PRN Brendon Ramirez MD        levETIRAcetam (KEPPRA) 500 mg in 0.9% sodium chloride (MBP/ADV) 100 mL MBP  500 mg IntraVENous Q12H Truman Meckel,  mL/hr at 01/11/23 0035 500 mg at 01/11/23 0035    0.9% sodium chloride infusion 250 mL  250 mL IntraVENous PRN Rocio Tristan DO        dextrose 5% with KCl 20 mEq/L infusion IntraVENous CONTINUOUS Rosaline Ross  mL/hr at 01/11/23 0810 New Bag at 01/11/23 0810    0.9% sodium chloride infusion 250 mL  250 mL IntraVENous PRN Truman Meckel, MD        vancomycin 50 mg/mL oral solution (compounded) 125 mg  125 mg Per G Tube Q6H Truman Meckel, MD   125 mg at 01/11/23 9135    cyanocobalamin tablet 500 mcg  500 mcg Per G Tube DAILY Truman Meckel, MD   500 mcg at 01/11/23 0809    pantoprazole (PROTONIX) 40 mg in 0.9% sodium chloride 10 mL injection  40 mg IntraVENous DAILY Truman Meckel, MD   40 mg at 01/11/23 0809    sodium chloride (NS) flush 5-40 mL  5-40 mL IntraVENous Q2G Sil Hawkins MD   10 mL at 01/11/23 0651    sodium chloride (NS) flush 5-40 mL  5-40 mL IntraVENous PRN Sil Hawkins MD        acetaminophen (TYLENOL) tablet 650 mg  650 mg Oral Q6H PRN Sil Hawkins MD        Or    acetaminophen (TYLENOL) suppository 650 mg  650 mg Rectal Q6H PRN Sil Hawkins MD        ondansetron (ZOFRAN ODT) tablet 4 mg  4 mg Oral Q8H PRN Sil Hawkins MD        Or    ondansetron (ZOFRAN) injection 4 mg  4 mg IntraVENous Q6H PRN Sil Hawkins MD        [Held by provider] heparin (porcine) injection 5,000 Units  5,000 Units SubCUTAneous U2V Sil Hawkins MD   0,540 Units at 01/05/23 2339    insulin lispro (HUMALOG) injection   SubCUTAneous Q6H Alma Julian PA-C        glucose chewable tablet 16 g  4 Tablet Oral PRN Alma Julian PA-C        glucagon (GLUCAGEN) injection 1 mg  1 mg IntraMUSCular PRN Alma Julian PA-C        dextrose 10% infusion 0-250 mL  0-250 mL IntraVENous PRN Alma Julian PA-C        midodrine (PROAMATINE) tablet 5 mg  5 mg Per G Tube Q8H PRN Kristin Sanz MD        sodium chloride (NS) flush 5-10 mL  5-10 mL IntraVENous PRN ARSEN Sebastian        piperacillin-tazobactam (ZOSYN) 3.375 g in 0.9% sodium chloride (MBP/ADV) 100 mL MBP  3.375 g IntraVENous Q8H Alba Sebastian mL/hr at 01/11/23 0810 3.375 g at 01/11/23 9241        Physical Exam:     Physical Exam:   General:  Alert, cooperative, no distress, appears stated age. Neck: Supple, symmetrical, trachea midline, no adenopathy, thyroid: no enlargement/tenderness/nodules, no carotid bruit and no JVD. Lungs:   Clear to auscultation bilaterally. Heart:  Regular rate and rhythm, S1, S2 normal, no murmur, click, rub or gallop. Abdomen:   Soft, non-tender. Bowel sounds normal. No masses,  No organomegaly. Has PEG. Extremities: Extremities normal, atraumatic, no cyanosis or edema. Has ulcers on his both heels                         Data Review:    CBC w/Diff    Recent Labs     01/11/23  0310 01/10/23  0124 01/09/23  0252   WBC 12.9 11.7 10.9   RBC 3.98* 3.88* 2.96*   HGB 11.1* 10.6* 8.2*   HCT 36.1 34.3* 27.0*   MCV 90.7 88.4 91.2   MCH 27.9 27.3 27.7   MCHC 30.7* 30.9* 30.4*   RDW 16.0* 16.0* 16.3*    Recent Labs     01/11/23  0310 01/10/23  0124 01/09/23  0252   MONOS 4 5 3   EOS 2 3 4   BASOS 1 0 0   RDW 16.0* 16.0* 16.3*        Comprehensive Metabolic Profile    Recent Labs     01/11/23  0310 01/10/23  0124 01/09/23  0252   * 148* 150*   K 3.7 3.9 3.3*   * 120* 123*   CO2 24 24 23   BUN 5* 11 16   CREA 0.47* 0.45* 0.50*    Recent Labs     01/11/23  0310 01/10/23  0124 01/09/23  0252   CA 8.4* 8.1* 8.2*   PHOS 2.5 2.2* 2.5                      Impression:       Active Hospital Problems    Diagnosis Date Noted    Septic shock ([facility] 01/06/2023    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 ([facility] 01/06/2023    Community acquired pneumonia of right lower lobe of lung 01/06/2023    Hypernatremia 78/55/5522    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[facility] 90/42/1370    Hyperosmolality and hypernatremia 25/78/1968    Acute metabolic encephalopathy 36/44/4239    Hypokalemia 06/22/2013      Very slow improvement of his sodium. Still hypernatremic but much better than at the time of admission.       Plan:     Make sure he continues to give the IV fluid and the potassium replacement as ordered.   We will monitor labs tomorrow morning      Renny Sepulveda MD

## 2023-01-11 NOTE — PROGRESS NOTES
Spoke with patient's son Shaina Aburto to verify that patient will return to [address] at discharge. He confirmed yes pt will [facility]. Notified him that possible discharge for Friday. CM uploaded pt with clinicals to [address] [place] [address] and sent booking request to [facility] and let them know that pt is a possible discharge for Friday.       [facility] RN CDCES  Case Management

## 2023-01-11 NOTE — PROGRESS NOTES
Infectious Disease progress Note        Reason: Sepsis    Current abx Prior abx   Zosyn since 1/4/2023  Po vancomycin since 1/7/23 levofloxacin, vancomycin 1/4-1/9     Lines:       Assessment :   79 y.o. male with hx of dementia, COVID, HLD, HTN, chronic hyponatremia, CVA, seizures who presented to ED on 1/4/2023 for altered mental status. Hospitalization [facility] July 2017 for right foot cellulitis, infected right foot ulcer secondary to Proteus, citrobacter, e.coli, mssa, e.fecalis, e.avium, strep viridans. Surgical wound cultures 7/11 : proteus,citrobacter   S/p I&D of right foot ulcer on 7/11- intra op findings noted - no evidence of bone infection. Hospitalization [facility] October 2022 for sepsis, acute hypoxic respiratory failure-present on admission due to aspiration pneumonia superimposed on recent COVID-19 infection     Now with leukocytosis, bandemia, hypotension requiring pressors on admission, CT scan 1/5/2023 with air noted in the posterior wall of anus/rectum, posterior cortex of the sacrum/coccyx, bacteremia    Clinical presentation consistent with septic shock-present on admission due to polymicrobial bloodstream infection -( blood cx 1/4/23 positive for e.coli, proteus, bacteroides, gram positive cocci), necrotizing infection of the sacrum/acute on chronic sacral osteomyelitis, c.diff infection    Most likely source of polymicrobial bloodstream infection is necrotizing infection of sacral ulcer  Status post I&D on 1/6/2023. Intra-Op findings noted. Surgery help appreciated  Preop wound cultures 1/6-mixed enteric larisa  Intra-Op cultures-mixed enteric larisa, e.coli, Enterococcus    Stool c.diff positive- likely suggestive of evolving c.diff infection. Acute kidney injury-likely due to sepsis    Leakage around PEG tube-plans for IR evaluation noted    Recommendations:     1. Continue Zosyn. continue oral vancomycin. D/c IV vancomycin  2. Follow up Repeat blood culture 1/8  3.   Follow-up surgery recommendations regarding wound care sacral ulcer  4. Management of acute kidney injury per primary team  5. Await palliative care recommendations regarding address goals of care  6. Patient will need outpatient IV antibiotics and family wishes for aggressive measures. Patient is at very high risk for wound nonhealing, recurrent infection. 7.  Follow-up IR recommendations regarding drainage around PEG tube      Please call me if any further questions or concerns. Will continue to participate in the care of this patient.   HPI:    Unable to communicate effectively    Past Medical History:   Diagnosis Date    Alcohol dependence with alcohol-induced persisting dementia ([address]     per [facility] 4/23/21    Anemia     Aphasia     Benign prostatic hyperplasia     Cerebral vascular disease     COVID-19 04/06/2021    Disturbances of salivary secretion     Dysphagia     GERD (gastroesophageal reflux disease)     Hemiparesis (HCC)     left side    Hemiplegia (HCC)     left side     Hiccups     Hyperlipidemia     Hypernatremia 03/2021    Hypertension     Hyponatremia     Insomnia     Lacunar infarction ([address] 2010    weakness both arms, unable to walk    Lower extremity edema     Moderate protein-calorie malnutrition (HCC)     MRSA (methicillin resistant Staphylococcus aureus)     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)     Pneumonia     Psychogenic polydipsia     PVD (peripheral vascular disease) ([address]     Seizures ([address]     Spinal stenosis     Stroke ([address] 2017    Unspecified convulsions ([address]     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 Qty: 60 Tablet, Refills: 0      baclofen (LIORESAL) 10 mg tablet 10 mg by Per G Tube route two (2) times daily as needed for Muscle Spasm(s). 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 Indications: prevention of vitamin B12 deficiency      ferrous sulfate (FerrouSuL) 325 mg (65 mg iron) tablet Take  by mouth Daily (before breakfast). metoprolol tartrate (LOPRESSOR) 25 mg tablet 25 mg by Per G Tube route two (2) times a day. thiamine HCL (B-1) 100 mg tablet 1 Tablet by Per G Tube route daily. Qty: 30 Tablet, Refills: 0      levETIRAcetam (KEPPRA) 100 mg/mL solution 7.5 mL by Per G Tube route two (2) times a day. Qty: 473 mL, Refills: 0      pravastatin (PRAVACHOL) 40 mg tablet Take 1 Tab by mouth nightly. Qty: 30 Tab, Refills: 1      ascorbic acid, vitamin C, (VITAMIN C) 250 mg tablet Take 1 Tab by mouth daily. Qty: 30 Tab, Refills: 2      polyethylene glycol (MIRALAX) 17 gram packet Take 1 Packet by mouth daily. Qty: 30 Packet, Refills: 2      tamsulosin (FLOMAX) 0.4 mg capsule Take 0.4 mg by mouth daily. 1 tab daily      MULTIVITAMIN,THERAPEUTIC (THERA MULTI-VITAMIN PO) Take 500 mg by mouth daily. folic acid (FOLVITE) 1 mg tablet Take 1 Tab by mouth daily.   Qty: 30 Tab, Refills: 1             Current Facility-Administered Medications   Medication Dose Route Frequency    potassium chloride (K-DUR, KLOR-CON M20) SR tablet 20 mEq  20 mEq Oral DAILY    albuterol-ipratropium (DUO-NEB) 2.5 MG-0.5 MG/3 ML  3 mL Nebulization Q4H PRN    levETIRAcetam (KEPPRA) 500 mg in 0.9% sodium chloride (MBP/ADV) 100 mL MBP  500 mg IntraVENous Q12H    0.9% sodium chloride infusion 250 mL  250 mL IntraVENous PRN    dextrose 5% with KCl 20 mEq/L infusion   IntraVENous CONTINUOUS    0.9% sodium chloride infusion 250 mL  250 mL IntraVENous PRN    vancomycin 50 mg/mL oral solution (compounded) 125 mg  125 mg Per G Tube Q6H    cyanocobalamin tablet 500 mcg  500 mcg Per G Tube DAILY    pantoprazole (PROTONIX) 40 mg in 0.9% sodium chloride 10 mL injection  40 mg IntraVENous DAILY    sodium chloride (NS) flush 5-40 mL  5-40 mL IntraVENous Q8H    sodium chloride (NS) flush 5-40 mL  5-40 mL IntraVENous PRN    acetaminophen (TYLENOL) tablet 650 mg  650 mg Oral Q6H PRN    Or    acetaminophen (TYLENOL) suppository 650 mg  650 mg Rectal Q6H PRN    ondansetron (ZOFRAN ODT) tablet 4 mg  4 mg Oral Q8H PRN    Or    ondansetron (ZOFRAN) injection 4 mg  4 mg IntraVENous Q6H PRN    [Held by provider] heparin (porcine) injection 5,000 Units  5,000 Units SubCUTAneous Q8H    insulin lispro (HUMALOG) injection   SubCUTAneous Q6H    glucose chewable tablet 16 g  4 Tablet Oral PRN    glucagon (GLUCAGEN) injection 1 mg  1 mg IntraMUSCular PRN    dextrose 10% infusion 0-250 mL  0-250 mL IntraVENous PRN    midodrine (PROAMATINE) tablet 5 mg  5 mg Per G Tube Q8H PRN    sodium chloride (NS) flush 5-10 mL  5-10 mL IntraVENous PRN    piperacillin-tazobactam (ZOSYN) 3.375 g in 0.9% sodium chloride (MBP/ADV) 100 mL MBP  3.375 g IntraVENous Q8H       Allergies: Patient has no known allergies.     Family History   Problem Relation Age of Onset    Hypertension Other      Social History     Socioeconomic History    Marital status:      Spouse name: Not on file    Number of children: Not on file    Years of education: Not on file    Highest education level: Not on file   Occupational History    Not on file   Tobacco Use    Smoking status: Former     Packs/day: 0.50     Types: Cigarettes     Quit date: 2008     Years since quittin.5    Smokeless tobacco: Never   Substance and Sexual Activity    Alcohol use: No    Drug use: No    Sexual activity: Not Currently   Other Topics Concern    Not on file   Social History Narrative Not on file     Social Determinants of Health     Financial Resource Strain: Not on file   Food Insecurity: Not on file   Transportation Needs: Not on file   Physical Activity: Not on file   Stress: Not on file   Social Connections: Not on file   Intimate Partner Violence: Not on file   Housing Stability: Not on file     Social History     Tobacco Use   Smoking Status Former    Packs/day: 0.50    Types: Cigarettes    Quit date: 2008    Years since quittin.5   Smokeless Tobacco Never        Temp (24hrs), Av.3 °F (36.8 °C), Min:97 °F (36.1 °C), Max:99.1 °F (37.3 °C)    Visit Vitals  BP (!) 159/95 (BP 1 Location: Left upper arm, BP Patient Position: At rest)   Pulse 99   Temp 99.1 °F (37.3 °C)   Resp 18   Ht 5' 9\" (1.753 m)   Wt 78.9 kg (173 lb 15.1 oz)   SpO2 99%   BMI 25.69 kg/m²       ROS: Unable to obtain due to patient factors    Physical Exam:    General:  male patient laying on the bed, eyes open, alert, mumbles some words. Head:  Normocephalic, without obvious abnormality, atraumatic. Eyes:  Conjunctivae/corneas clear. Nose: Nares normal.  No drainage or sinus tenderness. Throat: Lips, mucosa, and tongue normal. Teeth and gums of poor dentition, lips dried and cracked/peeling    Neck: Supple, symmetrical, trachea midline, no adenopathy, thyroid: no enlargment/tenderness/nodules, no carotid bruit and no JVD. Back:   Symmetric, no curvature. Lungs:   Coarse to auscultation bilaterally. Chest wall:  No tenderness or deformity. Heart:  S1, S2 normal, no  click, rub or gallop. Abdomen:   Abd flat, Soft, non-tender. Bowel sounds normal. No masses,  No organomegaly. Healed surgical scar in upper abdomen   Extremities: Extremities normal, atraumatic, no cyanosis or edema. Pulses: 2+ and symmetric all extremities.    Skin: BLE with ulcers per report covered with dressing, sacral wound dressing not opened   Lymph nodes:  Not examined   Neurologic: Grossly non-focal; non-verbal, does not follow commands      Labs: Results:   Chemistry Recent Labs     01/11/23  0310 01/10/23  0124 01/09/23  0252   GLU 95 94 113*   * 148* 150*   K 3.7 3.9 3.3*   * 120* 123*   CO2 24 24 23   BUN 5* 11 16   CREA 0.47* 0.45* 0.50*   CA 8.4* 8.1* 8.2*   AGAP 5 4 4   BUCR 11* 24* 32*        CBC w/Diff Recent Labs     01/11/23  0310 01/10/23  0124 01/09/23  0252   WBC 12.9 11.7 10.9   RBC 3.98* 3.88* 2.96*   HGB 11.1* 10.6* 8.2*   HCT 36.1 34.3* 27.0*    249 151   GRANS 84* 78* 84*   LYMPH 9* 13* 8*   EOS 2 3 4        Microbiology Recent Labs     01/08/23  1020   CULT NO GROWTH 3 DAYS            RADIOLOGY:    All available imaging studies/reports in [facility] care for this admission were reviewed        Disclaimer: Sections of this note are dictated utilizing voice recognition software, which may have resulted in some phonetic based errors in grammar and contents. Even though attempts were made to correct all the mistakes, some may have been missed, and remained in the body of the document. If questions arise, please contact our department.     Dr. Deshaun Biggs, Infectious Disease Specialist  [phone]  January 11, 2023  5:16 PM

## 2023-01-11 NOTE — PROGRESS NOTES
Palliative Medicine     Palliative Medicine team members, Jackelin Blanco NP and this writer met with Patient's son,  Lanita Gitelman at bedside. Palliative Team provided brief overview and patient's current status and overall debility due to chronic illness. Mr. Josh Lai shared with team his relationship with his father stared when he was 25. Prior to his being placed in a Nursing Home patient was living with him and he was providing his care. 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 He shared that he is OK with the current course of care but is acutely aware of his poor health. Mr. Josh Lai stated he will stop \"all of this when he feels it is too much\" for his father. Team informed him that Mr Chanelle Alfaro would meet hospice criteria for admission with prognosis of 6 months or less. He will ask family members to come and visit if they desire to. Provided contact information for the Palliative Department. Mr. Josh Lai expressed gratitude for the candid conversation and compassionate words of support. CODE STATUS - DNR/DNI LIMITED MEDICAL INTERVENTIONS. OK WITH FEEDING TUBE FOR LONG TERM.        Markus Finnegan BSN, RN, [facility]  Palliative Medicine Inpatient RN  Palliative [facility] Line: [phone]

## 2023-01-11 NOTE — PROGRESS NOTES
Comprehensive Nutrition Assessment    Type and Reason for Visit: Reassess, Positive nutrition screen, Consult, NPO/clear liquid    Nutrition Recommendations/Plan:   Initiate tube feeding via PEG while medically appropriate:   Formula: Jevity 1.5  Initial Rate: 10 mL/hr  Advancement: 10 q 6 hrs as tolerated  Goal Rate: 50 mL/hr   Water Flushes: 175 mL q 4 hours (1050 mL total) while D5 infusing  Goal Regimen Provides: 1800 kcal, 77 gm pro, 1962 mL TOTAL WATER, 100% RDIs  Continue IVF d/t elevated Na levels   Initiate aspiration precautions   Monitor tolerance of EN feeding till goal rate, weight, labs and plan of care during admission. Malnutrition Assessment:  Malnutrition Status: At risk for malnutrition (specify) (r/t PEG dependence, and advance age) (01/06/23 2008)      Nutrition Assessment:    Pt admitted for AMS and severe hypernatremia with septic shock from nursing home. Per chart review, PEG leakage/dislodged on 1/9-feeds held. PEG replaced on 1/10 and KUB ordered to ensure accurate placement. TF currently held x 2 days. Current lab shows elevated Na (149), and low BUN (5), Creatinine (0.47). TF to resume when medically appropriate; goal regiment Jevity 1.5 at 50mL/hour will provide  1800 kcal, 77 gm protein, 912 mL free water, 100% RDIs. Pt receiving D5 at 125mL/hour providing an additional (150 g dextrose, 510 kcal per day). Nutrition Related Findings:    Last BM 1/11-soft. Output: 1000mL (urine-catheter). Pertinent Medications: potassium chloride, D5 infusion at 125mL/hour, pantoprazole, zofran, midodrine. Wound Type: Multiple, Skin tears     Current Nutrition Intake & Therapies:  Average Meal Intake: NPO  Average Supplement Intake: NPO  ADULT TUBE FEEDING PEG; Standard with Fiber; Delivery Method: Continuous; Continuous Initial Rate (mL/hr): 10; Continuous Advance Tube Feeding: Yes; Advancement Volume (mL/hr): 10; Advancement Frequency: Q 6 hours; Continuous Goal Rate (mL/hr): 50. ..   DIET NPO Sips of Water with Meds    Anthropometric Measures:  Height: 5' 9\" (175.3 cm)  Ideal Body Weight (IBW): 160 lbs (73 kg)  Admission Body Weight: 166 lb 0.1 oz  Current Body Wt:  78.9 kg (173 lb 15.1 oz), 108.7 % IBW. Bed scale  Current BMI (kg/m2): 25.7  Usual Body Weight: 68 kg (150 lb)  % Weight Change (Calculated): 10.7  Weight Adjustment: No adjustment  BMI Category: Overweight (BMI 25.0-29. 9)    Estimated Daily Nutrient Needs:  Energy Requirements Based On: Formula (MSJ x 1.2-1.3)  Weight Used for Energy Requirements: Current  Energy (kcal/day): 4142-8215  Weight Used for Protein Requirements: Current (1.0-1.2)  Protein (g/day): 75-90  Method Used for Fluid Requirements: 1 ml/kcal  Fluid (ml/day): 1222-6115    Nutrition Diagnosis:   Inadequate oral intake related to swallowing difficulty as evidenced by NPO or clear liquid status due to medical condition, nutrition support-enteral nutrition, constipation    Nutrition Interventions:   Food and/or Nutrient Delivery: Continue NPO, Start tube feeding, IV fluid delivery  Nutrition Education/Counseling: Education not indicated, No recommendations at this time  Coordination of Nutrition Care: Continue to monitor while inpatient       Goals:  Previous Goal Met: Goal(s) achieved  Goals: Meet at least 75% of estimated needs, by next RD assessment       Nutrition Monitoring and Evaluation:   Behavioral-Environmental Outcomes: None identified  Food/Nutrient Intake Outcomes: Enteral nutrition intake/tolerance, IVF intake  Physical Signs/Symptoms Outcomes: Biochemical data, Meal time behavior, Nutrition focused physical findings, Weight, GI status    Discharge Planning:    Enteral nutrition    Gris He MA, RDN, LD   Contact: [phone]

## 2023-01-11 NOTE — RT PROTOCOL NOTE
ADULT PROTOCOL: JET AEROSOL ASSESSMENT    Patient  Marycarmen Marie     79 y.o.   male     1/10/2023  9:33 PM    Breath Sounds Pre Procedure:  Breath Sounds Bilateral: Diminished                                            Breath Sounds Post Procedure: Breath Sounds Bilateral: Clear                                               Breathing pattern: Pre procedure  Breathing Pattern: Regular          Post procedure  Breathing Pattern: Regular    Cough: Pre procedure  Cough: Non-productive               Post procedure Cough: Non-productive    Heart Rate: Pre procedure Pulse: 91           Post procedure Pulse: 90    Resp Rate: Pre procedure  Respirations: 18           Post procedure          Nebulizer Therapy: Current medications Aerosolized Medications: DuoNeb       Problem List:   Patient Active Problem List   Diagnosis Code    Intractable hiccups R06.6    Hypertension I10    Hypokalemia E87.6    First degree AV block I44.0    Former heavy tobacco smoker Z87.891    Severe protein-calorie malnutrition (HCC) E43    Psychogenic polydipsia R63.1, F54    Bilateral leg and foot pain M79.604, M79.605, M79.671, M79.672    Stroke ([facility]) I63.9    Hyperosmolality and hypernatremia E13.7    Acute metabolic encephalopathy S61.22    Dehydration E86.0    Goals of care, counseling/discussion Z71.89    Debility R53.81    Respiratory failure ([facility]) M09.46    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    Seizure disorder ([facility]) G40.909    Hypernatremia E87.0    Anemia D64.9    Symptomatic anemia D64.9    Aspiration pneumonia ([facility]) J69.0    Septic shock ([facility]) A41.9, R65.21    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 [facility]) L98.429    Community acquired pneumonia of right lower lobe of lung J18.9       Patient alert and cooperative to use MDI: No    Home Respiratory Therapy Regimen/Frequency:  NO  Medication   Device  Frequency     SEVERITY INDEX:    ITEM 0 1 2 3 4 Score   Respiratory Pattern and or Rate Regular  10-19 Regular  20-24   24-30    30-34 Severe SOB or   Greater than 35 1   Breath Sounds Clear Occasional Wheeze Mild Wheezing Moderate Wheezing  wheezing/Absent breath sounds 0   Shortness of Breath None Dyspnea on Exertion Dyspnea at Rest Moderate Shortness of Breath at Rest Severe Shortness of Breath - Limited Speech 1       Total Score:  2    * Scoring Guidelines  0-4 pts:  PRN-BID   5-7 pts:  BID, TID, QID  8-9 pts:  TID, QID, Q6  10-12 pts:  Q4-Q6  * - Guidelines used with clinical judgement. PRN Treatments can be ordered to supplement scheduled treatments. Regardless of score, frequency should not be less than normal home regimen. Recommended Order/Frequency:  change duoneb to PRN and complete pulmonary hygiene assessment to address atelectasis and secretion clearance.     Comments:          Respiratory Therapist: Dora Nickerson, RT

## 2023-01-11 NOTE — PROGRESS NOTES
Reason for Admission:   Septic shock ([facility] [A41.9, R65.21]    PCP: Laith Chery MD               RUR Score:     22%             Resources/supports as identified by patient/family:       Top Challenges facing patient (as identified by patient/family and CM):     none at this time. Finances/Medication cost?     [employer] transport  Support system or lack thereof? Family support  Living arrangements? LTC  Self-care/ADLs/Cognition?   total        Current Advanced Directive/Advance Care Plan:   yes    Healthcare Decision Maker:   Secondary Decision Maker: Madison Reynaga (stepdaughter) - Other Non-relative - [phone]    Supplemental (Other) Decision Maker: Delroy Maza - [phone]    Click here to complete [address] including selection of the Healthcare Decision Maker Relationship (ie \"Primary\")                          Plan for utilizing home health:    no                      Likelihood of readmission:   HIGH    Transition of Care Plan:                    Initial assessment completed with relative(s). Cognitive status of patient: unable to assess. Face sheet information confirmed:  yes. The patient son Saintclair Moots agrees to participate in his discharge plan and to receive any needed information. This patient resides at [facility]. Patient is not able to navigate steps as needed. Prior to hospitalization, patient was considered to be independent with ADLs/IADLS : no . If not independent,  patient needs assist with : dressing, bathing, toileting, and grooming    Patient has a current ACP document on file: yes  Medicaid transport will be available to transport patient LTC upon discharge. The patient already has all needed medical equipment available in the LTC. Patient is not currently active with home health. Patient has stayed in a skilled nursing facility or rehab.   Was  stay within last 60 days : yes.      This patient is on dialysis :no    Currently, the discharge plan is LTC. The patient states that he can obtain his medications from the pharmacy, and take his medications as directed. Patient's current insurance is Payor: BLUE CROSS / Plan: [place] BLUE [place] FEDERAL / Product Type: PPO /       Care Management Interventions  PCP Verified by CM:  Yes (PCP at [facility])  Mode of Transport at Discharge: BLS  Transition of Care Consult (CM Consult): [address]  Discharge Durable Medical Equipment: No  Physical Therapy Consult: No  Occupational Therapy Consult: No  Speech Therapy Consult: No  Support Systems: [place]  Confirm Follow Up Transport: Other (see comment) (Medicaid transport)  The Plan for Transition of Care is Related to the Following Treatment Goals : [address]  Name of the Patient Representative Who was Provided with a Choice of Provider and Agrees with the Discharge Plan: Gregg Schultz  Discharge Location  Patient Expects to be Discharged to[de-identified] [address] RN [facility]  Case Management

## 2023-01-12 LAB
ANION GAP SERPL CALC-SCNC: 3 MMOL/L (ref 3–18)
BASOPHILS # BLD: 0 K/UL (ref 0–0.1)
BASOPHILS NFR BLD: 0 % (ref 0–2)
BUN SERPL-MCNC: 6 MG/DL (ref 7–18)
BUN/CREAT SERPL: 15 (ref 12–20)
CALCIUM SERPL-MCNC: 8.3 MG/DL (ref 8.5–10.1)
CHLORIDE SERPL-SCNC: 118 MMOL/L (ref 100–111)
CO2 SERPL-SCNC: 25 MMOL/L (ref 21–32)
CREAT SERPL-MCNC: 0.39 MG/DL (ref 0.6–1.3)
DIFFERENTIAL METHOD BLD: ABNORMAL
EOSINOPHIL # BLD: 0.3 K/UL (ref 0–0.4)
EOSINOPHIL NFR BLD: 3 % (ref 0–5)
ERYTHROCYTE [DISTWIDTH] IN BLOOD BY AUTOMATED COUNT: 16 % (ref 11.6–14.5)
GLUCOSE BLD STRIP.AUTO-MCNC: 117 MG/DL (ref 70–110)
GLUCOSE BLD STRIP.AUTO-MCNC: 98 MG/DL (ref 70–110)
GLUCOSE SERPL-MCNC: 121 MG/DL (ref 74–99)
HCT VFR BLD AUTO: 28.6 % (ref 36–48)
HGB BLD-MCNC: 9 G/DL (ref 13–16)
IMM GRANULOCYTES # BLD AUTO: 0.1 K/UL (ref 0–0.04)
IMM GRANULOCYTES NFR BLD AUTO: 1 % (ref 0–0.5)
LYMPHOCYTES # BLD: 1 K/UL (ref 0.9–3.6)
LYMPHOCYTES NFR BLD: 10 % (ref 21–52)
MAGNESIUM SERPL-MCNC: 1.9 MG/DL (ref 1.6–2.6)
MCH RBC QN AUTO: 27.3 PG (ref 24–34)
MCHC RBC AUTO-ENTMCNC: 31.5 G/DL (ref 31–37)
MCV RBC AUTO: 86.7 FL (ref 78–100)
MONOCYTES # BLD: 0.4 K/UL (ref 0.05–1.2)
MONOCYTES NFR BLD: 4 % (ref 3–10)
NEUTS SEG # BLD: 8.6 K/UL (ref 1.8–8)
NEUTS SEG NFR BLD: 83 % (ref 40–73)
NRBC # BLD: 0 K/UL (ref 0–0.01)
NRBC BLD-RTO: 0 PER 100 WBC
PHOSPHATE SERPL-MCNC: 2 MG/DL (ref 2.5–4.9)
PLATELET # BLD AUTO: 273 K/UL (ref 135–420)
PMV BLD AUTO: 11.1 FL (ref 9.2–11.8)
POTASSIUM SERPL-SCNC: 4 MMOL/L (ref 3.5–5.5)
RBC # BLD AUTO: 3.3 M/UL (ref 4.35–5.65)
SODIUM SERPL-SCNC: 146 MMOL/L (ref 136–145)
WBC # BLD AUTO: 10.4 K/UL (ref 4.6–13.2)

## 2023-01-12 PROCEDURE — 74011000250 HC RX REV CODE- 250

## 2023-01-12 PROCEDURE — 74011000250 HC RX REV CODE- 250: Performed by: INTERNAL MEDICINE

## 2023-01-12 PROCEDURE — 85025 COMPLETE CBC W/AUTO DIFF WBC: CPT

## 2023-01-12 PROCEDURE — C9113 INJ PANTOPRAZOLE SODIUM, VIA: HCPCS | Performed by: INTERNAL MEDICINE

## 2023-01-12 PROCEDURE — 65660000004 HC RM CVT STEPDOWN

## 2023-01-12 PROCEDURE — 99232 SBSQ HOSP IP/OBS MODERATE 35: CPT | Performed by: INTERNAL MEDICINE

## 2023-01-12 PROCEDURE — 74011000258 HC RX REV CODE- 258: Performed by: INTERNAL MEDICINE

## 2023-01-12 PROCEDURE — 80048 BASIC METABOLIC PNL TOTAL CA: CPT

## 2023-01-12 PROCEDURE — 74011000258 HC RX REV CODE- 258: Performed by: PHYSICIAN ASSISTANT

## 2023-01-12 PROCEDURE — 84100 ASSAY OF PHOSPHORUS: CPT

## 2023-01-12 PROCEDURE — 36415 COLL VENOUS BLD VENIPUNCTURE: CPT

## 2023-01-12 PROCEDURE — 74011250636 HC RX REV CODE- 250/636: Performed by: PHYSICIAN ASSISTANT

## 2023-01-12 PROCEDURE — 74011250637 HC RX REV CODE- 250/637: Performed by: INTERNAL MEDICINE

## 2023-01-12 PROCEDURE — 74011250636 HC RX REV CODE- 250/636: Performed by: INTERNAL MEDICINE

## 2023-01-12 PROCEDURE — 82962 GLUCOSE BLOOD TEST: CPT

## 2023-01-12 PROCEDURE — 83735 ASSAY OF MAGNESIUM: CPT

## 2023-01-12 RX ORDER — MAG HYDROX/ALUMINUM HYD/SIMETH 200-200-20
30 SUSPENSION, ORAL (FINAL DOSE FORM) ORAL
Status: DISCONTINUED | OUTPATIENT
Start: 2023-01-12 | End: 2023-01-15 | Stop reason: HOSPADM

## 2023-01-12 RX ADMIN — DEXTROSE MONOHYDRATE AND POTASSIUM CHLORIDE INJECTION, SOLUTION: 5; .149 INJECTION, SOLUTION INTRAVENOUS at 01:00

## 2023-01-12 RX ADMIN — PIPERACILLIN SODIUM AND TAZOBACTAM SODIUM 3.38 G: 3; .375 INJECTION, POWDER, LYOPHILIZED, FOR SOLUTION INTRAVENOUS at 00:07

## 2023-01-12 RX ADMIN — DEXTROSE MONOHYDRATE AND POTASSIUM CHLORIDE INJECTION, SOLUTION: 5; .149 INJECTION, SOLUTION INTRAVENOUS at 18:49

## 2023-01-12 RX ADMIN — POTASSIUM CHLORIDE 20 MEQ: 1500 TABLET, EXTENDED RELEASE ORAL at 11:04

## 2023-01-12 RX ADMIN — PIPERACILLIN SODIUM AND TAZOBACTAM SODIUM 3.38 G: 3; .375 INJECTION, POWDER, LYOPHILIZED, FOR SOLUTION INTRAVENOUS at 11:03

## 2023-01-12 RX ADMIN — SODIUM CHLORIDE, PRESERVATIVE FREE 10 ML: 5 INJECTION INTRAVENOUS at 21:50

## 2023-01-12 RX ADMIN — Medication 125 MG: at 05:23

## 2023-01-12 RX ADMIN — SODIUM CHLORIDE, PRESERVATIVE FREE 10 ML: 5 INJECTION INTRAVENOUS at 05:23

## 2023-01-12 RX ADMIN — CYANOCOBALAMIN TAB 1000 MCG 500 MCG: 1000 TAB at 11:04

## 2023-01-12 RX ADMIN — PIPERACILLIN SODIUM AND TAZOBACTAM SODIUM 3.38 G: 3; .375 INJECTION, POWDER, LYOPHILIZED, FOR SOLUTION INTRAVENOUS at 18:44

## 2023-01-12 RX ADMIN — Medication 125 MG: at 18:44

## 2023-01-12 RX ADMIN — Medication 125 MG: at 12:54

## 2023-01-12 RX ADMIN — ALUMINUM HYDROXIDE, MAGNESIUM HYDROXIDE, AND SIMETHICONE 30 ML: 200; 200; 20 SUSPENSION ORAL at 01:00

## 2023-01-12 RX ADMIN — LEVETIRACETAM 500 MG: 500 INJECTION, SOLUTION INTRAVENOUS at 12:54

## 2023-01-12 RX ADMIN — PANTOPRAZOLE SODIUM 40 MG: 40 INJECTION, POWDER, FOR SOLUTION INTRAVENOUS at 11:04

## 2023-01-12 RX ADMIN — SODIUM CHLORIDE, PRESERVATIVE FREE 10 ML: 5 INJECTION INTRAVENOUS at 18:45

## 2023-01-12 NOTE — WOUND CARE
Physical Exam  Room 356: focused wound assess    Abdomen, PEG tube site, buried bumper, partial thickness wound, hyperpigmentation. Wound Care Orders PEG tube site: Unit staff nurses to cleanse wound with wound spray from par room, then apply Optifoam Gentle Silicone dressing, change every 2days & prn soilage. Will turn over care to nursing staff at this time.   Alison DANIELLEN, RN, [facility], [address]

## 2023-01-12 NOTE — PROGRESS NOTES
0700: Bedside and Verbal shift change report given to Jamie Escudero RN and Yenny Ferreira RN (oncoming nurse) by Fransico Bush RN (offgoing nurse). Report included the following information SBAR, Kardex, MAR, Accordion, and Cardiac Rhythm NSR .      1900: Bedside and Verbal shift change report given to [facility], JOSEFA (oncoming nurse) by Jamie Escudero RN and Yenny Ferreira RN (offgoing nurse). Report included the following information SBAR, Kardex, Intake/Output, MAR, Recent Results, and Cardiac Rhythm NSR .

## 2023-01-12 NOTE — PROGRESS NOTES
[facility] Hospitalist Group  Progress Note    Patient: Rayne Mendez Age: 79 y.o. : 1955 MR#: [id number] SSN: [id number]  Date/Time: 2023     Subjective:   Patient unable to verbalize his current status. Very difficult to understand but was attempting to communicate. 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 difficile positive  PEG tube in place    Infectious disease currently following  Patient currently on oral vancomycin and Zosyn  Follow-up surgery recommendations regarding wound care sacral ulcer  Patient was on Eliquis which has been on hold due to anemia  IR adjusted PEG tube placement after it came loose  Nephrology following for hypokalemia, hypernatremia, hyper osmolality. IV fluids per nephrology  Patient currently on [facility]  Palliative care team following  Awaiting placement at [facility]        I spent 40 minutes with the patient in face-to-face consultation, of which greater than 50% was spent in counseling and coordination of care as described above.     Case discussed with:  [x]Patient  []Family  []Nursing  []Case Management  DVT Prophylaxis:  []Lovenox  []Hep SQ  [x]SCDs  []Coumadin   []Eliquis/Xarelto     Objective:   VS: Visit Vitals  BP (!) 149/96 (BP 1 Location: Left upper arm, BP Patient Position: At rest)   Pulse 92   Temp 97.9 °F (36.6 °C)   Resp 18   Ht 5' 9\" (1.753 m)   Wt 76.5 kg (168 lb 10.4 oz)   SpO2 100%   BMI 24.91 kg/m²      Tmax/24hrs: Temp (24hrs), Av.9 °F (36.6 °C), Min:96.4 °F (35.8 °C), Max:99.1 °F (37.3 °C)  IOBRIEF  Intake/Output Summary (Last 24 hours) at 2023 0041  Last data filed at 2023 2326  Gross per 24 hour   Intake 3896.67 ml   Output 3800 ml   Net 96.67 ml       General:  Alert, cooperative, no acute distress    HEENT: PERRLA, anicteric sclerae. Poor dentition noted  Pulmonary:  CTA Bilaterally. No Wheezing/Rales. Cardiovascular: Regular rate and Rhythm. GI:  Soft, Non distended, Non tender. + Bowel sounds. Extremities:  No edema. No calf tenderness. Bilateral lower extremities with ulcers covered with dressings. Sacral ulcer with dressing. Psych: Good insight. Not anxious or agitated. Neurologic: Alert and oriented X 3. Moves all ext.   Additional:    Medications:   Current Facility-Administered Medications   Medication Dose Route Frequency    alum-mag hydroxide-simeth (MYLANTA) oral suspension 30 mL  30 mL Oral Q4H PRN    potassium chloride (K-DUR, KLOR-CON M20) SR tablet 20 mEq  20 mEq Oral DAILY    albuterol-ipratropium (DUO-NEB) 2.5 MG-0.5 MG/3 ML  3 mL Nebulization Q4H PRN    levETIRAcetam (KEPPRA) 500 mg in 0.9% sodium chloride (MBP/ADV) 100 mL MBP  500 mg IntraVENous Q12H    0.9% sodium chloride infusion 250 mL  250 mL IntraVENous PRN    dextrose 5% with KCl 20 mEq/L infusion   IntraVENous CONTINUOUS    0.9% sodium chloride infusion 250 mL  250 mL IntraVENous PRN    vancomycin 50 mg/mL oral solution (compounded) 125 mg  125 mg Per G Tube Q6H    cyanocobalamin tablet 500 mcg  500 mcg Per G Tube DAILY    pantoprazole (PROTONIX) 40 mg in 0.9% sodium chloride 10 mL injection  40 mg IntraVENous DAILY    sodium chloride (NS) flush 5-40 mL  5-40 mL IntraVENous Q8H    sodium chloride (NS) flush 5-40 mL  5-40 mL IntraVENous PRN    acetaminophen (TYLENOL) tablet 650 mg  650 mg Oral Q6H PRN    Or    acetaminophen (TYLENOL) suppository 650 mg  650 mg Rectal Q6H PRN    ondansetron (ZOFRAN ODT) tablet 4 mg  4 mg Oral Q8H PRN    Or    ondansetron (ZOFRAN) injection 4 mg  4 mg IntraVENous Q6H PRN    [Held by provider] heparin (porcine) injection 5,000 Units  5,000 Units SubCUTAneous Q8H    insulin lispro (HUMALOG) injection SubCUTAneous Q6H    glucose chewable tablet 16 g  4 Tablet Oral PRN    glucagon (GLUCAGEN) injection 1 mg  1 mg IntraMUSCular PRN    dextrose 10% infusion 0-250 mL  0-250 mL IntraVENous PRN    midodrine (PROAMATINE) tablet 5 mg  5 mg Per G Tube Q8H PRN    sodium chloride (NS) flush 5-10 mL  5-10 mL IntraVENous PRN    piperacillin-tazobactam (ZOSYN) 3.375 g in 0.9% sodium chloride (MBP/ADV) 100 mL MBP  3.375 g IntraVENous Q8H       Labs:    Recent Results (from the past 24 hour(s))   METABOLIC PANEL, BASIC    Collection Time: 01/11/23  3:10 AM   Result Value Ref Range    Sodium 149 (H) 136 - 145 mmol/L    Potassium 3.7 3.5 - 5.5 mmol/L    Chloride 120 (H) 100 - 111 mmol/L    CO2 24 21 - 32 mmol/L    Anion gap 5 3.0 - 18 mmol/L    Glucose 95 74 - 99 mg/dL    BUN 5 (L) 7.0 - 18 MG/DL    Creatinine 0.47 (L) 0.6 - 1.3 MG/DL    BUN/Creatinine ratio 11 (L) 12 - 20      eGFR >60 >60 ml/min/1.73m2    Calcium 8.4 (L) 8.5 - 10.1 MG/DL   MAGNESIUM    Collection Time: 01/11/23  3:10 AM   Result Value Ref Range    Magnesium 2.1 1.6 - 2.6 mg/dL   PHOSPHORUS    Collection Time: 01/11/23  3:10 AM   Result Value Ref Range    Phosphorus 2.5 2.5 - 4.9 MG/DL   CBC WITH AUTOMATED DIFF    Collection Time: 01/11/23  3:10 AM   Result Value Ref Range    WBC 12.9 4.6 - 13.2 K/uL    RBC 3.98 (L) 4.35 - 5.65 M/uL    HGB 11.1 (L) 13.0 - 16.0 g/dL    HCT 36.1 36.0 - 48.0 %    MCV 90.7 78.0 - 100.0 FL    MCH 27.9 24.0 - 34.0 PG    MCHC 30.7 (L) 31.0 - 37.0 g/dL    RDW 16.0 (H) 11.6 - 14.5 %    PLATELET 617 650 - 393 K/uL    MPV 11.9 (H) 9.2 - 11.8 FL    NRBC 0.0 0  WBC    ABSOLUTE NRBC 0.00 0.00 - 0.01 K/uL    NEUTROPHILS 84 (H) 40 - 73 %    LYMPHOCYTES 9 (L) 21 - 52 %    MONOCYTES 4 3 - 10 %    EOSINOPHILS 2 0 - 5 %    BASOPHILS 1 0 - 2 %    IMMATURE GRANULOCYTES 1 (H) 0.0 - 0.5 %    ABS. NEUTROPHILS 10.8 (H) 1.8 - 8.0 K/UL    ABS. LYMPHOCYTES 1.1 0.9 - 3.6 K/UL    ABS. MONOCYTES 0.5 0.05 - 1.2 K/UL    ABS.  EOSINOPHILS 0.3 0.0 - 0.4 K/UL ABS. BASOPHILS 0.1 0.0 - 0.1 K/UL    ABS. IMM. GRANS. 0.1 (H) 0.00 - 0.04 K/UL    DF AUTOMATED     GLUCOSE, POC    Collection Time: 01/11/23  6:19 AM   Result Value Ref Range    Glucose (POC) 110 70 - 110 mg/dL   GLUCOSE, POC    Collection Time: 01/11/23 11:54 AM   Result Value Ref Range    Glucose (POC) 133 (H) 70 - 110 mg/dL   GLUCOSE, POC    Collection Time: 01/11/23  4:30 PM   Result Value Ref Range    Glucose (POC) 110 70 - 110 mg/dL   GLUCOSE, POC    Collection Time: 01/11/23 11:51 PM   Result Value Ref Range    Glucose (POC) 109 70 - 110 mg/dL       Signed By: Dre Fong DO     January 12, 2023      Disclaimer: Sections of this note are dictated using utilizing voice recognition software. Minor typographical errors may be present. If questions arise, please do not hesitate to contact me or call our department.

## 2023-01-12 NOTE — PROGRESS NOTES
Progress Note    Ajay Washington  79 y.o. Admit Date: 1/4/2023  Principal Problem:    Septic shock ([facility] (1/6/2023) POA: Yes    Active Problems:    Hypokalemia (6/22/2013) POA: Yes      Hyperosmolality and hypernatremia (3/30/2021) POA: Yes      Acute metabolic encephalopathy (1/91/3128) POA: Yes      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[facility] (14/9/6058) POA: Yes      Hypernatremia (11/25/2022) POA: Yes      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 ([facility] (1/6/2023) POA: Yes      Community acquired pneumonia of right lower lobe of lung (1/6/2023) POA: Yes            Subjective:     Patient patient and remained in his baseline mental status. No significant change except more alert and more communicative. Getting IV fluid  & PEG feeding. A comprehensive review of systems was negative except for that written in the History of Present Illness.     Objective:     Visit Vitals  BP (!) 158/94 (BP 1 Location: Left upper arm, BP Patient Position: At rest;Lying)   Pulse 86   Temp 97.8 °F (36.6 °C)   Resp 16   Ht 5' 9\" (1.753 m)   Wt 76.5 kg (168 lb 10.4 oz)   SpO2 100%   BMI 24.91 kg/m²         Intake/Output Summary (Last 24 hours) at 1/12/2023 1710  Last data filed at 1/12/2023 1103  Gross per 24 hour   Intake 3460 ml   Output 4275 ml   Net -815 ml       Current Facility-Administered Medications   Medication Dose Route Frequency Provider Last Rate Last Admin    alum-mag hydroxide-simeth (MYLANTA) oral suspension 30 mL  30 mL Oral Q4H PRN Rick Chen MD   30 mL at 01/12/23 0100    potassium chloride (K-DUR, KLOR-CON M20) SR tablet 20 mEq  20 mEq Oral DAILY Robert Cole MD   20 mEq at 01/12/23 1104    albuterol-ipratropium (DUO-NEB) 2.5 MG-0.5 MG/3 ML  3 mL Nebulization Q4H PRN Bev Chaves MD        levETIRAcetam (KEPPRA) 500 mg in 0.9% sodium chloride (MBP/ADV) 100 mL MBP  500 mg IntraVENous Q12H Rick Chen  mL/hr at 01/12/23 1254 500 mg at 01/12/23 1254    0.9% sodium chloride infusion 250 mL  250 mL IntraVENous PRN Rocio Tristan DO        dextrose 5% with KCl 20 mEq/L infusion   IntraVENous CONTINUOUS Sigifredo Owen  mL/hr at 01/12/23 0100 New Bag at 01/12/23 0100    0.9% sodium chloride infusion 250 mL  250 mL IntraVENous PRN Callie Conway MD        vancomycin 50 mg/mL oral solution (compounded) 125 mg  125 mg Per G Tube Q6H Callie Conway MD   125 mg at 01/12/23 1254    cyanocobalamin tablet 500 mcg  500 mcg Per G Tube DAILY Callie Conway MD   500 mcg at 01/12/23 1104    pantoprazole (PROTONIX) 40 mg in 0.9% sodium chloride 10 mL injection  40 mg IntraVENous DAILY Callie Conway MD   40 mg at 01/12/23 1104    sodium chloride (NS) flush 5-40 mL  5-40 mL IntraVENous P0P Sil Hawkins MD   10 mL at 01/12/23 0523    sodium chloride (NS) flush 5-40 mL  5-40 mL IntraVENous PRN Sil Hawkins MD        acetaminophen (TYLENOL) tablet 650 mg  650 mg Oral Q6H PRN Sil Hawkins MD   059 mg at 01/11/23 2352    Or    acetaminophen (TYLENOL) suppository 650 mg  650 mg Rectal Q6H PRN Sil Hawkins MD        ondansetron (ZOFRAN ODT) tablet 4 mg  4 mg Oral Q8H PRN Sil Hawkins MD        Or    ondansetron (ZOFRAN) injection 4 mg  4 mg IntraVENous Q6H PRN Sil Hawkins MD        [Held by provider] heparin (porcine) injection 5,000 Units  5,000 Units SubCUTAneous T0F Sil Hawkins MD   8,622 Units at 01/05/23 2339    insulin lispro (HUMALOG) injection   SubCUTAneous Q6H Alma Julian PA-C        glucose chewable tablet 16 g  4 Tablet Oral PRN Alma Julian PA-C        glucagon (GLUCAGEN) injection 1 mg  1 mg IntraMUSCular PRN Alma Julian PA-C        dextrose 10% infusion 0-250 mL  0-250 mL IntraVENous PRN Alma Julian PA-C        midodrine (PROAMATINE) tablet 5 mg  5 mg Per G Tube Q8H PRN Mikel Reddy MD        sodium chloride (NS) flush 5-10 mL  5-10 mL IntraVENous PRN ARSEN Jesus        piperacillin-tazobactam (ZOSYN) 3.375 g in 0.9% sodium chloride (MBP/ADV) 100 mL MBP  3.375 g IntraVENous Q8H Alba Bhakta 25 mL/hr at 01/12/23 1103 3.375 g at 01/12/23 1103        Physical Exam:     Physical Exam:   General:  Alert, cooperative, no distress, appears stated age. Mouth/Throat: Lips, mucosa, and tongue normal. Teeth and gums normal.   Neck: Supple, symmetrical, trachea midline, no adenopathy, thyroid: no enlargement/tenderness/nodules, no carotid bruit and no JVD. Lungs:   Clear to auscultation bilaterally. Heart:  Regular rate and rhythm, S1, S2 normal, no murmur, click, rub or gallop. Abdomen:   Soft, non-tender. Bowel sounds normal. No masses,  No organomegaly.    Extremities: Extremities normal, atraumatic, no cyanosis or edema                         Data Review:    CBC w/Diff    Recent Labs     01/12/23 0126 01/11/23  0310 01/10/23  0124   WBC 10.4 12.9 11.7   RBC 3.30* 3.98* 3.88*   HGB 9.0* 11.1* 10.6*   HCT 28.6* 36.1 34.3*   MCV 86.7 90.7 88.4   MCH 27.3 27.9 27.3   MCHC 31.5 30.7* 30.9*   RDW 16.0* 16.0* 16.0*    Recent Labs     01/12/23  0126 01/11/23  0310 01/10/23  0124   MONOS 4 4 5   EOS 3 2 3   BASOS 0 1 0   RDW 16.0* 16.0* 16.0*        Comprehensive Metabolic Profile    Recent Labs     01/12/23 0126 01/11/23  0310 01/10/23  0124   * 149* 148*   K 4.0 3.7 3.9   * 120* 120*   CO2 25 24 24   BUN 6* 5* 11   CREA 0.39* 0.47* 0.45*    Recent Labs     01/12/23 0126 01/11/23  0310 01/10/23  0124   CA 8.3* 8.4* 8.1*   PHOS 2.0* 2.5 2.2*                      Impression:       Active Hospital Problems    Diagnosis Date Noted    Septic shock ([address] 01/06/2023    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 ([facility] 01/06/2023    Community acquired pneumonia of right lower lobe of lung 01/06/2023    Hypernatremia 04/45/5940    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[facility] 07/28/2545    Hyperosmolality and hypernatremia 50/39/3800    Acute metabolic encephalopathy 59/86/5329    Hypokalemia 06/22/2013      Slow and steady improvement of sodium. Almost close to normal sodium. Potassium is repleted and is now normal.      Plan:     Do not expect significant improvement in his general condition but anticipate more improvement of the electrolytes with continued hydration with fluid supplementation and nutrition through the PEG tube. From renal point of view patient can be discharged or transferred to the nursing home. Harpreet Briceño MD              Progress Note    Mraycarmen Marie  79 y.o. Admit Date: 1/4/2023  Patient Active Problem List   Diagnosis Code    Intractable hiccups R06.6    Hypertension I10    Hypokalemia E87.6    First degree AV block I44.0    Former heavy tobacco smoker Z87.891    Severe protein-calorie malnutrition (HCC) E43    Psychogenic polydipsia R63.1, F54    Bilateral leg and foot pain M79.604, M79.605, M79.671, M79.672    Stroke ([facility]) I63.9    Hyperosmolality and hypernatremia E88.6    Acute metabolic encephalopathy A76.74    Dehydration E86.0    Goals of care, counseling/discussion Z71.89    Debility R53.81    Respiratory failure ([facility]) A42.84    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[facility]) K35.03    Seizure disorder ([facility]) G40.909    Hypernatremia E87.0    Anemia D64.9    Symptomatic anemia D64.9    Aspiration pneumonia ([facility]) J69.0    Septic shock ([facility]) A41.9, R65.21    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 [facility]) L98.429    Community acquired pneumonia of right lower lobe of lung J18.9           Subjective:     Patient remained at her baseline mental status. Sort of encephalopathy but more alert. A comprehensive review of systems was negative except for that written in the History of Present Illness.     Objective:     Visit Vitals  BP (!) 158/94 (BP 1 Location: Left upper arm, BP Patient Position: At rest;Lying)   Pulse 86   Temp 97.8 °F (36.6 °C)   Resp 16   Ht 5' 9\" (1.753 m)   Wt 76.5 kg (168 lb 10.4 oz)   SpO2 100%   BMI 24.91 kg/m²         Intake/Output Summary (Last 24 hours) at 1/12/2023 1710  Last data filed at 1/12/2023 1103  Gross per 24 hour   Intake 3460 ml   Output 4275 ml   Net -815 ml       Current Facility-Administered Medications   Medication Dose Route Frequency Provider Last Rate Last Admin    alum-mag hydroxide-simeth (MYLANTA) oral suspension 30 mL  30 mL Oral Q4H PRN Debra Cadet MD   30 mL at 01/12/23 0100    potassium chloride (K-DUR, KLOR-CON M20) SR tablet 20 mEq  20 mEq Oral DAILY Arlen Ryan MD   20 mEq at 01/12/23 1104    albuterol-ipratropium (DUO-NEB) 2.5 MG-0.5 MG/3 ML  3 mL Nebulization Q4H PRN Munira Wilson MD        levETIRAcetam (KEPPRA) 500 mg in 0.9% sodium chloride (MBP/ADV) 100 mL MBP  500 mg IntraVENous Q12H Debra Cadet  mL/hr at 01/12/23 1254 500 mg at 01/12/23 1254    0.9% sodium chloride infusion 250 mL  250 mL IntraVENous PRN Rocio Tristan DO        dextrose 5% with KCl 20 mEq/L infusion   IntraVENous CONTINUOUS Arlen Ryan  mL/hr at 01/12/23 0100 New Bag at 01/12/23 0100    0.9% sodium chloride infusion 250 mL  250 mL IntraVENous PRN Debra Cadet MD        vancomycin 50 mg/mL oral solution (compounded) 125 mg  125 mg Per G Tube Q6H Debra Cadet MD   125 mg at 01/12/23 1254    cyanocobalamin tablet 500 mcg  500 mcg Per G Tube DAILY Debra Cadet MD   500 mcg at 01/12/23 1104    pantoprazole (PROTONIX) 40 mg in 0.9% sodium chloride 10 mL injection  40 mg IntraVENous DAILY Debra Cadet MD   40 mg at 01/12/23 1104    sodium chloride (NS) flush 5-40 mL  5-40 mL IntraVENous Z4L Sil Hawkins MD   10 mL at 01/12/23 0523    sodium chloride (NS) flush 5-40 mL  5-40 mL IntraVENous PRN Sil Hawkins MD        acetaminophen (TYLENOL) tablet 650 mg  650 mg Oral Q6H PRN Sil Hawkins MD   781 mg at 01/11/23 2352    Or    acetaminophen (TYLENOL) suppository 650 mg  650 mg Rectal Q6H PRN Sil Hawkins MD        ondansetron (ZOFRAN ODT) tablet 4 mg  4 mg Oral Q8H PRN Sil Hawkins MD Or    ondansetron (ZOFRAN) injection 4 mg  4 mg IntraVENous Q6H PRN Sil Hawkins MD        [Held by provider] heparin (porcine) injection 5,000 Units  5,000 Units SubCUTAneous W8X Sil Hawkins MD   7,441 Units at 01/05/23 2339    insulin lispro (HUMALOG) injection   SubCUTAneous Q6H Alma Julian PA-C        glucose chewable tablet 16 g  4 Tablet Oral PRN Alma Julian PA-C        glucagon (GLUCAGEN) injection 1 mg  1 mg IntraMUSCular PRN Alma Julian PA-C        dextrose 10% infusion 0-250 mL  0-250 mL IntraVENous PRN Alma Julian PA-C        midodrine (PROAMATINE) tablet 5 mg  5 mg Per G Tube Q8H PRN Kristin Sanz MD        sodium chloride (NS) flush 5-10 mL  5-10 mL IntraVENous PRN ARSEN Sebastian        piperacillin-tazobactam (ZOSYN) 3.375 g in 0.9% sodium chloride (MBP/ADV) 100 mL MBP  3.375 g IntraVENous Q8H [place] 25 mL/hr at 01/12/23 1103 3.375 g at 01/12/23 1103        Physical Exam:     Physical Exam:   General:  Alert, cooperative, no distress, appears stated age. Eyes:  Conjunctivae/corneas clear. PERRL, EOMs intact. Mouth/Throat: Lips, mucosa, and tongue normal. Teeth and gums normal.   Neck: Supple, symmetrical, trachea midline, no adenopathy, thyroid: no enlargement/tenderness/nodules, no carotid bruit and no JVD. Lungs:   Clear to auscultation bilaterally. Heart:  Regular rate and rhythm, S1, S2 normal, no murmur, click, rub or gallop. Abdomen:   Soft, non-tender. Bowel sounds normal. No masses,  No organomegaly.    Extremities: Extremities normal, atraumatic, no cyanosis or edema                         Data Review:    CBC w/Diff    Recent Labs     01/12/23  0126 01/11/23  0310 01/10/23  0124   WBC 10.4 12.9 11.7   RBC 3.30* 3.98* 3.88*   HGB 9.0* 11.1* 10.6*   HCT 28.6* 36.1 34.3*   MCV 86.7 90.7 88.4   MCH 27.3 27.9 27.3   MCHC 31.5 30.7* 30.9*   RDW 16.0* 16.0* 16.0*    Recent Labs     01/12/23  0126 01/11/23  0310 01/10/23  0124   MONOS 4 4 5   EOS 3 2 3   BASOS 0 1 0   RDW 16.0* 16.0* 16.0*        Comprehensive Metabolic Profile    Recent Labs     01/12/23  0126 01/11/23  0310 01/10/23  0124   * 149* 148*   K 4.0 3.7 3.9   * 120* 120*   CO2 25 24 24   BUN 6* 5* 11   CREA 0.39* 0.47* 0.45*    Recent Labs     01/12/23  0126 01/11/23  0310 01/10/23  0124   CA 8.3* 8.4* 8.1*   PHOS 2.0* 2.5 2.2*          Lab Results   Component Value Date/Time    GFR est AA >60 04/06/2021 04:45 AM    GFR est non-AA >60 04/06/2021 04:45 AM    Creatinine, POC 0.9 08/08/2014 11:53 AM    Creatinine 0.39 (L) 01/12/2023 01:26 AM    BUN 6 (L) 01/12/2023 01:26 AM    BUN, POC 9 08/08/2014 11:53 AM    Sodium,  (LL) 08/08/2014 11:53 AM    Sodium 146 (H) 01/12/2023 01:26 AM    Potassium 4.0 01/12/2023 01:26 AM    Potassium, POC 3.1 (L) 08/08/2014 11:53 AM    Chloride, POC 79 (L) 08/08/2014 11:53 AM    Chloride 118 (H) 01/12/2023 01:26 AM    CO2 25 01/12/2023 01:26 AM         Imaging:     Procedures/imaging: see electronic medical records for all procedures, Xrays and details which were not copied into this note but were reviewed prior to   taking my decision. Impression:       Active Hospital Problems    Diagnosis Date Noted    Septic shock ([facility] 01/06/2023    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 [facility]) 01/06/2023    Community acquired pneumonia of right lower lobe of lung 01/06/2023    Hypernatremia 02/23/9424    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[facility] 52/65/0433    Hyperosmolality and hypernatremia 04/36/7535    Acute metabolic encephalopathy 31/99/3004    Hypokalemia 06/22/2013            Plan:     Patient continues to have steady improvement of her electrolytes. Sodium is almost normalized so this potassium. Do not think any further significant improvement in his general condition but with hydration serum sodium and also with PEG feeding other electrolytes also will improve further.   From renal point of view patient can be discharged or transfer to the nursing home      Stella Ricci MD

## 2023-01-13 ENCOUNTER — HOSPITAL ENCOUNTER (INPATIENT)
Dept: INTERVENTIONAL RADIOLOGY/VASCULAR | Age: 68
Discharge: HOME | DRG: 853 | End: 2023-01-13
Attending: INTERNAL MEDICINE
Payer: COMMERCIAL

## 2023-01-13 LAB
ANION GAP SERPL CALC-SCNC: 3 MMOL/L (ref 3–18)
BASOPHILS # BLD: 0 K/UL (ref 0–0.1)
BASOPHILS NFR BLD: 0 % (ref 0–2)
BUN SERPL-MCNC: 7 MG/DL (ref 7–18)
BUN/CREAT SERPL: 16 (ref 12–20)
CALCIUM SERPL-MCNC: 8.3 MG/DL (ref 8.5–10.1)
CHLORIDE SERPL-SCNC: 117 MMOL/L (ref 100–111)
CO2 SERPL-SCNC: 27 MMOL/L (ref 21–32)
CREAT SERPL-MCNC: 0.43 MG/DL (ref 0.6–1.3)
DIFFERENTIAL METHOD BLD: ABNORMAL
EOSINOPHIL # BLD: 0.3 K/UL (ref 0–0.4)
EOSINOPHIL NFR BLD: 3 % (ref 0–5)
ERYTHROCYTE [DISTWIDTH] IN BLOOD BY AUTOMATED COUNT: 16.1 % (ref 11.6–14.5)
GLUCOSE BLD STRIP.AUTO-MCNC: 103 MG/DL (ref 70–110)
GLUCOSE BLD STRIP.AUTO-MCNC: 121 MG/DL (ref 70–110)
GLUCOSE SERPL-MCNC: 95 MG/DL (ref 74–99)
HCT VFR BLD AUTO: 29 % (ref 36–48)
HGB BLD-MCNC: 9.1 G/DL (ref 13–16)
IMM GRANULOCYTES # BLD AUTO: 0.1 K/UL (ref 0–0.04)
IMM GRANULOCYTES NFR BLD AUTO: 1 % (ref 0–0.5)
LYMPHOCYTES # BLD: 1.4 K/UL (ref 0.9–3.6)
LYMPHOCYTES NFR BLD: 13 % (ref 21–52)
MAGNESIUM SERPL-MCNC: 1.9 MG/DL (ref 1.6–2.6)
MCH RBC QN AUTO: 27.7 PG (ref 24–34)
MCHC RBC AUTO-ENTMCNC: 31.4 G/DL (ref 31–37)
MCV RBC AUTO: 88.1 FL (ref 78–100)
MONOCYTES # BLD: 0.5 K/UL (ref 0.05–1.2)
MONOCYTES NFR BLD: 4 % (ref 3–10)
NEUTS SEG # BLD: 8.7 K/UL (ref 1.8–8)
NEUTS SEG NFR BLD: 79 % (ref 40–73)
NRBC # BLD: 0 K/UL (ref 0–0.01)
NRBC BLD-RTO: 0 PER 100 WBC
PHOSPHATE SERPL-MCNC: 2.4 MG/DL (ref 2.5–4.9)
PLATELET # BLD AUTO: 331 K/UL (ref 135–420)
PMV BLD AUTO: 10.8 FL (ref 9.2–11.8)
POTASSIUM SERPL-SCNC: 4.3 MMOL/L (ref 3.5–5.5)
RBC # BLD AUTO: 3.29 M/UL (ref 4.35–5.65)
SODIUM SERPL-SCNC: 147 MMOL/L (ref 136–145)
WBC # BLD AUTO: 11 K/UL (ref 4.6–13.2)

## 2023-01-13 PROCEDURE — 80048 BASIC METABOLIC PNL TOTAL CA: CPT

## 2023-01-13 PROCEDURE — 83735 ASSAY OF MAGNESIUM: CPT

## 2023-01-13 PROCEDURE — 74011250636 HC RX REV CODE- 250/636: Performed by: INTERNAL MEDICINE

## 2023-01-13 PROCEDURE — 02HV33Z INSERTION OF INFUSION DEVICE INTO SUPERIOR VENA CAVA, PERCUTANEOUS APPROACH: ICD-10-PCS | Performed by: STUDENT IN AN ORGANIZED HEALTH CARE EDUCATION/TRAINING PROGRAM

## 2023-01-13 PROCEDURE — B548ZZA ULTRASONOGRAPHY OF SUPERIOR VENA CAVA, GUIDANCE: ICD-10-PCS | Performed by: STUDENT IN AN ORGANIZED HEALTH CARE EDUCATION/TRAINING PROGRAM

## 2023-01-13 PROCEDURE — 82962 GLUCOSE BLOOD TEST: CPT

## 2023-01-13 PROCEDURE — 74011000250 HC RX REV CODE- 250: Performed by: INTERNAL MEDICINE

## 2023-01-13 PROCEDURE — 36415 COLL VENOUS BLD VENIPUNCTURE: CPT

## 2023-01-13 PROCEDURE — 94762 N-INVAS EAR/PLS OXIMTRY CONT: CPT

## 2023-01-13 PROCEDURE — 74011000258 HC RX REV CODE- 258: Performed by: PHYSICIAN ASSISTANT

## 2023-01-13 PROCEDURE — 74011250636 HC RX REV CODE- 250/636

## 2023-01-13 PROCEDURE — 74011000258 HC RX REV CODE- 258: Performed by: INTERNAL MEDICINE

## 2023-01-13 PROCEDURE — 2709999900 HC NON-CHARGEABLE SUPPLY

## 2023-01-13 PROCEDURE — 74011250637 HC RX REV CODE- 250/637: Performed by: INTERNAL MEDICINE

## 2023-01-13 PROCEDURE — 85025 COMPLETE CBC W/AUTO DIFF WBC: CPT

## 2023-01-13 PROCEDURE — 74011250637 HC RX REV CODE- 250/637

## 2023-01-13 PROCEDURE — B5181ZA FLUOROSCOPY OF SUPERIOR VENA CAVA USING LOW OSMOLAR CONTRAST, GUIDANCE: ICD-10-PCS | Performed by: STUDENT IN AN ORGANIZED HEALTH CARE EDUCATION/TRAINING PROGRAM

## 2023-01-13 PROCEDURE — 74011000250 HC RX REV CODE- 250

## 2023-01-13 PROCEDURE — 84100 ASSAY OF PHOSPHORUS: CPT

## 2023-01-13 PROCEDURE — 74011250636 HC RX REV CODE- 250/636: Performed by: PHYSICIAN ASSISTANT

## 2023-01-13 PROCEDURE — 65660000004 HC RM CVT STEPDOWN

## 2023-01-13 PROCEDURE — 77001 FLUOROGUIDE FOR VEIN DEVICE: CPT

## 2023-01-13 PROCEDURE — 99232 SBSQ HOSP IP/OBS MODERATE 35: CPT | Performed by: INTERNAL MEDICINE

## 2023-01-13 RX ADMIN — PIPERACILLIN SODIUM AND TAZOBACTAM SODIUM 3.38 G: 3; .375 INJECTION, POWDER, LYOPHILIZED, FOR SOLUTION INTRAVENOUS at 01:02

## 2023-01-13 RX ADMIN — CYANOCOBALAMIN TAB 1000 MCG 500 MCG: 1000 TAB at 12:50

## 2023-01-13 RX ADMIN — Medication 125 MG: at 18:26

## 2023-01-13 RX ADMIN — HEPARIN SODIUM 5000 UNITS: 5000 INJECTION INTRAVENOUS; SUBCUTANEOUS at 22:23

## 2023-01-13 RX ADMIN — POTASSIUM CHLORIDE 20 MEQ: 1500 TABLET, EXTENDED RELEASE ORAL at 12:51

## 2023-01-13 RX ADMIN — SODIUM CHLORIDE, PRESERVATIVE FREE 10 ML: 5 INJECTION INTRAVENOUS at 06:00

## 2023-01-13 RX ADMIN — SODIUM CHLORIDE, PRESERVATIVE FREE 10 ML: 5 INJECTION INTRAVENOUS at 22:00

## 2023-01-13 RX ADMIN — DEXTROSE MONOHYDRATE AND POTASSIUM CHLORIDE INJECTION, SOLUTION: 5; .149 INJECTION, SOLUTION INTRAVENOUS at 05:36

## 2023-01-13 RX ADMIN — ACETAMINOPHEN 650 MG: 325 TABLET, FILM COATED ORAL at 00:42

## 2023-01-13 RX ADMIN — PIPERACILLIN SODIUM AND TAZOBACTAM SODIUM 3.38 G: 3; .375 INJECTION, POWDER, LYOPHILIZED, FOR SOLUTION INTRAVENOUS at 18:25

## 2023-01-13 RX ADMIN — Medication 125 MG: at 12:51

## 2023-01-13 RX ADMIN — Medication 125 MG: at 00:42

## 2023-01-13 RX ADMIN — LEVETIRACETAM 500 MG: 500 INJECTION, SOLUTION INTRAVENOUS at 00:42

## 2023-01-13 RX ADMIN — Medication 125 MG: at 05:36

## 2023-01-13 NOTE — ROUTINE PROCESS
MD notified of PIV infiltration and subsequent IV removal; PICC Vascular access team ordered this am for IV Abx administration; MD notified of missed IV medications this am.

## 2023-01-13 NOTE — PROGRESS NOTES
Infectious Disease progress Note        Reason: Sepsis    Current abx Prior abx   Zosyn since 1/4/2023  Po vancomycin since 1/7/23 levofloxacin, vancomycin 1/4-1/9     Lines:       Assessment :   79 y.o. male with hx of dementia, COVID, HLD, HTN, chronic hyponatremia, CVA, seizures who presented to ED on 1/4/2023 for altered mental status. Hospitalization [facility] July 2017 for right foot cellulitis, infected right foot ulcer secondary to Proteus, citrobacter, e.coli, mssa, e.fecalis, e.avium, strep viridans. Surgical wound cultures 7/11 : proteus,citrobacter   S/p I&D of right foot ulcer on 7/11- intra op findings noted - no evidence of bone infection. Hospitalization [facility] October 2022 for sepsis, acute hypoxic respiratory failure-present on admission due to aspiration pneumonia superimposed on recent COVID-19 infection     Now with leukocytosis, bandemia, hypotension requiring pressors on admission, CT scan 1/5/2023 with air noted in the posterior wall of anus/rectum, posterior cortex of the sacrum/coccyx, bacteremia    Clinical presentation consistent with septic shock-present on admission due to polymicrobial bloodstream infection -( blood cx 1/4/23 positive for e.coli, proteus, bacteroides, gram positive cocci, negative blood cx 1/8), necrotizing infection of the sacrum/acute on chronic sacral osteomyelitis, c.diff infection    Most likely source of polymicrobial bloodstream infection is necrotizing infection of sacral ulcer  Status post I&D on 1/6/2023. Intra-Op findings noted. Surgery help appreciated  Preop wound cultures 1/6-mixed enteric larisa  Intra-Op cultures-mixed enteric larisa, e.coli, Enterococcus    Stool c.diff positive- likely suggestive of evolving c.diff infection. Acute kidney injury-likely due to sepsis    Leakage around PEG tube-plans for IR evaluation noted    Recommendations:     1. Continue Zosyn till 2/21/23. continue oral vancomycin while on abx  2.   Follow-up surgery recommendations regarding wound care sacral ulcer  4. Management of acute kidney injury per primary team  5. Await palliative care recommendations regarding address goals of care  6. Will place picc line for outpatient iv abx once discharge plans finalized. D/w dr Ketty Bajwa. Please call me if any further questions or concerns. Will continue to participate in the care of this patient. HPI:    Unable to communicate effectively    Past Medical History:   Diagnosis Date    Alcohol dependence with alcohol-induced persisting dementia ([address]     per [facility] 4/23/21    Anemia     Aphasia     Benign prostatic hyperplasia     Cerebral vascular disease     COVID-19 04/06/2021    Disturbances of salivary secretion     Dysphagia     GERD (gastroesophageal reflux disease)     Hemiparesis (HCC)     left side    Hemiplegia (HCC)     left side     Hiccups     Hyperlipidemia     Hypernatremia 03/2021    Hypertension     Hyponatremia     Insomnia     Lacunar infarction ([address] 2010    weakness both arms, unable to walk    Lower extremity edema     Moderate protein-calorie malnutrition (HCC)     MRSA (methicillin resistant Staphylococcus aureus)     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)     Pneumonia     Psychogenic polydipsia     PVD (peripheral vascular disease) ([address]     Seizures ([address]     Spinal stenosis     Stroke ([address] 2017    Unspecified convulsions ([address]     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   Qty: 60 Tablet, Refills: 0 baclofen (LIORESAL) 10 mg tablet 10 mg by Per G Tube route two (2) times daily as needed for Muscle Spasm(s). 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 Indications: prevention of vitamin B12 deficiency      ferrous sulfate (FerrouSuL) 325 mg (65 mg iron) tablet Take  by mouth Daily (before breakfast). metoprolol tartrate (LOPRESSOR) 25 mg tablet 25 mg by Per G Tube route two (2) times a day. thiamine HCL (B-1) 100 mg tablet 1 Tablet by Per G Tube route daily. Qty: 30 Tablet, Refills: 0      levETIRAcetam (KEPPRA) 100 mg/mL solution 7.5 mL by Per G Tube route two (2) times a day. Qty: 473 mL, Refills: 0      pravastatin (PRAVACHOL) 40 mg tablet Take 1 Tab by mouth nightly. Qty: 30 Tab, Refills: 1      ascorbic acid, vitamin C, (VITAMIN C) 250 mg tablet Take 1 Tab by mouth daily. Qty: 30 Tab, Refills: 2      polyethylene glycol (MIRALAX) 17 gram packet Take 1 Packet by mouth daily. Qty: 30 Packet, Refills: 2      tamsulosin (FLOMAX) 0.4 mg capsule Take 0.4 mg by mouth daily. 1 tab daily      MULTIVITAMIN,THERAPEUTIC (THERA MULTI-VITAMIN PO) Take 500 mg by mouth daily. folic acid (FOLVITE) 1 mg tablet Take 1 Tab by mouth daily.   Qty: 30 Tab, Refills: 1             Current Facility-Administered Medications   Medication Dose Route Frequency    alum-mag hydroxide-simeth (MYLANTA) oral suspension 30 mL  30 mL Oral Q4H PRN    potassium chloride (K-DUR, KLOR-CON M20) SR tablet 20 mEq  20 mEq Oral DAILY    albuterol-ipratropium (DUO-NEB) 2.5 MG-0.5 MG/3 ML  3 mL Nebulization Q4H PRN    levETIRAcetam (KEPPRA) 500 mg in 0.9% sodium chloride (MBP/ADV) 100 mL MBP  500 mg IntraVENous Q12H    0.9% sodium chloride infusion 250 mL  250 mL IntraVENous PRN    dextrose 5% with KCl 20 mEq/L infusion   IntraVENous CONTINUOUS    0.9% sodium chloride infusion 250 mL  250 mL IntraVENous PRN    vancomycin 50 mg/mL oral solution (compounded) 125 mg  125 mg Per G Tube Q6H    cyanocobalamin tablet 500 mcg  500 mcg Per G Tube DAILY    pantoprazole (PROTONIX) 40 mg in 0.9% sodium chloride 10 mL injection  40 mg IntraVENous DAILY    sodium chloride (NS) flush 5-40 mL  5-40 mL IntraVENous Q8H    sodium chloride (NS) flush 5-40 mL  5-40 mL IntraVENous PRN    acetaminophen (TYLENOL) tablet 650 mg  650 mg Oral Q6H PRN    Or    acetaminophen (TYLENOL) suppository 650 mg  650 mg Rectal Q6H PRN    ondansetron (ZOFRAN ODT) tablet 4 mg  4 mg Oral Q8H PRN    Or    ondansetron (ZOFRAN) injection 4 mg  4 mg IntraVENous Q6H PRN    [Held by provider] heparin (porcine) injection 5,000 Units  5,000 Units SubCUTAneous Q8H    insulin lispro (HUMALOG) injection   SubCUTAneous Q6H    glucose chewable tablet 16 g  4 Tablet Oral PRN    glucagon (GLUCAGEN) injection 1 mg  1 mg IntraMUSCular PRN    dextrose 10% infusion 0-250 mL  0-250 mL IntraVENous PRN    midodrine (PROAMATINE) tablet 5 mg  5 mg Per G Tube Q8H PRN    sodium chloride (NS) flush 5-10 mL  5-10 mL IntraVENous PRN    piperacillin-tazobactam (ZOSYN) 3.375 g in 0.9% sodium chloride (MBP/ADV) 100 mL MBP  3.375 g IntraVENous Q8H       Allergies: Patient has no known allergies.     Family History   Problem Relation Age of Onset    Hypertension Other      Social History     Socioeconomic History    Marital status:      Spouse name: Not on file    Number of children: Not on file    Years of education: Not on file    Highest education level: Not on file   Occupational History    Not on file   Tobacco Use    Smoking status: Former     Packs/day: 0.50     Types: Cigarettes     Quit date: 2008     Years since quittin.5    Smokeless tobacco: Never   Substance and Sexual Activity    Alcohol use: No    Drug use: No    Sexual activity: Not Currently   Other Topics Concern    Not on file   Social History Narrative    Not on file     Social Determinants of Health     Financial Resource Strain: Not on file   Food Insecurity: Not on file   Transportation Needs: Not on file   Physical Activity: Not on file   Stress: Not on file   Social Connections: Not on file   Intimate Partner Violence: Not on file   Housing Stability: Not on file     Social History     Tobacco Use   Smoking Status Former    Packs/day: 0.50    Types: Cigarettes    Quit date: 2008    Years since quittin.5   Smokeless Tobacco Never        Temp (24hrs), Av.1 °F (36.7 °C), Min:96.8 °F (36 °C), Max:98.9 °F (37.2 °C)    Visit Vitals  BP (!) 157/92 (BP 1 Location: Left upper arm, BP Patient Position: Lying left side;Supine; At rest)   Pulse (!) 109   Temp 98.9 °F (37.2 °C)   Resp 16   Ht 5' 9\" (1.753 m)   Wt 76.6 kg (168 lb 14 oz)   SpO2 100%   BMI 24.94 kg/m²       ROS: Unable to obtain due to patient factors    Physical Exam:    General:  male patient laying on the bed, eyes open, alert, mumbles some words. Head:  Normocephalic, without obvious abnormality, atraumatic. Eyes:  Conjunctivae/corneas clear. Nose: Nares normal.  No drainage or sinus tenderness. Throat: Lips, mucosa, and tongue normal. Teeth and gums of poor dentition, lips dried and cracked/peeling    Neck: Supple, symmetrical, trachea midline, no adenopathy, thyroid: no enlargment/tenderness/nodules, no carotid bruit and no JVD. Back:   Symmetric, no curvature. Lungs:   Coarse to auscultation bilaterally. Chest wall:  No tenderness or deformity. Heart:  S1, S2 normal, no  click, rub or gallop. Abdomen:   Abd flat, Soft, non-tender. Bowel sounds normal. No masses,  No organomegaly. Healed surgical scar in upper abdomen   Extremities: Extremities normal, atraumatic, no cyanosis or edema. Pulses: 2+ and symmetric all extremities.    Skin: BLE with ulcers per report covered with dressing, sacral wound dressing not opened   Lymph nodes:  Not examined   Neurologic: Grossly non-focal; non-verbal, does not follow commands      Labs: Results: Chemistry Recent Labs     01/12/23  0126 01/11/23  0310 01/10/23  0124   * 95 94   * 149* 148*   K 4.0 3.7 3.9   * 120* 120*   CO2 25 24 24   BUN 6* 5* 11   CREA 0.39* 0.47* 0.45*   CA 8.3* 8.4* 8.1*   AGAP 3 5 4   BUCR 15 11* 24*        CBC w/Diff Recent Labs     01/12/23  0126 01/11/23  0310 01/10/23  0124   WBC 10.4 12.9 11.7   RBC 3.30* 3.98* 3.88*   HGB 9.0* 11.1* 10.6*   HCT 28.6* 36.1 34.3*    263 249   GRANS 83* 84* 78*   LYMPH 10* 9* 13*   EOS 3 2 3        Microbiology No results for input(s): CULT in the last 72 hours. RADIOLOGY:    All available imaging studies/reports in connect care for this admission were reviewed        Disclaimer: Sections of this note are dictated utilizing voice recognition software, which may have resulted in some phonetic based errors in grammar and contents. Even though attempts were made to correct all the mistakes, some may have been missed, and remained in the body of the document. If questions arise, please contact our department.     Dr. Akil Coleman, Infectious Disease Specialist  [phone]  January 12, 2023  5:16 PM

## 2023-01-13 NOTE — PROGRESS NOTES
[facility] Hospitalist Group  Progress Note    Patient: Early Furrow Age: 79 y.o. : 1955 MR#: [id number] SSN: [id number]  Date/Time: 2023     Subjective:   Patient unable to communicate. Shakes his head no when asked about pain. Likely discharge on . 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 difficile positive  PEG tube in place     Infectious disease currently following  Patient currently on oral vancomycin and Zosyn  Likely PICC tomorrow and possible dc  Follow-up surgery recommendations regarding wound care sacral ulcer  Patient was on Eliquis which has been on hold due to anemia  IR adjusted PEG tube placement after it came loose  Nephrology following for hypokalemia, hypernatremia, hyper osmolality. IV fluids per nephrology  Patient currently on [facility]  Palliative care team following  Awaiting [facility] at [facility]     I spent 40 minutes with the patient in face-to-face consultation, of which greater than 50% was spent in counseling and coordination of care as described above. Case discussed with:  [x]Patient  []Family  []Nursing  []Case Management  DVT Prophylaxis:  []Lovenox  []Hep SQ  [x]SCDs  []Coumadin   []Eliquis/Xarelto     Objective:   VS: Visit Vitals  BP (!) 157/92 (BP 1 Location: Left upper arm, BP Patient Position: Lying left side;Supine; At rest)   Pulse (!) 109   Temp 98.9 °F (37.2 °C)   Resp 16   Ht 5' 9\" (1.753 m)   Wt 76.6 kg (168 lb 14 oz)   SpO2 100%   BMI 24.94 kg/m²      Tmax/24hrs: Temp (24hrs), Av.1 °F (36.7 °C), Min:96.8 °F (36 °C), Max:98.9 °F (37.2 °C)  IOBRIEF  Intake/Output Summary (Last 24 hours) at 2023 2333  Last data filed at 2023 0701  Gross per 24 hour   Intake 3460 ml   Output 3925 ml   Net -465 ml       General:  Alert, cooperative, no acute distress    HEENT: PERRLA, anicteric sclerae. Poor dentition noted  Pulmonary:  CTA Bilaterally. No Wheezing/Rales. Cardiovascular: Regular rate and Rhythm. GI:  Soft, Non distended, Non tender. + Bowel sounds. Extremities:  No edema. No calf tenderness. Bilateral lower extremities with ulcers covered with dressings. Sacral ulcer with dressing. Psych: Good insight. Not anxious or agitated. Neurologic: Alert and oriented X 3. Moves all ext.   Additional:    Medications:   Current Facility-Administered Medications   Medication Dose Route Frequency    alum-mag hydroxide-simeth (MYLANTA) oral suspension 30 mL  30 mL Oral Q4H PRN    potassium chloride (K-DUR, KLOR-CON M20) SR tablet 20 mEq  20 mEq Oral DAILY    albuterol-ipratropium (DUO-NEB) 2.5 MG-0.5 MG/3 ML  3 mL Nebulization Q4H PRN    levETIRAcetam (KEPPRA) 500 mg in 0.9% sodium chloride (MBP/ADV) 100 mL MBP  500 mg IntraVENous Q12H    0.9% sodium chloride infusion 250 mL  250 mL IntraVENous PRN    dextrose 5% with KCl 20 mEq/L infusion   IntraVENous CONTINUOUS    0.9% sodium chloride infusion 250 mL  250 mL IntraVENous PRN    vancomycin 50 mg/mL oral solution (compounded) 125 mg  125 mg Per G Tube Q6H    cyanocobalamin tablet 500 mcg  500 mcg Per G Tube DAILY    pantoprazole (PROTONIX) 40 mg in 0.9% sodium chloride 10 mL injection  40 mg IntraVENous DAILY    sodium chloride (NS) flush 5-40 mL  5-40 mL IntraVENous Q8H    sodium chloride (NS) flush 5-40 mL  5-40 mL IntraVENous PRN    acetaminophen (TYLENOL) tablet 650 mg  650 mg Oral Q6H PRN    Or    acetaminophen (TYLENOL) suppository 650 mg  650 mg Rectal Q6H PRN    ondansetron (ZOFRAN ODT) tablet 4 mg  4 mg Oral Q8H PRN    Or    ondansetron (ZOFRAN) injection 4 mg  4 mg IntraVENous Q6H PRN    [Held by provider] heparin (porcine) injection 5,000 Units  5,000 Units SubCUTAneous Q8H    insulin lispro (HUMALOG) injection   SubCUTAneous Q6H    glucose chewable tablet 16 g  4 Tablet Oral PRN    glucagon (GLUCAGEN) injection 1 mg  1 mg IntraMUSCular PRN    dextrose 10% infusion 0-250 mL  0-250 mL IntraVENous PRN    midodrine (PROAMATINE) tablet 5 mg  5 mg Per G Tube Q8H PRN    sodium chloride (NS) flush 5-10 mL  5-10 mL IntraVENous PRN    piperacillin-tazobactam (ZOSYN) 3.375 g in 0.9% sodium chloride (MBP/ADV) 100 mL MBP  3.375 g IntraVENous Q8H       Labs:    Recent Results (from the past 24 hour(s))   GLUCOSE, POC    Collection Time: 01/11/23 11:51 PM   Result Value Ref Range    Glucose (POC) 109 70 - 557 mg/dL   METABOLIC PANEL, BASIC    Collection Time: 01/12/23  1:26 AM   Result Value Ref Range    Sodium 146 (H) 136 - 145 mmol/L    Potassium 4.0 3.5 - 5.5 mmol/L    Chloride 118 (H) 100 - 111 mmol/L    CO2 25 21 - 32 mmol/L    Anion gap 3 3.0 - 18 mmol/L    Glucose 121 (H) 74 - 99 mg/dL    BUN 6 (L) 7.0 - 18 MG/DL    Creatinine 0.39 (L) 0.6 - 1.3 MG/DL    BUN/Creatinine ratio 15 12 - 20      eGFR >60 >60 ml/min/1.73m2    Calcium 8.3 (L) 8.5 - 10.1 MG/DL   MAGNESIUM    Collection Time: 01/12/23  1:26 AM   Result Value Ref Range    Magnesium 1.9 1.6 - 2.6 mg/dL   PHOSPHORUS    Collection Time: 01/12/23  1:26 AM   Result Value Ref Range    Phosphorus 2.0 (L) 2.5 - 4.9 MG/DL   CBC WITH AUTOMATED DIFF    Collection Time: 01/12/23  1:26 AM   Result Value Ref Range    WBC 10.4 4.6 - 13.2 K/uL    RBC 3.30 (L) 4.35 - 5.65 M/uL    HGB 9.0 (L) 13.0 - 16.0 g/dL    HCT 28.6 (L) 36.0 - 48.0 %    MCV 86.7 78.0 - 100.0 FL    MCH 27.3 24.0 - 34.0 PG    MCHC 31.5 31.0 - 37.0 g/dL    RDW 16.0 (H) 11.6 - 14.5 %    PLATELET 975 988 - 569 K/uL    MPV 11.1 9.2 - 11.8 FL    NRBC 0.0 0  WBC    ABSOLUTE NRBC 0.00 0.00 - 0.01 K/uL    NEUTROPHILS 83 (H) 40 - 73 %    LYMPHOCYTES 10 (L) 21 - 52 %    MONOCYTES 4 3 - 10 %    EOSINOPHILS 3 0 - 5 %    BASOPHILS 0 0 - 2 %    IMMATURE GRANULOCYTES 1 (H) 0.0 - 0.5 %    ABS.  NEUTROPHILS 8.6 (H) 1.8 - 8.0 K/UL    ABS. LYMPHOCYTES 1.0 0.9 - 3.6 K/UL    ABS. MONOCYTES 0.4 0.05 - 1.2 K/UL    ABS. EOSINOPHILS 0.3 0.0 - 0.4 K/UL    ABS. BASOPHILS 0.0 0.0 - 0.1 K/UL    ABS. IMM. GRANS. 0.1 (H) 0.00 - 0.04 K/UL    DF AUTOMATED     GLUCOSE, POC    Collection Time: 01/12/23 11:23 AM   Result Value Ref Range    Glucose (POC) 98 70 - 110 mg/dL   GLUCOSE, POC    Collection Time: 01/12/23  5:51 PM   Result Value Ref Range    Glucose (POC) 117 (H) 70 - 110 mg/dL       Signed By: Florin Sullivan DO     January 12, 2023      Disclaimer: Sections of this note are dictated using utilizing voice recognition software. Minor typographical errors may be present. If questions arise, please do not hesitate to contact me or call our department.

## 2023-01-13 NOTE — PROGRESS NOTES
[facility] Hospitalist Group  Progress Note    Patient: Trey Plaza Age: 79 y.o. : 1955 MR#: [id number] SSN: [id number]  Date/Time: 2023     Subjective:   Noted right upper extremity swelling and tenderness around the [facility] site. Review of systems  Unable to state  Assessment/Plan:     Septic shock  Hyperosmolality and hypernatremia  Acute metabolic encephalopathy  Alcoholic dementia  Stage IV skin ulcer of the sacral region  Community-acquired pneumonia of the right lower lobe of lung  Acute postop anemia status post 2 units  C. difficile positive  PEG tube in place     Infectious disease currently following  Patient currently on oral vancomycin and Zosyn  PICC placement with IR  Follow-up surgery recommendations regarding wound care sacral ulcer  Patient was on Eliquis which has been on hold due to anemia, restart upon dc  IR adjusted PEG tube placement after it came loose  Nephrology following for hypokalemia, hypernatremia, hyper osmolality. IV fluids per nephrology  Patient currently on [facility]  Palliative care team following  Awaiting placement at [facility]     I spent 40 minutes with the patient in face-to-face consultation, of which greater than 50% was spent in counseling and coordination of care as described above.      Case discussed with:  [x]Patient  []Family  []Nursing  []Case Management  DVT Prophylaxis:  []Lovenox  []Hep SQ  [x]SCDs  []Coumadin   []Eliquis/Xarelto     Objective:   VS: Visit Vitals  BP (!) 149/94 (BP 1 Location: Left upper arm, BP Patient Position: At rest)   Pulse (!) 103   Temp 99.1 °F (37.3 °C)   Resp 18   Ht 5' 9\" (1.753 m)   Wt 76.5 kg (168 lb 10.4 oz)   SpO2 94%   BMI 24.91 kg/m²      Tmax/24hrs: Temp (24hrs), Av °F (37.2 °C), Min:98.8 °F (37.1 °C), Max:99.1 °F (37.3 °C)  IOBRIEF  Intake/Output Summary (Last 24 hours) at 2023 1656  Last data filed at 2023 0800  Gross per 24 hour   Intake 925 ml   Output 2040 ml Net -1115 ml       General:  Alert, cooperative, no acute distress    HEENT: PERRLA, anicteric sclerae. Poor dentition noted  Pulmonary:  CTA Bilaterally. No Wheezing/Rales. Cardiovascular: Regular rate and Rhythm. GI:  Soft, Non distended, Non tender. + Bowel sounds. Extremities:  No edema. No calf tenderness. Bilateral lower extremities with ulcers covered with dressings. Sacral ulcer with dressing. Right upper extremity swelling, tender  Psych: Good insight. Not anxious or agitated. Neurologic: Alert and oriented X 3. Moves all ext.   Additional:    Medications:   Current Facility-Administered Medications   Medication Dose Route Frequency    albuterol/ipratropium (DUONEB) neb solution  1 Dose Nebulization Q4H PRN    alum-mag hydroxide-simeth (MYLANTA) oral suspension 30 mL  30 mL Oral Q4H PRN    potassium chloride (K-DUR, KLOR-CON M20) SR tablet 20 mEq  20 mEq Oral DAILY    levETIRAcetam (KEPPRA) 500 mg in 0.9% sodium chloride (MBP/ADV) 100 mL MBP  500 mg IntraVENous Q12H    0.9% sodium chloride infusion 250 mL  250 mL IntraVENous PRN    dextrose 5% with KCl 20 mEq/L infusion   IntraVENous CONTINUOUS    0.9% sodium chloride infusion 250 mL  250 mL IntraVENous PRN    vancomycin 50 mg/mL oral solution (compounded) 125 mg  125 mg Per G Tube Q6H    cyanocobalamin tablet 500 mcg  500 mcg Per G Tube DAILY    pantoprazole (PROTONIX) 40 mg in 0.9% sodium chloride 10 mL injection  40 mg IntraVENous DAILY    sodium chloride (NS) flush 5-40 mL  5-40 mL IntraVENous Q8H    sodium chloride (NS) flush 5-40 mL  5-40 mL IntraVENous PRN    acetaminophen (TYLENOL) tablet 650 mg  650 mg Oral Q6H PRN    Or    acetaminophen (TYLENOL) suppository 650 mg  650 mg Rectal Q6H PRN    ondansetron (ZOFRAN ODT) tablet 4 mg  4 mg Oral Q8H PRN    Or    ondansetron (ZOFRAN) injection 4 mg  4 mg IntraVENous Q6H PRN    [Held by provider] heparin (porcine) injection 5,000 Units  5,000 Units SubCUTAneous Q8H    insulin lispro (HUMALOG) injection   SubCUTAneous Q6H    glucose chewable tablet 16 g  4 Tablet Oral PRN    glucagon (GLUCAGEN) injection 1 mg  1 mg IntraMUSCular PRN    dextrose 10% infusion 0-250 mL  0-250 mL IntraVENous PRN    midodrine (PROAMATINE) tablet 5 mg  5 mg Per G Tube Q8H PRN    sodium chloride (NS) flush 5-10 mL  5-10 mL IntraVENous PRN    piperacillin-tazobactam (ZOSYN) 3.375 g in 0.9% sodium chloride (MBP/ADV) 100 mL MBP  3.375 g IntraVENous Q8H       Labs:    Recent Results (from the past 24 hour(s))   GLUCOSE, POC    Collection Time: 01/12/23  5:51 PM   Result Value Ref Range    Glucose (POC) 117 (H) 70 - 110 mg/dL   GLUCOSE, POC    Collection Time: 01/13/23 12:18 AM   Result Value Ref Range    Glucose (POC) 121 (H) 70 - 912 mg/dL   METABOLIC PANEL, BASIC    Collection Time: 01/13/23  4:03 AM   Result Value Ref Range    Sodium 147 (H) 136 - 145 mmol/L    Potassium 4.3 3.5 - 5.5 mmol/L    Chloride 117 (H) 100 - 111 mmol/L    CO2 27 21 - 32 mmol/L    Anion gap 3 3.0 - 18 mmol/L    Glucose 95 74 - 99 mg/dL    BUN 7 7.0 - 18 MG/DL    Creatinine 0.43 (L) 0.6 - 1.3 MG/DL    BUN/Creatinine ratio 16 12 - 20      eGFR >60 >60 ml/min/1.73m2    Calcium 8.3 (L) 8.5 - 10.1 MG/DL   MAGNESIUM    Collection Time: 01/13/23  4:03 AM   Result Value Ref Range    Magnesium 1.9 1.6 - 2.6 mg/dL   PHOSPHORUS    Collection Time: 01/13/23  4:03 AM   Result Value Ref Range    Phosphorus 2.4 (L) 2.5 - 4.9 MG/DL   CBC WITH AUTOMATED DIFF    Collection Time: 01/13/23  4:03 AM   Result Value Ref Range    WBC 11.0 4.6 - 13.2 K/uL    RBC 3.29 (L) 4.35 - 5.65 M/uL    HGB 9.1 (L) 13.0 - 16.0 g/dL    HCT 29.0 (L) 36.0 - 48.0 %    MCV 88.1 78.0 - 100.0 FL    MCH 27.7 24.0 - 34.0 PG    MCHC 31.4 31.0 - 37.0 g/dL    RDW 16.1 (H) 11.6 - 14.5 %    PLATELET 872 474 - 909 K/uL    MPV 10.8 9.2 - 11.8 FL    NRBC 0.0 0  WBC    ABSOLUTE NRBC 0.00 0.00 - 0.01 K/uL    NEUTROPHILS 79 (H) 40 - 73 %    LYMPHOCYTES 13 (L) 21 - 52 %    MONOCYTES 4 3 - 10 % EOSINOPHILS 3 0 - 5 %    BASOPHILS 0 0 - 2 %    IMMATURE GRANULOCYTES 1 (H) 0.0 - 0.5 %    ABS. NEUTROPHILS 8.7 (H) 1.8 - 8.0 K/UL    ABS. LYMPHOCYTES 1.4 0.9 - 3.6 K/UL    ABS. MONOCYTES 0.5 0.05 - 1.2 K/UL    ABS. EOSINOPHILS 0.3 0.0 - 0.4 K/UL    ABS. BASOPHILS 0.0 0.0 - 0.1 K/UL    ABS. IMM. GRANS. 0.1 (H) 0.00 - 0.04 K/UL    DF AUTOMATED     GLUCOSE, POC    Collection Time: 01/13/23  5:41 AM   Result Value Ref Range    Glucose (POC) 103 70 - 110 mg/dL       Signed By: Rakesh He DO     January 13, 2023      Disclaimer: Sections of this note are dictated using utilizing voice recognition software. Minor typographical errors may be present. If questions arise, please do not hesitate to contact me or call our department.

## 2023-01-13 NOTE — PROGRESS NOTES
Rec'd order for PICC, patient not consentable, attempted to call son to get consent, didn't answer, unable to leave voicemail. Contacted 3N nurse manager Luis M Silva to make aware unable to reach son to obtain consent.

## 2023-01-13 NOTE — ROUTINE PROCESS
AM Report received from [facility] AT [facility], [address] [place] [address]; patient bedside report performed; no acute distress noted; patient alert, oriented to self, place. Telemetry reviewed; monitor in place Sinus Rhythm; denies pain; TF @ 50cc/hr.; HOB >30  Reid catheter in place; CHG bath performed.

## 2023-01-13 NOTE — PROGRESS NOTES
RN Shift Summary:    1900: Received report from Yuliet Ackerman RN  3934: Report given to Mala Kimble day shift RN

## 2023-01-13 NOTE — PROCEDURES
INTERVENTIONAL RADIOLOGY POST PICC LINE NOTE     January 13, 2023       5:22 PM     Preoperative Diagnosis:   IV Access    Postoperative Diagnosis:  Same. :  Yahir Bolton MD    Assistant:  Farhad Lewis PA-C. Type of Anesthesia:  1% local lidocaine    Procedure/Description: right basilic  upper extremity PICC Line. Findings:  right basilic 5 Setswana catheter placed. Tip at the SVC/RA junction. OK to use. Length 34 cm. Estimated blood Loss: Minimal    Specimen Removed: None    Drains: None     Complications:  None. Condition:  Stable.     Discharge Plan:  continue present therapy

## 2023-01-13 NOTE — PROGRESS NOTES
Received call from Angio regarding attempt to reach son for consent unsuccessful at this time. They will continue to try.

## 2023-01-14 LAB
ANION GAP SERPL CALC-SCNC: 4 MMOL/L (ref 3–18)
BACTERIA SPEC CULT: NORMAL
BASOPHILS # BLD: 0 K/UL (ref 0–0.1)
BASOPHILS NFR BLD: 0 % (ref 0–2)
BUN SERPL-MCNC: 8 MG/DL (ref 7–18)
BUN/CREAT SERPL: 17 (ref 12–20)
CALCIUM SERPL-MCNC: 8.7 MG/DL (ref 8.5–10.1)
CHLORIDE SERPL-SCNC: 112 MMOL/L (ref 100–111)
CO2 SERPL-SCNC: 26 MMOL/L (ref 21–32)
CREAT SERPL-MCNC: 0.46 MG/DL (ref 0.6–1.3)
DIFFERENTIAL METHOD BLD: ABNORMAL
EOSINOPHIL # BLD: 0.2 K/UL (ref 0–0.4)
EOSINOPHIL NFR BLD: 2 % (ref 0–5)
ERYTHROCYTE [DISTWIDTH] IN BLOOD BY AUTOMATED COUNT: 16.6 % (ref 11.6–14.5)
GLUCOSE BLD STRIP.AUTO-MCNC: 104 MG/DL (ref 70–110)
GLUCOSE BLD STRIP.AUTO-MCNC: 113 MG/DL (ref 70–110)
GLUCOSE BLD STRIP.AUTO-MCNC: 80 MG/DL (ref 70–110)
GLUCOSE BLD STRIP.AUTO-MCNC: 81 MG/DL (ref 70–110)
GLUCOSE BLD STRIP.AUTO-MCNC: 91 MG/DL (ref 70–110)
GLUCOSE SERPL-MCNC: 91 MG/DL (ref 74–99)
HCT VFR BLD AUTO: 32.9 % (ref 36–48)
HGB BLD-MCNC: 10 G/DL (ref 13–16)
IMM GRANULOCYTES # BLD AUTO: 0.1 K/UL (ref 0–0.04)
IMM GRANULOCYTES NFR BLD AUTO: 1 % (ref 0–0.5)
LYMPHOCYTES # BLD: 1 K/UL (ref 0.9–3.6)
LYMPHOCYTES NFR BLD: 10 % (ref 21–52)
MAGNESIUM SERPL-MCNC: 1.9 MG/DL (ref 1.6–2.6)
MCH RBC QN AUTO: 27.6 PG (ref 24–34)
MCHC RBC AUTO-ENTMCNC: 30.4 G/DL (ref 31–37)
MCV RBC AUTO: 90.9 FL (ref 78–100)
MONOCYTES # BLD: 0.4 K/UL (ref 0.05–1.2)
MONOCYTES NFR BLD: 4 % (ref 3–10)
NEUTS SEG # BLD: 8.6 K/UL (ref 1.8–8)
NEUTS SEG NFR BLD: 83 % (ref 40–73)
NRBC # BLD: 0 K/UL (ref 0–0.01)
NRBC BLD-RTO: 0 PER 100 WBC
PHOSPHATE SERPL-MCNC: 3.3 MG/DL (ref 2.5–4.9)
PLATELET # BLD AUTO: 270 K/UL (ref 135–420)
PLATELET COMMENTS,PCOM: ABNORMAL
PMV BLD AUTO: 12.4 FL (ref 9.2–11.8)
POTASSIUM SERPL-SCNC: 4.5 MMOL/L (ref 3.5–5.5)
RBC # BLD AUTO: 3.62 M/UL (ref 4.35–5.65)
RBC MORPH BLD: ABNORMAL
SERVICE CMNT-IMP: NORMAL
SODIUM SERPL-SCNC: 142 MMOL/L (ref 136–145)
WBC # BLD AUTO: 10.3 K/UL (ref 4.6–13.2)

## 2023-01-14 PROCEDURE — 74011250637 HC RX REV CODE- 250/637: Performed by: INTERNAL MEDICINE

## 2023-01-14 PROCEDURE — 83735 ASSAY OF MAGNESIUM: CPT

## 2023-01-14 PROCEDURE — 65660000004 HC RM CVT STEPDOWN

## 2023-01-14 PROCEDURE — 74011250636 HC RX REV CODE- 250/636: Performed by: INTERNAL MEDICINE

## 2023-01-14 PROCEDURE — 74011000258 HC RX REV CODE- 258: Performed by: INTERNAL MEDICINE

## 2023-01-14 PROCEDURE — 74011000250 HC RX REV CODE- 250: Performed by: INTERNAL MEDICINE

## 2023-01-14 PROCEDURE — 82962 GLUCOSE BLOOD TEST: CPT

## 2023-01-14 PROCEDURE — 74011000258 HC RX REV CODE- 258: Performed by: PHYSICIAN ASSISTANT

## 2023-01-14 PROCEDURE — 80048 BASIC METABOLIC PNL TOTAL CA: CPT

## 2023-01-14 PROCEDURE — 74011250636 HC RX REV CODE- 250/636: Performed by: PHYSICIAN ASSISTANT

## 2023-01-14 PROCEDURE — 99232 SBSQ HOSP IP/OBS MODERATE 35: CPT | Performed by: INTERNAL MEDICINE

## 2023-01-14 PROCEDURE — 84100 ASSAY OF PHOSPHORUS: CPT

## 2023-01-14 PROCEDURE — 85025 COMPLETE CBC W/AUTO DIFF WBC: CPT

## 2023-01-14 PROCEDURE — 74011000250 HC RX REV CODE- 250

## 2023-01-14 PROCEDURE — 74011250636 HC RX REV CODE- 250/636

## 2023-01-14 PROCEDURE — C9113 INJ PANTOPRAZOLE SODIUM, VIA: HCPCS | Performed by: INTERNAL MEDICINE

## 2023-01-14 PROCEDURE — 94762 N-INVAS EAR/PLS OXIMTRY CONT: CPT

## 2023-01-14 PROCEDURE — 36415 COLL VENOUS BLD VENIPUNCTURE: CPT

## 2023-01-14 RX ADMIN — PIPERACILLIN SODIUM AND TAZOBACTAM SODIUM 3.38 G: 3; .375 INJECTION, POWDER, LYOPHILIZED, FOR SOLUTION INTRAVENOUS at 10:28

## 2023-01-14 RX ADMIN — SODIUM CHLORIDE, PRESERVATIVE FREE 10 ML: 5 INJECTION INTRAVENOUS at 14:00

## 2023-01-14 RX ADMIN — Medication 125 MG: at 17:35

## 2023-01-14 RX ADMIN — HEPARIN SODIUM 5000 UNITS: 5000 INJECTION INTRAVENOUS; SUBCUTANEOUS at 06:41

## 2023-01-14 RX ADMIN — PIPERACILLIN SODIUM AND TAZOBACTAM SODIUM 3.38 G: 3; .375 INJECTION, POWDER, LYOPHILIZED, FOR SOLUTION INTRAVENOUS at 17:35

## 2023-01-14 RX ADMIN — PANTOPRAZOLE SODIUM 40 MG: 40 INJECTION, POWDER, FOR SOLUTION INTRAVENOUS at 10:28

## 2023-01-14 RX ADMIN — LEVETIRACETAM 500 MG: 500 INJECTION, SOLUTION INTRAVENOUS at 23:27

## 2023-01-14 RX ADMIN — HEPARIN SODIUM 5000 UNITS: 5000 INJECTION INTRAVENOUS; SUBCUTANEOUS at 23:17

## 2023-01-14 RX ADMIN — Medication 125 MG: at 00:38

## 2023-01-14 RX ADMIN — SODIUM CHLORIDE, PRESERVATIVE FREE 10 ML: 5 INJECTION INTRAVENOUS at 06:00

## 2023-01-14 RX ADMIN — HEPARIN SODIUM 5000 UNITS: 5000 INJECTION INTRAVENOUS; SUBCUTANEOUS at 13:30

## 2023-01-14 RX ADMIN — LEVETIRACETAM 500 MG: 500 INJECTION, SOLUTION INTRAVENOUS at 13:30

## 2023-01-14 RX ADMIN — LEVETIRACETAM 500 MG: 500 INJECTION, SOLUTION INTRAVENOUS at 00:39

## 2023-01-14 RX ADMIN — CYANOCOBALAMIN TAB 1000 MCG 500 MCG: 1000 TAB at 10:29

## 2023-01-14 RX ADMIN — Medication 125 MG: at 13:30

## 2023-01-14 RX ADMIN — PIPERACILLIN SODIUM AND TAZOBACTAM SODIUM 3.38 G: 3; .375 INJECTION, POWDER, LYOPHILIZED, FOR SOLUTION INTRAVENOUS at 01:18

## 2023-01-14 RX ADMIN — Medication 125 MG: at 06:41

## 2023-01-14 RX ADMIN — POTASSIUM CHLORIDE 20 MEQ: 1500 TABLET, EXTENDED RELEASE ORAL at 10:29

## 2023-01-14 RX ADMIN — Medication 125 MG: at 23:16

## 2023-01-14 NOTE — PROGRESS NOTES
Progress Note    Alise Claros  79 y.o. Admit Date: 1/4/2023  Principal Problem:    Septic shock ([facility] (1/6/2023) POA: Yes    Active Problems:    Hypokalemia (6/22/2013) POA: Yes      Hyperosmolality and hypernatremia (3/30/2021) POA: Yes      Acute metabolic encephalopathy (3/26/0021) POA: Yes      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[facility] (23/1/6053) POA: Yes      Hypernatremia (11/25/2022) POA: Yes      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 ([facility] (1/6/2023) POA: Yes      Community acquired pneumonia of right lower lobe of lung (1/6/2023) POA: Yes            Subjective:     Seen earlier, off IVF,getting PEG feeding & Free water 180 cc q 4 hour through PEG tube. A comprehensive review of systems was negative except for that written in the History of Present Illness. Objective:     Visit Vitals  BP (!) 147/91 (BP 1 Location: Left upper arm, BP Patient Position: Lying;Supine; At rest)   Pulse (!) 108   Temp 99.2 °F (37.3 °C)   Resp 18   Ht 5' 9\" (1.753 m)   Wt 76.5 kg (168 lb 10.4 oz)   SpO2 99%   BMI 24.91 kg/m²         Intake/Output Summary (Last 24 hours) at 1/13/2023 1958  Last data filed at 1/13/2023 0800  Gross per 24 hour   Intake 700 ml   Output 1040 ml   Net -340 ml       Current Facility-Administered Medications   Medication Dose Route Frequency Provider Last Rate Last Admin    albuterol/ipratropium (DUONEB) neb solution  1 Dose Nebulization Q4H PRN Shreya Presley MD        alum-mag hydroxide-simeth (MYLANTA) oral suspension 30 mL  30 mL Oral Q4H PRN Shyla Coker MD   30 mL at 01/12/23 0100    potassium chloride (K-DUR, KLOR-CON M20) SR tablet 20 mEq  20 mEq Oral DAILY Dianne Ervin MD   20 mEq at 01/13/23 1251    levETIRAcetam (KEPPRA) 500 mg in 0.9% sodium chloride (MBP/ADV) 100 mL MBP  500 mg IntraVENous Q12H Shyla Coker  mL/hr at 01/13/23 0042 500 mg at 01/13/23 0042    0.9% sodium chloride infusion 250 mL  250 mL IntraVENous PRN Rocio Tristan,         dextrose 5% with KCl 20 mEq/L infusion   IntraVENous CONTINUOUS Jaye Campos  mL/hr at 01/13/23 0536 New Bag at 01/13/23 0536    0.9% sodium chloride infusion 250 mL  250 mL IntraVENous PRN Barrie Arthur MD        vancomycin 50 mg/mL oral solution (compounded) 125 mg  125 mg Per G Tube Q6H Barrie Arthur MD   125 mg at 01/13/23 1826    cyanocobalamin tablet 500 mcg  500 mcg Per G Tube DAILY Barrie Arthur MD   500 mcg at 01/13/23 1250    pantoprazole (PROTONIX) 40 mg in 0.9% sodium chloride 10 mL injection  40 mg IntraVENous DAILY Barrie Arthur MD   40 mg at 01/12/23 1104    sodium chloride (NS) flush 5-40 mL  5-40 mL IntraVENous N4W Sil Hawkins MD   10 mL at 01/13/23 0600    sodium chloride (NS) flush 5-40 mL  5-40 mL IntraVENous PRN Sil Hawkins MD        acetaminophen (TYLENOL) tablet 650 mg  650 mg Oral Q6H PRN Sil Hawkins MD   259 mg at 01/13/23 0042    Or    acetaminophen (TYLENOL) suppository 650 mg  650 mg Rectal Q6H PRN Sil Hawkins MD        ondansetron (ZOFRAN ODT) tablet 4 mg  4 mg Oral Q8H PRN Sil Hawkins MD        Or    ondansetron (ZOFRAN) injection 4 mg  4 mg IntraVENous Q6H PRN Sil Hawkins MD        heparin (porcine) injection 5,000 Units  5,000 Units SubCUTAneous JENAROA Sil Hawkins MD   6,129 Units at 01/05/23 2339    insulin lispro (HUMALOG) injection   SubCUTAneous Q6H Alma Julian PA-C        glucose chewable tablet 16 g  4 Tablet Oral PRN Alma Julian PA-C        glucagon (GLUCAGEN) injection 1 mg  1 mg IntraMUSCular PRN Alma Julian PA-C        dextrose 10% infusion 0-250 mL  0-250 mL IntraVENous PRN Alma Julian PA-C   Stopped at 01/13/23 1231    midodrine (PROAMATINE) tablet 5 mg  5 mg Per G Tube Q8H PRN Golda Lanes, MD        sodium chloride (NS) flush 5-10 mL  5-10 mL IntraVENous PRN ARSEN Burris        piperacillin-tazobactam (ZOSYN) 3.375 g in 0.9% sodium chloride (MBP/ADV) 100 mL MBP  3.375 g IntraVENous Q8H Mike Bianchi, [address] 25 mL/hr at 01/13/23 1825 3.375 g at 01/13/23 1825        Physical Exam:     Physical Exam:   General:  Alert,poorly communicating, no distress, appears stated age. Lungs:   Clear to auscultation bilaterally. Heart:  Regular rate and rhythm, S1, S2 normal, no murmur, click, rub or gallop. Abdomen:   Soft, non-tender. Bowel sounds normal. No masses,  No organomegaly. Has PEG   Extremities: Extremities normal, atraumatic, no cyanosis or edema                         Data Review:    CBC w/Diff    Recent Labs     01/13/23 0403 01/12/23  0126 01/11/23  0310   WBC 11.0 10.4 12.9   RBC 3.29* 3.30* 3.98*   HGB 9.1* 9.0* 11.1*   HCT 29.0* 28.6* 36.1   MCV 88.1 86.7 90.7   MCH 27.7 27.3 27.9   MCHC 31.4 31.5 30.7*   RDW 16.1* 16.0* 16.0*    Recent Labs     01/13/23  0403 01/12/23  0126 01/11/23  0310   MONOS 4 4 4   EOS 3 3 2   BASOS 0 0 1   RDW 16.1* 16.0* 16.0*        Comprehensive Metabolic Profile    Recent Labs     01/13/23  0403 01/12/23  0126 01/11/23  0310   * 146* 149*   K 4.3 4.0 3.7   * 118* 120*   CO2 27 25 24   BUN 7 6* 5*   CREA 0.43* 0.39* 0.47*    Recent Labs     01/13/23 0403 01/12/23  0126 01/11/23  0310   CA 8.3* 8.3* 8.4*   PHOS 2.4* 2.0* 2.5                      Impression:       Active Hospital Problems    Diagnosis Date Noted    Septic shock ([facility] 01/06/2023    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 ([facility] 01/06/2023    Community acquired pneumonia of right lower lobe of lung 01/06/2023    Hypernatremia 62/59/0214    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[address] 13/24/9518    Hyperosmolality and hypernatremia 15/19/1811    Acute metabolic encephalopathy 20/27/9852    Hypokalemia 06/22/2013      Very slowly improving NA, K  has normalized      Plan:     Make sure to give Free water q 4 hour without any Interruption. , Discussed with his caring Nurse.       Oriana Hidalgo MD

## 2023-01-14 NOTE — PROGRESS NOTES
Infectious Disease progress Note        Reason: Sepsis    Current abx Prior abx   Zosyn since 1/4/2023  Po vancomycin since 1/7/23 levofloxacin, vancomycin 1/4-1/9     Lines:       Assessment :   79 y.o. male with hx of dementia, COVID, HLD, HTN, chronic hyponatremia, CVA, seizures who presented to ED on 1/4/2023 for altered mental status. Hospitalization [facility] July 2017 for right foot cellulitis, infected right foot ulcer secondary to Proteus, citrobacter, e.coli, mssa, e.fecalis, e.avium, strep viridans. Surgical wound cultures 7/11 : proteus,citrobacter   S/p I&D of right foot ulcer on 7/11- intra op findings noted - no evidence of bone infection. Hospitalization [facility] October 2022 for sepsis, acute hypoxic respiratory failure-present on admission due to aspiration pneumonia superimposed on recent COVID-19 infection     Now with leukocytosis, bandemia, hypotension requiring pressors on admission, CT scan 1/5/2023 with air noted in the posterior wall of anus/rectum, posterior cortex of the sacrum/coccyx, bacteremia    Clinical presentation consistent with septic shock-present on admission due to polymicrobial bloodstream infection -( blood cx 1/4/23 positive for e.coli, proteus, bacteroides, gram positive cocci, negative blood cx 1/8), necrotizing infection of the sacrum/acute on chronic sacral osteomyelitis, c.diff infection    Most likely source of polymicrobial bloodstream infection is necrotizing infection of sacral ulcer  Status post I&D on 1/6/2023. Intra-Op findings noted. Surgery help appreciated  Preop wound cultures 1/6-mixed enteric larisa  Intra-Op cultures-mixed enteric larisa, e.coli, Enterococcus    Stool c.diff positive- likely suggestive of evolving c.diff infection. Acute kidney injury-likely due to sepsis    Leakage around PEG tube-plans for IR evaluation noted    Recommendations:     1. Continue Zosyn till 2/21/23. continue oral vancomycin while on abx  2.   Follow-up surgery recommendations regarding wound care sacral ulcer  4. Management of acute kidney injury per primary team  5. Await palliative care recommendations regarding address goals of care  6.  place picc line for outpatient iv abx once discharge plans finalized. D/w dr Austen Yoder. Please call me if any further questions or concerns. Will continue to participate in the care of this patient. HPI:    Unable to communicate effectively    Past Medical History:   Diagnosis Date    Alcohol dependence with alcohol-induced persisting dementia ([address]     per [facility] 4/23/21    Anemia     Aphasia     Benign prostatic hyperplasia     Cerebral vascular disease     COVID-19 04/06/2021    Disturbances of salivary secretion     Dysphagia     GERD (gastroesophageal reflux disease)     Hemiparesis (HCC)     left side    Hemiplegia (HCC)     left side     Hiccups     Hyperlipidemia     Hypernatremia 03/2021    Hypertension     Hyponatremia     Insomnia     Lacunar infarction ([address] 2010    weakness both arms, unable to walk    Lower extremity edema     Moderate protein-calorie malnutrition (HCC)     MRSA (methicillin resistant Staphylococcus aureus)     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)     Pneumonia     Psychogenic polydipsia     PVD (peripheral vascular disease) ([address]     Seizures ([address]     Spinal stenosis     Stroke ([address] 2017    Unspecified convulsions ([address]     4/23/2021 [facility] nurse       Past Surgical History:   Procedure Laterality Date    COLONOSCOPY N/A 4/29/2021    COLONOSCOPY performed by Robert Bradford MD at [facility] ENDOSCOPY    HX HEENT      pt reports past hx of surgery on ears unsure of what type    HX OTHER SURGICAL Bilateral     debridement of lower extremities       Current Discharge Medication List        CONTINUE these medications which have NOT CHANGED    Details   apixaban (ELIQUIS) 5 mg tablet Take 1 Tablet by mouth every twelve (12) hours.   Qty: 60 Tablet, Refills: 0 baclofen (LIORESAL) 10 mg tablet 10 mg by Per G Tube route two (2) times daily as needed for Muscle Spasm(s). 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 Indications: prevention of vitamin B12 deficiency      ferrous sulfate (FerrouSuL) 325 mg (65 mg iron) tablet Take  by mouth Daily (before breakfast). metoprolol tartrate (LOPRESSOR) 25 mg tablet 25 mg by Per G Tube route two (2) times a day. thiamine HCL (B-1) 100 mg tablet 1 Tablet by Per G Tube route daily. Qty: 30 Tablet, Refills: 0      levETIRAcetam (KEPPRA) 100 mg/mL solution 7.5 mL by Per G Tube route two (2) times a day. Qty: 473 mL, Refills: 0      pravastatin (PRAVACHOL) 40 mg tablet Take 1 Tab by mouth nightly. Qty: 30 Tab, Refills: 1      ascorbic acid, vitamin C, (VITAMIN C) 250 mg tablet Take 1 Tab by mouth daily. Qty: 30 Tab, Refills: 2      polyethylene glycol (MIRALAX) 17 gram packet Take 1 Packet by mouth daily. Qty: 30 Packet, Refills: 2      tamsulosin (FLOMAX) 0.4 mg capsule Take 0.4 mg by mouth daily. 1 tab daily      MULTIVITAMIN,THERAPEUTIC (THERA MULTI-VITAMIN PO) Take 500 mg by mouth daily. folic acid (FOLVITE) 1 mg tablet Take 1 Tab by mouth daily.   Qty: 30 Tab, Refills: 1             Current Facility-Administered Medications   Medication Dose Route Frequency    albuterol/ipratropium (DUONEB) neb solution  1 Dose Nebulization Q4H PRN    alum-mag hydroxide-simeth (MYLANTA) oral suspension 30 mL  30 mL Oral Q4H PRN    potassium chloride (K-DUR, KLOR-CON M20) SR tablet 20 mEq  20 mEq Oral DAILY    levETIRAcetam (KEPPRA) 500 mg in 0.9% sodium chloride (MBP/ADV) 100 mL MBP  500 mg IntraVENous Q12H    0.9% sodium chloride infusion 250 mL  250 mL IntraVENous PRN    dextrose 5% with KCl 20 mEq/L infusion   IntraVENous CONTINUOUS    0.9% sodium chloride infusion 250 mL  250 mL IntraVENous PRN    vancomycin 50 mg/mL oral solution (compounded) 125 mg  125 mg Per G Tube Q6H    cyanocobalamin tablet 500 mcg  500 mcg Per G Tube DAILY    pantoprazole (PROTONIX) 40 mg in 0.9% sodium chloride 10 mL injection  40 mg IntraVENous DAILY    sodium chloride (NS) flush 5-40 mL  5-40 mL IntraVENous Q8H    sodium chloride (NS) flush 5-40 mL  5-40 mL IntraVENous PRN    acetaminophen (TYLENOL) tablet 650 mg  650 mg Oral Q6H PRN    Or    acetaminophen (TYLENOL) suppository 650 mg  650 mg Rectal Q6H PRN    ondansetron (ZOFRAN ODT) tablet 4 mg  4 mg Oral Q8H PRN    Or    ondansetron (ZOFRAN) injection 4 mg  4 mg IntraVENous Q6H PRN    heparin (porcine) injection 5,000 Units  5,000 Units SubCUTAneous Q8H    insulin lispro (HUMALOG) injection   SubCUTAneous Q6H    glucose chewable tablet 16 g  4 Tablet Oral PRN    glucagon (GLUCAGEN) injection 1 mg  1 mg IntraMUSCular PRN    dextrose 10% infusion 0-250 mL  0-250 mL IntraVENous PRN    midodrine (PROAMATINE) tablet 5 mg  5 mg Per G Tube Q8H PRN    sodium chloride (NS) flush 5-10 mL  5-10 mL IntraVENous PRN    piperacillin-tazobactam (ZOSYN) 3.375 g in 0.9% sodium chloride (MBP/ADV) 100 mL MBP  3.375 g IntraVENous Q8H       Allergies: Patient has no known allergies.     Family History   Problem Relation Age of Onset    Hypertension Other      Social History     Socioeconomic History    Marital status:      Spouse name: Not on file    Number of children: Not on file    Years of education: Not on file    Highest education level: Not on file   Occupational History    Not on file   Tobacco Use    Smoking status: Former     Packs/day: 0.50     Types: Cigarettes     Quit date: 2008     Years since quittin.5    Smokeless tobacco: Never   Substance and Sexual Activity    Alcohol use: No    Drug use: No    Sexual activity: Not Currently   Other Topics Concern    Not on file   Social History Narrative    Not on file     Social Determinants of Health     Financial Resource Strain: Not on file   Food Insecurity: Not on file   Transportation Needs: Not on file   Physical Activity: Not on file   Stress: Not on file   Social Connections: Not on file   Intimate Partner Violence: Not on file   Housing Stability: Not on file     Social History     Tobacco Use   Smoking Status Former    Packs/day: 0.50    Types: Cigarettes    Quit date: 2008    Years since quittin.5   Smokeless Tobacco Never        Temp (24hrs), Av °F (37.2 °C), Min:98.9 °F (37.2 °C), Max:99.1 °F (37.3 °C)    Visit Vitals  BP (!) 149/94 (BP 1 Location: Left upper arm, BP Patient Position: At rest)   Pulse (!) 103   Temp 99.1 °F (37.3 °C)   Resp 18   Ht 5' 9\" (1.753 m)   Wt 76.5 kg (168 lb 10.4 oz)   SpO2 94%   BMI 24.91 kg/m²       ROS: Unable to obtain due to patient factors    Physical Exam:    General:  male patient laying on the bed, eyes open, alert, mumbles some words. Head:  Normocephalic, without obvious abnormality, atraumatic. Eyes:  Conjunctivae/corneas clear. Nose: Nares normal.  No drainage or sinus tenderness. Throat: Lips, mucosa, and tongue normal. Teeth and gums of poor dentition, lips dried and cracked/peeling    Neck: Supple, symmetrical, trachea midline, no adenopathy, thyroid: no enlargment/tenderness/nodules, no carotid bruit and no JVD. Back:   Symmetric, no curvature. Lungs:   Coarse to auscultation bilaterally. Chest wall:  No tenderness or deformity. Heart:  S1, S2 normal, no  click, rub or gallop. Abdomen:   Abd flat, Soft, non-tender. Bowel sounds normal. No masses,  No organomegaly. Healed surgical scar in upper abdomen   Extremities: Extremities normal, atraumatic, no cyanosis or edema. Pulses: 2+ and symmetric all extremities.    Skin: BLE with ulcers per report covered with dressing, sacral wound dressing not opened   Lymph nodes:  Not examined   Neurologic: Grossly non-focal; non-verbal, does not follow commands      Labs: Results:   Chemistry Recent Labs     23  0403 23  0126 01/11/23  0310   GLU 95 121* 95   * 146* 149*   K 4.3 4.0 3.7   * 118* 120*   CO2 27 25 24   BUN 7 6* 5*   CREA 0.43* 0.39* 0.47*   CA 8.3* 8.3* 8.4*   AGAP 3 3 5   BUCR 16 15 11*        CBC w/Diff Recent Labs     01/13/23  0403 01/12/23  0126 01/11/23  0310   WBC 11.0 10.4 12.9   RBC 3.29* 3.30* 3.98*   HGB 9.1* 9.0* 11.1*   HCT 29.0* 28.6* 36.1    273 263   GRANS 79* 83* 84*   LYMPH 13* 10* 9*   EOS 3 3 2        Microbiology No results for input(s): CULT in the last 72 hours. RADIOLOGY:    All available imaging studies/reports in [facility] care for this admission were reviewed        Disclaimer: Sections of this note are dictated utilizing voice recognition software, which may have resulted in some phonetic based errors in grammar and contents. Even though attempts were made to correct all the mistakes, some may have been missed, and remained in the body of the document. If questions arise, please contact our department.     Dr. Juan Antonio Feliciano, Infectious Disease Specialist  [phone]  January 13, 2023  5:16 PM

## 2023-01-14 NOTE — PROGRESS NOTES
Progress Note    Aurelia Akbar  79 y.o. 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 Getting PEG feeding along with the free water and also yesterday to IV fluid. Sodium has improved and almost normalized with free water supplementation and IV fluid      A comprehensive review of systems was negative except for that written in the History of Present Illness.     Objective:     Visit Vitals  BP (!) 152/82 (BP 1 Location: Left upper arm, BP Patient Position: At rest)   Pulse (!) 120   Temp 99.8 °F (37.7 °C)   Resp 19   Ht 5' 9\" (1.753 m)   Wt 76.5 kg (168 lb 10.4 oz)   SpO2 99%   BMI 24.91 kg/m²         Intake/Output Summary (Last 24 hours) at 1/14/2023 1240  Last data filed at 1/14/2023 1147  Gross per 24 hour   Intake 350 ml   Output 3300 ml   Net -2950 ml       Current Facility-Administered Medications   Medication Dose Route Frequency Provider Last Rate Last Admin    albuterol/ipratropium (DUONEB) neb solution  1 Dose Nebulization Q4H PRN Ernie Reed MD        alum-mag hydroxide-simeth (MYLANTA) oral suspension 30 mL  30 mL Oral Q4H PRN Marie Odonnell MD   30 mL at 01/12/23 0100    potassium chloride (K-DUR, KLOR-CON M20) SR tablet 20 mEq  20 mEq Oral DAILY Debbi Almeida MD   20 mEq at 01/14/23 1029    levETIRAcetam (KEPPRA) 500 mg in 0.9% sodium chloride (MBP/ADV) 100 mL MBP  500 mg IntraVENous Q12H Merlinda Sprawls MD MALAIKA 400 mL/hr at 01/14/23 0039 500 mg at 01/14/23 0039    0.9% sodium chloride infusion 250 mL  250 mL IntraVENous PRN Rocio Tristan DO        dextrose 5% with KCl 20 mEq/L infusion   IntraVENous CONTINUOUS Sigifredo Owen  mL/hr at 01/13/23 0536 New Bag at 01/13/23 0536    0.9% sodium chloride infusion 250 mL  250 mL IntraVENous PRN Callie Conway MD        vancomycin 50 mg/mL oral solution (compounded) 125 mg  125 mg Per G Tube Q6H Callie Conway MD   125 mg at 01/14/23 0442    cyanocobalamin tablet 500 mcg  500 mcg Per G Tube DAILY Callie Conway MD   500 mcg at 01/14/23 1029    pantoprazole (PROTONIX) 40 mg in 0.9% sodium chloride 10 mL injection  40 mg IntraVENous DAILY Callie Conway MD   40 mg at 01/14/23 1028    sodium chloride (NS) flush 5-40 mL  5-40 mL IntraVENous R9C Sil Hawkins MD   10 mL at 01/14/23 0600    sodium chloride (NS) flush 5-40 mL  5-40 mL IntraVENous PRN Sil Hawkins MD        acetaminophen (TYLENOL) tablet 650 mg  650 mg Oral Q6H PRN Sil Hawkins MD   672 mg at 01/13/23 0042    Or    acetaminophen (TYLENOL) suppository 650 mg  650 mg Rectal Q6H PRN Sil Hawkins MD        ondansetron (ZOFRAN ODT) tablet 4 mg  4 mg Oral Q8H PRN Sil Hawkins MD        Or    ondansetron (ZOFRAN) injection 4 mg  4 mg IntraVENous Q6H PRN Sil Hawkins MD        heparin (porcine) injection 5,000 Units  5,000 Units SubCUTAneous ELLENA Sil Hawkins MD   9,294 Units at 01/14/23 0641    insulin lispro (HUMALOG) injection   SubCUTAneous Q6H Alma Julian PA-C        glucose chewable tablet 16 g  4 Tablet Oral PRN Alma Julian PA-C        glucagon (GLUCAGEN) injection 1 mg  1 mg IntraMUSCular PRN Alma Julian PA-C        dextrose 10% infusion 0-250 mL  0-250 mL IntraVENous PRN Alma Julian PA-C   Stopped at 01/13/23 1231    midodrine (PROAMATINE) tablet 5 mg  5 mg Per G Tube Q8H PRN Mikel Reddy MD        sodium chloride (NS) flush 5-10 mL  5-10 mL IntraVENous PRN ARSEN Neal        piperacillin-tazobactam (ZOSYN) 3.375 g in 0.9% sodium chloride (MBP/ADV) 100 mL MBP  3.375 g IntraVENous Q8H Alba Neal 25 mL/hr at 01/14/23 1028 3.375 g at 01/14/23 1028        Physical Exam:     Physical Exam:   General:  Alert, cooperative, no distress, appears stated age. Eyes:  Conjunctivae/corneas clear. PERRL, EOMs intact. Mouth/Throat: Lips, mucosa, and tongue normal. Teeth and gums normal.   Neck: Supple, symmetrical, trachea midline, no adenopathy, thyroid: no enlargement/tenderness/nodules, no carotid bruit and no JVD. Lungs:   Clear to auscultation bilaterally. Heart:  Regular rate and rhythm, S1, S2 normal, no murmur, click, rub or gallop. Abdomen:   Soft, non-tender. Bowel sounds normal. No masses,  No organomegaly. Has PEG   Extremities: Extremities normal, atraumatic, no cyanosis or edema   Pulses: 2+ and symmetric all extremities.    Skin: Skin color, texture, turgor normal. No rashes or lesions                 Data Review:    CBC w/Diff    Recent Labs     01/14/23 0247 01/13/23 0403 01/12/23  0126   WBC 10.3 11.0 10.4   RBC 3.62* 3.29* 3.30*   HGB 10.0* 9.1* 9.0*   HCT 32.9* 29.0* 28.6*   MCV 90.9 88.1 86.7   MCH 27.6 27.7 27.3   MCHC 30.4* 31.4 31.5   RDW 16.6* 16.1* 16.0*    Recent Labs     01/14/23 0247 01/13/23  0403 01/12/23  0126   MONOS 4 4 4   EOS 2 3 3   BASOS 0 0 0   RDW 16.6* 16.1* 16.0*        Comprehensive Metabolic Profile    Recent Labs     01/14/23 0247 01/13/23 0403 01/12/23  0126    147* 146*   K 4.5 4.3 4.0   * 117* 118*   CO2 26 27 25   BUN 8 7 6*   CREA 0.46* 0.43* 0.39*    Recent Labs     01/14/23  0247 01/13/23  0403 01/12/23  0126   CA 8.7 8.3* 8.3*   PHOS 3.3 2.4* 2.0*                        Impression:       Active Hospital Problems    Diagnosis Date Noted    Septic shock ([facility] 01/06/2023    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 [facility]) 01/06/2023    Community acquired pneumonia of right lower lobe of lung 01/06/2023    Hypernatremia 31/11/2810    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[facility] 90/44/4643    Hyperosmolality and hypernatremia 86/20/3341    Acute metabolic encephalopathy 81/75/9479    Hypokalemia 06/22/2013      Sodium and potassium now normalized      Plan:   Continue current care and needs to have plan for sending him back to his nursing home      Tatiana Cuadra MD

## 2023-01-15 VITALS
OXYGEN SATURATION: 99 % | TEMPERATURE: 98.8 F | HEART RATE: 101 BPM | DIASTOLIC BLOOD PRESSURE: 79 MMHG | HEIGHT: 69 IN | SYSTOLIC BLOOD PRESSURE: 129 MMHG | RESPIRATION RATE: 16 BRPM | WEIGHT: 167.99 LBS | BODY MASS INDEX: 24.88 KG/M2

## 2023-01-15 LAB
ANION GAP SERPL CALC-SCNC: 2 MMOL/L (ref 3–18)
BASOPHILS # BLD: 0 K/UL (ref 0–0.1)
BASOPHILS NFR BLD: 0 % (ref 0–2)
BUN SERPL-MCNC: 9 MG/DL (ref 7–18)
BUN/CREAT SERPL: 21 (ref 12–20)
CALCIUM SERPL-MCNC: 8.3 MG/DL (ref 8.5–10.1)
CHLORIDE SERPL-SCNC: 113 MMOL/L (ref 100–111)
CO2 SERPL-SCNC: 27 MMOL/L (ref 21–32)
CREAT SERPL-MCNC: 0.42 MG/DL (ref 0.6–1.3)
DIFFERENTIAL METHOD BLD: ABNORMAL
EOSINOPHIL # BLD: 0.2 K/UL (ref 0–0.4)
EOSINOPHIL NFR BLD: 2 % (ref 0–5)
ERYTHROCYTE [DISTWIDTH] IN BLOOD BY AUTOMATED COUNT: 16.8 % (ref 11.6–14.5)
GLUCOSE BLD STRIP.AUTO-MCNC: 91 MG/DL (ref 70–110)
GLUCOSE BLD STRIP.AUTO-MCNC: 93 MG/DL (ref 70–110)
GLUCOSE BLD STRIP.AUTO-MCNC: 95 MG/DL (ref 70–110)
GLUCOSE SERPL-MCNC: 139 MG/DL (ref 74–99)
HCT VFR BLD AUTO: 27.8 % (ref 36–48)
HGB BLD-MCNC: 8.9 G/DL (ref 13–16)
IMM GRANULOCYTES # BLD AUTO: 0.1 K/UL (ref 0–0.04)
IMM GRANULOCYTES NFR BLD AUTO: 1 % (ref 0–0.5)
LYMPHOCYTES # BLD: 1 K/UL (ref 0.9–3.6)
LYMPHOCYTES NFR BLD: 9 % (ref 21–52)
MAGNESIUM SERPL-MCNC: 2 MG/DL (ref 1.6–2.6)
MCH RBC QN AUTO: 28 PG (ref 24–34)
MCHC RBC AUTO-ENTMCNC: 32 G/DL (ref 31–37)
MCV RBC AUTO: 87.4 FL (ref 78–100)
MONOCYTES # BLD: 0.4 K/UL (ref 0.05–1.2)
MONOCYTES NFR BLD: 3 % (ref 3–10)
NEUTS SEG # BLD: 10.1 K/UL (ref 1.8–8)
NEUTS SEG NFR BLD: 86 % (ref 40–73)
NRBC # BLD: 0 K/UL (ref 0–0.01)
NRBC BLD-RTO: 0 PER 100 WBC
PHOSPHATE SERPL-MCNC: 2.9 MG/DL (ref 2.5–4.9)
PLATELET # BLD AUTO: 328 K/UL (ref 135–420)
PMV BLD AUTO: 10.7 FL (ref 9.2–11.8)
POTASSIUM SERPL-SCNC: 4.2 MMOL/L (ref 3.5–5.5)
RBC # BLD AUTO: 3.18 M/UL (ref 4.35–5.65)
SODIUM SERPL-SCNC: 142 MMOL/L (ref 136–145)
WBC # BLD AUTO: 11.7 K/UL (ref 4.6–13.2)

## 2023-01-15 PROCEDURE — 84100 ASSAY OF PHOSPHORUS: CPT

## 2023-01-15 PROCEDURE — 74011250637 HC RX REV CODE- 250/637: Performed by: INTERNAL MEDICINE

## 2023-01-15 PROCEDURE — 74011250636 HC RX REV CODE- 250/636

## 2023-01-15 PROCEDURE — 74011250636 HC RX REV CODE- 250/636: Performed by: INTERNAL MEDICINE

## 2023-01-15 PROCEDURE — C9113 INJ PANTOPRAZOLE SODIUM, VIA: HCPCS | Performed by: INTERNAL MEDICINE

## 2023-01-15 PROCEDURE — 99239 HOSP IP/OBS DSCHRG MGMT >30: CPT | Performed by: INTERNAL MEDICINE

## 2023-01-15 PROCEDURE — 74011000258 HC RX REV CODE- 258: Performed by: PHYSICIAN ASSISTANT

## 2023-01-15 PROCEDURE — 74011000258 HC RX REV CODE- 258: Performed by: INTERNAL MEDICINE

## 2023-01-15 PROCEDURE — 82962 GLUCOSE BLOOD TEST: CPT

## 2023-01-15 PROCEDURE — 74011250636 HC RX REV CODE- 250/636: Performed by: PHYSICIAN ASSISTANT

## 2023-01-15 PROCEDURE — 74011000250 HC RX REV CODE- 250: Performed by: INTERNAL MEDICINE

## 2023-01-15 PROCEDURE — 74011000250 HC RX REV CODE- 250

## 2023-01-15 PROCEDURE — 83735 ASSAY OF MAGNESIUM: CPT

## 2023-01-15 PROCEDURE — 85025 COMPLETE CBC W/AUTO DIFF WBC: CPT

## 2023-01-15 PROCEDURE — 80048 BASIC METABOLIC PNL TOTAL CA: CPT

## 2023-01-15 RX ORDER — LANOLIN ALCOHOL/MO/W.PET/CERES
325 CREAM (GRAM) TOPICAL
Qty: 30 TABLET | Refills: 2 | Status: SHIPPED | OUTPATIENT
Start: 2023-02-22

## 2023-01-15 RX ORDER — METOPROLOL TARTRATE 25 MG/1
25 TABLET, FILM COATED ORAL EVERY 12 HOURS
Status: DISCONTINUED | OUTPATIENT
Start: 2023-01-15 | End: 2023-01-15 | Stop reason: HOSPADM

## 2023-01-15 RX ADMIN — METOPROLOL TARTRATE 25 MG: 25 TABLET ORAL at 11:00

## 2023-01-15 RX ADMIN — SODIUM CHLORIDE, PRESERVATIVE FREE 10 ML: 5 INJECTION INTRAVENOUS at 05:19

## 2023-01-15 RX ADMIN — LEVETIRACETAM 500 MG: 500 INJECTION, SOLUTION INTRAVENOUS at 12:55

## 2023-01-15 RX ADMIN — PIPERACILLIN SODIUM AND TAZOBACTAM SODIUM 3.38 G: 3; .375 INJECTION, POWDER, LYOPHILIZED, FOR SOLUTION INTRAVENOUS at 02:44

## 2023-01-15 RX ADMIN — HEPARIN SODIUM 5000 UNITS: 5000 INJECTION INTRAVENOUS; SUBCUTANEOUS at 13:55

## 2023-01-15 RX ADMIN — Medication 125 MG: at 12:00

## 2023-01-15 RX ADMIN — HEPARIN SODIUM 5000 UNITS: 5000 INJECTION INTRAVENOUS; SUBCUTANEOUS at 05:18

## 2023-01-15 RX ADMIN — PANTOPRAZOLE SODIUM 40 MG: 40 INJECTION, POWDER, FOR SOLUTION INTRAVENOUS at 09:00

## 2023-01-15 RX ADMIN — SODIUM CHLORIDE, PRESERVATIVE FREE 10 ML: 5 INJECTION INTRAVENOUS at 14:00

## 2023-01-15 RX ADMIN — Medication 125 MG: at 05:18

## 2023-01-15 RX ADMIN — CYANOCOBALAMIN TAB 1000 MCG 500 MCG: 1000 TAB at 09:00

## 2023-01-15 RX ADMIN — PIPERACILLIN SODIUM AND TAZOBACTAM SODIUM 3.38 G: 3; .375 INJECTION, POWDER, LYOPHILIZED, FOR SOLUTION INTRAVENOUS at 11:55

## 2023-01-15 NOTE — PROGRESS NOTES
CM contacted [address] [place] [address] to advise of patient's discharge today to return to [address] [place] [address]. CM was advised the patient will be on the [address] wing of [address] [place] [address]. Requested Case Management specialist to assist with transportation to [address] [place] [address] Ave, [place], [address] phone number is  [phone]    Patient will require BLS transport. Pt requires Stretcher If stretcher, reason: Encephalopathy  Patient is currently requiring oxygen No No  Height: 5' 9\"  Weight: 167 lb  Pt is on isolation: Yes Isolation is for: MRSA  Is the pt ready now? no  Requested time: Next Available 30 minutes - three hours  PCS Faxed: no  Insurance verified on face sheet: yes  Auth needed for transport: yes [id number]  MILLIE completed PCS/ Envelope and placed on chart.       Yesi Lei, MSW, QMHP

## 2023-01-15 NOTE — PROGRESS NOTES
Progress Note    Ajay Washington  79 y.o. Admit Date: 1/4/2023  Principal Problem:    Septic shock ([facility] (1/6/2023) POA: Yes    Active Problems:    Hypokalemia (6/22/2013) POA: Yes      Hyperosmolality and hypernatremia (3/30/2021) POA: Yes      Acute metabolic encephalopathy (5/95/4462) POA: Yes      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[facility] (19/0/1750) POA: Yes      Hypernatremia (11/25/2022) POA: Yes      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 ([facility] (1/6/2023) POA: Yes      Community acquired pneumonia of right lower lobe of lung (1/6/2023) POA: Yes            Subjective:     Patient remained at his baseline mental status, no new complaint does not communicate well. Off IV fluid. Continue to get free water through the PEG tube      A comprehensive review of systems was negative except for that written in the History of Present Illness.     Objective:     Visit Vitals  BP (!) 147/82 (BP 1 Location: Left upper arm, BP Patient Position: At rest)   Pulse (!) 110   Temp 98.8 °F (37.1 °C)   Resp 17   Ht 5' 9\" (1.753 m)   Wt 76.2 kg (167 lb 15.9 oz)   SpO2 100%   BMI 24.81 kg/m²         Intake/Output Summary (Last 24 hours) at 1/15/2023 1357  Last data filed at 1/15/2023 1122  Gross per 24 hour   Intake 7286.25 ml   Output 2875 ml   Net 4411.25 ml       Current Facility-Administered Medications   Medication Dose Route Frequency Provider Last Rate Last Admin    metoprolol tartrate (LOPRESSOR) tablet 25 mg  25 mg Per G Tube Q12H Irena YBARRA, DO   25 mg at 01/15/23 1100    albuterol/ipratropium (DUONEB) neb solution  1 Dose Nebulization Q4H PRN Bev Chaves MD        alum-mag hydroxide-simeth (MYLANTA) oral suspension 30 mL  30 mL Oral Q4H PRN Rick Chen MD   30 mL at 01/12/23 0100    levETIRAcetam (KEPPRA) 500 mg in 0.9% sodium chloride (MBP/ADV) 100 mL MBP  500 mg IntraVENous Q12H Rick Chen  mL/hr at 01/15/23 1255 500 mg at 01/15/23 1255    0.9% sodium chloride infusion 250 mL  250 mL IntraVENous PRN Rocio Tristan DO        0.9% sodium chloride infusion 250 mL  250 mL IntraVENous PRN Tammy Wild MD        vancomycin 50 mg/mL oral solution (compounded) 125 mg  125 mg Per G Tube Q6H Tammy Wild MD   125 mg at 01/15/23 1200    cyanocobalamin tablet 500 mcg  500 mcg Per G Tube DAILY Tammy Wild MD   500 mcg at 01/15/23 0900    pantoprazole (PROTONIX) 40 mg in 0.9% sodium chloride 10 mL injection  40 mg IntraVENous DAILY Tammy Wild MD   40 mg at 01/15/23 0900    sodium chloride (NS) flush 5-40 mL  5-40 mL IntraVENous G6D Sil Hawkins MD   10 mL at 01/15/23 1400    sodium chloride (NS) flush 5-40 mL  5-40 mL IntraVENous PRN Sil Hawkins MD        acetaminophen (TYLENOL) tablet 650 mg  650 mg Oral Q6H PRN Sil Hawkins MD   739 mg at 01/13/23 0042    Or    acetaminophen (TYLENOL) suppository 650 mg  650 mg Rectal Q6H PRN Sil Hawkins MD        ondansetron (ZOFRAN ODT) tablet 4 mg  4 mg Oral Q8H PRN Sil Hawkins MD        Or    ondansetron (ZOFRAN) injection 4 mg  4 mg IntraVENous Q6H PRN Sil Hawkins MD        heparin (porcine) injection 5,000 Units  5,000 Units SubCUTAneous B0V Sil Hawkins MD   3,880 Units at 01/15/23 1355    insulin lispro (HUMALOG) injection   SubCUTAneous Q6H Alma Julian PA-C        glucose chewable tablet 16 g  4 Tablet Oral PRN Alma Julian PA-C        glucagon (GLUCAGEN) injection 1 mg  1 mg IntraMUSCular PRN Alma Julian PA-C        dextrose 10% infusion 0-250 mL  0-250 mL IntraVENous PRN Alona Singh PA-C   Stopped at 01/13/23 1231    midodrine (PROAMATINE) tablet 5 mg  5 mg Per G Tube Q8H PRN Kathey Felty, MD        sodium chloride (NS) flush 5-10 mL  5-10 mL IntraVENous PRN ARSEN Singer Cera        piperacillin-tazobactam (ZOSYN) 3.375 g in 0.9% sodium chloride (MBP/ADV) 100 mL MBP  3.375 g IntraVENous Q8H Adonis Whyte PA 25 mL/hr at 01/15/23 0900 3.375 g at 01/15/23 0900        Physical Exam:     Physical Exam:   General:  Alert, cooperative, no distress, appears stated age. Eyes:  Conjunctivae/corneas clear. PERRL, EOMs intact. Mouth/Throat: Lips, mucosa, and tongue normal. Teeth and gums normal.   Neck: Supple, symmetrical, trachea midline, no adenopathy, thyroid: no enlargement/tenderness/nodules, no carotid bruit and no JVD. Lungs:   Clear to auscultation bilaterally. Heart:  Regular rate and rhythm, S1, S2 normal, no murmur, click, rub or gallop. Abdomen:   Soft, non-tender. Bowel sounds normal. No masses,  No organomegaly. ,has PEG   Extremities: Extremities normal, atraumatic, no cyanosis or edema. Data Review:    CBC w/Diff    Recent Labs     01/15/23  0047 01/14/23  0247 01/13/23  0403   WBC 11.7 10.3 11.0   RBC 3.18* 3.62* 3.29*   HGB 8.9* 10.0* 9.1*   HCT 27.8* 32.9* 29.0*   MCV 87.4 90.9 88.1   MCH 28.0 27.6 27.7   MCHC 32.0 30.4* 31.4   RDW 16.8* 16.6* 16.1*    Recent Labs     01/15/23  0047 01/14/23  0247 01/13/23  0403   MONOS 3 4 4   EOS 2 2 3   BASOS 0 0 0   RDW 16.8* 16.6* 16.1*        Comprehensive Metabolic Profile    Recent Labs     01/15/23  0047 01/14/23  0247 01/13/23  0403    142 147*   K 4.2 4.5 4.3   * 112* 117*   CO2 27 26 27   BUN 9 8 7   CREA 0.42* 0.46* 0.43*    Recent Labs     01/15/23  0047 01/14/23  0247 01/13/23  0403   CA 8.3* 8.7 8.3*   PHOS 2.9 3.3 2.4*                        Impression:       Active Hospital Problems    Diagnosis Date Noted    Septic shock ([facility] 01/06/2023    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 ([facility] 01/06/2023    Community acquired pneumonia of right lower lobe of lung 01/06/2023    Hypernatremia 08/50/3175    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[facility] 99/71/0557    Hyperosmolality and hypernatremia 17/75/8744    Acute metabolic encephalopathy 82/02/6184    Hypokalemia 06/22/2013      Stable sodium and potassium. Normal renal function      Plan:     Continue supportive care.   DC plan to transfer back to his nursing home      Antonieta Castillo MD

## 2023-01-15 NOTE — PROGRESS NOTES
[facility]  [facility]    Patient: Reema Jensen Age: 79 y.o. : 1955 MR#: [id number] SSN: [id number]  Date/Time: 2023     Subjective:   Patient doing well. Received PICC line on . Awaiting disposition    Review of systems  General: No fevers or chills. Cardiovascular: No chest pain or pressure. No palpitations. Pulmonary: No shortness of breath, cough or wheeze. Gastrointestinal: No abdominal pain, nausea, vomiting or diarrhea. Genitourinary: No urinary frequency, urgency, hesitancy or dysuria. Musculoskeletal: No joint or muscle pain, no back pain, no recent trauma. Neurologic: No headache, numbness, tingling or weakness. Assessment/Plan:   Septic shock  Hyperosmolality and hypernatremia  Acute metabolic encephalopathy  Alcoholic dementia  Stage IV skin ulcer of the sacral region  Community-acquired pneumonia of the right lower lobe of lung  Acute postop anemia status post 2 units  C. difficile positive  PEG tube in place     Infectious disease currently following  Patient currently on oral vancomycin and Zosyn  PICC placement with IR  Follow-up surgery recommendations regarding wound care sacral ulcer  Patient was on Eliquis which has been on hold due to anemia, restart upon dc  IR adjusted PEG tube placement after it came loose  Nephrology following for hypokalemia, hypernatremia, hyper osmolality. IV fluids per nephrology. Sodium almost normalized  Patient currently on [address] care team following  Awaiting [facility] at [facility]     I spent 40 minutes with the patient in face-to-face consultation, of which greater than 50% was spent in counseling and coordination of care as described above.      Case discussed with:  [x]Patient  []Family  []Nursing  []Case Management  DVT Prophylaxis:  []Lovenox  []Hep SQ  [x]SCDs  []Coumadin   []Eliquis/Xarelto     Objective:   VS: Visit Vitals  BP (!) 143/90 (BP 1 Location: Left upper arm, BP Patient Position: At rest)   Pulse (!) 112   Temp 99.8 °F (37.7 °C)   Resp 18   Ht 5' 9\" (1.753 m)   Wt 76.5 kg (168 lb 10.4 oz)   SpO2 100%   BMI 24.91 kg/m²      Tmax/24hrs: Temp (24hrs), Av.6 °F (37.6 °C), Min:99.2 °F (37.3 °C), Max:99.8 °F (37.7 °C)  IOBRIEF  Intake/Output Summary (Last 24 hours) at 2023  Last data filed at 2023 1549  Gross per 24 hour   Intake 350 ml   Output 2600 ml   Net -2250 ml       General:  Alert, cooperative, no acute distress    HEENT: PERRLA, anicteric sclerae. Poor dentition noted  Pulmonary:  CTA Bilaterally. No Wheezing/Rales. Cardiovascular: Regular rate and Rhythm. GI:  Soft, Non distended, Non tender. + Bowel sounds. Extremities:  No edema. No calf tenderness. Bilateral lower extremities with ulcers covered with dressings. Sacral ulcer with dressing. Right upper extremity swelling, tender. PICC in place. Psych: Good insight. Not anxious or agitated. Neurologic: Alert and oriented X 3. Moves all ext.   Additional:    Medications:   Current Facility-Administered Medications   Medication Dose Route Frequency    albuterol/ipratropium (DUONEB) neb solution  1 Dose Nebulization Q4H PRN    alum-mag hydroxide-simeth (MYLANTA) oral suspension 30 mL  30 mL Oral Q4H PRN    potassium chloride (K-DUR, KLOR-CON M20) SR tablet 20 mEq  20 mEq Oral DAILY    levETIRAcetam (KEPPRA) 500 mg in 0.9% sodium chloride (MBP/ADV) 100 mL MBP  500 mg IntraVENous Q12H    0.9% sodium chloride infusion 250 mL  250 mL IntraVENous PRN    dextrose 5% with KCl 20 mEq/L infusion   IntraVENous CONTINUOUS    0.9% sodium chloride infusion 250 mL  250 mL IntraVENous PRN    vancomycin 50 mg/mL oral solution (compounded) 125 mg  125 mg Per G Tube Q6H    cyanocobalamin tablet 500 mcg  500 mcg Per G Tube DAILY    pantoprazole (PROTONIX) 40 mg in 0.9% sodium chloride 10 mL injection  40 mg IntraVENous DAILY    sodium chloride (NS) flush 5-40 mL  5-40 mL IntraVENous Q8H    sodium chloride (NS) flush 5-40 mL  5-40 mL IntraVENous PRN    acetaminophen (TYLENOL) tablet 650 mg  650 mg Oral Q6H PRN    Or    acetaminophen (TYLENOL) suppository 650 mg  650 mg Rectal Q6H PRN    ondansetron (ZOFRAN ODT) tablet 4 mg  4 mg Oral Q8H PRN    Or    ondansetron (ZOFRAN) injection 4 mg  4 mg IntraVENous Q6H PRN    heparin (porcine) injection 5,000 Units  5,000 Units SubCUTAneous Q8H    insulin lispro (HUMALOG) injection   SubCUTAneous Q6H    glucose chewable tablet 16 g  4 Tablet Oral PRN    glucagon (GLUCAGEN) injection 1 mg  1 mg IntraMUSCular PRN    dextrose 10% infusion 0-250 mL  0-250 mL IntraVENous PRN    midodrine (PROAMATINE) tablet 5 mg  5 mg Per G Tube Q8H PRN    sodium chloride (NS) flush 5-10 mL  5-10 mL IntraVENous PRN    piperacillin-tazobactam (ZOSYN) 3.375 g in 0.9% sodium chloride (MBP/ADV) 100 mL MBP  3.375 g IntraVENous Q8H       Labs:    Recent Results (from the past 24 hour(s))   GLUCOSE, POC    Collection Time: 01/14/23 12:11 AM   Result Value Ref Range    Glucose (POC) 91 70 - 834 mg/dL   METABOLIC PANEL, BASIC    Collection Time: 01/14/23  2:47 AM   Result Value Ref Range    Sodium 142 136 - 145 mmol/L    Potassium 4.5 3.5 - 5.5 mmol/L    Chloride 112 (H) 100 - 111 mmol/L    CO2 26 21 - 32 mmol/L    Anion gap 4 3.0 - 18 mmol/L    Glucose 91 74 - 99 mg/dL    BUN 8 7.0 - 18 MG/DL    Creatinine 0.46 (L) 0.6 - 1.3 MG/DL    BUN/Creatinine ratio 17 12 - 20      eGFR >60 >60 ml/min/1.73m2    Calcium 8.7 8.5 - 10.1 MG/DL   MAGNESIUM    Collection Time: 01/14/23  2:47 AM   Result Value Ref Range    Magnesium 1.9 1.6 - 2.6 mg/dL   PHOSPHORUS    Collection Time: 01/14/23  2:47 AM   Result Value Ref Range    Phosphorus 3.3 2.5 - 4.9 MG/DL   CBC WITH AUTOMATED DIFF    Collection Time: 01/14/23  2:47 AM   Result Value Ref Range    WBC 10.3 4.6 - 13.2 K/uL    RBC 3.62 (L) 4.35 - 5.65 M/uL    HGB 10.0 (L) 13.0 - 16.0 g/dL    HCT 32.9 (L) 36.0 - 48.0 %    MCV 90.9 78.0 - 100.0 FL    MCH 27.6 24.0 - 34.0 PG    MCHC 30.4 (L) 31.0 - 37.0 g/dL    RDW 16.6 (H) 11.6 - 14.5 %    PLATELET 478 049 - 328 K/uL    MPV 12.4 (H) 9.2 - 11.8 FL    NRBC 0.0 0  WBC    ABSOLUTE NRBC 0.00 0.00 - 0.01 K/uL    NEUTROPHILS 83 (H) 40 - 73 %    LYMPHOCYTES 10 (L) 21 - 52 %    MONOCYTES 4 3 - 10 %    EOSINOPHILS 2 0 - 5 %    BASOPHILS 0 0 - 2 %    IMMATURE GRANULOCYTES 1 (H) 0.0 - 0.5 %    ABS. NEUTROPHILS 8.6 (H) 1.8 - 8.0 K/UL    ABS. LYMPHOCYTES 1.0 0.9 - 3.6 K/UL    ABS. MONOCYTES 0.4 0.05 - 1.2 K/UL    ABS. EOSINOPHILS 0.2 0.0 - 0.4 K/UL    ABS. BASOPHILS 0.0 0.0 - 0.1 K/UL    ABS. IMM. GRANS. 0.1 (H) 0.00 - 0.04 K/UL    DF AUTOMATED      PLATELET COMMENTS ADEQUATE PLATELETS      RBC COMMENTS ANISOCYTOSIS  2+        RBC COMMENTS POLYCHROMASIA  1+        RBC COMMENTS LULI CELLS  FEW  OVALOCYTES  FEW       GLUCOSE, POC    Collection Time: 01/14/23  5:12 AM   Result Value Ref Range    Glucose (POC) 104 70 - 110 mg/dL   GLUCOSE, POC    Collection Time: 01/14/23 11:43 AM   Result Value Ref Range    Glucose (POC) 113 (H) 70 - 110 mg/dL   GLUCOSE, POC    Collection Time: 01/14/23  5:34 PM   Result Value Ref Range    Glucose (POC) 80 70 - 110 mg/dL       Signed By: Nasim Harper,      January 14, 2023      Disclaimer: Sections of this note are dictated using utilizing voice recognition software. Minor typographical errors may be present. If questions arise, please do not hesitate to contact me or call our department.

## 2023-01-15 NOTE — DISCHARGE SUMMARY
Discharge Summary    Patient: Trey Plaza MRN: [id number]  CSN: [id number]    YOB: 1955  Age: 79 y.o.   Sex: male    DOA: 1/4/2023 LOS:  LOS: 9 days   Discharge Date:      Admission Diagnosis: Septic shock ([facility] [A41.9, R65.21]    Discharge Diagnosis:    Problem List as of 1/15/2023 Date Reviewed: 10/11/2022            Codes Class Noted - Resolved    * (Principal) Septic shock ([place] [address].) ICD-10-CM: A41.9, R65.21  ICD-9-CM: 038.9, 785.52, 995.92  1/6/2023 - Present        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 [facility]) ICD-10-CM: L98.429  ICD-9-CM: 707.8  1/6/2023 - Present        Community acquired pneumonia of right lower lobe of lung ICD-10-CM: J18.9  ICD-9-CM: 486  1/6/2023 - Present        Aspiration pneumonia ([facility] ICD-10-CM: J69.0  ICD-9-CM: 507.0  12/1/2022 - Present        Hypernatremia ICD-10-CM: E87.0  ICD-9-CM: 276.0  11/25/2022 - Present        Anemia ICD-10-CM: D64.9  ICD-9-CM: 285.9  11/25/2022 - Present        Symptomatic anemia ICD-10-CM: D64.9  ICD-9-CM: 285.9  11/25/2022 - Present        Seizure disorder ([facility] ICD-10-CM: G40.909  ICD-9-CM: 345.90  10/12/2022 - Present        Respiratory failure ([facility] ICD-10-CM: J96.90  ICD-9-CM: 518.81  10/9/2022 - Present        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  ICD-9-CM: 291.2  10/9/2022 - Present        Goals of care, counseling/discussion ICD-10-CM: Z71.89  ICD-9-CM: V65.49  Unknown - Present        Debility ICD-10-CM: R53.81  ICD-9-CM: 799.3  Unknown - Present        Hyperosmolality and hypernatremia ICD-10-CM: E87.0  ICD-9-CM: 276.0  3/30/2021 - Present        Acute metabolic encephalopathy PHY-07-EY: G93.41  ICD-9-CM: 348.31  3/30/2021 - Present        Dehydration ICD-10-CM: E86.0  ICD-9-CM: 276.51  3/30/2021 - Present        Stroke [facility]) ICD-10-CM: I63.9  ICD-9-CM: 434.91  9/16/2017 - Present        Bilateral leg and foot pain ICD-10-CM: M79.604, M79.605, M79.671, M79.672  ICD-9-CM: 729.5  7/6/2017 - Present        Psychogenic polydipsia ICD-10-CM: R63.1, F54  ICD-9-CM: 316, 783.5  5/22/2014 - Present        Severe protein-calorie malnutrition ([facility] ICD-10-CM: E43  ICD-9-CM: 262  9/17/2013 - Present        First degree AV block ICD-10-CM: I44.0  ICD-9-CM: 426.11  9/1/2013 - Present        Former heavy tobacco smoker (Chronic) ICD-10-CM: J14.097  ICD-9-CM: V15.82  9/1/2013 - Present        Hypokalemia ICD-10-CM: E87.6  ICD-9-CM: 276.8  6/22/2013 - Present        Intractable hiccups (Chronic) ICD-10-CM: R06.6  ICD-9-CM: 786.8  9/2/2012 - Present        Hypertension ICD-10-CM: I10  ICD-9-CM: 401.9  9/2/2012 - Present        RESOLVED: Foot ulcer ([facility] ICD-10-CM: L97.509  ICD-9-CM: 707.15  7/7/2017 - 7/13/2017        RESOLVED: Infected ulcer of skin ([place] [address].) ICD-10-CM: L98.499, L08.9  ICD-9-CM: 707.9, 686.9  7/6/2017 - 7/13/2017        RESOLVED: Hyponatremia ICD-10-CM: E87.1  ICD-9-CM: 276.1  5/22/2014 - 8/18/2014        RESOLVED: Nausea with vomiting ICD-10-CM: R11.2  ICD-9-CM: 787.01  4/7/2014 - 5/22/2014        RESOLVED: Hyponatremia ICD-10-CM: E87.1  ICD-9-CM: 276.1  12/25/2013 - 12/27/2013        RESOLVED: Dysphagia ICD-10-CM: R13.10  ICD-9-CM: 787.20  9/17/2013 - 5/22/2014        RESOLVED: Paraplegia ([facility] ICD-10-CM: G82.20  ICD-9-CM: 344.1  9/3/2013 - 5/22/2014        RESOLVED: Hypomagnesemia ICD-10-CM: E83.42  ICD-9-CM: 275.2  9/1/2013 - 9/14/2013        RESOLVED: Microcytic anemia ICD-10-CM: D50.9  ICD-9-CM: 280.9  9/1/2013 - 5/22/2014        RESOLVED: Fall at home ICD-10-CM: [address] [place], [place]  ICD-9-CM: I208.5, E849.0  9/1/2013 - 9/14/2013        RESOLVED: Sinus tachycardia ICD-10-CM: R00.0  ICD-9-CM: 427.89  9/1/2013 - 9/17/2013        RESOLVED: Hiccups ICD-10-CM: R06.6  ICD-9-CM: 786.8  6/6/2013 - 6/22/2013        RESOLVED: Hyponatremia ICD-10-CM: E87.1  ICD-9-CM: 276.1  10/11/2012 - 9/14/2013        RESOLVED: Tobacco abuse ICD-10-CM: Z72.0  ICD-9-CM: 305.1  10/11/2012 - 9/17/2013        RESOLVED: Unspecified essential hypertension ICD-10-CM: I10  ICD-9-CM: 401.9  9/13/2012 - 1/6/2013        RESOLVED: Hyponatremia ICD-10-CM: E87.1  ICD-9-CM: 276.1  9/2/2012 - 9/18/2012        RESOLVED: Hypokalemia ICD-10-CM: E87.6  ICD-9-CM: 276.8  9/2/2012 - 9/18/2012        RESOLVED: Psychogenic polydipsia (Chronic) ICD-10-CM: R63.1, F54  ICD-9-CM: 316, 783.5  9/2/2012 - 9/14/2013           Discharge Condition: Stable    PHYSICAL EXAM  Visit Vitals  /80 (BP 1 Location: Left upper arm, BP Patient Position: At rest)   Pulse (!) 107   Temp 98.4 °F (36.9 °C)   Resp 17   Ht 5' 9\" (1.753 m)   Wt 76.2 kg (167 lb 15.9 oz)   SpO2 100%   BMI 24.81 kg/m²       General: Alert, cooperative, no acute distress    HEENT: NC, Atraumatic. PERRLA, EOMI. Anicteric sclerae. Lungs:  CTA Bilaterally. No Wheezing/Rhonchi/Rales. Heart:  Regular  rhythm,  No murmur, No Rubs, No Gallops  Abdomen: Soft, Non distended, Non tender. +Bowel sounds, no HSM  Extremities: No c/c/e  Psych:   Good insight. Not anxious or agitated. Neurologic:  CN 2-12 grossly intact, oriented X 3. No acute neurological      Deficits,     Hospital Course: Patient is a 60-year-old with a past medical history of dementia, hypertension, CVA, seizures, hyperlipidemia who presented to the [facility] ED on 1/4/2023 from [facility] due to altered mental status. In the emergency department was notable for leukocytosis, lactic acidosis, hyponatremia with a sodium of 161, hypokalemia and hypophosphatemia. Imaging notable for bilateral basal consolidations on CT chest as well as fecal impaction. Patient was fluid resuscitated and the patient went to the ICU for persistent shock requiring Levophed at 5 mcg/minute. The patient was started on additional fluids and a dietitian was consulted start the patient on tube feeding and water flushes. Infectious disease was consulted and the patient was placed on Zosyn, levofloxacin and vancomycin and a surgery consult was obtained for a sacral ulcer that was concerning for infection. Neurology was consulted for hypernatremia and they recommended hydration with D5W and free water through the PEG tube. General surgery was consulted who noted a large necrotic sacral decubitus ulcer in the setting of sepsis and recommended drainage and debridement of the ulcer. Patient received an I&D of the sacral ulcer on 1/6/2023. During the procedure the patient had his hemoglobin dropped to 4.7 and he had 2 units of blood given. Wound care was consulted after the I&D of the sacral ulcer. Cultures and wound cultures have grown different organisms including staph coag negative which eventually grew to be E. coli, Proteus, Bacteroides, gram-positive cocci. Also noted to have a C. difficile infection and sacral osteomyelitis. Patient had a bowel movement on 1/8/2023 with a soapsuds enema. During the patient's stay the patient's PEG tube became dislodged and gastroenterology was consulted to help put it back in. Of care was consulted and the patient was made DNR/DNI but okay for a long-term feeding tube. ID recommended long-term antibiotics with a PICC line and a PICC line was placed by interventional radiology. On the day of discharge infectious disease was contacted and was agreeable to letting the patient go back to his facility on long-term antibiotics via a PICC line until 2/21/2023, patient will be on Zosyn and oral vancomycin until that date. Patient is to follow-up with surgery in roughly a week to follow-up on the wound VAC that is in place for his sacral ulcer. Patient's Eliquis was on hold while in the hospital due to postop anemia however it is to be restarted on discharge. Consults: Critical care, dietitian, infectious disease, nephrology, gastroenterology, interventional radiology    Significant Diagnostic Studies:   Xr abdomen 1/10/23  Administered contrast via gastrostomy appropriately outlines gastric rugae and  proximal duodenum. CT chest abd pelvis  1.  Bilateral posterior basilar pneumonia, slightly less severe than before. Trace right pleural effusion. 2. Hiatal hernia/distal esophagitis. 3. Severe constipation. Fecal impaction in the rectum with distended rectum,  large fecal ball. 4. Decubitus ulcer with air approaching the posterior wall of anus/rectum and  posterior cortex of the sacrum and coccyx. No abscess. 01/04/2023  Streaky bibasilar opacities, right greater than left. Findings may represent  atelectasis versus developing infiltrate. Discharge Medications:     Current Discharge Medication List        START taking these medications    Details   piperacillin-tazobactam 3.375 gram 3.375 g IVPB 3.375 g by IntraVENous route every eight (8) hours for 37 days. Qty: 3.375 Dose, Refills: 0      vancomycin 50 mg/mL oral solution (compounded) 2.5 mL by Per G Tube route every six (6) hours for 37 days. Qty: 370 mL, Refills: 0           CONTINUE these medications which have CHANGED    Details   ferrous sulfate (FerrouSuL) 325 mg (65 mg iron) tablet Take 1 Tablet by mouth Daily (before breakfast). Qty: 30 Tablet, Refills: 2           CONTINUE these medications which have NOT CHANGED    Details   apixaban (ELIQUIS) 5 mg tablet Take 1 Tablet by mouth every twelve (12) hours. Qty: 60 Tablet, Refills: 0      baclofen (LIORESAL) 10 mg tablet 10 mg by Per G Tube route two (2) times daily as needed for Muscle Spasm(s). 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 Indications: prevention of vitamin B12 deficiency      metoprolol tartrate (LOPRESSOR) 25 mg tablet 25 mg by Per G Tube route two (2) times a day. thiamine HCL (B-1) 100 mg tablet 1 Tablet by Per G Tube route daily. Qty: 30 Tablet, Refills: 0      levETIRAcetam (KEPPRA) 100 mg/mL solution 7.5 mL by Per G Tube route two (2) times a day.   Qty: 473 mL, Refills: 0      pravastatin (PRAVACHOL) 40 mg tablet Take 1 Tab by mouth nightly. Qty: 30 Tab, Refills: 1      ascorbic acid, vitamin C, (VITAMIN C) 250 mg tablet Take 1 Tab by mouth daily. Qty: 30 Tab, Refills: 2      polyethylene glycol (MIRALAX) 17 gram packet Take 1 Packet by mouth daily. Qty: 30 Packet, Refills: 2      tamsulosin (FLOMAX) 0.4 mg capsule Take 0.4 mg by mouth daily. 1 tab daily      MULTIVITAMIN,THERAPEUTIC (THERA MULTI-VITAMIN PO) Take 500 mg by mouth daily. folic acid (FOLVITE) 1 mg tablet Take 1 Tab by mouth daily.   Qty: 30 Tab, Refills: 1             Activity: activity as tolerated    Diet: PEG feeding at 50 ml/hr    Wound Care: As directed    Follow-up: with PCP, Milagros Oseguera MD in 7-10days    Minutes spent on discharge: >30 minutes spent coordinating this discharge (review instructions/follow-up, prescriptions, preparing report for sign off)

## 2023-01-15 NOTE — PROGRESS NOTES
1143 Report attempted to be called to [facility] was put on hold then hung up on.     1200 Report called to Jorge Dowell at [facility], will call [facility] when transport arrives for patient to give a more accurate arrival time.

## 2023-01-15 NOTE — PROGRESS NOTES
Transition of Care Plan back to LTC    LTC return:  Patient has been accepted to [address] [place] [address] and meets criteria for admission. Patient will transported by and expected to leave at three hour window    Communication to Patient/Family:  Patient are agreeable to the transition plan. Communication to SNF/Rehab:  Bedside RN, has been notified to update the transition plan to the facility and call report (phone number [phone]    Discharge information has been updated on the AVS.       Nursing Please include all hard scripts for controlled substances, med rec and dc summary, and AVS in packet. Reviewed and confirmed with facility, can manage the patient care needs for the following:     Mehdi Rowe with (X) only those applicable:    Medication:  [x]  Medications will be available at the facility  []  IV Antibiotics   []  Controlled Substance - hard copy to be sent with patient   []  Weekly Labs   Documents:  [x] Hard RX  [] MAR  [] Kardex  [] AVS  []Transfer Summary  []Discharge   Equipment:  []  CPAP/BiPAP  []  Wound Vacuum  []  Reid or Urinary Device  []  PICC/Central Line  []  Nebulizer  []  Ventilator   Treatment:  []Isolation (for MRSA, VRE, etc.)  []Surgical Drain Management  []Tracheostomy Care  []Dressing Changes  []Dialysis with transportation and chair time   []PEG Care  []Oxygen  []Daily Weights for Heart Failure   Dietary:  []Any diet limitations  []Tube Feedings   []Total Parenteral Management (TPN)   Eligible for Medicaid Long Term Services and Supports  Yes:  [x] Eligible for medical assistance or will become eligible within 180 days and UAI completed. [] Provider/Patient and/or support system has requested screening. [] UAI copy provided to patient or responsible party,   [] UAI unavailable at discharge will send once processed to SNF provider.   [] UAI unavailable at discharged mailed to patient  No:   [] Private pay and is not financially eligible for Medicaid within the next 180 days.  [] Reside out-of-state.   [] A residents of a state owned/operated facility that is licensed  by [address] [employer] [facility] or [facility]  [] Enrollment in [facility] services  []  Medical [facility]  [] Patient /Family declines to have screening completed or provide financial information for screening     Financial Resources:  Medicaid    [] Initiated and application pending   [] Full coverage     Advanced Care Plan:  []Surrogate Decision Maker of Care  []POA  []Communicated Code Status (DDNR\", \"Full\")    Other      Meme Wlilett, EVAN, HP

## 2023-06-27 NOTE — ADT AUTH CERT NOTES
Pneumonia Due to Aspiration - Care Day 8 (10/16/2022) by Ethel Damico       Review Status Review Entered   Completed 10/17/2022 1226       Created By   Ethel Damico      Criteria Review      Care Day: 8 Care Date: 10/16/2022 Level of Care: Step Down    Guideline Day 2    Level Of Care    (X) Floor    10/17/2022 12:25:56 EDT by Ethel Damico      10/16 Stepdown    Clinical Status    (X) * Hypotension absent    10/17/2022 12:25:56 EDT by Shawanda Briggs      BP: 153/83, 126/83    (X) * No requirement for mechanical ventilation    10/17/2022 12:25:56 EDT by Shawanda Briggs      3L nc    (X) * Uncompensated respiratory acidosis absent    10/17/2022 12:25:56 EDT by Shawanda Briggs      CO2: 27    Activity    ( ) * Activity as tolerated    10/17/2022 12:25:56 EDT by Ethel Damico      turn or position q2h    Routes    (X) * Liquid diet or tube feedings    10/17/2022 12:25:56 EDT by Ethel Damico      ADULT TUBE FEEDING Nasogastric; Standard with Fiber NGT 10ml/hr continuous    Interventions    (X) CBC    10/17/2022 12:25:56 EDT by Shawanda Briggs      GLUCOSE,FAST - POC: 97, 127, 125 ,105  WBC: 9.1  RBC: 3.44 (L)  HGB: 8.6 (L)  HCT: 27.7 (L)  NEUTROPHILS: 83 (H)  LYMPHOCYTES: 9 (L)  Sodium: 146 (H)  Potassium: 2.9 (LL)  Chloride: 115 (H)  Glucose: 122 (H)  BUN: 3 (L)  Creatinine: 0.48 (L)  Calcium: 8.1 (L)    (X) Possible feeding tube placement    10/17/2022 12:25:56 EDT by Shawanda Briggs      on NGT feeding, plan for PEG    Medications    (X) Possible IV or oral antibiotics    10/17/2022 12:25:56 EDT by Shawanda Briggs      IV Zosyn 4.5g q8h    * Milestone   Additional Notes   10/16 Stepdown      Pertinent Updates:   Patient in to maintain COVID isolation. No change in mental status. Does not talk very poor communication as a condom catheter very slow urine output. Finally getting IV fluid and the lab is drawn.    Currently he is getting IV fluid to D5W with potassium chloride at 50 cc/h also getting free water through the NG tube 50 cc/h restarted Jevity oral nutrition at 25 cc/h   Lab from this morning shows serum sodium of 146 and the potassium is 2.9 and magnesium of 1.9.   6 boluses of potassium replacement being ordered by hospitalist       Vitals:   98.3 °F (36.8 °C) 82 153/83  20 99 % ra      Physical Exam:   General:  In NAD. Nontoxic-appearing. NGT in place. Cardiovascular:  RRR. Pulmonary: Effort nonlabored. GI:  Abdomen soft. Extremities:  Warm, no edema or ischemia. Neuro: Awake, nonverbal      Per Attending   Antibiotic course completed. Monitor off. Continue NG tube feeds as ordered. Will reconsult gastroenterology for PEG tube placement mid-week. Consider IR eval if necessary to see if tube can be done sooner. Volume management per nephrology. Wound care per current orders. PT/OT evaluations now that patient is more awake. Disposition to be determined. Per Nephrology   Continue current IV fluid, free water through NG tube and NG feeding and check labs once per day. Renal function has improved significantly with hydration ,continue DNR.       Medications:   IVF dextrose 5% with KCl 20 mEq/L infusion 75ml/hr   IV Pepcid 18IM T26L   IV folic acid  1 mg in 7.1% sodium chloride 50 mL ivpb daily   SC heparin 5000 units q8h    IV Keppra 750mg q12h   PO Lopressor 25mg 2x daily   IV thiamine 100mg daily    IV potassium chloride 10 mEq in 100 ml IVPB q1h given 6x       Orders:   droplet plus isolation,    daily labs, spot check oximetry, neuro vascular checks continuous, SCD               Pneumonia Due to Aspiration - Care Day 7 (10/15/2022) by Nancy West       Review Status Review Entered   Completed 10/17/2022 1217       Created By   Nancy West      Criteria Review      Care Day: 7 Care Date: 10/15/2022 Level of Care: Step Down    Guideline Day 2    Level Of Care    (X) Floor    10/17/2022 12:17:39 EDT by Berkley Kelley      10/15 Stepdown    Clinical Status    (X) * Hypotension absent    10/17/2022 12:17:39 EDT by Shawanda Myers      98.9 °F (37.2 °C) 100 163/85  20 93 % 3L nc    (X) * No requirement for mechanical ventilation    10/17/2022 12:17:39 EDT by Shawanda Myers      3L nc    (X) * Uncompensated respiratory acidosis absent    10/17/2022 12:17:39 EDT by Shawanda Myers      not indicated    Activity    ( ) * Activity as tolerated    10/17/2022 12:17:39 EDT by Berkley Kelley      turn or position q2h    Routes    (X) * Liquid diet or tube feedings    10/17/2022 12:17:39 EDT by Berkley Kelley      ADULT TUBE FEEDING Nasogastric; Standard with Fiber NGT  10ml/hr continuous    Interventions    (X) Possible feeding tube placement    10/17/2022 12:17:39 EDT by Shawanda Myers      on NGT feeding, plan for PEG    Medications    (X) Possible IV or oral antibiotics    10/17/2022 12:17:39 EDT by Shawanda Myers      IV Zosyn 4.5g q8h    * Milestone   Additional Notes   10/15 Stepdown      Pertinent Updates:   Much more awake today. Speech a little difficult to understand but no pain or shortness of breath reported      GLUCOSE,FAST - POC: [id number]      Physical Exam:   General:  In NAD. Nontoxic-appearing. NGT in place. Cardiovascular:  RRR. Pulmonary: Effort nonlabored. GI:  Abdomen soft. Extremities:  Warm, no edema or ischemia. Neuro: Awake, nonverbal.      Per Attending   Viniciusn continued, management per ID. PEG tube placement this coming week. GI versus IR. Volume management per nephrology monitor electrolytes and renal indices. Wound care per [id number]  services orders. Antiepileptic therapy as previously prescribed. Remains on Keppra. May need to replace midline for vascular access as current catheter unable to return blood. Other care primarily supportive. Disposition to be determined. Follow.       Per Nephrology   Patient looks comfortable and not in acute distress. Seems  not received IV fluid as well as free water and NG feeding for a while but now restarted. Also phlebotomist having difficult time drawing blood. Seems blood have to drawn from the midline   Continue current IV fluids with D5W and 20 of potassium per liter and run at 75 cc per. Continue free water through the NG tube and NG feeding.   As the sodium has improved to 146 will decrease the frequency of lab draw and that will be done only once in the morning      Medications:   IVF dextrose 5% with KCl 20 mEq/L infusion 75ml/hr   IV Pepcid 52VL Q37R   IV folic acid  1 mg in 7.2% sodium chloride 50 mL ivpb daily   SC heparin 5000 units q8h    IV Keppra 750mg q12h   PO Lopressor 25mg 2x daily   IV thiamine 100mg daily       Orders:   droplet plus isolation,    daily labs, spot check oximetry, neuro vascular checks continuous, SCD                  Pneumonia Due to Aspiration - Care Day 6 (10/14/2022) by Carly Ugarte       Review Status Review Entered   Completed 10/17/2022 1206       Created By   Carly Ugarte      Criteria Review      Care Day: 6 Care Date: 10/14/2022 Level of Care: Step Down    Guideline Day 1    Level Of Care    (X) Floor    10/17/2022 12:06:40 EDT by Carly Ugarte      10/14 Stepdown    Clinical Status    (X) * Clinical Indications met    10/17/2022 12:06:40 EDT by Candance Soles, Abegail      Aspiration pneumonia, dysphagia, need for PEG    (X) Dyspnea and tachypnea    10/17/2022 12:06:40 EDT by Candance Soles, Abegail      RR: 21,  22    Activity    ( ) Bed rest    10/17/2022 12:06:40 EDT by Carly Ugarte      turn or position q2h    Routes    (X) IV fluids    10/17/2022 12:06:40 EDT by Candance Soles, Abegail      dextrose 5% with KCl 20 mEq/L infusion 75ml/hr    (X) IV medications    10/17/2022 12:06:40 EDT by Candance Soles, Abegail      IV Pepcid 02SL N31U, IV Folic acid 1 mg in 2.1% sodium chloride 50 mL ivpb daily; IV Keppra 750mg q12h; IV thiamine 100mg daily; IV potassium chloride 10 mEq in 100 ml IVPB q1h given 5x    Interventions    (X) CBC    10/17/2022 12:06:40 EDT by Shawanda Tilley      WBC: 9.6  RBC: 3.31 (L)  HGB: 8.2 (L)  HCT: 27.0 (L)  PLATELET: 873  NEUTROPHILS: 85 (H)  LYMPHOCYTES: 7 (L)  Sodium: 151 (H)  Potassium: 2.7 (LL)  Chloride: 120 (H)  Glucose: 109 (H)  BUN: 7  Creatinine: 0.38 (L)  Calcium: 7.6 (L)  Phosphorus: 1.9 (L)    (X) Aspiration precautions    10/17/2022 12:06:40 EDT by Shawanda Tilley      aspiration precaution continuous    (X) Oxygen    10/17/2022 12:06:40 EDT by Shawanda Tilley      3L nc    (X) Head of bed at 30 degrees    10/17/2022 12:06:40 EDT by Shawanda Tilley      continuous    Medications    (X) Possible IV antibiotics    10/17/2022 12:06:40 EDT by Shawanda Tilley      IV Zosyn 4.5g q8h    * Milestone   Additional Notes   10/14 Stepdown      Pertinent Updates:   GI recommendations regarding PEG tube placement appreciated. Earliest ability to place tube would be 10/20 per their COVID protocols      Vitals:   99.2 (37.3) 86 141/72 18 99% 3L nc       Abnl/Pertinent Labs/Radiology/Diagnostic Studies:   GLUCOSE,FAST - POC: 106,101,105, 89,94      XR CHEST PORT:    Esophagogastric tube as above. Left more than right lower lung likely hazy   infiltrate or edema and left pleural effusion, increased since prior      Sodium: 148 (H)   Potassium: 3.0 (L)   Chloride: 118 (H)   Glucose: 103 (H)   BUN: 7   Creatinine: 0.51 (L)   Calcium: 8.3 (L)   Phosphorus: 1.8 (L)   Sodium: 146 (H)      Physical Exam:   General:  In NAD. Nontoxic-appearing. NGT in place. Cardiovascular:  RRR. Pulmonary: Effort nonlabored. GI:  Abdomen soft. Extremities:  Warm, no edema or ischemia. Neuro: Awake, nonverbal.      Per Palliative Medicine   CODE STATUS: DNR/DNI, LIMITED INTERVENTIONS, FEEDING TUBE LONG TERM      Per Infectious Disease   Continue piperacillin/tazobactam till 10/16/22. Discontinue Reid.  D/w nephrologist. Shaun Safer to d/c bradley                                                                                                                        Aspiration precautions    Monitor CBC, temperature, oxygenation, clinically    Discontinue droplet plus isolation on 10/20/2022      Per gastroenterology   Aspiration pneumonia, dysphagia, need for PEG    Oropharyngeal dysphagia - receiving NGT feeds    COVID Positive (10/10), also positive for enterovirus and rhinovirus    EGD/colonoscopy 2021 - small sliding hiatal hernia, otherwise normal; diverticulosis, poor prep, hemorrhoids   Please reconsult GI on 10/20 after droplet plus contact precautions d/c, consider elective PEG at that time, pending clinical course. Anatomically patient seems to be a reasonable candidate for PEG- EGD last year w/ only small HH. Did not have opportunity to discuss w/ family regarding risks/benefits/alternatives. If pt ready for d/c prior to 10/20, consider consulting IR to see what their policy is regarding PEG placement in COVID positive patient. Per Family medicine   Antibiotics as ordered, management per ID. GI recommendations regarding PEG tube placement appreciated. Earliest ability to place tube would be 10/20 per their COVID protocols. Patient likely to remain hospitalized for the next several days as disposition is also an issue. We will reconsult GI to place tube on 1020 unless disposition can be arranged earlier. Continue antiepileptic therapy. Continue wound care. Other management primarily supportive. Care management to continue to work on disposition. Unless significant improvement noted, anticipate that long-term care placement will be necessary\      Per Nephrology   Patient  remained in his baseline mental status but more alert and awake. Strict anticoagulant measures implemented. NG feeding was on hold now he stated after readjusting that NG tube. Bradley sticking out.   Also receiving D5W with 20 of potassium milliequivalent per liter at 50 cc/h. No labs are being done today unfortunately. Last sodium remains 147 definitely improved significantly from the time of admission last few days back. Continue current IV fluid along with free water through the NG tube and NG feeding with Jevity. Need to check electrolytes every 12 hours and serum sodium every 4 hours until the sodium is more stable. Continue DNR. Continue other supportive care. Medications:   SC heparin 5000 units q8h    PO Lopressor 25mg 2x daily      Orders:   droplet plus isolation, ADULT TUBE FEEDING Nasogastric; Standard with Fiber NGT  10ml/hr continuous    daily labs, spot check oximetry, neuro vascular checks continuous, SCD       Pe Dietary   Resume tube feeding:    Formula: Jevity 1.5   Initial Rate: 10 mL/hr   Advancement: 10 q 24 hrs as tolerated   Goal Rate: 60 mL/hr    Water Flushes: 50 mL q 1 hours (D5 also infusing)    Goal Regimen Provides: 2160 kcal, 119 g of protein, 1093 mL of free water, 2293 mL of TOTAL WATER, 100% RDIs. Initiate aspiration precautions    Monitor tolerance of EN feeding, readiness to advance to goal rate, weight, labs, and plan of care during admission. CM Note:   The patient is a LTC resident at [address] [place] [address] and can return.                    Pneumonia Due to Aspiration - Clinical Indications for Admission to Inpatient Care by Lisa Romo       Review Status Review Entered   Completed 10/17/2022 1150       Created By   Lisa Romo      Criteria Review      Clinical Indications for Admission to Inpatient Care    Most Recent : Lisa Romo Most Recent Date: 10/17/2022 11:50:15 EDT    (X) Admission is indicated for  1 or more  of the following  (1) (2) (3) (4):       (X) Patient receives chronic care in setting (eg, [facility] care, skilled nursing facility) where       care for aspiration has failed or cannot be provided (eg, patient is clinically unstable,       and aggressive medical care is desired).        10/17/2022 11:50:15 EDT by Robinson Arreola         Aspiration pneumonia, dysphagia, need for PEG no

## 2024-04-01 NOTE — PROGRESS NOTES
Hyper Osmolar Hypernatremic Dehydration. .  Acute Renal Failure  ,Pre-Renal.  By his current weight he is 10  liters Free water Deficit. .  Did not receive any Fluid Until Patient Moved to ICU & currently getting Normosol. Aspiration Pneumonia. Covid  Positive. Acute on Chronic Encephalopathy. Plan : send Serum & Urine Osmolality,Urine Electrolytes. Change IVF to D5W & run at 100 cc/hour. Check BMP q 12 hours, not to correct Na  > 10  meq /24 hours. I/O record. Easton Crumble aspiration Precautions. Continue DNR/ DNI. Agree with Palliative care consult. Avoid aggressive care. .            Maintain Anti Covid measure             Discussed with ICU Attending & PA & caring Nurse. Full consult to Follow . Will follow. Thanks. CRISTINO

## 2024-07-23 NOTE — ROUTINE PROCESS
Bedside and Verbal shift change report given to [address] [place] [address] E (oncoming nurse) by Lenore Abad RN (offgoing nurse). Report included the following information SBAR, Kardex, MAR and Recent Results.     SITUATION:    Code Status: Full Code   Reason for Admission: Pneumonia    [facility] day: 4   Problem List:       Hospital Problems  Date Reviewed: 7/11/2017          Codes Class Noted POA    Pneumonia ICD-10-CM: J18.9  ICD-9-CM: 604  8/22/2017 Unknown              BACKGROUND:    Past Medical History:   Past Medical History:   Diagnosis Date    Hiccups     Hypertension     Hyponatremia     Lacunar infarction ([facility] [address] 2010    Lower extremity edema     Psychogenic polydipsia     Spinal stenosis          Patient taking anticoagulants yes     ASSESSMENT:    Changes in Assessment Throughout Shift: none     Patient has Central Line: no Reasons if yes: n/a   Patient has Reid Cath: no Reasons if yes: n/a      Last Vitals:     Vitals:    08/25/17 2047 08/26/17 0034 08/26/17 0414 08/26/17 1008   BP: 160/88 164/85 157/72 152/86   Pulse: 81 79 81 85   Resp: 18 20 18 18   Temp: 97.5 °F (36.4 °C) 97.7 °F (36.5 °C) 97.8 °F (36.6 °C) 98.5 °F (36.9 °C)   SpO2: 95% 96% 97% 95%   Weight:   65.3 kg (144 lb)    Height:            IV and DRAINS (will only show if present)   [REMOVED] Peripheral IV 08/22/17 Right Hand-Site Assessment: Clean, dry, & intact  Peripheral IV 08/22/17 Left Hand-Site Assessment: Clean, dry, & intact     WOUND (if present)   Wound Type:  Vascular wound   Dressing present Dressing Present : Yes   Wound Concerns/Notes:  Yes,  Wound  consult in place     PAIN    Pain Assessment    Pain Intensity 1: 0 (08/26/17 0414)    Pain Location 1: Abdomen    Pain Intervention(s) 1: Medication (see MAR)    Patient Stated Pain Goal: 0  o Interventions for Pain:  percocet  o Intervention effective: yes  o Time of last intervention: 2014   o Reassessment Completed: yes      Last 3 Weights:  Last 3 Recorded Weights in Patient sent CTA she done previously from outside and was reviewed by you. Please advise if you still want patient to have another one.   this Encounter    08/22/17 0308 08/22/17 1153 08/26/17 0414   Weight: 99.3 kg (219 lb) 65.8 kg (145 lb) 65.3 kg (144 lb)     Weight change:      INTAKE/OUPUT    Current Shift:      Last three shifts: 08/24 1901 - 08/26 0700  In: 4400 [P.O.:1020; I.V.:1200]  Out: 2200 [Urine:2200]     LAB RESULTS     Recent Labs      08/25/17   0320  08/24/17   0314   WBC   --   6.6   HGB  9.8*  8.8*   HCT  29.5*  26.4*   PLT   --   296        Recent Labs      08/25/17   1010  08/24/17   0314   NA  139  136   K  3.7  3.7   GLU  128*  114*   BUN  10  9   CREA  0.71  0.69   CA  8.5  8.4*       RECOMMENDATIONS AND DISCHARGE PLANNING     1. Pending tests/procedures/ Plan of Care or Other Needs: antibiotics    2. Discharge plan for patient and Needs/Barriers: home    3. Estimated Discharge Date: tbd Posted on Whiteboard in [facility]: yes      4. The patient's care plan was reviewed with the oncoming nurse. \"HEALS\" SAFETY CHECK      Fall Risk    Total Score: 4    Safety Measures: Safety Measures: Bed/Chair-Wheels locked, Bed in low position, Call light within reach    A safety check occurred in the patient's room between off going nurse and oncoming nurse listed above. The safety check included the below items  Area Items   H  High Alert Medications - Verify all high alert medication drips (heparin, PCA, etc.)   E  Equipment - Suction is set up for ALL patients (with yanker)  - Red plugs utilized for all equipment (IV pumps, etc.)  - WOWs wiped down at end of shift.  - Room stocked with oxygen, suction, and other unit-specific supplies   A  Alarms - Bed alarm is set for fall risk patients  - Ensure chair alarm is in place and activated if patient is up in a chair   L  Lines - Check IV for any infiltration  - Reid bag is empty if patient has a Reid   - Tubing and IV bags are labeled   S  Safety   - Room is clean, patient is clean, and equipment is clean. - Hallways are clear from equipment besides carts.    - Fall bracelet on for fall risk patients  - Ensure room is clear and free of clutter  - Suction is set up for ALL patients (with erika)  - Hallways are clear from equipment besides carts.    - Isolation precautions followed, supplies available outside room, sign posted     Sharri De Leon RN

## 2024-09-14 NOTE — ROUTINE PROCESS
Dilaudid 1 mg iv given prior to MRI test, MRI made aware to call floor first before sending patient back to floor if patient can not tolerate test, so to provide assistance to patient to complete test. positive home pregnancy test 2 weeks ago and was seen by OBGYN clinic. Pt was told there is no heart beat at that time. LMP 24. . Pt scheduled for D+C next week but started c/o right pelvic cramping and spotting. Phx DM2. Fingerstick 275

## (undated) DEVICE — PACK PROCEDURE SURG MAJ W/ BASIN LF

## (undated) DEVICE — AIRLIFE™ NASAL OXYGEN CANNULA CURVED, FLARED TIP WITH 14 FOOT (4.3 M) CRUSH-RESISTANT TUBING, OVER-THE-EAR STYLE: Brand: AIRLIFE™

## (undated) DEVICE — BLADE ASSEMB CLP HAIR FINE --

## (undated) DEVICE — FLUFF AND POLYMER UNDERPAD,EXTRA HEAVY: Brand: WINGS

## (undated) DEVICE — STERILE POLYISOPRENE POWDER-FREE SURGICAL GLOVES: Brand: PROTEXIS

## (undated) DEVICE — INTENDED FOR TISSUE SEPARATION, AND OTHER PROCEDURES THAT REQUIRE A SHARP SURGICAL BLADE TO PUNCTURE OR CUT.: Brand: BARD-PARKER SAFETY BLADES SIZE 10, STERILE

## (undated) DEVICE — GAUZE,SPONGE,4"X4",16PLY,STRL,LF,10/TRAY: Brand: MEDLINE

## (undated) DEVICE — DRAPE,EXTREMITY,89X128,STERILE: Brand: MEDLINE

## (undated) DEVICE — FORCEPS BX L240CM JAW DIA2.8MM L CAP W/ NDL MIC MESH TOOTH

## (undated) DEVICE — AIRLIFE™ NASAL OXYGEN CANNULA CURVED, NONFLARED TIP WITH 14 FOOT (4.3 M) CRUSH-RESISTANT TUBING, OVER-THE-EAR STYLE: Brand: AIRLIFE™

## (undated) DEVICE — MEDI-VAC SUCTION HIGH CAPACITY: Brand: CARDINAL HEALTH

## (undated) DEVICE — FCPS RAD JAW 4LC 240CM W/NDL -- BX/20 RADIAL JAW 4

## (undated) DEVICE — BITE BLOCK ENDOSCP UNIV AD 6 TO 9.4 MM

## (undated) DEVICE — SOLUTION IRRIG 1000ML H2O STRL BLT

## (undated) DEVICE — SYR 50ML LR LCK 1ML GRAD NSAF --

## (undated) DEVICE — SYR 50ML SLIP TIP NSAF LF STRL --

## (undated) DEVICE — KIT CLN UP BON SECOURS MARYV

## (undated) DEVICE — ELECTRODE PT RET AD L9FT HI MOIST COND ADH HYDRGEL CORDED

## (undated) DEVICE — LIMB HOLDER, WRIST/ANKLE: Brand: DEROYAL

## (undated) DEVICE — BANDAGE COMPR W4INXL5YD BGE COHESIVE SELF ADH ADBAN CBN1104] AVCOR HEALTHCARE PRODUCTS INC]

## (undated) DEVICE — MEDI-VAC NON-CONDUCTIVE SUCTION TUBING: Brand: CARDINAL HEALTH

## (undated) DEVICE — CANNULA ORIG TL CLR W FOAM CUSHIONS AND 14FT SUPL TB 3 CHN

## (undated) DEVICE — SYR 20ML LL STRL LF --

## (undated) DEVICE — (D)GLOVE SURG ORTH 8 PWD LTX -- DISC BY MFR USE ITEM 278015

## (undated) DEVICE — BASIN EMESIS 500CC ROSE 250/CS 60/PLT: Brand: MEDEGEN MEDICAL PRODUCTS, LLC

## (undated) DEVICE — THREE-QUARTER SHEET: Brand: CONVERTORS

## (undated) DEVICE — CATHETER SUCT TR FL TIP 14FR W/ O CTRL

## (undated) DEVICE — KERLIX BANDAGE ROLL: Brand: KERLIX

## (undated) DEVICE — LINER SUCT CANSTR 3000CC PLAS SFT PRE ASSEMB W/OUT TBNG W/

## (undated) DEVICE — FLEX ADVANTAGE 3000CC: Brand: FLEX ADVANTAGE

## (undated) DEVICE — SOLUTION IV 1000ML 0.9% SOD CHL

## (undated) DEVICE — KENDALL SCD EXPRESS SLEEVES, KNEE LENGTH, MEDIUM: Brand: KENDALL SCD

## (undated) DEVICE — CURITY NON-ADHERENT STRIPS: Brand: CURITY

## (undated) DEVICE — REM POLYHESIVE ADULT PATIENT RETURN ELECTRODE: Brand: VALLEYLAB

## (undated) DEVICE — GAUZE SPONGES,16 PLY: Brand: CURITY

## (undated) DEVICE — GOWN ISOL IMPERV UNIV, DISP, OPEN BACK, BLUE --

## (undated) DEVICE — SHEET, T, LAPAROTOMY, STERILE: Brand: MEDLINE

## (undated) DEVICE — TAPE ADH CLTH SILK H2O REPELLENT CURAD

## (undated) DEVICE — BANDAGE,GAUZE,BULKEE II,4.5"X4.1YD,STRL: Brand: MEDLINE

## (undated) DEVICE — SYR 10ML LUER LOK 1/5ML GRAD --

## (undated) DEVICE — ENDOSCOPY PUMP TUBING/ CAP SET: Brand: ERBE

## (undated) DEVICE — STOCKINETTE,IMPERVIOUS,12X48,STERILE: Brand: MEDLINE

## (undated) DEVICE — SYRINGE MED 25GA 3ML L5/8IN SUBQ PLAS W/ DETACH NDL SFTY

## (undated) DEVICE — TRAY PREP DRY W/ PREM GLV 2 APPL 6 SPNG 2 UNDPD 1 OVERWRAP

## (undated) DEVICE — (D)PREP SKN CHLRAPRP APPL 26ML -- CONVERT TO ITEM 371833

## (undated) DEVICE — KIT GASTMY PERC PEG PULL 20FR -- ENDOVIVE BX/2

## (undated) DEVICE — 3M™ BAIR PAWS FLEX™ WARMING GOWN, STANDARD, 20 PER CASE 81003: Brand: BAIR PAWS™

## (undated) DEVICE — TAPE,CLOTH/SILK,CURAD,3"X10YD,LF,40/CS: Brand: CURAD

## (undated) DEVICE — SNARE POLYP M W27MMXL240CM OVL STIFF DISP CAPTIVATOR